# Patient Record
Sex: FEMALE | Race: WHITE | Employment: OTHER
[De-identification: names, ages, dates, MRNs, and addresses within clinical notes are randomized per-mention and may not be internally consistent; named-entity substitution may affect disease eponyms.]

---

## 2017-01-13 ENCOUNTER — OFFICE VISIT (OUTPATIENT)
Dept: PODIATRY | Facility: CLINIC | Age: 67
End: 2017-01-13

## 2017-01-13 VITALS
BODY MASS INDEX: 36.61 KG/M2 | HEART RATE: 83 BPM | TEMPERATURE: 98.3 F | WEIGHT: 220 LBS | DIASTOLIC BLOOD PRESSURE: 80 MMHG | SYSTOLIC BLOOD PRESSURE: 136 MMHG

## 2017-01-13 DIAGNOSIS — M79.672 PAIN IN BOTH FEET: ICD-10-CM

## 2017-01-13 DIAGNOSIS — M79.671 PAIN IN BOTH FEET: ICD-10-CM

## 2017-01-13 DIAGNOSIS — B35.1 ONYCHOMYCOSIS: Primary | ICD-10-CM

## 2017-01-13 PROCEDURE — 11721 DEBRIDE NAIL 6 OR MORE: CPT | Performed by: PODIATRIST

## 2017-02-06 ENCOUNTER — HOSPITAL ENCOUNTER (OUTPATIENT)
Age: 67
Setting detail: SPECIMEN
Discharge: HOME | End: 2017-02-06
Payer: COMMERCIAL

## 2017-02-06 ENCOUNTER — TELEPHONE (OUTPATIENT)
Dept: FAMILY MEDICINE CLINIC | Facility: CLINIC | Age: 67
End: 2017-02-06

## 2017-02-06 LAB
CHOLESTEROL/HDL RATIO: 3.5
CHOLESTEROL: 230 MG/DL
HDLC SERPL-MCNC: 65 MG/DL
HEPATITIS C ANTIBODY: NONREACTIVE
LDL CHOLESTEROL: 131 MG/DL (ref 0–130)
TRIGL SERPL-MCNC: 169 MG/DL
VLDLC SERPL CALC-MCNC: ABNORMAL MG/DL (ref 1–30)

## 2017-02-06 RX ORDER — ATORVASTATIN CALCIUM 40 MG/1
40 TABLET, FILM COATED ORAL DAILY
Qty: 30 TABLET | Refills: 3 | Status: SHIPPED | OUTPATIENT
Start: 2017-02-06 | End: 2018-12-11 | Stop reason: SDDI

## 2017-02-07 ENCOUNTER — TELEPHONE (OUTPATIENT)
Dept: FAMILY MEDICINE CLINIC | Facility: CLINIC | Age: 67
End: 2017-02-07

## 2017-02-17 ENCOUNTER — OFFICE VISIT (OUTPATIENT)
Dept: FAMILY MEDICINE CLINIC | Facility: CLINIC | Age: 67
End: 2017-02-17

## 2017-02-17 VITALS
WEIGHT: 217.8 LBS | DIASTOLIC BLOOD PRESSURE: 69 MMHG | HEART RATE: 77 BPM | SYSTOLIC BLOOD PRESSURE: 119 MMHG | HEIGHT: 65 IN | TEMPERATURE: 96.9 F | BODY MASS INDEX: 36.29 KG/M2

## 2017-02-17 DIAGNOSIS — E78.2 MIXED HYPERLIPIDEMIA: ICD-10-CM

## 2017-02-17 DIAGNOSIS — I10 ESSENTIAL HYPERTENSION: Primary | ICD-10-CM

## 2017-02-17 DIAGNOSIS — M81.0 OSTEOPOROSIS: ICD-10-CM

## 2017-02-17 DIAGNOSIS — M17.12 PRIMARY OSTEOARTHRITIS OF LEFT KNEE: ICD-10-CM

## 2017-02-17 PROCEDURE — 99213 OFFICE O/P EST LOW 20 MIN: CPT

## 2017-02-17 PROCEDURE — 20610 DRAIN/INJ JOINT/BURSA W/O US: CPT

## 2017-02-17 RX ORDER — ALENDRONATE SODIUM 70 MG/1
70 TABLET ORAL
Qty: 4 TABLET | Refills: 5 | Status: SHIPPED | OUTPATIENT
Start: 2017-02-17 | End: 2018-12-11 | Stop reason: SDDI

## 2017-02-17 RX ORDER — ASPIRIN 81 MG/1
TABLET ORAL
Qty: 60 TABLET | Refills: 5 | Status: SHIPPED | OUTPATIENT
Start: 2017-02-17 | End: 2018-12-11 | Stop reason: SDDI

## 2017-02-17 RX ORDER — LIDOCAINE HYDROCHLORIDE 10 MG/ML
2 INJECTION, SOLUTION EPIDURAL; INFILTRATION; INTRACAUDAL; PERINEURAL ONCE
Status: COMPLETED | OUTPATIENT
Start: 2017-02-17 | End: 2017-02-17

## 2017-02-17 RX ORDER — TRIAMCINOLONE ACETONIDE 40 MG/ML
40 INJECTION, SUSPENSION INTRA-ARTICULAR; INTRAMUSCULAR ONCE
Status: COMPLETED | OUTPATIENT
Start: 2017-02-17 | End: 2017-02-17

## 2017-02-17 RX ADMIN — TRIAMCINOLONE ACETONIDE 40 MG: 40 INJECTION, SUSPENSION INTRA-ARTICULAR; INTRAMUSCULAR at 14:37

## 2017-02-17 RX ADMIN — LIDOCAINE HYDROCHLORIDE 2 ML: 10 INJECTION, SOLUTION EPIDURAL; INFILTRATION; INTRACAUDAL; PERINEURAL at 14:36

## 2017-02-17 ASSESSMENT — ENCOUNTER SYMPTOMS
SHORTNESS OF BREATH: 0
CONSTIPATION: 0
VOMITING: 0
DIARRHEA: 0
NAUSEA: 0
WHEEZING: 0
BACK PAIN: 0
COUGH: 0

## 2017-02-17 ASSESSMENT — PATIENT HEALTH QUESTIONNAIRE - PHQ9
SUM OF ALL RESPONSES TO PHQ QUESTIONS 1-9: 1
1. LITTLE INTEREST OR PLEASURE IN DOING THINGS: 1
2. FEELING DOWN, DEPRESSED OR HOPELESS: 0
SUM OF ALL RESPONSES TO PHQ9 QUESTIONS 1 & 2: 1

## 2017-03-09 RX ORDER — LISINOPRIL 20 MG/1
TABLET ORAL
Qty: 90 TABLET | Refills: 0 | Status: SHIPPED | OUTPATIENT
Start: 2017-03-09 | End: 2017-03-22 | Stop reason: SDUPTHER

## 2017-03-22 RX ORDER — LISINOPRIL 20 MG/1
TABLET ORAL
Qty: 90 TABLET | Refills: 0 | Status: SHIPPED | OUTPATIENT
Start: 2017-03-22 | End: 2017-05-18 | Stop reason: SDUPTHER

## 2017-03-24 ENCOUNTER — TELEPHONE (OUTPATIENT)
Dept: FAMILY MEDICINE CLINIC | Age: 67
End: 2017-03-24

## 2017-04-17 ENCOUNTER — OFFICE VISIT (OUTPATIENT)
Dept: FAMILY MEDICINE CLINIC | Age: 67
End: 2017-04-17
Payer: COMMERCIAL

## 2017-04-17 VITALS
SYSTOLIC BLOOD PRESSURE: 127 MMHG | TEMPERATURE: 98.2 F | DIASTOLIC BLOOD PRESSURE: 67 MMHG | WEIGHT: 213 LBS | BODY MASS INDEX: 35.45 KG/M2 | HEART RATE: 88 BPM

## 2017-04-17 DIAGNOSIS — M79.601 RIGHT ARM PAIN: Primary | ICD-10-CM

## 2017-04-17 PROCEDURE — 99213 OFFICE O/P EST LOW 20 MIN: CPT | Performed by: FAMILY MEDICINE

## 2017-04-17 RX ORDER — LEG BRACE
1 EACH MISCELLANEOUS DAILY
Qty: 1 EACH | Refills: 0 | Status: SHIPPED | OUTPATIENT
Start: 2017-04-17

## 2017-04-17 RX ORDER — IBUPROFEN 600 MG/1
600 TABLET ORAL EVERY 6 HOURS PRN
Qty: 30 TABLET | Refills: 1 | Status: SHIPPED | OUTPATIENT
Start: 2017-04-17 | End: 2017-05-18 | Stop reason: SDUPTHER

## 2017-04-17 ASSESSMENT — ENCOUNTER SYMPTOMS
COUGH: 0
NAUSEA: 0
BACK PAIN: 0
DIARRHEA: 0
SORE THROAT: 0
CONSTIPATION: 0
SHORTNESS OF BREATH: 0
ABDOMINAL PAIN: 0
VOMITING: 0

## 2017-04-21 ENCOUNTER — HOSPITAL ENCOUNTER (OUTPATIENT)
Age: 67
Discharge: HOME | End: 2017-04-21
Payer: COMMERCIAL

## 2017-04-21 ENCOUNTER — HOSPITAL ENCOUNTER (OUTPATIENT)
Dept: GENERAL RADIOLOGY | Age: 67
Discharge: HOME | End: 2017-04-21
Payer: COMMERCIAL

## 2017-04-21 DIAGNOSIS — M79.601 RIGHT ARM PAIN: ICD-10-CM

## 2017-04-21 PROCEDURE — 73080 X-RAY EXAM OF ELBOW: CPT

## 2017-04-21 PROCEDURE — 73130 X-RAY EXAM OF HAND: CPT

## 2017-04-21 PROCEDURE — 73090 X-RAY EXAM OF FOREARM: CPT

## 2017-05-18 ENCOUNTER — OFFICE VISIT (OUTPATIENT)
Dept: FAMILY MEDICINE CLINIC | Age: 67
End: 2017-05-18
Payer: COMMERCIAL

## 2017-05-18 VITALS
DIASTOLIC BLOOD PRESSURE: 86 MMHG | HEIGHT: 65 IN | WEIGHT: 215.2 LBS | SYSTOLIC BLOOD PRESSURE: 140 MMHG | TEMPERATURE: 98.7 F | BODY MASS INDEX: 35.85 KG/M2 | HEART RATE: 98 BPM

## 2017-05-18 DIAGNOSIS — S52.124A CLOSED NONDISPLACED FRACTURE OF HEAD OF RIGHT RADIUS, INITIAL ENCOUNTER: ICD-10-CM

## 2017-05-18 DIAGNOSIS — M17.12 PRIMARY OSTEOARTHRITIS OF LEFT KNEE: ICD-10-CM

## 2017-05-18 DIAGNOSIS — I10 ESSENTIAL HYPERTENSION: Primary | ICD-10-CM

## 2017-05-18 PROCEDURE — 99213 OFFICE O/P EST LOW 20 MIN: CPT | Performed by: FAMILY MEDICINE

## 2017-05-18 RX ORDER — LISINOPRIL 20 MG/1
TABLET ORAL
Qty: 90 TABLET | Refills: 0 | Status: SHIPPED | OUTPATIENT
Start: 2017-05-18 | End: 2017-06-19 | Stop reason: SDUPTHER

## 2017-05-18 RX ORDER — IBUPROFEN 600 MG/1
600 TABLET ORAL EVERY 6 HOURS PRN
Qty: 30 TABLET | Refills: 1 | Status: SHIPPED | OUTPATIENT
Start: 2017-05-18 | End: 2017-07-31 | Stop reason: ALTCHOICE

## 2017-05-18 ASSESSMENT — ENCOUNTER SYMPTOMS
COUGH: 0
GASTROINTESTINAL NEGATIVE: 1
SHORTNESS OF BREATH: 0

## 2017-05-19 ENCOUNTER — PROCEDURE VISIT (OUTPATIENT)
Dept: PODIATRY | Age: 67
End: 2017-05-19
Payer: COMMERCIAL

## 2017-05-19 VITALS
HEIGHT: 65 IN | HEART RATE: 83 BPM | DIASTOLIC BLOOD PRESSURE: 57 MMHG | WEIGHT: 217 LBS | BODY MASS INDEX: 36.15 KG/M2 | SYSTOLIC BLOOD PRESSURE: 123 MMHG

## 2017-05-19 DIAGNOSIS — B35.1 ONYCHOMYCOSIS: Primary | ICD-10-CM

## 2017-05-19 DIAGNOSIS — M79.671 PAIN IN BOTH FEET: ICD-10-CM

## 2017-05-19 DIAGNOSIS — L40.9 PSORIASIS: ICD-10-CM

## 2017-05-19 DIAGNOSIS — M79.672 PAIN IN BOTH FEET: ICD-10-CM

## 2017-05-19 PROCEDURE — 11721 DEBRIDE NAIL 6 OR MORE: CPT | Performed by: PODIATRIST

## 2017-05-23 ENCOUNTER — TELEPHONE (OUTPATIENT)
Dept: ORTHOPEDIC SURGERY | Age: 67
End: 2017-05-23

## 2017-05-24 DIAGNOSIS — S52.124A CLOSED NONDISPLACED FRACTURE OF HEAD OF RIGHT RADIUS, INITIAL ENCOUNTER: Primary | ICD-10-CM

## 2017-06-09 ENCOUNTER — HOSPITAL ENCOUNTER (OUTPATIENT)
Age: 67
Discharge: HOME | End: 2017-06-09
Payer: COMMERCIAL

## 2017-06-09 ENCOUNTER — OFFICE VISIT (OUTPATIENT)
Dept: ORTHOPEDIC SURGERY | Age: 67
End: 2017-06-09
Payer: COMMERCIAL

## 2017-06-09 VITALS — HEIGHT: 65 IN | BODY MASS INDEX: 36.14 KG/M2 | WEIGHT: 216.93 LBS

## 2017-06-09 DIAGNOSIS — S52.124A CLOSED NONDISPLACED FRACTURE OF HEAD OF RIGHT RADIUS, INITIAL ENCOUNTER: Primary | ICD-10-CM

## 2017-06-09 LAB
ALT SERPL-CCNC: 20 U/L (ref 5–33)
AST SERPL-CCNC: 18 U/L
CHOLESTEROL, FASTING: 173 MG/DL
CHOLESTEROL/HDL RATIO: 2.2
HDLC SERPL-MCNC: 77 MG/DL
LDL CHOLESTEROL: 67 MG/DL (ref 0–130)
TOTAL CK: 74 U/L (ref 26–192)
TRIGLYCERIDE, FASTING: 147 MG/DL
VLDLC SERPL CALC-MCNC: NORMAL MG/DL (ref 1–30)

## 2017-06-09 PROCEDURE — 80061 LIPID PANEL: CPT

## 2017-06-09 PROCEDURE — 84460 ALANINE AMINO (ALT) (SGPT): CPT

## 2017-06-09 PROCEDURE — 84450 TRANSFERASE (AST) (SGOT): CPT

## 2017-06-09 PROCEDURE — 36415 COLL VENOUS BLD VENIPUNCTURE: CPT

## 2017-06-09 PROCEDURE — 82550 ASSAY OF CK (CPK): CPT

## 2017-06-09 PROCEDURE — 99243 OFF/OP CNSLTJ NEW/EST LOW 30: CPT | Performed by: ORTHOPAEDIC SURGERY

## 2017-06-09 ASSESSMENT — ENCOUNTER SYMPTOMS
DIARRHEA: 0
COUGH: 0
NAUSEA: 0
CONSTIPATION: 0

## 2017-06-19 RX ORDER — LISINOPRIL 20 MG/1
TABLET ORAL
Qty: 90 TABLET | Refills: 0 | Status: SHIPPED | OUTPATIENT
Start: 2017-06-19 | End: 2018-07-17 | Stop reason: SDUPTHER

## 2017-06-28 ENCOUNTER — HOSPITAL ENCOUNTER (OUTPATIENT)
Age: 67
Setting detail: SPECIMEN
Discharge: HOME | End: 2017-06-28
Payer: COMMERCIAL

## 2017-06-28 ENCOUNTER — OFFICE VISIT (OUTPATIENT)
Dept: FAMILY MEDICINE CLINIC | Age: 67
End: 2017-06-28
Payer: COMMERCIAL

## 2017-06-28 VITALS
HEART RATE: 91 BPM | HEIGHT: 65 IN | BODY MASS INDEX: 36.22 KG/M2 | WEIGHT: 217.4 LBS | TEMPERATURE: 98.6 F | SYSTOLIC BLOOD PRESSURE: 119 MMHG | DIASTOLIC BLOOD PRESSURE: 68 MMHG

## 2017-06-28 DIAGNOSIS — Z13.9 SCREENING: ICD-10-CM

## 2017-06-28 DIAGNOSIS — I10 ESSENTIAL HYPERTENSION: ICD-10-CM

## 2017-06-28 DIAGNOSIS — M17.12 PRIMARY OSTEOARTHRITIS OF LEFT KNEE: Primary | ICD-10-CM

## 2017-06-28 LAB
ALBUMIN SERPL-MCNC: 4.2 G/DL (ref 3.5–5.2)
ALBUMIN/GLOBULIN RATIO: 1.3 (ref 1–2.5)
ALP BLD-CCNC: 127 U/L (ref 35–104)
ALT SERPL-CCNC: 19 U/L (ref 5–33)
ANION GAP SERPL CALCULATED.3IONS-SCNC: 15 MMOL/L (ref 9–17)
AST SERPL-CCNC: 19 U/L
BILIRUB SERPL-MCNC: 0.35 MG/DL (ref 0.3–1.2)
BUN BLDV-MCNC: 14 MG/DL (ref 8–23)
BUN/CREAT BLD: ABNORMAL (ref 9–20)
CALCIUM SERPL-MCNC: 9.7 MG/DL (ref 8.6–10.4)
CHLORIDE BLD-SCNC: 94 MMOL/L (ref 98–107)
CHOLESTEROL/HDL RATIO: 2.4
CHOLESTEROL: 185 MG/DL
CO2: 27 MMOL/L (ref 20–31)
CREAT SERPL-MCNC: 0.79 MG/DL (ref 0.5–0.9)
ESTIMATED AVERAGE GLUCOSE: 114 MG/DL
GFR AFRICAN AMERICAN: >60 ML/MIN
GFR NON-AFRICAN AMERICAN: >60 ML/MIN
GFR SERPL CREATININE-BSD FRML MDRD: ABNORMAL ML/MIN/{1.73_M2}
GFR SERPL CREATININE-BSD FRML MDRD: ABNORMAL ML/MIN/{1.73_M2}
GLUCOSE BLD-MCNC: 85 MG/DL (ref 70–99)
HBA1C MFR BLD: 5.6 % (ref 4–6)
HDLC SERPL-MCNC: 78 MG/DL
HEPATITIS C ANTIBODY: NONREACTIVE
LDL CHOLESTEROL: 80 MG/DL (ref 0–130)
POTASSIUM SERPL-SCNC: 4.5 MMOL/L (ref 3.7–5.3)
SODIUM BLD-SCNC: 136 MMOL/L (ref 135–144)
TOTAL PROTEIN: 7.4 G/DL (ref 6.4–8.3)
TRIGL SERPL-MCNC: 136 MG/DL
VLDLC SERPL CALC-MCNC: NORMAL MG/DL (ref 1–30)

## 2017-06-28 PROCEDURE — 20610 DRAIN/INJ JOINT/BURSA W/O US: CPT | Performed by: FAMILY MEDICINE

## 2017-06-28 RX ORDER — METHYLPREDNISOLONE ACETATE 40 MG/ML
40 INJECTION, SUSPENSION INTRA-ARTICULAR; INTRALESIONAL; INTRAMUSCULAR; SOFT TISSUE ONCE
Status: COMPLETED | OUTPATIENT
Start: 2017-06-28 | End: 2017-06-28

## 2017-06-28 RX ORDER — LIDOCAINE HYDROCHLORIDE 10 MG/ML
2 INJECTION, SOLUTION EPIDURAL; INFILTRATION; INTRACAUDAL; PERINEURAL ONCE
Status: COMPLETED | OUTPATIENT
Start: 2017-06-28 | End: 2017-06-28

## 2017-06-28 RX ORDER — LIDOCAINE HYDROCHLORIDE 10 MG/ML
4 INJECTION, SOLUTION EPIDURAL; INFILTRATION; INTRACAUDAL; PERINEURAL ONCE
Status: DISCONTINUED | OUTPATIENT
Start: 2017-06-28 | End: 2017-06-28

## 2017-06-28 RX ADMIN — LIDOCAINE HYDROCHLORIDE 2 ML: 10 INJECTION, SOLUTION EPIDURAL; INFILTRATION; INTRACAUDAL; PERINEURAL at 15:46

## 2017-06-28 RX ADMIN — METHYLPREDNISOLONE ACETATE 40 MG: 40 INJECTION, SUSPENSION INTRA-ARTICULAR; INTRALESIONAL; INTRAMUSCULAR; SOFT TISSUE at 14:35

## 2017-06-28 RX ADMIN — LIDOCAINE HYDROCHLORIDE 2 ML: 10 INJECTION, SOLUTION EPIDURAL; INFILTRATION; INTRACAUDAL; PERINEURAL at 15:50

## 2017-07-18 DIAGNOSIS — S52.124A CLOSED NONDISPLACED FRACTURE OF HEAD OF RIGHT RADIUS, INITIAL ENCOUNTER: Primary | ICD-10-CM

## 2017-07-21 ENCOUNTER — OFFICE VISIT (OUTPATIENT)
Dept: ORTHOPEDIC SURGERY | Age: 67
End: 2017-07-21
Payer: COMMERCIAL

## 2017-07-21 VITALS — WEIGHT: 217.37 LBS | BODY MASS INDEX: 36.22 KG/M2 | HEIGHT: 65 IN

## 2017-07-21 DIAGNOSIS — S52.124D CLOSED NONDISPLACED FRACTURE OF HEAD OF RIGHT RADIUS WITH ROUTINE HEALING, SUBSEQUENT ENCOUNTER: Primary | ICD-10-CM

## 2017-07-21 PROCEDURE — 99213 OFFICE O/P EST LOW 20 MIN: CPT | Performed by: ORTHOPAEDIC SURGERY

## 2017-07-21 ASSESSMENT — ENCOUNTER SYMPTOMS
SHORTNESS OF BREATH: 0
VOICE CHANGE: 0
DIARRHEA: 0
CHOKING: 0
WHEEZING: 0
NAUSEA: 0
VOMITING: 0
CONSTIPATION: 0
COUGH: 0
ABDOMINAL PAIN: 0
TROUBLE SWALLOWING: 0

## 2017-07-31 ENCOUNTER — OFFICE VISIT (OUTPATIENT)
Dept: FAMILY MEDICINE CLINIC | Age: 67
End: 2017-07-31
Payer: COMMERCIAL

## 2017-07-31 VITALS
WEIGHT: 216.4 LBS | TEMPERATURE: 97.6 F | HEART RATE: 77 BPM | DIASTOLIC BLOOD PRESSURE: 74 MMHG | SYSTOLIC BLOOD PRESSURE: 128 MMHG | BODY MASS INDEX: 38.34 KG/M2 | HEIGHT: 63 IN

## 2017-07-31 DIAGNOSIS — G44.219 EPISODIC TENSION-TYPE HEADACHE, NOT INTRACTABLE: ICD-10-CM

## 2017-07-31 DIAGNOSIS — I10 ESSENTIAL HYPERTENSION: Primary | ICD-10-CM

## 2017-07-31 PROCEDURE — 99213 OFFICE O/P EST LOW 20 MIN: CPT | Performed by: INTERNAL MEDICINE

## 2017-07-31 RX ORDER — NAPROXEN 375 MG/1
375 TABLET ORAL 2 TIMES DAILY WITH MEALS
Qty: 60 TABLET | Refills: 1 | Status: SHIPPED | OUTPATIENT
Start: 2017-07-31 | End: 2017-10-27 | Stop reason: SDUPTHER

## 2017-07-31 ASSESSMENT — ENCOUNTER SYMPTOMS
COUGH: 0
SHORTNESS OF BREATH: 0

## 2017-08-25 ENCOUNTER — OFFICE VISIT (OUTPATIENT)
Dept: PODIATRY | Age: 67
End: 2017-08-25
Payer: COMMERCIAL

## 2017-08-25 VITALS
DIASTOLIC BLOOD PRESSURE: 74 MMHG | BODY MASS INDEX: 36.65 KG/M2 | SYSTOLIC BLOOD PRESSURE: 130 MMHG | WEIGHT: 220 LBS | HEART RATE: 84 BPM | TEMPERATURE: 97.9 F | HEIGHT: 65 IN

## 2017-08-25 DIAGNOSIS — M79.672 PAIN IN BOTH FEET: ICD-10-CM

## 2017-08-25 DIAGNOSIS — B35.1 ONYCHOMYCOSIS: Primary | ICD-10-CM

## 2017-08-25 DIAGNOSIS — M79.671 PAIN IN BOTH FEET: ICD-10-CM

## 2017-08-25 PROCEDURE — 11721 DEBRIDE NAIL 6 OR MORE: CPT | Performed by: PODIATRIST

## 2017-09-12 ENCOUNTER — TELEPHONE (OUTPATIENT)
Dept: PHARMACY | Age: 67
End: 2017-09-12

## 2017-09-12 ENCOUNTER — OFFICE VISIT (OUTPATIENT)
Dept: FAMILY MEDICINE CLINIC | Age: 67
End: 2017-09-12
Payer: COMMERCIAL

## 2017-09-12 VITALS
HEIGHT: 65 IN | DIASTOLIC BLOOD PRESSURE: 83 MMHG | WEIGHT: 218 LBS | HEART RATE: 101 BPM | TEMPERATURE: 97.9 F | SYSTOLIC BLOOD PRESSURE: 137 MMHG | BODY MASS INDEX: 36.32 KG/M2

## 2017-09-12 DIAGNOSIS — J44.9 CHRONIC OBSTRUCTIVE PULMONARY DISEASE, UNSPECIFIED COPD TYPE (HCC): Primary | ICD-10-CM

## 2017-09-12 DIAGNOSIS — Z23 NEED FOR INFLUENZA VACCINATION: ICD-10-CM

## 2017-09-12 DIAGNOSIS — F17.200 SMOKER: ICD-10-CM

## 2017-09-12 PROCEDURE — 90688 IIV4 VACCINE SPLT 0.5 ML IM: CPT | Performed by: INTERNAL MEDICINE

## 2017-09-12 PROCEDURE — 90471 IMMUNIZATION ADMIN: CPT | Performed by: INTERNAL MEDICINE

## 2017-09-12 PROCEDURE — 99213 OFFICE O/P EST LOW 20 MIN: CPT | Performed by: INTERNAL MEDICINE

## 2017-09-12 RX ORDER — TRAZODONE HYDROCHLORIDE 100 MG/1
TABLET ORAL
COMMUNITY
Start: 2017-08-09

## 2017-09-12 ASSESSMENT — ENCOUNTER SYMPTOMS: ABDOMINAL PAIN: 0

## 2017-09-19 ENCOUNTER — HOSPITAL ENCOUNTER (OUTPATIENT)
Dept: PULMONOLOGY | Age: 67
Discharge: HOME | End: 2017-09-19
Payer: COMMERCIAL

## 2017-09-19 DIAGNOSIS — F17.200 SMOKER: ICD-10-CM

## 2017-09-19 DIAGNOSIS — J44.9 CHRONIC OBSTRUCTIVE PULMONARY DISEASE, UNSPECIFIED COPD TYPE (HCC): ICD-10-CM

## 2017-09-19 PROCEDURE — 94726 PLETHYSMOGRAPHY LUNG VOLUMES: CPT

## 2017-09-19 PROCEDURE — 94010 BREATHING CAPACITY TEST: CPT

## 2017-09-19 PROCEDURE — 94728 AIRWY RESIST BY OSCILLOMETRY: CPT

## 2017-10-27 ENCOUNTER — OFFICE VISIT (OUTPATIENT)
Dept: FAMILY MEDICINE CLINIC | Age: 67
End: 2017-10-27
Payer: COMMERCIAL

## 2017-10-27 VITALS
OXYGEN SATURATION: 98 % | SYSTOLIC BLOOD PRESSURE: 133 MMHG | DIASTOLIC BLOOD PRESSURE: 75 MMHG | TEMPERATURE: 97.7 F | BODY MASS INDEX: 36.59 KG/M2 | WEIGHT: 219.6 LBS | HEART RATE: 71 BPM | HEIGHT: 65 IN

## 2017-10-27 DIAGNOSIS — G44.219 EPISODIC TENSION-TYPE HEADACHE, NOT INTRACTABLE: ICD-10-CM

## 2017-10-27 DIAGNOSIS — Z12.39 BREAST CANCER SCREENING: ICD-10-CM

## 2017-10-27 DIAGNOSIS — J44.9 CHRONIC OBSTRUCTIVE PULMONARY DISEASE, UNSPECIFIED COPD TYPE (HCC): Primary | ICD-10-CM

## 2017-10-27 PROCEDURE — 99213 OFFICE O/P EST LOW 20 MIN: CPT | Performed by: INTERNAL MEDICINE

## 2017-10-27 RX ORDER — NAPROXEN 375 MG/1
375 TABLET ORAL 2 TIMES DAILY WITH MEALS
Qty: 60 TABLET | Refills: 1 | Status: SHIPPED | OUTPATIENT
Start: 2017-10-27 | End: 2017-12-11 | Stop reason: SDUPTHER

## 2017-10-27 ASSESSMENT — ENCOUNTER SYMPTOMS
COUGH: 1
ANAL BLEEDING: 0

## 2017-11-27 ENCOUNTER — OFFICE VISIT (OUTPATIENT)
Dept: FAMILY MEDICINE CLINIC | Age: 67
End: 2017-11-27
Payer: COMMERCIAL

## 2017-11-27 ENCOUNTER — TELEPHONE (OUTPATIENT)
Dept: PHARMACY | Age: 67
End: 2017-11-27

## 2017-11-27 VITALS
TEMPERATURE: 99.2 F | BODY MASS INDEX: 37.09 KG/M2 | WEIGHT: 222.6 LBS | HEART RATE: 110 BPM | HEIGHT: 65 IN | SYSTOLIC BLOOD PRESSURE: 147 MMHG | DIASTOLIC BLOOD PRESSURE: 86 MMHG

## 2017-11-27 DIAGNOSIS — J21.8 ACUTE BRONCHIOLITIS DUE TO OTHER SPECIFIED ORGANISMS: Primary | ICD-10-CM

## 2017-11-27 DIAGNOSIS — J42 CHRONIC BRONCHITIS, UNSPECIFIED CHRONIC BRONCHITIS TYPE (HCC): ICD-10-CM

## 2017-11-27 PROCEDURE — 3023F SPIROM DOC REV: CPT | Performed by: FAMILY MEDICINE

## 2017-11-27 PROCEDURE — 1123F ACP DISCUSS/DSCN MKR DOCD: CPT | Performed by: FAMILY MEDICINE

## 2017-11-27 PROCEDURE — 3014F SCREEN MAMMO DOC REV: CPT | Performed by: FAMILY MEDICINE

## 2017-11-27 PROCEDURE — 4004F PT TOBACCO SCREEN RCVD TLK: CPT | Performed by: FAMILY MEDICINE

## 2017-11-27 PROCEDURE — G8399 PT W/DXA RESULTS DOCUMENT: HCPCS | Performed by: FAMILY MEDICINE

## 2017-11-27 PROCEDURE — 3017F COLORECTAL CA SCREEN DOC REV: CPT | Performed by: FAMILY MEDICINE

## 2017-11-27 PROCEDURE — 1090F PRES/ABSN URINE INCON ASSESS: CPT | Performed by: FAMILY MEDICINE

## 2017-11-27 PROCEDURE — G8926 SPIRO NO PERF OR DOC: HCPCS | Performed by: FAMILY MEDICINE

## 2017-11-27 PROCEDURE — 4040F PNEUMOC VAC/ADMIN/RCVD: CPT | Performed by: FAMILY MEDICINE

## 2017-11-27 PROCEDURE — G8484 FLU IMMUNIZE NO ADMIN: HCPCS | Performed by: FAMILY MEDICINE

## 2017-11-27 PROCEDURE — 99213 OFFICE O/P EST LOW 20 MIN: CPT | Performed by: FAMILY MEDICINE

## 2017-11-27 PROCEDURE — G8417 CALC BMI ABV UP PARAM F/U: HCPCS | Performed by: FAMILY MEDICINE

## 2017-11-27 PROCEDURE — G8427 DOCREV CUR MEDS BY ELIG CLIN: HCPCS | Performed by: FAMILY MEDICINE

## 2017-11-27 RX ORDER — ALBUTEROL SULFATE 90 UG/1
2 AEROSOL, METERED RESPIRATORY (INHALATION) EVERY 6 HOURS PRN
Qty: 1 INHALER | Refills: 5 | Status: SHIPPED | OUTPATIENT
Start: 2017-11-27 | End: 2018-05-07 | Stop reason: SDUPTHER

## 2017-11-27 RX ORDER — PREDNISONE 20 MG/1
20 TABLET ORAL 2 TIMES DAILY
Qty: 10 TABLET | Refills: 0 | Status: SHIPPED | OUTPATIENT
Start: 2017-11-27 | End: 2017-12-02

## 2017-11-27 ASSESSMENT — ENCOUNTER SYMPTOMS
HEMOPTYSIS: 0
SORE THROAT: 0
COUGH: 1
SHORTNESS OF BREATH: 0

## 2017-11-27 NOTE — PROGRESS NOTES
Subjective:      Patient ID: Nancy Chatman is a 78 y.o. female. Cough with sputum -white  No blood seen. Cough   This is a new problem. The current episode started in the past 7 days. The problem has been unchanged. The problem occurs hourly. The cough is productive of sputum. Pertinent negatives include no chest pain, ear pain, fever, hemoptysis, sore throat or shortness of breath. Risk factors for lung disease include smoking/tobacco exposure (passive exposure - lady frien in her house). The treatment provided no relief. Her past medical history is significant for bronchitis. There is no history of bronchiectasis. Review of Systems   Constitutional: Negative for fever. HENT: Negative for ear pain and sore throat. Respiratory: Positive for cough. Negative for hemoptysis and shortness of breath. Cardiovascular: Negative for chest pain. Objective:   Physical Exam   Constitutional: She is oriented to person, place, and time. She appears well-developed and well-nourished. HENT:   Head: Normocephalic and atraumatic. Eyes: Conjunctivae are normal.   Cardiovascular: Normal rate and regular rhythm. Pulmonary/Chest: Effort normal and breath sounds normal.   Neurological: She is alert and oriented to person, place, and time. Skin: Skin is warm and dry. Psychiatric: She has a normal mood and affect. Her behavior is normal. Judgment and thought content normal.       Assessment:      1. Acute bronchiolitis due to other specified organisms  predniSONE (DELTASONE) 20 MG tablet    albuterol sulfate HFA (VENTOLIN HFA) 108 (90 Base) MCG/ACT inhaler   2. Chronic bronchitis, unspecified chronic bronchitis type (HCC)  albuterol sulfate HFA (VENTOLIN HFA) 108 (90 Base) MCG/ACT inhaler           Plan:      The patient is asked to return in 2 weeks      Verify condition is resolved - compliance with medications.

## 2017-11-27 NOTE — TELEPHONE ENCOUNTER
Medication Management - Inhaler Technique Follow-Up    Karsten Rizzo is a 78 y.o. female who was called by pharmacy for follow-up post inhaler education for COPD management. Patients CAT score today is: Not assessed today as patient currently has acute bronchitis. Patient is currently using the following inhaler(s)  1. Proair  · Frequency of Use: Patient reports using this 2 times daily, 2 puffs each time. She stated that she has not increased her inhaler use due to her bronchitis, but does need a refill on this medication. Technique for the use of Proair was reiterated. Patient verbalized understanding. All questions were answered. Recommendations/Interventions: PFTs performed at end of September with spirometry in normal range. No changes had been recommended by patient's PCP, Dr. Siddharth Trinidad after PFTs completed. Patient reports using her rescue inhaler less than at initial visit, despite respiratory illness. Recommended to Dr. Joe Del Real that rescue inhaler be refilled at this time, and he placed the order. Medication Management will have no further follow-up at this time. Medication Management is available in the future for any further medication needs. [employer], Pharm. Yancy Paul  PGY2 Ambulatory Care Pharmacy Resident  [facility]) Medication Management Service  [phone]

## 2017-12-08 ENCOUNTER — OFFICE VISIT (OUTPATIENT)
Dept: PODIATRY | Age: 67
End: 2017-12-08
Payer: COMMERCIAL

## 2017-12-08 VITALS
HEIGHT: 65 IN | SYSTOLIC BLOOD PRESSURE: 108 MMHG | WEIGHT: 217 LBS | HEART RATE: 90 BPM | DIASTOLIC BLOOD PRESSURE: 76 MMHG | BODY MASS INDEX: 36.15 KG/M2 | TEMPERATURE: 98.2 F

## 2017-12-08 DIAGNOSIS — M79.671 PAIN IN BOTH FEET: ICD-10-CM

## 2017-12-08 DIAGNOSIS — M79.672 PAIN IN BOTH FEET: ICD-10-CM

## 2017-12-08 DIAGNOSIS — B35.1 ONYCHOMYCOSIS: Primary | ICD-10-CM

## 2017-12-08 PROCEDURE — 3017F COLORECTAL CA SCREEN DOC REV: CPT | Performed by: STUDENT IN AN ORGANIZED HEALTH CARE EDUCATION/TRAINING PROGRAM

## 2017-12-08 PROCEDURE — 1090F PRES/ABSN URINE INCON ASSESS: CPT | Performed by: STUDENT IN AN ORGANIZED HEALTH CARE EDUCATION/TRAINING PROGRAM

## 2017-12-08 PROCEDURE — 3014F SCREEN MAMMO DOC REV: CPT | Performed by: STUDENT IN AN ORGANIZED HEALTH CARE EDUCATION/TRAINING PROGRAM

## 2017-12-08 PROCEDURE — 11721 DEBRIDE NAIL 6 OR MORE: CPT | Performed by: STUDENT IN AN ORGANIZED HEALTH CARE EDUCATION/TRAINING PROGRAM

## 2017-12-08 PROCEDURE — G8417 CALC BMI ABV UP PARAM F/U: HCPCS | Performed by: STUDENT IN AN ORGANIZED HEALTH CARE EDUCATION/TRAINING PROGRAM

## 2017-12-08 PROCEDURE — 4004F PT TOBACCO SCREEN RCVD TLK: CPT | Performed by: STUDENT IN AN ORGANIZED HEALTH CARE EDUCATION/TRAINING PROGRAM

## 2017-12-08 PROCEDURE — G8399 PT W/DXA RESULTS DOCUMENT: HCPCS | Performed by: STUDENT IN AN ORGANIZED HEALTH CARE EDUCATION/TRAINING PROGRAM

## 2017-12-08 PROCEDURE — G8427 DOCREV CUR MEDS BY ELIG CLIN: HCPCS | Performed by: STUDENT IN AN ORGANIZED HEALTH CARE EDUCATION/TRAINING PROGRAM

## 2017-12-08 PROCEDURE — 4040F PNEUMOC VAC/ADMIN/RCVD: CPT | Performed by: STUDENT IN AN ORGANIZED HEALTH CARE EDUCATION/TRAINING PROGRAM

## 2017-12-08 PROCEDURE — G8484 FLU IMMUNIZE NO ADMIN: HCPCS | Performed by: STUDENT IN AN ORGANIZED HEALTH CARE EDUCATION/TRAINING PROGRAM

## 2017-12-08 PROCEDURE — 1123F ACP DISCUSS/DSCN MKR DOCD: CPT | Performed by: STUDENT IN AN ORGANIZED HEALTH CARE EDUCATION/TRAINING PROGRAM

## 2017-12-08 NOTE — PROGRESS NOTES
Patient instructed to remove shoes and socks, instructed to sit in exam chair. Current PCP name is Dr. Sundeep Gregg and date of last visit 11/27/17. Do you have a follow up visit scheduled?   Yes or no    If yes the date is 12/11/17

## 2017-12-08 NOTE — PROGRESS NOTES
Subjective:      Patient ID: Nancy Chatman is a nondiabetic 75 y.o. female presenting to the clinic for follow up on fungal nails. She is only complaining of painful, elongated toes today. Patient has psoriasis which is under control at this time. No other complaints currently. Patient states her psoriatic plaques have cleared up. Review of Systems  Denies n/v/f/c    Objective:     Vitals:    12/08/17 1439   BP: 108/76   Pulse: 90   Temp: 98.2 °F (36.8 °C)       Physical Exam  Gen: AAOx3, NAD    Vascular: DP and PT pulses are palpable 1/4, bilateral. CFT is brisk to all digits. Skin temp is warm to cool proximal to distal, bilateral. No appreciable edema or varicosities noted, bilateral. Hair growth absent to digits, bilateral.     Neurologic: Sharp/dull discrimination intact, bilateral. Protective sensation intact to 10 /10 pedal sites as tested with [place]-Da Monofilament 5.07g.     Dermatologic: Nails 1-10 are thickened, elongated, and discolored with the presence of subungual debris. Webspaces 1-4 are c/d/i, bilateral. No rash or lesions. No open wounds. Excoriations noted to plantar medial right foot, pt states cat scratched her. No erythema, edema, purulence noted. Musculoskeletal: Muscle strength 5/5 for all LE muscle groups. No gross deformities noted, bilateral.     10/31/14: Left anterior ankle biopsy: PSORIASIFORM DERMATITIS, CONSISTENT WITH PSORIASIS VULGARIS. Assessment:      1. Onychomycosis  41451 - TX DEBRIDEMENT OF NAILS, 6 OR MORE   2. Pain in both feet  36960 - TX DEBRIDEMENT OF NAILS, 6 OR MORE         Plan:      Patient seen and evaluated  Nails 1-5 debrided in thickness and length without incident   Educated on the importance of daily foot exams and what to look for, including open sores, foreign bodies, and signs of infection. Instructed to wear supportive tennis shoes   Continue use of dovonex if psoriatic lesions develop.   RTC in 3 months or sooner should problem arise     Electronically signed by Darinel Lee DPM on 12/8/2017 at 3:15 PM     I performed a history and physical examination of the patient and discussed management with the resident. I reviewed the residents note and agree with the documented findings and plan of care. Any areas of disagreement are noted on the chart. I was personally present for the key portions of any procedures. I have documented in the chart those procedures where I was not present during the key portions. I have reviewed the Podiatry Resident progress note. I agree with the chief complaint, past medical history, past surgical history, allergies, medications, social and family history as documented unless otherwise noted below. Documentation of the HPI, Physical Exam and Medical Decision Making performed by medical students or scribes is based on my personal performance of the HPI, PE and MDM. I have personally evaluated this patient and have completed at least one if not all key elements of the E/M (history, physical exam, and MDM). Additional findings are as noted.      Electronically signed by Mynor Medeiros DPM on 12/8/2017 at 4:16 PM

## 2017-12-11 ENCOUNTER — HOSPITAL ENCOUNTER (OUTPATIENT)
Age: 67
Setting detail: SPECIMEN
Discharge: HOME | End: 2017-12-11
Payer: COMMERCIAL

## 2017-12-11 ENCOUNTER — OFFICE VISIT (OUTPATIENT)
Dept: FAMILY MEDICINE CLINIC | Age: 67
End: 2017-12-11
Payer: COMMERCIAL

## 2017-12-11 VITALS
SYSTOLIC BLOOD PRESSURE: 128 MMHG | HEART RATE: 95 BPM | TEMPERATURE: 97.8 F | WEIGHT: 219.8 LBS | BODY MASS INDEX: 36.62 KG/M2 | HEIGHT: 65 IN | DIASTOLIC BLOOD PRESSURE: 69 MMHG

## 2017-12-11 DIAGNOSIS — M25.562 CHRONIC PAIN OF LEFT KNEE: ICD-10-CM

## 2017-12-11 DIAGNOSIS — G89.29 CHRONIC PAIN OF LEFT KNEE: ICD-10-CM

## 2017-12-11 DIAGNOSIS — I10 ESSENTIAL HYPERTENSION: Primary | ICD-10-CM

## 2017-12-11 LAB
CREATININE URINE: 48.2 MG/DL (ref 28–217)
MICROALBUMIN/CREAT 24H UR: <12 MG/L
MICROALBUMIN/CREAT UR-RTO: NORMAL MCG/MG CREAT

## 2017-12-11 PROCEDURE — 4004F PT TOBACCO SCREEN RCVD TLK: CPT | Performed by: INTERNAL MEDICINE

## 2017-12-11 PROCEDURE — 1123F ACP DISCUSS/DSCN MKR DOCD: CPT | Performed by: INTERNAL MEDICINE

## 2017-12-11 PROCEDURE — 1090F PRES/ABSN URINE INCON ASSESS: CPT | Performed by: INTERNAL MEDICINE

## 2017-12-11 PROCEDURE — 4040F PNEUMOC VAC/ADMIN/RCVD: CPT | Performed by: INTERNAL MEDICINE

## 2017-12-11 PROCEDURE — G8427 DOCREV CUR MEDS BY ELIG CLIN: HCPCS | Performed by: INTERNAL MEDICINE

## 2017-12-11 PROCEDURE — G8484 FLU IMMUNIZE NO ADMIN: HCPCS | Performed by: INTERNAL MEDICINE

## 2017-12-11 PROCEDURE — 3017F COLORECTAL CA SCREEN DOC REV: CPT | Performed by: INTERNAL MEDICINE

## 2017-12-11 PROCEDURE — G8399 PT W/DXA RESULTS DOCUMENT: HCPCS | Performed by: INTERNAL MEDICINE

## 2017-12-11 PROCEDURE — 99213 OFFICE O/P EST LOW 20 MIN: CPT | Performed by: INTERNAL MEDICINE

## 2017-12-11 PROCEDURE — G8417 CALC BMI ABV UP PARAM F/U: HCPCS | Performed by: INTERNAL MEDICINE

## 2017-12-11 PROCEDURE — 3014F SCREEN MAMMO DOC REV: CPT | Performed by: INTERNAL MEDICINE

## 2017-12-11 RX ORDER — NAPROXEN 375 MG/1
375 TABLET ORAL 2 TIMES DAILY WITH MEALS
Qty: 60 TABLET | Refills: 1 | Status: SHIPPED | OUTPATIENT
Start: 2017-12-11 | End: 2018-07-17 | Stop reason: SDUPTHER

## 2017-12-11 ASSESSMENT — ENCOUNTER SYMPTOMS
SHORTNESS OF BREATH: 0
COUGH: 0

## 2017-12-11 NOTE — PROGRESS NOTES
Subjective: Analy Vicente is a 75 y.o. female with  has a past medical history of Arthritis; Bronchitis; Chronic obstructive pulmonary disease ([address] Colon polyps; Dental neglect; Depression; Environmental allergies; GERD (gastroesophageal reflux disease); Hyperlipidemia; Hypertension; Obesity; Onychomycosis; Poor historian; RBBB; Treadmill stress test negative for angina pectoris; and Wears glasses. Family History   Problem Relation Age of Onset    Heart Disease Mother    [employer] Cancer Father        Presented to the office today for:  Chief Complaint   Patient presents with    1 Month Follow-Up     bronchitis     Knee Pain     left knee       HPI  Pt is here for:    HTN:  Her BP is controlled. She is taking lisinopril 20 and lopressor 25 BID. She denies chest pain or flank pain. No new numbness or weakness. Left knee pain:  She has chronic left knee pain that is there over the past few years. She had XR on 2015 with no abnormalities. She had PT of the knee but states she does not want to do it again. She takes ibuprofen as needed for the pain. Last BMP from this year shows normal creatinine. Review of Systems   Constitutional: Negative for chills and fever. Respiratory: Negative for cough and shortness of breath. Cardiovascular: Negative for chest pain. Musculoskeletal: Positive for arthralgias. Objective:    /69 (Site: Left Arm, Position: Sitting, Cuff Size: Large Adult)   Pulse 95   Temp 97.8 °F (36.6 °C) (Tympanic)   Ht 5' 5\" (1.651 m)   Wt 219 lb 12.8 oz (99.7 kg)   BMI 36.58 kg/m²    BP Readings from Last 3 Encounters:   12/11/17 128/69   12/08/17 108/76   11/27/17 (!) 147/86       Physical Exam   Constitutional: She appears well-developed and well-nourished. Cardiovascular: Normal rate, regular rhythm and normal heart sounds. Pulmonary/Chest: Effort normal and breath sounds normal.   Musculoskeletal:   Positive for diffuse tenderness of left knee with normal ROM.

## 2017-12-15 ENCOUNTER — HOSPITAL ENCOUNTER (OUTPATIENT)
Dept: GENERAL RADIOLOGY | Age: 67
Discharge: HOME | End: 2017-12-15
Payer: COMMERCIAL

## 2017-12-15 ENCOUNTER — HOSPITAL ENCOUNTER (OUTPATIENT)
Dept: MAMMOGRAPHY | Age: 67
Discharge: HOME | End: 2017-12-15
Payer: COMMERCIAL

## 2017-12-15 ENCOUNTER — HOSPITAL ENCOUNTER (OUTPATIENT)
Age: 67
Discharge: HOME | End: 2017-12-15
Payer: COMMERCIAL

## 2017-12-15 DIAGNOSIS — Z12.39 BREAST CANCER SCREENING: ICD-10-CM

## 2017-12-15 DIAGNOSIS — M25.562 CHRONIC PAIN OF LEFT KNEE: ICD-10-CM

## 2017-12-15 DIAGNOSIS — G89.29 CHRONIC PAIN OF LEFT KNEE: ICD-10-CM

## 2017-12-15 PROCEDURE — G0202 SCR MAMMO BI INCL CAD: HCPCS

## 2017-12-15 PROCEDURE — 73562 X-RAY EXAM OF KNEE 3: CPT

## 2018-01-25 ENCOUNTER — OFFICE VISIT (OUTPATIENT)
Dept: FAMILY MEDICINE CLINIC | Age: 68
End: 2018-01-25
Payer: COMMERCIAL

## 2018-01-25 VITALS — HEART RATE: 94 BPM | SYSTOLIC BLOOD PRESSURE: 120 MMHG | TEMPERATURE: 97.2 F | DIASTOLIC BLOOD PRESSURE: 77 MMHG

## 2018-01-25 DIAGNOSIS — E66.09 CLASS 2 OBESITY DUE TO EXCESS CALORIES WITH BODY MASS INDEX (BMI) OF 36.0 TO 36.9 IN ADULT, UNSPECIFIED WHETHER SERIOUS COMORBIDITY PRESENT: ICD-10-CM

## 2018-01-25 DIAGNOSIS — M25.462 KNEE EFFUSION, LEFT: Primary | ICD-10-CM

## 2018-01-25 PROCEDURE — 4004F PT TOBACCO SCREEN RCVD TLK: CPT | Performed by: INTERNAL MEDICINE

## 2018-01-25 PROCEDURE — 4040F PNEUMOC VAC/ADMIN/RCVD: CPT | Performed by: INTERNAL MEDICINE

## 2018-01-25 PROCEDURE — 1123F ACP DISCUSS/DSCN MKR DOCD: CPT | Performed by: INTERNAL MEDICINE

## 2018-01-25 PROCEDURE — 1090F PRES/ABSN URINE INCON ASSESS: CPT | Performed by: INTERNAL MEDICINE

## 2018-01-25 PROCEDURE — 3014F SCREEN MAMMO DOC REV: CPT | Performed by: INTERNAL MEDICINE

## 2018-01-25 PROCEDURE — G8427 DOCREV CUR MEDS BY ELIG CLIN: HCPCS | Performed by: INTERNAL MEDICINE

## 2018-01-25 PROCEDURE — G8417 CALC BMI ABV UP PARAM F/U: HCPCS | Performed by: INTERNAL MEDICINE

## 2018-01-25 PROCEDURE — G8399 PT W/DXA RESULTS DOCUMENT: HCPCS | Performed by: INTERNAL MEDICINE

## 2018-01-25 PROCEDURE — 3017F COLORECTAL CA SCREEN DOC REV: CPT | Performed by: INTERNAL MEDICINE

## 2018-01-25 PROCEDURE — 99213 OFFICE O/P EST LOW 20 MIN: CPT | Performed by: INTERNAL MEDICINE

## 2018-01-25 PROCEDURE — G8484 FLU IMMUNIZE NO ADMIN: HCPCS | Performed by: INTERNAL MEDICINE

## 2018-01-25 ASSESSMENT — ENCOUNTER SYMPTOMS
COUGH: 0
SHORTNESS OF BREATH: 0
ABDOMINAL PAIN: 0

## 2018-01-25 NOTE — PROGRESS NOTES
Diabetic visit information    BP Readings from Last 3 Encounters:   01/25/18 120/77   12/11/17 128/69   12/08/17 108/76       Hemoglobin A1C (%)   Date Value   06/28/2017 5.6   11/21/2016 5.3   08/14/2015 5.7     Microalb/Crt. Ratio (mcg/mg creat)   Date Value   12/11/2017 CANNOT BE CALCULATED     LDL Cholesterol (mg/dL)   Date Value   06/28/2017 80               Have you changed or started any medications since your last visit including any over-the-counter medicines, vitamins, or herbal medicines? no   Have you stopped taking any of your medications? Is so, why? -  no  Are you having any side effects from any of your medications? - no    Have you seen any other physician or provider since your last visit?  no   Have you had any other diagnostic tests since your last visit?  no   Have you been seen in the emergency room and/or had an admission in a hospital since we last saw you?  no     Have you had your annual diabetic retinal (eye) exam? No   (ensure copy of exam is in the chart)    Have you had your routine dental cleaning in the past 6 months? no    Do you have an active [employer] account? If not, what are your barriers? Yes    Patient Care Team:  Lennox Som, MD as PCP - General (Family Medicine)  Lamar Yeager DPM as Consulting Physician (Podiatry)  Tiara Roberts DO as Consulting Physician (General Surgery)    Medical history Review  Past Medical, Family, and Social History reviewed and does contribute to the patient presenting condition.     Health Maintenance   Topic Date Due    Diabetic foot exam  [birth year]04/1960    Diabetic retinal exam  12/04/1959    Pneumococcal low/med risk (2 of 2 - PPSV23) 04/24/2018    A1C test (Diabetic or Prediabetic)  06/28/2018    Lipid screen  06/28/2018    Potassium monitoring  06/28/2018    Creatinine monitoring  06/28/2018    Diabetic microalbuminuria test  12/11/2018    Breast cancer screen  12/15/2019    Colon cancer screen colonoscopy  06/11/2023    DTaP/Tdap/Td vaccine (2 - Td) 08/17/2026    Zostavax vaccine  Addressed    DEXA (modify frequency per FRAX score)  Completed    Flu vaccine  Completed    Hepatitis C screen  Completed

## 2018-01-30 ENCOUNTER — TELEPHONE (OUTPATIENT)
Dept: ORTHOPEDIC SURGERY | Age: 68
End: 2018-01-30

## 2018-02-12 ENCOUNTER — OFFICE VISIT (OUTPATIENT)
Dept: ORTHOPEDIC SURGERY | Age: 68
End: 2018-02-12
Payer: COMMERCIAL

## 2018-02-12 VITALS — WEIGHT: 219.8 LBS | BODY MASS INDEX: 36.62 KG/M2 | HEIGHT: 65 IN

## 2018-02-12 DIAGNOSIS — M17.12 ARTHRITIS OF LEFT KNEE: Primary | ICD-10-CM

## 2018-02-12 PROCEDURE — G8417 CALC BMI ABV UP PARAM F/U: HCPCS | Performed by: STUDENT IN AN ORGANIZED HEALTH CARE EDUCATION/TRAINING PROGRAM

## 2018-02-12 PROCEDURE — 4040F PNEUMOC VAC/ADMIN/RCVD: CPT | Performed by: STUDENT IN AN ORGANIZED HEALTH CARE EDUCATION/TRAINING PROGRAM

## 2018-02-12 PROCEDURE — G8399 PT W/DXA RESULTS DOCUMENT: HCPCS | Performed by: STUDENT IN AN ORGANIZED HEALTH CARE EDUCATION/TRAINING PROGRAM

## 2018-02-12 PROCEDURE — 20610 DRAIN/INJ JOINT/BURSA W/O US: CPT | Performed by: STUDENT IN AN ORGANIZED HEALTH CARE EDUCATION/TRAINING PROGRAM

## 2018-02-12 PROCEDURE — 4004F PT TOBACCO SCREEN RCVD TLK: CPT | Performed by: STUDENT IN AN ORGANIZED HEALTH CARE EDUCATION/TRAINING PROGRAM

## 2018-02-12 PROCEDURE — G8484 FLU IMMUNIZE NO ADMIN: HCPCS | Performed by: STUDENT IN AN ORGANIZED HEALTH CARE EDUCATION/TRAINING PROGRAM

## 2018-02-12 PROCEDURE — 99213 OFFICE O/P EST LOW 20 MIN: CPT | Performed by: STUDENT IN AN ORGANIZED HEALTH CARE EDUCATION/TRAINING PROGRAM

## 2018-02-12 PROCEDURE — 3017F COLORECTAL CA SCREEN DOC REV: CPT | Performed by: STUDENT IN AN ORGANIZED HEALTH CARE EDUCATION/TRAINING PROGRAM

## 2018-02-12 PROCEDURE — 3014F SCREEN MAMMO DOC REV: CPT | Performed by: STUDENT IN AN ORGANIZED HEALTH CARE EDUCATION/TRAINING PROGRAM

## 2018-02-12 PROCEDURE — G8427 DOCREV CUR MEDS BY ELIG CLIN: HCPCS | Performed by: STUDENT IN AN ORGANIZED HEALTH CARE EDUCATION/TRAINING PROGRAM

## 2018-02-12 PROCEDURE — 1123F ACP DISCUSS/DSCN MKR DOCD: CPT | Performed by: STUDENT IN AN ORGANIZED HEALTH CARE EDUCATION/TRAINING PROGRAM

## 2018-02-12 PROCEDURE — 1090F PRES/ABSN URINE INCON ASSESS: CPT | Performed by: STUDENT IN AN ORGANIZED HEALTH CARE EDUCATION/TRAINING PROGRAM

## 2018-02-12 ASSESSMENT — ENCOUNTER SYMPTOMS
SHORTNESS OF BREATH: 0
ABDOMINAL DISTENTION: 0
WHEEZING: 0

## 2018-03-09 ENCOUNTER — OFFICE VISIT (OUTPATIENT)
Dept: PODIATRY | Age: 68
End: 2018-03-09
Payer: COMMERCIAL

## 2018-03-09 VITALS
HEIGHT: 65 IN | WEIGHT: 225 LBS | DIASTOLIC BLOOD PRESSURE: 84 MMHG | BODY MASS INDEX: 37.49 KG/M2 | SYSTOLIC BLOOD PRESSURE: 138 MMHG | HEART RATE: 86 BPM

## 2018-03-09 DIAGNOSIS — M15.9 OSTEOARTHRITIS OF MULTIPLE JOINTS, UNSPECIFIED OSTEOARTHRITIS TYPE: ICD-10-CM

## 2018-03-09 DIAGNOSIS — M79.671 PAIN IN BOTH FEET: ICD-10-CM

## 2018-03-09 DIAGNOSIS — L40.9 PSORIASIS: ICD-10-CM

## 2018-03-09 DIAGNOSIS — B35.1 ONYCHOMYCOSIS: Primary | ICD-10-CM

## 2018-03-09 DIAGNOSIS — M79.672 PAIN IN BOTH FEET: ICD-10-CM

## 2018-03-09 PROCEDURE — 4040F PNEUMOC VAC/ADMIN/RCVD: CPT | Performed by: STUDENT IN AN ORGANIZED HEALTH CARE EDUCATION/TRAINING PROGRAM

## 2018-03-09 PROCEDURE — 4004F PT TOBACCO SCREEN RCVD TLK: CPT | Performed by: STUDENT IN AN ORGANIZED HEALTH CARE EDUCATION/TRAINING PROGRAM

## 2018-03-09 PROCEDURE — 1090F PRES/ABSN URINE INCON ASSESS: CPT | Performed by: STUDENT IN AN ORGANIZED HEALTH CARE EDUCATION/TRAINING PROGRAM

## 2018-03-09 PROCEDURE — 3017F COLORECTAL CA SCREEN DOC REV: CPT | Performed by: STUDENT IN AN ORGANIZED HEALTH CARE EDUCATION/TRAINING PROGRAM

## 2018-03-09 PROCEDURE — 99213 OFFICE O/P EST LOW 20 MIN: CPT | Performed by: STUDENT IN AN ORGANIZED HEALTH CARE EDUCATION/TRAINING PROGRAM

## 2018-03-09 PROCEDURE — 1123F ACP DISCUSS/DSCN MKR DOCD: CPT | Performed by: STUDENT IN AN ORGANIZED HEALTH CARE EDUCATION/TRAINING PROGRAM

## 2018-03-09 PROCEDURE — G8482 FLU IMMUNIZE ORDER/ADMIN: HCPCS | Performed by: STUDENT IN AN ORGANIZED HEALTH CARE EDUCATION/TRAINING PROGRAM

## 2018-03-09 PROCEDURE — G8399 PT W/DXA RESULTS DOCUMENT: HCPCS | Performed by: STUDENT IN AN ORGANIZED HEALTH CARE EDUCATION/TRAINING PROGRAM

## 2018-03-09 PROCEDURE — 11721 DEBRIDE NAIL 6 OR MORE: CPT | Performed by: STUDENT IN AN ORGANIZED HEALTH CARE EDUCATION/TRAINING PROGRAM

## 2018-03-09 PROCEDURE — G8417 CALC BMI ABV UP PARAM F/U: HCPCS | Performed by: STUDENT IN AN ORGANIZED HEALTH CARE EDUCATION/TRAINING PROGRAM

## 2018-03-09 PROCEDURE — G8427 DOCREV CUR MEDS BY ELIG CLIN: HCPCS | Performed by: STUDENT IN AN ORGANIZED HEALTH CARE EDUCATION/TRAINING PROGRAM

## 2018-03-09 PROCEDURE — 3014F SCREEN MAMMO DOC REV: CPT | Performed by: STUDENT IN AN ORGANIZED HEALTH CARE EDUCATION/TRAINING PROGRAM

## 2018-03-09 NOTE — PROGRESS NOTES
Patient instructed to remove shoes and socks, instructed to sit in exam chair. Current PCP name is DR. Coy and date of last visit 01/2018. Do you have a follow up visit scheduled?   Yes 04/2018

## 2018-04-12 PROBLEM — Z23 NEED FOR INFLUENZA VACCINATION: Status: RESOLVED | Noted: 2017-09-12 | Resolved: 2018-04-12

## 2018-04-26 ENCOUNTER — OFFICE VISIT (OUTPATIENT)
Dept: FAMILY MEDICINE CLINIC | Age: 68
End: 2018-04-26
Payer: COMMERCIAL

## 2018-04-26 VITALS
WEIGHT: 223.8 LBS | HEIGHT: 65 IN | BODY MASS INDEX: 37.29 KG/M2 | SYSTOLIC BLOOD PRESSURE: 120 MMHG | DIASTOLIC BLOOD PRESSURE: 68 MMHG | HEART RATE: 105 BPM | TEMPERATURE: 98.4 F

## 2018-04-26 DIAGNOSIS — J44.9 CHRONIC OBSTRUCTIVE PULMONARY DISEASE, UNSPECIFIED COPD TYPE (HCC): ICD-10-CM

## 2018-04-26 DIAGNOSIS — Z23 NEED FOR VACCINATION: ICD-10-CM

## 2018-04-26 DIAGNOSIS — I10 ESSENTIAL HYPERTENSION: Primary | ICD-10-CM

## 2018-04-26 PROCEDURE — 3017F COLORECTAL CA SCREEN DOC REV: CPT | Performed by: INTERNAL MEDICINE

## 2018-04-26 PROCEDURE — 4004F PT TOBACCO SCREEN RCVD TLK: CPT | Performed by: INTERNAL MEDICINE

## 2018-04-26 PROCEDURE — 99213 OFFICE O/P EST LOW 20 MIN: CPT | Performed by: INTERNAL MEDICINE

## 2018-04-26 PROCEDURE — G8417 CALC BMI ABV UP PARAM F/U: HCPCS | Performed by: INTERNAL MEDICINE

## 2018-04-26 PROCEDURE — 1090F PRES/ABSN URINE INCON ASSESS: CPT | Performed by: INTERNAL MEDICINE

## 2018-04-26 PROCEDURE — G8399 PT W/DXA RESULTS DOCUMENT: HCPCS | Performed by: INTERNAL MEDICINE

## 2018-04-26 PROCEDURE — 99213 OFFICE O/P EST LOW 20 MIN: CPT

## 2018-04-26 PROCEDURE — 90732 PPSV23 VACC 2 YRS+ SUBQ/IM: CPT

## 2018-04-26 PROCEDURE — 4040F PNEUMOC VAC/ADMIN/RCVD: CPT | Performed by: INTERNAL MEDICINE

## 2018-04-26 PROCEDURE — G8926 SPIRO NO PERF OR DOC: HCPCS | Performed by: INTERNAL MEDICINE

## 2018-04-26 PROCEDURE — 90471 IMMUNIZATION ADMIN: CPT | Performed by: INTERNAL MEDICINE

## 2018-04-26 PROCEDURE — 1123F ACP DISCUSS/DSCN MKR DOCD: CPT | Performed by: INTERNAL MEDICINE

## 2018-04-26 PROCEDURE — 90732 PPSV23 VACC 2 YRS+ SUBQ/IM: CPT | Performed by: INTERNAL MEDICINE

## 2018-04-26 PROCEDURE — 3023F SPIROM DOC REV: CPT | Performed by: INTERNAL MEDICINE

## 2018-04-26 PROCEDURE — G8427 DOCREV CUR MEDS BY ELIG CLIN: HCPCS | Performed by: INTERNAL MEDICINE

## 2018-04-26 ASSESSMENT — ENCOUNTER SYMPTOMS: ABDOMINAL PAIN: 0

## 2018-04-26 ASSESSMENT — PATIENT HEALTH QUESTIONNAIRE - PHQ9
2. FEELING DOWN, DEPRESSED OR HOPELESS: 0
1. LITTLE INTEREST OR PLEASURE IN DOING THINGS: 0
SUM OF ALL RESPONSES TO PHQ9 QUESTIONS 1 & 2: 0
SUM OF ALL RESPONSES TO PHQ QUESTIONS 1-9: 0

## 2018-05-07 ENCOUNTER — OFFICE VISIT (OUTPATIENT)
Dept: FAMILY MEDICINE CLINIC | Age: 68
End: 2018-05-07
Payer: COMMERCIAL

## 2018-05-07 VITALS
WEIGHT: 223.6 LBS | HEIGHT: 65 IN | BODY MASS INDEX: 37.25 KG/M2 | SYSTOLIC BLOOD PRESSURE: 138 MMHG | HEART RATE: 101 BPM | DIASTOLIC BLOOD PRESSURE: 79 MMHG

## 2018-05-07 DIAGNOSIS — J40 BRONCHITIS: Primary | ICD-10-CM

## 2018-05-07 PROCEDURE — 4040F PNEUMOC VAC/ADMIN/RCVD: CPT | Performed by: FAMILY MEDICINE

## 2018-05-07 PROCEDURE — 1123F ACP DISCUSS/DSCN MKR DOCD: CPT | Performed by: FAMILY MEDICINE

## 2018-05-07 PROCEDURE — G8427 DOCREV CUR MEDS BY ELIG CLIN: HCPCS | Performed by: FAMILY MEDICINE

## 2018-05-07 PROCEDURE — G8417 CALC BMI ABV UP PARAM F/U: HCPCS | Performed by: FAMILY MEDICINE

## 2018-05-07 PROCEDURE — 99213 OFFICE O/P EST LOW 20 MIN: CPT | Performed by: FAMILY MEDICINE

## 2018-05-07 PROCEDURE — 4004F PT TOBACCO SCREEN RCVD TLK: CPT | Performed by: FAMILY MEDICINE

## 2018-05-07 PROCEDURE — G8399 PT W/DXA RESULTS DOCUMENT: HCPCS | Performed by: FAMILY MEDICINE

## 2018-05-07 PROCEDURE — 1090F PRES/ABSN URINE INCON ASSESS: CPT | Performed by: FAMILY MEDICINE

## 2018-05-07 PROCEDURE — 3017F COLORECTAL CA SCREEN DOC REV: CPT | Performed by: FAMILY MEDICINE

## 2018-05-07 RX ORDER — AZITHROMYCIN 250 MG/1
250 TABLET, FILM COATED ORAL DAILY
Qty: 10 TABLET | Refills: 0 | Status: CANCELLED | OUTPATIENT
Start: 2018-05-07 | End: 2018-05-17

## 2018-05-07 RX ORDER — AZITHROMYCIN 250 MG/1
250 TABLET, FILM COATED ORAL DAILY
Qty: 10 TABLET | Refills: 0 | Status: SHIPPED | OUTPATIENT
Start: 2018-05-07 | End: 2018-05-17

## 2018-05-07 RX ORDER — ALBUTEROL SULFATE 90 UG/1
2 AEROSOL, METERED RESPIRATORY (INHALATION) EVERY 6 HOURS PRN
Qty: 1 INHALER | Refills: 5 | Status: SHIPPED | OUTPATIENT
Start: 2018-05-07 | End: 2018-09-25 | Stop reason: SDUPTHER

## 2018-05-07 ASSESSMENT — ENCOUNTER SYMPTOMS
GASTROINTESTINAL NEGATIVE: 1
COUGH: 1
SHORTNESS OF BREATH: 1
RHINORRHEA: 1

## 2018-06-22 ENCOUNTER — OFFICE VISIT (OUTPATIENT)
Dept: PODIATRY | Age: 68
End: 2018-06-22
Payer: COMMERCIAL

## 2018-06-22 VITALS
BODY MASS INDEX: 36.75 KG/M2 | DIASTOLIC BLOOD PRESSURE: 84 MMHG | HEIGHT: 65 IN | WEIGHT: 220.6 LBS | HEART RATE: 68 BPM | SYSTOLIC BLOOD PRESSURE: 136 MMHG

## 2018-06-22 DIAGNOSIS — M79.672 PAIN IN BOTH FEET: ICD-10-CM

## 2018-06-22 DIAGNOSIS — M79.671 PAIN IN BOTH FEET: ICD-10-CM

## 2018-06-22 DIAGNOSIS — B35.1 ONYCHOMYCOSIS: Primary | ICD-10-CM

## 2018-06-22 PROCEDURE — G8399 PT W/DXA RESULTS DOCUMENT: HCPCS | Performed by: PODIATRIST

## 2018-06-22 PROCEDURE — 11721 DEBRIDE NAIL 6 OR MORE: CPT | Performed by: PODIATRIST

## 2018-06-22 PROCEDURE — G8417 CALC BMI ABV UP PARAM F/U: HCPCS | Performed by: PODIATRIST

## 2018-06-22 PROCEDURE — 99213 OFFICE O/P EST LOW 20 MIN: CPT | Performed by: PODIATRIST

## 2018-06-22 PROCEDURE — 1090F PRES/ABSN URINE INCON ASSESS: CPT | Performed by: PODIATRIST

## 2018-06-22 PROCEDURE — 2022F DILAT RTA XM EVC RTNOPTHY: CPT | Performed by: PODIATRIST

## 2018-06-22 PROCEDURE — 3017F COLORECTAL CA SCREEN DOC REV: CPT | Performed by: PODIATRIST

## 2018-06-22 PROCEDURE — 4040F PNEUMOC VAC/ADMIN/RCVD: CPT | Performed by: PODIATRIST

## 2018-06-22 PROCEDURE — 4004F PT TOBACCO SCREEN RCVD TLK: CPT | Performed by: PODIATRIST

## 2018-06-22 PROCEDURE — 3046F HEMOGLOBIN A1C LEVEL >9.0%: CPT | Performed by: PODIATRIST

## 2018-06-22 PROCEDURE — 1123F ACP DISCUSS/DSCN MKR DOCD: CPT | Performed by: PODIATRIST

## 2018-06-22 PROCEDURE — G8427 DOCREV CUR MEDS BY ELIG CLIN: HCPCS | Performed by: PODIATRIST

## 2018-07-17 ENCOUNTER — OFFICE VISIT (OUTPATIENT)
Dept: FAMILY MEDICINE CLINIC | Age: 68
End: 2018-07-17
Payer: COMMERCIAL

## 2018-07-17 VITALS
SYSTOLIC BLOOD PRESSURE: 136 MMHG | WEIGHT: 222 LBS | HEART RATE: 88 BPM | TEMPERATURE: 98.8 F | DIASTOLIC BLOOD PRESSURE: 80 MMHG | BODY MASS INDEX: 36.94 KG/M2

## 2018-07-17 DIAGNOSIS — Z13.1 DIABETES MELLITUS SCREENING: ICD-10-CM

## 2018-07-17 DIAGNOSIS — Z78.9 HEALTH MAINTENANCE ALTERATION: ICD-10-CM

## 2018-07-17 DIAGNOSIS — G44.229 CHRONIC TENSION-TYPE HEADACHE, NOT INTRACTABLE: Primary | ICD-10-CM

## 2018-07-17 DIAGNOSIS — I10 ESSENTIAL HYPERTENSION: ICD-10-CM

## 2018-07-17 DIAGNOSIS — M25.562 CHRONIC PAIN OF LEFT KNEE: ICD-10-CM

## 2018-07-17 DIAGNOSIS — G89.29 CHRONIC PAIN OF LEFT KNEE: ICD-10-CM

## 2018-07-17 LAB — HBA1C MFR BLD: 5.6 %

## 2018-07-17 PROCEDURE — 4040F PNEUMOC VAC/ADMIN/RCVD: CPT | Performed by: STUDENT IN AN ORGANIZED HEALTH CARE EDUCATION/TRAINING PROGRAM

## 2018-07-17 PROCEDURE — G8399 PT W/DXA RESULTS DOCUMENT: HCPCS | Performed by: STUDENT IN AN ORGANIZED HEALTH CARE EDUCATION/TRAINING PROGRAM

## 2018-07-17 PROCEDURE — 1090F PRES/ABSN URINE INCON ASSESS: CPT | Performed by: STUDENT IN AN ORGANIZED HEALTH CARE EDUCATION/TRAINING PROGRAM

## 2018-07-17 PROCEDURE — 99213 OFFICE O/P EST LOW 20 MIN: CPT | Performed by: STUDENT IN AN ORGANIZED HEALTH CARE EDUCATION/TRAINING PROGRAM

## 2018-07-17 PROCEDURE — 3017F COLORECTAL CA SCREEN DOC REV: CPT | Performed by: STUDENT IN AN ORGANIZED HEALTH CARE EDUCATION/TRAINING PROGRAM

## 2018-07-17 PROCEDURE — 4004F PT TOBACCO SCREEN RCVD TLK: CPT | Performed by: STUDENT IN AN ORGANIZED HEALTH CARE EDUCATION/TRAINING PROGRAM

## 2018-07-17 PROCEDURE — 1101F PT FALLS ASSESS-DOCD LE1/YR: CPT | Performed by: STUDENT IN AN ORGANIZED HEALTH CARE EDUCATION/TRAINING PROGRAM

## 2018-07-17 PROCEDURE — G8427 DOCREV CUR MEDS BY ELIG CLIN: HCPCS | Performed by: STUDENT IN AN ORGANIZED HEALTH CARE EDUCATION/TRAINING PROGRAM

## 2018-07-17 PROCEDURE — 1123F ACP DISCUSS/DSCN MKR DOCD: CPT | Performed by: STUDENT IN AN ORGANIZED HEALTH CARE EDUCATION/TRAINING PROGRAM

## 2018-07-17 PROCEDURE — 83036 HEMOGLOBIN GLYCOSYLATED A1C: CPT | Performed by: STUDENT IN AN ORGANIZED HEALTH CARE EDUCATION/TRAINING PROGRAM

## 2018-07-17 PROCEDURE — G8417 CALC BMI ABV UP PARAM F/U: HCPCS | Performed by: STUDENT IN AN ORGANIZED HEALTH CARE EDUCATION/TRAINING PROGRAM

## 2018-07-17 RX ORDER — ACETAMINOPHEN 500 MG
500 TABLET ORAL EVERY 6 HOURS PRN
Qty: 120 TABLET | Refills: 3 | Status: SHIPPED | OUTPATIENT
Start: 2018-07-17 | End: 2019-03-07 | Stop reason: SDUPTHER

## 2018-07-17 RX ORDER — NAPROXEN 375 MG/1
375 TABLET ORAL 2 TIMES DAILY WITH MEALS
Qty: 60 TABLET | Refills: 3 | Status: SHIPPED | OUTPATIENT
Start: 2018-07-17 | End: 2018-12-11 | Stop reason: ALTCHOICE

## 2018-07-17 RX ORDER — LISINOPRIL 20 MG/1
TABLET ORAL
Qty: 90 TABLET | Refills: 3 | Status: SHIPPED | OUTPATIENT
Start: 2018-07-17 | End: 2018-12-11 | Stop reason: SDDI

## 2018-07-17 ASSESSMENT — ENCOUNTER SYMPTOMS
COUGH: 0
ANAL BLEEDING: 0
VOMITING: 0
ABDOMINAL PAIN: 0
DIARRHEA: 0
SHORTNESS OF BREATH: 0
NAUSEA: 0
CONSTIPATION: 0
WHEEZING: 0

## 2018-07-17 NOTE — PATIENT INSTRUCTIONS
make sure you have a routine office visit set up to follow-up on [address]. Your Care Team at [place] [address] is Team #2  Tatyana Kay DO (Faculty)  Rochelle Hernandez MD (Resident)  Sarkis Jain MD (Resident)  Lorin Sainz MD (Resident)  Rangel Wilson MD (Resident)  Filomena Warren MD (Resident)  Enzo Villarreal, LPFATIMAH [place]., [place], [address] ([facility])  Kenneth Garces RN, ([address] )  Bhavana Hoyos, Ph.D., (Behavioral Services)  James Anaya [facility] (Clinical Pharmacist)     Office phone number: [phone]    If you need to get in right away due to illness, please be advised we have \"Same Day\" appointments available Monday-Friday. Please call us at [phone] option #3 to schedule your \"Same Day\" appointment.

## 2018-07-17 NOTE — PROGRESS NOTES
Subjective: Shazia Palma is a 75 y.o. female with  has a past medical history of Arthritis; Bronchitis; Chronic obstructive pulmonary disease ([address] Colon polyps; Dental neglect; Depression; Environmental allergies; GERD (gastroesophageal reflux disease); Hyperlipidemia; Hypertension; Obesity; Onychomycosis; Poor historian; RBBB; Treadmill stress test negative for angina pectoris; and Wears glasses. Family History   Problem Relation Age of Onset    Heart Disease Mother    [employer] Cancer Father        Presented to the office today for:  Chief Complaint   Patient presents with    Hypertension     patient states that she take her b/p medication everyday       Patient is a 65-year-old female presents to the office for physical exam follow-up on chronic left knee pain, hypertension and recurrent headaches. Patient reports that she continues to express headaches states that she's been taking ibuprofen and that her headaches occur 1-2 times per week. She also reports that she continues to experience pain in her left knee she had bruits with a cane and she states that the injection that she received in the office 4 months ago provided some relief however hernia surgery hurting again. Patient reports being compliant with medication states that she has refills on all her medications except for her lisinopril and naproxen. Review of Systems   Constitutional: Negative for chills and fever. Respiratory: Negative for cough, shortness of breath and wheezing. Cardiovascular: Negative for chest pain. Gastrointestinal: Negative for abdominal pain, anal bleeding, constipation, diarrhea, nausea and vomiting. Genitourinary: Negative for difficulty urinating, dysuria, frequency and urgency. Musculoskeletal: Positive for arthralgias (left knee) and gait problem. Neurological: Negative for dizziness, weakness, light-headedness and headaches.        Objective:    /80 (Site: Left Arm, Position: Sitting, Cuff hypertension    - lisinopril (PRINIVIL;ZESTRIL) 20 MG tablet; TAKE 1 TAB BY MOUTH ONCE A DAY  Dispense: 90 tablet; Refill: 3    3. Chronic pain of left knee  - Naprosyn  - Cortisone injection 4 months ago   - Consider second injection in 2 months     4. Diabetes mellitus screening    - POCT glycosylated hemoglobin (Hb A1C)    5. Health maintenance alteration    - Basic Metabolic Panel; Future  - Lipid Panel; Future          Requested Prescriptions     Signed Prescriptions Disp Refills    lisinopril (PRINIVIL;ZESTRIL) 20 MG tablet 90 tablet 3     Sig: TAKE 1 TAB BY MOUTH ONCE A DAY    acetaminophen (APAP EXTRA STRENGTH) 500 MG tablet 120 tablet 3     Sig: Take 1 tablet by mouth every 6 hours as needed for Pain (headache)       Medications Discontinued During This Encounter   Medication Reason    lisinopril (PRINIVIL;ZESTRIL) 20 MG tablet REORDER       No Follow-up on file. Casey Sherman received counseling on the following healthy behaviors: nutrition, exercise and medication adherence  Reviewed prior labs and health maintenance  Continue current medications, diet and exercise. Discussed use, benefit, and side effects of prescribed medications. Barriers to medication compliance addressed. Patient given educational materials - see patient instructions  Was a self-tracking handout given in paper form or via [employer]? Requested Prescriptions     Signed Prescriptions Disp Refills    lisinopril (PRINIVIL;ZESTRIL) 20 MG tablet 90 tablet 3     Sig: TAKE 1 TAB BY MOUTH ONCE A DAY    acetaminophen (APAP EXTRA STRENGTH) 500 MG tablet 120 tablet 3     Sig: Take 1 tablet by mouth every 6 hours as needed for Pain (headache)    naproxen (NAPROSYN) 375 MG tablet 60 tablet 3     Sig: Take 1 tablet by mouth 2 times daily (with meals)       All patient questions answered. Patient voiced understanding. Quality Measures    Body mass index is 36.94 kg/m². Elevated.  Weight control planned discussed Healthy diet and regular

## 2018-09-25 ENCOUNTER — OFFICE VISIT (OUTPATIENT)
Dept: FAMILY MEDICINE CLINIC | Age: 68
End: 2018-09-25
Payer: COMMERCIAL

## 2018-09-25 VITALS
DIASTOLIC BLOOD PRESSURE: 71 MMHG | WEIGHT: 227.4 LBS | SYSTOLIC BLOOD PRESSURE: 136 MMHG | HEART RATE: 110 BPM | TEMPERATURE: 97.5 F | BODY MASS INDEX: 37.89 KG/M2 | HEIGHT: 65 IN

## 2018-09-25 DIAGNOSIS — B37.0 ORAL CANDIDIASIS: ICD-10-CM

## 2018-09-25 DIAGNOSIS — Z23 NEED FOR SHINGLES VACCINE: ICD-10-CM

## 2018-09-25 DIAGNOSIS — Z23 INFLUENZA VACCINATION ADMINISTERED AT CURRENT VISIT: ICD-10-CM

## 2018-09-25 DIAGNOSIS — J44.9 CHRONIC OBSTRUCTIVE PULMONARY DISEASE, UNSPECIFIED COPD TYPE (HCC): Primary | ICD-10-CM

## 2018-09-25 DIAGNOSIS — J06.9 VIRAL URI: ICD-10-CM

## 2018-09-25 DIAGNOSIS — J40 BRONCHITIS: ICD-10-CM

## 2018-09-25 PROCEDURE — 1090F PRES/ABSN URINE INCON ASSESS: CPT | Performed by: STUDENT IN AN ORGANIZED HEALTH CARE EDUCATION/TRAINING PROGRAM

## 2018-09-25 PROCEDURE — G8399 PT W/DXA RESULTS DOCUMENT: HCPCS | Performed by: STUDENT IN AN ORGANIZED HEALTH CARE EDUCATION/TRAINING PROGRAM

## 2018-09-25 PROCEDURE — 3017F COLORECTAL CA SCREEN DOC REV: CPT | Performed by: STUDENT IN AN ORGANIZED HEALTH CARE EDUCATION/TRAINING PROGRAM

## 2018-09-25 PROCEDURE — G8427 DOCREV CUR MEDS BY ELIG CLIN: HCPCS | Performed by: STUDENT IN AN ORGANIZED HEALTH CARE EDUCATION/TRAINING PROGRAM

## 2018-09-25 PROCEDURE — 99213 OFFICE O/P EST LOW 20 MIN: CPT | Performed by: STUDENT IN AN ORGANIZED HEALTH CARE EDUCATION/TRAINING PROGRAM

## 2018-09-25 PROCEDURE — G8926 SPIRO NO PERF OR DOC: HCPCS | Performed by: STUDENT IN AN ORGANIZED HEALTH CARE EDUCATION/TRAINING PROGRAM

## 2018-09-25 PROCEDURE — 4040F PNEUMOC VAC/ADMIN/RCVD: CPT | Performed by: STUDENT IN AN ORGANIZED HEALTH CARE EDUCATION/TRAINING PROGRAM

## 2018-09-25 PROCEDURE — 3023F SPIROM DOC REV: CPT | Performed by: STUDENT IN AN ORGANIZED HEALTH CARE EDUCATION/TRAINING PROGRAM

## 2018-09-25 PROCEDURE — G8417 CALC BMI ABV UP PARAM F/U: HCPCS | Performed by: STUDENT IN AN ORGANIZED HEALTH CARE EDUCATION/TRAINING PROGRAM

## 2018-09-25 PROCEDURE — 1123F ACP DISCUSS/DSCN MKR DOCD: CPT | Performed by: STUDENT IN AN ORGANIZED HEALTH CARE EDUCATION/TRAINING PROGRAM

## 2018-09-25 PROCEDURE — 4004F PT TOBACCO SCREEN RCVD TLK: CPT | Performed by: STUDENT IN AN ORGANIZED HEALTH CARE EDUCATION/TRAINING PROGRAM

## 2018-09-25 PROCEDURE — 1101F PT FALLS ASSESS-DOCD LE1/YR: CPT | Performed by: STUDENT IN AN ORGANIZED HEALTH CARE EDUCATION/TRAINING PROGRAM

## 2018-09-25 RX ORDER — FLUCONAZOLE 200 MG/1
400 TABLET ORAL DAILY
Qty: 32 TABLET | Refills: 0 | Status: SHIPPED | OUTPATIENT
Start: 2018-09-25 | End: 2018-09-25 | Stop reason: CLARIF

## 2018-09-25 RX ORDER — ALBUTEROL SULFATE 90 UG/1
2 AEROSOL, METERED RESPIRATORY (INHALATION) EVERY 6 HOURS PRN
Qty: 1 INHALER | Refills: 3 | Status: SHIPPED | OUTPATIENT
Start: 2018-09-25 | End: 2019-08-12 | Stop reason: SDUPTHER

## 2018-09-25 RX ORDER — FLUTICASONE FUROATE AND VILANTEROL 100; 25 UG/1; UG/1
1 POWDER RESPIRATORY (INHALATION) DAILY
Qty: 1 EACH | Refills: 3 | Status: SHIPPED | OUTPATIENT
Start: 2018-09-25 | End: 2018-09-25

## 2018-09-25 ASSESSMENT — ENCOUNTER SYMPTOMS
DIARRHEA: 0
VOMITING: 0
COUGH: 1
ANAL BLEEDING: 0
ABDOMINAL PAIN: 0
CONSTIPATION: 0
SHORTNESS OF BREATH: 1
NAUSEA: 0
WHEEZING: 0

## 2018-09-25 NOTE — PATIENT INSTRUCTIONS
2. [address] [place], [place] [address]  [phone]  Hours: Monday-Friday 8:00 am-8:00 pm and Saturday and Sunday 9:30 am-6:00 pm    3. [address] [place], [place] [address]  [phone]  Hours: Monday-Friday 9:00 am-5:30 pm and Saturday 10:00 am-4:00 pm    4. [facility]  [phone]  Hours: Monday-Friday 9:00 am-5:30 pm and Saturday 10:00 am-2:00 pm

## 2018-09-26 ENCOUNTER — TELEPHONE (OUTPATIENT)
Dept: PHARMACY | Age: 68
End: 2018-09-26

## 2018-09-26 NOTE — TELEPHONE ENCOUNTER
Ambulatory Care Pharmacy Resident  [facility] Medication Management  Phone: [phone]  9/26/2018 5:14 PM

## 2018-09-28 ENCOUNTER — OFFICE VISIT (OUTPATIENT)
Dept: PODIATRY | Age: 68
End: 2018-09-28
Payer: COMMERCIAL

## 2018-09-28 ENCOUNTER — TELEPHONE (OUTPATIENT)
Dept: FAMILY MEDICINE CLINIC | Age: 68
End: 2018-09-28

## 2018-09-28 VITALS
BODY MASS INDEX: 36.15 KG/M2 | HEART RATE: 117 BPM | SYSTOLIC BLOOD PRESSURE: 134 MMHG | WEIGHT: 217 LBS | DIASTOLIC BLOOD PRESSURE: 78 MMHG | HEIGHT: 65 IN

## 2018-09-28 DIAGNOSIS — B35.1 ONYCHOMYCOSIS: Primary | ICD-10-CM

## 2018-09-28 DIAGNOSIS — M79.671 PAIN IN BOTH FEET: ICD-10-CM

## 2018-09-28 DIAGNOSIS — M79.672 PAIN IN BOTH FEET: ICD-10-CM

## 2018-09-28 PROCEDURE — 1101F PT FALLS ASSESS-DOCD LE1/YR: CPT | Performed by: STUDENT IN AN ORGANIZED HEALTH CARE EDUCATION/TRAINING PROGRAM

## 2018-09-28 PROCEDURE — 11721 DEBRIDE NAIL 6 OR MORE: CPT | Performed by: STUDENT IN AN ORGANIZED HEALTH CARE EDUCATION/TRAINING PROGRAM

## 2018-09-28 PROCEDURE — G8427 DOCREV CUR MEDS BY ELIG CLIN: HCPCS | Performed by: STUDENT IN AN ORGANIZED HEALTH CARE EDUCATION/TRAINING PROGRAM

## 2018-09-28 PROCEDURE — 1123F ACP DISCUSS/DSCN MKR DOCD: CPT | Performed by: STUDENT IN AN ORGANIZED HEALTH CARE EDUCATION/TRAINING PROGRAM

## 2018-09-28 PROCEDURE — 4040F PNEUMOC VAC/ADMIN/RCVD: CPT | Performed by: STUDENT IN AN ORGANIZED HEALTH CARE EDUCATION/TRAINING PROGRAM

## 2018-09-28 PROCEDURE — 99212 OFFICE O/P EST SF 10 MIN: CPT | Performed by: STUDENT IN AN ORGANIZED HEALTH CARE EDUCATION/TRAINING PROGRAM

## 2018-09-28 PROCEDURE — 4004F PT TOBACCO SCREEN RCVD TLK: CPT | Performed by: STUDENT IN AN ORGANIZED HEALTH CARE EDUCATION/TRAINING PROGRAM

## 2018-09-28 PROCEDURE — G8399 PT W/DXA RESULTS DOCUMENT: HCPCS | Performed by: STUDENT IN AN ORGANIZED HEALTH CARE EDUCATION/TRAINING PROGRAM

## 2018-09-28 PROCEDURE — 3017F COLORECTAL CA SCREEN DOC REV: CPT | Performed by: STUDENT IN AN ORGANIZED HEALTH CARE EDUCATION/TRAINING PROGRAM

## 2018-09-28 PROCEDURE — 1090F PRES/ABSN URINE INCON ASSESS: CPT | Performed by: STUDENT IN AN ORGANIZED HEALTH CARE EDUCATION/TRAINING PROGRAM

## 2018-09-28 PROCEDURE — G8417 CALC BMI ABV UP PARAM F/U: HCPCS | Performed by: STUDENT IN AN ORGANIZED HEALTH CARE EDUCATION/TRAINING PROGRAM

## 2018-09-28 NOTE — TELEPHONE ENCOUNTER
Called patient and asked how visit went and if all her questions were answered. She said her visit went fine.

## 2018-09-28 NOTE — PROGRESS NOTES
8554
months for nail debridement or sooner if issues arise  · Discussed with Dr. Trudi Inman    Electronically signed by Shelton Jo DPM on 9/28/2018 at 2:26 PM     I performed a history and physical examination of the patient and discussed management with the resident. I reviewed the residents note and agree with the documented findings and plan of care. Any areas of disagreement are noted on the chart. I was personally present for the key portions of any procedures. I have documented in the chart those procedures where I was not present during the key portions. I have reviewed the Podiatry Resident progress note. I agree with the chief complaint, past medical history, past surgical history, allergies, medications, social and family history as documented unless otherwise noted below. Documentation of the HPI, Physical Exam and Medical Decision Making performed by medical students or scribes is based on my personal performance of the HPI, PE and MDM. I have personally evaluated this patient and have completed at least one if not all key elements of the E/M (history, physical exam, and MDM). Additional findings are as noted.      Electronically signed by Monica Jo DPM on 9/28/2018 at 3:44 PM

## 2018-10-01 NOTE — PROGRESS NOTES
I have reviewed and discussed franco elements of Geovany Cochran with the resident including plan of care and follow up and agree with the care alexy plan.
Visit Information    Have you changed or started any medications since your last visit including any over-the-counter medicines, vitamins, or herbal medicines? no   Have you stopped taking any of your medications? Is so, why? -  no  Are you having any side effects from any of your medications? - no    Have you seen any other physician or provider since your last visit?  no   Have you had any other diagnostic tests since your last visit?  no   Have you been seen in the emergency room and/or had an admission in a hospital since we last saw you?  no   Have you had your routine dental cleaning in the past 6 months?  no     Do you have an active MyChart account? If no, what is the barrier?   Yes    Patient Care Team:  Guido Ashby MD as PCP - General (Family Medicine)  Magui De Leon MD as PCP - S Attributed Provider  Denise Dennis DPM (Inactive) as Consulting Physician (Podiatry)  Supa Moss DO as Consulting Physician (General Surgery)    Medical History Review  Past Medical, Family, and Social History reviewed and does not contribute to the patient presenting condition    Health Maintenance   Topic Date Due    Shingles Vaccine (1 of 2 - 2 Dose Series) 12/04/1999    Potassium monitoring  06/28/2018    Creatinine monitoring  06/28/2018    Flu vaccine (1) 09/01/2018    Breast cancer screen  12/15/2019    Lipid screen  06/28/2022    Colon cancer screen colonoscopy  06/11/2023    DTaP/Tdap/Td vaccine (2 - Td) 08/17/2026    DEXA (modify frequency per FRAX score)  Completed    Pneumococcal low/med risk  Completed    Hepatitis C screen  Completed
handout given in paper form or via     Requested Prescriptions     Signed Prescriptions Disp Refills    zoster recombinant adjuvanted vaccine (SHINGRIX) 50 MCG SUSR injection 0.5 mL 1     Sig: Inject 0.5 mLs into the muscle once for 1 dose    tiotropium (SPIRIVA RESPIMAT) 1.25 MCG/ACT AERS inhaler 1 Inhaler 3     Sig: Inhale 2 puffs into the lungs daily    albuterol sulfate HFA (VENTOLIN HFA) 108 (90 Base) MCG/ACT inhaler 1 Inhaler 3     Sig: Inhale 2 puffs into the lungs every 6 hours as needed for Wheezing    nystatin (MYCOSTATIN) 409982 UNIT/ML suspension 1 Bottle 0     Sig: Swish in oral cavity four times daily one teaspoon       All patient questions answered. Patient voiced understanding. Quality Measures    Body mass index is 37.84 kg/m². Elevated. Weight control planned discussed Healthy diet and regular exercise. BP: 136/71 (machine). Blood pressure is normal. Treatment plan consists of No treatment change needed.     Fall Risk 4/26/2018 2/17/2017 1/18/2016 12/10/2014   2 or more falls in past year? no no no yes   Fall with injury in past year? no no no yes         Lab Results   Component Value Date    LDLCHOLESTEROL 80 06/28/2017    (goal LDL reduction with dx if diabetes is 50% LDL reduction)    PHQ Scores 4/26/2018 2/17/2017 1/18/2016 12/10/2014   PHQ2 Score 0 1 0 0   PHQ9 Score 0 1 0 0     Interpretation of Total Score Depression Severity: 1-4 = Minimal depression, 5-9 = Mild depression, 10-14 = Moderate depression, 15-19 = Moderately severe depression, 20-27 = Severe depression

## 2018-12-11 ENCOUNTER — TELEPHONE (OUTPATIENT)
Dept: PHARMACY | Age: 68
End: 2018-12-11

## 2018-12-11 ENCOUNTER — HOSPITAL ENCOUNTER (OUTPATIENT)
Age: 68
Setting detail: SPECIMEN
Discharge: HOME | End: 2018-12-11
Payer: COMMERCIAL

## 2018-12-11 ENCOUNTER — OFFICE VISIT (OUTPATIENT)
Dept: FAMILY MEDICINE CLINIC | Age: 68
End: 2018-12-11
Payer: COMMERCIAL

## 2018-12-11 VITALS
SYSTOLIC BLOOD PRESSURE: 124 MMHG | TEMPERATURE: 97.8 F | HEART RATE: 94 BPM | BODY MASS INDEX: 34.82 KG/M2 | WEIGHT: 209 LBS | DIASTOLIC BLOOD PRESSURE: 72 MMHG | HEIGHT: 65 IN

## 2018-12-11 DIAGNOSIS — I10 ESSENTIAL HYPERTENSION: ICD-10-CM

## 2018-12-11 DIAGNOSIS — M25.562 ACUTE PAIN OF LEFT KNEE: Primary | ICD-10-CM

## 2018-12-11 DIAGNOSIS — Z78.9 HEALTH MAINTENANCE ALTERATION: ICD-10-CM

## 2018-12-11 DIAGNOSIS — J44.9 CHRONIC OBSTRUCTIVE PULMONARY DISEASE, UNSPECIFIED COPD TYPE (HCC): ICD-10-CM

## 2018-12-11 LAB
ANION GAP SERPL CALCULATED.3IONS-SCNC: 12 MMOL/L (ref 9–17)
BUN BLDV-MCNC: 11 MG/DL (ref 8–23)
BUN/CREAT BLD: NORMAL (ref 9–20)
CALCIUM SERPL-MCNC: 9.6 MG/DL (ref 8.6–10.4)
CHLORIDE BLD-SCNC: 99 MMOL/L (ref 98–107)
CO2: 28 MMOL/L (ref 20–31)
CREAT SERPL-MCNC: 0.76 MG/DL (ref 0.5–0.9)
GFR AFRICAN AMERICAN: >60 ML/MIN
GFR NON-AFRICAN AMERICAN: >60 ML/MIN
GFR SERPL CREATININE-BSD FRML MDRD: NORMAL ML/MIN/{1.73_M2}
GFR SERPL CREATININE-BSD FRML MDRD: NORMAL ML/MIN/{1.73_M2}
GLUCOSE BLD-MCNC: 84 MG/DL (ref 70–99)
POTASSIUM SERPL-SCNC: 4.4 MMOL/L (ref 3.7–5.3)
SODIUM BLD-SCNC: 139 MMOL/L (ref 135–144)

## 2018-12-11 PROCEDURE — 99213 OFFICE O/P EST LOW 20 MIN: CPT | Performed by: STUDENT IN AN ORGANIZED HEALTH CARE EDUCATION/TRAINING PROGRAM

## 2018-12-11 PROCEDURE — 99211 OFF/OP EST MAY X REQ PHY/QHP: CPT | Performed by: STUDENT IN AN ORGANIZED HEALTH CARE EDUCATION/TRAINING PROGRAM

## 2018-12-11 RX ORDER — IBUPROFEN 600 MG/1
600 TABLET ORAL 4 TIMES DAILY PRN
Qty: 120 TABLET | Refills: 1 | Status: SHIPPED | OUTPATIENT
Start: 2018-12-11 | End: 2019-06-20 | Stop reason: SDUPTHER

## 2018-12-11 RX ORDER — HALOPERIDOL 5 MG
5 TABLET ORAL 2 TIMES DAILY
COMMUNITY

## 2018-12-11 RX ORDER — LISINOPRIL 20 MG/1
20 TABLET ORAL DAILY
COMMUNITY
End: 2019-05-10 | Stop reason: SDUPTHER

## 2018-12-11 ASSESSMENT — ENCOUNTER SYMPTOMS
VOMITING: 0
ABDOMINAL PAIN: 0
CONSTIPATION: 0
ANAL BLEEDING: 0
WHEEZING: 0
COUGH: 1
NAUSEA: 0
SHORTNESS OF BREATH: 1
DIARRHEA: 0

## 2018-12-11 NOTE — PROGRESS NOTES
with acute worsening and new onset of edema   - XR KNEE LEFT (3 VIEWS); Future  - Jonas Tidwell DO, Orthopedics [facility]  - ibuprofen (ADVIL;MOTRIN) 600 MG tablet; Take 1 tablet by mouth 4 times daily as needed for Pain  Dispense: 120 tablet; Refill: 1    2. Health maintenance alteration    - Basic Metabolic Panel; Future    3. Chronic obstructive pulmonary disease, unspecified COPD type ([facility] [address]  - stable  - continue albuterol and spiriva    4. Essential hypertension  - controlled           Requested Prescriptions     Signed Prescriptions Disp Refills    ibuprofen (ADVIL;MOTRIN) 600 MG tablet 120 tablet 1     Sig: Take 1 tablet by mouth 4 times daily as needed for Pain       Medications Discontinued During This Encounter   Medication Reason    alendronate (FOSAMAX) 70 MG tablet Non-compliance    aspirin (ASPIRIN LOW DOSE) 81 MG EC tablet Non-compliance    atorvastatin (LIPITOR) 40 MG tablet Non-compliance    calcipotriene (DOVONEX) 0.005 % cream Therapy completed    Calcium Carbonate-Vitamin D (OYSTER SHELL CALCIUM/D) 500-200 MG-UNIT TABS Non-compliance    clobetasol (TEMOVATE) 0.05 % ointment Therapy completed    diclofenac sodium (VOLTAREN) 1 % GEL Non-compliance    famotidine (PEPCID) 20 MG tablet Non-compliance    guaiFENesin (MUCINEX) 600 MG SR tablet Therapy completed    haloperidol (HALDOL) 2 MG tablet DOSE ADJUSTMENT    lisinopril (PRINIVIL;ZESTRIL) 20 MG tablet Non-compliance    metoprolol (LOPRESSOR) 25 MG tablet Non-compliance    naproxen (NAPROSYN) 375 MG tablet Therapy completed    nicotine (NICODERM CQ) 7 MG/24HR Therapy completed    nystatin (MYCOSTATIN) 913931 UNIT/ML suspension Therapy completed    simethicone (MYLICON) 80 MG chewable tablet Non-compliance    trazodone (DESYREL) 50 MG tablet DOSE ADJUSTMENT       Return in about 3 months (around 3/11/2019) for knee pain .       Carmen Bennett received counseling on the following healthy behaviors:   Reviewed prior labs and health

## 2018-12-11 NOTE — TELEPHONE ENCOUNTER
Wheezing 1 Inhaler 3    acetaminophen (APAP EXTRA STRENGTH) 500 MG tablet Take 1 tablet by mouth every 6 hours as needed for Pain (headache) 120 tablet 3    traZODone (DESYREL) 100 MG tablet       Elastic Bandages & Supports (ACE ARM SLING) MISC Apply 1 Units topically daily 1 each 0    Misc. Devices MISC Walker 1 Device 0    trihexyphenidyl (ARTANE) 2 MG tablet Take 2 mg by mouth 2 times daily        No current facility-administered medications for this visit.      ______________________________________________________________________  Targeted Interventions:   [x] Medication adherence: Spiriva  [] Needs drug therapy  [] Needs patient education  [] Suboptimal drug selection  [] Drug interaction  [] Cost-effective alternative    Medication Action Plan:  · Patient counseled on importance of taking medications as directed. Encouraged patient to talk to pharmacist if there are changes to their medications or if they have questions. · Patient taking Spiriva incorrectly, states she is using it three times daily. Counseled patient to use as directed, with two puffs once daily. All patient questions answered and medication list reviewed and updated. Patient provided with copy of [facility] including Personal Medication Record and Medication Action Plan as required by CMS.       Haroon Mcneal, PharmD  PGY-2 Ambulatory Care Pharmacy Resident  [employer] [facility]  Phone: [phone]  12/11/2018 3:38 PM

## 2018-12-28 ENCOUNTER — OFFICE VISIT (OUTPATIENT)
Dept: PODIATRY | Age: 68
End: 2018-12-28
Payer: COMMERCIAL

## 2018-12-28 VITALS
BODY MASS INDEX: 34.72 KG/M2 | HEIGHT: 65 IN | SYSTOLIC BLOOD PRESSURE: 119 MMHG | WEIGHT: 208.4 LBS | DIASTOLIC BLOOD PRESSURE: 71 MMHG | HEART RATE: 85 BPM

## 2018-12-28 DIAGNOSIS — M79.672 PAIN IN BOTH FEET: ICD-10-CM

## 2018-12-28 DIAGNOSIS — M79.671 PAIN IN BOTH FEET: ICD-10-CM

## 2018-12-28 DIAGNOSIS — E11.9 CONTROLLED TYPE 2 DIABETES MELLITUS WITHOUT COMPLICATION, WITHOUT LONG-TERM CURRENT USE OF INSULIN (HCC): ICD-10-CM

## 2018-12-28 DIAGNOSIS — L40.9 PSORIASIS: ICD-10-CM

## 2018-12-28 DIAGNOSIS — B35.1 ONYCHOMYCOSIS: Primary | ICD-10-CM

## 2018-12-28 DIAGNOSIS — M15.9 OSTEOARTHRITIS OF MULTIPLE JOINTS, UNSPECIFIED OSTEOARTHRITIS TYPE: ICD-10-CM

## 2018-12-28 PROCEDURE — 11721 DEBRIDE NAIL 6 OR MORE: CPT | Performed by: STUDENT IN AN ORGANIZED HEALTH CARE EDUCATION/TRAINING PROGRAM

## 2018-12-28 PROCEDURE — 3288F FALL RISK ASSESSMENT DOCD: CPT | Performed by: STUDENT IN AN ORGANIZED HEALTH CARE EDUCATION/TRAINING PROGRAM

## 2018-12-28 PROCEDURE — G8399 PT W/DXA RESULTS DOCUMENT: HCPCS | Performed by: STUDENT IN AN ORGANIZED HEALTH CARE EDUCATION/TRAINING PROGRAM

## 2018-12-28 PROCEDURE — 1100F PTFALLS ASSESS-DOCD GE2>/YR: CPT | Performed by: STUDENT IN AN ORGANIZED HEALTH CARE EDUCATION/TRAINING PROGRAM

## 2018-12-28 PROCEDURE — G8417 CALC BMI ABV UP PARAM F/U: HCPCS | Performed by: STUDENT IN AN ORGANIZED HEALTH CARE EDUCATION/TRAINING PROGRAM

## 2018-12-28 PROCEDURE — 1090F PRES/ABSN URINE INCON ASSESS: CPT | Performed by: STUDENT IN AN ORGANIZED HEALTH CARE EDUCATION/TRAINING PROGRAM

## 2018-12-28 PROCEDURE — 1123F ACP DISCUSS/DSCN MKR DOCD: CPT | Performed by: STUDENT IN AN ORGANIZED HEALTH CARE EDUCATION/TRAINING PROGRAM

## 2018-12-28 PROCEDURE — 4040F PNEUMOC VAC/ADMIN/RCVD: CPT | Performed by: STUDENT IN AN ORGANIZED HEALTH CARE EDUCATION/TRAINING PROGRAM

## 2018-12-28 PROCEDURE — G8427 DOCREV CUR MEDS BY ELIG CLIN: HCPCS | Performed by: STUDENT IN AN ORGANIZED HEALTH CARE EDUCATION/TRAINING PROGRAM

## 2018-12-28 PROCEDURE — 3044F HG A1C LEVEL LT 7.0%: CPT | Performed by: STUDENT IN AN ORGANIZED HEALTH CARE EDUCATION/TRAINING PROGRAM

## 2018-12-28 PROCEDURE — G8484 FLU IMMUNIZE NO ADMIN: HCPCS | Performed by: STUDENT IN AN ORGANIZED HEALTH CARE EDUCATION/TRAINING PROGRAM

## 2018-12-28 PROCEDURE — 2022F DILAT RTA XM EVC RTNOPTHY: CPT | Performed by: STUDENT IN AN ORGANIZED HEALTH CARE EDUCATION/TRAINING PROGRAM

## 2018-12-28 PROCEDURE — 4004F PT TOBACCO SCREEN RCVD TLK: CPT | Performed by: STUDENT IN AN ORGANIZED HEALTH CARE EDUCATION/TRAINING PROGRAM

## 2018-12-28 PROCEDURE — 3017F COLORECTAL CA SCREEN DOC REV: CPT | Performed by: STUDENT IN AN ORGANIZED HEALTH CARE EDUCATION/TRAINING PROGRAM

## 2018-12-28 PROCEDURE — 99213 OFFICE O/P EST LOW 20 MIN: CPT | Performed by: STUDENT IN AN ORGANIZED HEALTH CARE EDUCATION/TRAINING PROGRAM

## 2018-12-28 RX ORDER — INFLUENZA VACCINE, ADJUVANTED 15; 15; 15 UG/.5ML; UG/.5ML; UG/.5ML
INJECTION, SUSPENSION INTRAMUSCULAR
Refills: 0 | COMMUNITY
Start: 2018-10-11 | End: 2019-09-26

## 2019-01-11 DIAGNOSIS — M25.562 ACUTE PAIN OF LEFT KNEE: Primary | ICD-10-CM

## 2019-01-28 ENCOUNTER — OFFICE VISIT (OUTPATIENT)
Dept: ORTHOPEDIC SURGERY | Age: 69
End: 2019-01-28
Payer: COMMERCIAL

## 2019-01-28 VITALS — HEIGHT: 65 IN | WEIGHT: 208 LBS | BODY MASS INDEX: 34.66 KG/M2

## 2019-01-28 DIAGNOSIS — M17.12 ARTHRITIS OF LEFT KNEE: ICD-10-CM

## 2019-01-28 DIAGNOSIS — M25.562 ACUTE PAIN OF LEFT KNEE: Primary | ICD-10-CM

## 2019-01-28 PROCEDURE — 4040F PNEUMOC VAC/ADMIN/RCVD: CPT | Performed by: STUDENT IN AN ORGANIZED HEALTH CARE EDUCATION/TRAINING PROGRAM

## 2019-01-28 PROCEDURE — 1123F ACP DISCUSS/DSCN MKR DOCD: CPT | Performed by: STUDENT IN AN ORGANIZED HEALTH CARE EDUCATION/TRAINING PROGRAM

## 2019-01-28 PROCEDURE — G8484 FLU IMMUNIZE NO ADMIN: HCPCS | Performed by: STUDENT IN AN ORGANIZED HEALTH CARE EDUCATION/TRAINING PROGRAM

## 2019-01-28 PROCEDURE — 99213 OFFICE O/P EST LOW 20 MIN: CPT | Performed by: STUDENT IN AN ORGANIZED HEALTH CARE EDUCATION/TRAINING PROGRAM

## 2019-01-28 PROCEDURE — 4004F PT TOBACCO SCREEN RCVD TLK: CPT | Performed by: STUDENT IN AN ORGANIZED HEALTH CARE EDUCATION/TRAINING PROGRAM

## 2019-01-28 PROCEDURE — G8417 CALC BMI ABV UP PARAM F/U: HCPCS | Performed by: STUDENT IN AN ORGANIZED HEALTH CARE EDUCATION/TRAINING PROGRAM

## 2019-01-28 PROCEDURE — G8399 PT W/DXA RESULTS DOCUMENT: HCPCS | Performed by: STUDENT IN AN ORGANIZED HEALTH CARE EDUCATION/TRAINING PROGRAM

## 2019-01-28 PROCEDURE — 3288F FALL RISK ASSESSMENT DOCD: CPT | Performed by: STUDENT IN AN ORGANIZED HEALTH CARE EDUCATION/TRAINING PROGRAM

## 2019-01-28 PROCEDURE — 20610 DRAIN/INJ JOINT/BURSA W/O US: CPT | Performed by: STUDENT IN AN ORGANIZED HEALTH CARE EDUCATION/TRAINING PROGRAM

## 2019-01-28 PROCEDURE — 1090F PRES/ABSN URINE INCON ASSESS: CPT | Performed by: STUDENT IN AN ORGANIZED HEALTH CARE EDUCATION/TRAINING PROGRAM

## 2019-01-28 PROCEDURE — 3017F COLORECTAL CA SCREEN DOC REV: CPT | Performed by: STUDENT IN AN ORGANIZED HEALTH CARE EDUCATION/TRAINING PROGRAM

## 2019-01-28 PROCEDURE — 0518F FALL PLAN OF CARE DOCD: CPT | Performed by: STUDENT IN AN ORGANIZED HEALTH CARE EDUCATION/TRAINING PROGRAM

## 2019-01-28 PROCEDURE — 1100F PTFALLS ASSESS-DOCD GE2>/YR: CPT | Performed by: STUDENT IN AN ORGANIZED HEALTH CARE EDUCATION/TRAINING PROGRAM

## 2019-01-28 PROCEDURE — G8427 DOCREV CUR MEDS BY ELIG CLIN: HCPCS | Performed by: STUDENT IN AN ORGANIZED HEALTH CARE EDUCATION/TRAINING PROGRAM

## 2019-01-28 RX ORDER — BUPIVACAINE HYDROCHLORIDE 2.5 MG/ML
2 INJECTION, SOLUTION INFILTRATION; PERINEURAL ONCE
Status: COMPLETED | OUTPATIENT
Start: 2019-01-28 | End: 2019-01-29

## 2019-01-28 RX ORDER — METHYLPREDNISOLONE ACETATE 80 MG/ML
80 INJECTION, SUSPENSION INTRA-ARTICULAR; INTRALESIONAL; INTRAMUSCULAR; SOFT TISSUE ONCE
Status: COMPLETED | OUTPATIENT
Start: 2019-01-28 | End: 2019-01-29

## 2019-01-29 RX ADMIN — BUPIVACAINE HYDROCHLORIDE 5 MG: 2.5 INJECTION, SOLUTION INFILTRATION; PERINEURAL at 10:19

## 2019-01-29 RX ADMIN — METHYLPREDNISOLONE ACETATE 80 MG: 80 INJECTION, SUSPENSION INTRA-ARTICULAR; INTRALESIONAL; INTRAMUSCULAR; SOFT TISSUE at 10:20

## 2019-01-31 ASSESSMENT — ENCOUNTER SYMPTOMS
BACK PAIN: 0
SHORTNESS OF BREATH: 0
CHEST TIGHTNESS: 0
CONSTIPATION: 0
VOMITING: 0
COLOR CHANGE: 0
NAUSEA: 0
WHEEZING: 0
ALLERGIC/IMMUNOLOGIC NEGATIVE: 1

## 2019-02-05 ENCOUNTER — OFFICE VISIT (OUTPATIENT)
Dept: FAMILY MEDICINE CLINIC | Age: 69
End: 2019-02-05
Payer: COMMERCIAL

## 2019-02-05 ENCOUNTER — HOSPITAL ENCOUNTER (OUTPATIENT)
Age: 69
Setting detail: SPECIMEN
Discharge: HOME | End: 2019-02-05
Payer: COMMERCIAL

## 2019-02-05 VITALS
SYSTOLIC BLOOD PRESSURE: 123 MMHG | WEIGHT: 206.2 LBS | HEART RATE: 87 BPM | DIASTOLIC BLOOD PRESSURE: 74 MMHG | HEIGHT: 65 IN | TEMPERATURE: 96.8 F | BODY MASS INDEX: 34.35 KG/M2

## 2019-02-05 DIAGNOSIS — E78.5 DYSLIPIDEMIA: ICD-10-CM

## 2019-02-05 DIAGNOSIS — J42 CHRONIC BRONCHITIS, UNSPECIFIED CHRONIC BRONCHITIS TYPE (HCC): Primary | ICD-10-CM

## 2019-02-05 DIAGNOSIS — M17.12 OSTEOARTHRITIS OF LEFT KNEE, UNSPECIFIED OSTEOARTHRITIS TYPE: ICD-10-CM

## 2019-02-05 LAB
CHOLESTEROL/HDL RATIO: 3.3
CHOLESTEROL: 234 MG/DL
HDLC SERPL-MCNC: 71 MG/DL
LDL CHOLESTEROL: 124 MG/DL (ref 0–130)
TRIGL SERPL-MCNC: 193 MG/DL
VLDLC SERPL CALC-MCNC: ABNORMAL MG/DL (ref 1–30)

## 2019-02-05 PROCEDURE — 3017F COLORECTAL CA SCREEN DOC REV: CPT | Performed by: FAMILY MEDICINE

## 2019-02-05 PROCEDURE — 99211 OFF/OP EST MAY X REQ PHY/QHP: CPT | Performed by: STUDENT IN AN ORGANIZED HEALTH CARE EDUCATION/TRAINING PROGRAM

## 2019-02-05 PROCEDURE — G8926 SPIRO NO PERF OR DOC: HCPCS | Performed by: FAMILY MEDICINE

## 2019-02-05 PROCEDURE — G8417 CALC BMI ABV UP PARAM F/U: HCPCS | Performed by: FAMILY MEDICINE

## 2019-02-05 PROCEDURE — G8427 DOCREV CUR MEDS BY ELIG CLIN: HCPCS | Performed by: FAMILY MEDICINE

## 2019-02-05 PROCEDURE — 99213 OFFICE O/P EST LOW 20 MIN: CPT | Performed by: STUDENT IN AN ORGANIZED HEALTH CARE EDUCATION/TRAINING PROGRAM

## 2019-02-05 PROCEDURE — 4004F PT TOBACCO SCREEN RCVD TLK: CPT | Performed by: FAMILY MEDICINE

## 2019-02-05 PROCEDURE — 1123F ACP DISCUSS/DSCN MKR DOCD: CPT | Performed by: FAMILY MEDICINE

## 2019-02-05 PROCEDURE — 3288F FALL RISK ASSESSMENT DOCD: CPT | Performed by: FAMILY MEDICINE

## 2019-02-05 PROCEDURE — 4040F PNEUMOC VAC/ADMIN/RCVD: CPT | Performed by: FAMILY MEDICINE

## 2019-02-05 PROCEDURE — 0518F FALL PLAN OF CARE DOCD: CPT | Performed by: FAMILY MEDICINE

## 2019-02-05 PROCEDURE — G8399 PT W/DXA RESULTS DOCUMENT: HCPCS | Performed by: FAMILY MEDICINE

## 2019-02-05 PROCEDURE — 1100F PTFALLS ASSESS-DOCD GE2>/YR: CPT | Performed by: FAMILY MEDICINE

## 2019-02-05 PROCEDURE — 1090F PRES/ABSN URINE INCON ASSESS: CPT | Performed by: FAMILY MEDICINE

## 2019-02-05 PROCEDURE — G8484 FLU IMMUNIZE NO ADMIN: HCPCS | Performed by: FAMILY MEDICINE

## 2019-02-05 PROCEDURE — 3023F SPIROM DOC REV: CPT | Performed by: FAMILY MEDICINE

## 2019-02-05 ASSESSMENT — ENCOUNTER SYMPTOMS
SHORTNESS OF BREATH: 1
VOMITING: 0
WHEEZING: 0
BACK PAIN: 1
ABDOMINAL PAIN: 0
ANAL BLEEDING: 0
DIARRHEA: 0
CONSTIPATION: 0
NAUSEA: 0
COUGH: 1

## 2019-03-07 ENCOUNTER — OFFICE VISIT (OUTPATIENT)
Dept: FAMILY MEDICINE CLINIC | Age: 69
End: 2019-03-07
Payer: COMMERCIAL

## 2019-03-07 ENCOUNTER — TELEPHONE (OUTPATIENT)
Dept: PHARMACY | Age: 69
End: 2019-03-07

## 2019-03-07 VITALS
HEART RATE: 120 BPM | DIASTOLIC BLOOD PRESSURE: 80 MMHG | TEMPERATURE: 97.8 F | SYSTOLIC BLOOD PRESSURE: 115 MMHG | WEIGHT: 198 LBS | BODY MASS INDEX: 32.95 KG/M2

## 2019-03-07 DIAGNOSIS — K52.9 ACUTE GASTROENTERITIS: Primary | ICD-10-CM

## 2019-03-07 PROCEDURE — 4004F PT TOBACCO SCREEN RCVD TLK: CPT | Performed by: STUDENT IN AN ORGANIZED HEALTH CARE EDUCATION/TRAINING PROGRAM

## 2019-03-07 PROCEDURE — 99213 OFFICE O/P EST LOW 20 MIN: CPT | Performed by: STUDENT IN AN ORGANIZED HEALTH CARE EDUCATION/TRAINING PROGRAM

## 2019-03-07 PROCEDURE — 99211 OFF/OP EST MAY X REQ PHY/QHP: CPT | Performed by: STUDENT IN AN ORGANIZED HEALTH CARE EDUCATION/TRAINING PROGRAM

## 2019-03-07 PROCEDURE — G8484 FLU IMMUNIZE NO ADMIN: HCPCS | Performed by: STUDENT IN AN ORGANIZED HEALTH CARE EDUCATION/TRAINING PROGRAM

## 2019-03-07 PROCEDURE — 1100F PTFALLS ASSESS-DOCD GE2>/YR: CPT | Performed by: STUDENT IN AN ORGANIZED HEALTH CARE EDUCATION/TRAINING PROGRAM

## 2019-03-07 PROCEDURE — 3017F COLORECTAL CA SCREEN DOC REV: CPT | Performed by: STUDENT IN AN ORGANIZED HEALTH CARE EDUCATION/TRAINING PROGRAM

## 2019-03-07 PROCEDURE — G8427 DOCREV CUR MEDS BY ELIG CLIN: HCPCS | Performed by: STUDENT IN AN ORGANIZED HEALTH CARE EDUCATION/TRAINING PROGRAM

## 2019-03-07 PROCEDURE — 0518F FALL PLAN OF CARE DOCD: CPT | Performed by: STUDENT IN AN ORGANIZED HEALTH CARE EDUCATION/TRAINING PROGRAM

## 2019-03-07 PROCEDURE — 4040F PNEUMOC VAC/ADMIN/RCVD: CPT | Performed by: STUDENT IN AN ORGANIZED HEALTH CARE EDUCATION/TRAINING PROGRAM

## 2019-03-07 PROCEDURE — 1090F PRES/ABSN URINE INCON ASSESS: CPT | Performed by: STUDENT IN AN ORGANIZED HEALTH CARE EDUCATION/TRAINING PROGRAM

## 2019-03-07 PROCEDURE — 1123F ACP DISCUSS/DSCN MKR DOCD: CPT | Performed by: STUDENT IN AN ORGANIZED HEALTH CARE EDUCATION/TRAINING PROGRAM

## 2019-03-07 PROCEDURE — 3288F FALL RISK ASSESSMENT DOCD: CPT | Performed by: STUDENT IN AN ORGANIZED HEALTH CARE EDUCATION/TRAINING PROGRAM

## 2019-03-07 PROCEDURE — G8417 CALC BMI ABV UP PARAM F/U: HCPCS | Performed by: STUDENT IN AN ORGANIZED HEALTH CARE EDUCATION/TRAINING PROGRAM

## 2019-03-07 PROCEDURE — G8399 PT W/DXA RESULTS DOCUMENT: HCPCS | Performed by: STUDENT IN AN ORGANIZED HEALTH CARE EDUCATION/TRAINING PROGRAM

## 2019-03-07 RX ORDER — ACETAMINOPHEN 500 MG
1000 TABLET ORAL EVERY 8 HOURS PRN
Qty: 40 TABLET | Refills: 0 | Status: SHIPPED | OUTPATIENT
Start: 2019-03-07 | End: 2022-05-17

## 2019-03-07 RX ORDER — ONDANSETRON 4 MG/1
4 TABLET, FILM COATED ORAL 3 TIMES DAILY PRN
Qty: 30 TABLET | Refills: 0 | Status: SHIPPED | OUTPATIENT
Start: 2019-03-07 | End: 2021-06-29

## 2019-03-07 ASSESSMENT — ENCOUNTER SYMPTOMS
COUGH: 0
SHORTNESS OF BREATH: 0
WHEEZING: 0

## 2019-03-29 ENCOUNTER — OFFICE VISIT (OUTPATIENT)
Dept: PODIATRY | Age: 69
End: 2019-03-29
Payer: COMMERCIAL

## 2019-03-29 VITALS
SYSTOLIC BLOOD PRESSURE: 115 MMHG | DIASTOLIC BLOOD PRESSURE: 64 MMHG | BODY MASS INDEX: 34.16 KG/M2 | HEIGHT: 65 IN | HEART RATE: 103 BPM | WEIGHT: 205 LBS

## 2019-03-29 DIAGNOSIS — M79.672 PAIN IN BOTH FEET: ICD-10-CM

## 2019-03-29 DIAGNOSIS — B35.1 ONYCHOMYCOSIS: Primary | ICD-10-CM

## 2019-03-29 DIAGNOSIS — M79.671 PAIN IN BOTH FEET: ICD-10-CM

## 2019-03-29 PROCEDURE — 99213 OFFICE O/P EST LOW 20 MIN: CPT | Performed by: PODIATRIST

## 2019-03-29 PROCEDURE — 3017F COLORECTAL CA SCREEN DOC REV: CPT | Performed by: PODIATRIST

## 2019-03-29 PROCEDURE — 1123F ACP DISCUSS/DSCN MKR DOCD: CPT | Performed by: PODIATRIST

## 2019-03-29 PROCEDURE — 0518F FALL PLAN OF CARE DOCD: CPT | Performed by: PODIATRIST

## 2019-03-29 PROCEDURE — G8399 PT W/DXA RESULTS DOCUMENT: HCPCS | Performed by: PODIATRIST

## 2019-03-29 PROCEDURE — 3288F FALL RISK ASSESSMENT DOCD: CPT | Performed by: PODIATRIST

## 2019-03-29 PROCEDURE — G8484 FLU IMMUNIZE NO ADMIN: HCPCS | Performed by: PODIATRIST

## 2019-03-29 PROCEDURE — G8427 DOCREV CUR MEDS BY ELIG CLIN: HCPCS | Performed by: PODIATRIST

## 2019-03-29 PROCEDURE — G8417 CALC BMI ABV UP PARAM F/U: HCPCS | Performed by: PODIATRIST

## 2019-03-29 PROCEDURE — 4004F PT TOBACCO SCREEN RCVD TLK: CPT | Performed by: PODIATRIST

## 2019-03-29 PROCEDURE — 11721 DEBRIDE NAIL 6 OR MORE: CPT | Performed by: PODIATRIST

## 2019-03-29 PROCEDURE — 4040F PNEUMOC VAC/ADMIN/RCVD: CPT | Performed by: PODIATRIST

## 2019-03-29 PROCEDURE — 1090F PRES/ABSN URINE INCON ASSESS: CPT | Performed by: PODIATRIST

## 2019-03-29 NOTE — PROGRESS NOTES
Patient instructed to remove shoes and socks, instructed to sit in exam chair. Current PCP name is  and date of last visit . Do you have a follow up visit scheduled?   Yes or no    If yes the date is
examination of the patient and discussed management with the resident. I reviewed the residents note and agree with the documented findings and plan of care. Any areas of disagreement are noted on the chart. I was personally present for the key portions of any procedures. I have documented in the chart those procedures where I was not present during the key portions. I have reviewed the Podiatry Resident progress note. I agree with the chief complaint, past medical history, past surgical history, allergies, medications, social and family history as documented unless otherwise noted below. Documentation of the HPI, Physical Exam and Medical Decision Making performed by medical students or scribes is based on my personal performance of the HPI, PE and MDM. I have personally evaluated this patient and have completed at least one if not all key elements of the E/M (history, physical exam, and MDM). Additional findings are as noted.      Electronically signed by Blank Tavera DPM on 3/29/2019 at 3:53 PM

## 2019-05-10 ENCOUNTER — TELEPHONE (OUTPATIENT)
Dept: FAMILY MEDICINE CLINIC | Age: 69
End: 2019-05-10

## 2019-05-10 ENCOUNTER — OFFICE VISIT (OUTPATIENT)
Dept: FAMILY MEDICINE CLINIC | Age: 69
End: 2019-05-10
Payer: COMMERCIAL

## 2019-05-10 VITALS
DIASTOLIC BLOOD PRESSURE: 84 MMHG | BODY MASS INDEX: 33.72 KG/M2 | WEIGHT: 202.4 LBS | HEART RATE: 86 BPM | HEIGHT: 65 IN | SYSTOLIC BLOOD PRESSURE: 130 MMHG

## 2019-05-10 DIAGNOSIS — I10 ESSENTIAL HYPERTENSION: Primary | ICD-10-CM

## 2019-05-10 PROCEDURE — 99213 OFFICE O/P EST LOW 20 MIN: CPT | Performed by: STUDENT IN AN ORGANIZED HEALTH CARE EDUCATION/TRAINING PROGRAM

## 2019-05-10 RX ORDER — LISINOPRIL 20 MG/1
20 TABLET ORAL DAILY
Qty: 30 TABLET | Refills: 3 | Status: SHIPPED | OUTPATIENT
Start: 2019-05-10 | End: 2021-06-29 | Stop reason: ALTCHOICE

## 2019-05-10 ASSESSMENT — ENCOUNTER SYMPTOMS
WHEEZING: 0
NAUSEA: 0
SHORTNESS OF BREATH: 0
CONSTIPATION: 0
VOMITING: 0
ANAL BLEEDING: 0
DIARRHEA: 0
COUGH: 0
ABDOMINAL PAIN: 0

## 2019-05-10 NOTE — TELEPHONE ENCOUNTER
Forms for transportation were completed to reflect patient can take bus to appointments.   Patient states she can not take bus as she falls and would like other medical transportation  Please return call to patient

## 2019-05-10 NOTE — PROGRESS NOTES
I have reviewed and discussed franco elements of Louise Barbosa with the resident including plan of care and follow up and agree with the care alexy plan.

## 2019-05-10 NOTE — PROGRESS NOTES
HYPERTENSION visit     BP Readings from Last 3 Encounters:   03/29/19 115/64   03/07/19 115/80   02/05/19 123/74       LDL Cholesterol (mg/dL)   Date Value   02/05/2019 124     HDL (mg/dL)   Date Value   02/05/2019 71     BUN (mg/dL)   Date Value   12/11/2018 11     CREATININE (mg/dL)   Date Value   12/11/2018 0.76     Glucose (mg/dL)   Date Value   12/11/2018 84   04/24/2012 87              Have you changed or started any medications since your last visit including any over-the-counter medicines, vitamins, or herbal medicines? no   Have you stopped taking any of your medications? Is so, why? -  no  Are you having any side effects from any of your medications? - no  How often do you miss doses of your medication? occasional      Have you seen any other physician or provider since your last visit? yes - Podiatry    Have you had any other diagnostic tests since your last visit?  no   Have you been seen in the emergency room and/or had an admission in a hospital since we last saw you?  no   Have you had your routine dental cleaning in the past 6 months?  no     Do you have an active MyChart account? If no, what is the barrier?   Yes    Patient Care Team:  Laurita Long MD as PCP - General (Family Medicine)  Sharri Lewis,  as PCP - S Attributed Provider  Oral De La Rosa DPM (Inactive) as Consulting Physician (Podiatry)  Starr Richmond, DO as Consulting Physician (General Surgery)    Medical History Review  Past Medical, Family, and Social History reviewed and does contribute to the patient presenting condition    Health Maintenance   Topic Date Due    Shingles Vaccine (1 of 2) 12/04/1999    Flu vaccine (Season Ended) 09/01/2019    Potassium monitoring  12/11/2019    Creatinine monitoring  12/11/2019    Breast cancer screen  12/15/2019    Colon cancer screen colonoscopy  06/11/2023    Lipid screen  02/05/2024    DTaP/Tdap/Td vaccine (2 - Td) 08/17/2026    DEXA (modify frequency per FRAX score) Completed    Pneumococcal 65+ years Vaccine  Completed    Hepatitis C screen  Completed

## 2019-05-10 NOTE — PROGRESS NOTES
Subjective: Edilberto Ellis is a 70 y.o. female with  has a past medical history of Arthritis, Bronchitis, Chronic obstructive pulmonary disease ([address] Colon polyps, Dental neglect, Depression, Environmental allergies, GERD (gastroesophageal reflux disease), Hyperlipidemia, Hypertension, Obesity, Onychomycosis, Poor historian, RBBB, Treadmill stress test negative for angina pectoris, and Wears glasses. Family History   Problem Relation Age of Onset    Heart Disease Mother    [employer] Cancer Father        Presented tothe office today for:  Chief Complaint   Patient presents with    3 Month Follow-Up    Knee Pain     Left knee     Forms     [employer] transportatin forms to be completed        53-year-old female presented to the office today for follow-up related to essential hypertension and left knee pain. Patient reports that her left knee pain has improved but today it is swollen. Ace wrap applied. Otherwise patient does not have any other concerns or complaints at this time. Review of systems is negative except as mentioned above. Patient does need paperwork filled out for assistance with transportation. Paperwork completed. Review of Systems   Constitutional: Negative for chills and fever. Respiratory: Negative for cough, shortness of breath and wheezing. Cardiovascular: Negative for chest pain. Gastrointestinal: Negative for abdominal pain, anal bleeding, constipation, diarrhea, nausea and vomiting. Genitourinary: Negative for difficulty urinating, dysuria, frequency and urgency. Musculoskeletal: Positive for arthralgias and joint swelling. Neurological: Negative for dizziness, weakness, light-headedness and headaches.        Objective:    /84 (Site: Left Upper Arm, Position: Sitting, Cuff Size: Large Adult)   Pulse 86   Ht 5' 5\" (1.651 m)   Wt 202 lb 6.4 oz (91.8 kg)   BMI 33.68 kg/m²    BP Readings from Last 3 Encounters:   05/10/19 130/84   03/29/19 115/64   03/07/19 115/80 Physical Exam   Constitutional: She is oriented to person, place, and time. No distress. HENT:   Head: Normocephalic and atraumatic. Neck: Normal range of motion. Neck supple. No JVD present. Cardiovascular: Normal rate and regular rhythm. Exam reveals no gallop and no friction rub. No murmur heard. Pulmonary/Chest: Effort normal and breath sounds normal. No respiratory distress. She has no wheezes. Abdominal: Soft. Bowel sounds are normal. She exhibits no distension. There is no tenderness. Musculoskeletal: She exhibits edema and tenderness. She exhibits no deformity. Left knee   Neurological: She is alert and oriented to person, place, and time. Skin: Skin is warm and dry. No rash noted. No erythema. Lab Results   Component Value Date    WBC 9.2 08/14/2015    HGB 14.1 08/14/2015    HCT 43.5 08/14/2015     08/14/2015    CHOL 234 (H) 02/05/2019    TRIG 193 (H) 02/05/2019    HDL 71 02/05/2019    ALT 19 06/28/2017    AST 19 06/28/2017     12/11/2018    K 4.4 12/11/2018    CL 99 12/11/2018    CREATININE 0.76 12/11/2018    BUN 11 12/11/2018    CO2 28 12/11/2018    TSH 2.35 08/14/2015    LABA1C 5.6 07/17/2018    LABMICR CANNOT BE CALCULATED 12/11/2017     Lab Results   Component Value Date    CALCIUM 9.6 12/11/2018     Lab Results   Component Value Date    LDLCHOLESTEROL 124 02/05/2019       Assessment and Plan:    1. Essential hypertension  - lisinopril (PRINIVIL;ZESTRIL) 20 MG tablet; Take 1 tablet by mouth daily  Dispense: 30 tablet; Refill: 3  - controlled     2. Left knee pain and swelling  - RICE  - ace wrap applied         Requested Prescriptions     Signed Prescriptions Disp Refills    lisinopril (PRINIVIL;ZESTRIL) 20 MG tablet 30 tablet 3     Sig: Take 1 tablet by mouth daily       Medications Discontinued During This Encounter   Medication Reason    lisinopril (PRINIVIL;ZESTRIL) 20 MG tablet REORDER       Return in about 3 months (around 8/10/2019).       Ashley White

## 2019-05-13 ENCOUNTER — TELEPHONE (OUTPATIENT)
Dept: ADMINISTRATIVE | Age: 69
End: 2019-05-13

## 2019-05-13 NOTE — TELEPHONE ENCOUNTER
transportation form completed and scanned in patient's chart from 5-10-19.  Left pt msg that this has been completed

## 2019-06-20 ENCOUNTER — OFFICE VISIT (OUTPATIENT)
Dept: FAMILY MEDICINE CLINIC | Age: 69
End: 2019-06-20
Payer: COMMERCIAL

## 2019-06-20 VITALS
HEART RATE: 99 BPM | DIASTOLIC BLOOD PRESSURE: 82 MMHG | HEIGHT: 65 IN | TEMPERATURE: 98.3 F | BODY MASS INDEX: 35.32 KG/M2 | SYSTOLIC BLOOD PRESSURE: 133 MMHG | WEIGHT: 212 LBS

## 2019-06-20 DIAGNOSIS — G44.209 ACUTE NON INTRACTABLE TENSION-TYPE HEADACHE: Primary | ICD-10-CM

## 2019-06-20 DIAGNOSIS — M25.562 ACUTE PAIN OF LEFT KNEE: ICD-10-CM

## 2019-06-20 PROCEDURE — G8427 DOCREV CUR MEDS BY ELIG CLIN: HCPCS | Performed by: STUDENT IN AN ORGANIZED HEALTH CARE EDUCATION/TRAINING PROGRAM

## 2019-06-20 PROCEDURE — 1036F TOBACCO NON-USER: CPT | Performed by: STUDENT IN AN ORGANIZED HEALTH CARE EDUCATION/TRAINING PROGRAM

## 2019-06-20 PROCEDURE — 1123F ACP DISCUSS/DSCN MKR DOCD: CPT | Performed by: STUDENT IN AN ORGANIZED HEALTH CARE EDUCATION/TRAINING PROGRAM

## 2019-06-20 PROCEDURE — 99211 OFF/OP EST MAY X REQ PHY/QHP: CPT | Performed by: STUDENT IN AN ORGANIZED HEALTH CARE EDUCATION/TRAINING PROGRAM

## 2019-06-20 PROCEDURE — G8399 PT W/DXA RESULTS DOCUMENT: HCPCS | Performed by: STUDENT IN AN ORGANIZED HEALTH CARE EDUCATION/TRAINING PROGRAM

## 2019-06-20 PROCEDURE — 1090F PRES/ABSN URINE INCON ASSESS: CPT | Performed by: STUDENT IN AN ORGANIZED HEALTH CARE EDUCATION/TRAINING PROGRAM

## 2019-06-20 PROCEDURE — 4040F PNEUMOC VAC/ADMIN/RCVD: CPT | Performed by: STUDENT IN AN ORGANIZED HEALTH CARE EDUCATION/TRAINING PROGRAM

## 2019-06-20 PROCEDURE — 3017F COLORECTAL CA SCREEN DOC REV: CPT | Performed by: STUDENT IN AN ORGANIZED HEALTH CARE EDUCATION/TRAINING PROGRAM

## 2019-06-20 PROCEDURE — 99213 OFFICE O/P EST LOW 20 MIN: CPT | Performed by: STUDENT IN AN ORGANIZED HEALTH CARE EDUCATION/TRAINING PROGRAM

## 2019-06-20 PROCEDURE — G8417 CALC BMI ABV UP PARAM F/U: HCPCS | Performed by: STUDENT IN AN ORGANIZED HEALTH CARE EDUCATION/TRAINING PROGRAM

## 2019-06-20 RX ORDER — IBUPROFEN 600 MG/1
600 TABLET ORAL 4 TIMES DAILY PRN
Qty: 120 TABLET | Refills: 1 | Status: ON HOLD | OUTPATIENT
Start: 2019-06-20 | End: 2021-06-03 | Stop reason: HOSPADM

## 2019-06-20 ASSESSMENT — ENCOUNTER SYMPTOMS
WHEEZING: 0
COUGH: 0
VOMITING: 0
ABDOMINAL PAIN: 0
NAUSEA: 0
ANAL BLEEDING: 0
CONSTIPATION: 0
SHORTNESS OF BREATH: 0
DIARRHEA: 0

## 2019-06-20 NOTE — PROGRESS NOTES
Subjective: Liu Fleming is a 70 y.o. female with  has a past medical history of Arthritis, Bronchitis, Chronic obstructive pulmonary disease ([address] Colon polyps, Dental neglect, Depression, Environmental allergies, GERD (gastroesophageal reflux disease), Hyperlipidemia, Hypertension, Obesity, Onychomycosis, Poor historian, RBBB, Treadmill stress test negative for angina pectoris, and Wears glasses. Family History   Problem Relation Age of Onset    Heart Disease Mother     Cancer Father        Presented tot office today for:  Chief Complaint   Patient presents with    Headache     x 2 weeks        Zeinab Hammer is a 39-year-old female presents the office today due to headache for the past 2 weeks. Patient states that she has been having frontal headaches, occurring every 3 days, duration of 5 minutes, respond to ibuprofen. Patient denies any dizziness, lightheadedness, visual disturbances, nausea, vomiting. Patient denies any head trauma. No concerns or complaints at this time. Review of Systems   Constitutional: Negative for chills and fever. Respiratory: Negative for cough, shortness of breath and wheezing. Cardiovascular: Negative for chest pain. Gastrointestinal: Negative for abdominal pain, anal bleeding, constipation, diarrhea, nausea and vomiting. Genitourinary: Negative for difficulty urinating, dysuria, frequency and urgency. Neurological: Positive for headaches. Negative for dizziness, weakness and light-headedness. Objective:    /82 (Site: Left Lower Arm, Position: Sitting, Cuff Size: Medium Adult) Comment: machine  Pulse 99   Temp 98.3 °F (36.8 °C) (Temporal)   Ht 5' 5\" (1.651 m)   Wt 212 lb (96.2 kg)   BMI 35.28 kg/m²    BP Readings from Last 3 Encounters:   06/20/19 133/82   05/10/19 130/84   03/29/19 115/64     Physical Exam   Constitutional: She is oriented to person, place, and time. She appears well-developed and well-nourished. No distress.

## 2019-06-20 NOTE — PROGRESS NOTES
Visit Information    Have you changed or started any medications since your last visit including any over-the-counter medicines, vitamins, or herbal medicines? no   Have you stopped taking any of your medications? Is so, why? -  no  Are you having any side effects from any of your medications? - no    Have you seen any other physician or provider since your last visit?  no   Have you had any other diagnostic tests since your last visit?  no   Have you been seen in the emergency room and/or had an admission in a hospital since we last saw you?  no   Have you had your routine dental cleaning in the past 6 months?  no     Do you have an active MyChart account? If no, what is the barrier?   Yes    Patient Care Team:  Joyce Díaz MD as PCP - General (Family Medicine)  Amy Crocker DO as PCP - [facility] Provider  Jesus Kaur DPM (Inactive) as Consulting Physician (Podiatry)  Bina Jernigan DO as Consulting Physician (General Surgery)    Medical History Review  Past Medical, Family, and Social History reviewed and does not contribute to the patient presenting condition    Health Maintenance   Topic Date Due    Shingles Vaccine (1 of 2) 12/04/1999    Flu vaccine (Season Ended) 09/01/2019    Potassium monitoring  12/11/2019    Creatinine monitoring  12/11/2019    Breast cancer screen  12/15/2019    Colon cancer screen colonoscopy  06/11/2023    Lipid screen  02/05/2024    DTaP/Tdap/Td vaccine (2 - Td) 08/17/2026    DEXA (modify frequency per FRAX score)  Completed    Pneumococcal 65+ years Vaccine  Completed    Hepatitis C screen  Completed

## 2019-07-03 ENCOUNTER — OFFICE VISIT (OUTPATIENT)
Dept: PODIATRY | Age: 69
End: 2019-07-03
Payer: COMMERCIAL

## 2019-07-03 VITALS
WEIGHT: 212.08 LBS | SYSTOLIC BLOOD PRESSURE: 119 MMHG | DIASTOLIC BLOOD PRESSURE: 69 MMHG | BODY MASS INDEX: 35.34 KG/M2 | HEART RATE: 90 BPM | HEIGHT: 65 IN

## 2019-07-03 DIAGNOSIS — M79.671 PAIN IN BOTH FEET: Primary | ICD-10-CM

## 2019-07-03 DIAGNOSIS — M79.672 PAIN IN BOTH FEET: Primary | ICD-10-CM

## 2019-07-03 DIAGNOSIS — B35.1 ONYCHOMYCOSIS: ICD-10-CM

## 2019-07-03 PROCEDURE — 11721 DEBRIDE NAIL 6 OR MORE: CPT | Performed by: STUDENT IN AN ORGANIZED HEALTH CARE EDUCATION/TRAINING PROGRAM

## 2019-07-03 PROCEDURE — 99212 OFFICE O/P EST SF 10 MIN: CPT | Performed by: STUDENT IN AN ORGANIZED HEALTH CARE EDUCATION/TRAINING PROGRAM

## 2019-08-12 ENCOUNTER — OFFICE VISIT (OUTPATIENT)
Dept: FAMILY MEDICINE CLINIC | Age: 69
End: 2019-08-12
Payer: COMMERCIAL

## 2019-08-12 VITALS
BODY MASS INDEX: 33.69 KG/M2 | WEIGHT: 202.2 LBS | HEIGHT: 65 IN | SYSTOLIC BLOOD PRESSURE: 114 MMHG | HEART RATE: 79 BPM | DIASTOLIC BLOOD PRESSURE: 72 MMHG

## 2019-08-12 DIAGNOSIS — G44.219 EPISODIC TENSION-TYPE HEADACHE, NOT INTRACTABLE: ICD-10-CM

## 2019-08-12 DIAGNOSIS — Z23 NEED FOR SHINGLES VACCINE: Primary | ICD-10-CM

## 2019-08-12 DIAGNOSIS — Z71.6 ENCOUNTER FOR SMOKING CESSATION COUNSELING: ICD-10-CM

## 2019-08-12 DIAGNOSIS — J44.9 CHRONIC OBSTRUCTIVE PULMONARY DISEASE, UNSPECIFIED COPD TYPE (HCC): ICD-10-CM

## 2019-08-12 DIAGNOSIS — I10 ESSENTIAL HYPERTENSION: ICD-10-CM

## 2019-08-12 PROCEDURE — G8926 SPIRO NO PERF OR DOC: HCPCS | Performed by: FAMILY MEDICINE

## 2019-08-12 PROCEDURE — 1036F TOBACCO NON-USER: CPT | Performed by: FAMILY MEDICINE

## 2019-08-12 PROCEDURE — G8417 CALC BMI ABV UP PARAM F/U: HCPCS | Performed by: FAMILY MEDICINE

## 2019-08-12 PROCEDURE — 3017F COLORECTAL CA SCREEN DOC REV: CPT | Performed by: FAMILY MEDICINE

## 2019-08-12 PROCEDURE — 99213 OFFICE O/P EST LOW 20 MIN: CPT | Performed by: STUDENT IN AN ORGANIZED HEALTH CARE EDUCATION/TRAINING PROGRAM

## 2019-08-12 PROCEDURE — 4040F PNEUMOC VAC/ADMIN/RCVD: CPT | Performed by: FAMILY MEDICINE

## 2019-08-12 PROCEDURE — 1090F PRES/ABSN URINE INCON ASSESS: CPT | Performed by: FAMILY MEDICINE

## 2019-08-12 PROCEDURE — G8427 DOCREV CUR MEDS BY ELIG CLIN: HCPCS | Performed by: FAMILY MEDICINE

## 2019-08-12 PROCEDURE — 1123F ACP DISCUSS/DSCN MKR DOCD: CPT | Performed by: FAMILY MEDICINE

## 2019-08-12 PROCEDURE — 3023F SPIROM DOC REV: CPT | Performed by: FAMILY MEDICINE

## 2019-08-12 PROCEDURE — G8399 PT W/DXA RESULTS DOCUMENT: HCPCS | Performed by: FAMILY MEDICINE

## 2019-08-12 RX ORDER — ALBUTEROL SULFATE 90 UG/1
2 AEROSOL, METERED RESPIRATORY (INHALATION) EVERY 6 HOURS PRN
Qty: 1 INHALER | Refills: 3 | Status: SHIPPED | OUTPATIENT
Start: 2019-08-12 | End: 2021-06-29

## 2019-08-12 ASSESSMENT — ENCOUNTER SYMPTOMS
DIARRHEA: 1
ANAL BLEEDING: 0
ABDOMINAL PAIN: 0
CONSTIPATION: 0
WHEEZING: 0
VOMITING: 0
COUGH: 0
NAUSEA: 0
SHORTNESS OF BREATH: 0

## 2019-08-12 NOTE — PROGRESS NOTES
Subjective: Macario Henning is a 70 y.o. female with  has a past medical history of Arthritis, Bronchitis, Chronic obstructive pulmonary disease ([address] Colon polyps, Dental neglect, Depression, Environmental allergies, GERD (gastroesophageal reflux disease), Hyperlipidemia, Hypertension, Obesity, Onychomycosis, Poor historian, RBBB, Treadmill stress test negative for angina pectoris, and Wears glasses. Family History   Problem Relation Age of Onset    Heart Disease Mother    Lissette Britt[employer] Cancer Father        Presented tot office today for:  Chief Complaint   Patient presents with    Headache    Stress       69-year-old female presents the office for follow-up. At this time patient's primary concern is headaches, says that she has them occasionally, less than once a week, good response to ibuprofen, no aura, photosensitivity, or vomiting. Patient complains of diarrhea since yesterday, has had 3 loose bowel movements, no blood. No pain. Patient reports being compliant with medication. Patient is requesting refills on her inhalers. Patient does not have any other concerns or complaints at this time. Review of systems is negative except as mentioned above. Review of Systems   Constitutional: Negative for chills and fever. Respiratory: Negative for cough, shortness of breath and wheezing. Cardiovascular: Negative for chest pain. Gastrointestinal: Positive for diarrhea. Negative for abdominal pain, anal bleeding, constipation, nausea and vomiting. Genitourinary: Negative for difficulty urinating, dysuria, frequency and urgency. Neurological: Positive for headaches. Negative for dizziness, weakness and light-headedness.        Objective:    /72 (Site: Left Upper Arm, Position: Sitting, Cuff Size: Large Adult)   Pulse 79   Ht 5' 5\" (1.651 m)   Wt 202 lb 3.2 oz (91.7 kg)   BMI 33.65 kg/m²    BP Readings from Last 3 Encounters:   08/12/19 114/72   07/03/19 119/69   06/20/19 133/82     Physical

## 2019-09-26 ENCOUNTER — OFFICE VISIT (OUTPATIENT)
Dept: FAMILY MEDICINE CLINIC | Age: 69
End: 2019-09-26
Payer: COMMERCIAL

## 2019-09-26 VITALS
DIASTOLIC BLOOD PRESSURE: 85 MMHG | BODY MASS INDEX: 33.95 KG/M2 | SYSTOLIC BLOOD PRESSURE: 131 MMHG | HEIGHT: 65 IN | HEART RATE: 88 BPM | WEIGHT: 203.8 LBS

## 2019-09-26 DIAGNOSIS — Z71.6 ENCOUNTER FOR SMOKING CESSATION COUNSELING: ICD-10-CM

## 2019-09-26 DIAGNOSIS — M25.562 CHRONIC PAIN OF LEFT KNEE: Primary | ICD-10-CM

## 2019-09-26 DIAGNOSIS — G89.29 CHRONIC PAIN OF LEFT KNEE: Primary | ICD-10-CM

## 2019-09-26 PROCEDURE — G8427 DOCREV CUR MEDS BY ELIG CLIN: HCPCS | Performed by: FAMILY MEDICINE

## 2019-09-26 PROCEDURE — 1090F PRES/ABSN URINE INCON ASSESS: CPT | Performed by: FAMILY MEDICINE

## 2019-09-26 PROCEDURE — 3017F COLORECTAL CA SCREEN DOC REV: CPT | Performed by: FAMILY MEDICINE

## 2019-09-26 PROCEDURE — G8399 PT W/DXA RESULTS DOCUMENT: HCPCS | Performed by: FAMILY MEDICINE

## 2019-09-26 PROCEDURE — 99213 OFFICE O/P EST LOW 20 MIN: CPT | Performed by: STUDENT IN AN ORGANIZED HEALTH CARE EDUCATION/TRAINING PROGRAM

## 2019-09-26 PROCEDURE — 4040F PNEUMOC VAC/ADMIN/RCVD: CPT | Performed by: FAMILY MEDICINE

## 2019-09-26 PROCEDURE — 1123F ACP DISCUSS/DSCN MKR DOCD: CPT | Performed by: FAMILY MEDICINE

## 2019-09-26 PROCEDURE — 1036F TOBACCO NON-USER: CPT | Performed by: FAMILY MEDICINE

## 2019-09-26 PROCEDURE — G8417 CALC BMI ABV UP PARAM F/U: HCPCS | Performed by: FAMILY MEDICINE

## 2019-09-26 PROCEDURE — 99211 OFF/OP EST MAY X REQ PHY/QHP: CPT | Performed by: FAMILY MEDICINE

## 2019-09-26 ASSESSMENT — ENCOUNTER SYMPTOMS
CONSTIPATION: 0
WHEEZING: 0
NAUSEA: 0
DIARRHEA: 0
COUGH: 0
ABDOMINAL PAIN: 0
SHORTNESS OF BREATH: 0
ANAL BLEEDING: 0
VOMITING: 0

## 2019-10-02 ENCOUNTER — OFFICE VISIT (OUTPATIENT)
Dept: PODIATRY | Age: 69
End: 2019-10-02
Payer: COMMERCIAL

## 2019-10-02 VITALS
HEART RATE: 104 BPM | DIASTOLIC BLOOD PRESSURE: 74 MMHG | SYSTOLIC BLOOD PRESSURE: 132 MMHG | WEIGHT: 208 LBS | BODY MASS INDEX: 34.66 KG/M2 | HEIGHT: 65 IN

## 2019-10-02 DIAGNOSIS — M79.671 PAIN IN BOTH FEET: Primary | ICD-10-CM

## 2019-10-02 DIAGNOSIS — B35.1 ONYCHOMYCOSIS: ICD-10-CM

## 2019-10-02 DIAGNOSIS — R26.2 DIFFICULTY WALKING: ICD-10-CM

## 2019-10-02 DIAGNOSIS — M79.672 PAIN IN BOTH FEET: Primary | ICD-10-CM

## 2019-10-02 DIAGNOSIS — M15.9 OSTEOARTHRITIS OF MULTIPLE JOINTS, UNSPECIFIED OSTEOARTHRITIS TYPE: ICD-10-CM

## 2019-10-02 PROCEDURE — 99212 OFFICE O/P EST SF 10 MIN: CPT | Performed by: STUDENT IN AN ORGANIZED HEALTH CARE EDUCATION/TRAINING PROGRAM

## 2019-10-02 PROCEDURE — 11721 DEBRIDE NAIL 6 OR MORE: CPT | Performed by: STUDENT IN AN ORGANIZED HEALTH CARE EDUCATION/TRAINING PROGRAM

## 2019-11-18 ENCOUNTER — OFFICE VISIT (OUTPATIENT)
Dept: FAMILY MEDICINE CLINIC | Age: 69
End: 2019-11-18
Payer: COMMERCIAL

## 2019-11-18 VITALS
HEART RATE: 76 BPM | BODY MASS INDEX: 34.26 KG/M2 | HEIGHT: 65 IN | SYSTOLIC BLOOD PRESSURE: 133 MMHG | DIASTOLIC BLOOD PRESSURE: 80 MMHG | WEIGHT: 205.6 LBS

## 2019-11-18 DIAGNOSIS — Z12.31 ENCOUNTER FOR SCREENING MAMMOGRAM FOR BREAST CANCER: ICD-10-CM

## 2019-11-18 DIAGNOSIS — R73.03 PRE-DIABETES: Primary | ICD-10-CM

## 2019-11-18 DIAGNOSIS — M25.562 CHRONIC PAIN OF LEFT KNEE: ICD-10-CM

## 2019-11-18 DIAGNOSIS — G89.29 CHRONIC PAIN OF LEFT KNEE: ICD-10-CM

## 2019-11-18 DIAGNOSIS — Z23 NEED FOR INFLUENZA VACCINATION: ICD-10-CM

## 2019-11-18 LAB — HBA1C MFR BLD: 5.6 %

## 2019-11-18 PROCEDURE — 99213 OFFICE O/P EST LOW 20 MIN: CPT | Performed by: STUDENT IN AN ORGANIZED HEALTH CARE EDUCATION/TRAINING PROGRAM

## 2019-11-18 PROCEDURE — G8399 PT W/DXA RESULTS DOCUMENT: HCPCS | Performed by: FAMILY MEDICINE

## 2019-11-18 PROCEDURE — 1123F ACP DISCUSS/DSCN MKR DOCD: CPT | Performed by: FAMILY MEDICINE

## 2019-11-18 PROCEDURE — 1036F TOBACCO NON-USER: CPT | Performed by: FAMILY MEDICINE

## 2019-11-18 PROCEDURE — 83036 HEMOGLOBIN GLYCOSYLATED A1C: CPT | Performed by: STUDENT IN AN ORGANIZED HEALTH CARE EDUCATION/TRAINING PROGRAM

## 2019-11-18 PROCEDURE — G8427 DOCREV CUR MEDS BY ELIG CLIN: HCPCS | Performed by: FAMILY MEDICINE

## 2019-11-18 PROCEDURE — 99211 OFF/OP EST MAY X REQ PHY/QHP: CPT | Performed by: FAMILY MEDICINE

## 2019-11-18 PROCEDURE — 1090F PRES/ABSN URINE INCON ASSESS: CPT | Performed by: FAMILY MEDICINE

## 2019-11-18 PROCEDURE — 4040F PNEUMOC VAC/ADMIN/RCVD: CPT | Performed by: FAMILY MEDICINE

## 2019-11-18 PROCEDURE — 90686 IIV4 VACC NO PRSV 0.5 ML IM: CPT | Performed by: FAMILY MEDICINE

## 2019-11-18 PROCEDURE — G8417 CALC BMI ABV UP PARAM F/U: HCPCS | Performed by: FAMILY MEDICINE

## 2019-11-18 PROCEDURE — G8482 FLU IMMUNIZE ORDER/ADMIN: HCPCS | Performed by: FAMILY MEDICINE

## 2019-11-18 PROCEDURE — 3017F COLORECTAL CA SCREEN DOC REV: CPT | Performed by: FAMILY MEDICINE

## 2019-11-18 ASSESSMENT — ENCOUNTER SYMPTOMS
ABDOMINAL PAIN: 0
DIARRHEA: 0
NAUSEA: 0
CONSTIPATION: 0
VOMITING: 0
SHORTNESS OF BREATH: 1
ANAL BLEEDING: 0
WHEEZING: 0
COUGH: 0

## 2019-12-18 PROBLEM — Z23 NEED FOR INFLUENZA VACCINATION: Status: RESOLVED | Noted: 2017-09-12 | Resolved: 2019-12-18

## 2019-12-19 ENCOUNTER — OFFICE VISIT (OUTPATIENT)
Dept: FAMILY MEDICINE CLINIC | Age: 69
End: 2019-12-19
Payer: COMMERCIAL

## 2019-12-19 VITALS
HEIGHT: 65 IN | WEIGHT: 205.4 LBS | DIASTOLIC BLOOD PRESSURE: 73 MMHG | SYSTOLIC BLOOD PRESSURE: 124 MMHG | HEART RATE: 95 BPM | BODY MASS INDEX: 34.22 KG/M2

## 2019-12-19 DIAGNOSIS — M17.12 PRIMARY OSTEOARTHRITIS OF LEFT KNEE: Primary | ICD-10-CM

## 2019-12-19 PROCEDURE — 6360000002 HC RX W HCPCS

## 2019-12-19 PROCEDURE — 3017F COLORECTAL CA SCREEN DOC REV: CPT | Performed by: STUDENT IN AN ORGANIZED HEALTH CARE EDUCATION/TRAINING PROGRAM

## 2019-12-19 PROCEDURE — G8427 DOCREV CUR MEDS BY ELIG CLIN: HCPCS | Performed by: STUDENT IN AN ORGANIZED HEALTH CARE EDUCATION/TRAINING PROGRAM

## 2019-12-19 PROCEDURE — 99213 OFFICE O/P EST LOW 20 MIN: CPT | Performed by: STUDENT IN AN ORGANIZED HEALTH CARE EDUCATION/TRAINING PROGRAM

## 2019-12-19 PROCEDURE — 1090F PRES/ABSN URINE INCON ASSESS: CPT | Performed by: STUDENT IN AN ORGANIZED HEALTH CARE EDUCATION/TRAINING PROGRAM

## 2019-12-19 PROCEDURE — 20610 DRAIN/INJ JOINT/BURSA W/O US: CPT

## 2019-12-19 PROCEDURE — 1036F TOBACCO NON-USER: CPT | Performed by: STUDENT IN AN ORGANIZED HEALTH CARE EDUCATION/TRAINING PROGRAM

## 2019-12-19 PROCEDURE — G8482 FLU IMMUNIZE ORDER/ADMIN: HCPCS | Performed by: STUDENT IN AN ORGANIZED HEALTH CARE EDUCATION/TRAINING PROGRAM

## 2019-12-19 PROCEDURE — 1123F ACP DISCUSS/DSCN MKR DOCD: CPT | Performed by: STUDENT IN AN ORGANIZED HEALTH CARE EDUCATION/TRAINING PROGRAM

## 2019-12-19 PROCEDURE — 4040F PNEUMOC VAC/ADMIN/RCVD: CPT | Performed by: STUDENT IN AN ORGANIZED HEALTH CARE EDUCATION/TRAINING PROGRAM

## 2019-12-19 PROCEDURE — 99211 OFF/OP EST MAY X REQ PHY/QHP: CPT | Performed by: FAMILY MEDICINE

## 2019-12-19 PROCEDURE — G8417 CALC BMI ABV UP PARAM F/U: HCPCS | Performed by: STUDENT IN AN ORGANIZED HEALTH CARE EDUCATION/TRAINING PROGRAM

## 2019-12-19 PROCEDURE — G8399 PT W/DXA RESULTS DOCUMENT: HCPCS | Performed by: STUDENT IN AN ORGANIZED HEALTH CARE EDUCATION/TRAINING PROGRAM

## 2019-12-19 RX ORDER — BUPIVACAINE HYDROCHLORIDE 5 MG/ML
4 INJECTION, SOLUTION EPIDURAL; INTRACAUDAL ONCE
Status: COMPLETED | OUTPATIENT
Start: 2019-12-19 | End: 2019-12-19

## 2019-12-19 RX ORDER — TRIAMCINOLONE ACETONIDE 40 MG/ML
40 INJECTION, SUSPENSION INTRA-ARTICULAR; INTRAMUSCULAR ONCE
Status: COMPLETED | OUTPATIENT
Start: 2019-12-19 | End: 2019-12-19

## 2019-12-19 RX ADMIN — TRIAMCINOLONE ACETONIDE 40 MG: 40 INJECTION, SUSPENSION INTRA-ARTICULAR; INTRAMUSCULAR at 14:17

## 2019-12-19 RX ADMIN — BUPIVACAINE HYDROCHLORIDE 20 MG: 5 INJECTION, SOLUTION EPIDURAL; INTRACAUDAL at 14:31

## 2019-12-19 ASSESSMENT — PAIN SCALES - GENERAL: PAINLEVEL_OUTOF10: 0

## 2019-12-19 ASSESSMENT — ENCOUNTER SYMPTOMS: SHORTNESS OF BREATH: 0

## 2020-01-08 ENCOUNTER — OFFICE VISIT (OUTPATIENT)
Dept: PODIATRY | Age: 70
End: 2020-01-08
Payer: COMMERCIAL

## 2020-01-08 VITALS
WEIGHT: 209 LBS | DIASTOLIC BLOOD PRESSURE: 80 MMHG | SYSTOLIC BLOOD PRESSURE: 136 MMHG | BODY MASS INDEX: 34.82 KG/M2 | HEIGHT: 65 IN | HEART RATE: 78 BPM

## 2020-01-08 PROCEDURE — 11721 DEBRIDE NAIL 6 OR MORE: CPT | Performed by: STUDENT IN AN ORGANIZED HEALTH CARE EDUCATION/TRAINING PROGRAM

## 2020-01-08 PROCEDURE — 99212 OFFICE O/P EST SF 10 MIN: CPT | Performed by: STUDENT IN AN ORGANIZED HEALTH CARE EDUCATION/TRAINING PROGRAM

## 2020-01-22 ENCOUNTER — OFFICE VISIT (OUTPATIENT)
Dept: FAMILY MEDICINE CLINIC | Age: 70
End: 2020-01-22
Payer: COMMERCIAL

## 2020-01-22 ENCOUNTER — HOSPITAL ENCOUNTER (OUTPATIENT)
Age: 70
Setting detail: SPECIMEN
Discharge: HOME | End: 2020-01-22
Payer: COMMERCIAL

## 2020-01-22 VITALS
SYSTOLIC BLOOD PRESSURE: 113 MMHG | HEART RATE: 77 BPM | DIASTOLIC BLOOD PRESSURE: 72 MMHG | WEIGHT: 210.6 LBS | BODY MASS INDEX: 35.05 KG/M2

## 2020-01-22 LAB
ANION GAP SERPL CALCULATED.3IONS-SCNC: 14 MMOL/L (ref 9–17)
BUN BLDV-MCNC: 11 MG/DL (ref 8–23)
BUN/CREAT BLD: NORMAL (ref 9–20)
CALCIUM SERPL-MCNC: 9.5 MG/DL (ref 8.6–10.4)
CHLORIDE BLD-SCNC: 101 MMOL/L (ref 98–107)
CHOLESTEROL/HDL RATIO: 2.7
CHOLESTEROL: 226 MG/DL
CO2: 26 MMOL/L (ref 20–31)
CREAT SERPL-MCNC: 0.71 MG/DL (ref 0.5–0.9)
GFR AFRICAN AMERICAN: >60 ML/MIN
GFR NON-AFRICAN AMERICAN: >60 ML/MIN
GFR SERPL CREATININE-BSD FRML MDRD: NORMAL ML/MIN/{1.73_M2}
GFR SERPL CREATININE-BSD FRML MDRD: NORMAL ML/MIN/{1.73_M2}
GLUCOSE BLD-MCNC: 98 MG/DL (ref 70–99)
HDLC SERPL-MCNC: 84 MG/DL
LDL CHOLESTEROL: 120 MG/DL (ref 0–130)
POTASSIUM SERPL-SCNC: 4.2 MMOL/L (ref 3.7–5.3)
SODIUM BLD-SCNC: 141 MMOL/L (ref 135–144)
TRIGL SERPL-MCNC: 109 MG/DL
VLDLC SERPL CALC-MCNC: ABNORMAL MG/DL (ref 1–30)

## 2020-01-22 PROCEDURE — 1090F PRES/ABSN URINE INCON ASSESS: CPT | Performed by: FAMILY MEDICINE

## 2020-01-22 PROCEDURE — 1123F ACP DISCUSS/DSCN MKR DOCD: CPT | Performed by: FAMILY MEDICINE

## 2020-01-22 PROCEDURE — 3023F SPIROM DOC REV: CPT | Performed by: FAMILY MEDICINE

## 2020-01-22 PROCEDURE — 4040F PNEUMOC VAC/ADMIN/RCVD: CPT | Performed by: FAMILY MEDICINE

## 2020-01-22 PROCEDURE — 99213 OFFICE O/P EST LOW 20 MIN: CPT | Performed by: STUDENT IN AN ORGANIZED HEALTH CARE EDUCATION/TRAINING PROGRAM

## 2020-01-22 PROCEDURE — 99211 OFF/OP EST MAY X REQ PHY/QHP: CPT | Performed by: FAMILY MEDICINE

## 2020-01-22 PROCEDURE — 1036F TOBACCO NON-USER: CPT | Performed by: FAMILY MEDICINE

## 2020-01-22 PROCEDURE — G8417 CALC BMI ABV UP PARAM F/U: HCPCS | Performed by: FAMILY MEDICINE

## 2020-01-22 PROCEDURE — G8482 FLU IMMUNIZE ORDER/ADMIN: HCPCS | Performed by: FAMILY MEDICINE

## 2020-01-22 PROCEDURE — G8926 SPIRO NO PERF OR DOC: HCPCS | Performed by: FAMILY MEDICINE

## 2020-01-22 PROCEDURE — 3017F COLORECTAL CA SCREEN DOC REV: CPT | Performed by: FAMILY MEDICINE

## 2020-01-22 PROCEDURE — G8427 DOCREV CUR MEDS BY ELIG CLIN: HCPCS | Performed by: FAMILY MEDICINE

## 2020-01-22 PROCEDURE — G8399 PT W/DXA RESULTS DOCUMENT: HCPCS | Performed by: FAMILY MEDICINE

## 2020-01-22 ASSESSMENT — ENCOUNTER SYMPTOMS
COUGH: 0
NAUSEA: 0
ABDOMINAL PAIN: 0
ANAL BLEEDING: 0
DIARRHEA: 0
SHORTNESS OF BREATH: 0
VOMITING: 0
WHEEZING: 0
CONSTIPATION: 0

## 2020-01-22 NOTE — PROGRESS NOTES
Subjective: Mindy Tavares is a 78 y.o. female with  has a past medical history of Arthritis, Bronchitis, Chronic obstructive pulmonary disease ([address] Colon polyps, Dental neglect, Depression, Environmental allergies, GERD (gastroesophageal reflux disease), Hyperlipidemia, Hypertension, Obesity, Onychomycosis, Poor historian, RBBB, Treadmill stress test negative for angina pectoris, and Wears glasses. Family History   Problem Relation Age of Onset    Heart Disease Mother    Cardoza Rule Cancer Father        Presented tothe office today for:  Chief Complaint   Patient presents with    Knee Pain     6 week f/u left knee pain        76-year-old female presents the office for routine follow-up. Reports that her bilateral knee pain has improved significantly, pain is managed with ibuprofen. Patient states that at this time she is not considering knee injections or physical therapy. Patient does not have any other concerns or complaints at this time. Patient reports that she is been trying to lose weight through healthy eating. Patient reports having lost 20+ pounds. She reports that she has not smoked in more than 6 months, encouragement and counseling provided. No other concerns or complaints at this time. Review of systems is negative except as mentioned above. Review of Systems   Constitutional: Negative for chills and fever. Respiratory: Negative for cough, shortness of breath and wheezing. Cardiovascular: Negative for chest pain. Gastrointestinal: Negative for abdominal pain, anal bleeding, constipation, diarrhea, nausea and vomiting. Genitourinary: Negative for difficulty urinating, dysuria, frequency and urgency. Neurological: Negative for dizziness, weakness, light-headedness and headaches.        Objective:    /72 (Site: Left Upper Arm, Position: Sitting, Cuff Size: Large Adult)   Pulse 77   Wt 210 lb 9.6 oz (95.5 kg)   BMI 35.05 kg/m²    BP Readings from Last 3 Encounters: 01/22/20 113/72   01/08/20 136/80   12/19/19 124/73     Physical Exam  Constitutional:       General: She is not in acute distress. HENT:      Head: Normocephalic and atraumatic. Neck:      Musculoskeletal: Normal range of motion and neck supple. Vascular: No JVD. Cardiovascular:      Rate and Rhythm: Normal rate and regular rhythm. Heart sounds: No murmur. No friction rub. No gallop. Pulmonary:      Effort: Pulmonary effort is normal. No respiratory distress. Breath sounds: Normal breath sounds. No wheezing. Abdominal:      General: Bowel sounds are normal. There is no distension. Palpations: Abdomen is soft. Tenderness: There is no tenderness. Skin:     General: Skin is warm and dry. Findings: No erythema or rash. Neurological:      Mental Status: She is alert and oriented to person, place, and time. Lab Results   Component Value Date    WBC 9.2 08/14/2015    HGB 14.1 08/14/2015    HCT 43.5 08/14/2015     08/14/2015    CHOL 234 (H) 02/05/2019    TRIG 193 (H) 02/05/2019    HDL 71 02/05/2019    ALT 19 06/28/2017    AST 19 06/28/2017     12/11/2018    K 4.4 12/11/2018    CL 99 12/11/2018    CREATININE 0.76 12/11/2018    BUN 11 12/11/2018    CO2 28 12/11/2018    TSH 2.35 08/14/2015    LABA1C 5.6 11/18/2019    LABMICR CANNOT BE CALCULATED 12/11/2017     Lab Results   Component Value Date    CALCIUM 9.6 12/11/2018     Lab Results   Component Value Date    LDLCHOLESTEROL 124 02/05/2019       Assessment and Plan:    1. Chronic pain of left knee  - Motrin prn  - Basic Metabolic Panel; Future, check creatinine     2. Dyslipidemia  - Lipid Panel; Future  - If elevated will start patient on statin     3. Chronic obstructive pulmonary disease, unspecified COPD type ([facility] [address]  - Stable  - Has not smoked in > 6 months          Requested Prescriptions      No prescriptions requested or ordered in this encounter       There are no discontinued medications.     Return in about 6 months (around 7/22/2020). Greyson Noel received counseling on the following healthy behaviors: nutrition and exercise  Reviewed prior labs and health maintenance  Continue current medications, diet and exercise. Discussed use, benefit, and side effects of prescribed medications. Barriers to medication compliance addressed. Patient given educational materials - see patient instructions  Was a self-tracking handout given in paper form or via [employer]? Requested Prescriptions      No prescriptions requested or ordered in this encounter       All patient questions answered. Patient voiced understanding. Quality Measures    Body mass index is 35.05 kg/m². Elevated. Weight control planned discussed Healthy diet and regular exercise. BP: 113/72 Blood pressure is normal. Treatment plan consists of No treatment change needed.     Lab Results   Component Value Date    LDLCHOLESTEROL 124 02/05/2019    (goal LDL reduction with dx if diabetes is 50% LDL reduction)      PHQ Scores 4/26/2018 2/17/2017 1/18/2016 12/10/2014   PHQ2 Score 0 1 0 0   PHQ9 Score 0 1 0 0     Interpretation of Total Score Depression Severity: 1-4 = Minimal depression, 5-9 = Mild depression, 10-14 = Moderate depression, 15-19 = Moderately severe depression, 20-27 = Severe depression

## 2020-01-22 NOTE — PROGRESS NOTES
Attending Physician Statement  I  have discussed the care of Kalie Cruz including pertinent history and exam findings with the resident. I agree with the assessment, plan and orders as documented by the resident. /72 (Site: Left Upper Arm, Position: Sitting, Cuff Size: Large Adult)   Pulse 77   Wt 210 lb 9.6 oz (95.5 kg)   BMI 35.05 kg/m²    BP Readings from Last 3 Encounters:   01/22/20 113/72   01/08/20 136/80   12/19/19 124/73     Wt Readings from Last 3 Encounters:   01/22/20 210 lb 9.6 oz (95.5 kg)   01/08/20 209 lb (94.8 kg)   12/19/19 205 lb 6.4 oz (93.2 kg)          Diagnosis Orders   1. Chronic pain of left knee  Basic Metabolic Panel   2.  Dyslipidemia  Lipid Panel       Lulla Sandifer, DO 1/22/2020 2:22 PM

## 2020-01-23 RX ORDER — ATORVASTATIN CALCIUM 20 MG/1
20 TABLET, FILM COATED ORAL DAILY
Qty: 30 TABLET | Refills: 3 | Status: SHIPPED | OUTPATIENT
Start: 2020-01-23 | End: 2020-05-19 | Stop reason: SDUPTHER

## 2020-03-25 ENCOUNTER — TELEPHONE (OUTPATIENT)
Dept: PODIATRY | Age: 70
End: 2020-03-25

## 2020-04-20 ENCOUNTER — TELEPHONE (OUTPATIENT)
Dept: FAMILY MEDICINE CLINIC | Age: 70
End: 2020-04-20

## 2020-05-07 ENCOUNTER — TELEPHONE (OUTPATIENT)
Dept: FAMILY MEDICINE CLINIC | Age: 70
End: 2020-05-07

## 2020-05-13 ENCOUNTER — NURSE TRIAGE (OUTPATIENT)
Dept: OTHER | Facility: CLINIC | Age: 70
End: 2020-05-13

## 2020-05-13 NOTE — TELEPHONE ENCOUNTER
Reason for Disposition   Patient wants to be seen    Answer Assessment - Initial Assessment Questions  1. ONSET: \"When did the cough begin? \"       1 week ago  2. SEVERITY: \"How bad is the cough today? \"       mild  3. RESPIRATORY DISTRESS: \"Describe your breathing. \"       no  4. FEVER: \"Do you have a fever? \" If so, ask: \"What is your temperature, how was it measured, and when did it start? \"      no  5. HEMOPTYSIS: \"Are you coughing up any blood? \" If so ask: \"How much? \" (flecks, streaks, tablespoons, etc.)      no  6. TREATMENT: \"What have you done so far to treat the cough? \" (e.g., meds, fluids, humidifier)      no  7. CARDIAC HISTORY: \"Do you have any history of heart disease? \" (e.g., heart attack, congestive heart failure)       no  8. LUNG HISTORY: \"Do you have any history of lung disease? \"  (e.g., pulmonary embolus, asthma, emphysema)      no  9. PE RISK FACTORS: \"Do you have a history of blood clots? \" (or: recent major surgery, recent prolonged travel, bedridden)      no  10. OTHER SYMPTOMS: \"Do you have any other symptoms? (e.g., runny nose, wheezing, chest pain)        no  11. PREGNANCY: \"Is there any chance you are pregnant? \" \"When was your last menstrual period? \"        no  12. TRAVEL: \"Have you traveled out of the country in the last month? \" (e.g., travel history, exposures)        no    Protocols used: COUGH-ADULT-[place]    Pt requests to be seen. No SOB or alarm symptoms. Care advice discussed. Call transferred back to [facility].

## 2020-05-14 ENCOUNTER — VIRTUAL VISIT (OUTPATIENT)
Dept: FAMILY MEDICINE CLINIC | Age: 70
End: 2020-05-14
Payer: COMMERCIAL

## 2020-05-14 VITALS — WEIGHT: 180 LBS | HEIGHT: 65 IN | BODY MASS INDEX: 29.99 KG/M2

## 2020-05-14 PROCEDURE — 99212 OFFICE O/P EST SF 10 MIN: CPT | Performed by: FAMILY MEDICINE

## 2020-05-19 RX ORDER — ATORVASTATIN CALCIUM 20 MG/1
20 TABLET, FILM COATED ORAL DAILY
Qty: 30 TABLET | Refills: 3 | Status: SHIPPED | OUTPATIENT
Start: 2020-05-19 | End: 2021-06-29 | Stop reason: SDUPTHER

## 2020-06-24 ENCOUNTER — OFFICE VISIT (OUTPATIENT)
Dept: PODIATRY | Age: 70
End: 2020-06-24
Payer: COMMERCIAL

## 2020-06-24 VITALS
HEIGHT: 65 IN | SYSTOLIC BLOOD PRESSURE: 135 MMHG | TEMPERATURE: 97.6 F | DIASTOLIC BLOOD PRESSURE: 72 MMHG | BODY MASS INDEX: 36.65 KG/M2 | WEIGHT: 220 LBS

## 2020-06-24 PROCEDURE — 3017F COLORECTAL CA SCREEN DOC REV: CPT | Performed by: PODIATRIST

## 2020-06-24 PROCEDURE — 1123F ACP DISCUSS/DSCN MKR DOCD: CPT | Performed by: PODIATRIST

## 2020-06-24 PROCEDURE — G8417 CALC BMI ABV UP PARAM F/U: HCPCS | Performed by: PODIATRIST

## 2020-06-24 PROCEDURE — 1036F TOBACCO NON-USER: CPT | Performed by: PODIATRIST

## 2020-06-24 PROCEDURE — 99212 OFFICE O/P EST SF 10 MIN: CPT | Performed by: PODIATRIST

## 2020-06-24 PROCEDURE — 4040F PNEUMOC VAC/ADMIN/RCVD: CPT | Performed by: PODIATRIST

## 2020-06-24 PROCEDURE — G8399 PT W/DXA RESULTS DOCUMENT: HCPCS | Performed by: PODIATRIST

## 2020-06-24 PROCEDURE — 99213 OFFICE O/P EST LOW 20 MIN: CPT | Performed by: PODIATRIST

## 2020-06-24 PROCEDURE — G8427 DOCREV CUR MEDS BY ELIG CLIN: HCPCS | Performed by: PODIATRIST

## 2020-06-24 PROCEDURE — 1090F PRES/ABSN URINE INCON ASSESS: CPT | Performed by: PODIATRIST

## 2020-06-24 NOTE — PROGRESS NOTES
[address]  [address] [place] [address],  SARTH[place] Bagley  Tel: [phone]   Fax: [phone]    Subjective     CC: Painful and elongated toe nails    HPI:  Xavier Carson is a 78y.o. year old female who presents to clinic today for painful and elongated toenails. The patient is not diabetic. The patient states their digits are painful when the toenails are elongated, causing rubbing in shoe gear. The patient denies tingling and numbness in the lower extremities. Patient denies any rest pain or claudication symptoms. The patient denies any history of acute trauma. The patient denies any other pedal complaints. Primary care physician is Kaleigh Green MD.    ROS:    Constitutional: Denies nausea, vomiting, fever, chills. Neurologic: Denies numbness, tingling, and burning in the feet. Vascular: Denies symptoms of lower extremity claudication. Skin: Denies open wounds. Otherwise negative except as noted in the HPI. PMH:  Past Medical History:   Diagnosis Date    Arthritis     knees    Bronchitis x1 long ago    Chronic obstructive pulmonary disease ([facility] [address] 2017    Colon polyps     Dental neglect     poor dentation. Missing teeth.  No loose teeth    Depression     Environmental allergies     GERD (gastroesophageal reflux disease)     Hyperlipidemia     Hypertension     Obesity     Onychomycosis     Poor historian     RBBB     Treadmill stress test negative for angina pectoris     Wears glasses     reading only       Surgical History:   Past Surgical History:   Procedure Laterality Date    CHOLECYSTECTOMY      COLONOSCOPY  2013    one hyperplastic polyp    DOPPLER ECHOCARDIOGRAPHY      cardiac    TUBAL LIGATION         Social History:  Social History     Tobacco Use    Smoking status: Former Smoker     Packs/day: 0.50     Years: 35.00     Pack years: 17.50     Types: Cigarettes     Last attempt to quit: 2015     Years since quittin.9   

## 2020-06-24 NOTE — PROGRESS NOTES
Patient instructed to remove shoes and socks, instructed to sit in exam chair. Current PCP name is Jonn Newton and date of last visit 1/22/20. Do you have a follow up visit scheduled?    no

## 2020-07-09 ENCOUNTER — TELEPHONE (OUTPATIENT)
Dept: FAMILY MEDICINE CLINIC | Age: 70
End: 2020-07-09

## 2020-09-23 ENCOUNTER — OFFICE VISIT (OUTPATIENT)
Dept: PODIATRY | Age: 70
End: 2020-09-23
Payer: COMMERCIAL

## 2020-09-23 VITALS
HEIGHT: 65 IN | HEART RATE: 96 BPM | WEIGHT: 202.2 LBS | DIASTOLIC BLOOD PRESSURE: 67 MMHG | BODY MASS INDEX: 33.69 KG/M2 | SYSTOLIC BLOOD PRESSURE: 95 MMHG

## 2020-09-23 PROCEDURE — 99212 OFFICE O/P EST SF 10 MIN: CPT | Performed by: STUDENT IN AN ORGANIZED HEALTH CARE EDUCATION/TRAINING PROGRAM

## 2020-09-23 PROCEDURE — 11721 DEBRIDE NAIL 6 OR MORE: CPT | Performed by: STUDENT IN AN ORGANIZED HEALTH CARE EDUCATION/TRAINING PROGRAM

## 2020-09-23 RX ORDER — KETOCONAZOLE 20 MG/G
CREAM TOPICAL
Qty: 30 G | Refills: 5 | Status: SHIPPED | OUTPATIENT
Start: 2020-09-23 | End: 2021-06-29

## 2020-09-23 NOTE — PROGRESS NOTES
Patient instructed to remove shoes and socks and instructed to sit in exam chair. Current PCP is Letty Palacio MD and date of last visit was 5-14-20. Do you have a follow up visit scheduled?   No

## 2020-09-23 NOTE — PROGRESS NOTES
[address]  [address] [facility] [address] [facility], [address] [facility]  Tel: [phone]   Fax: [phone]    Subjective     CC: Painful and elongated toe nails, non-diabetic    Interval History: 9/23/2020  Patient presents to clinic today for debridement of painful, thickened, and elongated toenails. She states the nails are causing rubbing in her shoes which causes pain and interferes with her ambulation. Additionally patient states that she has some \"kiran horses\" in her feet and hands when she's sitting. Reports that it's intermittent and does not happen while walking. Patient denies trauma or open wounds since her last visit. No other pedal complaints today. HPI:  Donato Jean Baptiste is a 78y.o. year old female who presents to clinic today for painful and elongated toenails. The patient is not diabetic. The patient states their digits are painful when the toenails are elongated, causing rubbing in shoe gear. The patient denies tingling and numbness in the lower extremities. Patient denies any rest pain or claudication symptoms. The patient denies any history of acute trauma. The patient denies any other pedal complaints. Primary care physician is Ron Sutherland MD.    ROS:    Constitutional: Denies nausea, vomiting, fever, chills. Neurologic: Denies numbness, tingling, and burning in the feet. Vascular: Denies symptoms of lower extremity claudication. Skin: Denies open wounds. Otherwise negative except as noted in the HPI. PMH:  Past Medical History:   Diagnosis Date    Arthritis     knees    Bronchitis x1 long ago    Chronic obstructive pulmonary disease ([facility] [address] 9/12/2017    Colon polyps     Dental neglect     poor dentation. Missing teeth.  No loose teeth    Depression     Environmental allergies     GERD (gastroesophageal reflux disease)     Hyperlipidemia     Hypertension     Obesity     Onychomycosis     Poor historian     RBBB     Treadmill stress test negative for angina pectoris     Wears glasses     reading only       Surgical History:   Past Surgical History:   Procedure Laterality Date    CHOLECYSTECTOMY      COLONOSCOPY  2013    one hyperplastic polyp    DOPPLER ECHOCARDIOGRAPHY      cardiac    TUBAL LIGATION         Social History:  Social History     Tobacco Use    Smoking status: Former Smoker     Packs/day: 0.50     Years: 35.00     Pack years: 17.50     Types: Cigarettes     Last attempt to quit: 2015     Years since quittin.1    Smokeless tobacco: Never Used    Tobacco comment: Daughter stated pt started again   Substance Use Topics    Alcohol use: No     Alcohol/week: 0.0 standard drinks    Drug use: No       Medications:  Prior to Admission medications    Medication Sig Start Date End Date Taking? Authorizing Provider   atorvastatin (LIPITOR) 20 MG tablet Take 1 tablet by mouth daily 20  Yes Gwynneth Aschoff, MD   ciclopirox (LOPROX) 0.77 % cream Apply topically 2 times daily.  10/2/19  Yes Cassi Garces DPM   tiotropium (SPIRIVA RESPIMAT) 1.25 MCG/ACT AERS inhaler Inhale 2 puffs into the lungs daily 19  Yes Cristela Salcedo MD   albuterol sulfate HFA (VENTOLIN HFA) 108 (90 Base) MCG/ACT inhaler Inhale 2 puffs into the lungs every 6 hours as needed for Wheezing 19  Yes Cristela Salcedo MD   ibuprofen (ADVIL;MOTRIN) 600 MG tablet Take 1 tablet by mouth 4 times daily as needed for Pain 19  Yes Cristela Salcedo MD   lisinopril (PRINIVIL;ZESTRIL) 20 MG tablet Take 1 tablet by mouth daily 5/10/19  Yes Cristela Salcedo MD   acetaminophen (TYLENOL) 500 MG tablet Take 2 tablets by mouth every 8 hours as needed for Pain 3/7/19  Yes Camilo Watt MD   ondansetron (ZOFRAN) 4 MG tablet Take 1 tablet by mouth 3 times daily as needed for Nausea or Vomiting 3/7/19  Yes Camilo Watt MD   haloperidol (HALDOL) 5 MG tablet Take 5 mg by mouth 2 times daily   Yes Historical Provider, MD   traZODone (DESYREL) 100 MG tablet  17  Yes Historical

## 2020-10-13 ENCOUNTER — NURSE TRIAGE (OUTPATIENT)
Dept: OTHER | Facility: CLINIC | Age: 70
End: 2020-10-13

## 2020-10-13 NOTE — TELEPHONE ENCOUNTER
Reason for Disposition   Patient wants to be seen    Protocols used: CHEST PAIN-ADULT-[place]    Call received from [facility] in [place]. Caller is extremely difficult to understand and difficult to complete a phone triage. Caller does have chest pain when she coughs. Call placed to PCP office and office staff unfamiliar with patient. Informed it is difficult to understand her. Directed caller to the ED at this time. Caller does state she will go to Urgent Care.

## 2020-12-23 ENCOUNTER — OFFICE VISIT (OUTPATIENT)
Dept: PODIATRY | Age: 70
End: 2020-12-23
Payer: COMMERCIAL

## 2020-12-23 VITALS
SYSTOLIC BLOOD PRESSURE: 120 MMHG | HEART RATE: 101 BPM | DIASTOLIC BLOOD PRESSURE: 82 MMHG | BODY MASS INDEX: 33.61 KG/M2 | WEIGHT: 202 LBS | TEMPERATURE: 98 F

## 2020-12-23 PROCEDURE — 11721 DEBRIDE NAIL 6 OR MORE: CPT | Performed by: STUDENT IN AN ORGANIZED HEALTH CARE EDUCATION/TRAINING PROGRAM

## 2020-12-23 PROCEDURE — 4040F PNEUMOC VAC/ADMIN/RCVD: CPT | Performed by: STUDENT IN AN ORGANIZED HEALTH CARE EDUCATION/TRAINING PROGRAM

## 2020-12-23 PROCEDURE — 1123F ACP DISCUSS/DSCN MKR DOCD: CPT | Performed by: STUDENT IN AN ORGANIZED HEALTH CARE EDUCATION/TRAINING PROGRAM

## 2020-12-23 PROCEDURE — G8399 PT W/DXA RESULTS DOCUMENT: HCPCS | Performed by: STUDENT IN AN ORGANIZED HEALTH CARE EDUCATION/TRAINING PROGRAM

## 2020-12-23 PROCEDURE — G8427 DOCREV CUR MEDS BY ELIG CLIN: HCPCS | Performed by: STUDENT IN AN ORGANIZED HEALTH CARE EDUCATION/TRAINING PROGRAM

## 2020-12-23 PROCEDURE — G8484 FLU IMMUNIZE NO ADMIN: HCPCS | Performed by: STUDENT IN AN ORGANIZED HEALTH CARE EDUCATION/TRAINING PROGRAM

## 2020-12-23 PROCEDURE — 1090F PRES/ABSN URINE INCON ASSESS: CPT | Performed by: STUDENT IN AN ORGANIZED HEALTH CARE EDUCATION/TRAINING PROGRAM

## 2020-12-23 PROCEDURE — G8417 CALC BMI ABV UP PARAM F/U: HCPCS | Performed by: STUDENT IN AN ORGANIZED HEALTH CARE EDUCATION/TRAINING PROGRAM

## 2020-12-23 PROCEDURE — 1036F TOBACCO NON-USER: CPT | Performed by: STUDENT IN AN ORGANIZED HEALTH CARE EDUCATION/TRAINING PROGRAM

## 2020-12-23 PROCEDURE — 99212 OFFICE O/P EST SF 10 MIN: CPT | Performed by: STUDENT IN AN ORGANIZED HEALTH CARE EDUCATION/TRAINING PROGRAM

## 2020-12-23 PROCEDURE — 99213 OFFICE O/P EST LOW 20 MIN: CPT | Performed by: STUDENT IN AN ORGANIZED HEALTH CARE EDUCATION/TRAINING PROGRAM

## 2020-12-23 PROCEDURE — 3017F COLORECTAL CA SCREEN DOC REV: CPT | Performed by: STUDENT IN AN ORGANIZED HEALTH CARE EDUCATION/TRAINING PROGRAM

## 2020-12-23 NOTE — PROGRESS NOTES
Patient instructed to remove shoes and socks and instructed to sit in exam chair. Current PCP is Sapphire Kimbrough MD and date of last visit was 05/14/20. Do you have a follow up visit scheduled?   No  If yes, the date is n/a

## 2020-12-23 NOTE — PROGRESS NOTES
[address],  S [place] Bagley  Tel: [phone]   Fax: [phone]    Subjective     CC: Painful and elongated toe nails, non-diabetic    HPI:  Veronica Qureshi is a 69y.o. year old female who presents to clinic today for painful and elongated toenails. The patient is not diabetic. The patient states their digits are painful when the toenails are elongated, causing rubbing in shoe gear. The patient admits to mild tingling and numbness in the lower extremities. Patient denies any rest pain or claudication symptoms. The patient denies any history of acute trauma. The patient denies any other pedal complaints. Patient continues to smoke despite smoking cessation consultation. Patient states she follows closely with her PCP. Primary care physician is Nithya Mendez MD.    ROS:    Constitutional: Denies nausea, vomiting, fever, chills. Neurologic: Denies numbness, tingling, and burning in the feet. Vascular: Denies symptoms of lower extremity claudication. Skin: Denies open wounds. Otherwise negative except as noted in the HPI. PMH:  Past Medical History:   Diagnosis Date    Arthritis     knees    Bronchitis x1 long ago    Chronic obstructive pulmonary disease ([facility] [address] 9/12/2017    Colon polyps     Dental neglect     poor dentation. Missing teeth.  No loose teeth    Depression     Environmental allergies     GERD (gastroesophageal reflux disease)     Hyperlipidemia     Hypertension     Obesity     Onychomycosis     Poor historian     RBBB     Treadmill stress test negative for angina pectoris 2009    Wears glasses     reading only       Surgical History:   Past Surgical History:   Procedure Laterality Date    CHOLECYSTECTOMY      COLONOSCOPY  6/2013    one hyperplastic polyp    DOPPLER ECHOCARDIOGRAPHY      cardiac    TUBAL LIGATION         Social History:  Social History     Tobacco Use    Smoking status: Former Smoker     Packs/day: 0.50 Years: 35.00     Pack years: 17.50     Types: Cigarettes     Quit date: 2015     Years since quittin.4    Smokeless tobacco: Never Used   Substance Use Topics    Alcohol use: No     Alcohol/week: 0.0 standard drinks    Drug use: No       Medications:  Prior to Admission medications    Medication Sig Start Date End Date Taking? Authorizing Provider   ketoconazole (NIZORAL) 2 % cream Apply topically daily to bottom of feet, nails, and in-between toes. 20  Yes Dotty Guthrie DPM   atorvastatin (LIPITOR) 20 MG tablet Take 1 tablet by mouth daily 20  Yes Lara Brown MD   ciclopirox (LOPROX) 0.77 % cream Apply topically 2 times daily. 10/2/19  Yes Levy Mott DPM   tiotropium (SPIRIVA RESPIMAT) 1.25 MCG/ACT AERS inhaler Inhale 2 puffs into the lungs daily 19  Yes Nina Garrett MD   albuterol sulfate HFA (VENTOLIN HFA) 108 (90 Base) MCG/ACT inhaler Inhale 2 puffs into the lungs every 6 hours as needed for Wheezing 19  Yes Nina Garrett MD   ibuprofen (ADVIL;MOTRIN) 600 MG tablet Take 1 tablet by mouth 4 times daily as needed for Pain 19  Yes Nina Garrett MD   lisinopril (PRINIVIL;ZESTRIL) 20 MG tablet Take 1 tablet by mouth daily 5/10/19  Yes Nina Garrett MD   acetaminophen (TYLENOL) 500 MG tablet Take 2 tablets by mouth every 8 hours as needed for Pain 3/7/19  Yes Enoch Hidalgo MD   ondansetron (ZOFRAN) 4 MG tablet Take 1 tablet by mouth 3 times daily as needed for Nausea or Vomiting 3/7/19  Yes Enoch Hidalgo MD   haloperidol (HALDOL) 5 MG tablet Take 5 mg by mouth 2 times daily   Yes Historical Provider, MD   traZODone (DESYREL) 100 MG tablet  17  Yes Historical Provider, MD   Elastic Bandages & Supports (ACE ARM SLING) MISC Apply 1 Units topically daily 17  Yes Deandra Ellis MD   Misc.  Devices MISC Walker 9/1/15  Yes Paolo Macias MD   trihexyphenidyl (ARTANE) 2 MG tablet Take 2 mg by mouth 2 times daily  10/10/14  Yes Historical Provider, MD       Objective Vitals:    12/23/20 1301   BP: 120/82   Pulse: 101   Temp: 98 °F (36.7 °C)       Lab Results   Component Value Date    LABA1C 5.6 11/18/2019       Physical Exam:  General:  Alert and oriented x3. In no acute distress. Lower Extremity Physical Exam:    Vascular: DP and PT pulses are palpable, Bilateral. CFT <3 seconds to all digits, Bilateral.  No edema, Bilateral.  Hair growth is absent to the level of the digits, Bilateral.     Neuro: Saph/sural/SP/DP/plantar sensation intact to light touch. Protective sensation is intact to 6/10 sites as tested with a 5.07g SWMF, Bilateral.     Musculoskeletal: EHL/FHL/GS/TA gross motor intact. TTP to distal digits b/l. Gross deformity is absent, Bilateral.     Dermatologic: No open lesions, Bilateral.  Interdigital maceration absent, Bilateral. Nails 1-5 bilateral thickened, discolored, and elongated with subungual debris noted. Assessment   Kenzie Dacosta is a 69 y.o. female with     Diagnosis Orders   1. Onychomycosis     2. Peripheral neuropathy, idiopathic     3. PVD (peripheral vascular disease) ([facility] [address]          Plan   · Patient examined and evaluated. · Diagnosis and treatment options discussed in detail. · Mycotic nails 1-10 debrided sharply of all nonviable tissue with sterile nail nippers without incident. · Educated patient on the utmost importance of smoking cessation  · Educated patient on signs of worsening PAD such as intermittent claudication or rest pain. Instructed patient to closely monitor for worsening symptoms. · Continue ketoconazole. · Patient to RTC in 3 months. · Please, call the office with any questions or concerns. No orders of the defined types were placed in this encounter. No orders of the defined types were placed in this encounter.       Sade Chung DPM   Podiatric Medicine & Surgery   12/23/2020 at 1:40 PM

## 2021-01-22 ENCOUNTER — TELEPHONE (OUTPATIENT)
Dept: PODIATRY | Age: 71
End: 2021-01-22

## 2021-01-22 NOTE — TELEPHONE ENCOUNTER
Writer attempted to reach pt to inform her that podiatry clinic has been cancelled for 3/24/2021 and that her appointment will need to be rescheduled. Line rang busy. Will attempt again, and send letter upon next attempt.

## 2021-04-07 ENCOUNTER — OFFICE VISIT (OUTPATIENT)
Dept: PODIATRY | Age: 71
End: 2021-04-07
Payer: COMMERCIAL

## 2021-04-07 VITALS
HEART RATE: 118 BPM | HEIGHT: 65 IN | DIASTOLIC BLOOD PRESSURE: 86 MMHG | WEIGHT: 202 LBS | SYSTOLIC BLOOD PRESSURE: 135 MMHG | BODY MASS INDEX: 33.66 KG/M2

## 2021-04-07 DIAGNOSIS — I73.9 PVD (PERIPHERAL VASCULAR DISEASE) (HCC): ICD-10-CM

## 2021-04-07 DIAGNOSIS — M79.662 BILATERAL CALF PAIN: ICD-10-CM

## 2021-04-07 DIAGNOSIS — M79.661 BILATERAL CALF PAIN: ICD-10-CM

## 2021-04-07 DIAGNOSIS — B35.1 ONYCHOMYCOSIS: Primary | ICD-10-CM

## 2021-04-07 PROCEDURE — 99213 OFFICE O/P EST LOW 20 MIN: CPT | Performed by: STUDENT IN AN ORGANIZED HEALTH CARE EDUCATION/TRAINING PROGRAM

## 2021-04-07 PROCEDURE — 11721 DEBRIDE NAIL 6 OR MORE: CPT | Performed by: STUDENT IN AN ORGANIZED HEALTH CARE EDUCATION/TRAINING PROGRAM

## 2021-04-07 PROCEDURE — 99212 OFFICE O/P EST SF 10 MIN: CPT | Performed by: STUDENT IN AN ORGANIZED HEALTH CARE EDUCATION/TRAINING PROGRAM

## 2021-04-07 NOTE — PROGRESS NOTES
[address],  [place] Bag[place]  Tel: [phone]   Fax: [phone]    Subjective     CC: Painful and elongated toe nails, non-diabetic    HPI:  Bandar Leonard is a 69y.o. year old female who presents to clinic today for painful and elongated toenails. The patient is not diabetic. The patient states their digits are painful when the toenails are elongated, causing rubbing in shoe gear. The patient states she has the left calf pain. The patient denies any shortness of breath. Patient denies any rest pain or claudication symptoms. The patient denies any history of acute trauma. The patient denies any other pedal complaints. Patient continues to smoke despite smoking cessation consultation. Patient states she follows closely with her PCP. Primary care physician is Isabella Pineda MD.    ROS:    Constitutional: Denies nausea, vomiting, fever, chills. Neurologic: Denies numbness, tingling, and burning in the feet. Vascular: Denies symptoms of lower extremity claudication. Skin: Denies open wounds. Otherwise negative except as noted in the HPI. PMH:  Past Medical History:   Diagnosis Date    Arthritis     knees    Bronchitis x1 long ago    Chronic obstructive pulmonary disease ([facility] [address] 9/12/2017    Colon polyps     Dental neglect     poor dentation. Missing teeth.  No loose teeth    Depression     Environmental allergies     GERD (gastroesophageal reflux disease)     Hyperlipidemia     Hypertension     Obesity     Onychomycosis     Poor historian     RBBB     Treadmill stress test negative for angina pectoris 2009    Wears glasses     reading only       Surgical History:   Past Surgical History:   Procedure Laterality Date    CHOLECYSTECTOMY      COLONOSCOPY  6/2013    one hyperplastic polyp    DOPPLER ECHOCARDIOGRAPHY      cardiac    TUBAL LIGATION         Social History:  Social History     Tobacco Use    Smoking status: Former Smoker Packs/day: 0.50     Years: 35.00     Pack years: 17.50     Types: Cigarettes     Quit date: 2015     Years since quittin.7    Smokeless tobacco: Never Used   Substance Use Topics    Alcohol use: No     Alcohol/week: 0.0 standard drinks    Drug use: No       Medications:  Prior to Admission medications    Medication Sig Start Date End Date Taking? Authorizing Provider   ketoconazole (NIZORAL) 2 % cream Apply topically daily to bottom of feet, nails, and in-between toes. 20  Yes Patricio Patten DPM   atorvastatin (LIPITOR) 20 MG tablet Take 1 tablet by mouth daily 20  Yes Kadie Winn MD   ciclopirox (LOPROX) 0.77 % cream Apply topically 2 times daily. 10/2/19  Yes Arpit Martini DPM   tiotropium (SPIRIVA RESPIMAT) 1.25 MCG/ACT AERS inhaler Inhale 2 puffs into the lungs daily 19  Yes Cyndy Cortez MD   albuterol sulfate HFA (VENTOLIN HFA) 108 (90 Base) MCG/ACT inhaler Inhale 2 puffs into the lungs every 6 hours as needed for Wheezing 19  Yes Cyndy Cortez MD   ibuprofen (ADVIL;MOTRIN) 600 MG tablet Take 1 tablet by mouth 4 times daily as needed for Pain 19  Yes Cyndy Cortez MD   lisinopril (PRINIVIL;ZESTRIL) 20 MG tablet Take 1 tablet by mouth daily 5/10/19  Yes Cyndy Cortez MD   acetaminophen (TYLENOL) 500 MG tablet Take 2 tablets by mouth every 8 hours as needed for Pain 3/7/19  Yes Fritz Jackson MD   ondansetron (ZOFRAN) 4 MG tablet Take 1 tablet by mouth 3 times daily as needed for Nausea or Vomiting 3/7/19  Yes Fritz Jackson MD   haloperidol (HALDOL) 5 MG tablet Take 5 mg by mouth 2 times daily   Yes Historical Provider, MD   traZODone (DESYREL) 100 MG tablet  17  Yes Historical Provider, MD   Elastic Bandages & Supports (ACE ARM SLING) MISC Apply 1 Units topically daily 17  Yes Jennifer White MD   Misc.  Devices MISC Walker 9/1/15  Yes Daysi Fine MD   trihexyphenidyl (ARTANE) 2 MG tablet Take 2 mg by mouth 2 times daily  10/10/14  Yes Historical Provider, MD       Objective     Vitals:    04/07/21 1255   BP: 135/86   Pulse: 118       Lab Results   Component Value Date    LABA1C 5.6 11/18/2019       Physical Exam:  General:  Alert and oriented x3. In no acute distress. Lower Extremity Physical Exam:    Vascular: DP and PT pulses are palpable, Bilateral. CFT <3 seconds to all digits, Bilateral.  No edema, Bilateral.  Hair growth is absent to the level of the digits, Bilateral.     Neuro: Saph/sural/SP/DP/plantar sensation intact to light touch. Protective sensation is intact to 6/10 sites as tested with a 5.07g SWMF, Bilateral.     Musculoskeletal: EHL/FHL/GS/TA gross motor intact. TTP to distal digits b/l. Gross deformity is absent, Bilateral.  Very mild tenderness to the left calf with palpation. Dermatologic: No open lesions, Bilateral.  Interdigital maceration absent, Bilateral. Nails 1-5 bilateral thickened, discolored, and elongated with subungual debris noted. Assessment   Shazia Crews is a 69 y.o. female with     Diagnosis Orders   1. Onychomycosis     2. [facility]     3. Bilateral calf pain  VL DUP LOWER EXTREMITY VENOUS LEFT    VL DUP LOWER EXTREMITY VENOUS RIGHT        Plan   · Patient examined and evaluated. · Diagnosis and treatment options discussed in detail. · Mycotic nails 1-10 debrided sharply of all nonviable tissue with sterile nail nippers without incident. · Educated patient on the utmost importance of smoking cessation  · Educated patient on signs of worsening PAD such as intermittent claudication or rest pain. Instructed patient to closely monitor for worsening symptoms. · Given the calf pain without any acute shortness of breath very low suspicion for DVT however we will order bilateral venous duplex scans. · Continue ketoconazole. · Patient to RTC in 3 months. · Please, call the office with any questions or concerns. No orders of the defined types were placed in this encounter.     Orders

## 2021-05-25 ENCOUNTER — APPOINTMENT (OUTPATIENT)
Dept: GENERAL RADIOLOGY | Age: 71
End: 2021-05-25
Payer: COMMERCIAL

## 2021-05-25 ENCOUNTER — HOSPITAL ENCOUNTER (EMERGENCY)
Age: 71
Discharge: HOME | End: 2021-05-25
Attending: EMERGENCY MEDICINE
Payer: COMMERCIAL

## 2021-05-25 VITALS
WEIGHT: 220 LBS | HEIGHT: 65 IN | RESPIRATION RATE: 23 BRPM | BODY MASS INDEX: 36.65 KG/M2 | OXYGEN SATURATION: 95 % | TEMPERATURE: 98.2 F | DIASTOLIC BLOOD PRESSURE: 85 MMHG | SYSTOLIC BLOOD PRESSURE: 115 MMHG | HEART RATE: 83 BPM

## 2021-05-25 DIAGNOSIS — R07.9 CHEST PAIN, UNSPECIFIED TYPE: Primary | ICD-10-CM

## 2021-05-25 LAB
ABSOLUTE EOS #: 0 K/UL (ref 0–0.4)
ABSOLUTE IMMATURE GRANULOCYTE: ABNORMAL K/UL (ref 0–0.3)
ABSOLUTE LYMPH #: 1.7 K/UL (ref 1–4.8)
ABSOLUTE MONO #: 0.6 K/UL (ref 0.1–1.3)
ANION GAP SERPL CALCULATED.3IONS-SCNC: 11 MMOL/L (ref 9–17)
BASOPHILS # BLD: 1 % (ref 0–2)
BASOPHILS ABSOLUTE: 0.1 K/UL (ref 0–0.2)
BNP INTERPRETATION: NORMAL
BUN BLDV-MCNC: 11 MG/DL (ref 8–23)
BUN/CREAT BLD: ABNORMAL (ref 9–20)
CALCIUM SERPL-MCNC: 9.3 MG/DL (ref 8.6–10.4)
CHLORIDE BLD-SCNC: 103 MMOL/L (ref 98–107)
CO2: 26 MMOL/L (ref 20–31)
CREAT SERPL-MCNC: 0.77 MG/DL (ref 0.5–0.9)
D-DIMER QUANTITATIVE: 0.41 MG/L FEU (ref 0–0.59)
DIFFERENTIAL TYPE: ABNORMAL
EOSINOPHILS RELATIVE PERCENT: 0 % (ref 0–4)
GFR AFRICAN AMERICAN: >60 ML/MIN
GFR NON-AFRICAN AMERICAN: >60 ML/MIN
GFR SERPL CREATININE-BSD FRML MDRD: ABNORMAL ML/MIN/{1.73_M2}
GFR SERPL CREATININE-BSD FRML MDRD: ABNORMAL ML/MIN/{1.73_M2}
GLUCOSE BLD-MCNC: 103 MG/DL (ref 70–99)
HCT VFR BLD CALC: 42.2 % (ref 36–46)
HEMOGLOBIN: 14.1 G/DL (ref 12–16)
IMMATURE GRANULOCYTES: ABNORMAL %
LYMPHOCYTES # BLD: 20 % (ref 24–44)
MCH RBC QN AUTO: 30.9 PG (ref 26–34)
MCHC RBC AUTO-ENTMCNC: 33.4 G/DL (ref 31–37)
MCV RBC AUTO: 92.8 FL (ref 80–100)
MONOCYTES # BLD: 7 % (ref 1–7)
NRBC AUTOMATED: ABNORMAL PER 100 WBC
PDW BLD-RTO: 13.6 % (ref 11.5–14.9)
PLATELET # BLD: 282 K/UL (ref 150–450)
PLATELET ESTIMATE: ABNORMAL
PMV BLD AUTO: 7.4 FL (ref 6–12)
POTASSIUM SERPL-SCNC: 3.7 MMOL/L (ref 3.7–5.3)
PRO-BNP: 280 PG/ML
RBC # BLD: 4.55 M/UL (ref 4–5.2)
RBC # BLD: ABNORMAL 10*6/UL
SARS-COV-2, RAPID: NOT DETECTED
SEG NEUTROPHILS: 72 % (ref 36–66)
SEGMENTED NEUTROPHILS ABSOLUTE COUNT: 6.3 K/UL (ref 1.3–9.1)
SODIUM BLD-SCNC: 140 MMOL/L (ref 135–144)
SPECIMEN DESCRIPTION: NORMAL
TROPONIN INTERP: NORMAL
TROPONIN INTERP: NORMAL
TROPONIN T: NORMAL NG/ML
TROPONIN T: NORMAL NG/ML
TROPONIN, HIGH SENSITIVITY: 10 NG/L (ref 0–14)
TROPONIN, HIGH SENSITIVITY: 11 NG/L (ref 0–14)
WBC # BLD: 8.7 K/UL (ref 3.5–11)
WBC # BLD: ABNORMAL 10*3/UL

## 2021-05-25 PROCEDURE — 99283 EMERGENCY DEPT VISIT LOW MDM: CPT

## 2021-05-25 PROCEDURE — 93005 ELECTROCARDIOGRAM TRACING: CPT | Performed by: STUDENT IN AN ORGANIZED HEALTH CARE EDUCATION/TRAINING PROGRAM

## 2021-05-25 PROCEDURE — 87635 SARS-COV-2 COVID-19 AMP PRB: CPT

## 2021-05-25 PROCEDURE — 71046 X-RAY EXAM CHEST 2 VIEWS: CPT

## 2021-05-25 PROCEDURE — 85025 COMPLETE CBC W/AUTO DIFF WBC: CPT

## 2021-05-25 PROCEDURE — 36415 COLL VENOUS BLD VENIPUNCTURE: CPT

## 2021-05-25 PROCEDURE — 83880 ASSAY OF NATRIURETIC PEPTIDE: CPT

## 2021-05-25 PROCEDURE — 85379 FIBRIN DEGRADATION QUANT: CPT

## 2021-05-25 PROCEDURE — 80048 BASIC METABOLIC PNL TOTAL CA: CPT

## 2021-05-25 PROCEDURE — 84484 ASSAY OF TROPONIN QUANT: CPT

## 2021-05-25 ASSESSMENT — PAIN SCALES - GENERAL: PAINLEVEL_OUTOF10: 2

## 2021-05-25 NOTE — ED NOTES
Patient states she is having chest discomfort. Patient appears to be in no distress, speaking with her son on the telephone. Patient describes chest discomfort as dull pain located to left upper chest and remains constant.       Ethan Salmon RN  05/25/21 0610

## 2021-05-25 NOTE — ED NOTES
Patient provided with phone to speak to her son who called.       Lucas Del Rosario RN  05/25/21 8512

## 2021-05-25 NOTE — ED PROVIDER NOTES
[address] ED  eMERGENCY dEPARTMENT eNCOUnter   Attending Attestation     Pt Name: Ritu Dale  MRN: [id number]  [place] 1950  Date of evaluation: 5/25/21       Ritu Dale is a 69 y.o. female who presents with Shortness of Breath (Pt states sthat she has just not been feeling right for the last 2 days. Patient states she is unable to sleep and feels a little short of breath. )      History:   Patient is here stating she has had about 3 days of just not feeling right feeling short of breath and having a hard time falling asleep. Patient denies cough or fevers. Patient has no pain or swelling in the legs. Patient has had no sore throat vomiting or diarrhea. Exam: Vitals:   Vitals:    05/25/21 1408   BP: (!) 149/69   Pulse: (!) 48   Resp: 16   Temp: 98.2 °F (36.8 °C)   TempSrc: Oral   SpO2: 97%   Weight: 220 lb (99.8 kg)   Height: 5' 5\" (1.651 m)     Heart is irregular but no murmurs. Lungs clear to auscultation bilaterally. I performed a history and physical examination of the patient and discussed management with the resident. I reviewed the residents note and agree with the documented findings and plan of care. Any areas of disagreement are noted on the chart. I was personally present for the key portions of any procedures. I have documented in the chart those procedures where I was not present during the key portions. I have personally reviewed all images and agree with the resident's interpretation. I have reviewed the emergency nurses triage note. I agree with the chief complaint, past medical history, past surgical history, allergies, medications, social and family history as documented unless otherwise noted below. Documentation of the HPI, Physical Exam and Medical Decision Making performed by medical students or scribes is based on my personal performance of the HPI, PE and MDM.  I personally evaluated and examined the patient in conjunction with the APC and agree with the assessment, treatment plan, and disposition of the patient as recorded by the APC. Additional findings are as noted.     Joshua Tracy MD  Attending Emergency  Physician             Alice Abdalla MD  05/25/21 3246

## 2021-05-25 NOTE — ED PROVIDER NOTES
[address]  Emergency Department Encounter  EmergencyMedicine Resident     Pt Emili Schumacher  MRN: [id number]  Birthdate 1950  Date of evaluation: 5/25/21  PCP:  MD Faustino Yang       Chief Complaint   Patient presents with    Shortness of Breath     Pt states sthat she has just not been feeling right for the last 2 days. Patient states she is unable to sleep and feels a little short of breath. HISTORY OF PRESENT ILLNESS  (Location/Symptom, Timing/Onset, Context/Setting, Quality, Duration, Modifying Factors, Severity.)      Ritu Dale is a 69 y.o. female who presents with chest pain. Patient presents with 2 days of intermittent chest pain, sharp, left-sided, worse with breathing, not associated with exertion, does not know what makes it better or worse, associated with shortness of breath, lightheadedness. Patient denies fevers, chills, cough, congestion, rhinorrhea, sore throat, palpitations, abdominal pain, nausea, vomiting, dysuria, diarrhea, constipation, lower extremity swelling or pain. Patient has history of COPD, HLD, HTN. Patient denies history of diabetes, smoking, family history of CAD, drug use, clots, lower extremity swelling or pain. Patient is not on any anticoagulation medication. PAST MEDICAL / SURGICAL / SOCIAL / FAMILY HISTORY      has a past medical history of Arthritis, Bronchitis, Chronic obstructive pulmonary disease ([address] Colon polyps, Dental neglect, Depression, Environmental allergies, GERD (gastroesophageal reflux disease), Hyperlipidemia, Hypertension, Obesity, Onychomycosis, Poor historian, RBBB, Treadmill stress test negative for angina pectoris, and Wears glasses. has a past surgical history that includes Cholecystectomy; doppler echocardiography; Colonoscopy (6/2013); and Tubal ligation.       Social History     Socioeconomic History    Marital status: Single     Spouse name: Not on file    Number of children: Not ciclopirox (LOPROX) 0.77 % cream Apply topically 2 times daily. 10/2/19   Venkat Manner, DPM   tiotropium (SPIRIVA RESPIMAT) 1.25 MCG/ACT AERS inhaler Inhale 2 puffs into the lungs daily 8/12/19   Rhoda Peters MD   albuterol sulfate HFA (VENTOLIN HFA) 108 (90 Base) MCG/ACT inhaler Inhale 2 puffs into the lungs every 6 hours as needed for Wheezing 8/12/19   Rhoda Peters MD   ibuprofen (ADVIL;MOTRIN) 600 MG tablet Take 1 tablet by mouth 4 times daily as needed for Pain 6/20/19   Rhoda Peters MD   lisinopril (PRINIVIL;ZESTRIL) 20 MG tablet Take 1 tablet by mouth daily 5/10/19   Rhoda Peters MD   acetaminophen (TYLENOL) 500 MG tablet Take 2 tablets by mouth every 8 hours as needed for Pain 3/7/19   Rosmery Phillips MD   ondansetron (ZOFRAN) 4 MG tablet Take 1 tablet by mouth 3 times daily as needed for Nausea or Vomiting 3/7/19   Rosmery Phillips MD   haloperidol (HALDOL) 5 MG tablet Take 5 mg by mouth 2 times daily    Historical Provider, MD   traZODone (DESYREL) 100 MG tablet  8/9/17   Historical Provider, MD   Elastic Bandages & Supports (ACE ARM SLING) MISC Apply 1 Units topically daily 4/17/17   Elizabeth Love MD   Misc. Devices MISC Walker 9/1/15   Jasson Strange MD   trihexyphenidyl (ARTANE) 2 MG tablet Take 2 mg by mouth 2 times daily  10/10/14   Historical Provider, MD       REVIEW OF SYSTEMS    (2-9 systems for level 4, 10 or more for level 5)      Review of Systems   Positive for chest pain, shortness of breath, lightheadedness. Patient denies fevers, chills, cough, congestion, rhinorrhea, sore throat, palpitations, abdominal pain, nausea, vomiting, dysuria, diarrhea, constipation, lower extremity swelling or pain.     PHYSICAL EXAM   (up to 7 for level 4, 8 or more for level 5)      INITIAL VITALS:   /85   Pulse 83   Temp 98.2 °F (36.8 °C) (Oral)   Resp 23   Ht 5' 5\" (1.651 m)   Wt 220 lb (99.8 kg)   SpO2 95%   BMI 36.61 kg/m²     Physical Exam  Constitutional:       General: She is not in acute distress. Appearance: She is well-developed. She is not ill-appearing, toxic-appearing or diaphoretic. HENT:      Head: Normocephalic and atraumatic. Right Ear: External ear normal.      Left Ear: External ear normal.   Eyes:      General:         Right eye: No discharge. Left eye: No discharge. Extraocular Movements: Extraocular movements intact. Pupils: Pupils are equal, round, and reactive to light. Neck:      Vascular: No JVD. Trachea: No tracheal deviation. Cardiovascular:      Rate and Rhythm: Normal rate and regular rhythm. Pulses: Normal pulses. Heart sounds: Normal heart sounds. No murmur heard. No friction rub. No gallop. Pulmonary:      Effort: Pulmonary effort is normal. No respiratory distress. Breath sounds: Normal breath sounds. No stridor. No wheezing, rhonchi or rales. Chest:      Chest wall: No tenderness. Abdominal:      General: There is no distension. Palpations: Abdomen is soft. There is no mass. Tenderness: There is no abdominal tenderness. There is no right CVA tenderness, left CVA tenderness or guarding. Musculoskeletal:         General: No tenderness. Normal range of motion. Cervical back: Normal range of motion and neck supple. Right lower leg: No edema. Left lower leg: No edema. Skin:     General: Skin is warm. Capillary Refill: Capillary refill takes less than 2 seconds. Neurological:      General: No focal deficit present. Mental Status: She is alert and oriented to person, place, and time. Cranial Nerves: No cranial nerve deficit. Sensory: No sensory deficit. Motor: No weakness.       Coordination: Coordination normal.      Gait: Gait normal.   Psychiatric:         Behavior: Behavior normal.         DIFFERENTIAL  DIAGNOSIS     PLAN (LABS / IMAGING / EKG):  Orders Placed This Encounter   Procedures    COVID-19, Rapid    XR CHEST (2 VW)    CBC Auto Differential    Basic Metabolic Panel w/ Reflex to MG    Troponin    Brain Natriuretic Peptide    D-Dimer, Quantitative    Troponin    EKG 12 Lead       MEDICATIONS ORDERED:  No orders of the defined types were placed in this encounter. DDX:     DIAGNOSTIC RESULTS / EMERGENCY DEPARTMENT COURSE / MDM   LAB RESULTS:  Results for orders placed or performed during the hospital encounter of 05/25/21   COVID-19, Rapid    Specimen: Nasopharyngeal Swab   Result Value Ref Range    Specimen Description . NASOPHARYNGEAL SWAB     SARS-CoV-2, Rapid Not Detected Not Detected   CBC Auto Differential   Result Value Ref Range    WBC 8.7 3.5 - 11.0 k/uL    RBC 4.55 4.0 - 5.2 m/uL    Hemoglobin 14.1 12.0 - 16.0 g/dL    Hematocrit 42.2 36 - 46 %    MCV 92.8 80 - 100 fL    MCH 30.9 26 - 34 pg    MCHC 33.4 31 - 37 g/dL    RDW 13.6 11.5 - 14.9 %    Platelets 109 330 - 505 k/uL    MPV 7.4 6.0 - 12.0 fL    NRBC Automated NOT REPORTED per 100 WBC    Differential Type NOT REPORTED     Seg Neutrophils 72 (H) 36 - 66 %    Lymphocytes 20 (L) 24 - 44 %    Monocytes 7 1 - 7 %    Eosinophils % 0 0 - 4 %    Basophils 1 0 - 2 %    Immature Granulocytes NOT REPORTED 0 %    Segs Absolute 6.30 1.3 - 9.1 k/uL    Absolute Lymph # 1.70 1.0 - 4.8 k/uL    Absolute Mono # 0.60 0.1 - 1.3 k/uL    Absolute Eos # 0.00 0.0 - 0.4 k/uL    Basophils Absolute 0.10 0.0 - 0.2 k/uL    Absolute Immature Granulocyte NOT REPORTED 0.00 - 0.30 k/uL    WBC Morphology NOT REPORTED     RBC Morphology NOT REPORTED     Platelet Estimate NOT REPORTED    Basic Metabolic Panel w/ Reflex to MG   Result Value Ref Range    Glucose 103 (H) 70 - 99 mg/dL    BUN 11 8 - 23 mg/dL    CREATININE 0.77 0.50 - 0.90 mg/dL    Bun/Cre Ratio NOT REPORTED 9 - 20    Calcium 9.3 8.6 - 10.4 mg/dL    Sodium 140 135 - 144 mmol/L    Potassium 3.7 3.7 - 5.3 mmol/L    Chloride 103 98 - 107 mmol/L    CO2 26 20 - 31 mmol/L    Anion Gap 11 9 - 17 mmol/L    GFR Non-African American >60 >60 mL/min    GFR African American >60 >60 mL/min    GFR Comment          GFR Staging NOT REPORTED    Troponin   Result Value Ref Range    Troponin, High Sensitivity 11 0 - 14 ng/L    Troponin T NOT REPORTED <0.03 ng/mL    Troponin Interp NOT REPORTED    Brain Natriuretic Peptide   Result Value Ref Range    Pro- <300 pg/mL    BNP Interpretation Pro-BNP Reference Range:    D-Dimer, Quantitative   Result Value Ref Range    D-Dimer, Quant 0.41 0.00 - 0.59 mg/L FEU   Troponin   Result Value Ref Range    Troponin, High Sensitivity 10 0 - 14 ng/L    Troponin T NOT REPORTED <0.03 ng/mL    Troponin Interp NOT REPORTED    EKG 12 Lead   Result Value Ref Range    Ventricular Rate 88 BPM    Atrial Rate 88 BPM    P-R Interval 126 ms    QRS Duration 110 ms    Q-T Interval 408 ms    QTc Calculation (Bazett) 493 ms    P Axis 40 degrees    R Axis -43 degrees    T Axis -30 degrees       IMPRESSION: 74-year-old female with history of HTN, HLD, smoking, presenting with 2 days of intermittent chest pain, worse with breathing, not associated with exertion, associated with shortness of breath, will obtain cardiac work-up to include EKG, troponins, dimer to evaluate for PE, BNP to evaluate for heart failure, basic labs to evaluate for anemia, electrolyte abnormality. RADIOLOGY:  XR CHEST (2 VW)    Result Date: 5/25/2021  EXAMINATION: TWO XRAY VIEWS OF THE CHEST 5/25/2021 3:52 pm COMPARISON: Chest radiograph performed 05/29/2013. HISTORY: ORDERING SYSTEM PROVIDED HISTORY: Chest pain TECHNOLOGIST PROVIDED HISTORY: Chest pain Reason for Exam: PT CO generalized fatigue with CP and SOB X 3 days. Acuity: Acute Type of Exam: Initial FINDINGS: There is no acute consolidation or effusion. There is no pneumothorax. The mediastinal structures are unremarkable. The upper abdomen is unremarkable. The extrathoracic soft tissues are unremarkable. There is no acute osseous abnormality. No acute cardiopulmonary process.        EKG  EKG Interpretation    Interpreted by me    Rhythm: Sinus rhythm  Rate: 88  Axis: Left axis  Ectopy: PACs bigeminy  Conduction: Right bundle branch block  ST Segments: no acute change  T Waves: no acute change  Q Waves: none    Clinical Impression: no acute changes and normal EKG, similar to prior EKG on 8/11/2015    All EKG's are interpreted by the Emergency Department Physician who either signs or Co-signs this chart in the absence of a cardiologist.    EMERGENCY DEPARTMENT COURSE:  Patient came to emergency department, HPI and physical exam were conducted. All nursing notes were reviewed. Chest x-ray found no acute changes and has no concern for pneumonia, pneumothorax. No electrolyte abnormality, no anemia, , no concern for worsening heart failure, dimer negative. EKG found no acute changes, similar to prior EKG, troponin stable, patient has heart score 4. Recommended admission to the hospital for cardiology evaluation, patient adamantly declined admission to the hospital, son was in the room, agreed with admission, tried to convince the patient, patient continued to decline. Recommended follow-up immediately with PCP, provided a list of PCPs to follow-up with, both patient and son were in agreement with discharge, will follow up or return to the emergency department with worsening symptoms. Patient has capacity, with stable in the emergency department, will allow patient to leave and have her follow-up with PCP. PROCEDURES:      CONSULTS:  None    CRITICAL CARE:  Please see attending note    FINAL IMPRESSION      1.  Chest pain, unspecified type          DISPOSITION / PLAN     DISPOSITION        PATIENT REFERRED TO:  [facility] ED  Yajaira Liz [id number] [phone]  Go to   As needed, If symptoms worsen    [address] [place] [address] [address] [address] [phone]  Schedule an appointment as soon as possible for a visit in 1 day  For reassessment      DISCHARGE MEDICATIONS:  Discharge Medication List as of 5/25/2021  7:36 PM          Alma Crawley MD  Emergency Medicine Resident    (Please note that portions of thisnote were completed with a voice recognition program.  Efforts were made to edit the dictations but occasionally words are mis-transcribed.)        Alma Crawley MD  Resident  05/26/21 [id number]

## 2021-05-26 ENCOUNTER — TELEPHONE (OUTPATIENT)
Dept: FAMILY MEDICINE CLINIC | Age: 71
End: 2021-05-26

## 2021-05-26 LAB
EKG ATRIAL RATE: 88 BPM
EKG P AXIS: 40 DEGREES
EKG P-R INTERVAL: 126 MS
EKG Q-T INTERVAL: 408 MS
EKG QRS DURATION: 110 MS
EKG QTC CALCULATION (BAZETT): 493 MS
EKG R AXIS: -43 DEGREES
EKG T AXIS: -30 DEGREES
EKG VENTRICULAR RATE: 88 BPM

## 2021-05-26 PROCEDURE — 93010 ELECTROCARDIOGRAM REPORT: CPT | Performed by: INTERNAL MEDICINE

## 2021-06-01 ENCOUNTER — HOSPITAL ENCOUNTER (INPATIENT)
Age: 71
LOS: 1 days | Discharge: HOME | DRG: 468 | End: 2021-06-03
Attending: EMERGENCY MEDICINE | Admitting: FAMILY MEDICINE
Payer: COMMERCIAL

## 2021-06-01 ENCOUNTER — APPOINTMENT (OUTPATIENT)
Dept: GENERAL RADIOLOGY | Age: 71
DRG: 468 | End: 2021-06-01
Payer: COMMERCIAL

## 2021-06-01 DIAGNOSIS — R73.9 HYPERGLYCEMIA: ICD-10-CM

## 2021-06-01 DIAGNOSIS — N17.9 AKI (ACUTE KIDNEY INJURY) (HCC): ICD-10-CM

## 2021-06-01 DIAGNOSIS — N39.0 URINARY TRACT INFECTION WITH HEMATURIA, SITE UNSPECIFIED: ICD-10-CM

## 2021-06-01 DIAGNOSIS — R31.9 URINARY TRACT INFECTION WITH HEMATURIA, SITE UNSPECIFIED: ICD-10-CM

## 2021-06-01 DIAGNOSIS — R29.6 FALLS FREQUENTLY: Primary | ICD-10-CM

## 2021-06-01 DIAGNOSIS — M17.11 PRIMARY OSTEOARTHRITIS OF RIGHT KNEE: ICD-10-CM

## 2021-06-01 DIAGNOSIS — R42 LIGHTHEADED: ICD-10-CM

## 2021-06-01 LAB
-: ABNORMAL
ABSOLUTE EOS #: <0.03 K/UL (ref 0–0.44)
ABSOLUTE IMMATURE GRANULOCYTE: 0.07 K/UL (ref 0–0.3)
ABSOLUTE LYMPH #: 1.18 K/UL (ref 1.1–3.7)
ABSOLUTE MONO #: 0.51 K/UL (ref 0.1–1.2)
ALBUMIN SERPL-MCNC: 3.7 G/DL (ref 3.5–5.2)
ALBUMIN/GLOBULIN RATIO: 1.4 (ref 1–2.5)
ALP BLD-CCNC: 118 U/L (ref 35–104)
ALT SERPL-CCNC: 50 U/L (ref 5–33)
AMORPHOUS: ABNORMAL
ANION GAP SERPL CALCULATED.3IONS-SCNC: 18 MMOL/L (ref 9–17)
AST SERPL-CCNC: 28 U/L
BACTERIA: ABNORMAL
BASOPHILS # BLD: 0 % (ref 0–2)
BASOPHILS ABSOLUTE: <0.03 K/UL (ref 0–0.2)
BILIRUB SERPL-MCNC: 0.31 MG/DL (ref 0.3–1.2)
BILIRUBIN URINE: NEGATIVE
BUN BLDV-MCNC: 13 MG/DL (ref 8–23)
BUN/CREAT BLD: ABNORMAL (ref 9–20)
CALCIUM SERPL-MCNC: 9.2 MG/DL (ref 8.6–10.4)
CASTS UA: ABNORMAL /LPF (ref 0–2)
CASTS UA: ABNORMAL /LPF (ref 0–2)
CHLORIDE BLD-SCNC: 100 MMOL/L (ref 98–107)
CO2: 19 MMOL/L (ref 20–31)
COLOR: YELLOW
CREAT SERPL-MCNC: 1.14 MG/DL (ref 0.5–0.9)
CRYSTALS, UA: ABNORMAL /HPF
DIFFERENTIAL TYPE: ABNORMAL
EOSINOPHILS RELATIVE PERCENT: 0 % (ref 1–4)
EPITHELIAL CELLS UA: ABNORMAL /HPF (ref 0–5)
GFR AFRICAN AMERICAN: 57 ML/MIN
GFR NON-AFRICAN AMERICAN: 47 ML/MIN
GFR SERPL CREATININE-BSD FRML MDRD: ABNORMAL ML/MIN/{1.73_M2}
GFR SERPL CREATININE-BSD FRML MDRD: ABNORMAL ML/MIN/{1.73_M2}
GLUCOSE BLD-MCNC: 331 MG/DL (ref 70–99)
GLUCOSE BLD-MCNC: 83 MG/DL (ref 65–105)
GLUCOSE URINE: ABNORMAL
HCT VFR BLD CALC: 44 % (ref 36.3–47.1)
HEMOGLOBIN: 13.4 G/DL (ref 11.9–15.1)
IMMATURE GRANULOCYTES: 1 %
KETONES, URINE: ABNORMAL
LEUKOCYTE ESTERASE, URINE: ABNORMAL
LYMPHOCYTES # BLD: 10 % (ref 24–43)
MAGNESIUM: 1.9 MG/DL (ref 1.6–2.6)
MCH RBC QN AUTO: 31.1 PG (ref 25.2–33.5)
MCHC RBC AUTO-ENTMCNC: 30.5 G/DL (ref 28.4–34.8)
MCV RBC AUTO: 102.1 FL (ref 82.6–102.9)
MONOCYTES # BLD: 4 % (ref 3–12)
MUCUS: ABNORMAL
MYOGLOBIN: 92 NG/ML (ref 25–58)
NITRITE, URINE: POSITIVE
NRBC AUTOMATED: 0 PER 100 WBC
OTHER OBSERVATIONS UA: ABNORMAL
PDW BLD-RTO: 12.5 % (ref 11.8–14.4)
PH UA: 5.5 (ref 5–8)
PLATELET # BLD: 228 K/UL (ref 138–453)
PLATELET ESTIMATE: ABNORMAL
PMV BLD AUTO: 9.5 FL (ref 8.1–13.5)
POTASSIUM SERPL-SCNC: 4.2 MMOL/L (ref 3.7–5.3)
PROTEIN UA: ABNORMAL
RBC # BLD: 4.31 M/UL (ref 3.95–5.11)
RBC # BLD: ABNORMAL 10*6/UL
RBC UA: ABNORMAL /HPF (ref 0–2)
RENAL EPITHELIAL, UA: ABNORMAL /HPF
SARS-COV-2, RAPID: NOT DETECTED
SEG NEUTROPHILS: 85 % (ref 36–65)
SEGMENTED NEUTROPHILS ABSOLUTE COUNT: 10.07 K/UL (ref 1.5–8.1)
SODIUM BLD-SCNC: 137 MMOL/L (ref 135–144)
SPECIFIC GRAVITY UA: 1.02 (ref 1–1.03)
SPECIMEN DESCRIPTION: NORMAL
TOTAL CK: 54 U/L (ref 26–192)
TOTAL PROTEIN: 6.3 G/DL (ref 6.4–8.3)
TRICHOMONAS: ABNORMAL
TROPONIN INTERP: ABNORMAL
TROPONIN INTERP: ABNORMAL
TROPONIN T: ABNORMAL NG/ML
TROPONIN T: ABNORMAL NG/ML
TROPONIN, HIGH SENSITIVITY: 16 NG/L (ref 0–14)
TROPONIN, HIGH SENSITIVITY: 18 NG/L (ref 0–14)
TURBIDITY: ABNORMAL
URINE HGB: ABNORMAL
UROBILINOGEN, URINE: NORMAL
WBC # BLD: 11.9 K/UL (ref 3.5–11.3)
WBC # BLD: ABNORMAL 10*3/UL
WBC UA: ABNORMAL /HPF (ref 0–5)
YEAST: ABNORMAL

## 2021-06-01 PROCEDURE — G0378 HOSPITAL OBSERVATION PER HR: HCPCS

## 2021-06-01 PROCEDURE — 87635 SARS-COV-2 COVID-19 AMP PRB: CPT

## 2021-06-01 PROCEDURE — 81001 URINALYSIS AUTO W/SCOPE: CPT

## 2021-06-01 PROCEDURE — 85025 COMPLETE CBC W/AUTO DIFF WBC: CPT

## 2021-06-01 PROCEDURE — 99284 EMERGENCY DEPT VISIT MOD MDM: CPT

## 2021-06-01 PROCEDURE — 73562 X-RAY EXAM OF KNEE 3: CPT

## 2021-06-01 PROCEDURE — 6370000000 HC RX 637 (ALT 250 FOR IP): Performed by: STUDENT IN AN ORGANIZED HEALTH CARE EDUCATION/TRAINING PROGRAM

## 2021-06-01 PROCEDURE — 87077 CULTURE AEROBIC IDENTIFY: CPT

## 2021-06-01 PROCEDURE — 96375 TX/PRO/DX INJ NEW DRUG ADDON: CPT

## 2021-06-01 PROCEDURE — 84484 ASSAY OF TROPONIN QUANT: CPT

## 2021-06-01 PROCEDURE — 2580000003 HC RX 258: Performed by: STUDENT IN AN ORGANIZED HEALTH CARE EDUCATION/TRAINING PROGRAM

## 2021-06-01 PROCEDURE — 83874 ASSAY OF MYOGLOBIN: CPT

## 2021-06-01 PROCEDURE — 82947 ASSAY GLUCOSE BLOOD QUANT: CPT

## 2021-06-01 PROCEDURE — 96372 THER/PROPH/DIAG INJ SC/IM: CPT

## 2021-06-01 PROCEDURE — 82550 ASSAY OF CK (CPK): CPT

## 2021-06-01 PROCEDURE — 87086 URINE CULTURE/COLONY COUNT: CPT

## 2021-06-01 PROCEDURE — 93005 ELECTROCARDIOGRAM TRACING: CPT | Performed by: STUDENT IN AN ORGANIZED HEALTH CARE EDUCATION/TRAINING PROGRAM

## 2021-06-01 PROCEDURE — 87186 SC STD MICRODIL/AGAR DIL: CPT

## 2021-06-01 PROCEDURE — 83735 ASSAY OF MAGNESIUM: CPT

## 2021-06-01 PROCEDURE — 80053 COMPREHEN METABOLIC PANEL: CPT

## 2021-06-01 PROCEDURE — 96361 HYDRATE IV INFUSION ADD-ON: CPT

## 2021-06-01 PROCEDURE — 96374 THER/PROPH/DIAG INJ IV PUSH: CPT

## 2021-06-01 PROCEDURE — 71046 X-RAY EXAM CHEST 2 VIEWS: CPT

## 2021-06-01 PROCEDURE — 6360000002 HC RX W HCPCS: Performed by: STUDENT IN AN ORGANIZED HEALTH CARE EDUCATION/TRAINING PROGRAM

## 2021-06-01 RX ORDER — ATORVASTATIN CALCIUM 20 MG/1
20 TABLET, FILM COATED ORAL DAILY
Status: DISCONTINUED | OUTPATIENT
Start: 2021-06-01 | End: 2021-06-03 | Stop reason: HOSPADM

## 2021-06-01 RX ORDER — OXYCODONE HYDROCHLORIDE AND ACETAMINOPHEN 5; 325 MG/1; MG/1
1 TABLET ORAL EVERY 4 HOURS PRN
Status: DISCONTINUED | OUTPATIENT
Start: 2021-06-01 | End: 2021-06-02

## 2021-06-01 RX ORDER — SODIUM CHLORIDE 0.9 % (FLUSH) 0.9 %
5-40 SYRINGE (ML) INJECTION PRN
Status: DISCONTINUED | OUTPATIENT
Start: 2021-06-01 | End: 2021-06-03 | Stop reason: HOSPADM

## 2021-06-01 RX ORDER — OXYCODONE HYDROCHLORIDE AND ACETAMINOPHEN 5; 325 MG/1; MG/1
2 TABLET ORAL EVERY 4 HOURS PRN
Status: DISCONTINUED | OUTPATIENT
Start: 2021-06-01 | End: 2021-06-02

## 2021-06-01 RX ORDER — CEPHALEXIN 500 MG/1
500 CAPSULE ORAL ONCE
Status: COMPLETED | OUTPATIENT
Start: 2021-06-01 | End: 2021-06-01

## 2021-06-01 RX ORDER — ONDANSETRON 2 MG/ML
4 INJECTION INTRAMUSCULAR; INTRAVENOUS EVERY 8 HOURS PRN
Status: DISCONTINUED | OUTPATIENT
Start: 2021-06-01 | End: 2021-06-03 | Stop reason: HOSPADM

## 2021-06-01 RX ORDER — LISINOPRIL 10 MG/1
20 TABLET ORAL DAILY
Status: DISCONTINUED | OUTPATIENT
Start: 2021-06-01 | End: 2021-06-03 | Stop reason: HOSPADM

## 2021-06-01 RX ORDER — ACETAMINOPHEN 500 MG
1000 TABLET ORAL ONCE
Status: COMPLETED | OUTPATIENT
Start: 2021-06-01 | End: 2021-06-01

## 2021-06-01 RX ORDER — TRIHEXYPHENIDYL HYDROCHLORIDE 2 MG/1
2 TABLET ORAL 2 TIMES DAILY
Status: DISCONTINUED | OUTPATIENT
Start: 2021-06-01 | End: 2021-06-03 | Stop reason: HOSPADM

## 2021-06-01 RX ORDER — SODIUM CHLORIDE 0.9 % (FLUSH) 0.9 %
5-40 SYRINGE (ML) INJECTION EVERY 12 HOURS SCHEDULED
Status: DISCONTINUED | OUTPATIENT
Start: 2021-06-01 | End: 2021-06-03 | Stop reason: HOSPADM

## 2021-06-01 RX ORDER — 0.9 % SODIUM CHLORIDE 0.9 %
1000 INTRAVENOUS SOLUTION INTRAVENOUS ONCE
Status: COMPLETED | OUTPATIENT
Start: 2021-06-01 | End: 2021-06-01

## 2021-06-01 RX ORDER — FENTANYL CITRATE 50 UG/ML
50 INJECTION, SOLUTION INTRAMUSCULAR; INTRAVENOUS
Status: DISCONTINUED | OUTPATIENT
Start: 2021-06-01 | End: 2021-06-02

## 2021-06-01 RX ORDER — HALOPERIDOL 5 MG
5 TABLET ORAL 2 TIMES DAILY
Status: DISCONTINUED | OUTPATIENT
Start: 2021-06-01 | End: 2021-06-03 | Stop reason: HOSPADM

## 2021-06-01 RX ORDER — TRAZODONE HYDROCHLORIDE 100 MG/1
100 TABLET ORAL NIGHTLY
Status: DISCONTINUED | OUTPATIENT
Start: 2021-06-01 | End: 2021-06-03 | Stop reason: HOSPADM

## 2021-06-01 RX ORDER — ACETAMINOPHEN 325 MG/1
650 TABLET ORAL EVERY 4 HOURS PRN
Status: DISCONTINUED | OUTPATIENT
Start: 2021-06-01 | End: 2021-06-03 | Stop reason: HOSPADM

## 2021-06-01 RX ORDER — SODIUM CHLORIDE 9 MG/ML
25 INJECTION, SOLUTION INTRAVENOUS PRN
Status: DISCONTINUED | OUTPATIENT
Start: 2021-06-01 | End: 2021-06-03 | Stop reason: HOSPADM

## 2021-06-01 RX ORDER — SODIUM CHLORIDE 9 MG/ML
INJECTION, SOLUTION INTRAVENOUS CONTINUOUS
Status: DISCONTINUED | OUTPATIENT
Start: 2021-06-01 | End: 2021-06-03 | Stop reason: HOSPADM

## 2021-06-01 RX ORDER — ALBUTEROL SULFATE 90 UG/1
2 AEROSOL, METERED RESPIRATORY (INHALATION) EVERY 6 HOURS PRN
Status: DISCONTINUED | OUTPATIENT
Start: 2021-06-01 | End: 2021-06-03 | Stop reason: HOSPADM

## 2021-06-01 RX ORDER — FENTANYL CITRATE 50 UG/ML
25 INJECTION, SOLUTION INTRAMUSCULAR; INTRAVENOUS
Status: DISCONTINUED | OUTPATIENT
Start: 2021-06-01 | End: 2021-06-02

## 2021-06-01 RX ADMIN — HALOPERIDOL 5 MG: 5 TABLET ORAL at 22:48

## 2021-06-01 RX ADMIN — SODIUM CHLORIDE: 9 INJECTION, SOLUTION INTRAVENOUS at 22:49

## 2021-06-01 RX ADMIN — CEPHALEXIN 500 MG: 500 CAPSULE ORAL at 12:46

## 2021-06-01 RX ADMIN — ACETAMINOPHEN 1000 MG: 500 TABLET ORAL at 11:45

## 2021-06-01 RX ADMIN — OXYCODONE HYDROCHLORIDE AND ACETAMINOPHEN 1 TABLET: 5; 325 TABLET ORAL at 22:47

## 2021-06-01 RX ADMIN — TRAZODONE HYDROCHLORIDE 100 MG: 100 TABLET ORAL at 22:47

## 2021-06-01 RX ADMIN — SODIUM CHLORIDE 1000 ML: 9 INJECTION, SOLUTION INTRAVENOUS at 11:28

## 2021-06-01 RX ADMIN — TRIHEXYPHENIDYL HYDROCHLORIDE 2 MG: 2 TABLET ORAL at 22:47

## 2021-06-01 RX ADMIN — INSULIN HUMAN 5 UNITS: 100 INJECTION, SOLUTION PARENTERAL at 11:46

## 2021-06-01 RX ADMIN — ENOXAPARIN SODIUM 40 MG: 40 INJECTION, SOLUTION INTRAVENOUS; SUBCUTANEOUS at 23:57

## 2021-06-01 ASSESSMENT — PAIN DESCRIPTION - LOCATION
LOCATION: KNEE
LOCATION: KNEE

## 2021-06-01 ASSESSMENT — ENCOUNTER SYMPTOMS
SHORTNESS OF BREATH: 0
VOMITING: 0
TROUBLE SWALLOWING: 0
ABDOMINAL PAIN: 0
NAUSEA: 0
BACK PAIN: 0
SORE THROAT: 0
DIARRHEA: 0

## 2021-06-01 ASSESSMENT — PAIN SCALES - GENERAL
PAINLEVEL_OUTOF10: 10
PAINLEVEL_OUTOF10: 0
PAINLEVEL_OUTOF10: 5

## 2021-06-01 ASSESSMENT — PAIN DESCRIPTION - ORIENTATION
ORIENTATION: RIGHT
ORIENTATION: RIGHT

## 2021-06-01 NOTE — ED PROVIDER NOTES
[facility] ED  Emergency Department Encounter  EmergencyMedicine Resident     Pt Blanquita Hoyt  MRN: [id number]  [place] 1950  Date of evaluation: 6/1/21  PCP:  Ann Marie Kenney MD    [address] [place] [address]       Chief Complaint   Patient presents with    Dizziness    Fall       HISTORY OF PRESENT ILLNESS  (Location/Symptom, Timing/Onset, Context/Setting, Quality, Duration, Modifying Factors, Severity.)      Adriana Branham is a 69 y.o. female who presents with falls 3 today and lightheadedness denies vertiginous symptoms denies any pain denies any loss of consciousness denies striking her head patient states that she was walking and simply fell to the ground denies any prodromal symptoms. Not in any pain. Review of records with the patient was seen recently at [facility] for chest pain recommending admission due to elevated heart score and she adamantly refused. While in the emergency department patient's called the department to inform staff that she left AMA recently her she was advised to be admitted to the hospital.  Denies any fevers any chest pain or shortness of breath any abdominal pain and nausea or vomiting. She is a poor historian. Denies any seizure-like activity. PAST MEDICAL / SURGICAL / SOCIAL / FAMILY HISTORY      has a past medical history of Arthritis, Bronchitis, Chronic obstructive pulmonary disease ([address] Colon polyps, Dental neglect, Depression, Environmental allergies, GERD (gastroesophageal reflux disease), Hyperlipidemia, Hypertension, Obesity, Onychomycosis, Poor historian, RBBB, Treadmill stress test negative for angina pectoris, and Wears glasses. has a past surgical history that includes Cholecystectomy; doppler echocardiography; Colonoscopy (6/2013); and Tubal ligation.       Social History     Socioeconomic History    Marital status: Single     Spouse name: Not on file    Number of children: Not on file    Years of education: Not on 9/23/20   Ila Crabtree DPM   atorvastatin (LIPITOR) 20 MG tablet Take 1 tablet by mouth daily 5/19/20   Armando Molina MD   ciclopirox (LOPROX) 0.77 % cream Apply topically 2 times daily. 10/2/19   Andres Hernandez DPM   tiotropium (SPIRIVA RESPIMAT) 1.25 MCG/ACT AERS inhaler Inhale 2 puffs into the lungs daily 8/12/19   Henry Gann MD   albuterol sulfate HFA (VENTOLIN HFA) 108 (90 Base) MCG/ACT inhaler Inhale 2 puffs into the lungs every 6 hours as needed for Wheezing 8/12/19   Henry Gann MD   ibuprofen (ADVIL;MOTRIN) 600 MG tablet Take 1 tablet by mouth 4 times daily as needed for Pain 6/20/19   Henry Gann MD   lisinopril (PRINIVIL;ZESTRIL) 20 MG tablet Take 1 tablet by mouth daily 5/10/19   Henry Gann MD   acetaminophen (TYLENOL) 500 MG tablet Take 2 tablets by mouth every 8 hours as needed for Pain 3/7/19   Meg Benavides MD   ondansetron (ZOFRAN) 4 MG tablet Take 1 tablet by mouth 3 times daily as needed for Nausea or Vomiting 3/7/19   Meg Benavides MD   Elastic Bandages & Supports (ACE ARM SLING) MISC Apply 1 Units topically daily 4/17/17   Sonya Olivera MD   Misc. Devices MISC Walker 9/1/15   Leah Baker MD   trihexyphenidyl (ARTANE) 2 MG tablet Take 2 mg by mouth 2 times daily  10/10/14   Historical Provider, MD       REVIEW OF SYSTEMS    (2-9 systems for level 4, 10 or more for level 5)      Review of Systems   Constitutional: Negative for fever. HENT: Negative for sore throat. Cardiovascular: Negative for chest pain. Gastrointestinal: Negative for abdominal pain, diarrhea, nausea and vomiting. Genitourinary: Negative for difficulty urinating and dysuria. Musculoskeletal: Negative for back pain and neck pain. Skin: Positive for wound. Small abrasion of left knee   Allergic/Immunologic: Negative for environmental allergies and food allergies. Neurological: Positive for weakness and light-headedness. Psychiatric/Behavioral: Negative for agitation.        PHYSICAL EXAM (up to 7 for level 4, 8 or more for level 5)      INITIAL VITALS:   BP (!) 140/51   Pulse 106   Temp 97.7 °F (36.5 °C) (Oral)   Resp 19   Wt 220 lb (99.8 kg)   SpO2 95%   BMI 36.61 kg/m²     Physical Exam  Vitals and nursing note reviewed. Constitutional:       Appearance: She is not toxic-appearing. Comments: Disheveled, unkempt malodorous   HENT:      Head: Normocephalic. Right Ear: External ear normal.      Left Ear: External ear normal.      Nose: Nose normal.      Mouth/Throat:      Pharynx: Oropharynx is clear. Eyes:      Conjunctiva/sclera: Conjunctivae normal.   Cardiovascular:      Rate and Rhythm: Normal rate. Pulses: Normal pulses. Pulmonary:      Effort: Pulmonary effort is normal.   Abdominal:      Palpations: Abdomen is soft. Tenderness: There is no abdominal tenderness. There is no guarding. Musculoskeletal:         General: Normal range of motion. Cervical back: Normal range of motion. Skin:     General: Skin is warm. Capillary Refill: Capillary refill takes less than 2 seconds. Comments: Is wearing wet socks that have not been changed and apparently several days   Neurological:      General: No focal deficit present. Mental Status: She is alert. Psychiatric:         Mood and Affect: Affect is flat. Speech: Speech is delayed. Behavior: Behavior is slowed.          DIFFERENTIAL  DIAGNOSIS     PLAN (LABS / IMAGING / EKG):  Orders Placed This Encounter   Procedures    Culture, Urine    COVID-19, Rapid    XR CHEST (2 VW)    XR KNEE RIGHT (3 VIEWS)    XR KNEE LEFT (3 VIEWS)    CBC WITH AUTO DIFFERENTIAL    Comprehensive Metabolic Panel    Troponin    Urinalysis with Microscopic    Magnesium    Troponin    CK    Myoglobin, Serum    Telemetry monitoring    POC Glucose Fingerstick    EKG 12 Lead    PATIENT STATUS (FROM ED OR OR/PROCEDURAL) Observation       MEDICATIONS ORDERED:  Orders Placed This Encounter Glucose Fingerstick   Result Value Ref Range    POC Glucose 83 65 - 105 mg/dL   EKG 12 Lead   Result Value Ref Range    Ventricular Rate 113 BPM    Atrial Rate 113 BPM    P-R Interval 112 ms    QRS Duration 112 ms    Q-T Interval 354 ms    QTc Calculation (Bazett) 485 ms    P Axis 44 degrees    R Axis -33 degrees    T Axis -15 degrees       IMPRESSION: 2year-old female with multiple falls no loss of consciousness she is a very poor historian he is complaining of lightheadedness be broad work-up labs imaging will call attempt to reach out to family for further history    RADIOLOGY:  XR CHEST (2 VW)    Result Date: 6/1/2021  EXAMINATION: TWO XRAY VIEWS OF THE CHEST 6/1/2021 10:38 am COMPARISON: May 25, 2021 HISTORY: ORDERING SYSTEM PROVIDED HISTORY: evaluation lightheadedness TECHNOLOGIST PROVIDED HISTORY: evaluation lightheadedness Reason for Exam: evaluation lightheadedness Acuity: Acute Type of Exam: Initial FINDINGS: The lungs are without acute focal process. There is no effusion or pneumothorax. The cardiomediastinal silhouette is stable. The osseous structures are stable. No acute process. Stable compared to prior study     XR KNEE LEFT (3 VIEWS)    Result Date: 6/1/2021  EXAMINATION: THREE XRAY VIEWS OF THE LEFT KNEE 6/1/2021 11:15 am COMPARISON: None. HISTORY: ORDERING SYSTEM PROVIDED HISTORY: pain fall TECHNOLOGIST PROVIDED HISTORY: pain fall FINDINGS: There is no evidence of fracture, malalignment, or other acute osseous abnormality. There is severe tricompartmental degenerative changes. There is a moderate to large suprapatellar joint effusion. There is significant medial soft tissue swelling. No acute osseous abnormality.      XR KNEE RIGHT (3 VIEWS)    Result Date: 6/1/2021  EXAMINATION: THREE XRAY VIEWS OF THE RIGHT KNEE 6/1/2021 11:15 am COMPARISON: 04/08/2013 HISTORY: ORDERING SYSTEM PROVIDED HISTORY: pain fall TECHNOLOGIST PROVIDED HISTORY: pain fall Reason for Exam: painl fall Acuity: Acute Type of Exam: Initial FINDINGS: Tricompartmental osteoarthropathy is noted with mild joint space narrowing and marginal hypertrophic changes. This predominates in the patellofemoral compartment. No significant joint effusion. No acute osseous abnormality identified. No acute osseous abnormality identified. EKG  Sinus tachycardia rate of 113 there is lewftward axis  Right bundle branch block pattern QRS duration 112 ms diffuse T wave abnormalities    All EKG's are interpreted by the Emergency Department Physician who either signs or Co-signs this chart in the absence of a cardiologist.    EMERGENCY DEPARTMENT COURSE:  ED Course as of Jun 01 1927   Tue Jun 01, 2021   1106 Pt initially stating she has no pain now endorsing knee pain. Remains poor historian, awaiting arrival of family for corroborating data    [BG]   1108 Trop elevated from prior 1 week ago    [BG]   1126 Álvaro hyperglycemia      [BG]   65 Spoke with family at bedside pt and family agreeable to admission    [BG]      ED Course User Index  [BG] Katarzyna Valiente DO         PROCEDURES:  none    CONSULTS:  None    CRITICAL CARE:  Please see attending note    FINAL IMPRESSION      1. Falls frequently    2. Lightheaded    3. ÁLVARO (acute kidney injury) ([facility] [address]    4. Hyperglycemia    5. Urinary tract infection with hematuria, site unspecified          DISPOSITION / PLAN     DISPOSITION  admitted to obs for hydration, pt, ot       PATIENT REFERRED TO:  No follow-up provider specified.     DISCHARGE MEDICATIONS:  New Prescriptions    No medications on file       Katarzyna Valiente DO  Emergency Medicine Resident    (Please note that portions of thisnote were completed with a voice recognition program.  Efforts were made to edit the dictations but occasionally words are mis-transcribed.)        Katarzyna Valiente DO  Resident  06/01/21 3120

## 2021-06-01 NOTE — ED NOTES
The following labs were labeled with patient stickers & tubed to lab;    [x]Lavender   []On Ice  [x]Blue  [x]Green/ Yellow  []Green/ Black []On Ice  []Pink  []Red  []Yellow    []COVID-19 Swab []Rapid    []Urine Sample  []Pelvic Cultures    []Blood Cultures       Isabella Virgen, JUSTINO  06/01/21 2909

## 2021-06-01 NOTE — ED NOTES
The following labs were labeled with patient stickers & tubed to lab;    []Lavender   []On Ice  []Blue  []Green/ Yellow  []Green/ Black []On Ice  []Pink  []Red  []Yellow    [x]COVID-19 Swab [x]Rapid    []Urine Sample  []Pelvic Cultures    []Blood Cultures       Sam Moeller RN  06/01/21 3162

## 2021-06-01 NOTE — ED NOTES
Bed: 42  Expected date:   Expected time:   Means of arrival:   Comments:     Yanni Chilel RN  06/01/21 5832

## 2021-06-01 NOTE — ED PROVIDER NOTES
[address]     Emergency Department     Faculty Attestation    I performed a history and physical examination of the patient and discussed management with the resident. I have reviewed and agree with the residents findings including all diagnostic interpretations, and treatment plans as written. Any areas of disagreement are noted on the chart. I was personally present for the key portions of any procedures. I have documented in the chart those procedures where I was not present during the key portions. I have reviewed the emergency nurses triage note. I agree with the chief complaint, past medical history, past surgical history, allergies, medications, social and family history as documented unless otherwise noted below. Documentation of the HPI, Physical Exam and Medical Decision Making performed by scribann marie is based on my personal performance of the HPI, PE and MDM. For Physician Assistant/ Nurse Practitioner cases/documentation I have personally evaluated this patient and have completed at least one if not all key elements of the E/M (history, physical exam, and MDM). Additional findings are as noted. Patient brought in by EMS after fall. Patient is a very poor historian and answers \"I do not know \" for most of my questioning's. She does not know why she is falling. She only complains of pain to her right knee where there is a slight abrasion and she is able to flex and extend fully at the knee, no edema or deformity noted. She ambulates at baseline with a cane. Her son called 911 because she was on the ground and could not get up. Patient is oriented to person place and time. Moving all extremities equally, no focal deficits. Will await further information from son. Chart does show patient was in [facility] for chest pain and recommended admission but patient did not want to stay.   We will plan on cardiac work-up unclear etiology

## 2021-06-01 NOTE — ED NOTES
Patient to ED via LS4 with c/o three falls today due to dizziness. Patient denies any pain or injury after her falls. The patient is placed in gown and on full cardiac monitor. Dr Bret Waller at bedside. The patient's son called the ED and reported that the patient was seen recently at [facility] and left AMA when admission was recommended.        Meri Stockton RN  06/01/21 1038

## 2021-06-01 NOTE — ED NOTES
The following labs were labeled with patient stickers & tubed to lab;    []Lavender   []On Ice  []Blue  [x]Green/ Yellow  []Green/ Black []On Ice  []Pink  []Red  []Yellow    []COVID-19 Swab []Rapid    []Urine Sample  []Pelvic Cultures    []Blood Cultures       Teja Morton RN  06/01/21 1047

## 2021-06-01 NOTE — FLOWSHEET NOTE
Pt brought over by transport/aid from ED to room 42 to board, report was never given by previous RN, pt connected to cardiac monitor at this time, pt denies questions at time and updated on plan of care, focused assessment complete.

## 2021-06-01 NOTE — ED NOTES
Blood sugar 83 g/dL. Patient given boxed lunch while waiting for meal tray.      Isabella Virgen RN  06/01/21 8015

## 2021-06-02 LAB
ANION GAP SERPL CALCULATED.3IONS-SCNC: 8 MMOL/L (ref 9–17)
BUN BLDV-MCNC: 14 MG/DL (ref 8–23)
BUN/CREAT BLD: ABNORMAL (ref 9–20)
CALCIUM SERPL-MCNC: 8.4 MG/DL (ref 8.6–10.4)
CHLORIDE BLD-SCNC: 110 MMOL/L (ref 98–107)
CO2: 25 MMOL/L (ref 20–31)
CREAT SERPL-MCNC: 0.97 MG/DL (ref 0.5–0.9)
EKG ATRIAL RATE: 113 BPM
EKG P AXIS: 44 DEGREES
EKG P-R INTERVAL: 112 MS
EKG Q-T INTERVAL: 354 MS
EKG QRS DURATION: 112 MS
EKG QTC CALCULATION (BAZETT): 485 MS
EKG R AXIS: -33 DEGREES
EKG T AXIS: -15 DEGREES
EKG VENTRICULAR RATE: 113 BPM
ESTIMATED AVERAGE GLUCOSE: 103 MG/DL
GFR AFRICAN AMERICAN: >60 ML/MIN
GFR NON-AFRICAN AMERICAN: 57 ML/MIN
GFR SERPL CREATININE-BSD FRML MDRD: ABNORMAL ML/MIN/{1.73_M2}
GFR SERPL CREATININE-BSD FRML MDRD: ABNORMAL ML/MIN/{1.73_M2}
GLUCOSE BLD-MCNC: 100 MG/DL (ref 65–105)
GLUCOSE BLD-MCNC: 102 MG/DL (ref 65–105)
GLUCOSE BLD-MCNC: 105 MG/DL (ref 70–99)
HBA1C MFR BLD: 5.2 % (ref 4–6)
MAGNESIUM: 2.2 MG/DL (ref 1.6–2.6)
POTASSIUM SERPL-SCNC: 3.9 MMOL/L (ref 3.7–5.3)
SODIUM BLD-SCNC: 143 MMOL/L (ref 135–144)
TROPONIN INTERP: ABNORMAL
TROPONIN T: ABNORMAL NG/ML
TROPONIN, HIGH SENSITIVITY: 17 NG/L (ref 0–14)

## 2021-06-02 PROCEDURE — 6370000000 HC RX 637 (ALT 250 FOR IP): Performed by: STUDENT IN AN ORGANIZED HEALTH CARE EDUCATION/TRAINING PROGRAM

## 2021-06-02 PROCEDURE — 80048 BASIC METABOLIC PNL TOTAL CA: CPT

## 2021-06-02 PROCEDURE — 97530 THERAPEUTIC ACTIVITIES: CPT

## 2021-06-02 PROCEDURE — 6360000002 HC RX W HCPCS: Performed by: EMERGENCY MEDICINE

## 2021-06-02 PROCEDURE — 97166 OT EVAL MOD COMPLEX 45 MIN: CPT

## 2021-06-02 PROCEDURE — G0378 HOSPITAL OBSERVATION PER HR: HCPCS

## 2021-06-02 PROCEDURE — 96365 THER/PROPH/DIAG IV INF INIT: CPT

## 2021-06-02 PROCEDURE — 97535 SELF CARE MNGMENT TRAINING: CPT

## 2021-06-02 PROCEDURE — 96372 THER/PROPH/DIAG INJ SC/IM: CPT

## 2021-06-02 PROCEDURE — 84484 ASSAY OF TROPONIN QUANT: CPT

## 2021-06-02 PROCEDURE — 99222 1ST HOSP IP/OBS MODERATE 55: CPT | Performed by: FAMILY MEDICINE

## 2021-06-02 PROCEDURE — 6360000002 HC RX W HCPCS: Performed by: STUDENT IN AN ORGANIZED HEALTH CARE EDUCATION/TRAINING PROGRAM

## 2021-06-02 PROCEDURE — 2580000003 HC RX 258: Performed by: EMERGENCY MEDICINE

## 2021-06-02 PROCEDURE — 36415 COLL VENOUS BLD VENIPUNCTURE: CPT

## 2021-06-02 PROCEDURE — 2580000003 HC RX 258: Performed by: STUDENT IN AN ORGANIZED HEALTH CARE EDUCATION/TRAINING PROGRAM

## 2021-06-02 PROCEDURE — 94640 AIRWAY INHALATION TREATMENT: CPT

## 2021-06-02 PROCEDURE — 83735 ASSAY OF MAGNESIUM: CPT

## 2021-06-02 PROCEDURE — 83036 HEMOGLOBIN GLYCOSYLATED A1C: CPT

## 2021-06-02 PROCEDURE — 93010 ELECTROCARDIOGRAM REPORT: CPT | Performed by: INTERNAL MEDICINE

## 2021-06-02 PROCEDURE — 97162 PT EVAL MOD COMPLEX 30 MIN: CPT

## 2021-06-02 PROCEDURE — 93005 ELECTROCARDIOGRAM TRACING: CPT | Performed by: STUDENT IN AN ORGANIZED HEALTH CARE EDUCATION/TRAINING PROGRAM

## 2021-06-02 PROCEDURE — 1200000000 HC SEMI PRIVATE

## 2021-06-02 PROCEDURE — 82947 ASSAY GLUCOSE BLOOD QUANT: CPT

## 2021-06-02 RX ORDER — 0.9 % SODIUM CHLORIDE 0.9 %
500 INTRAVENOUS SOLUTION INTRAVENOUS PRN
Status: DISCONTINUED | OUTPATIENT
Start: 2021-06-02 | End: 2021-06-03 | Stop reason: HOSPADM

## 2021-06-02 RX ORDER — NICOTINE POLACRILEX 4 MG
15 LOZENGE BUCCAL PRN
Status: DISCONTINUED | OUTPATIENT
Start: 2021-06-02 | End: 2021-06-03 | Stop reason: HOSPADM

## 2021-06-02 RX ORDER — OXYCODONE HYDROCHLORIDE AND ACETAMINOPHEN 5; 325 MG/1; MG/1
1 TABLET ORAL EVERY 8 HOURS PRN
Status: DISCONTINUED | OUTPATIENT
Start: 2021-06-02 | End: 2021-06-03 | Stop reason: HOSPADM

## 2021-06-02 RX ORDER — DEXTROSE MONOHYDRATE 50 MG/ML
100 INJECTION, SOLUTION INTRAVENOUS PRN
Status: DISCONTINUED | OUTPATIENT
Start: 2021-06-02 | End: 2021-06-03 | Stop reason: HOSPADM

## 2021-06-02 RX ORDER — DEXTROSE MONOHYDRATE 25 G/50ML
12.5 INJECTION, SOLUTION INTRAVENOUS PRN
Status: DISCONTINUED | OUTPATIENT
Start: 2021-06-02 | End: 2021-06-03 | Stop reason: HOSPADM

## 2021-06-02 RX ORDER — SENNA PLUS 8.6 MG/1
1 TABLET ORAL NIGHTLY
Status: DISCONTINUED | OUTPATIENT
Start: 2021-06-02 | End: 2021-06-03 | Stop reason: HOSPADM

## 2021-06-02 RX ORDER — OXYCODONE HYDROCHLORIDE AND ACETAMINOPHEN 5; 325 MG/1; MG/1
2 TABLET ORAL EVERY 8 HOURS PRN
Status: DISCONTINUED | OUTPATIENT
Start: 2021-06-02 | End: 2021-06-03 | Stop reason: HOSPADM

## 2021-06-02 RX ORDER — POLYETHYLENE GLYCOL 3350 17 G/17G
17 POWDER, FOR SOLUTION ORAL DAILY
Status: DISCONTINUED | OUTPATIENT
Start: 2021-06-02 | End: 2021-06-03 | Stop reason: HOSPADM

## 2021-06-02 RX ADMIN — OXYCODONE HYDROCHLORIDE AND ACETAMINOPHEN 2 TABLET: 5; 325 TABLET ORAL at 12:20

## 2021-06-02 RX ADMIN — TIOTROPIUM BROMIDE INHALATION SPRAY 1 PUFF: 3.12 SPRAY, METERED RESPIRATORY (INHALATION) at 09:51

## 2021-06-02 RX ADMIN — SENNOSIDES 8.6 MG: 8.6 TABLET, FILM COATED ORAL at 19:42

## 2021-06-02 RX ADMIN — CEFTRIAXONE SODIUM 1000 MG: 1 INJECTION, POWDER, FOR SOLUTION INTRAMUSCULAR; INTRAVENOUS at 09:37

## 2021-06-02 RX ADMIN — HALOPERIDOL 5 MG: 5 TABLET ORAL at 19:42

## 2021-06-02 RX ADMIN — SODIUM CHLORIDE: 9 INJECTION, SOLUTION INTRAVENOUS at 08:55

## 2021-06-02 RX ADMIN — LISINOPRIL 20 MG: 10 TABLET ORAL at 08:58

## 2021-06-02 RX ADMIN — TRIHEXYPHENIDYL HYDROCHLORIDE 2 MG: 2 TABLET ORAL at 19:42

## 2021-06-02 RX ADMIN — HALOPERIDOL 5 MG: 5 TABLET ORAL at 08:58

## 2021-06-02 RX ADMIN — TRAZODONE HYDROCHLORIDE 100 MG: 100 TABLET ORAL at 19:42

## 2021-06-02 RX ADMIN — ENOXAPARIN SODIUM 40 MG: 40 INJECTION, SOLUTION INTRAVENOUS; SUBCUTANEOUS at 08:58

## 2021-06-02 RX ADMIN — ATORVASTATIN CALCIUM 20 MG: 20 TABLET, FILM COATED ORAL at 08:58

## 2021-06-02 RX ADMIN — TRIHEXYPHENIDYL HYDROCHLORIDE 2 MG: 2 TABLET ORAL at 08:58

## 2021-06-02 RX ADMIN — OXYCODONE HYDROCHLORIDE AND ACETAMINOPHEN 1 TABLET: 5; 325 TABLET ORAL at 08:56

## 2021-06-02 RX ADMIN — OXYCODONE HYDROCHLORIDE AND ACETAMINOPHEN 1 TABLET: 5; 325 TABLET ORAL at 07:33

## 2021-06-02 RX ADMIN — OXYCODONE HYDROCHLORIDE AND ACETAMINOPHEN 2 TABLET: 5; 325 TABLET ORAL at 17:37

## 2021-06-02 ASSESSMENT — PAIN DESCRIPTION - LOCATION
LOCATION: KNEE
LOCATION: KNEE

## 2021-06-02 ASSESSMENT — PAIN DESCRIPTION - FREQUENCY
FREQUENCY: CONTINUOUS
FREQUENCY: CONTINUOUS

## 2021-06-02 ASSESSMENT — PAIN DESCRIPTION - PAIN TYPE
TYPE: ACUTE PAIN
TYPE: ACUTE PAIN

## 2021-06-02 ASSESSMENT — PAIN SCALES - GENERAL
PAINLEVEL_OUTOF10: 5
PAINLEVEL_OUTOF10: 8
PAINLEVEL_OUTOF10: 10

## 2021-06-02 ASSESSMENT — PAIN DESCRIPTION - ORIENTATION
ORIENTATION: LEFT
ORIENTATION: LEFT

## 2021-06-02 ASSESSMENT — PAIN - FUNCTIONAL ASSESSMENT: PAIN_FUNCTIONAL_ASSESSMENT: PREVENTS OR INTERFERES SOME ACTIVE ACTIVITIES AND ADLS

## 2021-06-02 ASSESSMENT — PAIN DESCRIPTION - DESCRIPTORS
DESCRIPTORS: ACHING
DESCRIPTORS: ACHING;DISCOMFORT;SORE;TENDER

## 2021-06-02 ASSESSMENT — PAIN DESCRIPTION - PROGRESSION: CLINICAL_PROGRESSION: NOT CHANGED

## 2021-06-02 ASSESSMENT — PAIN DESCRIPTION - ONSET: ONSET: ON-GOING

## 2021-06-02 NOTE — PROGRESS NOTES
Physical Therapy    Facility/Department: [address] MED SURG  Initial Assessment    NAME: Nydia Noyola  : 1950  MRN: [id number]    Date of Service: 2021   Nydia Nooyla is a 69 y.o. female who presents with complaints of falls and dizziness for the last couple days. Patient states she had a fall at home on her porch and says she did not hit her head. Patient currently complains of left leg discomfort with some increased weakness. Patient currently denies fever, chills, nausea, vomiting. Patient states he lives at home with her son and wants to return home. Discharge Recommendations:  Further therapy recommended at discharge. Pt would benefit from home or OP PT to improved gait/balance/independence. PT Equipment Recommendations  Equipment Needed: Yes  Mobility Devices: Purvi Christopher: Rolling    Assessment    Pt cooperative, but didn't want to ambulate in hallway--agreeable to ambulate in the room. Unsteady on her feet, better with rwalker. Recommend for home use. Body structures, Functions, Activity limitations: Decreased functional mobility ; Decreased endurance;Decreased balance; Increased pain;Decreased posture  Prognosis: Good  Decision Making: Medium Complexity  PT Education: Goals;PT Role;Plan of Care  REQUIRES PT FOLLOW UP: Yes  Activity Tolerance  Activity Tolerance: Patient Tolerated treatment well       Patient Diagnosis(es): The primary encounter diagnosis was Falls frequently. Diagnoses of Lightheaded, YOSEF (acute kidney injury) ([address] Hyperglycemia, and Urinary tract infection with hematuria, site unspecified were also pertinent to this visit.      has a past medical history of Arthritis, Bronchitis, Chronic obstructive pulmonary disease ([address] Colon polyps, Dental neglect, Depression, Environmental allergies, GERD (gastroesophageal reflux disease), Hyperlipidemia, Hypertension, Obesity, Onychomycosis, Poor historian, RBBB, Treadmill stress test negative for angina pectoris, and Wears glasses. has a past surgical history that includes Cholecystectomy; doppler echocardiography; Colonoscopy (6/2013); and Tubal ligation.     Restrictions  Restrictions/Precautions  Restrictions/Precautions: General Precautions, Fall Risk, Up as Tolerated  Required Braces or Orthoses?: No  Vision/Hearing  Vision: Within Functional Limits  Hearing: Within functional limits     Subjective  General  Patient assessed for rehabilitation services?: Yes  Response To Previous Treatment: Not applicable  Family / Caregiver Present: No  Follows Commands: Impaired (needed frequent visual and tactile cues to participate with MMT)  Pain Screening  Patient Currently in Pain: Yes  Pain Assessment  Pain Assessment: 0-10  Pain Level: 5  Patient's Stated Pain Goal: No pain  Pain Type: Acute pain  Pain Location: Knee  Pain Orientation: Left  Pain Descriptors: Aching  Pain Frequency: Continuous  Pain Onset: On-going  Clinical Progression: Not changed  Functional Pain Assessment: Prevents or interferes some active activities and ADLs  Vital Signs  Patient Currently in Pain: Yes  Pre Treatment Pain Screening  Intervention List: Patient able to continue with treatment    Orientation  Orientation  Overall Orientation Status: Impaired  Orientation Level: Oriented to place;Oriented to person;Disoriented to time;Oriented to situation  Social/Functional History  Social/Functional History  Lives With: Family (dtr and \"friend\")  Type of Home: House  Home Layout: One level, Laundry in basement (dtr and friend do laundry, per pt)  Home Access: Stairs to enter with rails  Entrance Stairs - Number of Steps: 2-4 (pt initially said there were 4 steps to enter, then changed it to 2)  Entrance Stairs - Rails: Right  Home Equipment: [address] Help From: Family, Friend(s)  ADL Assistance: Independent  Ambulation Assistance: Independent (normally uses straight cane)  Transfer Assistance: Independent      Objective Observation/Palpation  Posture: Fair    PROM RLE (degrees)  RLE PROM: WFL  PROM LLE (degrees)  LLE PROM: WFL  PROM RUE (degrees)  RUE PROM: WFL  PROM LUE (degrees)  LUE PROM: WFL  Strength RLE  Strength RLE: WFL  Strength LLE  Strength LLE: WFL  Strength RUE  Strength RUE: WFL  Strength LUE  Strength LUE: WFL  Tone RLE  RLE Tone: Normotonic  Tone LLE  LLE Tone: Normotonic  Motor Control  Gross Motor?: WFL  Sensation  Overall Sensation Status: WFL  Bed mobility  Rolling to Left: Independent  Supine to Sit: Independent  Scooting: Independent  Transfers  Sit to Stand: Independent  Stand to sit: Supervision  Bed to Chair: Supervision  Stand Pivot Transfers: Supervision  Ambulation  Ambulation?: Yes  More Ambulation?: Yes  Ambulation 1  Surface: level tile  Device: No Device  Assistance: Minimal assistance  Quality of Gait: ambulates with B knees \"stiff\", small/choppy steps  Gait Deviations: Slow Inez;Decreased step length;Decreased step height;Decreased arm swing;Decreased head and trunk rotation; Shuffles  Distance: 25'x1  Ambulation 2  Surface - 2: level tile  Device 2: Rolling Walker  Assistance 2: Stand by assistance  Gait Deviations: Slow Inez; Increased BETTINA; Decreased step length;Decreased step height  Distance: 25'x2  Stairs/Curb  Stairs?: No     Balance  Posture: Fair  Sitting - Static: Good  Sitting - Dynamic: Good  Standing - Static: Fair  Standing - Dynamic: Fair;-  Other exercises  Other exercises 1: A/AAROM BLEs supine: heel slides, SAQ x 10; AROM: ankle pumps x 10  Other exercises 2: seated LE ex, AROM: LAQ and KTC; verbal and visual cues to perform correctly     Plan   Plan  Times per week: 5-6 visits weekly  Times per day: Daily  Current Treatment Recommendations: Strengthening, ROM, Balance Training, Functional Mobility Training, Transfer Training, Endurance Training, Gait Training, Stair training, Cognitive Reorientation, Pain Management, Home Exercise Program, Safety Education & Training,

## 2021-06-02 NOTE — CARE COORDINATION
Case Management Initial Discharge Plan  Halina Bass,             Met with:patient to discuss discharge plans. Information verified: address, contacts, phone number, , insurance Yes    Emergency Contact/Next of Kin name & number: Mariella N[place]otte, ej [phone]    PCP: Angelica Sánchez MD  Date of last visit: 1 year ago     Insurance Provider: Edy     Discharge Planning    Living Arrangements:  Family Members, Friends   Support Systems:  Family Members, Friends/Neighbors    Home has 1 stories  2 stairs to climb to get into front door, na stairs to climb to reach second floor  Location of bedroom/bathroom in home main level     Patient able to perform ADL's:Independent. Son assists with IADLs     Current Services (outpatient & in home) None   DME equipment: cane   DME provider: na     Receiving oral anticoagulation therapy? No    If indicated:   Physician managing anticoagulation treatment: na   Where does patient obtain lab work for ATC treatment? Na        Potential Assistance Needed:  Durable Medical Equipment    Patient agreeable to home care: Yes  Freedom of choice provided:  Patient is agreeable to use [facility]. Jacquelyn Hughes is notified, accepts the patient and states that [facility] Juliet will provide nursing care. Prior SNF/Rehab Placement and Facility: none   Agreeable to SNF/Rehab: No  [place] of choice provided: n/a     Evaluation: no    Expected Discharge date:       Patient expects to be discharged to:  Home with Home PT and a walker  Follow Up Appointment: Best Day/ Time:      Transportation provider: medical cab  Transportation arrangements needed for discharge: Yes    Readmission Risk              Risk of Unplanned Readmission:  0             Does patient have a readmission risk score greater than 14?: No  If yes, follow-up appointment must be made within 7 days of discharge. Goals of Care: to get taken care of       Discharge Plan: Home with [facility]. And la nena from [facility]. Fax DC to [facility] at [phone] and Arnold at .           Electronically signed by Nithya Corley RN on 6/2/21 at 1:15 PM EDT

## 2021-06-02 NOTE — PROGRESS NOTES
[address]  CDU / OBSERVATION ENCOUNTER  ATTENDING NOTE       I performed a history and physical examination of the patient and discussed management with the resident or midlevel provider. I reviewed the resident or midlevel provider's note and agree with the documented findings and plan of care. Any areas of disagreement are noted on the chart. I was personally present for the key portions of any procedures. I have documented in the chart those procedures where I was not present during the key portions. I have reviewed the nurses notes. I agree with the chief complaint, past medical history, past surgical history, allergies, medications, social and family history as documented unless otherwise noted below. The Family history, social history, and ROS are effectively unchanged since admission unless noted elsewhere in the chart. Patient with significant arthritis in both knees. Patient with mechanical fall. Concerns over home safety. Admitted for PT and OT evaluation. Patient seemed confused providers later in the day and patient does have a urinary tract infection. Patient requiring IV antibiotics and reassessment. Patient cleared on safety for home. Patient typically cared for by [facility]. Will discuss with primary team assuming care in order to reassess patient over the next 24 hours of IV fluids, antibiotics, and physical therapy.     Francisco Juarez MD  Attending Emergency  Physician

## 2021-06-02 NOTE — H&P
[facility]  CDU / [facility] NOTE     Pt Name: Danelle Jacobo  MRN: [id number]  [place] 1950  Date of evaluation: 6/1/21  Patient's PCP is :  Davon Servin MD    [address] [place] [address]       Chief Complaint   Patient presents with    Dizziness    Fall         HISTORY OF PRESENT ILLNESS    Danelle Jacobo is a 69 y.o. female who presents with complaints of falls and dizziness for the last couple days. Patient states she had a fall at home on her porch and says she did not hit her head. Patient currently complains of left leg discomfort with some increased weakness. Patient currently denies fever, chills, nausea, vomiting. Patient states he lives at home with her son and wants to return home. Location/Symptom: Generalized weakness involved  Timing/Onset: 2 to 3 days  Provocation: Unknown  Quality: n/a  Radiation: n/a  Severity: n/a  Timing/Duration: Intermittent  Modifying Factors: n/a    REVIEW OF SYSTEMS       Review of Systems   Constitutional: Negative for appetite change, diaphoresis and fever. HENT: Negative for trouble swallowing. Eyes: Negative for visual disturbance. Respiratory: Negative for shortness of breath. Cardiovascular: Negative for chest pain. Gastrointestinal: Negative for abdominal pain, nausea and vomiting. Genitourinary: Negative for difficulty urinating. Musculoskeletal: Positive for arthralgias and myalgias. Neurological: Positive for dizziness and weakness. Psychiatric/Behavioral: Negative for agitation and behavioral problems. (PQRS) Advance directives on face sheet per hospital policy.  No change unless specifically mentioned in chart    PAST MEDICAL HISTORY    has a past medical history of Arthritis, Bronchitis, Chronic obstructive pulmonary disease ([address] Colon polyps, Dental neglect, Depression, Environmental allergies, GERD (gastroesophageal reflux disease), Hyperlipidemia, Hypertension, Obesity, Onychomycosis, evaluation lightheadedness Acuity: Acute Type of Exam: Initial FINDINGS: The lungs are without acute focal process. There is no effusion or pneumothorax. The cardiomediastinal silhouette is stable. The osseous structures are stable. No acute process. Stable compared to prior study     XR KNEE LEFT (3 VIEWS)    Result Date: 6/1/2021  EXAMINATION: THREE XRAY VIEWS OF THE LEFT KNEE 6/1/2021 11:15 am COMPARISON: None. HISTORY: ORDERING SYSTEM PROVIDED HISTORY: pain fall TECHNOLOGIST PROVIDED HISTORY: pain fall FINDINGS: There is no evidence of fracture, malalignment, or other acute osseous abnormality. There is severe tricompartmental degenerative changes. There is a moderate to large suprapatellar joint effusion. There is significant medial soft tissue swelling. No acute osseous abnormality. XR KNEE RIGHT (3 VIEWS)    Result Date: 6/1/2021  EXAMINATION: THREE XRAY VIEWS OF THE RIGHT KNEE 6/1/2021 11:15 am COMPARISON: 04/08/2013 HISTORY: ORDERING SYSTEM PROVIDED HISTORY: pain fall TECHNOLOGIST PROVIDED HISTORY: pain fall Reason for Exam: painl fall Acuity: Acute Type of Exam: Initial FINDINGS: Tricompartmental osteoarthropathy is noted with mild joint space narrowing and marginal hypertrophic changes. This predominates in the patellofemoral compartment. No significant joint effusion. No acute osseous abnormality identified. No acute osseous abnormality identified. LABS:  I have reviewed and interpreted all available lab results.   Labs Reviewed   CBC WITH AUTO DIFFERENTIAL - Abnormal; Notable for the following components:       Result Value    WBC 11.9 (*)     Seg Neutrophils 85 (*)     Lymphocytes 10 (*)     Eosinophils % 0 (*)     Immature Granulocytes 1 (*)     Segs Absolute 10.07 (*)     All other components within normal limits   COMPREHENSIVE METABOLIC PANEL - Abnormal; Notable for the following components:    Glucose 331 (*)     CREATININE 1.14 (*)     CO2 19 (*)     Anion Gap 18 (*) Alkaline Phosphatase 118 (*)     ALT 50 (*)     Total Protein 6.3 (*)     GFR Non- 47 (*)     GFR  57 (*)     All other components within normal limits   TROPONIN - Abnormal; Notable for the following components:    Troponin, High Sensitivity 16 (*)     All other components within normal limits   URINALYSIS WITH MICROSCOPIC - Abnormal; Notable for the following components:    Turbidity UA CLOUDY (*)     Glucose, Ur 3+ (*)     Ketones, Urine TRACE (*)     Urine Hgb TRACE (*)     Protein, UA 1+ (*)     Nitrite, Urine POSITIVE (*)     Leukocyte Esterase, Urine MODERATE (*)     Bacteria, UA MANY (*)     Mucus, UA 2+ (*)     All other components within normal limits   TROPONIN - Abnormal; Notable for the following components:    Troponin, High Sensitivity 18 (*)     All other components within normal limits   MYOGLOBIN, SERUM - Abnormal; Notable for the following components:    Myoglobin 92 (*)     All other components within normal limits   COVID-19, RAPID   CULTURE, URINE   MAGNESIUM   CK   POC GLUCOSE FINGERSTICK       SCREENING TOOLS:    HEART Risk Score for Chest Pain Patients   History and Physical Exam Suspicion Level  (Nausea, Vomiting, Diaphoresis, Radiation, Exertion)   Slightly Suspicious (0 pts)   Moderately Suspicious (1 pt)   Highly Suspicious (2 pts)   EKG Interpretation   Normal (0 pts)   Non-Specific Repolarization Disturbance (1 pt)   Significant ST-Depression (2 pts)   Age of Patient (in years)   = 39 (0 pts)   46-64 (1 pt)   = 65 (2 pts)   Risk Factors   No Risk Factors (0 pts)   1-2 Risk Factors (1 pt)   = 3 Risk Factors (2 pts)   Risk Factors Include:   Hypercholesterolemia   Hypertension   Diabetes Mellitus   Cigarette smoking   Positive family history   Obesity   CAD   (SLE, CKDz, HIV, Cocaine abuse)   Troponin Levels   = Normal Limit (0 pts)   1-3 Times Normal Limit (1 pt)   > 3 Times Normal Limit (2 pts)  TOTAL:    Percent Risk for Major Adverse Cardiac Event (MACE)  0-3 pts indicates low risk for MACE   2.5% (DISCHARGE)   4-7 pts indicates moderate risk for MACE  20.3% (OBS)  8-10 pts indicates high risk for MACE  72.7% (EARLY INVASIVE [place])    CDU IMPRESSION / PLAN      Ira Saldivar is a 69 y.o. female who presents with complaints of generalized weakness and increased falls at home. Patient states she fell on her porch approximately 2 days ago. Patient has complaints of left leg pain. Given clinical presentation will admit for further observation and evaluation by physical and Occupational Therapy    1. PT /OT to evaluate  · Symptom management  · Continue home medications and pain control  · Monitor vitals, labs, and imaging  · DISPO: pending consults and clinical improvement    CONSULTS:    None    PROCEDURES:  Not indicated       PATIENT REFERRED TO:    No follow-up provider specified. --  My Supervising Physician for today is Dr. Katerin Farrell  We discussed and agreed upon a treatment plan  Lencho Cummings, [address] [place] [address]   Emergency Medicine Resident     This dictation was generated by voice recognition computer software. Although all attempts are made to edit the dictation for accuracy, there may be errors in the transcription that are not intended. Detail Level: Detailed Quality 130: Documentation Of Current Medications In The Medical Record: Current Medications Documented Quality 431: Preventive Care And Screening: Unhealthy Alcohol Use - Screening: Patient screened for unhealthy alcohol use using a single question and scores less than 2 times per year Quality 226: Preventive Care And Screening: Tobacco Use: Screening And Cessation Intervention: Patient screened for tobacco use and is an ex/non-smoker

## 2021-06-02 NOTE — PROGRESS NOTES
OBS/CDU   RESIDENT NOTE    Patients PCP is:  Sandee Singh MD    SUBJECTIVE    Patient reports doing well, reports no complaints overnight. Patient noted to have vital signs that are stable. Patient reports that she still gets a little lightheaded when going up to the bathroom. Patient denies any fever, chills, nausea, vomiting, chest pain, shortness of breath, change in urination or bowel habits. Patient also reports no abdominal pain. PHYSICAL EXAM      General: NAD, A&Ox3  Heent: EOMI, PERRLA  Neck: Supple, No JVD  Cardiovascular: RRR, Normal S1/S2  Pulmonary: CTA-B, No SOB  Abdomen: Soft, nontender, nondistended, +bowel sounds  Extremities: +2/4 distal pulses, No swelling  Neuro/Psych: No numbness or tingling, mentating at baseline    PERTINENT TEST /EXAMS      I have reviewed all available laboratory results. MEDICATIONS CURRENT     albuterol sulfate  (90 Base) MCG/ACT inhaler 2 puff, Q6H PRN  atorvastatin (LIPITOR) tablet 20 mg, Daily  haloperidol (HALDOL) tablet 5 mg, BID  lisinopril (PRINIVIL;ZESTRIL) tablet 20 mg, Daily  tiotropium (SPIRIVA RESPIMAT) 2.5 MCG/ACT inhaler 1 puff, Daily  traZODone (DESYREL) tablet 100 mg, Nightly  trihexyphenidyl (ARTANE) tablet 2 mg, BID  sodium chloride flush 0.9 % injection 5-40 mL, 2 times per day  sodium chloride flush 0.9 % injection 5-40 mL, PRN  0.9 % sodium chloride infusion, PRN  enoxaparin (LOVENOX) injection 40 mg, Daily  acetaminophen (TYLENOL) tablet 650 mg, Q4H PRN  0.9 % sodium chloride infusion, Continuous  oxyCODONE-acetaminophen (PERCOCET) 5-325 MG per tablet 1 tablet, Q4H PRN   Or  oxyCODONE-acetaminophen (PERCOCET) 5-325 MG per tablet 2 tablet, Q4H PRN  fentaNYL (SUBLIMAZE) injection 25 mcg, Q1H PRN   Or  fentaNYL (SUBLIMAZE) injection 50 mcg, Q1H PRN  ondansetron (ZOFRAN) injection 4 mg, Q8H PRN        All medication charted and reviewed.     CONSULTS      None    ASSESSMENT/PLAN       Marcus Loomis is a 69 y.o. female who presents with    1. Acute onset of frequent falls with weakness. · Plan for PT OT evaluation. · Obtain repeat troponin orthostatic pressures with frequent falls. · Continue home medications and pain control  · Monitor vitals, labs, and imaging  · DISPO: pending consults and clinical improvement    --  Sharee Parr, DO   Emergency Medicine Resident Physician, PGY-1    This dictation was generated by voice recognition computer software. Although all attempts are made to edit the dictation for accuracy, there may be errors in the transcription that are not intended.

## 2021-06-02 NOTE — ED NOTES
Report given to [facility]     Ean Coppola RN  06/01/21 2056

## 2021-06-02 NOTE — PROGRESS NOTES
Occupational Therapy   Occupational Therapy Initial Assessment  Date: 2021   Patient Name: Lloyd Rodgers  MRN: [id number]     : 1950    Date of Service: 2021  Chief Complaint   Patient presents with    Dizziness    Fall       Discharge Recommendations: Pt confused, unsure of true baseline, L knee swollen, CTA. Equipment recommendations listed below are based on what the patient would need if they were able to return to prior living arrangements at the time of discharge. Patient would benefit from continued therapy after discharge     OT Equipment Recommendations  Equipment Needed: Yes  Mobility Devices: ADL Assistive Devices  ADL Assistive Devices: Transfer Tub Bench; Toileting - Raised Toilet Seat with arms;Grab Bars - shower    Assessment   Performance deficits / Impairments: Decreased functional mobility ; Decreased balance;Decreased safe awareness;Decreased cognition;Decreased ADL status; Decreased endurance;Decreased high-level IADLs  Prognosis: Good  Decision Making: Medium Complexity  OT Education: OT Role;Plan of Care;Transfer Training  Patient Education: Mary Claudio return-pt with some confusion  REQUIRES OT FOLLOW UP: Yes  Activity Tolerance  Activity Tolerance: Treatment limited secondary to decreased cognition;Patient limited by pain; Patient limited by fatigue  Safety Devices  Safety Devices in place: Yes  Type of devices: All fall risk precautions in place;Call light within reach;Gait belt;Nurse notified; Chair alarm in place; Left in chair  Restraints  Initially in place: No         Patient Diagnosis(es): The primary encounter diagnosis was Falls frequently. Diagnoses of Lightheaded, YOSEF (acute kidney injury) ([address] Hyperglycemia, and Urinary tract infection with hematuria, site unspecified were also pertinent to this visit.      has a past medical history of Arthritis, Bronchitis, Chronic obstructive pulmonary disease ([address] Colon polyps, Dental neglect, Depression, Environmental allergies, GERD (gastroesophageal reflux disease), Hyperlipidemia, Hypertension, Obesity, Onychomycosis, Poor historian, RBBB, Treadmill stress test negative for angina pectoris, and Wears glasses. has a past surgical history that includes Cholecystectomy; doppler echocardiography; Colonoscopy (2013); and Tubal ligation. Restrictions  Restrictions/Precautions  Restrictions/Precautions: General Precautions, Fall Risk  Required Braces or Orthoses?: No  Position Activity Restriction  Other position/activity restrictions: up with A    Subjective   General  Patient assessed for rehabilitation services?: Yes  Family / Caregiver Present: No  Diagnosis: Dizziness, falls  Patient Currently in Pain: Yes  Pain Assessment  Pain Assessment: 0-10  Pain Level: 5  Pain Type: Acute pain  Pain Location: Knee  Pain Orientation: Left  Pain Descriptors: Aching;Discomfort;Sore;Tender  Pain Frequency: Continuous  Non-Pharmaceutical Pain Intervention(s): Ambulation/Increased Activity; Distraction; Therapeutic touch; Emotional support  Response to Pain Intervention: Patient Satisfied  Vital Signs  Blood Pressure Lyin/43  Blood Pressure Sittin/38  Blood Pressure Standin/34  Patient Currently in Pain: Yes  Social/Functional History  Social/Functional History  Lives With: Son, Friend(s)  Type of Home: House  Home Layout: One level  Home Access: Stairs to enter with rails  Entrance Stairs - Number of Steps: 2  Entrance Stairs - Rails: Right  Bathroom Shower/Tub: Tub/Shower unit  Bathroom Toilet: Standard  Bathroom Accessibility: Accessible  Home Equipment: Cane (Pt asked \"Why do you need to use a cane? 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, dog     Objective   Vision: Impaired  Vision Exceptions: Wears glasses at all times  Hearing: Within functional limits    Orientation  Overall Orientation Status: Within Functional Limits  Observation/Palpation  Posture: Fair  Balance  Sitting Balance: Modified independent   Standing Balance: Supervision  Standing Balance  Time: 13 min  Activity: Pt stood bedside, sinkside, func mob to/from bathroom  Functional Mobility  Functional - Mobility Device: Rolling Walker  Activity: To/from bathroom  Assist Level: Stand by assistance  Functional Mobility Comments: DIzziness during func mob however no dizziness when sitting up/supine/standing, orthostatic BP supine (101/43), sitting (96/38), and standing (113/34), no major LOB noted but pt struggled to use RW properly-especially when turning RW  ADL  Feeding: Modified independent ; Increased time to complete  Grooming: Stand by assistance; Increased time to complete  UE Bathing: Stand by assistance; Increased time to complete  LE Bathing: Contact guard assistance; Increased time to complete  UE Dressing: Contact guard assistance; Increased time to complete  LE Dressing: Minimal assistance; Increased time to complete  Toileting: Minimal assistance; Increased time to complete  Additional Comments: Pt confused for most of session, dizzy during func mob only, pt stood sinkside for oral care activity, pt dipped toothbrush into a cup of water instead of simply turning sink water on, pt stood for hair care task sinkside-no major LOB noted during dynamic standing tasks, pt doffed/donned sock sitting EOB this date  Tone RUE  RUE Tone: Normotonic  Tone LUE  LUE Tone: Normotonic  Coordination  Movements Are Fluid And Coordinated: No  Coordination and Movement description: Decreased speed     Bed mobility  Supine to Sit: Minimal assistance (Pt slow to initiate movement to EOB after being asked to sit EOB, pt relied on tactile cues from writer and Hand-held assist)  Sit to Supine: Unable to assess  Scooting: Independent  Transfers  Sit to stand: Contact guard assistance  Stand to sit: Contact guard assistance     Cognition  Overall Cognitive Status: Exceptions  Arousal/Alertness: Delayed responses to stimuli;Inconsistent responses to stimuli  Following Commands: Follows multistep commands with increased time; Follows multistep commands with repitition  Attention Span: Difficulty attending to directions; Attends with cues to redirect  Memory: Decreased recall of precautions;Decreased short term memory;Decreased recall of recent events  Problem Solving: Assistance required to generate solutions  Insights: Decreased awareness of deficits  Initiation: Requires cues for some  Sequencing: Requires cues for some  Cognition Comment: Pt presents as if pt has chronic cognitive deficits, pt answering questions about home shower and then asked \"Is your toilet standard height or tall? and pt responds \"I have a shower\", pt has a accent and when asked \"do you speak any other languages than english? \" pt replies \"No\".  Poor safety awareness, cooperative with therapy this date        Sensation  Overall Sensation Status: WFL        LUE AROM (degrees)  LUE AROM : WFL  RUE AROM (degrees)  RUE AROM : WFL  LUE Strength  Gross LUE Strength: WFL  L Shoulder Flex: 4+/5  L Elbow Flex: 5/5  L Elbow Ext: 5/5  L Hand General: 5/5  RUE Strength  Gross RUE Strength: WFL  R Shoulder Flex: 4+/5  R Elbow Flex: 5/5  R Elbow Ext: 5/5  R Hand General: 5/5         Plan   Plan  Times per week: 3-5x  Current Treatment Recommendations: Balance Training, Functional Mobility Training, Patient/Caregiver Education & Training, Equipment Evaluation, Education, & procurement, Home Management Training, Self-Care / ADL, Pain Management, Safety Education & Training    AM-PAC Score        AM-PAC Inpatient Daily Activity Raw Score: 18 (06/02/21 1537)  AM-PAC Inpatient ADL

## 2021-06-02 NOTE — PROGRESS NOTES
This is a 76-year-old female with a history of blood pressure, cognitive impairment, mild intermittent asthma came to the ED after having a fall. Patient is a poor historian, per chart review patient had a fall at home but she stated that she did not hit the head. Patient is unable to specify other details but said that her left knee hurt. Patient came to the ED initial work-up showed WBC: 11.9, CMP showed glucose 331, creatinine 1.14, troponin 16--->18---> 17 (T), myoglobin: 92. Urinalysis shows glucose +3, trace ketones, urine hemoglobin trace, Nitrate positive with moderate leukocyte Estrace, urine culture is growing lactose fermenting gram-negative rods greater than 100,000. X-ray, right knee x-ray and left knee x-ray were unremarkable for any acute fracture. Patient was initially admitted to the hobs unit started on fluids and Rocephin. Patient was transferred from observation to family medicine inpatient service. Writer and senior resident saw the patient bedside, patient was alert and oriented. Patient denies any symptoms of headache, shortness of breath, chest pain but states that she is on and off abdominal pain. Patient also endorses constipation. Physical exam is unremarkable. Recent change in mentation might be secondary to UTI. We will continue to observe and order other appropriate labs.       Shagufta Koroma  PGY-1  Family Medicine     6/2/2021  4:52 PM

## 2021-06-02 NOTE — PROGRESS NOTES
tolerated     Activity:  As tolerated    Consultants: None    Procedures:      Diagnostic Test:   Results for orders placed or performed during the hospital encounter of 06/01/21   Culture, Urine    Specimen: Urine, clean catch   Result Value Ref Range    Specimen Description . CLEAN CATCH URINE     Special Requests NOT REPORTED     Culture (A)      LACTOSE FERMENTING GRAM NEGATIVE RODS >161051 CFU/ML   COVID-19, Rapid    Specimen: Nasopharyngeal Swab   Result Value Ref Range    Specimen Description . NASOPHARYNGEAL SWAB     SARS-CoV-2, Rapid Not Detected Not Detected   CBC WITH AUTO DIFFERENTIAL   Result Value Ref Range    WBC 11.9 (H) 3.5 - 11.3 k/uL    RBC 4.31 3.95 - 5.11 m/uL    Hemoglobin 13.4 11.9 - 15.1 g/dL    Hematocrit 44.0 36.3 - 47.1 %    .1 82.6 - 102.9 fL    MCH 31.1 25.2 - 33.5 pg    MCHC 30.5 28.4 - 34.8 g/dL    RDW 12.5 11.8 - 14.4 %    Platelets 081 507 - 197 k/uL    MPV 9.5 8.1 - 13.5 fL    NRBC Automated 0.0 0.0 per 100 WBC    Differential Type NOT REPORTED     Seg Neutrophils 85 (H) 36 - 65 %    Lymphocytes 10 (L) 24 - 43 %    Monocytes 4 3 - 12 %    Eosinophils % 0 (L) 1 - 4 %    Basophils 0 0 - 2 %    Immature Granulocytes 1 (H) 0 %    Segs Absolute 10.07 (H) 1.50 - 8.10 k/uL    Absolute Lymph # 1.18 1.10 - 3.70 k/uL    Absolute Mono # 0.51 0.10 - 1.20 k/uL    Absolute Eos # <0.03 0.00 - 0.44 k/uL    Basophils Absolute <0.03 0.00 - 0.20 k/uL    Absolute Immature Granulocyte 0.07 0.00 - 0.30 k/uL    WBC Morphology NOT REPORTED     RBC Morphology NOT REPORTED     Platelet Estimate NOT REPORTED    Comprehensive Metabolic Panel   Result Value Ref Range    Glucose 331 (H) 70 - 99 mg/dL    BUN 13 8 - 23 mg/dL    CREATININE 1.14 (H) 0.50 - 0.90 mg/dL    Bun/Cre Ratio NOT REPORTED 9 - 20    Calcium 9.2 8.6 - 10.4 mg/dL    Sodium 137 135 - 144 mmol/L    Potassium 4.2 3.7 - 5.3 mmol/L    Chloride 100 98 - 107 mmol/L    CO2 19 (L) 20 - 31 mmol/L    Anion Gap 18 (H) 9 - 17 mmol/L    Alkaline Phosphatase 118 (H) 35 - 104 U/L    ALT 50 (H) 5 - 33 U/L    AST 28 <32 U/L    Total Bilirubin 0.31 0.3 - 1.2 mg/dL    Total Protein 6.3 (L) 6.4 - 8.3 g/dL    Albumin 3.7 3.5 - 5.2 g/dL    Albumin/Globulin Ratio 1.4 1.0 - 2.5    GFR Non- 47 (L) >60 mL/min    GFR  57 (L) >60 mL/min    GFR Comment          GFR Staging NOT REPORTED    Troponin   Result Value Ref Range    Troponin, High Sensitivity 16 (H) 0 - 14 ng/L    Troponin T NOT REPORTED <0.03 ng/mL    Troponin Interp NOT REPORTED    Urinalysis with Microscopic   Result Value Ref Range    Color, UA YELLOW YELLOW    Turbidity UA CLOUDY (A) CLEAR    Glucose, Ur 3+ (A) NEGATIVE    Bilirubin Urine NEGATIVE NEGATIVE    Ketones, Urine TRACE (A) NEGATIVE    Specific Gravity, UA 1.022 1.005 - 1.030    Urine Hgb TRACE (A) NEGATIVE    pH, UA 5.5 5.0 - 8.0    Protein, UA 1+ (A) NEGATIVE    Urobilinogen, Urine Normal Normal    Nitrite, Urine POSITIVE (A) NEGATIVE    Leukocyte Esterase, Urine MODERATE (A) NEGATIVE    -          WBC, UA 20 TO 50 0 - 5 /HPF    RBC, UA 0 TO 2 0 - 2 /HPF    Casts UA HYALINE 0 - 2 /LPF    Casts UA 2 TO 5 0 - 2 /LPF    Crystals, UA NOT REPORTED None /HPF    Epithelial Cells UA 2 TO 5 0 - 5 /HPF    Renal Epithelial, UA NOT REPORTED 0 /HPF    Bacteria, UA MANY (A) None    Mucus, UA 2+ (A) None    Trichomonas, UA NOT REPORTED None    Amorphous, UA NOT REPORTED None    Other Observations UA NOT REPORTED NOT REQ.     Yeast, UA NOT REPORTED None   Magnesium   Result Value Ref Range    Magnesium 1.9 1.6 - 2.6 mg/dL   Troponin   Result Value Ref Range    Troponin, High Sensitivity 18 (H) 0 - 14 ng/L    Troponin T NOT REPORTED <0.03 ng/mL    Troponin Interp NOT REPORTED    CK   Result Value Ref Range    Total CK 54 26 - 192 U/L   Myoglobin, Serum   Result Value Ref Range    Myoglobin 92 (H) 25 - 58 ng/mL   Hemoglobin A1c   Result Value Ref Range    Hemoglobin A1C 5.2 4.0 - 6.0 %    Estimated Avg Glucose 103 mg/dL   Troponin Result Value Ref Range    Troponin, High Sensitivity 17 (H) 0 - 14 ng/L    Troponin T NOT REPORTED <0.03 ng/mL    Troponin Interp NOT REPORTED    POC Glucose Fingerstick   Result Value Ref Range    POC Glucose 83 65 - 105 mg/dL   POC Glucose Fingerstick   Result Value Ref Range    POC Glucose 102 65 - 105 mg/dL   EKG 12 Lead   Result Value Ref Range    Ventricular Rate 113 BPM    Atrial Rate 113 BPM    P-R Interval 112 ms    QRS Duration 112 ms    Q-T Interval 354 ms    QTc Calculation (Bazett) 485 ms    P Axis 44 degrees    R Axis -33 degrees    T Axis -15 degrees     XR CHEST (2 VW)    Result Date: 6/1/2021  EXAMINATION: TWO XRAY VIEWS OF THE CHEST 6/1/2021 10:38 am COMPARISON: May 25, 2021 HISTORY: ORDERING SYSTEM PROVIDED HISTORY: evaluation lightheadedness TECHNOLOGIST PROVIDED HISTORY: evaluation lightheadedness Reason for Exam: evaluation lightheadedness Acuity: Acute Type of Exam: Initial FINDINGS: The lungs are without acute focal process. There is no effusion or pneumothorax. The cardiomediastinal silhouette is stable. The osseous structures are stable. No acute process. Stable compared to prior study     XR KNEE LEFT (3 VIEWS)    Result Date: 6/1/2021  EXAMINATION: THREE XRAY VIEWS OF THE LEFT KNEE 6/1/2021 11:15 am COMPARISON: None. HISTORY: ORDERING SYSTEM PROVIDED HISTORY: pain fall TECHNOLOGIST PROVIDED HISTORY: pain fall FINDINGS: There is no evidence of fracture, malalignment, or other acute osseous abnormality. There is severe tricompartmental degenerative changes. There is a moderate to large suprapatellar joint effusion. There is significant medial soft tissue swelling. No acute osseous abnormality.      XR KNEE RIGHT (3 VIEWS)    Result Date: 6/1/2021  EXAMINATION: THREE XRAY VIEWS OF THE RIGHT KNEE 6/1/2021 11:15 am COMPARISON: 04/08/2013 HISTORY: ORDERING SYSTEM PROVIDED HISTORY: pain fall TECHNOLOGIST PROVIDED HISTORY: pain fall Reason for Exam: painl fall Acuity: Acute Type of Exam: Initial FINDINGS: Tricompartmental osteoarthropathy is noted with mild joint space narrowing and marginal hypertrophic changes. This predominates in the patellofemoral compartment. No significant joint effusion. No acute osseous abnormality identified. No acute osseous abnormality identified. Physical Exam:    General appearance - NAD, AOx 3 unclear of baseline. Lungs -CTAB, no R/R/R  Heart - RRR, no M/R/G  Abdomen - Soft, NT/ND  Neurological:  MAEx4, No focal motor deficit, sensory loss  Extremities - Cap refil <2 sec in all ext., no edema  Skin -warm, dry      Hospital Course:  Clinical course has improved, labs and imaging reviewed. Nelida Dunn originally presented to the hospital on 6/1/2021 10:20 AM with fall. At that time it was determined that She required further observation and Patient with significant arthritis in both knees. Patient with mechanical fall. Concerns over home safety. Admitted for PT and OT evaluation. Patient seemed confused providers later in the day and patient does have a urinary tract infection. Patient requiring IV antibiotics and reassessment. Patient cleared on safety for home. Patient typically cared for by [facility]. Will discuss with primary team assuming care in order to reassess patient over the next 24 hours of IV fluids, antibiotics, and physical therapy. .     Pt discussed directly with the admitting team    Disposition: Transfer  Condition: Good    Time Spent: 0 day      --  Veena Greenberg MD  Emergency Medicine Attending Physician    This dictation was generated by voice recognition computer software. Although all attempts are made to edit the dictation for accuracy, there may be errors in the transcription that are not intended.

## 2021-06-02 NOTE — DISCHARGE INSTR - COC
Continuity of Care Form    Patient Name: Anise Fothergill   :  1950  MRN:  [id number]    Admit date:  2021  Discharge date:  ***    Code Status Order: Full Code   Advance Directives:   Advance Care Flowsheet Documentation       Date/Time Healthcare Directive Type of Healthcare Directive Copy in [address] Agent's Name Healthcare Agent's Phone Number    21 [phone]  No, patient does not have an advance directive for healthcare treatment -- -- -- -- --            Admitting Physician:  Francois Daniel MD  PCP: Swetha Orosco MD    Discharging Nurse: [facility] Unit/Room#: 1570/5236-60  Discharging Unit Phone Number: ***    Emergency Contact:   Extended Emergency Contact Information  Primary Emergency Contact: Bharati Davin  Address: [place] Phone: [phone]  Relation: Child  Secondary Emergency Contact: [address] [place] [address],  Lexii Sandoval  Phone: [phone]  Relation: Child    Past Surgical History:  Past Surgical History:   Procedure Laterality Date    CHOLECYSTECTOMY      COLONOSCOPY  2013    one hyperplastic polyp    DOPPLER ECHOCARDIOGRAPHY      cardiac    TUBAL LIGATION         Immunization History:   Immunization History   Administered Date(s) Administered    Influenza 2012, 10/09/2013    Influenza Virus Vaccine 2014, 10/06/2015    Influenza, High Dose (Fluzone 65 yrs and older) 11/15/2011    Influenza, Quadv, IM, (6 mo and older Fluzone, Flulaval, Fluarix and 3 yrs and older Afluria) 2016, 2017    Influenza, Quadv, IM, PF (6 mo and older Fluzone, Flulaval, Fluarix, and 3 yrs and older Afluria) 2019    Influenza, Triv, inactivated, subunit, adjuvanted, IM (Fluad 65 yrs and older) 10/11/2018    Pneumococcal Conjugate 13-valent ([id number]) 2016    Pneumococcal Polysaccharide ([id number]) 2013, 2018    Tdap (Boostrix, Adacel) 2016       Active Problems:  Patient Active Problem List Diagnosis Code    GERD (gastroesophageal reflux disease) K21.9    Hyperlipidemia E78.5    Depression F32.9    Bronchitis J40    HTN (hypertension) I10    Osteoarthritis M19.90    Closed nondisplaced fracture of head of right radius S52.124A    Chronic obstructive pulmonary disease ([facility] [address] J44.9    Smoker F17.200    Chronic pain of left knee M25.562, G89.29    Pre-diabetes R73.03    Falls frequently R29.6       Isolation/Infection:   Isolation            No Isolation          Patient Infection Status       Infection Onset Added Last Indicated Last Indicated By Review Planned Expiration Resolved Resolved By    None active    Resolved    COVID-19 Rule Out 05/25/21 05/25/21 05/25/21 COVID-19, Rapid (Ordered)   05/25/21 Rule-Out Test Resulted            Nurse Assessment:  Last Vital Signs: /69   Pulse 110   Temp 97.7 °F (36.5 °C) (Oral)   Resp 20   Ht 5' 5\" (1.651 m)   Wt 203 lb (92.1 kg)   SpO2 92%   BMI 33.78 kg/m²     Last documented pain score (0-10 scale): Pain Level: 5  Last Weight:   Wt Readings from Last 1 Encounters:   06/01/21 203 lb (92.1 kg)     Mental Status:  oriented, alert, coherent, logical, thought processes intact, and able to concentrate and follow conversation    IV Access:  - None    Nursing Mobility/ADLs:  Walking   Assisted  Transfer  Assisted  Bathing  Assisted  Dressing  Assisted  Toileting  Assisted  Feeding  Assisted  Med Admin  Assisted  Med Delivery   whole    Wound Care Documentation and Therapy:        Elimination:  Continence: Bowel: Yes  Bladder: Yes  Urinary Catheter: None   Colostomy/Ileostomy/Ileal Conduit: No       Date of Last BM: 06/1/21    No intake or output data in the 24 hours ending 06/02/21 1210  No intake/output data recorded.     Safety Concerns:     History of Falls (last 30 days) and At Risk for Falls    Impairments/Disabilities:      Vision and Hearing    Nutrition Therapy:  Current Nutrition Therapy:   - Oral Diet:  General    Routes of Feeding: Oral  Liquids: No Restrictions  Daily Fluid Restriction: no  Last Modified Barium Swallow with Video (Video Swallowing Test): not done    Treatments at the Time of Hospital Discharge:   Respiratory Treatments: albuterol  Oxygen Therapy:  is not on home oxygen therapy. Ventilator:    - No ventilator support    Rehab Therapies: ***  Weight Bearing Status/Restrictions: No weight bearing restirctions  Other Medical Equipment (for information only, NOT a DME order):  cane, walker, and bath bench  Other Treatments: ***    Patient's personal belongings (please select all that are sent with patient):  Glasses    RN SIGNATURE:  Electronically signed by Sami Quijano RN on 6/3/21 at 1:16 PM EDT    CASE MANAGEMENT/SOCIAL WORK SECTION    Inpatient Status Date: ***    Readmission Risk Assessment Score:  Readmission Risk              Risk of Unplanned Readmission:  0           Discharging to Facility/ Agency   Name: [facility]  Phone: [address] [address]       Phone: [phone]       Fax: [phone]            / signature: Electronically signed by Vera Carlisle RN on 6/2/21 at 12:14 PM EDT    PHYSICIAN SECTION    Prognosis: Fair    Condition at Discharge: Stable    Rehab Potential (if transferring to Rehab): Poor    Recommended Labs or Other Treatments After Discharge:  PT/OT eval and treat. Skilled nursing for assessment and medication education. Home nursing - evaluation and care - 4 weeks. Physician Certification: I certify the above information and transfer of Adriana Branham  is necessary for the continuing treatment of the diagnosis listed and that she requires Home Care for greater 30 days.      Update Admission H&P: No change in H&P    PHYSICIAN SIGNATURE:  Electronically signed by Maycol Eng DO on 6/3/21 at 12:56 PM EDT

## 2021-06-03 VITALS
WEIGHT: 203 LBS | DIASTOLIC BLOOD PRESSURE: 57 MMHG | SYSTOLIC BLOOD PRESSURE: 120 MMHG | BODY MASS INDEX: 33.82 KG/M2 | OXYGEN SATURATION: 93 % | TEMPERATURE: 97.3 F | HEIGHT: 65 IN | RESPIRATION RATE: 20 BRPM | HEART RATE: 55 BPM

## 2021-06-03 PROBLEM — G92.8 TOXIC METABOLIC ENCEPHALOPATHY: Status: ACTIVE | Noted: 2021-06-03

## 2021-06-03 PROBLEM — R46.89 COGNITIVE AND BEHAVIORAL CHANGES: Status: ACTIVE | Noted: 2021-06-03

## 2021-06-03 PROBLEM — R41.89 COGNITIVE AND BEHAVIORAL CHANGES: Status: ACTIVE | Noted: 2021-06-03

## 2021-06-03 LAB
ANION GAP SERPL CALCULATED.3IONS-SCNC: 8 MMOL/L (ref 9–17)
BUN BLDV-MCNC: 13 MG/DL (ref 8–23)
BUN/CREAT BLD: ABNORMAL (ref 9–20)
CALCIUM SERPL-MCNC: 8.2 MG/DL (ref 8.6–10.4)
CHLORIDE BLD-SCNC: 112 MMOL/L (ref 98–107)
CO2: 24 MMOL/L (ref 20–31)
CREAT SERPL-MCNC: 0.81 MG/DL (ref 0.5–0.9)
CULTURE: ABNORMAL
EKG ATRIAL RATE: 69 BPM
EKG P AXIS: 73 DEGREES
EKG P-R INTERVAL: 108 MS
EKG Q-T INTERVAL: 398 MS
EKG QRS DURATION: 112 MS
EKG QTC CALCULATION (BAZETT): 426 MS
EKG R AXIS: -23 DEGREES
EKG T AXIS: -20 DEGREES
EKG VENTRICULAR RATE: 69 BPM
GFR AFRICAN AMERICAN: >60 ML/MIN
GFR NON-AFRICAN AMERICAN: >60 ML/MIN
GFR SERPL CREATININE-BSD FRML MDRD: ABNORMAL ML/MIN/{1.73_M2}
GFR SERPL CREATININE-BSD FRML MDRD: ABNORMAL ML/MIN/{1.73_M2}
GLUCOSE BLD-MCNC: 87 MG/DL (ref 70–99)
Lab: ABNORMAL
POTASSIUM SERPL-SCNC: 3.9 MMOL/L (ref 3.7–5.3)
SODIUM BLD-SCNC: 144 MMOL/L (ref 135–144)
SPECIMEN DESCRIPTION: ABNORMAL
TSH SERPL DL<=0.05 MIU/L-ACNC: 1.84 MIU/L (ref 0.3–5)

## 2021-06-03 PROCEDURE — 99239 HOSP IP/OBS DSCHRG MGMT >30: CPT | Performed by: FAMILY MEDICINE

## 2021-06-03 PROCEDURE — 94760 N-INVAS EAR/PLS OXIMETRY 1: CPT

## 2021-06-03 PROCEDURE — 36415 COLL VENOUS BLD VENIPUNCTURE: CPT

## 2021-06-03 PROCEDURE — 94640 AIRWAY INHALATION TREATMENT: CPT

## 2021-06-03 PROCEDURE — 2580000003 HC RX 258: Performed by: EMERGENCY MEDICINE

## 2021-06-03 PROCEDURE — 6360000002 HC RX W HCPCS: Performed by: EMERGENCY MEDICINE

## 2021-06-03 PROCEDURE — 2580000003 HC RX 258: Performed by: STUDENT IN AN ORGANIZED HEALTH CARE EDUCATION/TRAINING PROGRAM

## 2021-06-03 PROCEDURE — 84443 ASSAY THYROID STIM HORMONE: CPT

## 2021-06-03 PROCEDURE — 80048 BASIC METABOLIC PNL TOTAL CA: CPT

## 2021-06-03 PROCEDURE — 93010 ELECTROCARDIOGRAM REPORT: CPT | Performed by: INTERNAL MEDICINE

## 2021-06-03 PROCEDURE — 6370000000 HC RX 637 (ALT 250 FOR IP): Performed by: STUDENT IN AN ORGANIZED HEALTH CARE EDUCATION/TRAINING PROGRAM

## 2021-06-03 PROCEDURE — 6360000002 HC RX W HCPCS: Performed by: STUDENT IN AN ORGANIZED HEALTH CARE EDUCATION/TRAINING PROGRAM

## 2021-06-03 RX ORDER — SENNA PLUS 8.6 MG/1
1 TABLET ORAL NIGHTLY
Qty: 30 TABLET | Refills: 0 | Status: SHIPPED | OUTPATIENT
Start: 2021-06-03 | End: 2021-07-03

## 2021-06-03 RX ORDER — NITROFURANTOIN 25; 75 MG/1; MG/1
100 CAPSULE ORAL 2 TIMES DAILY
Qty: 6 CAPSULE | Refills: 0 | Status: SHIPPED | OUTPATIENT
Start: 2021-06-03 | End: 2021-06-06

## 2021-06-03 RX ORDER — POLYETHYLENE GLYCOL 3350 17 G/17G
17 POWDER, FOR SOLUTION ORAL DAILY
Qty: 527 G | Refills: 1 | Status: SHIPPED | OUTPATIENT
Start: 2021-06-04 | End: 2021-06-29

## 2021-06-03 RX ADMIN — TRIHEXYPHENIDYL HYDROCHLORIDE 2 MG: 2 TABLET ORAL at 09:30

## 2021-06-03 RX ADMIN — TIOTROPIUM BROMIDE INHALATION SPRAY 1 PUFF: 3.12 SPRAY, METERED RESPIRATORY (INHALATION) at 08:34

## 2021-06-03 RX ADMIN — ATORVASTATIN CALCIUM 20 MG: 20 TABLET, FILM COATED ORAL at 09:30

## 2021-06-03 RX ADMIN — CEFTRIAXONE SODIUM 1000 MG: 1 INJECTION, POWDER, FOR SOLUTION INTRAMUSCULAR; INTRAVENOUS at 09:41

## 2021-06-03 RX ADMIN — POLYETHYLENE GLYCOL 3350 17 G: 17 POWDER, FOR SOLUTION ORAL at 09:35

## 2021-06-03 RX ADMIN — LISINOPRIL 20 MG: 10 TABLET ORAL at 09:30

## 2021-06-03 RX ADMIN — ENOXAPARIN SODIUM 40 MG: 40 INJECTION, SOLUTION INTRAVENOUS; SUBCUTANEOUS at 09:30

## 2021-06-03 RX ADMIN — SODIUM CHLORIDE, PRESERVATIVE FREE 10 ML: 5 INJECTION INTRAVENOUS at 09:30

## 2021-06-03 ASSESSMENT — ENCOUNTER SYMPTOMS
COUGH: 0
ABDOMINAL PAIN: 0
NAUSEA: 0
SINUS PRESSURE: 0
BACK PAIN: 0
SHORTNESS OF BREATH: 0
VOMITING: 0
WHEEZING: 0
CONSTIPATION: 1
DIARRHEA: 0
COLOR CHANGE: 0
BLOOD IN STOOL: 0
SINUS PAIN: 0

## 2021-06-03 ASSESSMENT — PAIN SCALES - GENERAL
PAINLEVEL_OUTOF10: 6
PAINLEVEL_OUTOF10: 0

## 2021-06-03 NOTE — PROGRESS NOTES
Discharge teaching and instructions completed with patient using teachback method. AVS reviewed. Meds delivered to patient through meds to beds. Patient voiced understanding regarding prescriptions, follow up appointments, and care of self at home. Ambulated off unit with a steady gait. Discharged to private residence . All questions answered.

## 2021-06-03 NOTE — CARE COORDINATION
Discharge instructions and summary are faxed to both [facility] and [facility]. Script for walker and face to face are faxed to [employer]. Donald from [employer] and [facility] is notified of discharge.       Discharge [facility] Case Management Department  Written by: Eder Robbins RN    Patient Name: Josseline Hood  Attending Provider: Semaj Aguero DO  Admit Date: 2021 10:20 AM  MRN: [id number]  Account: [de-identified]                     : 1950  Discharge Date:       Disposition: Home with [facility] and therapy from [facility] and Jessica Simmons RN

## 2021-06-03 NOTE — PROGRESS NOTES
Physician Progress Note      Shonda Ruiz  CSN #:                  [id number]  :                       1950  ADMIT DATE:       2021 10:20 AM  DISCH DATE:  RESPONDING  PROVIDER #:        Hector Wilson MD          QUERY TEXT:    Pt admitted with UTI. Pt noted to have change in mentation. If possible,   please document in the progress notes and discharge summary if you are   evaluating and / or treating any of the following: The medical record reflects the following:  Risk Factors: UTI  Clinical Indicators: UA shows moderate leukocyte esterase and +nitrites, urine   cx + KLEBSIELLA PNEUMONIAE >567375 CFU/ML, per prog notes \"UTI possibly   causing mental status changes\", confusion  Treatment: IV Rocephin, labs, cultures, transfer to inpatient status    Call if any questions. Thank you, Andres Staples, [address] [place] [address]  Options provided:  -- Metabolic encephalopathy  -- Toxic encephalopathy  -- Toxic metabolic encephalopathy  -- Delirium due to, Please specify cause. -- Delirium  -- Other - I will add my own diagnosis  -- Disagree - Not applicable / Not valid  -- Disagree - Clinically unable to determine / Unknown  -- Refer to Clinical Documentation Reviewer    PROVIDER RESPONSE TEXT:    This patient has toxic encephalopathy.     Query created by: Leti Nick on 6/3/2021 8:58 AM      Electronically signed by:  Hector Wilson MD 6/3/2021 10:43 AM

## 2021-06-03 NOTE — PROGRESS NOTES
[facility]  Progress Note    Date:   6/3/2021  Patient name:  Joseph Engle  Date of admission:  6/1/2021 10:20 AM  MRN:   [id number]  YOB: 1950    SUBJECTIVE/Last 24 hours update:     Day 3 Hospitalization:   Patient was seen and examined with bedside. Patient heart rate drops into the 30s-40s during night. Resident was notified stat EKG was performed with electrolyte panel which was unremarkable. Due to patient poor cognition she did not mention any symptoms that occurred overnight. Patient been vitally stable other than that particular episode. Patient is only complaining of left knee pain and constipation, denies any headache, chest pain, shortness of breath and belly pain at this moment. Patient is working with physical therapy and use cane for ambulation. Urine culture came back which is sensitive to Keflex, Macrobid and Bactrim. REVIEW OF SYSTEMS:      Review of Systems   Constitutional: Negative for chills and fever. HENT: Negative for congestion, sinus pressure and sinus pain. Respiratory: Negative for cough, shortness of breath and wheezing. Cardiovascular: Negative for chest pain, palpitations and leg swelling. Gastrointestinal: Positive for constipation. Negative for abdominal pain, blood in stool, diarrhea, nausea and vomiting. Genitourinary: Negative for difficulty urinating, flank pain and hematuria. Musculoskeletal: Negative for arthralgias, back pain and myalgias. Left knee pain. Skin: Negative for color change and wound. Neurological: Negative for dizziness, light-headedness and headaches. Psychiatric/Behavioral: Negative for agitation and confusion. Patient has a flat affect and answer yes or no.        PAST MEDICAL HISTORY:      has a past medical history of Arthritis, Bronchitis, Chronic obstructive pulmonary disease ([address] Colon polyps, Dental neglect, Depression, Environmental allergies, GERD (gastroesophageal reflux disease), Hyperlipidemia, Hypertension, Obesity, Onychomycosis, Poor historian, RBBB, Treadmill stress test negative for angina pectoris, and Wears glasses. PAST SURGICAL HISTORY:      has a past surgical history that includes Cholecystectomy; doppler echocardiography; Colonoscopy (6/2013); and Tubal ligation. SOCIALHISTORY:      reports that she quit smoking about 5 years ago. Her smoking use included cigarettes. She has a 17.50 pack-year smoking history. She has never used smokeless tobacco. She reports that she does not drink alcohol and does not use drugs. FAMILY HISTORY:      family history includes Cancer in her father; Heart Disease in her mother. HOME MEDICATIONS:      Prior to Admission medications    Medication Sig Start Date End Date Taking? Authorizing Provider   atorvastatin (LIPITOR) 20 MG tablet Take 1 tablet by mouth daily 5/19/20  Yes Nena Moss MD   ibuprofen (ADVIL;MOTRIN) 600 MG tablet Take 1 tablet by mouth 4 times daily as needed for Pain 6/20/19  Yes Amina Gore MD   lisinopril (PRINIVIL;ZESTRIL) 20 MG tablet Take 1 tablet by mouth daily 5/10/19  Yes Amina Gore MD   acetaminophen (TYLENOL) 500 MG tablet Take 2 tablets by mouth every 8 hours as needed for Pain 3/7/19  Yes Keisha Tanner MD   ondansetron (ZOFRAN) 4 MG tablet Take 1 tablet by mouth 3 times daily as needed for Nausea or Vomiting 3/7/19  Yes Keisha Tanner MD   traZODone (DESYREL) 100 MG tablet  8/9/17  Yes Historical Provider, MD   ketoconazole (NIZORAL) 2 % cream Apply topically daily to bottom of feet, nails, and in-between toes. 9/23/20   Xiao Victor DPM   ciclopirox (LOPROX) 0.77 % cream Apply topically 2 times daily.  10/2/19   Snow Moctezuma DPM   tiotropium (SPIRIVA RESPIMAT) 1.25 MCG/ACT AERS inhaler Inhale 2 puffs into the lungs daily 8/12/19   Amina Gore MD   albuterol sulfate HFA (VENTOLIN HFA) 108 (90 Base) MCG/ACT inhaler Inhale 2 puffs into the lungs every 6 hours as needed for Wheezing 8/12/19   Ashley Dale MD   haloperidol (HALDOL) 5 MG tablet Take 5 mg by mouth 2 times daily    Historical Provider, MD   Elastic Bandages & Supports (ACE ARM SLING) MISC Apply 1 Units topically daily 4/17/17   Cyrus Mike MD   Misc. Devices MISC Walker 9/1/15   Paulette Prader, MD   trihexyphenidyl (ARTANE) 2 MG tablet Take 2 mg by mouth 2 times daily  10/10/14   Historical Provider, MD       ALLERGIES:     Patient has no known allergies. OBJECTIVE:       Vitals:    06/02/21 0805 06/02/21 1930 06/02/21 2120 06/03/21 0339   BP: 125/69 (!) 96/48 96/63 115/65   Pulse: 110 64 (!) 45 (!) 45   Resp: 20 17 20 19   Temp: 97.7 °F (36.5 °C) 98.8 °F (37.1 °C) 97.5 °F (36.4 °C) 97.3 °F (36.3 °C)   TempSrc: Oral Oral Oral Oral   SpO2: 92%  94% 95%   Weight:       Height:           No intake or output data in the 24 hours ending 06/03/21 0829    PHYSICAL EXAM:    Physical Exam  Vitals and nursing note reviewed. Constitutional:       Appearance: Normal appearance. HENT:      Head: Normocephalic and atraumatic. Mouth/Throat:      Mouth: Mucous membranes are moist.   Eyes:      Conjunctiva/sclera: Conjunctivae normal.   Cardiovascular:      Rate and Rhythm: Normal rate and regular rhythm. Pulmonary:      Effort: Pulmonary effort is normal.      Breath sounds: Normal breath sounds. Abdominal:      General: Bowel sounds are normal. There is no distension. Palpations: Abdomen is soft. There is no mass. Tenderness: There is no abdominal tenderness. There is no guarding. Musculoskeletal:      Right knee: Effusion and crepitus present. No swelling, deformity, lacerations or bony tenderness. Left knee: Effusion and crepitus present. No swelling, deformity, lacerations or bony tenderness. Right lower leg: No edema. Left lower leg: No edema. Neurological:      General: No focal deficit present.       Mental Status: She is alert and oriented to person, place, and time. Psychiatric:         Mood and Affect: Mood normal.         Behavior: Behavior normal.         ASSESSMENT:      Principal Problem:    Toxic metabolic encephalopathy  Active Problems:    Hypertension    Osteoarthritis of right knee    Chronic obstructive pulmonary disease (HCC)    Falls frequently    Cognitive and behavioral changes  Resolved Problems:    * No resolved hospital problems. *      PLAN:     1. Toxic metabolic encephalopathy secondary to UTI versus possible schizophrenia:   -Febrile, VSS.  - WBC: 11.9  - UA showed positive nitrates with moderate leukocyte esterase.  - Patient is on Rocephin, urine culture is sensitive to Macrobid, Keflex and ciprofloxacin.  - Patient is following with [facility] and is on Haldol for hallucination.  - Artane 2 mg twice daily. 2. YOSEF 2/2 UTI versus dehydration (Resolved)  - Cr: 1.14 ---> 0.81  - CK: 54, Myoglobin: 92  - NS@ 100 ml/hr    3. Elevated Troponin:   - EKG showed sinus tachycardia with premature supraventricular complexes urine complex. Repeat EKG was done yesterday as noted in chart.  - Troponin: 16 --> 18 ---> 17  - QTc: 485  - She is on trazodone and Haldol which might cause slight increase in QTC. We will continue to observe. 4.  Essential hypertension:  - Lisinopril 20 mg daily.  - Lipitor 20 mg daily. 5.  COPD:  - Albuterol and Spiriva as needed for shortness of breath/wheezing. Dispo: Most likely will discharge today as patient is medically stable and lab works are unremarkable.       Plan will be discussed with the attending, Dr. Juancho Means MD  Family Medicine Resident  6/3/2021 8:29 AM

## 2021-06-03 NOTE — PROGRESS NOTES
Nydia Noyola was evaluated today and a DME order was entered for a standard walker because she requires this to successfully complete daily living tasks of personal cares, ambulating, grooming and meal preparation. A standard walker is necessary due to the patient's impaired ambulation and mobility restrictions and she can ambulate only by using a walker instead of a lesser assistive device, such as a cane or crutch. The need for this equipment was discussed with the patient and she understands and is in agreement.          Shagufta Brown  PGY-1  Family Medicine     6/3/2021  12:57 PM

## 2021-06-03 NOTE — DISCHARGE SUMMARY
[facility]  Discharge Summary      NAME:  Ira Saldivar  :  1950  MRN:  [id number]    Admit date:  2021  Discharge date:  6/3/2021    Admitting Physician:  Lisa Lemus DO    Primary Diagnosis on Admission:   Present on Admission:  [address] frequently   Osteoarthritis of right knee   Hypertension   Chronic obstructive pulmonary disease ([address]   Cognitive and behavioral changes   Toxic metabolic encephalopathy      Secondary Diagnoses:  does not have any pertinent problems on file. Admission Condition:  stable     Discharged Condition: stable    Hospital Course: The patient was admitted for the management of toxic metabolic encephalopathy secondary to UTI. This is a 25-year-old female with a history of left and right knee osteoarthritis, hypertension, cognitive impairment with hallucination follow-up with Jovany Oden came in after having a fall. Per patient and chart reviewing patient loss of balance and fell without hitting the head. Patient is also a poor historian and lives with the son who called 46 and patient came to the ED for further evaluation. Initial evaluation showed that patient is having an YOSEF secondary to UTI and dehydration with slightly elevated troponin in 18 which trended down without any EKG changes. Patient was also found to have elevated myoglobin with normal CK. Chest x-ray, left and right knee x-ray were unremarkable for any acute fracture but show signs of osteoarthritis and effusion. Patient was started on Rocephin and fluids. Urine culture came back sensitive to Keflex, ciprofloxacin and Macrobid. Patient was afebrile and vitally stable, she will be discharged on 3 days of Macrobid with a follow-up with PCP in 3 days. Overnight on 6/3/2021 patient has an episode of bradycardia start EKG was done and electrolytes both were unremarkable. TSH was checked which was also unremarkable.   There is

## 2021-06-03 NOTE — PROGRESS NOTES
Pt HR dropped to 39 while resting family med resident rounded, stat electrolyte orders received, PRN bolus order placed     Haldol to be held, EKG order placed.

## 2021-06-03 NOTE — PROGRESS NOTES
CLINICAL PHARMACY NOTE: MEDS TO BEDS    Total # of Prescriptions Filled: 3   The following medications were delivered to the patient:  · jacob- andrea 8.6 mg tab  · Nitrofurantoin 100 mg cap  · Clearlax 17 g powder    Additional Documentation:Medications delivered to the patient in her room 349.

## 2021-06-03 NOTE — PROGRESS NOTES
Telephone conversation with Dr. Julissa Gibbs. Patient was initially admitted to Observation service but now [facility] in patient will take over. Chart has been reviewed and conditions discussed. 1. Falls frequently    2. Lightheaded    3. YOSEF (acute kidney injury) ([facility] [address]    4. Hyperglycemia    5. Urinary tract infection with hematuria, site unspecified    6. Primary osteoarthritis of right knee      Family Medicine inpatient team was notified and residents will proceed with a further evaluation and orders.

## 2021-06-29 ENCOUNTER — OFFICE VISIT (OUTPATIENT)
Dept: INTERNAL MEDICINE CLINIC | Age: 71
End: 2021-06-29
Payer: COMMERCIAL

## 2021-06-29 VITALS
BODY MASS INDEX: 31.52 KG/M2 | DIASTOLIC BLOOD PRESSURE: 58 MMHG | HEIGHT: 65 IN | WEIGHT: 189.2 LBS | HEART RATE: 58 BPM | SYSTOLIC BLOOD PRESSURE: 118 MMHG | OXYGEN SATURATION: 95 %

## 2021-06-29 DIAGNOSIS — Z12.31 ENCOUNTER FOR SCREENING MAMMOGRAM FOR BREAST CANCER: ICD-10-CM

## 2021-06-29 DIAGNOSIS — R00.1 BRADYCARDIA: ICD-10-CM

## 2021-06-29 DIAGNOSIS — R29.6 FALLS FREQUENTLY: ICD-10-CM

## 2021-06-29 DIAGNOSIS — E11.9 CONTROLLED TYPE 2 DIABETES MELLITUS WITHOUT COMPLICATION, WITHOUT LONG-TERM CURRENT USE OF INSULIN (HCC): ICD-10-CM

## 2021-06-29 DIAGNOSIS — I10 ESSENTIAL HYPERTENSION: ICD-10-CM

## 2021-06-29 DIAGNOSIS — R41.89 COGNITIVE AND BEHAVIORAL CHANGES: ICD-10-CM

## 2021-06-29 DIAGNOSIS — J44.9 CHRONIC OBSTRUCTIVE PULMONARY DISEASE, UNSPECIFIED COPD TYPE (HCC): Primary | ICD-10-CM

## 2021-06-29 DIAGNOSIS — Z13.220 SCREENING FOR HYPERLIPIDEMIA: ICD-10-CM

## 2021-06-29 DIAGNOSIS — R46.89 COGNITIVE AND BEHAVIORAL CHANGES: ICD-10-CM

## 2021-06-29 PROCEDURE — 3044F HG A1C LEVEL LT 7.0%: CPT | Performed by: INTERNAL MEDICINE

## 2021-06-29 PROCEDURE — G8427 DOCREV CUR MEDS BY ELIG CLIN: HCPCS | Performed by: INTERNAL MEDICINE

## 2021-06-29 PROCEDURE — 1123F ACP DISCUSS/DSCN MKR DOCD: CPT | Performed by: INTERNAL MEDICINE

## 2021-06-29 PROCEDURE — 99204 OFFICE O/P NEW MOD 45 MIN: CPT | Performed by: INTERNAL MEDICINE

## 2021-06-29 PROCEDURE — G8399 PT W/DXA RESULTS DOCUMENT: HCPCS | Performed by: INTERNAL MEDICINE

## 2021-06-29 PROCEDURE — 1111F DSCHRG MED/CURRENT MED MERGE: CPT | Performed by: INTERNAL MEDICINE

## 2021-06-29 PROCEDURE — 2022F DILAT RTA XM EVC RTNOPTHY: CPT | Performed by: INTERNAL MEDICINE

## 2021-06-29 PROCEDURE — G8417 CALC BMI ABV UP PARAM F/U: HCPCS | Performed by: INTERNAL MEDICINE

## 2021-06-29 PROCEDURE — 3017F COLORECTAL CA SCREEN DOC REV: CPT | Performed by: INTERNAL MEDICINE

## 2021-06-29 PROCEDURE — 1090F PRES/ABSN URINE INCON ASSESS: CPT | Performed by: INTERNAL MEDICINE

## 2021-06-29 PROCEDURE — G8926 SPIRO NO PERF OR DOC: HCPCS | Performed by: INTERNAL MEDICINE

## 2021-06-29 PROCEDURE — 4040F PNEUMOC VAC/ADMIN/RCVD: CPT | Performed by: INTERNAL MEDICINE

## 2021-06-29 PROCEDURE — 1036F TOBACCO NON-USER: CPT | Performed by: INTERNAL MEDICINE

## 2021-06-29 PROCEDURE — 3023F SPIROM DOC REV: CPT | Performed by: INTERNAL MEDICINE

## 2021-06-29 RX ORDER — ATORVASTATIN CALCIUM 20 MG/1
20 TABLET, FILM COATED ORAL DAILY
Qty: 30 TABLET | Refills: 3 | Status: SHIPPED | OUTPATIENT
Start: 2021-06-29

## 2021-06-29 RX ORDER — POLYETHYLENE GLYCOL 3350 17 G/17G
POWDER, FOR SOLUTION ORAL
COMMUNITY
Start: 2021-06-03 | End: 2021-07-07 | Stop reason: ALTCHOICE

## 2021-06-29 SDOH — ECONOMIC STABILITY: FOOD INSECURITY: WITHIN THE PAST 12 MONTHS, YOU WORRIED THAT YOUR FOOD WOULD RUN OUT BEFORE YOU GOT MONEY TO BUY MORE.: NEVER TRUE

## 2021-06-29 SDOH — ECONOMIC STABILITY: FOOD INSECURITY: WITHIN THE PAST 12 MONTHS, THE FOOD YOU BOUGHT JUST DIDN'T LAST AND YOU DIDN'T HAVE MONEY TO GET MORE.: NEVER TRUE

## 2021-06-29 SDOH — ECONOMIC STABILITY: TRANSPORTATION INSECURITY
IN THE PAST 12 MONTHS, HAS THE LACK OF TRANSPORTATION KEPT YOU FROM MEDICAL APPOINTMENTS OR FROM GETTING MEDICATIONS?: NO

## 2021-06-29 SDOH — ECONOMIC STABILITY: TRANSPORTATION INSECURITY
IN THE PAST 12 MONTHS, HAS LACK OF TRANSPORTATION KEPT YOU FROM MEETINGS, WORK, OR FROM GETTING THINGS NEEDED FOR DAILY LIVING?: NO

## 2021-06-29 ASSESSMENT — PATIENT HEALTH QUESTIONNAIRE - PHQ9
1. LITTLE INTEREST OR PLEASURE IN DOING THINGS: 0
2. FEELING DOWN, DEPRESSED OR HOPELESS: 0
SUM OF ALL RESPONSES TO PHQ QUESTIONS 1-9: 0
SUM OF ALL RESPONSES TO PHQ9 QUESTIONS 1 & 2: 0
SUM OF ALL RESPONSES TO PHQ QUESTIONS 1-9: 0
SUM OF ALL RESPONSES TO PHQ QUESTIONS 1-9: 0

## 2021-06-29 ASSESSMENT — SOCIAL DETERMINANTS OF HEALTH (SDOH): HOW HARD IS IT FOR YOU TO PAY FOR THE VERY BASICS LIKE FOOD, HOUSING, MEDICAL CARE, AND HEATING?: NOT HARD AT ALL

## 2021-06-29 NOTE — PROGRESS NOTES
following with: none    Chronic conditions being monitored: as below      Discharged from hospital 6/3 after admission for encephalopathy, fall and UTI. feelibng better now. COPD  Stabel, compliant with ibnhaler      DIABETES MELLITUS:    diagnosed more than 5 years ago  Modifying factors on med:   Severity: un/controlled   Associated signs and symtoms: neuropathy/ckd/ CAD. aggravated with sugar diet and better with low sugar diet      - Follows a diabetic diet most of the time.   -Is compliant with medication(s) and is tolerating med(s) without any side effects.    -  Reports checking his/her glucose on a once a day schedule with sugars in the fasting range. Hemoglobin A1C   Date Value Ref Range Status   06/02/2021 5.2 4.0 - 6.0 % Final   11/18/2019 5.6 % Final   07/17/2018 5.6 % Final     Diet conrolled only       HYPERTENSION:    Onset more than 2 years ago  Elke: mild to mod  Not taking any meds  Not associated with headaches or blurry vision  No chest pain     -S/he santos /not check BP's generally. -Denies regular aerobic exercise. - Watches his/her diet for sodium, low fat and low cholesterol most of the time. Last 3 Encounter BP Readings:     Date:        BP:  BP Readings from Last 3 Encounters:   06/29/21 (!) 118/58   06/03/21 (!) 120/57   05/25/21 115/85     Not taking  Her lisinopril, will stop     HYPERLIPIDEMIA:   Patient reports doing well on current therapy of statin:    - Denies side effects of muscle weakness or achiness.   - Exercise  -last lipid panel  Lab Results   Component Value Date    CHOL 226 (H) 01/22/2020    CHOL 234 (H) 02/05/2019    CHOL 185 06/28/2017     Lab Results   Component Value Date    TRIG 109 01/22/2020    TRIG 193 (H) 02/05/2019    TRIG 136 06/28/2017     Lab Results   Component Value Date    HDL 84 01/22/2020    HDL 71 02/05/2019    HDL 78 06/28/2017     Lab Results   Component Value Date    LDLCHOLESTEROL 120 01/22/2020    LDLCHOLESTEROL 124 02/05/2019 LDLCHOLESTEROL 80 06/28/2017     Lab Results   Component Value Date    VLDL NOT REPORTED 01/22/2020    VLDL NOT REPORTED 02/05/2019    VLDL NOT REPORTED 06/28/2017     Lab Results   Component Value Date    CHOLHDLRATIO 2.7 01/22/2020    CHOLHDLRATIO 3.3 02/05/2019    CHOLHDLRATIO 2.4 06/28/2017       REVIEW OF SYSTEMS (except Subjective (HPI))    CONSTITUTIONAL: No weight loss, malaise or fevers  HEENT: Negative for frequent or significant headaches, No changes in hearing or vision, no nose bleeds or other nasal problems  NECK: Negative for lumps, goiter, pain and significant neck swelling  RESPIRATORY: Negative for cough, hemoptysis, wheezing, or shortness of breath  CARDIOVASCULAR: Negative for chest pain, leg swelling,or palpitations  GI: No nausea, vomiting, or diarrhea or Constipation  : No history of dysuria, frequency or incontinence, or hematuria  MUSCULOSKELETAL: Negative for joint pain or swelling  SKIN: Negative for lesions, rash, and itching  PSYCH: Negative for sleep disturbance, mood disorder and recent psychosocial stressors  NEURO: No history of headaches, syncope, paralysis, seizures or tremors    ACTIVE PROBLEM LIST  Patient Active Problem List   Diagnosis    GERD (gastroesophageal reflux disease)    Hypertension    Hyperlipidemia    Depression    Bronchitis    HTN (hypertension)    Osteoarthritis    Osteoarthritis of right knee    Closed nondisplaced fracture of head of right radius    Chronic obstructive pulmonary disease ([address]    Smoker    Chronic pain of left knee    Pre-diabetes    Falls frequently    Cognitive and behavioral changes    Toxic metabolic encephalopathy    Controlled type 2 diabetes mellitus without complication, without long-term current use of insulin (HCC)         PAST MEDICAL HISTORY:    Past Medical History:   Diagnosis Date    Arthritis     knees    Bronchitis x1 long ago    Chronic obstructive pulmonary disease ([address] 9/12/2017    Colon polyps     Controlled type 2 diabetes mellitus without complication, without long-term current use of insulin ([facility] [address] 2021    Dental neglect     poor dentation. Missing teeth. No loose teeth    Depression     Environmental allergies     GERD (gastroesophageal reflux disease)     Hyperlipidemia     Hypertension     Obesity     Onychomycosis     Poor historian     RBBB     Treadmill stress test negative for angina pectoris     Wears glasses     reading only       PAST SURGICAL HISTORY:    Past Surgical History:   Procedure Laterality Date    CHOLECYSTECTOMY      COLONOSCOPY  2013    one hyperplastic polyp    DOPPLER ECHOCARDIOGRAPHY      cardiac    TUBAL LIGATION         FAMILY HISTORY:    Family History   Problem Relation Age of Onset    Heart Disease Mother     Cancer Father         SOCIAL HISTORY:    Social History     Socioeconomic History    Marital status: Single     Spouse name: Not on file    Number of children: Not on file    Years of education: Not on file    Highest education level: Not on file   Occupational History    Not on file   Tobacco Use    Smoking status: Former Smoker     Packs/day: 0.50     Years: 35.00     Pack years: 17.50     Types: Cigarettes     Quit date: 2015     Years since quittin.9    Smokeless tobacco: Never Used   Substance and Sexual Activity    Alcohol use: No     Alcohol/week: 0.0 standard drinks    Drug use: No    Sexual activity: Not on file   Other Topics Concern    Not on file   Social History Narrative    Not on file     Social Determinants of Health     Financial Resource Strain: Low Risk     Difficulty of Paying Living Expenses: Not hard at all   Food Insecurity: No Food Insecurity    Worried About Running Out of Food in the Last Year: Never true    Marcos of Food in the Last Year: Never true   Transportation Needs: No Transportation Needs    Lack of Transportation (Medical): No    Lack of Transportation (Non-Medical):  No Physical Activity:     Days of Exercise per Week:     Minutes of Exercise per Session:    Stress:     Feeling of Stress :    Social Connections:     Frequency of Communication with Friends and Family:     Frequency of Social Gatherings with Friends and Family:     Attends Restorationist Services:     Active Member of Clubs or Organizations:     Attends Club or Organization Meetings:     Marital Status:    Intimate Partner Violence:     Fear of Current or Ex-Partner:     Emotionally Abused:     Physically Abused:     Sexually Abused:        CURRENT MEDICATIONS:  Current Outpatient Medications   Medication Sig Dispense Refill    atorvastatin (LIPITOR) 20 MG tablet Take 1 tablet by mouth daily 30 tablet 3    senna (SENOKOT) 8.6 MG tablet Take 1 tablet by mouth nightly 30 tablet 0    tiotropium (SPIRIVA RESPIMAT) 1.25 MCG/ACT AERS inhaler Inhale 2 puffs into the lungs daily 1 Inhaler 3    acetaminophen (TYLENOL) 500 MG tablet Take 2 tablets by mouth every 8 hours as needed for Pain 40 tablet 0    haloperidol (HALDOL) 5 MG tablet Take 5 mg by mouth 2 times daily      traZODone (DESYREL) 100 MG tablet       Elastic Bandages & Supports (ACE ARM SLING) MISC Apply 1 Units topically daily 1 each 0    trihexyphenidyl (ARTANE) 2 MG tablet Take 2 mg by mouth 2 times daily       CLEARLAX 17 GM/SCOOP powder  (Patient not taking: Reported on 6/29/2021)      polyethylene glycol (GLYCOLAX) 17 g packet Take 17 g by mouth daily (Patient not taking: Reported on 6/29/2021) 527 g 1    ketoconazole (NIZORAL) 2 % cream Apply topically daily to bottom of feet, nails, and in-between toes. (Patient not taking: Reported on 6/29/2021) 30 g 5    ciclopirox (LOPROX) 0.77 % cream Apply topically 2 times daily.  (Patient not taking: Reported on 6/29/2021) 1 Tube 0    albuterol sulfate HFA (VENTOLIN HFA) 108 (90 Base) MCG/ACT inhaler Inhale 2 puffs into the lungs every 6 hours as needed for Wheezing (Patient not taking: Reported on 6/29/2021) 1 Inhaler 3    ondansetron (ZOFRAN) 4 MG tablet Take 1 tablet by mouth 3 times daily as needed for Nausea or Vomiting (Patient not taking: Reported on 6/29/2021) 30 tablet 0    Misc. Devices MISC Gaetana Scales (Patient not taking: Reported on 6/29/2021) 1 Device 0     No current facility-administered medications for this visit. OBJECTIVE:  Vitals:    06/29/21 1237   BP: (!) 118/58   Pulse: 58   SpO2: 95%       General exam (except above):  Constitutional - well appearing, alert, in no acute distress  Eyes: Anicteric sclera. Pupils are equally round and reactive to light. Extraocular movements are intact. Skin: Skin color, texture, turgor normal  Respiratory - Lungs clear to auscultation. No wheezing, rhonchi, rales  Cardiovascular - RRR. S1S2 present. No leg swelling. Gastrointestinal - Abdomen soft, non-tender. Bowel sounds normal. No masses, organomegaly  Musculoskeletal - No joint swelling, deformity, or tenderness  Neuro - Pt Alert, awake and oriented x 3. No gross focal neurological deficits      ASSESSMENT AND PLAN (MEDICAL DECISION MAKING):   Xochilt Singh was seen today for new patient. Diagnoses and all orders for this visit:    Chronic obstructive pulmonary disease, unspecified COPD type ([address]  Comments:  breathing good, compliant [facility] inhaler    Encounter for screening mammogram for breast cancer    Screening for hyperlipidemia    Controlled type 2 diabetes mellitus without complication, without long-term current use of insulin ([address]  Comments:  diet controlld, not on meds  last HbA1c 5.2 6/1/2021  Orders:  -     atorvastatin (LIPITOR) 20 MG tablet; Take 1 tablet by mouth daily    Essential hypertension    Falls frequently    Cognitive and behavioral changes  Comments:  pt denies any memory concerns recently  lives with son and niece    Bradycardia  Comments:  noted inpatient  will get sleep study for possible LOUIE  Orders:  -     Sleep Study with PAP Titration; Future       :     Follow up in: 3mth    This note was partially generated using Dragon voice recognition system, any errors noted are unintentional and are due to this technology.   Santiago Martinez MD

## 2021-06-29 NOTE — PROGRESS NOTES
Visit Information    Have you changed or started any medications since your last visit including any over-the-counter medicines, vitamins, or herbal medicines? no   Are you having any side effects from any of your medications? -  no  Have you stopped taking any of your medications? Is so, why? -  yes -     Have you seen any other physician or provider since your last visit? Yes - Records Obtained  Have you had any other diagnostic tests since your last visit? Yes - Records Obtained  Have you been seen in the emergency room and/or had an admission to a hospital since we last saw you? Yes - Records Obtained  Have you had your routine dental cleaning in the past 6 months? no    Have you activated your [employer] account? If not, what are your barriers?  Yes     Patient Care Team:  Amaris Lozada MD as PCP - General (Internal Medicine)  Levar Yoo DPM (Inactive) as Consulting Physician (Podiatry)  [address], DO as Consulting Physician (General Surgery)    Medical History Review  Past Medical, Family, and Social History reviewed and does not contribute to the patient presenting condition    Health Maintenance   Topic Date Due    COVID-19 Vaccine (1) Never done    Shingles Vaccine (1 of 2) Never done    Breast cancer screen  12/15/2019    Lipid screen  01/22/2021    Flu vaccine (Season Ended) 09/01/2021    A1C test (Diabetic or Prediabetic)  06/02/2022    Potassium monitoring  06/03/2022    Creatinine monitoring  06/03/2022    Colon cancer screen colonoscopy  06/11/2023    DTaP/Tdap/Td vaccine (2 - Td or Tdap) 08/17/2026    DEXA (modify frequency per FRAX score)  Completed    Pneumococcal 65+ years Vaccine  Completed    Hepatitis C screen  Completed    Hepatitis A vaccine  Aged Out    Hepatitis B vaccine  Aged Out    Hib vaccine  Aged Out    Meningococcal (ACWY) vaccine  Aged Out     SUBJECTIVE:  Nydia Noyola is a 69 y.o. female who  comes for complaints of   Chief Complaint   Patient presents with    New Patient     [facility] pt is following with: none    Chronic conditions being monitored: as below      Discharged from hospital 6/3 after admission for encephalopathy, fall and UTI. feelibng better now. COPD  Stabel, compliant with ibnhaler      DIABETES MELLITUS:    diagnosed more than 5 years ago  Modifying factors on med:   Severity: un/controlled   Associated signs and symtoms: neuropathy/ckd/ CAD. aggravated with sugar diet and better with low sugar diet      - Follows a diabetic diet most of the time.   -Is compliant with medication(s) and is tolerating med(s) without any side effects.    -  Reports checking his/her glucose on a once a day schedule with sugars in the fasting range. Hemoglobin A1C   Date Value Ref Range Status   06/02/2021 5.2 4.0 - 6.0 % Final   11/18/2019 5.6 % Final   07/17/2018 5.6 % Final     Diet conrolled only       HYPERTENSION:    Onset more than 2 years ago  Elke: mild to mod  Not taking any meds  Not associated with headaches or blurry vision  No chest pain     -S/he santos /not check BP's generally. -Denies regular aerobic exercise. - Watches his/her diet for sodium, low fat and low cholesterol most of the time. Last 3 Encounter BP Readings:     Date:        BP:  BP Readings from Last 3 Encounters:   06/29/21 (!) 118/58   06/03/21 (!) 120/57   05/25/21 115/85     Not taking  Her lisinopril, will stop     HYPERLIPIDEMIA:   Patient reports doing well on current therapy of statin:    - Denies side effects of muscle weakness or achiness.   - Exercise  -last lipid panel  Lab Results   Component Value Date    CHOL 226 (H) 01/22/2020    CHOL 234 (H) 02/05/2019    CHOL 185 06/28/2017     Lab Results   Component Value Date    TRIG 109 01/22/2020    TRIG 193 (H) 02/05/2019    TRIG 136 06/28/2017     Lab Results   Component Value Date    HDL 84 01/22/2020    HDL 71 02/05/2019    HDL 78 06/28/2017     Lab Results   Component Value Date obstructive pulmonary disease ([facility] 2017    Colon polyps     Controlled type 2 diabetes mellitus without complication, without long-term current use of insulin ([facility] 2021    Dental neglect     poor dentation. Missing teeth.  No loose teeth    Depression     Environmental allergies     GERD (gastroesophageal reflux disease)     Hyperlipidemia     Hypertension     Obesity     Onychomycosis     Poor historian     RBBB     Treadmill stress test negative for angina pectoris     Wears glasses     reading only       PAST SURGICAL HISTORY:    Past Surgical History:   Procedure Laterality Date    CHOLECYSTECTOMY      COLONOSCOPY  2013    one hyperplastic polyp    DOPPLER ECHOCARDIOGRAPHY      cardiac    TUBAL LIGATION         FAMILY HISTORY:    Family History   Problem Relation Age of Onset    Heart Disease Mother     Cancer Father         SOCIAL HISTORY:    Social History     Socioeconomic History    Marital status: Single     Spouse name: Not on file    Number of children: Not on file    Years of education: Not on file    Highest education level: Not on file   Occupational History    Not on file   Tobacco Use    Smoking status: Former Smoker     Packs/day: 0.50     Years: 35.00     Pack years: 17.50     Types: Cigarettes     Quit date: 2015     Years since quittin.9    Smokeless tobacco: Never Used   Substance and Sexual Activity    Alcohol use: No     Alcohol/week: 0.0 standard drinks    Drug use: No    Sexual activity: Not on file   Other Topics Concern    Not on file   Social History Narrative    Not on file     Social Determinants of Health     Financial Resource Strain: Low Risk     Difficulty of Paying Living Expenses: Not hard at all   Food Insecurity: No Food Insecurity    Worried About Running Out of Food in the Last Year: Never true    Marcos of Food in the Last Year: Never true   Transportation Needs: No Transportation Needs    Lack of Transportation (Medical): No    Lack of Transportation (Non-Medical): No   Physical Activity:     Days of Exercise per Week:     Minutes of Exercise per Session:    Stress:     Feeling of Stress :    Social Connections:     Frequency of Communication with Friends and Family:     Frequency of Social Gatherings with Friends and Family:     Attends Voodoo Services:     Active Member of Clubs or Organizations:     Attends Club or Organization Meetings:     Marital Status:    Intimate Partner Violence:     Fear of Current or Ex-Partner:     Emotionally Abused:     Physically Abused:     Sexually Abused:        CURRENT MEDICATIONS:  Current Outpatient Medications   Medication Sig Dispense Refill    senna (SENOKOT) 8.6 MG tablet Take 1 tablet by mouth nightly 30 tablet 0    tiotropium (SPIRIVA RESPIMAT) 1.25 MCG/ACT AERS inhaler Inhale 2 puffs into the lungs daily 1 Inhaler 3    acetaminophen (TYLENOL) 500 MG tablet Take 2 tablets by mouth every 8 hours as needed for Pain 40 tablet 0    haloperidol (HALDOL) 5 MG tablet Take 5 mg by mouth 2 times daily      traZODone (DESYREL) 100 MG tablet       Elastic Bandages & Supports (ACE ARM SLING) MISC Apply 1 Units topically daily 1 each 0    trihexyphenidyl (ARTANE) 2 MG tablet Take 2 mg by mouth 2 times daily       CLEARLAX 17 GM/SCOOP powder  (Patient not taking: Reported on 6/29/2021)      polyethylene glycol (GLYCOLAX) 17 g packet Take 17 g by mouth daily (Patient not taking: Reported on 6/29/2021) 527 g 1    ketoconazole (NIZORAL) 2 % cream Apply topically daily to bottom of feet, nails, and in-between toes. (Patient not taking: Reported on 6/29/2021) 30 g 5    atorvastatin (LIPITOR) 20 MG tablet Take 1 tablet by mouth daily (Patient not taking: Reported on 6/29/2021) 30 tablet 3    ciclopirox (LOPROX) 0.77 % cream Apply topically 2 times daily.  (Patient not taking: Reported on 6/29/2021) 1 Tube 0    albuterol sulfate HFA (VENTOLIN HFA) 108 (90 Base) MCG/ACT inhaler Inhale 2 puffs into the lungs every 6 hours as needed for Wheezing (Patient not taking: Reported on 6/29/2021) 1 Inhaler 3    lisinopril (PRINIVIL;ZESTRIL) 20 MG tablet Take 1 tablet by mouth daily (Patient not taking: Reported on 6/29/2021) 30 tablet 3    ondansetron (ZOFRAN) 4 MG tablet Take 1 tablet by mouth 3 times daily as needed for Nausea or Vomiting (Patient not taking: Reported on 6/29/2021) 30 tablet 0    Misc. Devices MISC Rayetta Dhara (Patient not taking: Reported on 6/29/2021) 1 Device 0     No current facility-administered medications for this visit. OBJECTIVE:  Vitals:    06/29/21 1237   BP: (!) 118/58   Pulse: 58   SpO2: 95%       General exam (except above):  Constitutional - well appearing, alert, in no acute distress  Eyes: Anicteric sclera. Pupils are equally round and reactive to light. Extraocular movements are intact. Skin: Skin color, texture, turgor normal  Respiratory - Lungs clear to auscultation. No wheezing, rhonchi, rales  Cardiovascular - RRR. S1S2 present. No leg swelling. Gastrointestinal - Abdomen soft, non-tender. Bowel sounds normal. No masses, organomegaly  Musculoskeletal - No joint swelling, deformity, or tenderness  Neuro - Pt Alert, awake and oriented x 3. No gross focal neurological deficits      ASSESSMENT AND PLAN (MEDICAL DECISION MAKING):   Markus Urbina was seen today for new patient.     Diagnoses and all orders for this visit:    Chronic obstructive pulmonary disease, unspecified COPD type ([address]  Comments:  breathing good, compliant [facility] inhaler    Encounter for screening mammogram for breast cancer    Screening for hyperlipidemia    Controlled type 2 diabetes mellitus without complication, without long-term current use of insulin ([address]  Comments:  diet controlld, not on meds  last HbA1c 5.2 6/1/2021    Essential hypertension    Falls frequently    Cognitive and behavioral changes  Comments:  pt denies any memory concerns recently  lives with son and niece    Bradycardia  Comments:  noted inpatient  will get sleep study for possible LOUIE  Orders:  -     Sleep Study with PAP Titration; Future    Other orders  -     atorvastatin (LIPITOR) 20 MG tablet; Take 1 tablet by mouth daily       :    Follow up in: 3mth    This note was partially generated using Dragon voice recognition system, any errors noted are unintentional and are due to this technology.   Ramu Boyle MD

## 2021-07-07 ENCOUNTER — HOSPITAL ENCOUNTER (OUTPATIENT)
Dept: VASCULAR LAB | Age: 71
Discharge: HOME | End: 2021-07-07
Payer: COMMERCIAL

## 2021-07-07 ENCOUNTER — TELEPHONE (OUTPATIENT)
Dept: PODIATRY | Age: 71
End: 2021-07-07

## 2021-07-07 ENCOUNTER — OFFICE VISIT (OUTPATIENT)
Dept: PODIATRY | Age: 71
End: 2021-07-07
Payer: COMMERCIAL

## 2021-07-07 VITALS
WEIGHT: 189 LBS | SYSTOLIC BLOOD PRESSURE: 134 MMHG | HEART RATE: 72 BPM | DIASTOLIC BLOOD PRESSURE: 75 MMHG | BODY MASS INDEX: 31.45 KG/M2

## 2021-07-07 DIAGNOSIS — I82.562 CHRONIC DEEP VEIN THROMBOSIS (DVT) OF CALF MUSCLE VEIN OF LEFT LOWER EXTREMITY (HCC): ICD-10-CM

## 2021-07-07 DIAGNOSIS — M25.562 LEFT KNEE PAIN, UNSPECIFIED CHRONICITY: ICD-10-CM

## 2021-07-07 DIAGNOSIS — I73.9 PVD (PERIPHERAL VASCULAR DISEASE) (HCC): ICD-10-CM

## 2021-07-07 DIAGNOSIS — M79.662 BILATERAL CALF PAIN: ICD-10-CM

## 2021-07-07 DIAGNOSIS — B35.1 ONYCHOMYCOSIS: ICD-10-CM

## 2021-07-07 DIAGNOSIS — M79.662 BILATERAL CALF PAIN: Primary | ICD-10-CM

## 2021-07-07 DIAGNOSIS — M79.661 BILATERAL CALF PAIN: ICD-10-CM

## 2021-07-07 DIAGNOSIS — M79.661 BILATERAL CALF PAIN: Primary | ICD-10-CM

## 2021-07-07 PROCEDURE — 99212 OFFICE O/P EST SF 10 MIN: CPT

## 2021-07-07 PROCEDURE — G8417 CALC BMI ABV UP PARAM F/U: HCPCS

## 2021-07-07 PROCEDURE — 99213 OFFICE O/P EST LOW 20 MIN: CPT

## 2021-07-07 PROCEDURE — 93970 EXTREMITY STUDY: CPT

## 2021-07-07 PROCEDURE — 1090F PRES/ABSN URINE INCON ASSESS: CPT

## 2021-07-07 PROCEDURE — 4040F PNEUMOC VAC/ADMIN/RCVD: CPT

## 2021-07-07 PROCEDURE — 1036F TOBACCO NON-USER: CPT

## 2021-07-07 PROCEDURE — 3017F COLORECTAL CA SCREEN DOC REV: CPT

## 2021-07-07 PROCEDURE — G8399 PT W/DXA RESULTS DOCUMENT: HCPCS

## 2021-07-07 PROCEDURE — 11721 DEBRIDE NAIL 6 OR MORE: CPT

## 2021-07-07 PROCEDURE — G8427 DOCREV CUR MEDS BY ELIG CLIN: HCPCS

## 2021-07-07 PROCEDURE — 1123F ACP DISCUSS/DSCN MKR DOCD: CPT

## 2021-07-07 NOTE — PROGRESS NOTES
Patient instructed to remove shoes and socks and instructed to sit in exam chair. Current PCP is Cherylene Patten, MD and date of last visit was 06/29/21. Do you have a follow up visit scheduled? No  If yes, the date is n/a  Diabetic visit information    Blood pressure (Control is BP <140/90)  BP Readings from Last 3 Encounters:   06/29/21 (!) 118/58   06/03/21 (!) 120/57   05/25/21 115/85       BP taken with correct size cuff? - Yes   Repeated if > 140/90 Yes      Tobacco use:  Patient  reports that she quit smoking about 5 years ago. Her smoking use included cigarettes. She has a 17.50 pack-year smoking history. She has never used smokeless tobacco.  If Smoker - Cessation materials given? - Yes       Diabetic Health Maintenance Items due  Diabetes Management   Topic Date Due    Diabetic foot exam  Never done    Diabetic retinal exam  Never done    Diabetic microalbuminuria test  12/11/2018    Lipid screen  01/22/2021       Diabetic retinal exam done in last year? - Yes   If No: remind patient that it is due and they should schedule an exam    Medications  Is patient taking any medications for diabetes? -   Yes  Have blood sugars been controlled? Fasting blood sugars under 120   -   Yes   Random home sugars or today's POCT glucose is under 180 -   Yes   []  If No to the above then patient should schedule appt with PCP.      Diabetic Plan    A1C Plan  Lab Results   Component Value Date    LABA1C 5.2 06/02/2021    LABA1C 5.6 11/18/2019    LABA1C 5.6 07/17/2018      []  If A1C over 8 and last result >3 months ago - Order A1C and refer to PCP   []  If last A1C over 6 months ago - Order A1C and refer to PCP for follow up   []  If elevated blood sugars > 180 - refer to PCP for follow up    []  Blood sugar controlled - A1C under 8 and last check was < 6 months      Cholesterol Plan   Lab Results   Component Value Date    LDLCHOLESTEROL 120 01/22/2020      []  If LDL > 100 and last result >3 months ago - order Fasting lipids and refer to PCP for follow up   []  If LDL < 100 and over 1 year ago - Order Fasting lipids and refer to PCP for follow up   [] LDL is controlled. LDL < 100 and checked within the last year     Blood Pressure  BP Readings from Last 3 Encounters:   06/29/21 (!) 118/58   06/03/21 (!) 120/57   05/25/21 115/85      []  If SBP >140 mmhg - refer to PCP for follow up   []  If DBP > 90 mmhg - refer to PCP for follow up   [] BP is controlled <140/90     Order labs as PCP ordered.   (ie: Lipids, A1C, CMP)

## 2021-07-07 NOTE — PROGRESS NOTES
[address],  [address] [place]  Tel: [phone]   Fax: [phone]    Subjective     CC: Painful and elongated toe nails, non-diabetic    HPI:  Yahaira Allison is a 69y.o. year old female who presents to clinic today for painful and elongated toenails. The patient is not diabetic. Patient is a poor historian and does not recall exactly when she fell and what medications were prescribed. Patient states she had to go to the ER after a fall and UTI complications on 22/06/3690. She states the left knee is still painful after the fall today, and has not been seen by Ortho. The patient states their digits are painful when the toenails are elongated, causing rubbing in shoe gear. The patient states she still has the left calf pain. The patient denies any shortness of breath. Patient denies any rest pain or claudication symptoms. The patient denies any history of acute trauma. The patient denies any other pedal complaints. Patient continues to smoke despite smoking cessation consultation. Patient states she follows closely with her PCP. Primary care physician is Zoe Christie MD.    ROS:    Constitutional: Denies nausea, vomiting, fever, chills. Neurologic: Denies numbness, tingling, and burning in the feet. Vascular: Denies symptoms of lower extremity claudication. Skin: Denies open wounds. Otherwise negative except as noted in the HPI. PMH:  Past Medical History:   Diagnosis Date    Arthritis     knees    Bronchitis x1 long ago    Chronic obstructive pulmonary disease ([address] 9/12/2017    Colon polyps     Controlled type 2 diabetes mellitus without complication, without long-term current use of insulin ([address] 6/29/2021    Dental neglect     poor dentation. Missing teeth.  No loose teeth    Depression     Environmental allergies     GERD (gastroesophageal reflux disease)     Hyperlipidemia     Hypertension     Obesity     Onychomycosis     Poor historian     RBBB     Treadmill stress test negative for angina pectoris 2009    Wears glasses     reading only       Surgical History:   Past Surgical History:   Procedure Laterality Date    CHOLECYSTECTOMY      COLONOSCOPY  2013    one hyperplastic polyp    DOPPLER ECHOCARDIOGRAPHY      cardiac    TUBAL LIGATION         Social History:  Social History     Tobacco Use    Smoking status: Former Smoker     Packs/day: 0.50     Years: 35.00     Pack years: 17.50     Types: Cigarettes     Quit date: 2015     Years since quittin.9    Smokeless tobacco: Never Used   Substance Use Topics    Alcohol use: No     Alcohol/week: 0.0 standard drinks    Drug use: No       Medications:  Prior to Admission medications    Medication Sig Start Date End Date Taking? Authorizing Provider   atorvastatin (LIPITOR) 20 MG tablet Take 1 tablet by mouth daily 21  Yes Rhea Noyola MD   tiotropium (SPIRIVA RESPIMAT) 1.25 MCG/ACT AERS inhaler Inhale 2 puffs into the lungs daily 19  Yes Chyna Young MD   acetaminophen (TYLENOL) 500 MG tablet Take 2 tablets by mouth every 8 hours as needed for Pain 3/7/19  Yes Roger Arita MD   haloperidol (HALDOL) 5 MG tablet Take 5 mg by mouth 2 times daily   Yes Historical Provider, MD   traZODone (DESYREL) 100 MG tablet  17  Yes Historical Provider, MD   Elastic Bandages & Supports (ACE ARM SLING) MISC Apply 1 Units topically daily 17  Yes Kris Pineda MD   trihexyphenidyl (ARTANE) 2 MG tablet Take 2 mg by mouth 2 times daily  10/10/14  Yes Historical Provider, MD       Objective     Vitals:    21 1241   BP: 134/75   Pulse: 72       Lab Results   Component Value Date    LABA1C 5.2 2021       Physical Exam:  General:  Alert and oriented x3. In no acute distress.      Lower Extremity Physical Exam:    Vascular: DP and PT pulses are palpable, Bilateral. CFT <3 seconds to all digits, Bilateral.  No edema, Bilateral.  Hair growth is absent to the level of the digits, Bilateral.  Pain to left calf. Neuro: Saph/sural/SP/DP/plantar sensation intact to light touch. Protective sensation is intact to 6/10 sites as tested with a 5.07g SWMF, Bilateral.     Musculoskeletal: EHL/FHL/GS/TA gross motor intact. TTP to distal digits b/l. Gross deformity is absent, Bilateral.  Very mild tenderness to the left calf with palpation. Pain and edema to left knee. Dermatologic: No open lesions, Bilateral.  Interdigital maceration absent, Bilateral. Nails 1-5 bilateral thickened, discolored, and elongated with subungual debris noted. Assessment   Arash Bowles is a 69 y.o. female with a history of PVD, left knee pain, and onychomycosis. Diagnosis Orders   1. Bilateral calf pain  VL DUP LOWER EXTREMITY VENOUS BILATERAL   2. PVD (peripheral vascular disease) (HCC)  VL DUP LOWER EXTREMITY VENOUS BILATERAL   3. Chronic deep vein thrombosis (DVT) of calf muscle vein of left lower extremity (HCC)  VL DUP LOWER EXTREMITY VENOUS BILATERAL   4. Left knee pain, unspecified chronicity  Noreen Napier DO, Orthopedic Surgery, [facility]    VL DUP LOWER EXTREMITY VENOUS BILATERAL   5. Onychomycosis           Plan   · Patient examined and evaluated. · Diagnosis and treatment options discussed in detail. · Mycotic nails 1-10 debrided sharply of all nonviable tissue with sterile nail nippers without incident. · Educated patient on signs of worsening PAD such as intermittent claudication or rest pain. Instructed patient to closely monitor for worsening symptoms. · Given the calf pain without any acute shortness of breath, Stat duplex ultrasound were ordered for bilateral extremities, it was ruled patient does not have DVT, fluid collection was found behind left knee. · Patient referred to Ortho for left knee edema and pain. · Continue ketoconazole. · Patient to RTC in 3 months. · Please, call the office with any questions or concerns.     No orders of the defined types were placed in this encounter.     Orders Placed This Encounter   Procedures    VL DUP LOWER EXTREMITY VENOUS BILATERAL     HOLD AND CALL [phone]     Standing Status:   Future     Number of Occurrences:   1     Standing Expiration Date:   7/7/2022     Order Specific Question:   Reason for exam:     Answer:   POSSIBLE DVT    Noreen Olivier DO, Orthopedic Surgery, [facility]     Referral Priority:   Routine     Referral Type:   Eval and Treat     Referral Reason:   Specialty Services Required     Referred to Provider:   Brandy Guzman DO     Requested Specialty:   Orthopedic Surgery     Number of Visits Requested:   Nicolas Moffett 27, DPM   Podiatric Medicine & Surgery   7/7/2021 at 4:58 PM

## 2021-08-18 ENCOUNTER — HOSPITAL ENCOUNTER (EMERGENCY)
Age: 71
Discharge: HOME | End: 2021-08-18
Attending: EMERGENCY MEDICINE
Payer: COMMERCIAL

## 2021-08-18 ENCOUNTER — APPOINTMENT (OUTPATIENT)
Dept: GENERAL RADIOLOGY | Age: 71
End: 2021-08-18
Payer: COMMERCIAL

## 2021-08-18 VITALS
TEMPERATURE: 97.2 F | SYSTOLIC BLOOD PRESSURE: 123 MMHG | OXYGEN SATURATION: 97 % | HEART RATE: 82 BPM | BODY MASS INDEX: 34.16 KG/M2 | WEIGHT: 205 LBS | HEIGHT: 65 IN | RESPIRATION RATE: 22 BRPM | DIASTOLIC BLOOD PRESSURE: 54 MMHG

## 2021-08-18 DIAGNOSIS — R07.9 CHEST PAIN, UNSPECIFIED TYPE: Primary | ICD-10-CM

## 2021-08-18 LAB
ABSOLUTE EOS #: 0.1 K/UL (ref 0–0.4)
ABSOLUTE IMMATURE GRANULOCYTE: ABNORMAL K/UL (ref 0–0.3)
ABSOLUTE LYMPH #: 1.2 K/UL (ref 1–4.8)
ABSOLUTE MONO #: 0.9 K/UL (ref 0.1–1.3)
ANION GAP SERPL CALCULATED.3IONS-SCNC: 9 MMOL/L (ref 9–17)
BASOPHILS # BLD: 0 % (ref 0–2)
BASOPHILS ABSOLUTE: 0 K/UL (ref 0–0.2)
BNP INTERPRETATION: NORMAL
BUN BLDV-MCNC: 9 MG/DL (ref 8–23)
BUN/CREAT BLD: ABNORMAL (ref 9–20)
CALCIUM SERPL-MCNC: 9.2 MG/DL (ref 8.6–10.4)
CHLORIDE BLD-SCNC: 104 MMOL/L (ref 98–107)
CO2: 26 MMOL/L (ref 20–31)
CREAT SERPL-MCNC: 0.86 MG/DL (ref 0.5–0.9)
DIFFERENTIAL TYPE: ABNORMAL
EOSINOPHILS RELATIVE PERCENT: 1 % (ref 0–4)
GFR AFRICAN AMERICAN: >60 ML/MIN
GFR NON-AFRICAN AMERICAN: >60 ML/MIN
GFR SERPL CREATININE-BSD FRML MDRD: ABNORMAL ML/MIN/{1.73_M2}
GFR SERPL CREATININE-BSD FRML MDRD: ABNORMAL ML/MIN/{1.73_M2}
GLUCOSE BLD-MCNC: 106 MG/DL (ref 70–99)
HCT VFR BLD CALC: 42.1 % (ref 36–46)
HEMOGLOBIN: 13.8 G/DL (ref 12–16)
IMMATURE GRANULOCYTES: ABNORMAL %
INR BLD: 0.8
LYMPHOCYTES # BLD: 10 % (ref 24–44)
MAGNESIUM: 1.8 MG/DL (ref 1.6–2.6)
MCH RBC QN AUTO: 30.3 PG (ref 26–34)
MCHC RBC AUTO-ENTMCNC: 32.9 G/DL (ref 31–37)
MCV RBC AUTO: 92.2 FL (ref 80–100)
MONOCYTES # BLD: 8 % (ref 1–7)
NRBC AUTOMATED: ABNORMAL PER 100 WBC
PARTIAL THROMBOPLASTIN TIME: 28.4 SEC (ref 24–36)
PDW BLD-RTO: 13.3 % (ref 11.5–14.9)
PLATELET # BLD: 286 K/UL (ref 150–450)
PLATELET ESTIMATE: ABNORMAL
PMV BLD AUTO: 7.2 FL (ref 6–12)
POTASSIUM SERPL-SCNC: 3.5 MMOL/L (ref 3.7–5.3)
PRO-BNP: 270 PG/ML
PROTHROMBIN TIME: 11.6 SEC (ref 11.8–14.6)
RBC # BLD: 4.56 M/UL (ref 4–5.2)
RBC # BLD: ABNORMAL 10*6/UL
SEG NEUTROPHILS: 81 % (ref 36–66)
SEGMENTED NEUTROPHILS ABSOLUTE COUNT: 9.7 K/UL (ref 1.3–9.1)
SODIUM BLD-SCNC: 139 MMOL/L (ref 135–144)
TROPONIN INTERP: NORMAL
TROPONIN T: NORMAL NG/ML
TROPONIN, HIGH SENSITIVITY: 8 NG/L (ref 0–14)
WBC # BLD: 12 K/UL (ref 3.5–11)
WBC # BLD: ABNORMAL 10*3/UL

## 2021-08-18 PROCEDURE — 84484 ASSAY OF TROPONIN QUANT: CPT

## 2021-08-18 PROCEDURE — 99285 EMERGENCY DEPT VISIT HI MDM: CPT

## 2021-08-18 PROCEDURE — 93005 ELECTROCARDIOGRAM TRACING: CPT | Performed by: EMERGENCY MEDICINE

## 2021-08-18 PROCEDURE — 71045 X-RAY EXAM CHEST 1 VIEW: CPT

## 2021-08-18 PROCEDURE — 83735 ASSAY OF MAGNESIUM: CPT

## 2021-08-18 PROCEDURE — 6370000000 HC RX 637 (ALT 250 FOR IP): Performed by: EMERGENCY MEDICINE

## 2021-08-18 PROCEDURE — 85730 THROMBOPLASTIN TIME PARTIAL: CPT

## 2021-08-18 PROCEDURE — 36415 COLL VENOUS BLD VENIPUNCTURE: CPT

## 2021-08-18 PROCEDURE — 85025 COMPLETE CBC W/AUTO DIFF WBC: CPT

## 2021-08-18 PROCEDURE — 85610 PROTHROMBIN TIME: CPT

## 2021-08-18 PROCEDURE — 83880 ASSAY OF NATRIURETIC PEPTIDE: CPT

## 2021-08-18 PROCEDURE — 80048 BASIC METABOLIC PNL TOTAL CA: CPT

## 2021-08-18 RX ORDER — ASPIRIN 81 MG/1
324 TABLET, CHEWABLE ORAL ONCE
Status: COMPLETED | OUTPATIENT
Start: 2021-08-18 | End: 2021-08-18

## 2021-08-18 RX ADMIN — ASPIRIN 324 MG: 81 TABLET, CHEWABLE ORAL at 18:32

## 2021-08-18 ASSESSMENT — ENCOUNTER SYMPTOMS
SHORTNESS OF BREATH: 1
ABDOMINAL PAIN: 0
BACK PAIN: 0
COLOR CHANGE: 0
EYE PAIN: 0

## 2021-08-18 ASSESSMENT — PAIN SCALES - GENERAL: PAINLEVEL_OUTOF10: 5

## 2021-08-18 NOTE — ED PROVIDER NOTES
EMERGENCY DEPARTMENT ENCOUNTER    Pt Name: Kaylan Barrow  MRN: [id number]  [place] 1950  Date of evaluation: 8/18/21  CHIEF COMPLAINT       Chief Complaint   Patient presents with    Shortness of Breath     Ongoing for one day. Denies further complaints. HISTORY OF PRESENT ILLNESS   76-year-old female presents for complaint of chest pain and shortness of breath. Patient reports pain started yesterday but she was at home. Pain is located in the center of her chest, describes it as sharp, states it has been worse when she is out walking around, also admits associated shortness of breath which is worse with exertion as well. Patient denies radiation of pain, denies any significant cough or significant increase in sputum production. Patient denies any fevers, chills, nausea or vomiting. The history is provided by the patient. REVIEW OF SYSTEMS     Review of Systems   Constitutional: Negative for fever. HENT: Negative for congestion and ear pain. Eyes: Negative for pain. Respiratory: Positive for shortness of breath. Cardiovascular: Positive for chest pain. Negative for palpitations and leg swelling. Gastrointestinal: Negative for abdominal pain. Genitourinary: Negative for dysuria and flank pain. Musculoskeletal: Negative for back pain. Skin: Negative for color change. Neurological: Negative for numbness and headaches. Psychiatric/Behavioral: Negative for confusion. All other systems reviewed and are negative. PASTMEDICAL HISTORY     Past Medical History:   Diagnosis Date    Arthritis     knees    Bronchitis x1 long ago    Chronic obstructive pulmonary disease ([address] 9/12/2017    Colon polyps     Controlled type 2 diabetes mellitus without complication, without long-term current use of insulin ([address] 6/29/2021    Dental neglect     poor dentation. Missing teeth.  No loose teeth    Depression     Environmental allergies     GERD (gastroesophageal reflux tablet Take 2 mg by mouth 2 times daily Historical Med           ALLERGIES     has No Known Allergies. FAMILY HISTORY     She indicated that her mother is . She indicated that her father is . She indicated that her maternal grandmother is . She indicated that her maternal grandfather is . She indicated that her paternal grandmother is . She indicated that her paternal grandfather is . SOCIAL HISTORY       Social History     Tobacco Use    Smoking status: Former Smoker     Packs/day: 0.50     Years: 35.00     Pack years: 17.50     Types: Cigarettes     Quit date: 2015     Years since quittin.0    Smokeless tobacco: Never Used   Substance Use Topics    Alcohol use: No     Alcohol/week: 0.0 standard drinks    Drug use: No     PHYSICAL EXAM     INITIAL VITALS: BP (!) 123/54   Pulse 82   Temp 97.2 °F (36.2 °C)   Resp 22   Ht 5' 5\" (1.651 m)   Wt 205 lb (93 kg)   SpO2 97%   BMI 34.11 kg/m²    Physical Exam  Vitals and nursing note reviewed. Constitutional:       General: She is not in acute distress. Appearance: Normal appearance. She is not toxic-appearing. HENT:      Head: Normocephalic and atraumatic. Nose: Nose normal.      Mouth/Throat:      Mouth: Mucous membranes are moist.      Pharynx: Oropharynx is clear. Eyes:      Extraocular Movements: Extraocular movements intact. Conjunctiva/sclera: Conjunctivae normal.   Cardiovascular:      Rate and Rhythm: Normal rate and regular rhythm. Pulses: Normal pulses. Heart sounds: Normal heart sounds. Pulmonary:      Effort: Pulmonary effort is normal.      Breath sounds: Normal breath sounds. Abdominal:      General: Bowel sounds are normal. There is no distension. Palpations: Abdomen is soft. Tenderness: There is no abdominal tenderness. Musculoskeletal:         General: Normal range of motion. Cervical back: Normal range of motion.    Skin:     General: Skin is warm and dry. Capillary Refill: Capillary refill takes less than 2 seconds. Neurological:      General: No focal deficit present. Mental Status: She is alert. Psychiatric:         Mood and Affect: Mood normal.         MEDICAL DECISION MAKIN-year-old female presents with shortness of breath and chest pain. On initial exam patient in no acute distress, vitals are stable, will check cardiac work-up    Labs are reviewed patient noted of white blood cell count of 12, x-ray negative for acute process    Patient did not want to wait for second troponin, will be signing out AMA    The patient has decided to leave against medical advice and is refusing all further workup and testing. The patient has normal mental status and adequate capacity to make medical decisions and has the capacity to make decisions. The patient refuses hospital admission and wants to be discharged. The risks have been explained to the patient, including worsening illness, chronic pain, permanent disability, loss of organs, and death. The benefits of further testing and admission have also been explained, including the availability and proximity of nurses, physicians, monitoring, diagnostic testing, and treatment. The patient was able to understand and state the risks and benefits of hospital admission. This was witnessed by the nurse and me. The patient had the opportunity to ask questions about medical conditions. The patient was treated to the extent that the patient allowed, and knows that may return for care at any time. Follow up has been discussed patient will see pcp tomorrow.          CRITICAL CARE:       PROCEDURES:    Procedures    DIAGNOSTIC RESULTS   EKG:All EKG's are interpreted by the Emergency Department Physician who either signs or Co-signs this chart in the absence of a cardiologist.    Sinus rhythm with PACs, no significant ST segment elevation or depression, nonspecific T wave changes  RADIOLOGY:All plain film, CT, MRI, and formal ultrasound images (except ED bedside ultrasound) are read by the radiologist, see reports below, unless otherwisenoted in MDM or here. XR CHEST PORTABLE   Final Result   No acute cardiopulmonary findings           LABS: All lab results were reviewed by myself, and all abnormals are listed below. Labs Reviewed   CBC WITH AUTO DIFFERENTIAL - Abnormal; Notable for the following components:       Result Value    WBC 12.0 (*)     Seg Neutrophils 81 (*)     Lymphocytes 10 (*)     Monocytes 8 (*)     Segs Absolute 9.70 (*)     All other components within normal limits   BASIC METABOLIC PANEL W/ REFLEX TO MG FOR LOW K - Abnormal; Notable for the following components:    Glucose 106 (*)     Potassium 3.5 (*)     All other components within normal limits   PROTIME-INR - Abnormal; Notable for the following components:    Protime 11.6 (*)     All other components within normal limits   TROPONIN   BRAIN NATRIURETIC PEPTIDE   APTT   MAGNESIUM   TROPONIN       EMERGENCY DEPARTMENTCOURSE:         Vitals:    Vitals:    08/18/21 1646 08/18/21 1831   BP: (!) 108/48 (!) 123/54   Pulse:  82   Resp: 22 22   Temp: 97.2 °F (36.2 °C)    SpO2: 97% 97%   Weight: 205 lb (93 kg)    Height: 5' 5\" (1.651 m)        The patient was given the following medications while in the emergency department:  Orders Placed This Encounter   Medications    aspirin chewable tablet 324 mg     CONSULTS:  None    FINAL IMPRESSION      1. Chest pain, unspecified type          DISPOSITION/PLAN   DISPOSITION [place] 08/18/2021 07:22:11 PM      PATIENT REFERRED TO:  No follow-up provider specified.   DISCHARGE MEDICATIONS:  Discharge Medication List as of 8/18/2021  9:20 PM        Johnny Young DO  Attending Emergency Physician                  Johnny Young DO  08/18/21 [id number]

## 2021-08-18 NOTE — ED NOTES
Comfort measures provided, patient denies any pain at this time. Pure wick put on patient, and working properly. Patient resting comfortably in bed, bed in lowest position, wheels locked, and call bell within reach. son at bedside.       Lor Kelly RN  08/18/21 6254

## 2021-08-18 NOTE — LETTER
[facility] ED  [address] [place] [address]  Phone: [phone]    Patient: Tommy Jarrett  YOB: 1950  Date: 8/18/2021 Time: 7:10 PM    Leaving the Hospital Against Medical Advice    Chart #:[id number]    This will certify that I, the undersigned,    ______________________________________________________________________    A patient in the above named medical center, having requested discharge and removal from the medical center against the advice of my attending physician(s), hereby release the Emergency Department, its physicians, officers and employees, severally and individually, from any and all liability of any nature whatsoever for any injury or harm or complication of any kind that may result directly or indirectly, by reason of my terminating my stay as a patient from [facility], and hereby waive any and all rights of action I may now have or later acquire as a result of my voluntary departure from [facility] and the termination of my stay as a patient therein. This release is made with the full knowledge of the danger that may result from the action which I am taking.       Date:_______________________                         ___________________________                                                                                    Patient/Legal Representative    Witness:        ____________________________                          ___________________________  Nurse                                                                        Physician

## 2021-08-18 NOTE — ED NOTES
Mode of arrival (squad #, walk in, police, etc) : walk in         Chief complaint(s): SOB        Arrival Note (brief scenario, treatment PTA, etc). : patient arrived to ED from home accompanied by son with C/O SOB, and CP, onset yesterday. Patient states the CP is midline, non radiating described as sharp stabbing pain. Patient states she was just sitting down when the pain and SOB came on. Patient denies any fevers, chills, N/V/D, abd pain, or receiving the covid vaccine, or covid exposure. Patient is a poor historian and has difficulty keeping her answers consistent. C= \"Have you ever felt that you should Cut down on your drinking? \"  No  A= \"Have people Annoyed you by criticizing your drinking? \"  No  G= \"Have you ever felt bad or Guilty about your drinking? \"  No  E= \"Have you ever had a drink as an Eye-opener first thing in the morning to steady your nerves or to help a hangover? \"  No      Deferred []      Reason for deferring: N/A    *If yes to two or more: probable alcohol abuse. Nilsa Stephens RN  08/18/21 6755

## 2021-08-19 ENCOUNTER — HOSPITAL ENCOUNTER (EMERGENCY)
Age: 71
Discharge: HOME | End: 2021-08-20
Attending: EMERGENCY MEDICINE
Payer: COMMERCIAL

## 2021-08-19 ENCOUNTER — HOSPITAL ENCOUNTER (EMERGENCY)
Age: 71
Discharge: HOME | End: 2021-08-19
Attending: EMERGENCY MEDICINE
Payer: COMMERCIAL

## 2021-08-19 ENCOUNTER — APPOINTMENT (OUTPATIENT)
Dept: CT IMAGING | Age: 71
End: 2021-08-19
Payer: COMMERCIAL

## 2021-08-19 VITALS
OXYGEN SATURATION: 97 % | RESPIRATION RATE: 26 BRPM | TEMPERATURE: 98.7 F | SYSTOLIC BLOOD PRESSURE: 130 MMHG | HEART RATE: 80 BPM | DIASTOLIC BLOOD PRESSURE: 60 MMHG

## 2021-08-19 DIAGNOSIS — N30.00 ACUTE CYSTITIS WITHOUT HEMATURIA: Primary | ICD-10-CM

## 2021-08-19 DIAGNOSIS — R10.84 GENERALIZED ABDOMINAL PAIN: Primary | ICD-10-CM

## 2021-08-19 LAB
-: ABNORMAL
ABSOLUTE EOS #: 0.07 K/UL (ref 0–0.44)
ABSOLUTE EOS #: 0.07 K/UL (ref 0–0.44)
ABSOLUTE IMMATURE GRANULOCYTE: 0.03 K/UL (ref 0–0.3)
ABSOLUTE IMMATURE GRANULOCYTE: 0.04 K/UL (ref 0–0.3)
ABSOLUTE LYMPH #: 1.32 K/UL (ref 1.1–3.7)
ABSOLUTE LYMPH #: 2.03 K/UL (ref 1.1–3.7)
ABSOLUTE MONO #: 0.85 K/UL (ref 0.1–1.2)
ABSOLUTE MONO #: 1.07 K/UL (ref 0.1–1.2)
ACETAMINOPHEN LEVEL: <5 UG/ML (ref 10–30)
ALBUMIN SERPL-MCNC: 3.7 G/DL (ref 3.5–5.2)
ALBUMIN SERPL-MCNC: 3.8 G/DL (ref 3.5–5.2)
ALBUMIN/GLOBULIN RATIO: 1.3 (ref 1–2.5)
ALBUMIN/GLOBULIN RATIO: 1.4 (ref 1–2.5)
ALP BLD-CCNC: 148 U/L (ref 35–104)
ALP BLD-CCNC: 154 U/L (ref 35–104)
ALT SERPL-CCNC: 19 U/L (ref 5–33)
ALT SERPL-CCNC: 27 U/L (ref 5–33)
AMORPHOUS: ABNORMAL
ANION GAP SERPL CALCULATED.3IONS-SCNC: 13 MMOL/L (ref 9–17)
ANION GAP SERPL CALCULATED.3IONS-SCNC: 14 MMOL/L (ref 9–17)
AST SERPL-CCNC: 16 U/L
AST SERPL-CCNC: 26 U/L
BACTERIA: ABNORMAL
BASOPHILS # BLD: 0 % (ref 0–2)
BASOPHILS # BLD: 0 % (ref 0–2)
BASOPHILS ABSOLUTE: <0.03 K/UL (ref 0–0.2)
BASOPHILS ABSOLUTE: <0.03 K/UL (ref 0–0.2)
BILIRUB SERPL-MCNC: 0.27 MG/DL (ref 0.3–1.2)
BILIRUB SERPL-MCNC: 0.34 MG/DL (ref 0.3–1.2)
BILIRUBIN URINE: NEGATIVE
BNP INTERPRETATION: NORMAL
BUN BLDV-MCNC: 8 MG/DL (ref 8–23)
BUN BLDV-MCNC: 9 MG/DL (ref 8–23)
BUN/CREAT BLD: ABNORMAL (ref 9–20)
BUN/CREAT BLD: ABNORMAL (ref 9–20)
CALCIUM SERPL-MCNC: 9.1 MG/DL (ref 8.6–10.4)
CALCIUM SERPL-MCNC: 9.2 MG/DL (ref 8.6–10.4)
CASTS UA: ABNORMAL /LPF (ref 0–8)
CHLORIDE BLD-SCNC: 103 MMOL/L (ref 98–107)
CHLORIDE BLD-SCNC: 105 MMOL/L (ref 98–107)
CO2: 21 MMOL/L (ref 20–31)
CO2: 23 MMOL/L (ref 20–31)
COLOR: YELLOW
CREAT SERPL-MCNC: 0.92 MG/DL (ref 0.5–0.9)
CREAT SERPL-MCNC: 0.94 MG/DL (ref 0.5–0.9)
CRYSTALS, UA: ABNORMAL /HPF
D-DIMER QUANTITATIVE: 1.88 MG/L FEU
DIFFERENTIAL TYPE: ABNORMAL
DIFFERENTIAL TYPE: ABNORMAL
EKG ATRIAL RATE: 75 BPM
EKG P AXIS: 82 DEGREES
EKG P-R INTERVAL: 118 MS
EKG Q-T INTERVAL: 440 MS
EKG QRS DURATION: 110 MS
EKG QTC CALCULATION (BAZETT): 491 MS
EKG R AXIS: -34 DEGREES
EKG T AXIS: -12 DEGREES
EKG VENTRICULAR RATE: 75 BPM
EOSINOPHILS RELATIVE PERCENT: 1 % (ref 1–4)
EOSINOPHILS RELATIVE PERCENT: 1 % (ref 1–4)
EPITHELIAL CELLS UA: ABNORMAL /HPF (ref 0–5)
ETHANOL PERCENT: <0.01 %
ETHANOL: <10 MG/DL
GFR AFRICAN AMERICAN: >60 ML/MIN
GFR AFRICAN AMERICAN: >60 ML/MIN
GFR NON-AFRICAN AMERICAN: 59 ML/MIN
GFR NON-AFRICAN AMERICAN: >60 ML/MIN
GFR SERPL CREATININE-BSD FRML MDRD: ABNORMAL ML/MIN/{1.73_M2}
GLUCOSE BLD-MCNC: 104 MG/DL (ref 70–99)
GLUCOSE BLD-MCNC: 114 MG/DL (ref 70–99)
GLUCOSE URINE: NEGATIVE
HCT VFR BLD CALC: 40.7 % (ref 36.3–47.1)
HCT VFR BLD CALC: 41.3 % (ref 36.3–47.1)
HEMOGLOBIN: 12.8 G/DL (ref 11.9–15.1)
HEMOGLOBIN: 13.3 G/DL (ref 11.9–15.1)
IMMATURE GRANULOCYTES: 0 %
IMMATURE GRANULOCYTES: 0 %
KETONES, URINE: NEGATIVE
LACTIC ACID, SEPSIS WHOLE BLOOD: 2 MMOL/L (ref 0.5–1.9)
LACTIC ACID, SEPSIS: ABNORMAL MMOL/L (ref 0.5–1.9)
LACTIC ACID, WHOLE BLOOD: 2.3 MMOL/L (ref 0.7–2.1)
LEUKOCYTE ESTERASE, URINE: ABNORMAL
LIPASE: 13 U/L (ref 13–60)
LYMPHOCYTES # BLD: 14 % (ref 24–43)
LYMPHOCYTES # BLD: 18 % (ref 24–43)
MAGNESIUM: 2 MG/DL (ref 1.6–2.6)
MCH RBC QN AUTO: 30 PG (ref 25.2–33.5)
MCH RBC QN AUTO: 30.3 PG (ref 25.2–33.5)
MCHC RBC AUTO-ENTMCNC: 31.4 G/DL (ref 28.4–34.8)
MCHC RBC AUTO-ENTMCNC: 32.2 G/DL (ref 28.4–34.8)
MCV RBC AUTO: 93 FL (ref 82.6–102.9)
MCV RBC AUTO: 96.2 FL (ref 82.6–102.9)
MONOCYTES # BLD: 10 % (ref 3–12)
MONOCYTES # BLD: 9 % (ref 3–12)
MUCUS: ABNORMAL
NITRITE, URINE: POSITIVE
NRBC AUTOMATED: 0 PER 100 WBC
NRBC AUTOMATED: 0 PER 100 WBC
OTHER OBSERVATIONS UA: ABNORMAL
PDW BLD-RTO: 12.2 % (ref 11.8–14.4)
PDW BLD-RTO: 12.6 % (ref 11.8–14.4)
PH UA: 5.5 (ref 5–8)
PLATELET # BLD: 251 K/UL (ref 138–453)
PLATELET # BLD: 258 K/UL (ref 138–453)
PLATELET ESTIMATE: ABNORMAL
PLATELET ESTIMATE: ABNORMAL
PMV BLD AUTO: 9 FL (ref 8.1–13.5)
PMV BLD AUTO: 9.1 FL (ref 8.1–13.5)
POTASSIUM SERPL-SCNC: 3.5 MMOL/L (ref 3.7–5.3)
POTASSIUM SERPL-SCNC: 3.8 MMOL/L (ref 3.7–5.3)
PRO-BNP: 231 PG/ML
PROTEIN UA: NEGATIVE
RBC # BLD: 4.23 M/UL (ref 3.95–5.11)
RBC # BLD: 4.44 M/UL (ref 3.95–5.11)
RBC # BLD: ABNORMAL 10*6/UL
RBC # BLD: ABNORMAL 10*6/UL
RBC UA: ABNORMAL /HPF (ref 0–4)
RENAL EPITHELIAL, UA: ABNORMAL /HPF
SALICYLATE LEVEL: 4 MG/DL (ref 3–10)
SEG NEUTROPHILS: 71 % (ref 36–65)
SEG NEUTROPHILS: 76 % (ref 36–65)
SEGMENTED NEUTROPHILS ABSOLUTE COUNT: 7.53 K/UL (ref 1.5–8.1)
SEGMENTED NEUTROPHILS ABSOLUTE COUNT: 7.82 K/UL (ref 1.5–8.1)
SODIUM BLD-SCNC: 138 MMOL/L (ref 135–144)
SODIUM BLD-SCNC: 141 MMOL/L (ref 135–144)
SPECIFIC GRAVITY UA: 1.04 (ref 1–1.03)
TOTAL PROTEIN: 6.3 G/DL (ref 6.4–8.3)
TOTAL PROTEIN: 6.7 G/DL (ref 6.4–8.3)
TOXIC TRICYCLIC SC,BLOOD: NEGATIVE
TRICHOMONAS: ABNORMAL
TROPONIN INTERP: NORMAL
TROPONIN INTERP: NORMAL
TROPONIN T: NORMAL NG/ML
TROPONIN T: NORMAL NG/ML
TROPONIN, HIGH SENSITIVITY: 10 NG/L (ref 0–14)
TROPONIN, HIGH SENSITIVITY: 12 NG/L (ref 0–14)
TURBIDITY: CLEAR
URINE HGB: NEGATIVE
UROBILINOGEN, URINE: NORMAL
WBC # BLD: 11.1 K/UL (ref 3.5–11.3)
WBC # BLD: 9.8 K/UL (ref 3.5–11.3)
WBC # BLD: ABNORMAL 10*3/UL
WBC # BLD: ABNORMAL 10*3/UL
WBC UA: ABNORMAL /HPF (ref 0–5)
YEAST: ABNORMAL

## 2021-08-19 PROCEDURE — 85379 FIBRIN DEGRADATION QUANT: CPT

## 2021-08-19 PROCEDURE — 96361 HYDRATE IV INFUSION ADD-ON: CPT

## 2021-08-19 PROCEDURE — 80307 DRUG TEST PRSMV CHEM ANLYZR: CPT

## 2021-08-19 PROCEDURE — 83690 ASSAY OF LIPASE: CPT

## 2021-08-19 PROCEDURE — 80143 DRUG ASSAY ACETAMINOPHEN: CPT

## 2021-08-19 PROCEDURE — 99285 EMERGENCY DEPT VISIT HI MDM: CPT

## 2021-08-19 PROCEDURE — 83735 ASSAY OF MAGNESIUM: CPT

## 2021-08-19 PROCEDURE — 6370000000 HC RX 637 (ALT 250 FOR IP): Performed by: STUDENT IN AN ORGANIZED HEALTH CARE EDUCATION/TRAINING PROGRAM

## 2021-08-19 PROCEDURE — 80179 DRUG ASSAY SALICYLATE: CPT

## 2021-08-19 PROCEDURE — 80053 COMPREHEN METABOLIC PANEL: CPT

## 2021-08-19 PROCEDURE — 93010 ELECTROCARDIOGRAM REPORT: CPT | Performed by: INTERNAL MEDICINE

## 2021-08-19 PROCEDURE — G0480 DRUG TEST DEF 1-7 CLASSES: HCPCS

## 2021-08-19 PROCEDURE — 96360 HYDRATION IV INFUSION INIT: CPT

## 2021-08-19 PROCEDURE — 81001 URINALYSIS AUTO W/SCOPE: CPT

## 2021-08-19 PROCEDURE — 93005 ELECTROCARDIOGRAM TRACING: CPT | Performed by: STUDENT IN AN ORGANIZED HEALTH CARE EDUCATION/TRAINING PROGRAM

## 2021-08-19 PROCEDURE — 71260 CT THORAX DX C+: CPT

## 2021-08-19 PROCEDURE — 84484 ASSAY OF TROPONIN QUANT: CPT

## 2021-08-19 PROCEDURE — 99283 EMERGENCY DEPT VISIT LOW MDM: CPT

## 2021-08-19 PROCEDURE — 83880 ASSAY OF NATRIURETIC PEPTIDE: CPT

## 2021-08-19 PROCEDURE — 83605 ASSAY OF LACTIC ACID: CPT

## 2021-08-19 PROCEDURE — 6360000004 HC RX CONTRAST MEDICATION: Performed by: STUDENT IN AN ORGANIZED HEALTH CARE EDUCATION/TRAINING PROGRAM

## 2021-08-19 PROCEDURE — 85025 COMPLETE CBC W/AUTO DIFF WBC: CPT

## 2021-08-19 RX ORDER — CEPHALEXIN 500 MG/1
500 CAPSULE ORAL ONCE
Status: COMPLETED | OUTPATIENT
Start: 2021-08-19 | End: 2021-08-19

## 2021-08-19 RX ORDER — CEPHALEXIN 500 MG/1
500 CAPSULE ORAL 4 TIMES DAILY
Qty: 28 CAPSULE | Refills: 0 | Status: SHIPPED | OUTPATIENT
Start: 2021-08-19 | End: 2021-08-26

## 2021-08-19 RX ORDER — 0.9 % SODIUM CHLORIDE 0.9 %
1000 INTRAVENOUS SOLUTION INTRAVENOUS ONCE
Status: COMPLETED | OUTPATIENT
Start: 2021-08-19 | End: 2021-08-20

## 2021-08-19 RX ORDER — ACETAMINOPHEN 500 MG
1000 TABLET ORAL ONCE
Status: COMPLETED | OUTPATIENT
Start: 2021-08-19 | End: 2021-08-19

## 2021-08-19 RX ADMIN — POTASSIUM BICARBONATE 40 MEQ: 782 TABLET, EFFERVESCENT ORAL at 03:15

## 2021-08-19 RX ADMIN — ACETAMINOPHEN 1000 MG: 500 TABLET ORAL at 03:01

## 2021-08-19 RX ADMIN — IOPAMIDOL 85 ML: 755 INJECTION, SOLUTION INTRAVENOUS at 02:55

## 2021-08-19 RX ADMIN — CEPHALEXIN 500 MG: 500 CAPSULE ORAL at 05:17

## 2021-08-19 ASSESSMENT — PAIN DESCRIPTION - DESCRIPTORS: DESCRIPTORS: SHARP

## 2021-08-19 ASSESSMENT — PAIN DESCRIPTION - FREQUENCY: FREQUENCY: INTERMITTENT

## 2021-08-19 ASSESSMENT — PAIN DESCRIPTION - ORIENTATION: ORIENTATION: RIGHT

## 2021-08-19 ASSESSMENT — PAIN SCALES - GENERAL
PAINLEVEL_OUTOF10: 6
PAINLEVEL_OUTOF10: 5

## 2021-08-19 ASSESSMENT — ENCOUNTER SYMPTOMS
ABDOMINAL PAIN: 1
ABDOMINAL PAIN: 1
VOMITING: 0
NAUSEA: 1
BACK PAIN: 0
COUGH: 0
COUGH: 0
BACK PAIN: 0
VOMITING: 0
SORE THROAT: 0
TACHYPNEA: 1
SHORTNESS OF BREATH: 1

## 2021-08-19 ASSESSMENT — PAIN DESCRIPTION - LOCATION
LOCATION: CHEST
LOCATION: ABDOMEN

## 2021-08-19 NOTE — ED PROVIDER NOTES
[facility] ED  Emergency Department Encounter  EmergencyMedicine Resident     Pt Lisabeth Osgood  MRN: [id number]  [place] 1950  Date of evaluation: 8/19/21  PCP:  Shamir Rollins MD    This patient was evaluated in the Emergency Department for symptoms described in the history of present illness. The patient was evaluated in the context of the global COVID-19 pandemic, which necessitated consideration that the patient might be at risk for infection with the SARS-CoV-2 virus that causes COVID-19. Institutional protocols and algorithms that pertain to the evaluation of patients at risk for COVID-19 are in a state of rapid change based on information released by regulatory bodies including the CDC and federal and state organizations. These policies and algorithms were followed during the patient's care in the ED. CHIEF COMPLAINT       Chief Complaint   Patient presents with    Chest Pain    Anxiety    Abdominal Pain     HISTORY OF PRESENT ILLNESS  (Location/Symptom, Timing/Onset, Context/Setting, Quality, Duration, Modifying Factors, Severity.)      Kathie Shaikh is a 69 y.o. female who presents with chest pain, anxiety and abdominal pain, was seen at [facility] earlier today, per son Moraima Rushing did not find nothing wrong with her, so he came here\". Per son, patient has been saying that she is more nervous and is worried. When asked patient what she is worried about, patient states \"I do not know\". Patient complaining of chest pain that is unchanged from previous at this time. Also complaining of abdominal pain. Denies nausea or vomiting. Has been able to tolerate p.o. well. Has not received Covid vaccines.     PAST MEDICAL / SURGICAL / SOCIAL / FAMILY HISTORY      has a past medical history of Arthritis, Bronchitis, Chronic obstructive pulmonary disease ([address] Colon polyps, Controlled type 2 diabetes mellitus without complication, without long-term current use of insulin [facility]), Dental neglect, Depression, Environmental allergies, GERD (gastroesophageal reflux disease), Hyperlipidemia, Hypertension, Obesity, Onychomycosis, Poor historian, RBBB, Treadmill stress test negative for angina pectoris, and Wears glasses. has a past surgical history that includes Cholecystectomy; doppler echocardiography; Colonoscopy (2013); and Tubal ligation. Social History     Socioeconomic History    Marital status: Single     Spouse name: Not on file    Number of children: Not on file    Years of education: Not on file    Highest education level: Not on file   Occupational History    Not on file   Tobacco Use    Smoking status: Former Smoker     Packs/day: 0.50     Years: 35.00     Pack years: 17.50     Types: Cigarettes     Quit date: 2015     Years since quittin.0    Smokeless tobacco: Never Used   Substance and Sexual Activity    Alcohol use: No     Alcohol/week: 0.0 standard drinks    Drug use: No    Sexual activity: Not on file   Other Topics Concern    Not on file   Social History Narrative    Not on file     Social Determinants of Health     Financial Resource Strain: Low Risk     Difficulty of Paying Living Expenses: Not hard at all   Food Insecurity: No Food Insecurity    Worried About [address] [facility] in the Last Year: Never true    [address]  in the Last Year: Never true   Transportation Needs: No Transportation Needs    Lack of Transportation (Medical): No    Lack of Transportation (Non-Medical):  No   Physical Activity:     Days of Exercise per Week:     Minutes of Exercise per Session:    Stress:     Feeling of Stress :    Social Connections:     Frequency of Communication with Friends and Family:     Frequency of Social Gatherings with Friends and Family:     Attends Judaism Services:     Active Member of Clubs or Organizations:     Attends Club or Organization Meetings:     Marital Status:    Intimate Partner Violence:     Fear of Current or Ex-Partner:     Emotionally Abused:     Physically Abused:     Sexually Abused:        Family History   Problem Relation Age of Onset    Heart Disease Mother     Cancer Father        Allergies:  Patient has no known allergies. Home Medications:  Prior to Admission medications    Medication Sig Start Date End Date Taking? Authorizing Provider   cephALEXin (KEFLEX) 500 MG capsule Take 1 capsule by mouth 4 times daily for 7 days 8/19/21 8/26/21 Yes Shalom Duque MD   atorvastatin (LIPITOR) 20 MG tablet Take 1 tablet by mouth daily 6/29/21   Liu Barraza MD   tiotropium (SPIRIVA RESPIMAT) 1.25 MCG/ACT AERS inhaler Inhale 2 puffs into the lungs daily 8/12/19   Eusebio Herrmann MD   acetaminophen (TYLENOL) 500 MG tablet Take 2 tablets by mouth every 8 hours as needed for Pain 3/7/19   Charmayne Punches, MD   haloperidol (HALDOL) 5 MG tablet Take 5 mg by mouth 2 times daily    Historical Provider, MD   traZODone (DESYREL) 100 MG tablet  8/9/17   Historical Provider, MD   Elastic Bandages & Supports (ACE ARM SLING) MISC Apply 1 Units topically daily 4/17/17   Elyssa Aggarwal MD   trihexyphenidyl (ARTANE) 2 MG tablet Take 2 mg by mouth 2 times daily  10/10/14   Historical Provider, MD       REVIEW OF SYSTEMS    (2-9 systems for level 4, 10 or more for level 5)      Review of Systems   Constitutional: Negative for chills and fever. HENT: Negative for sore throat. Eyes: Negative for visual disturbance. Respiratory: Negative for cough. Cardiovascular: Positive for chest pain. Gastrointestinal: Positive for abdominal pain. Negative for vomiting. Genitourinary: Negative for dysuria and hematuria. Musculoskeletal: Negative for back pain. Neurological: Negative for light-headedness and headaches. Psychiatric/Behavioral: Negative for confusion.        PHYSICAL EXAM   (up to 7 for level 4, 8 or more for level 5)      INITIAL VITALS:   /60   Pulse 80   Temp 98.7 °F (37.1 °C) (Oral)   Resp 26 SpO2 97%     Physical Exam  Constitutional:       Appearance: She is well-developed. She is obese. HENT:      Head: Normocephalic. Mouth/Throat:      Mouth: Mucous membranes are moist.   Eyes:      Extraocular Movements: Extraocular movements intact. Pupils: Pupils are equal, round, and reactive to light. Cardiovascular:      Rate and Rhythm: Normal rate and regular rhythm. Heart sounds: Normal heart sounds. Pulmonary:      Effort: Pulmonary effort is normal.      Breath sounds: Normal breath sounds. Abdominal:      General: Abdomen is protuberant. Bowel sounds are normal. There is no distension. Palpations: Abdomen is soft. Tenderness: There is no abdominal tenderness. There is no right CVA tenderness or left CVA tenderness. Skin:     General: Skin is warm. Capillary Refill: Capillary refill takes less than 2 seconds. Neurological:      General: No focal deficit present. Mental Status: She is alert and oriented to person, place, and time.          DIFFERENTIAL  DIAGNOSIS     PLAN (LABS / IMAGING / EKG):  Orders Placed This Encounter   Procedures    CT CHEST PULMONARY EMBOLISM W CONTRAST    LACTIC ACID, WHOLE BLOOD    Troponin    CBC WITH AUTO DIFFERENTIAL    COMPREHENSIVE METABOLIC PANEL    LIPASE    Urinalysis with microscopic    D-DIMER, QUANTITATIVE    Brain Natriuretic Peptide    Straight cath    EKG 12 Lead       MEDICATIONS ORDERED:  Orders Placed This Encounter   Medications    iopamidol (ISOVUE-370) 76 % injection 85 mL    acetaminophen (TYLENOL) tablet 1,000 mg    potassium bicarb-citric acid (EFFER-K) effervescent tablet 40 mEq    cephALEXin (KEFLEX) capsule 500 mg     Order Specific Question:   Antimicrobial Indications     Answer:   Urinary Tract Infection    cephALEXin (KEFLEX) 500 MG capsule     Sig: Take 1 capsule by mouth 4 times daily for 7 days     Dispense:  28 capsule     Refill:  0     DDX:  STEMI, NSTEMI, angina, PE, anxiety    DIAGNOSTIC RESULTS / EMERGENCY DEPARTMENT COURSE / MDM   LAB RESULTS:  Results for orders placed or performed during the hospital encounter of 08/19/21   LACTIC ACID, WHOLE BLOOD   Result Value Ref Range    Lactic Acid, Whole Blood 2.3 (H) 0.7 - 2.1 mmol/L   Troponin   Result Value Ref Range    Troponin, High Sensitivity 10 0 - 14 ng/L    Troponin T NOT REPORTED <0.03 ng/mL    Troponin Interp NOT REPORTED    CBC WITH AUTO DIFFERENTIAL   Result Value Ref Range    WBC 9.8 3.5 - 11.3 k/uL    RBC 4.44 3.95 - 5.11 m/uL    Hemoglobin 13.3 11.9 - 15.1 g/dL    Hematocrit 41.3 36.3 - 47.1 %    MCV 93.0 82.6 - 102.9 fL    MCH 30.0 25.2 - 33.5 pg    MCHC 32.2 28.4 - 34.8 g/dL    RDW 12.2 11.8 - 14.4 %    Platelets 163 514 - 667 k/uL    MPV 9.1 8.1 - 13.5 fL    NRBC Automated 0.0 0.0 per 100 WBC    Differential Type NOT REPORTED     Seg Neutrophils 76 (H) 36 - 65 %    Lymphocytes 14 (L) 24 - 43 %    Monocytes 9 3 - 12 %    Eosinophils % 1 1 - 4 %    Basophils 0 0 - 2 %    Immature Granulocytes 0 0 %    Segs Absolute 7.53 1.50 - 8.10 k/uL    Absolute Lymph # 1.32 1.10 - 3.70 k/uL    Absolute Mono # 0.85 0.10 - 1.20 k/uL    Absolute Eos # 0.07 0.00 - 0.44 k/uL    Basophils Absolute <0.03 0.00 - 0.20 k/uL    Absolute Immature Granulocyte 0.03 0.00 - 0.30 k/uL    WBC Morphology NOT REPORTED     RBC Morphology NOT REPORTED     Platelet Estimate NOT REPORTED    COMPREHENSIVE METABOLIC PANEL   Result Value Ref Range    Glucose 114 (H) 70 - 99 mg/dL    BUN 8 8 - 23 mg/dL    CREATININE 0.92 (H) 0.50 - 0.90 mg/dL    Bun/Cre Ratio NOT REPORTED 9 - 20    Calcium 9.1 8.6 - 10.4 mg/dL    Sodium 141 135 - 144 mmol/L    Potassium 3.5 (L) 3.7 - 5.3 mmol/L    Chloride 105 98 - 107 mmol/L    CO2 23 20 - 31 mmol/L    Anion Gap 13 9 - 17 mmol/L    Alkaline Phosphatase 148 (H) 35 - 104 U/L    ALT 19 5 - 33 U/L    AST 16 <32 U/L    Total Bilirubin 0.27 (L) 0.3 - 1.2 mg/dL    Total Protein 6.3 (L) 6.4 - 8.3 g/dL    Albumin 3.7 3.5 - 5.2 g/dL    Albumin/Globulin Ratio 1.4 1.0 - 2.5    GFR Non-African American >60 >60 mL/min    GFR African American >60 >60 mL/min    GFR Comment          GFR Staging NOT REPORTED    LIPASE   Result Value Ref Range    Lipase 13 13 - 60 U/L   Urinalysis with microscopic   Result Value Ref Range    Color, UA YELLOW YELLOW    Turbidity UA CLEAR CLEAR    Glucose, Ur NEGATIVE NEGATIVE    Bilirubin Urine NEGATIVE NEGATIVE    Ketones, Urine NEGATIVE NEGATIVE    Specific Gravity, UA 1.037 (H) 1.005 - 1.030    Urine Hgb NEGATIVE NEGATIVE    pH, UA 5.5 5.0 - 8.0    Protein, UA NEGATIVE NEGATIVE    Urobilinogen, Urine Normal Normal    Nitrite, Urine POSITIVE (A) NEGATIVE    Leukocyte Esterase, Urine MODERATE (A) NEGATIVE    -          WBC, UA 50  0 - 5 /HPF    RBC, UA 0 TO 2 0 - 4 /HPF    Casts UA  0 - 8 /LPF     10 TO 20 HYALINE Reference range defined for non-centrifuged specimen. Crystals, UA NOT REPORTED None /HPF    Epithelial Cells UA 2 TO 5 0 - 5 /HPF    Renal Epithelial, UA NOT REPORTED 0 /HPF    Bacteria, UA MANY (A) None    Mucus, UA NOT REPORTED None    Trichomonas, UA NOT REPORTED None    Amorphous, UA NOT REPORTED None    Other Observations UA NOT REPORTED NOT REQ. Yeast, UA NOT REPORTED None   D-DIMER, QUANTITATIVE   Result Value Ref Range    D-Dimer, Quant 1.88 mg/L FEU   Brain Natriuretic Peptide   Result Value Ref Range    Pro- <300 pg/mL    BNP Interpretation Pro-BNP Reference Range:        IMPRESSION: 65-year-old lady presents to the emergency department with nervousness and chest pain that was unchanged from previously when seen at [facility] multiple hours ago. Patient brought by son and states that she is nervous and unable to sleep hence came here. No new symptoms. Physical exam grossly unremarkable. Cardiac work-up unremarkable. CT PE negative. Urine showing UTI. First dose of Keflex given in the emergency department.   Discussed with patient regarding follow-up with primary care

## 2021-08-19 NOTE — ED TRIAGE NOTES
Patient arrived to ED with C/O chest pain, nervousness and abdominal pain in the right upper quadrant. Patient reports that she was seen at [facility] for same symptoms yesterday @ 1900 but they report no abnormality. Patient also reports she is out of her medication Trazodone which helps her sleep at night. Patient reports she use to smoke and has not taken her COVID vaccine. Patient is AOx4 and is afebrile at this time.  Dr. Jeanette Euceda, Dr. Mercedes Lira, and Dr. Niecy Jimenez at bedside for eval.

## 2021-08-19 NOTE — ED PROVIDER NOTES
for separately reportable procedures.        [facility], DO  08/19/21 [address] [place] Mcdaniel, DO  08/19/21 0170

## 2021-08-20 ENCOUNTER — APPOINTMENT (OUTPATIENT)
Dept: CT IMAGING | Age: 71
End: 2021-08-20
Payer: COMMERCIAL

## 2021-08-20 ENCOUNTER — HOSPITAL ENCOUNTER (EMERGENCY)
Age: 71
Discharge: LEFT BEFORE DISCHARGE | End: 2021-08-21
Payer: COMMERCIAL

## 2021-08-20 VITALS
TEMPERATURE: 96.6 F | SYSTOLIC BLOOD PRESSURE: 141 MMHG | OXYGEN SATURATION: 94 % | HEART RATE: 90 BPM | DIASTOLIC BLOOD PRESSURE: 67 MMHG | RESPIRATION RATE: 22 BRPM

## 2021-08-20 LAB
EKG ATRIAL RATE: 90 BPM
EKG P-R INTERVAL: 156 MS
EKG Q-T INTERVAL: 410 MS
EKG QRS DURATION: 106 MS
EKG QTC CALCULATION (BAZETT): 501 MS
EKG R AXIS: -15 DEGREES
EKG T AXIS: -27 DEGREES
EKG VENTRICULAR RATE: 90 BPM
LACTIC ACID, SEPSIS WHOLE BLOOD: 1.7 MMOL/L (ref 0.5–1.9)
LACTIC ACID, SEPSIS: NORMAL MMOL/L (ref 0.5–1.9)

## 2021-08-20 PROCEDURE — 96360 HYDRATION IV INFUSION INIT: CPT

## 2021-08-20 PROCEDURE — 83605 ASSAY OF LACTIC ACID: CPT

## 2021-08-20 PROCEDURE — 93010 ELECTROCARDIOGRAM REPORT: CPT | Performed by: INTERNAL MEDICINE

## 2021-08-20 PROCEDURE — 96361 HYDRATE IV INFUSION ADD-ON: CPT

## 2021-08-20 PROCEDURE — 2580000003 HC RX 258: Performed by: STUDENT IN AN ORGANIZED HEALTH CARE EDUCATION/TRAINING PROGRAM

## 2021-08-20 PROCEDURE — 74177 CT ABD & PELVIS W/CONTRAST: CPT

## 2021-08-20 PROCEDURE — 6360000004 HC RX CONTRAST MEDICATION: Performed by: STUDENT IN AN ORGANIZED HEALTH CARE EDUCATION/TRAINING PROGRAM

## 2021-08-20 PROCEDURE — 6370000000 HC RX 637 (ALT 250 FOR IP): Performed by: STUDENT IN AN ORGANIZED HEALTH CARE EDUCATION/TRAINING PROGRAM

## 2021-08-20 RX ORDER — ACETAMINOPHEN 500 MG
1000 TABLET ORAL ONCE
Status: COMPLETED | OUTPATIENT
Start: 2021-08-20 | End: 2021-08-20

## 2021-08-20 RX ADMIN — ACETAMINOPHEN 1000 MG: 500 TABLET ORAL at 01:20

## 2021-08-20 RX ADMIN — IOPAMIDOL 75 ML: 755 INJECTION, SOLUTION INTRAVENOUS at 00:39

## 2021-08-20 RX ADMIN — SODIUM CHLORIDE 1000 ML: 9 INJECTION, SOLUTION INTRAVENOUS at 00:00

## 2021-08-20 ASSESSMENT — PAIN SCALES - GENERAL: PAINLEVEL_OUTOF10: 5

## 2021-08-20 NOTE — ED PROVIDER NOTES
[facility] ED  Emergency Department Encounter  EmergencyMedicine Resident     Pt eRji Eden  MRN: [id number]  Sharongfofelia 1950  Date of evaluation: 8/19/21  PCP:  Link Pritchard MD    This patient was evaluated in the Emergency Department for symptoms described in the history of present illness. The patient was evaluated in the context of the global COVID-19 pandemic, which necessitated consideration that the patient might be at risk for infection with the SARS-CoV-2 virus that causes COVID-19. Institutional protocols and algorithms that pertain to the evaluation of patients at risk for COVID-19 are in a state of rapid change based on information released by regulatory bodies including the CDC and federal and state organizations. These policies and algorithms were followed during the patient's care in the ED. CHIEF COMPLAINT       No chief complaint on file. HISTORY OF PRESENT ILLNESS  (Location/Symptom, Timing/Onset, Context/Setting, Quality, Duration, Modifying Factors, Severity.)      Hali Hines is a 69 y.o. female who presents with same complaints as yesterday's visit, patient states that she feels sick, and is nauseated. Patient continues to report abdominal pain, with chest pain and mild shortness of breath. Limited history due to communication barrier for mentation baseline. Patient is able to answer questions appropriately, however is minimally expressive. Patient states that she has been compliant with her antibiotics for her cystitis that was diagnosed yesterday.      PAST MEDICAL / SURGICAL / SOCIAL / FAMILY HISTORY      has a past medical history of Arthritis, Bronchitis, Chronic obstructive pulmonary disease ([address] Colon polyps, Controlled type 2 diabetes mellitus without complication, without long-term current use of insulin ([address] Dental neglect, Depression, Environmental allergies, GERD (gastroesophageal reflux disease), Hyperlipidemia, Hypertension, Obesity, Onychomycosis, Poor historian, RBBB, Treadmill stress test negative for angina pectoris, and Wears glasses. has a past surgical history that includes Cholecystectomy; doppler echocardiography; Colonoscopy (2013); and Tubal ligation. Social History     Socioeconomic History    Marital status: Single     Spouse name: Not on file    Number of children: Not on file    Years of education: Not on file    Highest education level: Not on file   Occupational History    Not on file   Tobacco Use    Smoking status: Former Smoker     Packs/day: 0.50     Years: 35.00     Pack years: 17.50     Types: Cigarettes     Quit date: 2015     Years since quittin.0    Smokeless tobacco: Never Used   Substance and Sexual Activity    Alcohol use: No     Alcohol/week: 0.0 standard drinks    Drug use: No    Sexual activity: Not on file   Other Topics Concern    Not on file   Social History Narrative    Not on file     Social Determinants of Health     Financial Resource Strain: Low Risk     Difficulty of Paying Living Expenses: Not hard at all   Food Insecurity: No Food Insecurity    Worried About [employer] [employer] in the Last Year: Never true    [facility] in the Last Year: Never true   Transportation Needs: No Transportation Needs    Lack of Transportation (Medical): No    Lack of Transportation (Non-Medical):  No   Physical Activity:     Days of Exercise per Week:     Minutes of Exercise per Session:    Stress:     Feeling of Stress :    Social Connections:     Frequency of Communication with Friends and Family:     Frequency of Social Gatherings with Friends and Family:     Attends Adventism Services:     Active Member of Clubs or Organizations:     Attends Club or Organization Meetings:     Marital Status:    Intimate Partner Violence:     Fear of Current or Ex-Partner:     Emotionally Abused:     Physically Abused:     Sexually Abused:        Family History   Problem Relation Age of Onset    Heart Disease Mother     Cancer Father        Allergies:  Patient has no known allergies. Home Medications:  Prior to Admission medications    Medication Sig Start Date End Date Taking? Authorizing Provider   cephALEXin (KEFLEX) 500 MG capsule Take 1 capsule by mouth 4 times daily for 7 days 8/19/21 8/26/21  LOGAN Murcia MD   atorvastatin (LIPITOR) 20 MG tablet Take 1 tablet by mouth daily 6/29/21   Destiny Moser MD   tiotropium (SPIRIVA RESPIMAT) 1.25 MCG/ACT AERS inhaler Inhale 2 puffs into the lungs daily 8/12/19   Edward Ann MD   acetaminophen (TYLENOL) 500 MG tablet Take 2 tablets by mouth every 8 hours as needed for Pain 3/7/19   Rahul Rogers MD   haloperidol (HALDOL) 5 MG tablet Take 5 mg by mouth 2 times daily    Historical Provider, MD   traZODone (DESYREL) 100 MG tablet  8/9/17   Historical Provider, MD   Elastic Bandages & Supports (ACE ARM SLING) MISC Apply 1 Units topically daily 4/17/17   Meek Johnson MD   trihexyphenidyl (ARTANE) 2 MG tablet Take 2 mg by mouth 2 times daily  10/10/14   Historical Provider, MD       REVIEW OF SYSTEMS    (2-9 systems for level 4, 10 or more for level 5)      Review of Systems   Constitutional: Negative for chills and fever. HENT: Negative for tinnitus. Respiratory: Positive for shortness of breath. Negative for cough. Cardiovascular: Positive for chest pain. Gastrointestinal: Positive for abdominal pain and nausea. Negative for vomiting. Endocrine: Negative for polyuria. Genitourinary: Negative for dysuria and hematuria. Musculoskeletal: Negative for back pain. Neurological: Negative for light-headedness, numbness and headaches. Psychiatric/Behavioral: Negative for confusion.        PHYSICAL EXAM   (up to 7 for level 4, 8 or more for level 5)      INITIAL VITALS:   BP (!) 141/67   Pulse 90   Temp 96.6 °F (35.9 °C) (Oral)   Resp 22   SpO2 94%     Physical Exam  Constitutional:       Appearance: Normal appearance. She is obese. HENT:      Head: Normocephalic. Nose: Nose normal.      Mouth/Throat:      Mouth: Mucous membranes are moist.      Pharynx: Oropharynx is clear. Eyes:      Extraocular Movements: Extraocular movements intact. Pupils: Pupils are equal, round, and reactive to light. Cardiovascular:      Rate and Rhythm: Regular rhythm. Tachycardia present. Pulses: Normal pulses. Heart sounds: Normal heart sounds. Pulmonary:      Effort: Pulmonary effort is normal.      Breath sounds: Normal breath sounds. Abdominal:      Palpations: Abdomen is soft. Tenderness: There is no abdominal tenderness. There is no right CVA tenderness or left CVA tenderness. Musculoskeletal:      Cervical back: Normal range of motion and neck supple. Skin:     General: Skin is warm. Capillary Refill: Capillary refill takes less than 2 seconds. Neurological:      General: No focal deficit present. Mental Status: She is alert and oriented to person, place, and time. Mental status is at baseline.    Psychiatric:         Mood and Affect: Mood normal.       DIFFERENTIAL  DIAGNOSIS     PLAN (LABS / IMAGING / EKG):  Orders Placed This Encounter   Procedures    CT ABDOMEN PELVIS W IV CONTRAST Additional Contrast? None    CBC WITH AUTO DIFFERENTIAL    COMPREHENSIVE METABOLIC PANEL    Lactate, Sepsis    Troponin    TOX SCR, BLD, ED    MAGNESIUM    EKG 12 Lead       MEDICATIONS ORDERED:  Orders Placed This Encounter   Medications    0.9 % sodium chloride bolus    iopamidol (ISOVUE-370) 76 % injection 75 mL    acetaminophen (TYLENOL) tablet 1,000 mg       DDX: Mesenteric ischemia, acute pancreatitis, cholecystitis, ACS    DIAGNOSTIC RESULTS / EMERGENCY DEPARTMENT COURSE / MDM   LAB RESULTS:  Results for orders placed or performed during the hospital encounter of 08/19/21   CBC WITH AUTO DIFFERENTIAL   Result Value Ref Range    WBC 11.1 3.5 - 11.3 k/uL    RBC 4.23 3.95 - 5.11 m/uL Hemoglobin 12.8 11.9 - 15.1 g/dL    Hematocrit 40.7 36.3 - 47.1 %    MCV 96.2 82.6 - 102.9 fL    MCH 30.3 25.2 - 33.5 pg    MCHC 31.4 28.4 - 34.8 g/dL    RDW 12.6 11.8 - 14.4 %    Platelets 211 585 - 781 k/uL    MPV 9.0 8.1 - 13.5 fL    NRBC Automated 0.0 0.0 per 100 WBC    Differential Type NOT REPORTED     Seg Neutrophils 71 (H) 36 - 65 %    Lymphocytes 18 (L) 24 - 43 %    Monocytes 10 3 - 12 %    Eosinophils % 1 1 - 4 %    Basophils 0 0 - 2 %    Immature Granulocytes 0 0 %    Segs Absolute 7.82 1.50 - 8.10 k/uL    Absolute Lymph # 2.03 1.10 - 3.70 k/uL    Absolute Mono # 1.07 0.10 - 1.20 k/uL    Absolute Eos # 0.07 0.00 - 0.44 k/uL    Basophils Absolute <0.03 0.00 - 0.20 k/uL    Absolute Immature Granulocyte 0.04 0.00 - 0.30 k/uL    WBC Morphology NOT REPORTED     RBC Morphology NOT REPORTED     Platelet Estimate NOT REPORTED    COMPREHENSIVE METABOLIC PANEL   Result Value Ref Range    Glucose 104 (H) 70 - 99 mg/dL    BUN 9 8 - 23 mg/dL    CREATININE 0.94 (H) 0.50 - 0.90 mg/dL    Bun/Cre Ratio NOT REPORTED 9 - 20    Calcium 9.2 8.6 - 10.4 mg/dL    Sodium 138 135 - 144 mmol/L    Potassium 3.8 3.7 - 5.3 mmol/L    Chloride 103 98 - 107 mmol/L    CO2 21 20 - 31 mmol/L    Anion Gap 14 9 - 17 mmol/L    Alkaline Phosphatase 154 (H) 35 - 104 U/L    ALT 27 5 - 33 U/L    AST 26 <32 U/L    Total Bilirubin 0.34 0.3 - 1.2 mg/dL    Total Protein 6.7 6.4 - 8.3 g/dL    Albumin 3.8 3.5 - 5.2 g/dL    Albumin/Globulin Ratio 1.3 1.0 - 2.5    GFR Non- 59 (L) >60 mL/min    GFR African American >60 >60 mL/min    GFR Comment          GFR Staging NOT REPORTED    Lactate, Sepsis   Result Value Ref Range    Lactic Acid, Sepsis NOT REPORTED 0.5 - 1.9 mmol/L    Lactic Acid, Sepsis, Whole Blood 2.0 (H) 0.5 - 1.9 mmol/L   Lactate, Sepsis   Result Value Ref Range    Lactic Acid, Sepsis NOT REPORTED 0.5 - 1.9 mmol/L    Lactic Acid, Sepsis, Whole Blood 1.7 0.5 - 1.9 mmol/L   Troponin   Result Value Ref Range    Troponin, High for Miller's presence    [EM]   0021 Patient son called back, states that he cannot make it to the emergency room at this time. Explained to patient's son that patient at this time does not have capacity to make the decision to leave [address] Explained to him that we are still awaiting a CT abdomen pelvis scan to rule out life-threatening conditions. He verbalized agreement and understanding, states that he does understand his mother at this time does not have the capacity to make this decision. Agrees with our plan. States that he can be contacted at any time in the night.    [EM]   0124 CT AP  Mild nonobstructive gaseous distention of large and small bowel.     2.8 cm polyp in the ascending colon near the hepatic flexure. Follow-up  colonoscopy recommended. [EM]      ED Course User Index  [EM] Oleksandr Seay MD        PROCEDURES:  None    CONSULTS:  None    FINAL IMPRESSION      1.  Generalized abdominal pain          DISPOSITION / PLAN     DISPOSITION        PATIENT REFERRED TO:  Carmella Kamara MD  [address] [place] [address] [address]    Schedule an appointment as soon as possible for a visit   For follow up    [facility]  [address]  Go to   As needed      DISCHARGE MEDICATIONS:  Discharge Medication List as of 8/20/2021  1:44 AM          Oleksandr Seay MD  Emergency Medicine Resident    (Please note that portions of thisnote were completed with a voice recognition program.  Efforts were made to edit the dictations but occasionally words are mis-transcribed.)       Oleksandr Seay MD  Resident  08/20/21 9655

## 2021-08-20 NOTE — ED PROVIDER NOTES
[facility]     Emergency Department     Faculty Attestation    I performed a history and physical examination of the patient and discussed management with the resident. I have reviewed and agree with the residents findings including all diagnostic interpretations, and treatment plans as written. Any areas of disagreement are noted on the chart. I was personally present for the key portions of any procedures. I have documented in the chart those procedures where I was not present during the key portions. I have reviewed the emergency nurses triage note. I agree with the chief complaint, past medical history, past surgical history, allergies, medications, social and family history as documented unless otherwise noted below. Documentation of the HPI, Physical Exam and Medical Decision Making performed by scribann marie is based on my personal performance of the HPI, PE and MDM. For Physician Assistant/ Nurse Practitioner cases/documentation I have personally evaluated this patient and have completed at least one if not all key elements of the E/M (history, physical exam, and MDM). Additional findings are as noted. 70 yo F c/o sob, seen yesterday, today having non focused complaints, no injury,   pe hr 90 on recheck, Kirby RN escort for exam, flat affect, no acute distress, lolita, neck supple, abdomen non tender, no distension, no rigidity, no guarding, 2+ ankle edema, no calf tenderness,     Lactate improved, trop stable, ct stable, s/s improved, pt request discharge, vss,     EKG Interpretation    Interpreted by me  Sinus rhythm, with pac, hr 90, no st elevation, L axis, qtc 501,     CRITICAL CARE: There was a high probability of clinically significant/life threatening deterioration in this patient's condition which required my urgent intervention. Total critical care time was 10 minutes. This excludes any time for separately reportable procedures.        Ana [address] Dania Franks, DO  08/19/21 [address] Cheryl Houser, DO  08/20/21 9759

## 2021-08-21 VITALS
OXYGEN SATURATION: 97 % | SYSTOLIC BLOOD PRESSURE: 173 MMHG | HEART RATE: 70 BPM | RESPIRATION RATE: 22 BRPM | DIASTOLIC BLOOD PRESSURE: 69 MMHG | TEMPERATURE: 97.2 F

## 2021-08-21 LAB
EKG ATRIAL RATE: 92 BPM
EKG Q-T INTERVAL: 414 MS
EKG QRS DURATION: 112 MS
EKG QTC CALCULATION (BAZETT): 511 MS
EKG R AXIS: -43 DEGREES
EKG T AXIS: -7 DEGREES
EKG VENTRICULAR RATE: 92 BPM

## 2021-08-21 PROCEDURE — 93010 ELECTROCARDIOGRAM REPORT: CPT | Performed by: INTERNAL MEDICINE

## 2021-08-21 ASSESSMENT — PAIN SCALES - GENERAL: PAINLEVEL_OUTOF10: 2

## 2021-08-21 NOTE — ED TRIAGE NOTES
Mode of arrival (squad #, walk in, police, etc) : walk in        Chief complaint(s): anxiety        Arrival Note (brief scenario, treatment PTA, etc). : Pt states she can;t sleep. Pt states that she is hearing voices that make her nervous. Pt states that the voices are not threatening, nor does she feel suicidal. Pt denies anyone trying to hurt her. Pt is very anxious. C= \"Have you ever felt that you should Cut down on your drinking? \"  No  A= \"Have people Annoyed you by criticizing your drinking? \"  No  G= \"Have you ever felt bad or Guilty about your drinking? \"  No  E= \"Have you ever had a drink as an Eye-opener first thing in the morning to steady your nerves or to help a hangover? \"  No      Deferred []      Reason for deferring: N/A    *If yes to two or more: probable alcohol abuse. *

## 2021-09-02 ENCOUNTER — TELEPHONE (OUTPATIENT)
Dept: INTERNAL MEDICINE CLINIC | Age: 71
End: 2021-09-02

## 2021-09-02 NOTE — LETTER
[facility]          09/02/2021    Dear Navi Parson: You recently missed a scheduled appointment with Dr Aicha Lenó on 09/02/2021 . This is the  scheduledappointment that you have missed within the last twelve months. If you miss 2 more appointment, you may be dismissed from the practice. It is your responsibility to arrive to your appointment. Please call our office as soon as possible so that we may reschedule your appointment, because your health and follow-up medical care are important to us. For future appointments that you are unable to keep, please call the office at [phone] at least 24 hours in advance to cancel and reschedule. If a traditional appointment is difficult for you to make, please keep in mind, we offer E-Visits for several diagnoses as well as virtual visits. We also have primary care walk-in clinics available. For more information on these options, please contact our office.    Sincerely,        [address]  [facility]

## 2021-09-02 NOTE — TELEPHONE ENCOUNTER
Mailed patient no show appointment letter #1 for the date of 09/01/2021 scheduled with Dr Randi Davis. Called patient to inform of missed appointment and the phone # verified is incorrect.

## 2021-09-30 NOTE — PROGRESS NOTES
Patient instructed to remove shoes and socks and instructed to sit in exam chair. Current PCP is Catia Owens MD and date of last visit was10/5/21  Do you have a follow up visit scheduled?   No  If yes, the date is

## 2021-09-30 NOTE — PATIENT INSTRUCTIONS
Patient Education        Toenail Fungus: Care Instructions  Overview  A nail that is infected by a fungus usually turns white or yellow. As the fungus spreads, the nail turns a darker color and gets thicker. And the nail edges start to turn ragged and crumble. A bad infection can cause pain, and the nail may pull away from the toe or finger. Nails that are exposed to moisture and warmth a lot are more likely to get infected by a fungus. This can happen from wearing sweaty shoes often and from walking barefoot on shower floors. Or it can happen if you share personal things, such as towels and nail clippers. It's hard to treat nail fungus. And the infection can return after it has cleared up. But medicines can sometimes get rid of nail fungus for good. If the infection is very bad, or if it causes a lot of pain, you may need to have the nail removed. Follow-up care is a key part of your treatment and safety. Be sure to make and go to all appointments, and call your doctor if you are having problems. It's also a good idea to know your test results and keep a list of the medicines you take. How can you care for yourself at home? · Take your medicines exactly as prescribed. Call your doctor if you have any problems with your medicine. · If your doctor gave you a cream or liquid to put on your nail, use it exactly as directed. · Wash your hands and feet often, and wash your hands after touching your feet. · Keep your nails clean and dry. Dry your feet completely after you bathe and before you put on shoes and socks. · Keep your nails trimmed. · Change socks often. Wear dry socks that absorb moisture. · Don't go barefoot in public places. · Use a spray or powder that fights fungus on your feet and in your shoes. · Don't pick at the skin around your nails. · Don't use nail polish or fake nails on your nails. · Don't share personal things, such as towels and nail clippers. When should you call for help? Call your doctor now or seek immediate medical care if:    · You have signs of infection, such as:  ? Increased pain, swelling, warmth, or redness. ? Red streaks leading from the site. ? Pus draining from the site. ? A fever.     · You have new or increased toe pain. Watch closely for changes in your health, and be sure to contact your doctor if:    · You do not get better as expected. Where can you learn more? Go to https://[email or web address].[email or web address] Talk. org and sign in to your [employer] account. Enter D202 in the FirmPlay box to learn more about \"Toenail Fungus: Care Instructions. \"     If you do not have an account, please click on the \"Sign Up Now\" link. Current as of: March 3, 2021               Content Version: 13.0  © 5340-3934 Healthwise, Incorporated. Care instructions adapted under license by [facility] ([facility]). If you have questions about a medical condition or this instruction, always ask your healthcare professional. [place] [address] any warranty or liability for your use of this information.

## 2021-10-03 ENCOUNTER — APPOINTMENT (OUTPATIENT)
Dept: CT IMAGING | Age: 71
End: 2021-10-03
Payer: COMMERCIAL

## 2021-10-03 ENCOUNTER — HOSPITAL ENCOUNTER (EMERGENCY)
Age: 71
Discharge: HOME | End: 2021-10-03
Attending: EMERGENCY MEDICINE
Payer: COMMERCIAL

## 2021-10-03 ENCOUNTER — APPOINTMENT (OUTPATIENT)
Dept: GENERAL RADIOLOGY | Age: 71
End: 2021-10-03
Payer: COMMERCIAL

## 2021-10-03 VITALS
BODY MASS INDEX: 34.11 KG/M2 | DIASTOLIC BLOOD PRESSURE: 74 MMHG | HEART RATE: 82 BPM | TEMPERATURE: 98.2 F | SYSTOLIC BLOOD PRESSURE: 157 MMHG | OXYGEN SATURATION: 95 % | RESPIRATION RATE: 26 BRPM | WEIGHT: 205 LBS

## 2021-10-03 DIAGNOSIS — N30.00 ACUTE CYSTITIS WITHOUT HEMATURIA: ICD-10-CM
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
## 2021-10-03 LAB
-: ABNORMAL
ABSOLUTE EOS #: 0.15 K/UL (ref 0–0.44)
ABSOLUTE IMMATURE GRANULOCYTE: 0.03 K/UL (ref 0–0.3)
ABSOLUTE LYMPH #: 2.74 K/UL (ref 1.1–3.7)
ABSOLUTE MONO #: 0.68 K/UL (ref 0.1–1.2)
AMORPHOUS: ABNORMAL
ANION GAP SERPL CALCULATED.3IONS-SCNC: 13 MMOL/L (ref 9–17)
BACTERIA: ABNORMAL
BASOPHILS # BLD: 1 % (ref 0–2)
BASOPHILS ABSOLUTE: 0.05 K/UL (ref 0–0.2)
BILIRUBIN URINE: NEGATIVE
BUN BLDV-MCNC: 14 MG/DL (ref 8–23)
BUN/CREAT BLD: ABNORMAL (ref 9–20)
CALCIUM SERPL-MCNC: 9.3 MG/DL (ref 8.6–10.4)
CASTS UA: ABNORMAL /LPF (ref 0–8)
CHLORIDE BLD-SCNC: 100 MMOL/L (ref 98–107)
CO2: 26 MMOL/L (ref 20–31)
COLOR: YELLOW
COMMENT UA: ABNORMAL
CREAT SERPL-MCNC: 0.7 MG/DL (ref 0.5–0.9)
CRYSTALS, UA: ABNORMAL /HPF
DIFFERENTIAL TYPE: NORMAL
EOSINOPHILS RELATIVE PERCENT: 2 % (ref 1–4)
EPITHELIAL CELLS UA: ABNORMAL /HPF (ref 0–5)
GFR AFRICAN AMERICAN: >60 ML/MIN
GFR NON-AFRICAN AMERICAN: >60 ML/MIN
GFR SERPL CREATININE-BSD FRML MDRD: ABNORMAL ML/MIN/{1.73_M2}
GFR SERPL CREATININE-BSD FRML MDRD: ABNORMAL ML/MIN/{1.73_M2}
GLUCOSE BLD-MCNC: 104 MG/DL (ref 70–99)
GLUCOSE URINE: NEGATIVE
HCT VFR BLD CALC: 44.9 % (ref 36.3–47.1)
HEMOGLOBIN: 13.9 G/DL (ref 11.9–15.1)
IMMATURE GRANULOCYTES: 0 %
KETONES, URINE: NEGATIVE
LEUKOCYTE ESTERASE, URINE: ABNORMAL
LYMPHOCYTES # BLD: 35 % (ref 24–43)
MCH RBC QN AUTO: 30.1 PG (ref 25.2–33.5)
MCHC RBC AUTO-ENTMCNC: 31 G/DL (ref 28.4–34.8)
MCV RBC AUTO: 97.2 FL (ref 82.6–102.9)
MONOCYTES # BLD: 9 % (ref 3–12)
MUCUS: ABNORMAL
NITRITE, URINE: POSITIVE
NRBC AUTOMATED: 0 PER 100 WBC
OTHER OBSERVATIONS UA: ABNORMAL
PDW BLD-RTO: 12.4 % (ref 11.8–14.4)
PH UA: 5.5 (ref 5–8)
PLATELET # BLD: 252 K/UL (ref 138–453)
PLATELET ESTIMATE: NORMAL
PMV BLD AUTO: 9.4 FL (ref 8.1–13.5)
POTASSIUM SERPL-SCNC: 4.1 MMOL/L (ref 3.7–5.3)
PROTEIN UA: NEGATIVE
RBC # BLD: 4.62 M/UL (ref 3.95–5.11)
RBC # BLD: NORMAL 10*6/UL
RBC UA: ABNORMAL /HPF (ref 0–4)
RENAL EPITHELIAL, UA: ABNORMAL /HPF
SEG NEUTROPHILS: 53 % (ref 36–65)
SEGMENTED NEUTROPHILS ABSOLUTE COUNT: 4.28 K/UL (ref 1.5–8.1)
SODIUM BLD-SCNC: 139 MMOL/L (ref 135–144)
SPECIFIC GRAVITY UA: 1.01 (ref 1–1.03)
TRICHOMONAS: ABNORMAL
TROPONIN INTERP: NORMAL
TROPONIN T: NORMAL NG/ML
TROPONIN, HIGH SENSITIVITY: 10 NG/L (ref 0–14)
TURBIDITY: CLEAR
URINE HGB: NEGATIVE
UROBILINOGEN, URINE: NORMAL
WBC # BLD: 7.9 K/UL (ref 3.5–11.3)
WBC # BLD: NORMAL 10*3/UL
WBC UA: ABNORMAL /HPF (ref 0–5)
YEAST: ABNORMAL

## 2021-10-03 PROCEDURE — 72170 X-RAY EXAM OF PELVIS: CPT

## 2021-10-03 PROCEDURE — 73562 X-RAY EXAM OF KNEE 3: CPT

## 2021-10-03 PROCEDURE — 85025 COMPLETE CBC W/AUTO DIFF WBC: CPT

## 2021-10-03 PROCEDURE — 84484 ASSAY OF TROPONIN QUANT: CPT

## 2021-10-03 PROCEDURE — 6370000000 HC RX 637 (ALT 250 FOR IP): Performed by: STUDENT IN AN ORGANIZED HEALTH CARE EDUCATION/TRAINING PROGRAM

## 2021-10-03 PROCEDURE — 96360 HYDRATION IV INFUSION INIT: CPT

## 2021-10-03 PROCEDURE — 72125 CT NECK SPINE W/O DYE: CPT

## 2021-10-03 PROCEDURE — 2580000003 HC RX 258: Performed by: STUDENT IN AN ORGANIZED HEALTH CARE EDUCATION/TRAINING PROGRAM

## 2021-10-03 PROCEDURE — 81001 URINALYSIS AUTO W/SCOPE: CPT

## 2021-10-03 PROCEDURE — 72131 CT LUMBAR SPINE W/O DYE: CPT

## 2021-10-03 PROCEDURE — 87088 URINE BACTERIA CULTURE: CPT

## 2021-10-03 PROCEDURE — 70450 CT HEAD/BRAIN W/O DYE: CPT

## 2021-10-03 PROCEDURE — 87186 SC STD MICRODIL/AGAR DIL: CPT

## 2021-10-03 PROCEDURE — 73700 CT LOWER EXTREMITY W/O DYE: CPT

## 2021-10-03 PROCEDURE — 80048 BASIC METABOLIC PNL TOTAL CA: CPT

## 2021-10-03 PROCEDURE — 93005 ELECTROCARDIOGRAM TRACING: CPT

## 2021-10-03 PROCEDURE — 99283 EMERGENCY DEPT VISIT LOW MDM: CPT

## 2021-10-03 PROCEDURE — 87086 URINE CULTURE/COLONY COUNT: CPT

## 2021-10-03 RX ORDER — CEPHALEXIN 500 MG/1
500 CAPSULE ORAL 2 TIMES DAILY
Qty: 14 CAPSULE | Refills: 0 | Status: SHIPPED | OUTPATIENT
Start: 2021-10-03 | End: 2021-10-03 | Stop reason: SDUPTHER

## 2021-10-03 RX ORDER — 0.9 % SODIUM CHLORIDE 0.9 %
1000 INTRAVENOUS SOLUTION INTRAVENOUS ONCE
Status: COMPLETED | OUTPATIENT
Start: 2021-10-03 | End: 2021-10-03

## 2021-10-03 RX ORDER — CEPHALEXIN 500 MG/1
500 CAPSULE ORAL 2 TIMES DAILY
Qty: 14 CAPSULE | Refills: 0 | Status: SHIPPED | OUTPATIENT
Start: 2021-10-03 | End: 2021-10-10

## 2021-10-03 RX ORDER — ACETAMINOPHEN 500 MG
1000 TABLET ORAL ONCE
Status: COMPLETED | OUTPATIENT
Start: 2021-10-03 | End: 2021-10-03

## 2021-10-03 RX ADMIN — SODIUM CHLORIDE 1000 ML: 9 INJECTION, SOLUTION INTRAVENOUS at 12:58

## 2021-10-03 RX ADMIN — ACETAMINOPHEN 1000 MG: 500 TABLET ORAL at 12:57

## 2021-10-03 ASSESSMENT — ENCOUNTER SYMPTOMS
EYE REDNESS: 0
TROUBLE SWALLOWING: 0
VOMITING: 0
CONSTIPATION: 0
PHOTOPHOBIA: 0
SINUS PAIN: 0
ABDOMINAL PAIN: 0
SINUS PRESSURE: 0
COUGH: 0
SHORTNESS OF BREATH: 0
BACK PAIN: 0
SORE THROAT: 0
COLOR CHANGE: 0
NAUSEA: 0
CHEST TIGHTNESS: 0
DIARRHEA: 0

## 2021-10-03 ASSESSMENT — PAIN SCALES - GENERAL: PAINLEVEL_OUTOF10: 8

## 2021-10-03 NOTE — ED PROVIDER NOTES
[facility]  Emergency Department  Faculty Attestation     I performed a history and physical examination of the patient and discussed management with the resident. I reviewed the residents note and agree with the documented findings and plan of care. Any areas of disagreement are noted on the chart. I was personally present for the key portions of any procedures. I have documented in the chart those procedures where I was not present during the key portions. I have reviewed the emergency nurses triage note. I agree with the chief complaint, past medical history, past surgical history, allergies, medications, social and family history as documented unless otherwise noted below. For Physician Assistant/ Nurse Practitioner cases/documentation I have personally evaluated this patient and have completed at least one if not all key elements of the E/M (history, physical exam, and MDM). Additional findings are as noted. Primary Care Physician:  Mor Hitchcock MD    Screenings:  [unfilled]    CHIEF COMPLAINT       Chief Complaint   Patient presents with    Fall     pt uses a walker, pt tripped and fell down face first    Dizziness       RECENT VITALS:    ,  Pulse: 100, Resp: 22, BP: (!) 157/74    LABS:  Labs Reviewed   CBC WITH AUTO DIFFERENTIAL   BASIC METABOLIC PANEL W/ REFLEX TO MG FOR LOW K   TROPONIN       Radiology  CT Head WO Contrast    (Results Pending)   CT Cervical Spine WO Contrast    (Results Pending)           EKG:   EKG Interpretation    Interpreted by me    Rhythm: normal sinus   Rate: normal  Axis: normal  Ectopy: PVC  Conduction: Incomplete right bundle branch block pattern  ST Segments: no acute change  T Waves: Inversion inferiorly and anteriorly  Q Waves: none    Clinical Impression: Incomplete right bundle, nonspecific T inversion    Attending Physician Additional  Notes    Patient is brought by EMS after a fall with head injury.   She is a poor historian due to MRDD but admits to likely having dizziness before her fall. Most of her symptoms are headache. She did admit to being too weak to be able to get back up. She admits to neck pain. No double vision. No stroke symptoms. No chest pain shortness of breath abdominal pain. She does admit to thirst.  No lower extremity deformity or pain. She is not on anticoagulation per medical record review. On exam she is tachycardic, mildly tachypneic, afebrile, mild systolic hypertension. GCS is 15. Normal pupils next ocular movements. Normal motor strength. Negative drift. Neck has mild midline tenderness but diffuse, more so paraspinous muscle tenderness. There is swelling in the right forehead and around the right eye. There is a superficial vertical abrasion of the upper periorbital tissues it does not involve the lid margin. No hyphema noted. There is a 1 cm skin avulsion on the right forehead. There is a superficial V-shaped laceration without active bleeding. No obvious foreign body. Upper and lower extremities are full range of movement without tenderness or deformity. There is mild tenderness to compression over the pelvis. Abdomen is soft and nontender. Mild lumbar spine tenderness as well. Impression is fall, dizziness, head injury, lacerations and avulsions, multiple contusions, rule out fracture or intracranial bleeding. Plan is CT imaging, plain films of the pelvis, laboratory studies, fluids, reassess. Danisha Snow.  Sylvester Linda MD, [facility]  Attending Emergency  Physician               Bethany Cordon MD  10/03/21 6282

## 2021-10-03 NOTE — ED NOTES
The following labs labeled with pt sticker and tubed to lab:     [] Blue     [] Lavender   [] on ice  [] Green/yellow  [] Green/black [] on ice  [] Yellow  [] Red  [] Pink      [] COVID-19 swab    [] Rapid  [] PCR  [] Peds Viral Panel     [x] Urine Sample  [] Pelvic Cultures  [] Blood Cultures      Leellen Pert, [place]  10/03/21 7749

## 2021-10-03 NOTE — ED NOTES
Bed: 28  Expected date:   Expected time:   Means of arrival:   Comments:  Med [id number] [facility], RN  10/03/21 2968

## 2021-10-03 NOTE — ED PROVIDER NOTES
[facility] ED  Emergency Department Encounter  Emergency Medicine Resident     Pt Name: Caren Martinez  MRN: [id number]  [place] 1950  Date of evaluation: 10/3/21  PCP:  Yesy Tierney MD    [address] [place] [address]       Chief Complaint   Patient presents with    Fall     pt uses a walker, pt tripped and fell down face first    Dizziness       HISTORY OFPRESENT ILLNESS  (Location/Symptom, Timing/Onset, Context/Setting, Quality, Duration, Modifying Factors,Severity.)      Caren Martinez is a 69 y. o.yo female who presents via EMS for mechanical fall. Patient reportedly was using her walker when he got caught up and she tripped falling face forward. Patient does have a history of MRDD and is difficult to obtain history from. Patient reportedly lives in a group home, not sure if she takes any blood thinning medication. Patient did strike her head, she did not lose consciousness. Patient does state that she might have been dizzy before the fall. Patient complaining of a headache at this time otherwise denies any concerns. PAST MEDICAL / SURGICAL / SOCIAL / FAMILY HISTORY      has a past medical history of Arthritis, Bronchitis, Chronic obstructive pulmonary disease ([address] Colon polyps, Controlled type 2 diabetes mellitus without complication, without long-term current use of insulin ([address] Dental neglect, Depression, Environmental allergies, GERD (gastroesophageal reflux disease), Hyperlipidemia, Hypertension, Obesity, Onychomycosis, Poor historian, RBBB, Treadmill stress test negative for angina pectoris, and Wears glasses. has a past surgical history that includes Cholecystectomy; doppler echocardiography; Colonoscopy (6/2013); and Tubal ligation.      Social History     Socioeconomic History    Marital status: Single     Spouse name: Not on file    Number of children: Not on file    Years of education: Not on file    Highest education level: Not on file   Occupational History    Not on file   Tobacco Use    Smoking status: Former Smoker     Packs/day: 0.50     Years: 35.00     Pack years: 17.50     Types: Cigarettes     Quit date: 2015     Years since quittin.2    Smokeless tobacco: Never Used   Substance and Sexual Activity    Alcohol use: No     Alcohol/week: 0.0 standard drinks    Drug use: No    Sexual activity: Not on file   Other Topics Concern    Not on file   Social History Narrative    Not on file     Social Determinants of Health     Financial Resource Strain: Low Risk     Difficulty of Paying Living Expenses: Not hard at all   Food Insecurity: No Food Insecurity    Worried About Running Out of Food in the Last Year: Never true    Marcos of Food in the Last Year: Never true   Transportation Needs: No Transportation Needs    Lack of Transportation (Medical): No    Lack of Transportation (Non-Medical): No   Physical Activity:     Days of Exercise per Week:     Minutes of Exercise per Session:    Stress:     Feeling of Stress :    Social Connections:     Frequency of Communication with Friends and Family:     Frequency of Social Gatherings with Friends and Family:     Attends [employer] Services:     Active Member of Clubs or Organizations:     Attends Club or Organization Meetings:     Marital Status:    Intimate Partner Violence:     Fear of Current or Ex-Partner:     Emotionally Abused:     Physically Abused:     Sexually Abused:        Family History   Problem Relation Age of Onset    Heart Disease Mother     Cancer Father         Allergies:  Patient has no known allergies. Home Medications:  Prior to Admission medications    Medication Sig Start Date End Date Taking?  Authorizing Provider   cephALEXin (KEFLEX) 500 MG capsule Take 1 capsule by mouth 2 times daily for 7 days 10/3/21 10/10/21 Yes Yolette Leija DO   atorvastatin (LIPITOR) 20 MG tablet Take 1 tablet by mouth daily 21   Yesy Tierney MD   tiotropium (Luke Caraballo) 1.25 MCG/ACT AERS inhaler Inhale 2 puffs into the lungs daily 8/12/19   Harriet Benites MD   acetaminophen (TYLENOL) 500 MG tablet Take 2 tablets by mouth every 8 hours as needed for Pain 3/7/19   Divine Freeman MD   haloperidol (HALDOL) 5 MG tablet Take 5 mg by mouth 2 times daily    Historical Provider, MD   traZODone (DESYREL) 100 MG tablet  8/9/17   Historical Provider, MD   Elastic Bandages & Supports (ACE ARM SLING) MISC Apply 1 Units topically daily 4/17/17   Cayden Montana MD   trihexyphenidyl (ARTANE) 2 MG tablet Take 2 mg by mouth 2 times daily  10/10/14   Historical Provider, MD       REVIEW OFSYSTEMS    (2-9 systems for level 4, 10 or more for level 5)      Review of Systems   Constitutional: Negative for chills, diaphoresis, fatigue and fever. HENT: Negative for congestion, sinus pressure, sinus pain, sore throat and trouble swallowing. Eyes: Negative for photophobia, redness and visual disturbance. Respiratory: Negative for cough, chest tightness and shortness of breath. Cardiovascular: Negative for chest pain, palpitations and leg swelling. Gastrointestinal: Negative for abdominal pain, constipation, diarrhea, nausea and vomiting. Genitourinary: Negative for difficulty urinating, dysuria, flank pain and urgency. Musculoskeletal: Negative for back pain, neck pain and neck stiffness. Skin: Positive for wound. Negative for color change, pallor and rash. Neurological: Positive for dizziness and headaches. Negative for tremors, speech difficulty, weakness and light-headedness. PHYSICAL EXAM   (up to 7 for level 4, 8 or more forlevel 5)      INITIAL VITALS:   ED Triage Vitals [10/03/21 1225]   BP Temp Temp src Pulse Resp SpO2 Height Weight   (!) 157/74 98.2 -- 100 22 93 % -- 205 lb (93 kg)       Physical Exam  Constitutional:       General: She is not in acute distress. HENT:      Head: Normocephalic.       Comments: Skin abrasion noted over right forehead, adjacent skin tear present. Not actively bleeding. Right Ear: External ear normal.      Left Ear: External ear normal.      Nose: Nose normal. No rhinorrhea. Eyes:      Extraocular Movements: Extraocular movements intact. Pupils: Pupils are equal, round, and reactive to light. Neck:      Comments: Left lateral lumbar tenderness  Cardiovascular:      Rate and Rhythm: Normal rate and regular rhythm. Pulses: Normal pulses. Heart sounds: No murmur heard. Pulmonary:      Effort: Pulmonary effort is normal. No respiratory distress. Breath sounds: Normal breath sounds. Chest:      Chest wall: No tenderness. Abdominal:      Palpations: Abdomen is soft. Tenderness: There is no abdominal tenderness. Musculoskeletal:         General: No swelling. Normal range of motion. Cervical back: Normal range of motion. No rigidity or tenderness. Skin:     General: Skin is warm and dry. Neurological:      General: No focal deficit present. Mental Status: She is alert and oriented to person, place, and time.          DIFFERENTIAL  DIAGNOSIS     PLAN (LABS / IMAGING / EKG):  Orders Placed This Encounter   Procedures    Culture, Urine    CT Head WO Contrast    CT Cervical Spine WO Contrast    CT LUMBAR SPINE WO CONTRAST    XR PELVIS (1-2 VIEWS)    CT HIP RIGHT WO CONTRAST    XR KNEE RIGHT (3 VIEWS)    CBC Auto Differential    Basic Metabolic Panel w/ Reflex to MG    Troponin    Urinalysis Reflex to Culture    Microscopic Urinalysis    EKG 12 Lead       MEDICATIONS ORDERED:  Orders Placed This Encounter   Medications    acetaminophen (TYLENOL) tablet 1,000 mg    0.9 % sodium chloride bolus    DISCONTD: cephALEXin (KEFLEX) 500 MG capsule     Sig: Take 1 capsule by mouth 2 times daily for 7 days     Dispense:  14 capsule     Refill:  0    cephALEXin (KEFLEX) 500 MG capsule     Sig: Take 1 capsule by mouth 2 times daily for 7 days     Dispense:  14 capsule     Refill:  0       DDX: Dehydration, mechanical fall, intracranial bleed, electrolyte abnormality, presyncope, atypical ACS    Initial MDM/Plan: 69 y.o. female who presents with questionable mechanical fall versus dizziness prior to fall. Patient does have history of MRDD difficult to obtain history from. She does have abrasion and skin tear noted over the right side of her forehead. We will plan for CT head C-spine and lumbar spine. Will check basic lab work and give a liter of fluid as she appears dehydrated    DIAGNOSTIC RESULTS / Kaleigh Leal / MDM     LABS:  Labs Reviewed   BASIC METABOLIC PANEL W/ REFLEX TO MG FOR LOW K - Abnormal; Notable for the following components:       Result Value    Glucose 104 (*)     All other components within normal limits   URINE RT REFLEX TO CULTURE - Abnormal; Notable for the following components:    Nitrite, Urine POSITIVE (*)     Leukocyte Esterase, Urine LARGE (*)     All other components within normal limits   MICROSCOPIC URINALYSIS - Abnormal; Notable for the following components:    Bacteria, UA MANY (*)     All other components within normal limits   CULTURE, URINE   CBC WITH AUTO DIFFERENTIAL   TROPONIN         RADIOLOGY:  XR PELVIS (1-2 VIEWS)    Result Date: 10/3/2021  EXAMINATION: ONE XRAY VIEW OF THE PELVIS 10/3/2021 12:54 pm COMPARISON: None. HISTORY: ORDERING SYSTEM PROVIDED HISTORY: pain, fall TECHNOLOGIST PROVIDED HISTORY: pain, fall Reason for Exam: supine Acuity: Acute Type of Exam: Initial FINDINGS: Pelvic ring intact. Osteoarthrosis bilateral hips. Slight irregularity of the right lesser trochanter with suggestion of cortical discontinuity/step of. If patient has right hip/pelvis pain further evaluation with CT scan would be advised. Some irregularity of the right lesser trochanter raises concern for nondisplaced fracture as discussed above. If patient has pain in the right hip/pelvis CT scan would be advised for further evaluation.      XR KNEE RIGHT (3 VIEWS)    Result Date: 10/3/2021  EXAMINATION: THREE XRAY VIEWS OF THE RIGHT KNEE 10/3/2021 2:54 pm COMPARISON: June 1, 2021 HISTORY: ORDERING SYSTEM PROVIDED HISTORY: fall TECHNOLOGIST PROVIDED HISTORY: fall FINDINGS: No evidence of acute fracture or dislocation. No focal osseous lesion. No evidence of joint effusion. No focal soft tissue abnormality. No acute abnormality of the knee. Mild degenerative changes. Prepatellar thickening and subcutaneous calcification suggestive of possible prepatellar bursitis. Clinical correlation required. CT Head WO Contrast    Result Date: 10/3/2021  EXAMINATION: CT OF THE HEAD WITHOUT CONTRAST  10/3/2021 1:33 pm TECHNIQUE: CT of the head was performed without the administration of intravenous contrast. Dose modulation, iterative reconstruction, and/or weight based adjustment of the mA/kV was utilized to reduce the radiation dose to as low as reasonably achievable. COMPARISON: None. HISTORY: ORDERING SYSTEM PROVIDED HISTORY: Fall TECHNOLOGIST PROVIDED HISTORY: Fall Decision Support Exception - unselect if not a suspected or confirmed emergency medical condition->Emergency Medical Condition (MA) Reason for Exam: Fall Acuity: Acute Type of Exam: Initial History of hypertension, diabetes and COPD. History of frequent falls. FINDINGS: BRAIN/VENTRICLES: There is no acute intracranial hemorrhage, mass effect or midline shift. No abnormal extra-axial fluid collection. No evidence of an acute infarct; extensive periventricular and subcortical patchy low-attenuation white matter abnormalities identified. Cortical atrophy evident, especially temporal and parietal lobes, with probable compensatory mild ventricular dilatation. Septum vergae. Vascular calcifications, best seen ICAs. ORBITS: The visualized portion of the orbits demonstrate no acute abnormality. Likely cataracts. SINUSES: The visualized paranasal sinuses and mastoid air cells demonstrate no acute abnormality. Small polyp or retention cyst left maxillary antrum. SOFT TISSUES/SKULL: Somewhat brachycephalic skull with significant hyperostosis frontalis interna. Scalp hematoma over the right frontal bones. No acute abnormality of the visualized skull or soft tissues. No acute intracranial abnormality. Small scalp hematoma over the right frontal bone. No calvarial fracture. Somewhat brachycephalic shape skull with marked hyperostosis frontalis interna. Findings compatible with chronic small vessel ischemic disease, cortical atrophy and likely associated ventricular dilatation. Arteriosclerosis. Septum vergae. Small polyp or retention cyst left maxillary antrum. CT Cervical Spine WO Contrast    Result Date: 10/3/2021  EXAMINATION: CT OF THE CERVICAL SPINE WITHOUT CONTRAST 10/3/2021 1:33 pm TECHNIQUE: CT of the cervical spine was performed without the administration of intravenous contrast. Multiplanar reformatted images are provided for review. Dose modulation, iterative reconstruction, and/or weight based adjustment of the mA/kV was utilized to reduce the radiation dose to as low as reasonably achievable. COMPARISON: None. HISTORY: ORDERING SYSTEM PROVIDED HISTORY: Fall TECHNOLOGIST PROVIDED HISTORY: Fall Decision Support Exception - unselect if not a suspected or confirmed emergency medical condition->Emergency Medical Condition (MA) Reason for Exam: Fall Acuity: Acute Type of Exam: Initial FINDINGS: BONES/ALIGNMENT: There is no acute fracture or traumatic malalignment. DEGENERATIVE CHANGES: Mild diffuse degenerative changes. SOFT TISSUES: There is no prevertebral soft tissue swelling. No acute abnormality of the cervical spine. CT LUMBAR SPINE WO CONTRAST    Result Date: 10/3/2021  EXAMINATION: CT OF THE LUMBAR SPINE WITHOUT CONTRAST  10/3/2021 TECHNIQUE: CT of the lumbar spine was performed without the administration of intravenous contrast. Multiplanar reformatted images are provided for review. Adjustment of mA and/or kV according to patient size was utilized. Dose modulation, iterative reconstruction, and/or weight based adjustment of the mA/kV was utilized to reduce the radiation dose to as low as reasonably achievable. COMPARISON: CT abdomen and pelvis 08/20/2021 HISTORY: ORDERING SYSTEM PROVIDED HISTORY: FALLING TECHNOLOGIST PROVIDED HISTORY: fall Decision Support Exception - unselect if not a suspected or confirmed emergency medical condition->Emergency Medical Condition (MA) Reason for Exam: Fall Acuity: Acute Type of Exam: Initial FINDINGS: BONES/ALIGNMENT: There is normal alignment of the spine. The vertebral body heights are maintained. No osseous destructive lesion is seen. DEGENERATIVE CHANGES: Multilevel mild disc disease is present with disc bulges notable at L3-4 and L4-5. Facet arthropathy is present at L4-5 and L5-S1. mild degenerative change in the sacroiliac joints. SOFT TISSUES/RETROPERITONEUM: No soft tissue hematoma identified. Calcified atheromatous plaque. No acute osseous abnormality identified. CT HIP RIGHT WO CONTRAST    Result Date: 10/3/2021  EXAMINATION: CT OF THE RIGHT HIP WITHOUT CONTRAST 10/3/2021 2:55 pm TECHNIQUE: CT of the right hip was performed without the administration of intravenous contrast.  Multiplanar reformatted images are provided for review. Dose modulation, iterative reconstruction, and/or weight based adjustment of the mA/kV was utilized to reduce the radiation dose to as low as reasonably achievable. COMPARISON: Plain film on the same date HISTORY ORDERING SYSTEM PROVIDED HISTORY: concern for fx over lesser troch TECHNOLOGIST PROVIDED HISTORY: concern for fx over lesser troch Decision Support Exception - unselect if not a suspected or confirmed emergency medical condition->Emergency Medical Condition (MA) FINDINGS: Bones: The right hip is intact. No underlying fracture.   Normal appearance of the lesser trochanter in comparison to the corresponding plain film study. Pelvic bones are otherwise intact bilaterally. Soft Tissue: No soft tissue abnormalities in the pelvis. Bladder and uterus are normal.  Visualized loops of bowel are normal. Joint: There is no joint effusion in the right hip. There is moderate degenerative change, symmetric in both hips. 1. No acute fracture of the right hip. 2. Chronic degenerative changes in both hips. EKG  Incomplete right bundle, nonspecific T wave inversion    All EKG's are interpreted by the Emergency Department Physicianwho either signs or Co-signs this chart in the absence of a cardiologist.    EMERGENCY DEPARTMENT COURSE:  ED Course as of Oct 03 1603   Sun Oct 03, 2021   1322 Trop wnl. CBC unremarkable     [CD]   1342 No obvious injury on imaging. Will wait for formal reads. [CD]   6299 X-ray of the pelvis is concerning for possible right hip fracture will obtain CT of the pelvis. Skin tear was cleaned and Steri-Strips were applied. Abrasion was cleaned and bacitracin was applied    [CD]   1535 No acute fracture of the head. Patient is wanting to go home. Will attempt to ambulate and discharge home with son if she is able to ambulate    [CD]   [phone] Patient does have a urinary tract infection. Will start on antibiotics. Patient does not have her walker here which she normally uses to ambulate. Will attempt to walk her to see if she is safe to return home. Patients walker is at her house     [CD]   1547 WBC, UA: 50  [CD]      ED Course User Index  [CD] Duana Lamp, DO          PROCEDURES:  None    CONSULTS:  None    CRITICAL CARE:  Please see attending documentation    FINAL IMPRESSION      1. Fall, initial encounter    2.  Acute cystitis without hematuria          DISPOSITION / PLAN     DISPOSITION    Discharge home    PATIENT REFERRED TO:  MD Louie Tucker [address] [facility]  [phone]    Schedule an appointment as soon as possible for a visit in 3

## 2021-10-04 LAB
CULTURE: ABNORMAL
Lab: ABNORMAL
SPECIMEN DESCRIPTION: ABNORMAL

## 2021-10-05 ENCOUNTER — OFFICE VISIT (OUTPATIENT)
Dept: INTERNAL MEDICINE CLINIC | Age: 71
End: 2021-10-05
Payer: COMMERCIAL

## 2021-10-05 VITALS
HEIGHT: 65 IN | OXYGEN SATURATION: 92 % | HEART RATE: 71 BPM | WEIGHT: 178 LBS | BODY MASS INDEX: 29.66 KG/M2 | DIASTOLIC BLOOD PRESSURE: 76 MMHG | SYSTOLIC BLOOD PRESSURE: 118 MMHG

## 2021-10-05 DIAGNOSIS — Z91.81 AT HIGH RISK FOR FALLS: Primary | ICD-10-CM

## 2021-10-05 DIAGNOSIS — Z13.220 SCREENING FOR HYPERLIPIDEMIA: ICD-10-CM

## 2021-10-05 DIAGNOSIS — E11.9 CONTROLLED TYPE 2 DIABETES MELLITUS WITHOUT COMPLICATION, WITHOUT LONG-TERM CURRENT USE OF INSULIN (HCC): ICD-10-CM

## 2021-10-05 DIAGNOSIS — J44.9 CHRONIC OBSTRUCTIVE PULMONARY DISEASE, UNSPECIFIED COPD TYPE (HCC): ICD-10-CM

## 2021-10-05 DIAGNOSIS — Z12.31 ENCOUNTER FOR SCREENING MAMMOGRAM FOR BREAST CANCER: ICD-10-CM

## 2021-10-05 PROCEDURE — G8417 CALC BMI ABV UP PARAM F/U: HCPCS | Performed by: INTERNAL MEDICINE

## 2021-10-05 PROCEDURE — 2022F DILAT RTA XM EVC RTNOPTHY: CPT | Performed by: INTERNAL MEDICINE

## 2021-10-05 PROCEDURE — 3044F HG A1C LEVEL LT 7.0%: CPT | Performed by: INTERNAL MEDICINE

## 2021-10-05 PROCEDURE — G8926 SPIRO NO PERF OR DOC: HCPCS | Performed by: INTERNAL MEDICINE

## 2021-10-05 PROCEDURE — 3023F SPIROM DOC REV: CPT | Performed by: INTERNAL MEDICINE

## 2021-10-05 PROCEDURE — G8427 DOCREV CUR MEDS BY ELIG CLIN: HCPCS | Performed by: INTERNAL MEDICINE

## 2021-10-05 PROCEDURE — 1123F ACP DISCUSS/DSCN MKR DOCD: CPT | Performed by: INTERNAL MEDICINE

## 2021-10-05 PROCEDURE — 1090F PRES/ABSN URINE INCON ASSESS: CPT | Performed by: INTERNAL MEDICINE

## 2021-10-05 PROCEDURE — 1036F TOBACCO NON-USER: CPT | Performed by: INTERNAL MEDICINE

## 2021-10-05 PROCEDURE — 3017F COLORECTAL CA SCREEN DOC REV: CPT | Performed by: INTERNAL MEDICINE

## 2021-10-05 PROCEDURE — 99214 OFFICE O/P EST MOD 30 MIN: CPT | Performed by: INTERNAL MEDICINE

## 2021-10-05 PROCEDURE — G8399 PT W/DXA RESULTS DOCUMENT: HCPCS | Performed by: INTERNAL MEDICINE

## 2021-10-05 PROCEDURE — 4040F PNEUMOC VAC/ADMIN/RCVD: CPT | Performed by: INTERNAL MEDICINE

## 2021-10-05 PROCEDURE — G8484 FLU IMMUNIZE NO ADMIN: HCPCS | Performed by: INTERNAL MEDICINE

## 2021-10-05 RX ORDER — ALBUTEROL SULFATE 90 UG/1
AEROSOL, METERED RESPIRATORY (INHALATION)
COMMUNITY

## 2021-10-05 RX ORDER — IBUPROFEN 600 MG/1
TABLET ORAL
COMMUNITY
End: 2022-05-17

## 2021-10-05 RX ORDER — LEVOFLOXACIN 500 MG/1
TABLET, FILM COATED ORAL
Status: ON HOLD | COMMUNITY
End: 2022-03-15 | Stop reason: HOSPADM

## 2021-10-05 RX ORDER — METHYLPREDNISOLONE SODIUM SUCCINATE 125 MG/2ML
125 INJECTION, POWDER, LYOPHILIZED, FOR SOLUTION INTRAMUSCULAR; INTRAVENOUS
Status: ON HOLD | COMMUNITY
End: 2022-04-29 | Stop reason: HOSPADM

## 2021-10-05 RX ORDER — BENZONATATE 100 MG/1
CAPSULE ORAL
COMMUNITY
End: 2022-04-13 | Stop reason: SDUPTHER

## 2021-10-05 NOTE — PROGRESS NOTES
Visit Information    Have you changed or started any medications since your last visit including any over-the-counter medicines, vitamins, or herbal medicines? no   Are you having any side effects from any of your medications? -  no  Have you stopped taking any of your medications? Is so, why? -  no    Have you seen any other physician or provider since your last visit? No  Have you had any other diagnostic tests since your last visit? No  Have you been seen in the emergency room and/or had an admission to a hospital since we last saw you? Yes - Records Obtained  Have you had your routine dental cleaning in the past 6 months? no    Have you activated your [employer] account? If not, what are your barriers?  Yes     Patient Care Team:  Lin Greene MD as PCP - General (Internal Medicine)  Lin Greene MD as PCP - [address] [place] [address]  [facility] Provider  Jerry Trinidad DPM (Inactive) as Consulting Physician (Podiatry)  Marky Dumont DO as Consulting Physician (General Surgery)    Medical History Review  Past Medical, Family, and Social History reviewed and does contribute to the patient presenting condition    Health Maintenance   Topic Date Due    Diabetic foot exam  Never done    Diabetic retinal exam  Never done    COVID-19 Vaccine (1) Never done    Shingles Vaccine (1 of 2) Never done    Diabetic microalbuminuria test  12/11/2018    Breast cancer screen  12/15/2019    Lipid screen  01/22/2021    Flu vaccine (1) 09/01/2021    A1C test (Diabetic or Prediabetic)  06/02/2022    Colon cancer screen colonoscopy  06/11/2023    DTaP/Tdap/Td vaccine (2 - Td or Tdap) 08/17/2026    DEXA (modify frequency per FRAX score)  Completed    Pneumococcal 65+ years Vaccine  Completed    Hepatitis C screen  Completed    Hepatitis A vaccine  Aged Out    Hib vaccine  Aged Out    Meningococcal (ACWY) vaccine  Aged Out     SUBJECTIVE:  Cynthia Briseno is a 69 y.o. female patient who  comes for complaints of   Chief Complaint Patient presents with    Hypertension    Fall     pt son requesting hospital bed          Fall recent  Pt has walker at home, pt wasn't using it at the time  Pt denies any Chest pain  Son is with her, lives with her, denies any forgetfulness  Poor historianMARIELLE  Seen in ER for recent fall  Given abx for UTI- on 10/3  Requesting hospital bed, discussed- I dont think will help current situation  Patient has no difficulty moving within the bed      UTI  E. coli sensitive to Keflex on culture  Patient was started on Keflex in ER, denies fevers chills vomiting or dysuria currently    Prediabetes  s. Hemoglobin A1C   Date Value Ref Range Status   06/02/2021 5.2 4.0 - 6.0 % Final   11/18/2019 5.6 % Final   07/17/2018 5.6 % Final            HYPERTENSION:    Currently not on medication  Last 3 Encounter BP Readings:     Date:        BP:  BP Readings from Last 3 Encounters:   10/05/21 118/76   10/03/21 (!) 157/74   08/19/21 (!) 141/67           HYPERLIPIDEMIA:   Patient reports doing well on current therapy of statin:  lipitor 20  - Denies side effects of muscle weakness or achiness.   - Exercise  -last lipid panel  Lab Results   Component Value Date    CHOL 226 (H) 01/22/2020    CHOL 234 (H) 02/05/2019    CHOL 185 06/28/2017     Lab Results   Component Value Date    TRIG 109 01/22/2020    TRIG 193 (H) 02/05/2019    TRIG 136 06/28/2017     Lab Results   Component Value Date    HDL 84 01/22/2020    HDL 71 02/05/2019    HDL 78 06/28/2017     Lab Results   Component Value Date    LDLCHOLESTEROL 120 01/22/2020    LDLCHOLESTEROL 124 02/05/2019    LDLCHOLESTEROL 80 06/28/2017     Lab Results   Component Value Date    VLDL NOT REPORTED 01/22/2020    VLDL NOT REPORTED 02/05/2019    VLDL NOT REPORTED 06/28/2017     Lab Results   Component Value Date    CHOLHDLRATIO 2.7 01/22/2020    CHOLHDLRATIO 3.3 02/05/2019    CHOLHDLRATIO 2.4 06/28/2017           REVIEW OF SYSTEMS (except Subjective (HPI))  GENERAL: No fevers / chills  RESPIRATORY: Negative for cough, wheezing or shortness of breath  CARDIOVASCULAR: Negative for chest pain or palpitations. GI: no nausea, vomiting, or diarrhea  NEURO: No history of headaches    Past Medical History:   Diagnosis Date    Arthritis     knees    Bronchitis x1 long ago    Chronic obstructive pulmonary disease ([facility] 2017    Colon polyps     Controlled type 2 diabetes mellitus without complication, without long-term current use of insulin ([facility] 2021    Dental neglect     poor dentation. Missing teeth. No loose teeth    Depression     Environmental allergies     GERD (gastroesophageal reflux disease)     Hyperlipidemia     Hypertension     Obesity     Onychomycosis     Poor historian     RBBB     Treadmill stress test negative for angina pectoris 2009    Wears glasses     reading only       SOCIAL HISTORY:  Social History     Socioeconomic History    Marital status: Single     Spouse name: Not on file    Number of children: Not on file    Years of education: Not on file    Highest education level: Not on file   Occupational History    Not on file   Tobacco Use    Smoking status: Former Smoker     Packs/day: 0.50     Years: 35.00     Pack years: 17.50     Types: Cigarettes     Quit date: 2015     Years since quittin.2    Smokeless tobacco: Never Used   Substance and Sexual Activity    Alcohol use: No     Alcohol/week: 0.0 standard drinks    Drug use: No    Sexual activity: Not on file   Other Topics Concern    Not on file   Social History Narrative    Not on file     Social Determinants of Health     Financial Resource Strain: Low Risk     Difficulty of Paying Living Expenses: Not hard at all   Food Insecurity: No Food Insecurity    Worried About Running Out of Food in the Last Year: Never true    Marcos of Food in the Last Year: Never true   Transportation Needs: No Transportation Needs    Lack of Transportation (Medical):  No    Lack of Transportation (Non-Medical): No   Physical Activity:     Days of Exercise per Week:     Minutes of Exercise per Session:    Stress:     Feeling of Stress :    Social Connections:     Frequency of Communication with Friends and Family:     Frequency of Social Gatherings with Friends and Family:     Attends Hinduism Services:     Active Member of Clubs or Organizations:     Attends Club or Organization Meetings:     Marital Status:    Intimate Partner Violence:     Fear of Current or Ex-Partner:     Emotionally Abused:     Physically Abused:     Sexually Abused:            CURRENT MEDICATIONS:  Current Outpatient Medications   Medication Sig Dispense Refill    methylPREDNISolone sodium (SOLU-MEDROL) 125 MG injection 125 mg      levoFLOXacin (LEVAQUIN) 500 MG tablet Levaquin 500 mg tablet   Take 1 tablet every 24 hours by oral route for 7 days.  ibuprofen (ADVIL;MOTRIN) 600 MG tablet ibuprofen 600 mg tablet      ciclopirox (LOPROX) 0.77 % cream ciclopirox 0.77 % topical cream      benzonatate (TESSALON PERLES) 100 MG capsule Tessalon Perles 100 mg capsule   Take 1 capsule 3 times a day by oral route as needed for 10 days.    for cough      albuterol sulfate  (90 Base) MCG/ACT inhaler albuterol sulfate HFA 90 mcg/actuation aerosol inhaler      cephALEXin (KEFLEX) 500 MG capsule Take 1 capsule by mouth 2 times daily for 7 days 14 capsule 0    atorvastatin (LIPITOR) 20 MG tablet Take 1 tablet by mouth daily 30 tablet 3    tiotropium (SPIRIVA RESPIMAT) 1.25 MCG/ACT AERS inhaler Inhale 2 puffs into the lungs daily 1 Inhaler 3    acetaminophen (TYLENOL) 500 MG tablet Take 2 tablets by mouth every 8 hours as needed for Pain 40 tablet 0    haloperidol (HALDOL) 5 MG tablet Take 5 mg by mouth 2 times daily      traZODone (DESYREL) 100 MG tablet       Elastic Bandages & Supports (ACE ARM SLING) MISC Apply 1 Units topically daily 1 each 0    trihexyphenidyl (ARTANE) 2 MG tablet Take 2 mg by mouth 2 times daily        No current facility-administered medications for this visit. OBJECTIVE:  Vitals:    10/05/21 1053   BP: 118/76   Pulse: 71   SpO2: 92%     Body mass index is 29.62 kg/m². Focal exam:    General exam (except above):  General appearance - well appearing, alert, in no acute distress  Head - Atraumatic, normocephalic  Eyes - EOMI, no jaundice or pallor  Lungs - Lungs clear to auscultation. No wheezing, rhonchi, rales  Heart - RRR without murmur, gallop, or rubs. No ectopy  Abdomen - Abdomen soft, non-tender. Bowel sounds normal. No masses, organomegaly  Extremities -No significant edema, or skin discoloration. Good capillary refill. Neuro - Pt Alert, awake and oriented x 3. No gross focal neurological deficits    ASSESSMENT AND PLAN (MEDICAL DECISION MAKING):   Cedric Roque was seen today for hypertension and fall. Diagnoses and all orders for this visit:    Chronic obstructive pulmonary disease, unspecified COPD type ([facility]  Comments:  stable, no flare up    Encounter for screening mammogram for breast cancer  -     [facility] Digital Screen Bilateral [[id number]]; Future    Screening for hyperlipidemia  -     Lipid, Fasting;  Future    Controlled type 2 diabetes mellitus without complication, without long-term current use of insulin ([facility])  -      DIABETES FOOT EXAM  -     Microalbumin, Ur; Future  -     Hemoglobin A1C; Future    At high risk for falls  -     Ambulatory referral to Physical Therapy           Follow up in: 3 to 4 weeks      Laquita Mott MD      On the basis of positive falls risk screening, assessment and plan is as follows: Refer to physical therapy for balance testing and recommendations

## 2021-10-05 NOTE — PROGRESS NOTES
Reviewed patient's urine culture - culture positive for E. coli. Patient was discharged on cephalexin 500 mg BID x 7 days, and culture is sensitive to prescribed medication. Antibiotic prescribed at discharge is appropriate - no changes made to antibiotic regimen.      Thank you,  Kevin Phillips, PharmD  10/5/2021  11:39 AM

## 2021-10-06 ENCOUNTER — HOSPITAL ENCOUNTER (OUTPATIENT)
Age: 71
Discharge: HOME | End: 2021-10-06
Payer: COMMERCIAL

## 2021-10-06 ENCOUNTER — OFFICE VISIT (OUTPATIENT)
Dept: PODIATRY | Age: 71
End: 2021-10-06
Payer: COMMERCIAL

## 2021-10-06 VITALS
RESPIRATION RATE: 16 BRPM | DIASTOLIC BLOOD PRESSURE: 82 MMHG | HEIGHT: 65 IN | HEART RATE: 72 BPM | WEIGHT: 178 LBS | SYSTOLIC BLOOD PRESSURE: 140 MMHG | BODY MASS INDEX: 29.66 KG/M2

## 2021-10-06 DIAGNOSIS — I73.9 PVD (PERIPHERAL VASCULAR DISEASE) (HCC): ICD-10-CM

## 2021-10-06 DIAGNOSIS — Z13.220 SCREENING FOR HYPERLIPIDEMIA: ICD-10-CM

## 2021-10-06 DIAGNOSIS — R60.0 EDEMA OF LOWER EXTREMITY: ICD-10-CM

## 2021-10-06 DIAGNOSIS — R20.8 BURNING SENSATION OF FEET: ICD-10-CM

## 2021-10-06 DIAGNOSIS — E11.9 CONTROLLED TYPE 2 DIABETES MELLITUS WITHOUT COMPLICATION, WITHOUT LONG-TERM CURRENT USE OF INSULIN (HCC): ICD-10-CM

## 2021-10-06 DIAGNOSIS — B35.1 ONYCHOMYCOSIS: Primary | ICD-10-CM

## 2021-10-06 LAB
CHOLESTEROL, FASTING: 188 MG/DL
CHOLESTEROL/HDL RATIO: 2.4
CREATININE URINE: 155.2 MG/DL (ref 28–217)
ESTIMATED AVERAGE GLUCOSE: 103 MG/DL
HBA1C MFR BLD: 5.2 % (ref 4–6)
HDLC SERPL-MCNC: 78 MG/DL
LDL CHOLESTEROL: 76 MG/DL (ref 0–130)
MICROALBUMIN/CREAT 24H UR: <12 MG/L
MICROALBUMIN/CREAT UR-RTO: NORMAL MCG/MG CREAT
TRIGLYCERIDE, FASTING: 170 MG/DL
VLDLC SERPL CALC-MCNC: ABNORMAL MG/DL (ref 1–30)

## 2021-10-06 PROCEDURE — 11721 DEBRIDE NAIL 6 OR MORE: CPT

## 2021-10-06 PROCEDURE — G8484 FLU IMMUNIZE NO ADMIN: HCPCS

## 2021-10-06 PROCEDURE — 83036 HEMOGLOBIN GLYCOSYLATED A1C: CPT

## 2021-10-06 PROCEDURE — 82570 ASSAY OF URINE CREATININE: CPT

## 2021-10-06 PROCEDURE — 36415 COLL VENOUS BLD VENIPUNCTURE: CPT

## 2021-10-06 PROCEDURE — 82043 UR ALBUMIN QUANTITATIVE: CPT

## 2021-10-06 PROCEDURE — 1090F PRES/ABSN URINE INCON ASSESS: CPT

## 2021-10-06 PROCEDURE — 3017F COLORECTAL CA SCREEN DOC REV: CPT

## 2021-10-06 PROCEDURE — 1123F ACP DISCUSS/DSCN MKR DOCD: CPT

## 2021-10-06 PROCEDURE — 80061 LIPID PANEL: CPT

## 2021-10-06 PROCEDURE — G8399 PT W/DXA RESULTS DOCUMENT: HCPCS

## 2021-10-06 PROCEDURE — G8417 CALC BMI ABV UP PARAM F/U: HCPCS

## 2021-10-06 PROCEDURE — 4040F PNEUMOC VAC/ADMIN/RCVD: CPT

## 2021-10-06 PROCEDURE — G8427 DOCREV CUR MEDS BY ELIG CLIN: HCPCS

## 2021-10-06 PROCEDURE — 1036F TOBACCO NON-USER: CPT

## 2021-10-06 PROCEDURE — 99212 OFFICE O/P EST SF 10 MIN: CPT

## 2021-10-06 PROCEDURE — 99214 OFFICE O/P EST MOD 30 MIN: CPT

## 2021-10-06 NOTE — PROGRESS NOTES
[address],  [address] [place]  Tel: [phone]   Fax: [phone]    Subjective     CC: Painful and elongated toe nails, non-diabetic    HPI:  Veronica Izquierdo is a 69y.o. year old female who presents to clinic today for painful and elongated toenails. Patient is a poor historian and has had multiple falls in the recent past. Patient states she had to go to the ER after a fall and UTI complications on 93/22/0902 and again on 10/03/2021. She is following for her left knee pain with Ortho. The patient states their digits are painful when the toenails are elongated, causing rubbing in shoe gear. Reports increase in edema and burning to her feet. The patient denies any shortness of breath. Patient denies any rest pain or claudication symptoms. The patient denies any history of acute trauma. The patient denies any other pedal complaints. Patient continues to smoke despite smoking cessation consultation. Patient states she follows closely with her PCP. Primary care physician is Lui Bedoya MD.    ROS:    Constitutional: Denies nausea, vomiting, fever, chills. Neurologic: Denies numbness, tingling, and burning in the feet. Vascular: Denies symptoms of lower extremity claudication. Skin: Denies open wounds. Otherwise negative except as noted in the HPI. PMH:  Past Medical History:   Diagnosis Date    Arthritis     knees    Bronchitis x1 long ago    Chronic obstructive pulmonary disease ([address] 9/12/2017    Colon polyps     Controlled type 2 diabetes mellitus without complication, without long-term current use of insulin ([address] 6/29/2021    Dental neglect     poor dentation. Missing teeth.  No loose teeth    Depression     Environmental allergies     GERD (gastroesophageal reflux disease)     Hyperlipidemia     Hypertension     Obesity     Onychomycosis     Poor historian     RBBB     Treadmill stress test negative for angina pectoris 2009  Wears glasses     reading only       Surgical History:   Past Surgical History:   Procedure Laterality Date    CHOLECYSTECTOMY      COLONOSCOPY  2013    one hyperplastic polyp    DOPPLER ECHOCARDIOGRAPHY      cardiac    TUBAL LIGATION         Social History:  Social History     Tobacco Use    Smoking status: Former Smoker     Packs/day: 0.50     Years: 35.00     Pack years: 17.50     Types: Cigarettes     Quit date: 2015     Years since quittin.2    Smokeless tobacco: Never Used   Substance Use Topics    Alcohol use: No     Alcohol/week: 0.0 standard drinks    Drug use: No       Medications:  Prior to Admission medications    Medication Sig Start Date End Date Taking? Authorizing Provider   methylPREDNISolone sodium (SOLU-MEDROL) 125 MG injection 125 mg   Yes Historical Provider, MD   levoFLOXacin (LEVAQUIN) 500 MG tablet Levaquin 500 mg tablet   Take 1 tablet every 24 hours by oral route for 7 days. Yes Historical Provider, MD   ibuprofen (ADVIL;MOTRIN) 600 MG tablet ibuprofen 600 mg tablet   Yes Historical Provider, MD   ciclopirox (LOPROX) 0.77 % cream ciclopirox 0.77 % topical cream   Yes Historical Provider, MD   benzonatate (TESSALON PERLES) 100 MG capsule Tessalon Perles 100 mg capsule   Take 1 capsule 3 times a day by oral route as needed for 10 days.    for cough   Yes Historical Provider, MD   albuterol sulfate  (90 Base) MCG/ACT inhaler albuterol sulfate HFA 90 mcg/actuation aerosol inhaler   Yes Historical Provider, MD   cephALEXin (KEFLEX) 500 MG capsule Take 1 capsule by mouth 2 times daily for 7 days 10/3/21 10/10/21 Yes Tiny Palumbo DO   atorvastatin (LIPITOR) 20 MG tablet Take 1 tablet by mouth daily 21  Yes Jamia Singh MD   tiotropium (SPIRIVA RESPIMAT) 1.25 MCG/ACT AERS inhaler Inhale 2 puffs into the lungs daily 19  Yes Shanon Strickland MD   acetaminophen (TYLENOL) 500 MG tablet Take 2 tablets by mouth every 8 hours as needed for Pain 3/7/19  Yes Dwayne Brii Bryant MD   haloperidol (HALDOL) 5 MG tablet Take 5 mg by mouth 2 times daily   Yes Historical Provider, MD   traZODone (DESYREL) 100 MG tablet  8/9/17  Yes Historical Provider, MD   Elastic Bandages & Supports (ACE ARM SLING) MISC Apply 1 Units topically daily 4/17/17  Yes Khai Bailey MD   trihexyphenidyl (ARTANE) 2 MG tablet Take 2 mg by mouth 2 times daily  10/10/14  Yes Historical Provider, MD       Objective     Vitals:    10/06/21 1249   BP: (!) 140/82   Pulse: 72   Resp: 16       Lab Results   Component Value Date    LABA1C 5.2 10/06/2021       Physical Exam:  General:  Alert and oriented x3. In no acute distress. Lower Extremity Physical Exam:    Vascular: DP and PT pulses are palpable, Bilateral. CFT <3 seconds to all digits, Bilateral.  No edema, Bilateral.  Hair growth is absent to the level of the digits, Bilateral.  Pain to left calf. Neuro: Saph/sural/SP/DP/plantar sensation intact to light touch. Protective sensation is intact to 6/10 sites as tested with a 5.07g SWMF, Bilateral.     Musculoskeletal: EHL/FHL/GS/TA gross motor intact. TTP to distal digits b/l. Gross deformity is absent, Bilateral.  No tenderness to the left calf with palpation. Pain and edema to left knee. Dermatologic: No open lesions, Bilateral.  Interdigital maceration absent, Bilateral. Nails 1-5 bilateral thickened, yellowish, and dystrophic with subungual debris noted. Assessment   Becky Cruz is a 69 y.o. female with a    Diagnosis Orders   1. Onychomycosis     2. Edema of lower extremity     3. PVD (peripheral vascular disease) ([facility]     4. Burning sensation of feet           Plan   · Patient examined and evaluated. · Diagnosis and treatment options discussed in detail. · Mycotic nails 1-10 debrided sharply of all nonviable tissue with sterile nail nippers without incident. · Educated patient on signs of worsening PAD such as intermittent claudication or rest pain.   Instructed patient to closely monitor for worsening symptoms  · Duplex ultrasound was reviewed, negative for any DVT  · Continue ketoconazole. · Patient to RTC in 3 months. Spent 30 min with patient discussing tx and options. · Please, call the office with any questions or concerns. No orders of the defined types were placed in this encounter. No orders of the defined types were placed in this encounter. Mariama Bernal DPM   Podiatric Medicine & Surgery   10/6/2021 at 4:09 PM   I performed a history and physical examination of the patient and discussed management with the resident. I reviewed the residents note and agree with the documented findings and plan of care. Any areas of disagreement are noted on the chart. I was personally present for the key portions of any procedures. I have documented in the chart those procedures where I was not present during the key portions. I have reviewed the Podiatry Resident progress note. I agree with the chief complaint, past medical history, past surgical history, allergies, medications, social and family history as documented unless otherwise noted below. Documentation of the HPI, Physical Exam and Medical Decision Making performed by medical students or scribes is based on my personal performance of the HPI, PE and MDM. I have personally evaluated this patient and have completed at least one if not all key elements of the E/M (history, physical exam, and MDM). Additional findings are as noted. Sharyl Dancer, DPM,D.P.M.

## 2021-10-07 LAB
EKG ATRIAL RATE: 96 BPM
EKG P AXIS: 94 DEGREES
EKG P-R INTERVAL: 98 MS
EKG Q-T INTERVAL: 394 MS
EKG QRS DURATION: 106 MS
EKG QTC CALCULATION (BAZETT): 497 MS
EKG R AXIS: -24 DEGREES
EKG T AXIS: 0 DEGREES
EKG VENTRICULAR RATE: 96 BPM

## 2021-10-11 ENCOUNTER — HOSPITAL ENCOUNTER (OUTPATIENT)
Dept: PHYSICAL THERAPY | Age: 71
Setting detail: THERAPIES SERIES
Discharge: HOME | End: 2021-10-11
Payer: COMMERCIAL

## 2021-10-11 PROCEDURE — 97161 PT EVAL LOW COMPLEX 20 MIN: CPT

## 2021-10-11 PROCEDURE — 97110 THERAPEUTIC EXERCISES: CPT

## 2021-10-11 NOTE — FLOWSHEET NOTE
[facility] Fall Risk Assessment    Patient Name:  Hemalatha Abdul  : 1950        Risk Factor Scale  Score   History of Falls [x] Yes  [] No 25  0 25   Secondary Diagnosis [x] Yes  [] No 15  0 15   Ambulatory Aid [] Furniture  [x] Crutches/cane/walker  [] None/bedrest/wheelchair/nurse 30  15  0 15   IV/Heparin Lock [] Yes  [x] No 20  0 0   Gait/Transferring [] Impaired  [x] Weak  [] Normal/bedrest/immobile 20  10  0 10   Mental Status [x] Forgets limitations  [] Oriented to own ability 15  0 15      Total:80     Based on the Assessment score: check the appropriate box. []  No intervention needed   Low =   Score of 0-24    []  Use standard prevention interventions Moderate =  Score of 24-44   [] Give patient handout and discuss fall prevention strategies   [] Establish goal of education for patient/family RE: fall prevention strategies    [x]  Use high risk prevention interventions High = Score of 45 and higher   [x] Give patient handout and discuss fall prevention strategies   [x] Establish goal of education for patient/family Re: fall prevention strategies   [x] Discuss lifeline / other resources    Electronically signed by:    Jeancarlos Cardoza PT  Date: 10/11/2021

## 2021-10-11 NOTE — CONSULTS
[x] [facility]) - [facility] &  Therapy  [address]  [phone]  F: [phone] [] [address]  [place] [address]   [address]  P: [phone]  F: [phone] [] [address] &  Therapy  [address]  P: [phone]  F: [phone] [] [address]  P: [phone]  F: [phone] [] [address]  [facility]   [address]   [place]: [phone]  F: [phone]      Physical Therapy Lower Extremity Evaluation    Date:  10/11/2021  Patient: Paolo Estrada  : 1950  MRN: [id number]  Physician: Marta Hugo MD    Insurance: [facility] MEDICAID ([place] [facility])  Medical Diagnosis: Z91.81 (ICD-10-CM) - At high risk for falls     Rehab Codes: M62.81  Onset date: 2021    Next Dr's appt.: 12/15/2021    Subjective:     CC: Frequent Falls     HPI: Patient states that she has been falling frequently. The patient states that her legs feel weak causing her falls. The patient denies any dizziness or light headedness that causes her falls. The patient arrives with cane but reports using a walker. Denies any numbness / tingling or back pain. Patient arrives without caregiver and had difficulty filling out the evaluation paper work. Patient has a history of MRDD and demonstrates signs of a questionable historian.      PMHx: [] Unremarkable [x] Diabetes [] HTN  [] Pacemaker   [] MI/Heart Problems [] Cancer [] Arthritis [] Other:              [] Refer to full medical chart  In EPIC       Comorbidities:   [x] Obesity [] Dialysis  [] N/A   [x] Asthma/COPD [] Dementia [] Other:   [] Stroke [] Sleep apnea [] Other:   [] Vascular disease [] Rheumatic disease [] Other:     Tests: [x] X-Ray: [] MRI:  [] Other:    Medications: [x] Refer to full medical [] AM    [] PM    [] Sit    [] Rise/Sit    []Stand    [] Walk    [] Lying    [] Other:  Worse: [] AM    [] PM    [] Sit    [x] Rise/Sit    [x]Stand    [x] Walk    [] Lying    [] Bend                      [] Valsalva    [] Other:  Sleep: [] OK    [] Disturbed    Objective:     OBSERVATION No Deficit Deficit Not Tested Comments   Posture       Lordosis [] [] [x]    Lateral Shift [] [] [x]    Scoliosis [] [] [x]    Iliac Crest [] [] [x]    PSIS [] [] [x]    ASIS [] [] [x]    Genu Valgus [] [] [x]    Genu Varus [] [] [x]    Genu Recurvatum [] [] [x]    Pronation [] [] [x]    Supination [] [] [x]    Leg Length Discrp [] [] [x]    Slumped Sitting [] [] [x]    Palpation [] [] [x]    Sensation [] [] [x]    Edema [] [] [x]    Neurological [] [] [x] Difficult to assess reflexes due to patient not understanding instructions   Patellar Mobility [] [] [x]    Patellar Orientation [] [] [x]    Gait [] [x] [] Analysis: decreased step length and danisha, decreased left push off. Demonstrates poor knowledge of impairments.       Transfers  [] [x] [] Sit to stand: [facility]   Chair to table: [facility]  Ambulation: [facility] 50 ft             ROM  ° A/P STRENGTH TESTS (+/-) Left Right Not Tested    Left Right Left Right    []   Hip     Anterior Drawer    []   Flexion   3+ 3+ Posterior drawer   []   Extension   3 3 Lachman's   []   Abduction   3 3 [facility]'s   []   IR     Apley's Compression   []   ER     Apley's Distraction   []   Knee     Thessaly    []   Flexion   4 4 Hip scour    []   Extension   4+ 4+ ROE   []   Ankle     FADDIR   []   Plantarflexion   4+ 4+ Neural tension   []   Dorsiflexion   4 4 Obers   []   Inversion     Noble compression   []   Eversion     Ankle anterior drawer    []   Foot     Talar tilt    []   EHL extension        []   Subtalar Inversion        []   Subtalar Eversion        []           []        FUNCTION Normal Difficult Unable   Sitting [x] [] []   Standing [] [x] []   Ambulation [] [x] []   Groom/Dress [] [x] [] Lift/Carry [] [x] []   Stairs [] [x] []   Bending [] [x] []   Squat [] [] [x]   Kneel [] [] [x]     BALANCE/PROPRIOCEPTION              [] Not tested   Narrow Base of Support: 30 sec   Semi Tandem Stance: 10 sec    Tandem stance: TBA       FUNCTIONAL TESTS PAIN NO PAIN COMMENTS   Step Test 4 [] []    6 [] []    8 [] []    Squat [] []    Single Leg Squat [] []      Functional Test:  Optimal Instrument  Score: 40% functionally impaired     Comments:    Assessment: The patient is a 68 y/o female that presents with frequent falls. The patient presents with impairments of increased frequency of falls, decreased LE strength, decreased balance, poor understanding of impairments and decreased tolerance to ADL's. The patient signs and symptoms may be consistent with generalized weakness leading to falls. The patient will benefit from therapy services to address the impairments of increased frequency of falls, decreased LE strength, decreased balance, poor understanding of impairments and decreased tolerance to ADL's. to return to previous level of activity. Problems:    [] ? Pain:  [] ? ROM:  [x] ? Strength: Decreased LLE Strength   [x] ? Function: Decreased tolerance to ADL's  [x] Other: Increased Falls / Decreased balance       STG: (to be met in 10 treatments)  1. ? Strength: Patient will increase LE strength to > or = to 3+/5.  2. ? Function: Patient will be able to safely ambulate 100ft with FWW to improve home ambulation. 3. Patient will increase semi tandem balance to > or = to 30 seconds. 4. Patient to be independent with home exercise program as demonstrated by performance with correct form without cues. 5. Demonstrate Knowledge of fall prevention  LTG: (to be met in 20 treatments)  1. Patient will be able to ambulate > 300 ft with FWW to improve community ambulation.   2. Patient will increase bilateral LE strength to > or = to 4/5.  3. Patient will decrease optimal instrument score to < or = to 30% MRDD  Exercises:  Exercise Reps/ Time Weight/ Level Comments   Seated Marches  1 x 10     Long arc Quad  1 x 10     Patient education X 10 minutes   Fall prevention strategies at home, reviewed work sheet                  HEP:   Seated March   Seated Long Arc [place]    Other:    Specific Instructions for next treatment: General LE strengthening focusing on the quads, hip flexors, hip extensors. Gait training continuously providing cues / reinforcement of safe ambulation. Safe transfer training. Evaluation Complexity:  History (Personal factors, comorbidities) [] 0 [] 1-2 [x] 3+   Exam (limitations, restrictions) [] 1-2 [x] 3 [] 4+   Clinical presentation (progression) [x] Stable [] Evolving  [] Unstable   Decision Making [x] Low [] Moderate [] High    [x] Low Complexity [] Moderate Complexity [] High Complexity       Treatment Charges: Mins Units   [x] Evaluation       [x]  Low       []  Moderate       []  High 30 1   []  Modalities     [x]  Ther Exercise 10 1   []  Manual Therapy     []  Ther Activities     []  Aquatics     []  Vasocompression     []  Other       TOTAL TREATMENT TIME: 40    Time in: 10:00 am   Time Out: 10:45 am     Electronically signed by: Dara Troy PT        Physician Signature:________________________________Date:__________________  By signing above or cosigning this note, I have reviewed this plan of care and certify a need for medically necessary rehabilitation services.      *PLEASE SIGN ABOVE AND FAX BACK ALL PAGES*

## 2021-10-15 ENCOUNTER — HOSPITAL ENCOUNTER (OUTPATIENT)
Dept: PHYSICAL THERAPY | Age: 71
Setting detail: THERAPIES SERIES
Discharge: HOME | End: 2021-10-15
Payer: COMMERCIAL

## 2021-10-15 PROCEDURE — 97110 THERAPEUTIC EXERCISES: CPT

## 2021-10-15 NOTE — FLOWSHEET NOTE
[facility]  [facility]  [address]  [phone]  F: [phone] [] [address]  [address]   [address]  P: [phone]  F: [phone] [] [address] &  Therapy  [address]  P: [phone]  F: [phone] [] [address]  P: [phone]  F: [phone] [] [address]  [facility]   [address]   [place]: [phone]  F: [phone]      Physical Therapy Daily Treatment Note    Date:  10/15/2021  Patient Name:  Peter Corrales    :  1950  MRN: [id number]  Physician: Salvador Hart MD                        Insurance: [facility] MEDICAID  Medical Diagnosis: Z91.81 (ICD-10-CM) - At high risk for falls                     Rehab Codes: M62.81  Onset date: 2021               Next Dr's appt.: 12/15/2021    Visit# / total visits: ; STG at 10    Cancels/No Shows: 0/0    Subjective:    Pain:  [x]? Yes  []? No  Location: No pain        Pain Rating: (0-10 scale) 0/10  Pain altered Tx:  []? Yes  [x]? No  Action:  Comments: Patient arrives stating she feels well overall. Denies pain at arrival. Reports adherence to HEP. Objective:  Modalities:   Precautions: MRDD  Exercises: All completed bilaterally  Exercise Reps/ Time Weight/ Level Comments   Nustep 5m L2          Seated      HS stretch 3x20\" ea  Stool   LAQ 2x10     Marches 2x10     HS curls 1x10  1x5 ea Lime    Sit to stands 6x Chair Eduated for proper sequence first, then performed.           Supine      Bridge  2x10     SAQ 2x10 ea     SLR 1x10 ea  1x5     Clamshells 2x10 Lime          Other:      Treatment Charges: Mins Units   []  Modalities     [x]  Ther Exercise 45 3   []  Manual Therapy     []  Ther Activities     []  Aquatics     [] previously given. Plan: [x] Continue current frequency toward long and short term goals.     [x] Specific Instructions for subsequent treatments: gait train, standing exs/balance as able     Frequency:  2 x/week for 20 visits      Time In: 805 am            Time Out: 855 am    Electronically signed by:  Bryant Walker PTA

## 2021-10-20 ENCOUNTER — HOSPITAL ENCOUNTER (OUTPATIENT)
Dept: PHYSICAL THERAPY | Age: 71
Setting detail: THERAPIES SERIES
Discharge: HOME | End: 2021-10-20
Payer: COMMERCIAL

## 2021-10-20 PROCEDURE — 97110 THERAPEUTIC EXERCISES: CPT

## 2021-10-20 NOTE — FLOWSHEET NOTE
[x] [facility]) - [facility] &  Therapy  [address]  [phone]  F: [phone] [] [address]  [address]   [address]  P: [phone]  F: [phone] [] [address] &  Therapy  [address]  P: [phone]  F: [phone] [] [address]  P: [phone]  F: [phone] [] [address]  [facility]   [address]   [place]: [phone]  F: [phone]      Physical Therapy Daily Treatment Note    Date:  10/20/2021  Patient Name:  Komal Guzman    :  1950  MRN: [id number]  Physician: Meredith Franklin MD                        Insurance: [facility] MEDICAID  Medical Diagnosis: Z91.81 (ICD-10-CM) - At high risk for falls                     Rehab Codes: M62.81  Onset date: 2021               Next Dr's appt.: 12/15/2021     Visit# / total visits: 3/20; STG at 10    Cancels/No Shows: 0/0    Subjective:    Pain:  [x]? Yes  []? No  Location: No pain        Pain Rating: (0-10 scale) 0/10  Pain altered Tx:  []? Yes  [x]? No  Action:  Comments: Patient arrives with only SPC, utilizing nearly every step but forgetting occasionally. Denies pain and states is feeling well at arrival.      Objective:  Modalities:   Precautions: MRDD  Exercises: All completed bilaterally  Exercise Reps/ Time Weight/ Level Comments   Nustep/Scifit 5m L2          Standing   // bars - mirror feedback   Gastroc stretch 3x20\"  Timer   Marches 2x10  Circuit format with heel raises - 10x heel raise > 10x marches - 2 sets    Heel raises 2x10  Circuit format with marches         Seated      HS stretch 3x20\" ea  Stool   LAQ 2x10     Marches 2x10     HS curls 2x10 ea Lime    Sit to stands 6x Chair Eduated for proper sequence first, then performed. 4/5.  3. Patient will decrease optimal instrument score to < or = to 30% impairment.                     Patient goals: Get stronger and decrease falls     Pt. Education:  [x] Yes  [] No  [x] Reviewed Prior HEP/Ed  Method of Education: [x] Verbal  [x] Demo  [] Written  Comprehension of Education:  [x] Verbalizes understanding. [x] Demonstrates understanding. [x] Needs review. [] Demonstrates/verbalizes HEP/Ed previously given. Plan: [x] Continue current frequency toward long and short term goals.     [x] Specific Instructions for subsequent treatments: gait train, standing exs/balance as able, add weight/resistance as able     Frequency:  2 x/week for 20 visits      Time In: 1000 am            Time Out: 1055 am    Electronically signed by:  Dom Martinez PTA

## 2021-10-27 ENCOUNTER — HOSPITAL ENCOUNTER (OUTPATIENT)
Dept: PHYSICAL THERAPY | Age: 71
Setting detail: THERAPIES SERIES
Discharge: HOME | End: 2021-10-27
Payer: COMMERCIAL

## 2021-10-27 PROCEDURE — 97110 THERAPEUTIC EXERCISES: CPT

## 2021-10-27 NOTE — FLOWSHEET NOTE
[x] [facility]  [facility]  [address]  [phone]  F: [phone] [] [address]  [address]   [address]  P: [phone]  F: [phone] [] [address] &  Therapy  [address]  P: [phone]  F: [phone] [] [address]  P: [phone]  F: [phone] [] [address]  [facility]   [address]   [place]: [phone]  F: [phone]      Physical Therapy Daily Treatment Note    Date:  10/27/2021  Patient Name:  Caren Martinez    :  1950  MRN: [id number]  Physician: Yesy Tierney MD                        Insurance: [facility] MEDICAID  Medical Diagnosis: Z91.81 (ICD-10-CM) - At high risk for falls                     Rehab Codes: M62.81  Onset date: 2021               Next Dr's appt.: 12/15/2021     Visit# / total visits: ; STG at 10    Cancels/No Shows: 0/0    Subjective:    Pain:  [x]? Yes  []? No  Location: No pain        Pain Rating: (0-10 scale) 0/10  Pain altered Tx:  []? Yes  [x]? No  Action:  Comments: Patient states no pain at arrival and notes no new symptoms or anything to report. Objective:  Modalities:   Precautions: MRDD  Exercises:  All completed bilaterally  Exercise Reps/ Time Weight/ Level Comments   Nustep/Scifit 5m L2          Standing   // bars - mirror feedback   Gastroc stretch 3x20\"  Timer   Marches 2x10  Circuit format with heel raises - 10x heel raise > 10x marches - 2 sets    Heel raises 2x10  Circuit format with marches   Hip abduction 10x ea  Added 10/27               Seated   Added weight 10/   HS stretch 3x20\" ea  Stool   LAQ 2x10 1lb    Marches 2x10 1lb    HS curls 2x10 ea Lime    Sit to stands 2x5 Chair Second set no UE assist               Supine   Added weight 10/27   Bridge  2x10     SAQ 2x10 ea 1lb     SLR 2x10 ea 1lb    Clamshells 2x10 Lime    Hip adduction 20x2\" Yellow ball          Gait training   SPC, verbal cues for cane use and increased heel strike          Other:      Treatment Charges: Mins Units   []  Modalities     [x]  Ther Exercise 55 4   []  Manual Therapy     []  Ther Activities     []  Aquatics     []  Vasocompression     []  Other     Total Treatment time 55 4       Assessment: [x] Progressing toward goals. Continued strengthening and stability progressions with addition of standing hip abduction as well as added resistance to seated and supine mat exercises. Patient with good tolerance to all progressions this date with no noted onset of pain/symptoms or significant fatigue. Improved recall this date, however, patient with continued need for verbal cueing and demonstration for proper technique throughout with fair to good carryover. [] No change. [] Other:  [x] Patient would continue to benefit from skilled physical therapy services in order to address the impairments of increased frequency of falls, decreased LE strength, decreased balance, poor understanding of impairments and decreased tolerance to ADL's. to return to previous level of activity.        STG: (to be met in 10 treatments)  1. ? Strength: Patient will increase LE strength to > or = to 3+/5.  2. ? Function: Patient will be able to safely ambulate 100ft with FWW to improve home ambulation. 3. Patient will increase semi tandem balance to > or = to 30 seconds. 4. Patient to be independent with home exercise program as demonstrated by performance with correct form without cues. 5. Demonstrate Knowledge of fall prevention    LTG: (to be met in 20 treatments)  1. Patient will be able to ambulate > 300 ft with FWW to improve community ambulation.   2. Patient will increase bilateral LE strength to > or = to 4/5.  3. Patient will decrease optimal instrument score to < or = to 30% impairment.                     Patient goals: Get stronger and decrease falls     Pt. Education:  [x] Yes  [] No  [x] Reviewed Prior HEP/Ed  Method of Education: [x] Verbal  [x] Demo  [] Written  Comprehension of Education:  [x] Verbalizes understanding. [x] Demonstrates understanding. [x] Needs review. [] Demonstrates/verbalizes HEP/Ed previously given. Plan: [x] Continue current frequency toward long and short term goals.     [x] Specific Instructions for subsequent treatments: gait train, standing exs/balance as able, add weight/resistance as able     Frequency:  2 x/week for 20 visits      Time In: 910 am            Time Out: 1015 am    Electronically signed by:  Mindy Whyte PTA

## 2021-10-29 ENCOUNTER — HOSPITAL ENCOUNTER (OUTPATIENT)
Dept: PHYSICAL THERAPY | Age: 71
Setting detail: THERAPIES SERIES
Discharge: HOME | End: 2021-10-29
Payer: COMMERCIAL

## 2021-10-29 PROCEDURE — 97110 THERAPEUTIC EXERCISES: CPT

## 2021-10-29 NOTE — FLOWSHEET NOTE
2x10 ea Lime  x   Plantar/dorsiflexion 15x ea Lime Added 10/29 - foot out on stool, therapist resist  x          Sit to stands 2x5 Chair Second set no UE assist                  Supine   Added weight 10/27    Bridge  2x10   x   SAQ 2x10 ea 1lb      SLR 2x10 ea 1lb  x   Clamshells 2x10 Lime  x   Hip adduction 20x2\" Yellow ball  x          Gait training   SPC, verbal cues for cane use and increased heel strike            Other:      Treatment Charges: Mins Units   []  Modalities     [x]  Ther Exercise 55 4   []  Manual Therapy     []  Ther Activities     []  Aquatics     []  Vasocompression     []  Other     Total Treatment time 55 4       Assessment: [x] Progressing toward goals. Continued standing progressions for improved stability and standing tolerance with good tolerance. Added dorsi/plantar flexion with lime theraband this date as patient with poor dorisflexion RLE>LLE at heel strike demonstrated during gait training this date. Max verbal cueing and tactile cueing for proper technique and alignment for resisted plantar/dorsiflexion. Patient verbalized increased fatigue/exhuastion at conclusion of treatment this date, however no noted pain or symptoms. [] No change. [] Other:  [x] Patient would continue to benefit from skilled physical therapy services in order to address the impairments of increased frequency of falls, decreased LE strength, decreased balance, poor understanding of impairments and decreased tolerance to ADL's. to return to previous level of activity.        STG: (to be met in 10 treatments)  1. ? Strength: Patient will increase LE strength to > or = to 3+/5.  2. ? Function: Patient will be able to safely ambulate 100ft with FWW to improve home ambulation. 3. Patient will increase semi tandem balance to > or = to 30 seconds. 4. Patient to be independent with home exercise program as demonstrated by performance with correct form without cues.   5. Demonstrate Knowledge of fall prevention    LTG: (to be met in 20 treatments)  1. Patient will be able to ambulate > 300 ft with FWW to improve community ambulation. 2. Patient will increase bilateral LE strength to > or = to 4/5.  3. Patient will decrease optimal instrument score to < or = to 30% impairment.                     Patient goals: Get stronger and decrease falls     Pt. Education:  [x] Yes  [] No  [] Reviewed Prior HEP/Ed  Method of Education: [x] Verbal  [x] Demo  [] Written  Comprehension of Education:  [x] Verbalizes understanding. [x] Demonstrates understanding. [] Needs review. [x] Demonstrates/verbalizes HEP/Ed previously given. Plan: [x] Continue current frequency toward long and short term goals.     [x] Specific Instructions for subsequent treatments: gait train, standing exs/balance as able, add weight/resistance as able     Frequency:  2 x/week for 20 visits      Time In: 920 am            Time Out: 1020 am    Electronically signed by:  Rosales Dejesus PTA

## 2021-11-10 ENCOUNTER — HOSPITAL ENCOUNTER (OUTPATIENT)
Dept: PHYSICAL THERAPY | Age: 71
Setting detail: THERAPIES SERIES
Discharge: HOME | End: 2021-11-10
Payer: COMMERCIAL

## 2021-11-10 PROCEDURE — 97110 THERAPEUTIC EXERCISES: CPT

## 2021-11-10 NOTE — FLOWSHEET NOTE
[x] [facility]) - [facility] TWELVESTEP [facility] &  Therapy  [address]  [phone]  F: [phone] [] [address]  [facility]   [address]  P: [phone]  F: [phone] [] [address] &  Therapy  [address] [facility]  P: [phone]  F: [phone] [] [address]  P: [phone]  F: [phone] [] [address]  [facility]   [address]   [place]: [phone]  F: [phone]      Physical Therapy Daily Treatment Note    Date:  11/10/2021  Patient Name:  Marie Wan    :  1950  MRN: [id number]  Physician: Lakeshia Beach MD                        Insurance: [facility] MEDICAID  Medical Diagnosis: Z91.81 (ICD-10-CM) - At high risk for falls                     Rehab Codes: M62.81  Onset date: 2021               Next Dr's appt.: 12/15/2021     Visit# / total visits: ; STG at 10    Cancels/No Shows: 0/2    Subjective:    Pain:  [x]? Yes  []? No  Location: No pain        Pain Rating: (0-10 scale) 0/10  Pain altered Tx:  []? Yes  [x]? No  Action:  Comments: Patient states feeling well at arrival, however, notes she had a headache the last couple days that has since gone away and is feeling better. Denies pain overall at arrival.         Objective:  Modalities:   Precautions: MRDD  Exercises:  All completed bilaterally  Exercise Reps/ Time Weight/ Level Comments    Nustep/Scifit 5m L2  x          Standing   Patient counts reps out loud     Gastroc stretch 3x20\"  Timer x   Marches 2x10   x   Heel raises 2x10   x   Hip abduction 2x10 ea   x   HS curls 2x10 ea   x   SLS 3x15\" ea  Added 11/10 x   Step ups to foam 10x ea  10x lead with RLE, 10x LLE - Added 11/10 x          Seated       HS stretch 3x20\" ea  Stool    LAQ 2x10 2lb Increased weight 11/10 x   Marches 2x10 2lb Increased weight 11/10 x   HS curls 2x10 ea Lime  x   Plantar/dorsiflexion 15x ea Lime  x          Sit to stands 2x5 Chair Second set no UE assist                  Supine       Bridge  2x10      SAQ 2x10 ea 1lb      SLR 2x10 ea 1lb     Clamshells 2x10 Lime     Hip adduction 20x2\" Yellow ball            Gait training 80 ft  SPC, verbal cues for cane use and increased heel strike, obstacle navigation  x          Other: Mod to briseyda verbal cues and demonstration for proper technique and rep count. Treatment Charges: Mins Units   []  Modalities     [x]  Ther Exercise 55 4   []  Manual Therapy     []  Ther Activities     []  Aquatics     []  Vasocompression     []  Other     Total Treatment time 55 4       Assessment: [x] Progressing toward goals. Added SLS as well as step ups to foam this date in standing for further strengthening and stability improvements of bilateral LE all with good tolerance. Patient required only one seated rest break during standing program despite progressions made this date. Patient required increased verbal and tactile cueing for proper technique of standing and seated exs and carryover this date. Patient stated increased challenge with increased weight for seated exs, however notes no pain accompanying. [] No change. [x] Other: Patient with poor obstacle navigation during gait training this date and poor carryover following verbal cues and demonstration for proper navigation as well as heel/toe gait pattern. Patient tends to take very quick steps and with little improvement following instruction for slower oliver and proper gait pattern. [x] Patient would continue to benefit from skilled physical therapy services in order to address the impairments of increased frequency of falls, decreased LE strength, decreased balance, poor understanding of impairments and decreased tolerance to ADL's. to return to previous level of activity.    STG: (to be met in 10 treatments)  1. ? Strength: Patient will increase LE strength to > or = to 3+/5.  2. ? Function: Patient will be able to safely ambulate 100ft with FWW to improve home ambulation. 3. Patient will increase semi tandem balance to > or = to 30 seconds. 4. Patient to be independent with home exercise program as demonstrated by performance with correct form without cues. 5. Demonstrate Knowledge of fall prevention    LTG: (to be met in 20 treatments)  1. Patient will be able to ambulate > 300 ft with FWW to improve community ambulation. 2. Patient will increase bilateral LE strength to > or = to 4/5.  3. Patient will decrease optimal instrument score to < or = to 30% impairment.                     Patient goals: Get stronger and decrease falls     Pt. Education:  [x] Yes  [] No  [] Reviewed Prior HEP/Ed  Method of Education: [x] Verbal  [x] Demo  [] Written  Comprehension of Education:  [x] Verbalizes understanding. [x] Demonstrates understanding. [] Needs review. [x] Demonstrates/verbalizes HEP/Ed previously given. Plan: [x] Continue current frequency toward long and short term goals.     [x] Specific Instructions for subsequent treatments: gait train, standing exs/balance as able, add weight/resistance as able     Frequency:  2 x/week for 20 visits      Time In: 950 am            Time Out: 1055 am    Electronically signed by:  Padma Fu PTA

## 2021-11-12 ENCOUNTER — HOSPITAL ENCOUNTER (OUTPATIENT)
Dept: PHYSICAL THERAPY | Age: 71
Setting detail: THERAPIES SERIES
Discharge: HOME | End: 2021-11-12
Payer: COMMERCIAL

## 2021-11-12 PROCEDURE — 97110 THERAPEUTIC EXERCISES: CPT

## 2021-11-12 NOTE — FLOWSHEET NOTE
[x] [facility]) - [facility] TWELVESTEP [facility] &  Therapy  [address]  [phone]  F: [phone] [] [address]  [facility]   [address]  P: [phone]  F: [phone] [] [address] &  Therapy  [address] [facility]  P: [phone]  F: [phone] [] [address]  P: [phone]  F: [phone] [] [address]  [facility]   [address]   [place]: [phone]  F: [phone]      Physical Therapy Daily Treatment Note    Date:  2021  Patient Name:  Cynthia Briseno    :  1950  MRN: [id number]  Physician: Lin Greene MD                        Insurance: [facility] MEDICAID  Medical Diagnosis: Z91.81 (ICD-10-CM) - At high risk for falls                     Rehab Codes: M62.81  Onset date: 2021               Next Dr's appt.: 12/15/2021     Visit# / total visits: ; STG at 10    Cancels/No Shows: 0/2    Subjective:    Pain:  [x]? Yes  []? No  Location: No pain        Pain Rating: (0-10 scale) 0/10  Pain altered Tx:  []? Yes  [x]? No  Action:  Comments: Patient states she if feeling good today. No pain. Arrived 2.5 hrs early due to transportation. Objective:  Modalities:   Precautions: MRDD  Exercises:  All completed bilaterally  Exercise Reps/ Time Weight/ Level Comments    Nustep/Scifit 5m L3  x          Standing   Patient counts reps out loud  x   Gastroc stretch 3x20\"  Timer x   Marches 2x10   x   Heel raises 2x10   x   Hip abduction 2x10 ea   x   HS curls 2x10 ea   x   SLS 3x15\" ea  Added 11/10  Cues for 1 hand rail x   Step ups to foam 10x ea  Max cueing x          Seated       HS stretch 3x20\" ea  Stool x   LAQ 2x10 2lb Increased weight 11/10 x   Marches 2x10 2lb Increased weight 11/10 x   HS curls 2x10 ea Lime  x Plantar/dorsiflexion 15x ea Lime  x          Sit to stands 2x5 Chair Second set no UE assist                  Supine       Bridge  2x10      SAQ 2x10 ea 2 lb  Inc weight 11.12 x   SLR 2x10 ea 2 lb Inc weight 11.12 x   Clamshells 2x10 Lime     Hip adduction 20x2\" Yellow ball  x          Gait training 38 ft  140 ft  SPC, verbal cues for cane use and increased heel strike x          Other: Mod to briseyda verbal cues and demonstration for proper technique and rep count. Treatment Charges: Mins Units   []  Modalities     [x]  Ther Exercise 45 3   []  Manual Therapy     []  Ther Activities     []  Aquatics     []  Vasocompression     []  Other     Total Treatment time 45 3       Assessment: [x] Progressing toward goals. Patient required mod to max cueing, improvement of carry over with repitition of demonstration. Improved gait training with SPC with appropriate heel strike. Progressed SLS to 1 UE support to improve postural sway. [] No change. [] Other:   [x] Patient would continue to benefit from skilled physical therapy services in order to address the impairments of increased frequency of falls, decreased LE strength, decreased balance, poor understanding of impairments and decreased tolerance to ADL's. to return to previous level of activity.        STG: (to be met in 10 treatments)  1. ? Strength: Patient will increase LE strength to > or = to 3+/5.  2. ? Function: Patient will be able to safely ambulate 100ft with FWW to improve home ambulation. 3. Patient will increase semi tandem balance to > or = to 30 seconds. 4. Patient to be independent with home exercise program as demonstrated by performance with correct form without cues. 5. Demonstrate Knowledge of fall prevention    LTG: (to be met in 20 treatments)  1. Patient will be able to ambulate > 300 ft with FWW to improve community ambulation.   2. Patient will increase bilateral LE strength to > or = to 4/5.  3. Patient will decrease optimal instrument score to < or = to 30% impairment.                     Patient goals: Get stronger and decrease falls     Pt. Education:  [x] Yes  [] No  [] Reviewed Prior HEP/Ed  Method of Education: [x] Verbal  [x] Demo  [] Written  Comprehension of Education:  [x] Verbalizes understanding. [x] Demonstrates understanding. [] Needs review. [x] Demonstrates/verbalizes HEP/Ed previously given. Plan: [x] Continue current frequency toward long and short term goals.     [x] Specific Instructions for subsequent treatments: gait train, standing exs/balance as able, add weight/resistance as able     Frequency:  2 x/week for 20 visits      Time In: 1010 am            Time Out: 1100 am    Electronically signed by:  Blank Villatoro PTA

## 2021-11-16 ENCOUNTER — HOSPITAL ENCOUNTER (OUTPATIENT)
Dept: PHYSICAL THERAPY | Age: 71
Setting detail: THERAPIES SERIES
Discharge: HOME | End: 2021-11-16
Payer: COMMERCIAL

## 2021-11-16 PROCEDURE — 97110 THERAPEUTIC EXERCISES: CPT

## 2021-11-16 NOTE — FLOWSHEET NOTE
[x] [facility]) [facility] TWELVESTEP [facility] &  Therapy  [address]  [phone]  F: [phone] [] [address]  [facility]   [address]  P: [phone]  F: [phone] [] [address] &  Therapy  [address] [facility]  P: [phone]  F: [phone] [] [address]  P: [phone]  F: [phone] [] [address]  [facility]   [address]   [place]: [phone]  F: [phone]      Physical Therapy Daily Treatment Note    Date:  2021  Patient Name:  Tami Vo    :  1950  MRN: [id number]  Physician: Nicolasa Murphy MD                        Insurance: [facility] MEDICAID  Medical Diagnosis: Z91.81 (ICD-10-CM) - At high risk for falls                     Rehab Codes: M62.81  Onset date: 2021               Next Dr's appt.: 12/15/2021     Visit# / total visits: ; STG at 10    Cancels/No Shows: 0/2    Subjective:    Pain:  []? Yes  [x]? No  Location: No pain  General weakness       Pain Rating: (0-10 scale) 0/10  Pain altered Tx:  []? Yes  [x]? No  Action:  Comments   Reports therapy is helping, feeling stronger. No pain complaints. Objective:  Modalities:   Precautions: MRDD  Exercises:  All completed bilaterally  Exercise Reps/ Time Weight/ Level Comments    Nustep/Scifit 5m L3  x          Standing   Patient counts reps out loud  x   Gastroc stretch 3x20\"  Wedge,Timer x   Marches 2x10   x   Heel raises 2x10   x   Hip abduction 2x10 ea   x   HS curls 2x10 ea   x   SLS 3x15\" ea  Cues for 1 hand rail x   Step ups to foam 10x ea  Max cueing x   Step ups  10x 4\" Added 11/16 x          Seated       HS stretch 3x20\" ea  Stool x   LAQ 2x10 2lb Increased weight 11/10 x   Marches 2x10 2lb Increased weight 11/10 x   HS curls 2x10 ea Lime  x Plantar/dorsiflexion 15x ea Lime  x          Sit to stands 2x5 Chair Second set no UE assist                  Supine       Bridge  2x10   x   SAQ 2x10 ea 2 lb  Inc weight 11.12 x   SLR 2x10 ea 2 lb Inc weight 11.12 x   Clamshells 2x10 Lime  x   Hip adduction 20x2\" Yellow ball  x          Gait training 84 ft  110 ft  SPC, verbal cues for cane use and increased heel strike. Lowered cane height 11/16 x          Other: Mod to briseyda verbal cues and demonstration for proper technique and rep count. 11/16 adjusted cane lower two notches. Treatment Charges: Mins Units   []  Modalities     [x]  Ther Exercise  50 3   []  Manual Therapy     []  Ther Activities     []  Aquatics     []  Vasocompression     []  Other     Total Treatment time 50 3       Assessment: [x] Progressing toward goals completed exercises as charted with intermittent vc for posture and/or technique. Added step ups for quad strengthening, vc to decreased UE usage. [] No change. [] Other:   [x] Patient would continue to benefit from skilled physical therapy services in order to address the impairments of increased frequency of falls, decreased LE strength, decreased balance, poor understanding of impairments and decreased tolerance to ADL's. to return to previous level of activity.        STG: (to be met in 10 treatments)  1. ? Strength: Patient will increase LE strength to > or = to 3+/5.  2. ? Function: Patient will be able to safely ambulate 100ft with FWW to improve home ambulation. 3. Patient will increase semi tandem balance to > or = to 30 seconds. 4. Patient to be independent with home exercise program as demonstrated by performance with correct form without cues. 5. Demonstrate Knowledge of fall prevention    LTG: (to be met in 20 treatments)  1. Patient will be able to ambulate > 300 ft with FWW to improve community ambulation.   2. Patient will increase bilateral LE strength to > or = to 4/5.  3. Patient will

## 2021-11-19 ENCOUNTER — HOSPITAL ENCOUNTER (OUTPATIENT)
Dept: PHYSICAL THERAPY | Age: 71
Setting detail: THERAPIES SERIES
Discharge: HOME | End: 2021-11-19
Payer: COMMERCIAL

## 2021-11-19 PROCEDURE — 97110 THERAPEUTIC EXERCISES: CPT

## 2021-11-19 NOTE — FLOWSHEET NOTE
curls 2x10 ea Lime     Plantar/dorsiflexion 15x ea Lime            Sit to stands 2x5 Chair Second set no UE assist                  Supine       Bridge  2x10      SAQ 2x10 ea 2 lb  Inc weight 11.12    SLR 2x10 ea 2 lb Inc weight 11.12    Clamshells 2x10 Lime     Hip adduction 20x2\" Yellow ball            Gait training 84 ft  110 ft  SPC, verbal cues for cane use and increased heel strike. Lowered cane height 11/16           Other: Mod to briseyda verbal cues and demonstration for proper technique and rep count. Treatment Charges: Mins Units   []  Modalities     [x]  Ther Exercise  45 3   []  Manual Therapy     []  Ther Activities     []  Aquatics     []  Vasocompression     []  Other     Total Treatment time 45 3       Assessment: [x] Progressing toward goals: Completed all standing exercises as noted with improved activity tolerance and required fewer rest breaks throughout standing program. Patient noted most fatigue and exhaustion during standing step ups, however able to complete with only one seated rest break. Treatment terminated after standing program due to \"other\" assessment section noted below. [] No change. [x] Other: Bed bug found on patient this date and removed. Patient taken into separate room and discussed clinic protocols including removal from schedule, checking the home for infestation, and having pest control involved if need be. Patient denied seeing or having bed bugs in the home, but agreeable to clinic protocols. Issued written handouts regarding protocols and further information.      [x] Patient would continue to benefit from skilled physical therapy services in order to address the impairments of increased frequency of falls, decreased LE strength, decreased balance, poor understanding of impairments and decreased tolerance to ADL's. to return to previous level of activity.        STG: (to be met in 10 treatments)  1. ? Strength: Patient will increase LE strength to > or = to 3+/5.  2. ? Function: Patient will be able to safely ambulate 100ft with FWW to improve home ambulation. 3. Patient will increase semi tandem balance to > or = to 30 seconds. 4. Patient to be independent with home exercise program as demonstrated by performance with correct form without cues. 5. Demonstrate Knowledge of fall prevention    LTG: (to be met in 20 treatments)  1. Patient will be able to ambulate > 300 ft with FWW to improve community ambulation. 2. Patient will increase bilateral LE strength to > or = to 4/5.  3. Patient will decrease optimal instrument score to < or = to 30% impairment.                     Patient goals: Get stronger and decrease falls     Pt. Education:  [x] Yes  [] No  [] Reviewed Prior HEP/Ed  Method of Education: [x] Verbal  [x] Demo  [] Written  Comprehension of Education:  [x] Verbalizes understanding. [x] Demonstrates understanding. [] Needs review. [x] Demonstrates/verbalizes HEP/Ed previously given. Plan: [] Continue current frequency toward long and short term goals. Patient on hold due to beg bug finding, will be called in two weeks to check in.    [x] Specific Instructions for subsequent treatments: gait train, standing exs/balance as able, add weight/resistance as able     Frequency:  2 x/week for 20 visits      Time In: 4475            Time Out: 1045 am     Electronically signed by:  Padma Fu PTA

## 2021-11-23 ENCOUNTER — APPOINTMENT (OUTPATIENT)
Dept: PHYSICAL THERAPY | Age: 71
End: 2021-11-23
Payer: COMMERCIAL

## 2021-12-03 ENCOUNTER — HOSPITAL ENCOUNTER (EMERGENCY)
Age: 71
Discharge: HOME | End: 2021-12-03
Attending: EMERGENCY MEDICINE
Payer: COMMERCIAL

## 2021-12-03 ENCOUNTER — APPOINTMENT (OUTPATIENT)
Dept: GENERAL RADIOLOGY | Age: 71
End: 2021-12-03
Payer: COMMERCIAL

## 2021-12-03 VITALS
SYSTOLIC BLOOD PRESSURE: 141 MMHG | HEART RATE: 81 BPM | WEIGHT: 178 LBS | RESPIRATION RATE: 17 BRPM | OXYGEN SATURATION: 96 % | DIASTOLIC BLOOD PRESSURE: 73 MMHG | TEMPERATURE: 98.2 F | BODY MASS INDEX: 29.62 KG/M2

## 2021-12-03 DIAGNOSIS — R07.89 ATYPICAL CHEST PAIN: Primary | ICD-10-CM

## 2021-12-03 LAB
ABSOLUTE EOS #: 0.03 K/UL (ref 0–0.44)
ABSOLUTE IMMATURE GRANULOCYTE: 0.04 K/UL (ref 0–0.3)
ABSOLUTE LYMPH #: 1.5 K/UL (ref 1.1–3.7)
ABSOLUTE MONO #: 0.62 K/UL (ref 0.1–1.2)
ANION GAP SERPL CALCULATED.3IONS-SCNC: 10 MMOL/L (ref 9–17)
BASOPHILS # BLD: 0 % (ref 0–2)
BASOPHILS ABSOLUTE: <0.03 K/UL (ref 0–0.2)
BNP INTERPRETATION: NORMAL
BUN BLDV-MCNC: 8 MG/DL (ref 8–23)
BUN/CREAT BLD: ABNORMAL (ref 9–20)
CALCIUM SERPL-MCNC: 9.5 MG/DL (ref 8.6–10.4)
CHLORIDE BLD-SCNC: 104 MMOL/L (ref 98–107)
CO2: 25 MMOL/L (ref 20–31)
CREAT SERPL-MCNC: 0.75 MG/DL (ref 0.5–0.9)
DIFFERENTIAL TYPE: ABNORMAL
EKG ATRIAL RATE: 80 BPM
EKG P AXIS: 70 DEGREES
EKG P-R INTERVAL: 112 MS
EKG Q-T INTERVAL: 440 MS
EKG QRS DURATION: 118 MS
EKG QTC CALCULATION (BAZETT): 507 MS
EKG R AXIS: -10 DEGREES
EKG T AXIS: -8 DEGREES
EKG VENTRICULAR RATE: 80 BPM
EOSINOPHILS RELATIVE PERCENT: 0 % (ref 1–4)
GFR AFRICAN AMERICAN: >60 ML/MIN
GFR NON-AFRICAN AMERICAN: >60 ML/MIN
GFR SERPL CREATININE-BSD FRML MDRD: ABNORMAL ML/MIN/{1.73_M2}
GFR SERPL CREATININE-BSD FRML MDRD: ABNORMAL ML/MIN/{1.73_M2}
GLUCOSE BLD-MCNC: 124 MG/DL (ref 70–99)
HCT VFR BLD CALC: 43.4 % (ref 36.3–47.1)
HEMOGLOBIN: 14.1 G/DL (ref 11.9–15.1)
IMMATURE GRANULOCYTES: 1 %
LYMPHOCYTES # BLD: 19 % (ref 24–43)
MAGNESIUM: 2 MG/DL (ref 1.6–2.6)
MCH RBC QN AUTO: 30.5 PG (ref 25.2–33.5)
MCHC RBC AUTO-ENTMCNC: 32.5 G/DL (ref 28.4–34.8)
MCV RBC AUTO: 93.9 FL (ref 82.6–102.9)
MONOCYTES # BLD: 8 % (ref 3–12)
NRBC AUTOMATED: 0 PER 100 WBC
PDW BLD-RTO: 12.4 % (ref 11.8–14.4)
PLATELET # BLD: 268 K/UL (ref 138–453)
PLATELET ESTIMATE: ABNORMAL
PMV BLD AUTO: 9.6 FL (ref 8.1–13.5)
POTASSIUM SERPL-SCNC: 4.1 MMOL/L (ref 3.7–5.3)
PRO-BNP: 170 PG/ML
RBC # BLD: 4.62 M/UL (ref 3.95–5.11)
RBC # BLD: ABNORMAL 10*6/UL
SEG NEUTROPHILS: 72 % (ref 36–65)
SEGMENTED NEUTROPHILS ABSOLUTE COUNT: 5.91 K/UL (ref 1.5–8.1)
SODIUM BLD-SCNC: 139 MMOL/L (ref 135–144)
TROPONIN INTERP: NORMAL
TROPONIN INTERP: NORMAL
TROPONIN T: NORMAL NG/ML
TROPONIN T: NORMAL NG/ML
TROPONIN, HIGH SENSITIVITY: 10 NG/L (ref 0–14)
TROPONIN, HIGH SENSITIVITY: 9 NG/L (ref 0–14)
WBC # BLD: 8.1 K/UL (ref 3.5–11.3)
WBC # BLD: ABNORMAL 10*3/UL

## 2021-12-03 PROCEDURE — 84484 ASSAY OF TROPONIN QUANT: CPT

## 2021-12-03 PROCEDURE — 80048 BASIC METABOLIC PNL TOTAL CA: CPT

## 2021-12-03 PROCEDURE — 99285 EMERGENCY DEPT VISIT HI MDM: CPT

## 2021-12-03 PROCEDURE — 71045 X-RAY EXAM CHEST 1 VIEW: CPT

## 2021-12-03 PROCEDURE — 83880 ASSAY OF NATRIURETIC PEPTIDE: CPT

## 2021-12-03 PROCEDURE — 93005 ELECTROCARDIOGRAM TRACING: CPT | Performed by: STUDENT IN AN ORGANIZED HEALTH CARE EDUCATION/TRAINING PROGRAM

## 2021-12-03 PROCEDURE — 93010 ELECTROCARDIOGRAM REPORT: CPT | Performed by: INTERNAL MEDICINE

## 2021-12-03 PROCEDURE — 6370000000 HC RX 637 (ALT 250 FOR IP): Performed by: STUDENT IN AN ORGANIZED HEALTH CARE EDUCATION/TRAINING PROGRAM

## 2021-12-03 PROCEDURE — 83735 ASSAY OF MAGNESIUM: CPT

## 2021-12-03 PROCEDURE — 85025 COMPLETE CBC W/AUTO DIFF WBC: CPT

## 2021-12-03 RX ORDER — MAGNESIUM HYDROXIDE/ALUMINUM HYDROXICE/SIMETHICONE 120; 1200; 1200 MG/30ML; MG/30ML; MG/30ML
30 SUSPENSION ORAL ONCE
Status: COMPLETED | OUTPATIENT
Start: 2021-12-03 | End: 2021-12-03

## 2021-12-03 RX ADMIN — ALUMINUM HYDROXIDE, MAGNESIUM HYDROXIDE, AND SIMETHICONE 30 ML: 200; 200; 20 SUSPENSION ORAL at 05:07

## 2021-12-03 ASSESSMENT — PAIN SCALES - GENERAL: PAINLEVEL_OUTOF10: 10

## 2021-12-03 ASSESSMENT — HEART SCORE: ECG: 0

## 2021-12-03 ASSESSMENT — ENCOUNTER SYMPTOMS
VOMITING: 0
ABDOMINAL PAIN: 0
NAUSEA: 0
SORE THROAT: 0
DIARRHEA: 0
SHORTNESS OF BREATH: 0
COUGH: 0
RHINORRHEA: 0

## 2021-12-03 NOTE — ED NOTES
Patient in need of transportation home. SW confirmed with the patient that she is able to ambulate in and out of a vehicle. Patient also confirmed that she has access to her home. TREVOR scheduled transportation with [facility]. ETA unknown.

## 2021-12-03 NOTE — ED PROVIDER NOTES
[facility]  ED  Emergency Department Encounter  Emergency Medicine Resident     Pt Name: Veronica Izquierdo  MRN: [id number]  Sharongfofelia 1950  Date of evaluation: 12/3/21  PCP:  Lui Bedoya MD    [address] [place] [address]       Chief Complaint   Patient presents with    Chest Pain       HISTORY OFPRESENT ILLNESS  (Location/Symptom, Timing/Onset, Context/Setting, Quality, Duration, Modifying Factors,Severity.)      Veronica Izquierdo is a 69 y.o. female who presents with complaint of midsternal chest pain, feels burning in nature. She has a medical history of MRDD and lives in a group setting. Very poor historian. She called EMS 3 times today for GERD-like symptoms and EMS brought her in on the third call. She does not have any shortness of breath, fevers, chills, cough, leg swelling, calf tenderness, difficulty breathing, abdominal pain, nausea or vomiting. PAST MEDICAL / SURGICAL / SOCIAL / FAMILY HISTORY      has a past medical history of Arthritis, Bronchitis, Chronic obstructive pulmonary disease ([address] Colon polyps, Controlled type 2 diabetes mellitus without complication, without long-term current use of insulin ([address] Dental neglect, Depression, Environmental allergies, GERD (gastroesophageal reflux disease), Hyperlipidemia, Hypertension, Obesity, Onychomycosis, Poor historian, RBBB, Treadmill stress test negative for angina pectoris, and Wears glasses. has a past surgical history that includes Cholecystectomy; doppler echocardiography; Colonoscopy (6/2013); and Tubal ligation. Social:  reports that she quit smoking about 6 years ago. Her smoking use included cigarettes. She has a 17.50 pack-year smoking history. She has never used smokeless tobacco. She reports that she does not drink alcohol and does not use drugs. Family Hx:   Family History   Problem Relation Age of Onset    Heart Disease Mother     Cancer Father         Allergies:  Patient has no known allergies.     Home Medications:  Prior to Admission medications    Medication Sig Start Date End Date Taking? Authorizing Provider   methylPREDNISolone sodium (SOLU-MEDROL) 125 MG injection 125 mg    Historical Provider, MD   levoFLOXacin (LEVAQUIN) 500 MG tablet Levaquin 500 mg tablet   Take 1 tablet every 24 hours by oral route for 7 days. Historical Provider, MD   ibuprofen (ADVIL;MOTRIN) 600 MG tablet ibuprofen 600 mg tablet    Historical Provider, MD   ciclopirox (LOPROX) 0.77 % cream ciclopirox 0.77 % topical cream    Historical Provider, MD   benzonatate (TESSALON PERLES) 100 MG capsule Tessalon Perles 100 mg capsule   Take 1 capsule 3 times a day by oral route as needed for 10 days. for cough    Historical Provider, MD   albuterol sulfate  (90 Base) MCG/ACT inhaler albuterol sulfate HFA 90 mcg/actuation aerosol inhaler    Historical Provider, MD   atorvastatin (LIPITOR) 20 MG tablet Take 1 tablet by mouth daily 6/29/21   Tory Hathaway MD   tiotropium (SPIRIVA RESPIMAT) 1.25 MCG/ACT AERS inhaler Inhale 2 puffs into the lungs daily 8/12/19   Geri Leung MD   acetaminophen (TYLENOL) 500 MG tablet Take 2 tablets by mouth every 8 hours as needed for Pain 3/7/19   Jaleesa Dumont MD   haloperidol (HALDOL) 5 MG tablet Take 5 mg by mouth 2 times daily    Historical Provider, MD   traZODone (DESYREL) 100 MG tablet  8/9/17   Historical Provider, MD   Elastic Bandages & Supports (ACE ARM SLING) MISC Apply 1 Units topically daily 4/17/17   Boyd Pineda MD   trihexyphenidyl (ARTANE) 2 MG tablet Take 2 mg by mouth 2 times daily  10/10/14   Historical Provider, MD       REVIEW OFSYSTEMS    (2-9 systems for level 4, 10 or more for level 5)      Review of Systems   Constitutional: Negative for appetite change, chills, fatigue and fever. HENT: Negative for congestion, rhinorrhea, sneezing and sore throat. Eyes: Negative for visual disturbance. Respiratory: Negative for cough and shortness of breath.     Cardiovascular: Positive for chest pain. Negative for leg swelling. Gastrointestinal: Negative for abdominal pain, diarrhea, nausea and vomiting. Genitourinary: Negative for dysuria. Musculoskeletal: Negative for myalgias, neck pain and neck stiffness. Skin: Negative for rash and wound. Neurological: Negative for dizziness, syncope, light-headedness and headaches. Psychiatric/Behavioral: Negative for dysphoric mood and suicidal ideas. PHYSICAL EXAM   (up to 7 for level 4, 8 or more forlevel 5)      INITIAL VITALS:   Vitals:    12/03/21 0404   BP: (!) 141/73   Pulse: 81   Resp: 17   Temp: 98.2 °F (36.8 °C)   SpO2: 96%        Physical Exam  Vitals and nursing note reviewed. Constitutional:       General: She is not in acute distress. Appearance: Normal appearance. She is not ill-appearing or diaphoretic. HENT:      Head: Normocephalic. Nose: Nose normal.      Mouth/Throat:      Mouth: Mucous membranes are moist.      Pharynx: Oropharynx is clear. Eyes:      Extraocular Movements: Extraocular movements intact. Conjunctiva/sclera: Conjunctivae normal.      Pupils: Pupils are equal, round, and reactive to light. Cardiovascular:      Rate and Rhythm: Normal rate and regular rhythm. Pulses: Normal pulses. Heart sounds: Normal heart sounds. Pulmonary:      Effort: Pulmonary effort is normal. No respiratory distress. Breath sounds: Normal breath sounds. No wheezing or rales. Chest:      Chest wall: No tenderness. Abdominal:      General: There is no distension. Palpations: Abdomen is soft. Tenderness: There is no abdominal tenderness. There is no guarding or rebound. Musculoskeletal:         General: Normal range of motion. Cervical back: Normal range of motion and neck supple. Skin:     General: Skin is warm. Capillary Refill: Capillary refill takes less than 2 seconds. Neurological:      General: No focal deficit present.       Mental Status: She is within normal limits. Poor delineation of the left hemidiaphragm appears secondary to superimposed soft tissues and prominent mediastinal fat pad. No focal consolidation identified. No definite effusion. No pneumothorax or subdiaphragmatic free air. No acute osseous abnormality identified. No radiographic evidence of acute cardiopulmonary disease. EKG  EKG Interpretation    Interpreted by emergency department physician    Rhythm: normal sinus   Rate: normal  Axis: normal  Ectopy: none  Conduction: right bundle branch block (incomplete)  ST Segments: no acute change  T Waves: no acute change  Q Waves: none    Clinical Impression: no acute changes    Candy Ruiz MD      All EKG's are interpreted by the Emergency Department Physicianwho either signs or Co-signs this chart in the absence of a cardiologist.    EMERGENCY DEPARTMENT COURSE:  ED Course as of 12/03/21 0556   Fri Dec 03, 2021   0454 Troponin, High Sensitivity: 9 [JT]   0554 Troponin, High Sensitivity: 10 [JT]   0555 Patient says her symptoms improved after Maalox. Likely GERD symptoms. Plan to discharge. [JT]      ED Course User Index  [JT] Candy Ruiz MD        PROCEDURES:  None    CONSULTS:  None      FINAL IMPRESSION      1.  Atypical chest pain        DISPOSITION / PLAN     DISPOSITION Decision To Discharge 12/03/2021 05:42:31 AM      PATIENT REFERRED TO:  Jamia Singh MD  [address]  [phone]    In 2 days  As needed    [facility]  [address] [place] [address]  [phone]  Go to   If symptoms worsen      DISCHARGE MEDICATIONS:  New Prescriptions    No medications on file       Candy Ruiz MD  Emergency Medicine Resident    (Please note that portions of this note were completed with a voice recognition program.Efforts were made to edit the dictations but occasionally words are mis-transcribed.)        Candy Ruiz MD  Resident  12/03/21 0871

## 2021-12-03 NOTE — ED NOTES
Bed: 17  Expected date:   Expected time:   Means of arrival:   Comments:  CUBA 613 Sumi Franks RN  12/03/21 4972

## 2021-12-03 NOTE — ED NOTES
Pt arrived to ED via EMS with c/o of chest pain. Pt states she has had chest pain today only. Pt states she belched earlier and her chest pain was relieved.  Pt is alert and oriented x4     Lawson Banuelos RN  12/03/21 2233

## 2021-12-08 NOTE — FLOWSHEET NOTE
[x] [facility]  [address]  [phone]  F: [phone] [] [address]  [facility]   [address]  P: [phone]  F: [phone] [] [place]  [address] [facility]  P: [phone]  F: [phone] [] [address]  [facility]   [address]   [place]: [phone]  F: [phone]     THERAPY RESPONSIBILITY OF CARE TRANSFER FORM       PATIENT NAME: Destiny Avitia  MRN: [id number]   : 1950      TRANSFERRING FACILITY:    [] [facility]   [] [facility]   []  [facility]   [] [facility]   [] [facility]   [] [facility]   [x] [facility]  [] [facility]   [] Other:       ACCEPTING FACILITY   [] [facility]   [] [facility]   []  [address]   [] [facility]   [] [facility]   [] [facility]   [x] [facility]  [] [facility]   [] Other:          REASON FOR TRANSFER: Evaluating therapist will be transferring locations and another therapist will be taking over this patient's POC effective 2021. This patient is currently on hold as she was placed on a mandatory 2 week hold due to bed bugs. The patient may be rescheduled starting 2021. TRANSFER OF CARE:    I am transferring the care of the above patient to: Michael Crawford PT, DPT  Mars Willoughby PT  2021      ACCEPTANCE OF CARE:     I am accepting the care of the above patient.  Michael Crawford PT, DPT

## 2021-12-13 ENCOUNTER — APPOINTMENT (OUTPATIENT)
Dept: GENERAL RADIOLOGY | Age: 71
End: 2021-12-13
Payer: COMMERCIAL

## 2021-12-13 ENCOUNTER — APPOINTMENT (OUTPATIENT)
Dept: CT IMAGING | Age: 71
End: 2021-12-13
Payer: COMMERCIAL

## 2021-12-13 ENCOUNTER — HOSPITAL ENCOUNTER (EMERGENCY)
Age: 71
Discharge: HOME | End: 2021-12-13
Attending: EMERGENCY MEDICINE
Payer: COMMERCIAL

## 2021-12-13 VITALS
SYSTOLIC BLOOD PRESSURE: 146 MMHG | TEMPERATURE: 98 F | OXYGEN SATURATION: 99 % | HEART RATE: 82 BPM | RESPIRATION RATE: 15 BRPM | DIASTOLIC BLOOD PRESSURE: 84 MMHG

## 2021-12-13 DIAGNOSIS — R10.84 GENERALIZED ABDOMINAL PAIN: Primary | ICD-10-CM

## 2021-12-13 DIAGNOSIS — N30.00 ACUTE CYSTITIS WITHOUT HEMATURIA: ICD-10-CM

## 2021-12-13 LAB
-: ABNORMAL
ALBUMIN SERPL-MCNC: 4 G/DL (ref 3.5–5.2)
ALBUMIN/GLOBULIN RATIO: 1.4 (ref 1–2.5)
ALP BLD-CCNC: 136 U/L (ref 35–104)
ALT SERPL-CCNC: 12 U/L (ref 5–33)
AMORPHOUS: ABNORMAL
ANION GAP SERPL CALCULATED.3IONS-SCNC: 12 MMOL/L (ref 9–17)
AST SERPL-CCNC: 14 U/L
BACTERIA: ABNORMAL
BILIRUB SERPL-MCNC: 0.23 MG/DL (ref 0.3–1.2)
BILIRUBIN URINE: NEGATIVE
BNP INTERPRETATION: NORMAL
BUN BLDV-MCNC: 10 MG/DL (ref 8–23)
BUN/CREAT BLD: ABNORMAL (ref 9–20)
CALCIUM SERPL-MCNC: 9.3 MG/DL (ref 8.6–10.4)
CASTS UA: ABNORMAL /LPF (ref 0–8)
CHLORIDE BLD-SCNC: 105 MMOL/L (ref 98–107)
CO2: 25 MMOL/L (ref 20–31)
COLOR: YELLOW
COMMENT UA: ABNORMAL
CREAT SERPL-MCNC: 0.78 MG/DL (ref 0.5–0.9)
CRYSTALS, UA: ABNORMAL /HPF
EPITHELIAL CELLS UA: ABNORMAL /HPF (ref 0–5)
GFR AFRICAN AMERICAN: >60 ML/MIN
GFR NON-AFRICAN AMERICAN: >60 ML/MIN
GFR SERPL CREATININE-BSD FRML MDRD: ABNORMAL ML/MIN/{1.73_M2}
GFR SERPL CREATININE-BSD FRML MDRD: ABNORMAL ML/MIN/{1.73_M2}
GLUCOSE BLD-MCNC: 102 MG/DL (ref 70–99)
GLUCOSE URINE: NEGATIVE
HCT VFR BLD CALC: 42 % (ref 36.3–47.1)
HEMOGLOBIN: 13.8 G/DL (ref 11.9–15.1)
KETONES, URINE: NEGATIVE
LACTIC ACID, SEPSIS WHOLE BLOOD: 1.2 MMOL/L (ref 0.5–1.9)
LACTIC ACID, SEPSIS: NORMAL MMOL/L (ref 0.5–1.9)
LEUKOCYTE ESTERASE, URINE: ABNORMAL
LIPASE: 16 U/L (ref 13–60)
MCH RBC QN AUTO: 30.9 PG (ref 25.2–33.5)
MCHC RBC AUTO-ENTMCNC: 32.9 G/DL (ref 28.4–34.8)
MCV RBC AUTO: 94.2 FL (ref 82.6–102.9)
MUCUS: ABNORMAL
NITRITE, URINE: NEGATIVE
NRBC AUTOMATED: 0 PER 100 WBC
OTHER OBSERVATIONS UA: ABNORMAL
PDW BLD-RTO: 12.4 % (ref 11.8–14.4)
PH UA: 6.5 (ref 5–8)
PLATELET # BLD: 251 K/UL (ref 138–453)
PMV BLD AUTO: 9.3 FL (ref 8.1–13.5)
POTASSIUM SERPL-SCNC: 4.1 MMOL/L (ref 3.7–5.3)
PRO-BNP: 86 PG/ML
PROTEIN UA: NEGATIVE
RBC # BLD: 4.46 M/UL (ref 3.95–5.11)
RBC UA: ABNORMAL /HPF (ref 0–4)
RENAL EPITHELIAL, UA: ABNORMAL /HPF
SODIUM BLD-SCNC: 142 MMOL/L (ref 135–144)
SPECIFIC GRAVITY UA: 1.04 (ref 1–1.03)
TOTAL PROTEIN: 6.9 G/DL (ref 6.4–8.3)
TRICHOMONAS: ABNORMAL
TROPONIN INTERP: NORMAL
TROPONIN INTERP: NORMAL
TROPONIN T: NORMAL NG/ML
TROPONIN T: NORMAL NG/ML
TROPONIN, HIGH SENSITIVITY: 7 NG/L (ref 0–14)
TROPONIN, HIGH SENSITIVITY: 8 NG/L (ref 0–14)
TURBIDITY: CLEAR
URINE HGB: NEGATIVE
UROBILINOGEN, URINE: NORMAL
WBC # BLD: 7.3 K/UL (ref 3.5–11.3)
WBC UA: ABNORMAL /HPF (ref 0–5)
YEAST: ABNORMAL

## 2021-12-13 PROCEDURE — 99284 EMERGENCY DEPT VISIT MOD MDM: CPT

## 2021-12-13 PROCEDURE — 6360000004 HC RX CONTRAST MEDICATION: Performed by: EMERGENCY MEDICINE

## 2021-12-13 PROCEDURE — 83880 ASSAY OF NATRIURETIC PEPTIDE: CPT

## 2021-12-13 PROCEDURE — 93005 ELECTROCARDIOGRAM TRACING: CPT | Performed by: GENERAL PRACTICE

## 2021-12-13 PROCEDURE — 87186 SC STD MICRODIL/AGAR DIL: CPT

## 2021-12-13 PROCEDURE — 74177 CT ABD & PELVIS W/CONTRAST: CPT

## 2021-12-13 PROCEDURE — 6370000000 HC RX 637 (ALT 250 FOR IP): Performed by: STUDENT IN AN ORGANIZED HEALTH CARE EDUCATION/TRAINING PROGRAM

## 2021-12-13 PROCEDURE — 83690 ASSAY OF LIPASE: CPT

## 2021-12-13 PROCEDURE — 81001 URINALYSIS AUTO W/SCOPE: CPT

## 2021-12-13 PROCEDURE — 87077 CULTURE AEROBIC IDENTIFY: CPT

## 2021-12-13 PROCEDURE — 84484 ASSAY OF TROPONIN QUANT: CPT

## 2021-12-13 PROCEDURE — 85027 COMPLETE CBC AUTOMATED: CPT

## 2021-12-13 PROCEDURE — 80053 COMPREHEN METABOLIC PANEL: CPT

## 2021-12-13 PROCEDURE — 83605 ASSAY OF LACTIC ACID: CPT

## 2021-12-13 PROCEDURE — 71045 X-RAY EXAM CHEST 1 VIEW: CPT

## 2021-12-13 PROCEDURE — 87086 URINE CULTURE/COLONY COUNT: CPT

## 2021-12-13 RX ORDER — CEPHALEXIN 250 MG/1
500 CAPSULE ORAL ONCE
Status: COMPLETED | OUTPATIENT
Start: 2021-12-13 | End: 2021-12-13

## 2021-12-13 RX ORDER — IBUPROFEN 800 MG/1
800 TABLET ORAL ONCE
Status: COMPLETED | OUTPATIENT
Start: 2021-12-13 | End: 2021-12-13

## 2021-12-13 RX ORDER — CEPHALEXIN 500 MG/1
500 CAPSULE ORAL 2 TIMES DAILY
Qty: 14 CAPSULE | Refills: 0 | Status: SHIPPED | OUTPATIENT
Start: 2021-12-13 | End: 2021-12-20

## 2021-12-13 RX ADMIN — IOPAMIDOL 75 ML: 755 INJECTION, SOLUTION INTRAVENOUS at 16:45

## 2021-12-13 RX ADMIN — IBUPROFEN 800 MG: 800 TABLET, FILM COATED ORAL at 20:50

## 2021-12-13 RX ADMIN — CEPHALEXIN 500 MG: 250 CAPSULE ORAL at 21:54

## 2021-12-13 ASSESSMENT — ENCOUNTER SYMPTOMS
DIARRHEA: 1
SHORTNESS OF BREATH: 0
VOMITING: 0
SORE THROAT: 0
NAUSEA: 0
COUGH: 0
ABDOMINAL PAIN: 1
BACK PAIN: 0

## 2021-12-13 ASSESSMENT — PAIN SCALES - GENERAL
PAINLEVEL_OUTOF10: 10
PAINLEVEL_OUTOF10: 6

## 2021-12-13 NOTE — ED PROVIDER NOTES
[facility]     Emergency Department     Faculty Note/ Attestation      Pt Name: Tami Vo                                       MRN: [id number]  Mauricio 1950  Date of evaluation: 12/13/2021  Patients PCP:    Nicolasa Murphy MD    Attestation  I performed a history and physical examination of the patient/ or directly observed  and discussed management with the resident. I reviewed the residents note and agree with the documented findings and plan of care. Any areas of disagreement are noted on the chart. I was personally present for the key portions of any procedures. I have documented in the chart those procedures where I was not present during the key portions. I have reviewed the emergency nurses triage note. I agree with the chief complaint, past medical history, past surgical history, allergies, medications, social and family history as documented unless otherwise noted below. For Physician Assistant/ Nurse Practitioner cases/documentation I have personally evaluated this patient and have completed at least one if not all key elements of the E/M (history, physical exam, and MDM). Additional findings are as noted. This patient was evaluated in the Emergency Department for symptoms described in the history of present illness. The patient was evaluated in the context of the global COVID-19 pandemic, which necessitated consideration that the patient might be at risk for infection with the SARS-CoV-2 virus that causes COVID-19. Institutional protocols and algorithms that pertain to the evaluation of patients at risk for COVID-19 are in a state of rapid change based on information released by regulatory bodies including the CDC and federal and state organizations. These policies and algorithms were followed during the patient's care in the ED. Initial Screens:  NIH Stroke Scale  Interval: Baseline  Level of Consciousness (1a. ): Alert  LOC Questions (1b. ):  Answers both correctly  LOC Commands (1c. ): Performs both tasks correctly  Best Gaze (2. ): Normal  Visual (3. ): No visual loss  Facial Palsy (4. ): Normal symmetrical movement  Motor Arm, Left (5a. ): No drift  Motor Arm, Right (5b. ): No drift  Motor Leg, Left (6a. ): No drift  Motor Leg, Right (6b. ): No drift  Limb Ataxia (7. ): Absent  Sensory (8. ): Normal  Best Language (9. ): Mild to moderate aphasia  Dysarthria (10. ): Normal  Extinction and Inattention (11): No abnormality  Total: 1     [place] Coma Scale  Eye Opening: Spontaneous  Best Verbal Response: Oriented  Best Motor Response: Obeys commands  [place] Coma Scale Score: 15    Vitals:    Vitals:    12/13/21 1454   BP: (!) 146/84   Pulse: 82   Resp: 15   Temp: 98 °F (36.7 °C)   SpO2: 99%       Chief Complaint      Chief Complaint   Patient presents with    Abdominal Pain          temperature is 98 °F (36.7 °C). Her blood pressure is 146/84 (abnormal) and her pulse is 82. Her respiration is 15 and oxygen saturation is 99%.             DIAGNOSTIC RESULTS       RADIOLOGY:   XR CHEST PORTABLE    (Results Pending)   CT ABDOMEN PELVIS W IV CONTRAST Additional Contrast? None    (Results Pending)         LABS:  Labs Reviewed   CBC   LACTATE, SEPSIS   TROPONIN   COMPREHENSIVE METABOLIC PANEL   BRAIN NATRIURETIC PEPTIDE   LACTATE, SEPSIS   LIPASE   URINE RT REFLEX TO CULTURE         EMERGENCY DEPARTMENT COURSE:     -------------------------      BP: (!) 146/84, Temp: 98 °F (36.7 °C), Pulse: 82, Resp: 15    System Problem List     Patient Active Problem List   Diagnosis    GERD (gastroesophageal reflux disease)    Hypertension    Hyperlipidemia    Depression    Bronchitis    HTN (hypertension)    Osteoarthritis    Osteoarthritis of right knee    Closed nondisplaced fracture of head of right radius    Chronic obstructive pulmonary disease (HCC)    Smoker    Chronic pain of left knee    Pre-diabetes    Falls frequently    Cognitive and behavioral changes

## 2021-12-13 NOTE — ED NOTES
Patient's daughter Kimberly Humphries is bedside. Patient & daughter denied safety concerns in the home. Daughter stated patient lives with [address] aunt. Daughter stated patient has not had aides in the home, does not need aides. Daughter stated she'll contact Francisco Merino once patient's discharged & make sure he'll help get patient out of the cab. Daughter stated patient has [employer] through [address] [place] [address]. Writer to contact [employer] once patient's discharged, daughter stated she'll stay with patient to make sure she gets into the cab.       ALIZA Middleton  12/13/21 4291

## 2021-12-13 NOTE — ED PROVIDER NOTES
Packs/day: 0.50     Years: 35.00     Pack years: 17.50     Types: Cigarettes     Quit date: 2015     Years since quittin.3    Smokeless tobacco: Never Used   Substance and Sexual Activity    Alcohol use: No     Alcohol/week: 0.0 standard drinks    Drug use: No    Sexual activity: Not on file   Other Topics Concern    Not on file   Social History Narrative    Not on file     Social Determinants of Health     Financial Resource Strain: Low Risk     Difficulty of Paying Living Expenses: Not hard at all   Food Insecurity: No Food Insecurity    Worried About Running Out of Food in the Last Year: Never true    Marcos of Food in the Last Year: Never true   Transportation Needs: No Transportation Needs    Lack of Transportation (Medical): No    Lack of Transportation (Non-Medical): No   Physical Activity:     Days of Exercise per Week: Not on file    Minutes of Exercise per Session: Not on file   Stress:     Feeling of Stress : Not on file   Social Connections:     Frequency of Communication with Friends and Family: Not on file    Frequency of Social Gatherings with Friends and Family: Not on file    Attends Mandaeism Services: Not on file    Active Member of [employer] or Organizations: Not on file    Attends Club or Organization Meetings: Not on file    Marital Status: Not on file   Intimate Partner Violence:     Fear of Current or Ex-Partner: Not on file    Emotionally Abused: Not on file    Physically Abused: Not on file    Sexually Abused: Not on file   Housing Stability:     Unable to Pay for Housing in the Last Year: Not on file    Number of [place] in the Last Year: Not on file    Unstable Housing in the Last Year: Not on file       Family History   Problem Relation Age of Onset    Heart Disease Mother     Cancer Father        Allergies:  Patient has no known allergies. Home Medications:  Prior to Admission medications    Medication Sig Start Date End Date Taking? Authorizing Provider   methylPREDNISolone sodium (SOLU-MEDROL) 125 MG injection 125 mg    Historical Provider, MD   levoFLOXacin (LEVAQUIN) 500 MG tablet Levaquin 500 mg tablet   Take 1 tablet every 24 hours by oral route for 7 days. Historical Provider, MD   ibuprofen (ADVIL;MOTRIN) 600 MG tablet ibuprofen 600 mg tablet    Historical Provider, MD   ciclopirox (LOPROX) 0.77 % cream ciclopirox 0.77 % topical cream    Historical Provider, MD   benzonatate (TESSALON PERLES) 100 MG capsule Tessalon Perles 100 mg capsule   Take 1 capsule 3 times a day by oral route as needed for 10 days. for cough    Historical Provider, MD   albuterol sulfate  (90 Base) MCG/ACT inhaler albuterol sulfate HFA 90 mcg/actuation aerosol inhaler    Historical Provider, MD   atorvastatin (LIPITOR) 20 MG tablet Take 1 tablet by mouth daily 6/29/21   Tl Armstrong MD   tiotropium (SPIRIVA RESPIMAT) 1.25 MCG/ACT AERS inhaler Inhale 2 puffs into the lungs daily 8/12/19   Chris Robles MD   acetaminophen (TYLENOL) 500 MG tablet Take 2 tablets by mouth every 8 hours as needed for Pain 3/7/19   Phani Montoya MD   haloperidol (HALDOL) 5 MG tablet Take 5 mg by mouth 2 times daily    Historical Provider, MD   traZODone (DESYREL) 100 MG tablet  8/9/17   Historical Provider, MD   Elastic Bandages & Supports (ACE ARM SLING) MISC Apply 1 Units topically daily 4/17/17   Loretta Chew MD   trihexyphenidyl (ARTANE) 2 MG tablet Take 2 mg by mouth 2 times daily  10/10/14   Historical Provider, MD       REVIEW OF SYSTEMS    (2-9 systems for level 4, 10 or more for level 5)      Review of Systems   Constitutional: Negative for activity change, appetite change, chills and fever. HENT: Negative for congestion and sore throat. Eyes: Negative for visual disturbance. Respiratory: Negative for cough and shortness of breath. Cardiovascular: Negative for chest pain. Gastrointestinal: Positive for abdominal pain and diarrhea.  Negative for nausea and vomiting. Musculoskeletal: Negative for back pain and gait problem. Skin: Negative for rash. Neurological: Negative for headaches. PHYSICAL EXAM   (up to 7 for level 4, 8 or more for level 5)      INITIAL VITALS:   BP (!) 146/84   Pulse 82   Temp 98 °F (36.7 °C)   Resp 15   SpO2 99%     Physical Exam  Vitals reviewed. Constitutional:       General: She is not in acute distress. Appearance: She is obese. She is not ill-appearing, toxic-appearing or diaphoretic. HENT:      Head: Normocephalic and atraumatic. Cardiovascular:      Rate and Rhythm: Normal rate. Pulmonary:      Effort: Pulmonary effort is normal.      Breath sounds: Normal breath sounds. Abdominal:      General: There is no distension. Palpations: Abdomen is soft. Tenderness: There is no abdominal tenderness. There is no guarding or rebound. Negative signs include Silva's sign, Rovsing's sign and McBurney's sign. Neurological:      Mental Status: She is alert. Comments:  At baseline, moving all extremities   Psychiatric:         Mood and Affect: Mood normal.         DIFFERENTIAL  DIAGNOSIS     PLAN (LABS / IMAGING / EKG):  Orders Placed This Encounter   Procedures    XR CHEST PORTABLE    CT ABDOMEN PELVIS W IV CONTRAST Additional Contrast? None    Troponin    CBC    Comprehensive Metabolic Panel    Brain Natriuretic Peptide    Lactate, Sepsis    LIPASE    Urinalysis Reflex to Culture    Troponin    EKG 12 Lead       MEDICATIONS ORDERED:  Orders Placed This Encounter   Medications    iopamidol (ISOVUE-370) 76 % injection 75 mL       DDX: Gastritis, appendicitis, ACS/MI, electrolyte normality, diverticulosis    DIAGNOSTIC RESULTS / EMERGENCY DEPARTMENT COURSE / MDM   :  Results for orders placed or performed during the hospital encounter of 12/13/21   Troponin   Result Value Ref Range    Troponin, High Sensitivity 8 0 - 14 ng/L    Troponin T NOT REPORTED <0.03 ng/mL    Troponin Interp NOT REPORTED    CBC   Result Value Ref Range    WBC 7.3 3.5 - 11.3 k/uL    RBC 4.46 3.95 - 5.11 m/uL    Hemoglobin 13.8 11.9 - 15.1 g/dL    Hematocrit 42.0 36.3 - 47.1 %    MCV 94.2 82.6 - 102.9 fL    MCH 30.9 25.2 - 33.5 pg    MCHC 32.9 28.4 - 34.8 g/dL    RDW 12.4 11.8 - 14.4 %    Platelets 432 489 - 989 k/uL    MPV 9.3 8.1 - 13.5 fL    NRBC Automated 0.0 0.0 per 100 WBC   Comprehensive Metabolic Panel   Result Value Ref Range    Glucose 102 (H) 70 - 99 mg/dL    BUN 10 8 - 23 mg/dL    CREATININE 0.78 0.50 - 0.90 mg/dL    Bun/Cre Ratio NOT REPORTED 9 - 20    Calcium 9.3 8.6 - 10.4 mg/dL    Sodium 142 135 - 144 mmol/L    Potassium 4.1 3.7 - 5.3 mmol/L    Chloride 105 98 - 107 mmol/L    CO2 25 20 - 31 mmol/L    Anion Gap 12 9 - 17 mmol/L    Alkaline Phosphatase 136 (H) 35 - 104 U/L    ALT 12 5 - 33 U/L    AST 14 <32 U/L    Total Bilirubin 0.23 (L) 0.3 - 1.2 mg/dL    Total Protein 6.9 6.4 - 8.3 g/dL    Albumin 4.0 3.5 - 5.2 g/dL    Albumin/Globulin Ratio 1.4 1.0 - 2.5    GFR Non-African American >60 >60 mL/min    GFR African American >60 >60 mL/min    GFR Comment          GFR Staging NOT REPORTED    Brain Natriuretic Peptide   Result Value Ref Range    Pro-BNP 86 <300 pg/mL    BNP Interpretation NOT REPORTED    Lactate, Sepsis   Result Value Ref Range    Lactic Acid, Sepsis NOT REPORTED 0.5 - 1.9 mmol/L    Lactic Acid, Sepsis, Whole Blood 1.2 0.5 - 1.9 mmol/L   LIPASE   Result Value Ref Range    Lipase 16 13 - 60 U/L           RADIOLOGY:  None    EKG  Sinus rhythm rate 84 bpm, rightward axis, , bigeminy with premature atrial complexes, right bundle branch block no signs of acute ischemia    All EKG's are interpreted by the Emergency Department Physician who either signs or Co-signs this chart in the absence of a cardiologist.    EMERGENCY DEPARTMENT COURSE/IMPRESSION: 69-year-old female baseline MRDD present emerge department with vague abdominal pain, abdomen soft, nonperitoneal, as patient has baseline MRDD and is a poor historian, ACS and abdominal exam was initiated which was unremarkable to include CT abdomen, discussed with social work however patient likely would need placement but does live at home, with her son, at this time there is no clinical [place] for admission and  worker aware of this patient have been able to place patient.  and social work continue to follow. There were some mild EKG changes from December 3 with premature atrial complexes in a pattern of bigeminy, patient is asymptomatic. Patient tolerated oral intake in the emergency department, patient discharged with her daughter. Patient signed out to Dr. Brittney Brown awaiting second troponin. PROCEDURES:  None    CONSULTS:  None    CRITICAL CARE:  None    FINAL IMPRESSION      1. Generalized abdominal pain          DISPOSITION / PLAN     DISPOSITION Discharge - Pending Orders Complete 12/13/2021 05:37:01 PM      PATIENT REFERRED TO:  No follow-up provider specified.     DISCHARGE MEDICATIONS:  New Prescriptions    No medications on file       Rose Martell DO  Emergency Medicine Resident    (Please note that portions of thisnote were completed with a voice recognition program.  Efforts were made to edit the dictations but occasionally words are mis-transcribed.)     Rose Martell DO  Resident  12/13/21 1862

## 2021-12-13 NOTE — ED NOTES
Writer received call from female who stated she's been involved with patient/family for decades but refused to provide identifying information. Caller ID reflected number [phone]. Caller stated:  >patient's severe MRDD  >patient's 2 children are also MRDD & do not get along  >patient is supposed to have a healthcare POA named Neo Duncan ________, caller does not know Delilah Gimenez last name  >patient lives with 77 yo female & son Benjamin Camacho. Patient sleeps on 75year old's couch  >patient wanders the streets, cannot care for self, sneaks out of the home & calls caller's step-sister to pick her up  >she does not believe patient receives services through the Board of   >she does not want patient/family to know she made this call  >patient needs AL placement & APS referral  >she requested patient be held in the hospital until placed in assisted living. Writer explained hospital cannot hold patients until they're place in assisted living  >she denied any other concerns    Writer agreed with APS referral, Jessee Duque made referral to on-call ALIZA Hanson  12/13/21 3200

## 2021-12-13 NOTE — ED TRIAGE NOTES
Pt came into er in nad   Pt reports abd pain with sob that started his morning   Pt denies cp   Pt states she used to have to use O2 at home but not anymore   Pt alert and orientede x4   Pt story appears very random  Pt reports shes not eating has much   Pt reports she vomited once yesterday   Will continue to monitor

## 2021-12-14 NOTE — ED NOTES
Patient has JFS via Jessie Jimenez and [facility].  cab scheduled.      [address]  12/13/21 7174

## 2021-12-14 NOTE — ED PROVIDER NOTES
[facility] ED  Emergency Department  Faculty Attestation       I performed a history and physical examination of the patient and discussed management with the resident. I reviewed the residents note and agree with the documented findings including all diagnostic interpretations and plan of care. Any areas of disagreement are noted on the chart. I was personally present for the key portions of any procedures. I have documented in the chart those procedures where I was not present during the key portions. I have reviewed the emergency nurses triage note. I agree with the chief complaint, past medical history, past surgical history, allergies, medications, social and family history as documented unless otherwise noted below. Documentation of the HPI, Physical Exam and Medical Decision Making performed by scribes is based on my personal performance of the HPI, PE and MDM. For Physician Assistant/ Nurse Practitioner cases/documentation I have personally evaluated this patient and have completed at least one if not all key elements of the E/M (history, physical exam, and MDM). Additional findings are as noted. Pertinent Comments     Primary Care Physician: Yousif Smith MD    History: This is a 66 y.o. female who presents to the Emergency Department with complaint of chest pain that is described as a burning sensation in the midsternal region that is nonradiating with no associated shortness of breath, diaphoresis, or nausea or vomiting. Patient states that she thinks this may be her GERD acting up. Per EMS patient is well-known to them and calls multiple times, and was brought in for evaluation as this was the third time of calling today. Does have a history of hypertension hyperlipidemia.       Physical:    ED Triage Vitals [12/03/21 0404]   BP Temp Temp Source Pulse Resp SpO2 Height Weight   (!) 141/73 98.2 °F (36.8 °C) Oral 81 17 96 % -- 178 lb (80.7 kg)        General: alert, well appearing, and in no distress. HENT: normocephalic, moist mucus membranes  Eyes: pupils equal and reactive, extraocular eye movements intact   Neck: normal ROM, trachea midline   Heart:  normal rate, regular rhythm, normal S1, S2, no murmurs, rubs, clicks or gallops   Chest: clear to auscultation, no wheezes, rales or rhonchi, symmetric air entry. Abdomen: soft, nontender, nondistended, no masses or organomegaly  no pulsatile masses   Extremities: no obvious deformities, normal ROM of all 4 extremities, no edema of bilateral lower extremities  Neurological: alert, oriented, normal speech, no focal findings or movement disorder noted   Skin: normal coloration and turgor, no rashes on exposed skin       MDM/Plan:   Midsternal burning sensation, with no associated symptoms. Most likely GERD, however cannot rule out ACS and therefore will do cardiac work-up. Patient's work-up is negative. However based on age and risk factors alone she is a heart score of 4. Recommend calling PCP for outpatient follow-up and cardiac work-up. Generally that she needs inpatient admission at this time given that this is low risk for ACS with normal troponins and unchanged EKG. EKG Interpretation    Interpreted by emergency department physician    Clinical Impression: Right bundle branch block.   No signs of acute ischemia    Ivett Samayoa MD    Critical Care Time: None     Ivett Samayoa MD  Attending Emergency Physician         Ivett Samayoa MD  12/14/21 2379

## 2021-12-14 NOTE — ED PROVIDER NOTES
[facility] ED  Emergency Department  Emergency Medicine Resident Sign-out     Care of Denisse Lloyd was assumed from Dr. Zia Valdez and is being seen for Abdominal Pain  . The patient's initial evaluation and plan have been discussed with the prior provider who initially evaluated the patient. EMERGENCY DEPARTMENT COURSE / MEDICAL DECISION MAKING:       MEDICATIONS GIVEN:  Orders Placed This Encounter   Medications    iopamidol (ISOVUE-370) 76 % injection 75 mL    ibuprofen (ADVIL;MOTRIN) tablet 800 mg    cephALEXin (KEFLEX) capsule 500 mg     Order Specific Question:   Antimicrobial Indications     Answer:   Urinary Tract Infection    cephALEXin (KEFLEX) 500 MG capsule     Sig: Take 1 capsule by mouth 2 times daily for 7 days     Dispense:  14 capsule     Refill:  0       LABS / RADIOLOGY:     Labs Reviewed   COMPREHENSIVE METABOLIC PANEL - Abnormal; Notable for the following components:       Result Value    Glucose 102 (*)     Alkaline Phosphatase 136 (*)     Total Bilirubin 0.23 (*)     All other components within normal limits   URINE RT REFLEX TO CULTURE - Abnormal; Notable for the following components:    Specific Gravity, UA 1.037 (*)     Leukocyte Esterase, Urine SMALL (*)     All other components within normal limits   MICROSCOPIC URINALYSIS - Abnormal; Notable for the following components:    Bacteria, UA MANY (*)     All other components within normal limits   CULTURE, URINE   TROPONIN   CBC   BRAIN NATRIURETIC PEPTIDE   LACTATE, SEPSIS   LIPASE   TROPONIN   LACTATE, SEPSIS       CT ABDOMEN PELVIS W IV CONTRAST Additional Contrast? None    Result Date: 12/13/2021  EXAMINATION: CT OF THE ABDOMEN AND PELVIS WITH CONTRAST 12/13/2021 1:33 pm TECHNIQUE: CT of the abdomen and pelvis was performed with the administration of intravenous contrast. Multiplanar reformatted images are provided for review.  Dose modulation, iterative reconstruction, and/or weight based adjustment of the mA/kV was utilized to reduce the radiation dose to as low as reasonably achievable. COMPARISON: 08/20/2021 HISTORY: ORDERING SYSTEM PROVIDED HISTORY: ABD pain TECHNOLOGIST PROVIDED HISTORY: ABD pain Decision Support Exception - unselect if not a suspected or confirmed emergency medical condition->Emergency Medical Condition (MA) FINDINGS: Lower Chest: No acute abnormality in the lung bases. No pleural or pericardial effusion. Mild cardiomegaly with 3 vessel coronary artery calcifications. Organs: Cholecystectomy with minimal postoperative intrahepatic biliary ductal prominence. Liver is grossly unremarkable aside from focal fatty infiltration near the falciform ligament. Spleen is normal in size and attenuation. Diffusely atrophic pancreas. Adrenal glands are normal caliber. Kidneys enhance symmetrically and normally without hydronephrosis. Small water attenuation renal sinus cysts on the left. Excreting contrast in the collecting system limits assessment for urolithiasis. GI/Bowel: Normal appendix. No bowel obstruction. Duodenal diverticulum in the 3rd portion. Probable colonic lipoma at the hepatic flexure. There is diverticulosis without evidence of diverticulitis. Pelvis: The female pelvic organs and urinary bladder are grossly unremarkable. Peritoneum/Retroperitoneum: No free fluid, free air, or lymphadenopathy. Normal caliber aorta with mild atherosclerosis. Bones/Soft Tissues: Mild subcutaneous edema which is similar to prior comparison. Degenerative changes of the spine and hips without acute bony abnormality. No acute inflammatory findings. Chronic unchanged findings as described above. XR CHEST PORTABLE    Result Date: 12/13/2021  EXAMINATION: ONE XRAY VIEW OF THE CHEST 12/13/2021 3:37 pm COMPARISON: CT of the chest August 19, 2021. Portable frontal view of the chest December 3, 2021.  HISTORY: ORDERING SYSTEM PROVIDED HISTORY: CP pain TECHNOLOGIST PROVIDED HISTORY: CP pain Reason for Exam: port upright FINDINGS: The heart is normal in size. No evidence of pneumothorax, pleural effusion, infiltrate, or abnormal lung mass. Some degenerative changes are apparent in the right shoulder. The central pulmonary vascularity appears normal. Question of a large body habitus. Unremarkable portable view of the chest.     XR CHEST PORTABLE    Result Date: 12/3/2021  EXAMINATION: ONE XRAY VIEW OF THE CHEST 12/3/2021 4:22 am COMPARISON: August 18, 2021. HISTORY: ORDERING SYSTEM PROVIDED HISTORY: chest pain TECHNOLOGIST PROVIDED HISTORY: chest pain Reason for Exam: uprt FINDINGS: Mild cardiomegaly appears unchanged. Cardiomediastinal silhouette otherwise within normal limits. Poor delineation of the left hemidiaphragm appears secondary to superimposed soft tissues and prominent mediastinal fat pad. No focal consolidation identified. No definite effusion. No pneumothorax or subdiaphragmatic free air. No acute osseous abnormality identified. No radiographic evidence of acute cardiopulmonary disease. RECENT VITALS:     Temp: 98 °F (36.7 °C),  Pulse: 82, Resp: 15, BP: (!) 146/84, SpO2: 99 %    This patient is a 66 y.o. Female with they complaint of abdominal pain with a history of MRDD. Patient notes questionable history of diarrhea with no other nausea or vomiting or any other complaints. Lab work is unremarkable except awaiting urinalysis. CT abdomen pelvis is nonconcerning. ED Course as of 12/14/21 0121   Mon Dec 13, 2021   1734 Evaluated patient who is resting comfortably, requesting something to eat and drink. Updated patient on lab work and imaging which were unremarkable. Patient does live at home with her son, has follow-up with her primary care provider will plan for discharge and follow-up with primary care provider additional return precautions given. [WG]   2043 Patient states she still having some abdominal pain but otherwise is resting comfortably.  Patient notes that she is used meth multiple times however never used a specimen cup or a hat. We will have the nurse either straight cath the patient or use a hat in the urinal patient is agreeable with the plan. Await urinalysis and plan for discharge as all the lab work is not nonconcerning. [CS]   2149 Urinalysis concerning for infection as there is many bacteria, 20-50 WBCs along with small cassette esterase but negative for nitrites. Patient is having abdominal pain that is likely the cause. Patient received a dose of Keflex here and follow-up in a few days with her PCP [CS]      ED Course User Index  [CS] Rashid Castillo DO  [WG] Melodie Mooney DO     Patient is updated on UTI. Patient agreeable discharge plan. Educated return precautions. Patient ambulated the without incident. OUTSTANDING TASKS / RECOMMENDATIONS:    1. Follow-up urinalysis  2. Reevaluate possible discharge     FINAL IMPRESSION:     1. Generalized abdominal pain    2.  Acute cystitis without hematuria        DISPOSITION:         DISPOSITION:  [x]  Discharge   []  Transfer -    []  Admission -     []  Against Medical Advice   []  Eloped   FOLLOW-UP: Catia Owens, [address] [place] [address]  [phone]    In 3 days  for reevaluation regarding this visit    [facility] ED  [address] [place] [address]  [phone]    As needed, If symptoms worsen     DISCHARGE MEDICATIONS: Discharge Medication List as of 12/13/2021  9:56 PM      START taking these medications    Details   cephALEXin (KEFLEX) 500 MG capsule Take 1 capsule by mouth 2 times daily for 7 days, Disp-14 capsule, R-0Normal                 Rashid Castillo DO  Emergency Medicine Resident  [facility]       Rashid Castillo [place]  Resident  12/14/21 1767

## 2021-12-15 LAB
CULTURE: ABNORMAL
Lab: ABNORMAL
SPECIMEN DESCRIPTION: ABNORMAL

## 2021-12-16 ENCOUNTER — TELEPHONE (OUTPATIENT)
Dept: INTERNAL MEDICINE CLINIC | Age: 71
End: 2021-12-16

## 2021-12-16 NOTE — TELEPHONE ENCOUNTER
Mailed patient no show appointment letter #2 for the date of 12/15/2021 scheduled with Dr Lovely Kussmaul.

## 2021-12-16 NOTE — PROGRESS NOTES
Reviewed patient's urine culture - culture positive for Klebsiella pneumoniae. Patient was discharged on cephalexin, and culture is sensitive to prescribed medication. Antibiotic prescribed at discharge is appropriate - no changes made to antibiotic regimen. Ginger Mccoy Pharm. D.

## 2022-01-05 LAB
EKG ATRIAL RATE: 84 BPM
EKG P AXIS: 44 DEGREES
EKG P-R INTERVAL: 118 MS
EKG Q-T INTERVAL: 428 MS
EKG QRS DURATION: 110 MS
EKG QTC CALCULATION (BAZETT): 505 MS
EKG R AXIS: -25 DEGREES
EKG T AXIS: -23 DEGREES
EKG VENTRICULAR RATE: 84 BPM

## 2022-03-14 ENCOUNTER — APPOINTMENT (OUTPATIENT)
Dept: CT IMAGING | Age: 72
End: 2022-03-14
Payer: COMMERCIAL

## 2022-03-14 ENCOUNTER — HOSPITAL ENCOUNTER (OUTPATIENT)
Age: 72
Setting detail: OBSERVATION
Discharge: HOME | End: 2022-03-15
Attending: EMERGENCY MEDICINE | Admitting: EMERGENCY MEDICINE
Payer: COMMERCIAL

## 2022-03-14 DIAGNOSIS — N39.0 URINARY TRACT INFECTION WITHOUT HEMATURIA, SITE UNSPECIFIED: Primary | ICD-10-CM

## 2022-03-14 LAB
-: ABNORMAL
ABSOLUTE EOS #: <0.03 K/UL (ref 0–0.44)
ABSOLUTE IMMATURE GRANULOCYTE: 0.06 K/UL (ref 0–0.3)
ABSOLUTE LYMPH #: 1.23 K/UL (ref 1.1–3.7)
ABSOLUTE MONO #: 0.65 K/UL (ref 0.1–1.2)
ALBUMIN SERPL-MCNC: 3.9 G/DL (ref 3.5–5.2)
ALBUMIN/GLOBULIN RATIO: 1.3 (ref 1–2.5)
ALP BLD-CCNC: 130 U/L (ref 35–104)
ALT SERPL-CCNC: 14 U/L (ref 5–33)
ANION GAP SERPL CALCULATED.3IONS-SCNC: 14 MMOL/L (ref 9–17)
AST SERPL-CCNC: 20 U/L
BACTERIA: ABNORMAL
BASOPHILS # BLD: 0 % (ref 0–2)
BASOPHILS ABSOLUTE: <0.03 K/UL (ref 0–0.2)
BILIRUB SERPL-MCNC: 0.28 MG/DL (ref 0.3–1.2)
BILIRUBIN URINE: NEGATIVE
BUN BLDV-MCNC: 10 MG/DL (ref 8–23)
CALCIUM SERPL-MCNC: 9.6 MG/DL (ref 8.6–10.4)
CASTS UA: ABNORMAL /LPF (ref 0–8)
CHLORIDE BLD-SCNC: 101 MMOL/L (ref 98–107)
CO2: 23 MMOL/L (ref 20–31)
COLOR: YELLOW
CREAT SERPL-MCNC: 0.75 MG/DL (ref 0.5–0.9)
EOSINOPHILS RELATIVE PERCENT: 0 % (ref 1–4)
EPITHELIAL CELLS UA: ABNORMAL /HPF (ref 0–5)
GFR AFRICAN AMERICAN: >60 ML/MIN
GFR NON-AFRICAN AMERICAN: >60 ML/MIN
GFR SERPL CREATININE-BSD FRML MDRD: ABNORMAL ML/MIN/{1.73_M2}
GLUCOSE BLD-MCNC: 170 MG/DL (ref 70–99)
GLUCOSE URINE: NEGATIVE
HCT VFR BLD CALC: 39.2 % (ref 36.3–47.1)
HEMOGLOBIN: 12.6 G/DL (ref 11.9–15.1)
IMMATURE GRANULOCYTES: 1 %
KETONES, URINE: NEGATIVE
LACTIC ACID, WHOLE BLOOD: 1.8 MMOL/L (ref 0.7–2.1)
LEUKOCYTE ESTERASE, URINE: ABNORMAL
LIPASE: 14 U/L (ref 13–60)
LYMPHOCYTES # BLD: 14 % (ref 24–43)
MCH RBC QN AUTO: 30.5 PG (ref 25.2–33.5)
MCHC RBC AUTO-ENTMCNC: 32.1 G/DL (ref 28.4–34.8)
MCV RBC AUTO: 94.9 FL (ref 82.6–102.9)
MONOCYTES # BLD: 7 % (ref 3–12)
NITRITE, URINE: NEGATIVE
NRBC AUTOMATED: 0 PER 100 WBC
PDW BLD-RTO: 12.6 % (ref 11.8–14.4)
PH UA: 7 (ref 5–8)
PLATELET # BLD: 335 K/UL (ref 138–453)
PMV BLD AUTO: 9.7 FL (ref 8.1–13.5)
POTASSIUM SERPL-SCNC: 4.2 MMOL/L (ref 3.7–5.3)
PROTEIN UA: NEGATIVE
RBC # BLD: 4.13 M/UL (ref 3.95–5.11)
RBC UA: ABNORMAL /HPF (ref 0–4)
SARS-COV-2, RAPID: NOT DETECTED
SEG NEUTROPHILS: 78 % (ref 36–65)
SEGMENTED NEUTROPHILS ABSOLUTE COUNT: 6.83 K/UL (ref 1.5–8.1)
SODIUM BLD-SCNC: 138 MMOL/L (ref 135–144)
SPECIFIC GRAVITY UA: 1 (ref 1–1.03)
SPECIMEN DESCRIPTION: NORMAL
TOTAL PROTEIN: 6.9 G/DL (ref 6.4–8.3)
TROPONIN, HIGH SENSITIVITY: 10 NG/L (ref 0–14)
TURBIDITY: CLEAR
URINE HGB: NEGATIVE
UROBILINOGEN, URINE: NORMAL
WBC # BLD: 8.8 K/UL (ref 3.5–11.3)
WBC UA: ABNORMAL /HPF (ref 0–5)

## 2022-03-14 PROCEDURE — G0378 HOSPITAL OBSERVATION PER HR: HCPCS

## 2022-03-14 PROCEDURE — 2580000003 HC RX 258

## 2022-03-14 PROCEDURE — 96375 TX/PRO/DX INJ NEW DRUG ADDON: CPT

## 2022-03-14 PROCEDURE — 81001 URINALYSIS AUTO W/SCOPE: CPT

## 2022-03-14 PROCEDURE — 93005 ELECTROCARDIOGRAM TRACING: CPT

## 2022-03-14 PROCEDURE — 74177 CT ABD & PELVIS W/CONTRAST: CPT

## 2022-03-14 PROCEDURE — 96374 THER/PROPH/DIAG INJ IV PUSH: CPT

## 2022-03-14 PROCEDURE — 80053 COMPREHEN METABOLIC PANEL: CPT

## 2022-03-14 PROCEDURE — 6370000000 HC RX 637 (ALT 250 FOR IP)

## 2022-03-14 PROCEDURE — 6360000002 HC RX W HCPCS

## 2022-03-14 PROCEDURE — 84484 ASSAY OF TROPONIN QUANT: CPT

## 2022-03-14 PROCEDURE — 85025 COMPLETE CBC W/AUTO DIFF WBC: CPT

## 2022-03-14 PROCEDURE — 99283 EMERGENCY DEPT VISIT LOW MDM: CPT

## 2022-03-14 PROCEDURE — 87635 SARS-COV-2 COVID-19 AMP PRB: CPT

## 2022-03-14 PROCEDURE — 83605 ASSAY OF LACTIC ACID: CPT

## 2022-03-14 PROCEDURE — 83690 ASSAY OF LIPASE: CPT

## 2022-03-14 PROCEDURE — 96365 THER/PROPH/DIAG IV INF INIT: CPT

## 2022-03-14 PROCEDURE — 2580000003 HC RX 258: Performed by: EMERGENCY MEDICINE

## 2022-03-14 PROCEDURE — 94640 AIRWAY INHALATION TREATMENT: CPT

## 2022-03-14 PROCEDURE — 6360000004 HC RX CONTRAST MEDICATION

## 2022-03-14 PROCEDURE — 96372 THER/PROPH/DIAG INJ SC/IM: CPT

## 2022-03-14 RX ORDER — HALOPERIDOL 5 MG
5 TABLET ORAL 2 TIMES DAILY
Status: DISCONTINUED | OUTPATIENT
Start: 2022-03-14 | End: 2022-03-15 | Stop reason: HOSPADM

## 2022-03-14 RX ORDER — SODIUM CHLORIDE 9 MG/ML
25 INJECTION, SOLUTION INTRAVENOUS PRN
Status: DISCONTINUED | OUTPATIENT
Start: 2022-03-14 | End: 2022-03-15 | Stop reason: HOSPADM

## 2022-03-14 RX ORDER — ALBUTEROL SULFATE 90 UG/1
2 AEROSOL, METERED RESPIRATORY (INHALATION) EVERY 4 HOURS PRN
Status: DISCONTINUED | OUTPATIENT
Start: 2022-03-14 | End: 2022-03-15 | Stop reason: HOSPADM

## 2022-03-14 RX ORDER — ONDANSETRON 2 MG/ML
4 INJECTION INTRAMUSCULAR; INTRAVENOUS EVERY 6 HOURS PRN
Status: DISCONTINUED | OUTPATIENT
Start: 2022-03-14 | End: 2022-03-15 | Stop reason: HOSPADM

## 2022-03-14 RX ORDER — ONDANSETRON 4 MG/1
4 TABLET, ORALLY DISINTEGRATING ORAL EVERY 8 HOURS PRN
Status: DISCONTINUED | OUTPATIENT
Start: 2022-03-14 | End: 2022-03-15 | Stop reason: HOSPADM

## 2022-03-14 RX ORDER — TRIHEXYPHENIDYL HYDROCHLORIDE 2 MG/1
2 TABLET ORAL 2 TIMES DAILY
Status: DISCONTINUED | OUTPATIENT
Start: 2022-03-14 | End: 2022-03-15 | Stop reason: HOSPADM

## 2022-03-14 RX ORDER — 0.9 % SODIUM CHLORIDE 0.9 %
1000 INTRAVENOUS SOLUTION INTRAVENOUS ONCE
Status: COMPLETED | OUTPATIENT
Start: 2022-03-14 | End: 2022-03-14

## 2022-03-14 RX ORDER — SODIUM CHLORIDE 0.9 % (FLUSH) 0.9 %
5-40 SYRINGE (ML) INJECTION EVERY 12 HOURS SCHEDULED
Status: DISCONTINUED | OUTPATIENT
Start: 2022-03-14 | End: 2022-03-15 | Stop reason: HOSPADM

## 2022-03-14 RX ORDER — ATORVASTATIN CALCIUM 20 MG/1
20 TABLET, FILM COATED ORAL DAILY
Status: DISCONTINUED | OUTPATIENT
Start: 2022-03-14 | End: 2022-03-15 | Stop reason: HOSPADM

## 2022-03-14 RX ORDER — FENTANYL CITRATE 50 UG/ML
25 INJECTION, SOLUTION INTRAMUSCULAR; INTRAVENOUS ONCE
Status: COMPLETED | OUTPATIENT
Start: 2022-03-14 | End: 2022-03-14

## 2022-03-14 RX ORDER — ACETAMINOPHEN 650 MG/1
650 SUPPOSITORY RECTAL EVERY 6 HOURS PRN
Status: DISCONTINUED | OUTPATIENT
Start: 2022-03-14 | End: 2022-03-15 | Stop reason: HOSPADM

## 2022-03-14 RX ORDER — SODIUM CHLORIDE 0.9 % (FLUSH) 0.9 %
5-40 SYRINGE (ML) INJECTION PRN
Status: DISCONTINUED | OUTPATIENT
Start: 2022-03-14 | End: 2022-03-15 | Stop reason: HOSPADM

## 2022-03-14 RX ORDER — ACETAMINOPHEN 325 MG/1
650 TABLET ORAL EVERY 6 HOURS PRN
Status: DISCONTINUED | OUTPATIENT
Start: 2022-03-14 | End: 2022-03-15 | Stop reason: HOSPADM

## 2022-03-14 RX ORDER — POLYETHYLENE GLYCOL 3350 17 G/17G
17 POWDER, FOR SOLUTION ORAL DAILY PRN
Status: DISCONTINUED | OUTPATIENT
Start: 2022-03-14 | End: 2022-03-15

## 2022-03-14 RX ADMIN — CEFTRIAXONE SODIUM 1000 MG: 1 INJECTION, POWDER, FOR SOLUTION INTRAMUSCULAR; INTRAVENOUS at 11:10

## 2022-03-14 RX ADMIN — SODIUM CHLORIDE 1000 ML: 9 INJECTION, SOLUTION INTRAVENOUS at 08:51

## 2022-03-14 RX ADMIN — TRIHEXYPHENIDYL HYDROCHLORIDE 2 MG: 2 TABLET ORAL at 20:02

## 2022-03-14 RX ADMIN — ATORVASTATIN CALCIUM 20 MG: 20 TABLET, FILM COATED ORAL at 20:02

## 2022-03-14 RX ADMIN — HALOPERIDOL 5 MG: 5 TABLET ORAL at 20:03

## 2022-03-14 RX ADMIN — IOPAMIDOL 75 ML: 755 INJECTION, SOLUTION INTRAVENOUS at 10:53

## 2022-03-14 RX ADMIN — SODIUM CHLORIDE, PRESERVATIVE FREE 10 ML: 5 INJECTION INTRAVENOUS at 20:02

## 2022-03-14 RX ADMIN — TIOTROPIUM BROMIDE INHALATION SPRAY 1 PUFF: 3.12 SPRAY, METERED RESPIRATORY (INHALATION) at 15:59

## 2022-03-14 RX ADMIN — ENOXAPARIN SODIUM 40 MG: 40 INJECTION SUBCUTANEOUS at 13:44

## 2022-03-14 RX ADMIN — FENTANYL CITRATE 25 MCG: 50 INJECTION, SOLUTION INTRAMUSCULAR; INTRAVENOUS at 08:47

## 2022-03-14 ASSESSMENT — PAIN DESCRIPTION - DESCRIPTORS: DESCRIPTORS: ACHING

## 2022-03-14 ASSESSMENT — ENCOUNTER SYMPTOMS
VOMITING: 0
SHORTNESS OF BREATH: 1
TACHYPNEA: 1
WHEEZING: 0
NAUSEA: 1
DIARRHEA: 0
COUGH: 0
BACK PAIN: 0
ABDOMINAL PAIN: 1
CONSTIPATION: 1

## 2022-03-14 ASSESSMENT — PAIN SCALES - GENERAL
PAINLEVEL_OUTOF10: 5
PAINLEVEL_OUTOF10: 5
PAINLEVEL_OUTOF10: 0

## 2022-03-14 ASSESSMENT — PAIN DESCRIPTION - PAIN TYPE: TYPE: ACUTE PAIN

## 2022-03-14 ASSESSMENT — PAIN DESCRIPTION - LOCATION: LOCATION: ABDOMEN

## 2022-03-14 NOTE — Clinical Note
Discharge Plan[de-identified] Other/[facility])   Telemetry/Cardiac Monitoring Required?: No

## 2022-03-14 NOTE — CARE COORDINATION
Case Management Initial Discharge Plan  Radha Cuellar,             Met with:patient to discuss discharge plans. Information verified: address, contacts, phone number, , insurance Yes  Insurance Provider: [address] [place] [address]     Emergency Contact/Next of Kin name & number: Braden Justice son [phone]  Who are involved in patient's support system? Son     PCP: Chelsea Brady MD  Date of last visit: 6 month ago       Discharge Planning    Living Arrangements:        Home has 1 stories  1 stairs to climb to get into front door, na stairs to climb to reach second floor  Location of bedroom/bathroom in home main level     Patient able to perform ADL's:Independent - son assists with IADL's     Current Services (outpatient & in home) none   DME equipment: Oxygen and walker   DME provider: na    Is patient receiving oral anticoagulation therapy? No    If indicated:   Physician managing anticoagulation treatment: na  Where does patient obtain lab work for ATC treatment? Na       Potential Assistance Needed:       Patient agreeable to home care: No  [place] of choice provided:  n/a    Prior SNF/Rehab Placement and Facility: none   Agreeable to SNF/Rehab: No  [place] of choice provided: n/a     Evaluation: Yes     Expected Discharge date:       Patient expects to be discharged to: If home: is the family and/or caregiver wiling & able to provide support at home? Yes   Who will be providing this support? son    Follow Up Appointment: Best Day/ Time:      Transportation provider: estefani  Transportation arrangements needed for discharge: No    Readmission Risk              Risk of Unplanned Readmission:  0             Does patient have a readmission risk score greater than 14?: No  If yes, follow-up appointment must be made within 7 days of discharge.      Goals of Care: Help me so I can go home       Educated patient  on transitional options, provided freedom of choice and are agreeable with plan      Discharge Plan: Home independently           Electronically signed by Kaitlynn Casillas RN on 3/14/22 at 4:31 PM EDT

## 2022-03-14 NOTE — ED PROVIDER NOTES
[facility] ED  Emergency Department Encounter  Emergency Medicine Resident     Pt Name: Dalton Lizarraga  MRN: [id number]  Sharongfofelia 1950  Date of evaluation: 3/14/22  PCP:  Nguyễn Robert MD    CHIEF COMPLAINT       Chief Complaint   Patient presents with    Abdominal Pain    Constipation       HISTORY OFPRESENT ILLNESS  (Location/Symptom, Timing/Onset, Context/Setting, Quality, Duration, Modifying Factors,Severity.)      Dalton Lizarraga is a 66 y.o. female who presents with abdominal pain and constipation x 3 days. Patient states that the pain started 3 days ago associated with constipation. Rates it 5/10, sharp, comes and goes, located on LLQ, does not radiate, denies any alleviating or aggravating factors. Associated with nausea but denies vomiting. Patient also reports of SOB which her baseline d/t COPD, former smoker. Patient has had history of repeated UTI, and as per charts she has ED visits with abdominal pain d/t UTI. Had admission in Feb 22 2/2 UTI with encephalopathy received Ceftriaxone in patient and ciprofloxacin on discharge. PAST MEDICAL / SURGICAL / SOCIAL / FAMILY HISTORY      has a past medical history of Arthritis, Bronchitis, Chronic obstructive pulmonary disease ([address] Colon polyps, Controlled type 2 diabetes mellitus without complication, without long-term current use of insulin ([address] Dental neglect, Depression, Environmental allergies, GERD (gastroesophageal reflux disease), Hyperlipidemia, Hypertension, Obesity, Onychomycosis, Poor historian, RBBB, Treadmill stress test negative for angina pectoris, and Wears glasses. has a past surgical history that includes Cholecystectomy; doppler echocardiography; Colonoscopy (6/2013); and Tubal ligation.      Social History     Socioeconomic History    Marital status: Single     Spouse name: Not on file    Number of children: Not on file    Years of education: Not on file    Highest education level: Not on file   Occupational History    Not on file   Tobacco Use    Smoking status: Former Smoker     Packs/day: 0.50     Years: 35.00     Pack years: 17.50     Types: Cigarettes     Quit date: 2015     Years since quittin.6    Smokeless tobacco: Never Used   Substance and Sexual Activity    Alcohol use: No     Alcohol/week: 0.0 standard drinks    Drug use: No    Sexual activity: Not on file   Other Topics Concern    Not on file   Social History Narrative    Not on file     Social Determinants of Health     Financial Resource Strain: Low Risk     Difficulty of Paying Living Expenses: Not hard at all   Food Insecurity: No Food Insecurity    Worried About Running Out of Food in the Last Year: Never true    Marcos of Food in the Last Year: Never true   Transportation Needs: No Transportation Needs    Lack of Transportation (Medical): No    Lack of Transportation (Non-Medical): No   Physical Activity:     Days of Exercise per Week: Not on file    Minutes of Exercise per Session: Not on file   Stress:     Feeling of Stress : Not on file   Social Connections:     Frequency of Communication with Friends and Family: Not on file    Frequency of Social Gatherings with Friends and Family: Not on file    Attends [employer]ology Services: Not on file    Active Member of [address] [place] [address] [employer] or Organizations: Not on file    Attends Club or Organization Meetings: Not on file    Marital Status: Not on file   Intimate Partner Violence:     Fear of Current or Ex-Partner: Not on file    Emotionally Abused: Not on file    Physically Abused: Not on file    Sexually Abused: Not on file   Housing Stability:     Unable to Pay for Housing in the Last Year: Not on file    Number of [place] in the Last Year: Not on file    Unstable Housing in the Last Year: Not on file       Family History   Problem Relation Age of Onset    Heart Disease Mother     Cancer Father         Allergies:  Patient has no known allergies.     Home Medications:  Prior to Admission medications    Medication Sig Start Date End Date Taking? Authorizing Provider   cephALEXin (KEFLEX) 500 MG capsule Take 1 capsule by mouth 2 times daily for 7 days 3/15/22 3/22/22 Yes Nikolas Jane MD   albuterol sulfate  (90 Base) MCG/ACT inhaler albuterol sulfate HFA 90 mcg/actuation aerosol inhaler   Yes Historical Provider, MD   atorvastatin (LIPITOR) 20 MG tablet Take 1 tablet by mouth daily 6/29/21  Yes Erika Sherman MD   tiotropium (SPIRIVA RESPIMAT) 1.25 MCG/ACT AERS inhaler Inhale 2 puffs into the lungs daily 8/12/19  Yes Adolfo Morales MD   acetaminophen (TYLENOL) 500 MG tablet Take 2 tablets by mouth every 8 hours as needed for Pain 3/7/19  Yes Marlin Jordan MD   haloperidol (HALDOL) 5 MG tablet Take 5 mg by mouth 2 times daily   Yes Historical Provider, MD   traZODone (DESYREL) 100 MG tablet  8/9/17  Yes Historical Provider, MD   trihexyphenidyl (ARTANE) 2 MG tablet Take 2 mg by mouth 2 times daily  10/10/14  Yes Historical Provider, MD   methylPREDNISolone sodium (SOLU-MEDROL) 125 MG injection 125 mg    Historical Provider, MD   ibuprofen (ADVIL;MOTRIN) 600 MG tablet ibuprofen 600 mg tablet    Historical Provider, MD   ciclopirox (LOPROX) 0.77 % cream ciclopirox 0.77 % topical cream    Historical Provider, MD   benzonatate (TESSALON PERLES) 100 MG capsule Tessalon Perles 100 mg capsule   Take 1 capsule 3 times a day by oral route as needed for 10 days. for cough    Historical Provider, MD   Elastic Bandages & Supports (ACE ARM SLING) MISC Apply 1 Units topically daily 4/17/17   Elidia Strange MD       REVIEW OFSYSTEMS    (2-9 systems for level 4, 10 or more for level 5)      Review of Systems   Constitutional: Negative for activity change, appetite change, chills and fatigue. Respiratory: Positive for shortness of breath. Negative for cough and wheezing. Cardiovascular: Negative for chest pain and palpitations.    Gastrointestinal: Positive for abdominal pain, constipation and nausea. Negative for diarrhea and vomiting. Musculoskeletal: Negative for arthralgias and back pain. Skin: Negative for pallor. Neurological: Negative for dizziness, weakness, numbness and headaches. Psychiatric/Behavioral: Negative for agitation, self-injury and sleep disturbance. The patient is not nervous/anxious. PHYSICAL EXAM   (up to 7 for level 4, 8 or more forlevel 5)      INITIAL VITALS:   ED Triage Vitals [03/14/22 0733]   BP Temp Temp Source Pulse Resp SpO2 Height Weight   132/63 98.4 °F (36.9 °C) Oral 117 30 -- -- --       Physical Exam  HENT:      Head: Normocephalic. Mouth/Throat:      Mouth: Mucous membranes are dry. Cardiovascular:      Rate and Rhythm: Tachycardia present. Pulses: Normal pulses. Pulmonary:      Breath sounds: Decreased breath sounds present. No wheezing or rhonchi. Abdominal:      General: Bowel sounds are normal. There is no distension. Palpations: Abdomen is soft. There is no mass. Tenderness: There is abdominal tenderness (LLQ) in the left lower quadrant. There is no guarding. Musculoskeletal:         General: Normal range of motion. Skin:     General: Skin is warm. Neurological:      General: No focal deficit present. Mental Status: She is alert and oriented to person, place, and time.    Psychiatric:         Mood and Affect: Mood normal.         Behavior: Behavior normal.         DIFFERENTIAL  DIAGNOSIS     PLAN (LABS / IMAGING / EKG):  Orders Placed This Encounter   Procedures    COVID-19, Rapid    CT ABDOMEN PELVIS W IV CONTRAST Additional Contrast? None    CBC with Auto Differential    Comprehensive Metabolic Panel    Lipase    Lactic Acid    Troponin    Urinalysis with Microscopic    Inpatient consult to Social Work    OT eval and treat    PT evaluation and treat    EKG 12 Lead    Place in Observation Service    Place in Observation Service    Discharge patient MEDICATIONS ORDERED:  Orders Placed This Encounter   Medications    fentaNYL (SUBLIMAZE) injection 25 mcg    0.9 % sodium chloride bolus    DISCONTD: cefTRIAXone (ROCEPHIN) 1000 mg IVPB in 50 mL D5W minibag     Order Specific Question:   Antimicrobial Indications     Answer:   Urinary Tract Infection    iopamidol (ISOVUE-370) 76 % injection 75 mL    DISCONTD: cefTRIAXone (ROCEPHIN) 1000 mg IVPB in NS 50ml minibag     Order Specific Question:   Antimicrobial Indications     Answer:   Pneumonia (CAP)    DISCONTD: sodium chloride flush 0.9 % injection 5-40 mL    DISCONTD: sodium chloride flush 0.9 % injection 5-40 mL    DISCONTD: 0.9 % sodium chloride infusion    DISCONTD: enoxaparin (LOVENOX) injection 40 mg    DISCONTD: ondansetron (ZOFRAN-ODT) disintegrating tablet 4 mg    DISCONTD: ondansetron (ZOFRAN) injection 4 mg    DISCONTD: polyethylene glycol (GLYCOLAX) packet 17 g    DISCONTD: acetaminophen (TYLENOL) tablet 650 mg    DISCONTD: acetaminophen (TYLENOL) suppository 650 mg    DISCONTD: albuterol sulfate  (90 Base) MCG/ACT inhaler 2 puff     Order Specific Question:   Initiate RT Bronchodilator Protocol     Answer:   No    DISCONTD: atorvastatin (LIPITOR) tablet 20 mg    DISCONTD: haloperidol (HALDOL) tablet 5 mg    DISCONTD: tiotropium (SPIRIVA RESPIMAT) 2.5 MCG/ACT inhaler 1 puff    DISCONTD: trihexyphenidyl (ARTANE) tablet 2 mg    DISCONTD: polyethylene glycol (GLYCOLAX) packet 17 g    DISCONTD: sennosides-docusate sodium (SENOKOT-S) 8.6-50 MG tablet 2 tablet    cephALEXin (KEFLEX) 500 MG capsule     Sig: Take 1 capsule by mouth 2 times daily for 7 days     Dispense:  14 capsule     Refill:  0       DDX: UTI,  Diverticulitis    Initial MDM/Plan/ED COURSE:    66 y.o. female who presents with abdominal pain and constipation x 3 days states it started suddenly and comes and goes rates it 5/10. Denies alleviating or aggravating factors. Also reports of some dysuria.      Will order abdominal workup CBC, CMP, LA, lipase, Trop, Urinalysis and CT Abdomen r/o diverticulitis, UTI, ishemia         ED Course as of 03/22/22 1540   Mon Mar 14, 2022   0909 EKG Sinus tachycardia, Qtc 482  [AD]   0909 Lactic Acid wnl [AD]   0959 Assessed the patient, reports it still hurts but not like before. Tachycardic. Awaiting CT scan and UA. [AD]   1015 UA LE+, WBC 20-50  Will give 1g IV Rocephin [AD]   1124 Assess the patient, feeling much better. HR in 110s [AD]   1148 HR still elevated  CT scan unremarkable  Patient feels much better, will change her status to observation [AD]      ED Course User Index  [AD] Dwayne Bishop MD    :     DIAGNOSTIC RESULTS / Dwana Prow COURSE / MDM     LABS:  Labs Reviewed   CBC WITH AUTO DIFFERENTIAL - Abnormal; Notable for the following components:       Result Value    Seg Neutrophils 78 (*)     Lymphocytes 14 (*)     Eosinophils % 0 (*)     Immature Granulocytes 1 (*)     All other components within normal limits   COMPREHENSIVE METABOLIC PANEL - Abnormal; Notable for the following components:    Glucose 170 (*)     Alkaline Phosphatase 130 (*)     Total Bilirubin 0.28 (*)     All other components within normal limits   URINALYSIS WITH MICROSCOPIC - Abnormal; Notable for the following components:    Specific Gravity, UA 1.003 (*)     Leukocyte Esterase, Urine MODERATE (*)     Bacteria, UA FEW (*)     All other components within normal limits   CBC WITH AUTO DIFFERENTIAL - Abnormal; Notable for the following components:    RBC 3.87 (*)     .0 (*)     All other components within normal limits   COVID-19, RAPID   LIPASE   LACTIC ACID   TROPONIN   BASIC METABOLIC PANEL           No results found.       EKG      All EKG's are interpreted by the Emergency Department Physicianwho either signs or Co-signs this chart in the absence of a cardiologist.      PROCEDURES:  None    CONSULTS:  IP CONSULT TO SOCIAL WORK    CRITICAL CARE:  Please see attending note    FINAL IMPRESSION      1. Urinary tract infection without hematuria, site unspecified          DISPOSITION / PLAN     DISPOSITION Admitted 03/14/2022 11:42:44 AM      PATIENT REFERRED TO:  MD Kobe Amaro [address] [place] [address]  [phone]    Call in 1 day  for follow up this week      DISCHARGE MEDICATIONS:  Discharge Medication List as of 3/15/2022  4:22 PM      START taking these medications    Details   cephALEXin (KEFLEX) 500 MG capsule Take 1 capsule by mouth 2 times daily for 7 days, Disp-14 capsule, R-0Normal             Delaney Valdez MD  Emergency Medicine Resident    (Please note that portions of this note were completed with a voice recognition program.Efforts were made to edit the dictations but occasionally words are mis-transcribed. )     Charles Pineda MD  Resident  03/14/22 [address] [facility] Manjeet Mercado MD  Resident  03/22/22 [id number]

## 2022-03-14 NOTE — ED TRIAGE NOTES
Pt lives at home with her son and at 0400 today developed abdominal pain.  No n/v/d states she has not had a BM in three days,

## 2022-03-14 NOTE — CARE COORDINATION
Consult received for discharge planning, living condition to be assessed, unable to take care at home  Chart reviewed, noted pt with bedbugs. Also noted that [facility]. was arranged with [email or web address] and [facility] at that time  Met with pt who stated she lives with her son, Roberto Paul, and niece, Sherice Maya. She stated someone is with her 24hrs. Pt stated she is able to get herself showered and dressed. Stated her niece does the laundry and cooking. Pt reports her son goes with her to appts in the medical cab. Pt stated she does not have any current services in the home. Pt stated they do have bedbugs and an  was there 2 weeks ago to spray. Pt reports no concerns at home. Called [facility] and spoke with Kirstin. No current involvement. She stated they had a ref in Jan for concerns of able to care for self. APS reports they went to the home and they then closed the case. SW will follow to see if further concerns arise/documented while pt here.

## 2022-03-15 VITALS
BODY MASS INDEX: 30.16 KG/M2 | TEMPERATURE: 97 F | HEIGHT: 65 IN | SYSTOLIC BLOOD PRESSURE: 104 MMHG | DIASTOLIC BLOOD PRESSURE: 64 MMHG | WEIGHT: 181 LBS | RESPIRATION RATE: 18 BRPM | OXYGEN SATURATION: 97 % | HEART RATE: 76 BPM

## 2022-03-15 LAB
ABSOLUTE EOS #: 0.2 K/UL (ref 0–0.44)
ABSOLUTE IMMATURE GRANULOCYTE: 0.03 K/UL (ref 0–0.3)
ABSOLUTE LYMPH #: 2.22 K/UL (ref 1.1–3.7)
ABSOLUTE MONO #: 0.84 K/UL (ref 0.1–1.2)
ANION GAP SERPL CALCULATED.3IONS-SCNC: 14 MMOL/L (ref 9–17)
BASOPHILS # BLD: 1 % (ref 0–2)
BASOPHILS ABSOLUTE: 0.06 K/UL (ref 0–0.2)
BUN BLDV-MCNC: 9 MG/DL (ref 8–23)
CALCIUM SERPL-MCNC: 9.1 MG/DL (ref 8.6–10.4)
CHLORIDE BLD-SCNC: 102 MMOL/L (ref 98–107)
CO2: 21 MMOL/L (ref 20–31)
CREAT SERPL-MCNC: 0.73 MG/DL (ref 0.5–0.9)
EKG ATRIAL RATE: 125 BPM
EKG P-R INTERVAL: 112 MS
EKG Q-T INTERVAL: 334 MS
EKG QRS DURATION: 106 MS
EKG QTC CALCULATION (BAZETT): 482 MS
EKG R AXIS: -34 DEGREES
EKG T AXIS: -4 DEGREES
EKG VENTRICULAR RATE: 125 BPM
EOSINOPHILS RELATIVE PERCENT: 2 % (ref 1–4)
GFR AFRICAN AMERICAN: >60 ML/MIN
GFR NON-AFRICAN AMERICAN: >60 ML/MIN
GFR SERPL CREATININE-BSD FRML MDRD: NORMAL ML/MIN/{1.73_M2}
GLUCOSE BLD-MCNC: 75 MG/DL (ref 70–99)
HCT VFR BLD CALC: 41.4 % (ref 36.3–47.1)
HEMOGLOBIN: 11.9 G/DL (ref 11.9–15.1)
IMMATURE GRANULOCYTES: 0 %
LYMPHOCYTES # BLD: 25 % (ref 24–43)
MCH RBC QN AUTO: 30.7 PG (ref 25.2–33.5)
MCHC RBC AUTO-ENTMCNC: 28.7 G/DL (ref 28.4–34.8)
MCV RBC AUTO: 107 FL (ref 82.6–102.9)
MONOCYTES # BLD: 10 % (ref 3–12)
NRBC AUTOMATED: 0 PER 100 WBC
PDW BLD-RTO: 13 % (ref 11.8–14.4)
PLATELET # BLD: 264 K/UL (ref 138–453)
PMV BLD AUTO: 9.1 FL (ref 8.1–13.5)
POTASSIUM SERPL-SCNC: 4.1 MMOL/L (ref 3.7–5.3)
RBC # BLD: 3.87 M/UL (ref 3.95–5.11)
RBC # BLD: ABNORMAL 10*6/UL
SEG NEUTROPHILS: 62 % (ref 36–65)
SEGMENTED NEUTROPHILS ABSOLUTE COUNT: 5.42 K/UL (ref 1.5–8.1)
SODIUM BLD-SCNC: 137 MMOL/L (ref 135–144)
WBC # BLD: 8.8 K/UL (ref 3.5–11.3)

## 2022-03-15 PROCEDURE — 97530 THERAPEUTIC ACTIVITIES: CPT

## 2022-03-15 PROCEDURE — 85025 COMPLETE CBC W/AUTO DIFF WBC: CPT

## 2022-03-15 PROCEDURE — G0378 HOSPITAL OBSERVATION PER HR: HCPCS

## 2022-03-15 PROCEDURE — 2580000003 HC RX 258

## 2022-03-15 PROCEDURE — 36415 COLL VENOUS BLD VENIPUNCTURE: CPT

## 2022-03-15 PROCEDURE — 94640 AIRWAY INHALATION TREATMENT: CPT

## 2022-03-15 PROCEDURE — 6360000002 HC RX W HCPCS

## 2022-03-15 PROCEDURE — 97162 PT EVAL MOD COMPLEX 30 MIN: CPT

## 2022-03-15 PROCEDURE — 96372 THER/PROPH/DIAG INJ SC/IM: CPT

## 2022-03-15 PROCEDURE — 6370000000 HC RX 637 (ALT 250 FOR IP)

## 2022-03-15 PROCEDURE — 96366 THER/PROPH/DIAG IV INF ADDON: CPT

## 2022-03-15 PROCEDURE — 80048 BASIC METABOLIC PNL TOTAL CA: CPT

## 2022-03-15 PROCEDURE — 97535 SELF CARE MNGMENT TRAINING: CPT

## 2022-03-15 PROCEDURE — 97166 OT EVAL MOD COMPLEX 45 MIN: CPT

## 2022-03-15 RX ORDER — CEPHALEXIN 500 MG/1
500 CAPSULE ORAL 2 TIMES DAILY
Qty: 14 CAPSULE | Refills: 0 | Status: SHIPPED | OUTPATIENT
Start: 2022-03-15 | End: 2022-03-22

## 2022-03-15 RX ORDER — POLYETHYLENE GLYCOL 3350 17 G/17G
17 POWDER, FOR SOLUTION ORAL 3 TIMES DAILY
Status: DISCONTINUED | OUTPATIENT
Start: 2022-03-15 | End: 2022-03-15 | Stop reason: HOSPADM

## 2022-03-15 RX ORDER — SENNA AND DOCUSATE SODIUM 50; 8.6 MG/1; MG/1
2 TABLET, FILM COATED ORAL 2 TIMES DAILY
Status: DISCONTINUED | OUTPATIENT
Start: 2022-03-15 | End: 2022-03-15 | Stop reason: HOSPADM

## 2022-03-15 RX ADMIN — ENOXAPARIN SODIUM 40 MG: 40 INJECTION SUBCUTANEOUS at 09:16

## 2022-03-15 RX ADMIN — HALOPERIDOL 5 MG: 5 TABLET ORAL at 09:16

## 2022-03-15 RX ADMIN — ATORVASTATIN CALCIUM 20 MG: 20 TABLET, FILM COATED ORAL at 09:16

## 2022-03-15 RX ADMIN — SODIUM CHLORIDE, PRESERVATIVE FREE 10 ML: 5 INJECTION INTRAVENOUS at 09:16

## 2022-03-15 RX ADMIN — SODIUM CHLORIDE 25 ML: 9 INJECTION, SOLUTION INTRAVENOUS at 09:13

## 2022-03-15 RX ADMIN — TIOTROPIUM BROMIDE INHALATION SPRAY 1 PUFF: 3.12 SPRAY, METERED RESPIRATORY (INHALATION) at 08:06

## 2022-03-15 RX ADMIN — CEFTRIAXONE SODIUM 1000 MG: 1 INJECTION, POWDER, FOR SOLUTION INTRAMUSCULAR; INTRAVENOUS at 09:14

## 2022-03-15 RX ADMIN — TRIHEXYPHENIDYL HYDROCHLORIDE 2 MG: 2 TABLET ORAL at 09:16

## 2022-03-15 NOTE — PROGRESS NOTES
Occupational Therapy   Occupational Therapy Initial Assessment  Date: 3/15/2022   Patient Name: Pam Munroe  MRN: [id number]     : 1950    Date of Service: 3/15/2022    Chief Complaint   Patient presents with    Abdominal Pain    Constipation     Discharge Recommendations:  Patient would benefit from continued therapy after discharge       Assessment   Performance deficits / Impairments: Decreased functional mobility ; Decreased ADL status; Decreased safe awareness;Decreased cognition;Decreased balance;Decreased high-level IADLs;Decreased endurance;Decreased strength  Assessment: Pt currently limited in performing ADL tasks and functional transfers/functional mobility due to above noted deficits, most significantly decreased activity tolerance. Pt to benefit from continued therapy services while hospitalized and at discharge to maximize pt's safety and independence in performing functional tasks. 24hr assist/supervision recommended at discharge. Prognosis: Good  Decision Making: Medium Complexity  OT Education: OT Role;Plan of Care;Precautions; ADL Adaptive Strategies;Transfer Training;Equipment (Activity Promotion, Safety Awareness/Fall Prevention, Use of RW/RW Mngt; fair return)  REQUIRES OT FOLLOW UP: Yes  Activity Tolerance  Activity Tolerance: Patient limited by fatigue  Safety Devices  Safety Devices in place: Yes  Type of devices: Nurse notified;Call light within reach; Left in bed  Restraints  Initially in place: No           Patient Diagnosis(es): The encounter diagnosis was Urinary tract infection without hematuria, site unspecified.      has a past medical history of Arthritis, Bronchitis, Chronic obstructive pulmonary disease ([address] Colon polyps, Controlled type 2 diabetes mellitus without complication, without long-term current use of insulin ([address] Dental neglect, Depression, Environmental allergies, GERD (gastroesophageal reflux disease), Hyperlipidemia, Hypertension, Obesity, Onychomycosis, guard assistance  Standing Balance: Minimal assistance    Functional Mobility  Functional - Mobility Device: Rolling Walker  Activity: To/from bathroom (3x)  Assist Level: Minimal assistance  Functional Mobility Comments: Min A with no AD, pt mildly unsteady and reaching for walls/furniture for support therefore provided pt RW and pt progressed to CGA; mod VCs for initiation/sequencing/safety awareness throughout; increased time/effort to perform as pt with slow movements and report of fatigue following minimal exertion     ADL  Feeding: Increased time to complete;Setup  Grooming: Increased time to complete;Contact guard assistance  UE Bathing: Increased time to complete;Minimal assistance  LE Bathing: Increased time to complete;Minimal assistance  UE Dressing: Increased time to complete;Contact guard assistance  LE Dressing: Increased time to complete;Minimal assistance (seated EOB to doff/don socks utilizing figure four technique)  Toileting: Increased time to complete;Contact guard assistance     Tone RUE  RUE Tone: Normotonic  Tone LUE  LUE Tone: Normotonic  Coordination  Movements Are Fluid And Coordinated: Yes        Bed mobility  Supine to Sit: Contact guard assistance  Sit to Supine: Contact guard assistance  Scooting: Contact guard assistance  Comment: HOB raised ~45* and use of bed rail; min VCs for intiation/sequencing/safety awareness; increased time/effort to perform     Transfers  Sit to stand: Contact guard assistance  Stand to sit: Contact guard assistance  Transfer Comments: Min VCs for initiation/sequening/safety awareness; increased time/effort to perform        Cognition  Overall Cognitive Status: Exceptions  Following Commands: Follows multistep commands with increased time; Follows multistep commands with repitition  Attention Span: Attends with cues to redirect  Safety Judgement: Decreased awareness of need for assistance  Problem Solving: Assistance required to identify errors made;Assistance required to correct errors made;Assistance required to generate solutions  Insights: Decreased awareness of deficits  Initiation: Requires cues for some  Sequencing: Requires cues for some           Sensation  Overall Sensation Status: WFL (Pt denies any numbness/tingling)        LUE AROM (degrees)  LUE AROM : WFL  Left Hand AROM (degrees)  Left Hand AROM: WFL  RUE AROM (degrees)  RUE AROM : WFL  Right Hand AROM (degrees)  Right Hand AROM: WFL  LUE Strength  Gross LUE Strength:  (grossly 4/5)  L Hand General: 4/5  RUE Strength  Gross RUE Strength:  (grossly 4/5)  R Hand General: 4/5         Plan   Plan  Times per week: 3-4x/wk  Times per day: Daily  Current Treatment Recommendations: Balance Training,Functional Mobility Training,Endurance Training,Patient/Caregiver Education & Training,Safety Education & Training,Self-Care / ADL,Home Management Training,Equipment Evaluation, Education, & procurement,Strengthening      AM-PAC Score  AM-PAC Inpatient Daily Activity Raw Score: 19 (03/15/22 1139)  AM-PAC Inpatient ADL T-Scale Score : 40.22 (03/15/22 1139)  ADL Inpatient CMS 0-100% Score: 42.8 (03/15/22 1139)  ADL Inpatient CMS G-Code Modifier : CK (03/15/22 1139)    Goals  Short term goals  Time Frame for Short term goals: Pt will, by discharge:  Short term goal 1: Perform ADL tasks independently  Short term goal 2: Perform functional transfers/functional mobility independently  Short term goal 3:  Independently demo good safety awareness during engagement in all ADLs and functional transfers/functional mobility  Short term goal 4: Demo 10+ minutes standing tolerance for increased participation in ADL/IADL tasks       Therapy Time   Individual Concurrent Group Co-treatment   Time In 0925         Time Out 0944         Minutes 19         Timed Code Treatment Minutes: MALA LainezR/L

## 2022-03-15 NOTE — H&P
[facility]  CDU / OBSERVATION ENCOUNTER  ATTENDING NOTE     Pt Name: Ana Laura Dillard  MRN: [id number]  Sharongfofelia 1950  Date of evaluation: 3/14/22  Patient's PCP is :  Yossi Vance MD    [address] [place] [address]       Chief Complaint   Patient presents with    Abdominal Pain    Constipation         HISTORY OF PRESENT ILLNESS    Ana Laura Dillard is a 66 y.o. female who presents with abdominal pain constipation the past 3 days. Patient states pain started 3 days ago. Patient's pain started with constipation patient had work-up in the ED which was positive for UTI. Patient has history of repeated UTIs and has had multiple visits because of the same. Patient had recent UTI and had encephalopathy associated with that. Patient has history of frequent drug-resistant's. Patient if relief of constipation and treatment of urinary tract infection. Location/Symptom: Abdominal discomfort  Timing/Onset: 3 days  Provocation: Uncertain  Quality: Abdominal fullness  Radiation: None  Severity: Mild to moderate  Timing/Duration: 3 days  Modifying Factors: Unclear    REVIEW OF SYSTEMS        General ROS - No fevers, No malaise   Ophthalmic ROS - No discharge, No changes in vision  ENT ROS -  No sore throat, No rhinorrhea,   Respiratory ROS - no shortness of breath, no cough, no  wheezing  Cardiovascular ROS - No chest pain, no dyspnea on exertion  Gastrointestinal ROS -abdominal pain, no nausea or vomiting, positive for constipation, no black or bloody stools  Genito-Urinary ROS - No dysuria, trouble voiding, or hematuria  Musculoskeletal ROS - No myalgias, No arthalgias  Neurological ROS - No headache, no dizziness/lightheadedness, No focal weakness, no loss of sensation  Dermatological ROS - No lesions, No rash     (PQRS) Advance directives on face sheet per hospital policy.  No change unless specifically mentioned in chart    PAST MEDICAL HISTORY    has a past medical history of Arthritis, Bronchitis, Chronic obstructive pulmonary disease ([facility] [address] Colon polyps, Controlled type 2 diabetes mellitus without complication, without long-term current use of insulin ([facility] [address] Dental neglect, Depression, Environmental allergies, GERD (gastroesophageal reflux disease), Hyperlipidemia, Hypertension, Obesity, Onychomycosis, Poor historian, RBBB, Treadmill stress test negative for angina pectoris, and Wears glasses. I have reviewed the past medical history with the patient and it is  pertinent to this complaint. SURGICAL HISTORY      has a past surgical history that includes Cholecystectomy; doppler echocardiography; Colonoscopy (2013); and Tubal ligation. I have reviewed and agree with Surgical History entered and it is  pertinent to this complaint. CURRENT MEDICATIONS     cefTRIAXone (ROCEPHIN) 1000 mg IVPB in NS 50ml minibag, Daily  sodium chloride flush 0.9 % injection 5-40 mL, 2 times per day  sodium chloride flush 0.9 % injection 5-40 mL, PRN  0.9 % sodium chloride infusion, PRN  enoxaparin (LOVENOX) injection 40 mg, Daily  ondansetron (ZOFRAN-ODT) disintegrating tablet 4 mg, Q8H PRN   Or  ondansetron (ZOFRAN) injection 4 mg, Q6H PRN  polyethylene glycol (GLYCOLAX) packet 17 g, Daily PRN  acetaminophen (TYLENOL) tablet 650 mg, Q6H PRN   Or  acetaminophen (TYLENOL) suppository 650 mg, Q6H PRN  albuterol sulfate  (90 Base) MCG/ACT inhaler 2 puff, Q4H PRN  atorvastatin (LIPITOR) tablet 20 mg, Daily  haloperidol (HALDOL) tablet 5 mg, BID  tiotropium (SPIRIVA RESPIMAT) 2.5 MCG/ACT inhaler 1 puff, Daily  trihexyphenidyl (ARTANE) tablet 2 mg, BID        All medication charted and reviewed. ALLERGIES     has No Known Allergies. FAMILY HISTORY     She indicated that her mother is . She indicated that her father is . She indicated that her maternal grandmother is . She indicated that her maternal grandfather is . She indicated that her paternal grandmother is .  She indicated that her paternal grandfather is . family history includes Cancer in her father; Heart Disease in her mother. The patient denies any pertinent family history. I have reviewed and agree with the family history entered. I have reviewed the Family History and it is not significant to the case    SOCIAL HISTORY      reports that she quit smoking about 6 years ago. Her smoking use included cigarettes. She has a 17.50 pack-year smoking history. She has never used smokeless tobacco. She reports that she does not drink alcohol and does not use drugs. I have reviewed and agree with all Social.  There are no  concerns for substance abuse/use. PHYSICAL EXAM     INITIAL VITALS:  height is 5' 5\" (1.651 m) and weight is 181 lb (82.1 kg). Her oral temperature is 98.5 °F (36.9 °C). Her blood pressure is 117/70 and her pulse is 87. Her respiration is 14 and oxygen saturation is 98%. CONSTITUTIONAL: AOx4, no apparent distress, appears stated age     HEAD: normocephalic, atraumatic   EYES: PERRLA, EOMI    ENT: moist mucous membranes, uvula midline   NECK: supple, symmetric   BACK: symmetric   LUNGS: clear to auscultation bilaterally   CARDIOVASCULAR: regular rate and rhythm, no murmurs, rubs or gallops   ABDOMEN: soft, non-tender, non-distended with normal active bowel sounds   NEUROLOGIC:  MAEx4, no focal sensory or motor deficits   MUSCULOSKELETAL: no clubbing, cyanosis or edema   SKIN: no rash or wounds       DIFFERENTIAL DIAGNOSIS/MDM:     History of multiple medical problems. Patient with dyspnea which is her baseline for COPD. Patient history of UTIs with drug resistance. Patient with constipation for 3 days. Patient with associated abdominal discomfort.     DIAGNOSTIC RESULTS            RADIOLOGY:   I directly visualized the following  images and reviewed the radiologist interpretations:    CT ABDOMEN PELVIS W IV CONTRAST Additional Contrast? None    Result Date: 3/14/2022  EXAMINATION: CT OF THE ABDOMEN AND PELVIS WITH CONTRAST 3/14/2022 10:54 am TECHNIQUE: CT of the abdomen and pelvis was performed with the administration of intravenous contrast. Multiplanar reformatted images are provided for review. Dose modulation, iterative reconstruction, and/or weight based adjustment of the mA/kV was utilized to reduce the radiation dose to as low as reasonably achievable. COMPARISON: CT abdomen and pelvis performed 12/13/2021. HISTORY: ORDERING SYSTEM PROVIDED HISTORY: abdominal pain LLQ TECHNOLOGIST PROVIDED HISTORY: abdominal pain LLQ Reason for Exam: abdominal pain LLQ FINDINGS: Lower Chest: The lung bases are without consolidation or effusion. The visualized cardiac structures are unremarkable. Organs: The liver and spleen are normal size and overall attenuation. The gallbladder has been removed. Pancreas and adrenal glands are unremarkable. There is no obstructive uropathy. There are left-sided parapelvic renal cysts. The ureters are not dilated. The urinary bladder is unremarkable. GI/Bowel: The stomach is contracted and otherwise unremarkable. Loops of small bowel are normal in caliber without evidence for obstruction. The colon contains air and fecal residue. The appendix is normal.  There is no free air or free fluid. Pelvis: The uterus is age-appropriate. Peritoneum/Retroperitoneum: The psoas muscles are symmetric. The abdominal aorta is normal in caliber. The inferior vena cava is unremarkable. There is no retroperitoneal or mesenteric adenopathy. Bones/Soft Tissues: The extra-abdominal soft tissues are unremarkable. There is no acute osseous abnormality. No acute abdominal or pelvic abnormality. RECOMMENDATIONS: Unavailable       LABS:  I have reviewed and interpreted all available lab results.   Labs Reviewed   CBC WITH AUTO DIFFERENTIAL - Abnormal; Notable for the following components:       Result Value    Seg Neutrophils 78 (*)     Lymphocytes 14 (*)     Eosinophils % 0 (*) Immature Granulocytes 1 (*)     All other components within normal limits   COMPREHENSIVE METABOLIC PANEL - Abnormal; Notable for the following components:    Glucose 170 (*)     Alkaline Phosphatase 130 (*)     Total Bilirubin 0.28 (*)     All other components within normal limits   URINALYSIS WITH MICROSCOPIC - Abnormal; Notable for the following components:    Specific Gravity, UA 1.003 (*)     Leukocyte Esterase, Urine MODERATE (*)     Bacteria, UA FEW (*)     All other components within normal limits   COVID-19, RAPID   LIPASE   LACTIC ACID   TROPONIN         CDU IMPRESSION / Lukas Mccarty is a 66 y.o. female who presents with       · Abdominal discomfort, history of pain for 3 days, uncertain etiology, constipated. · Patient with nonsurgical abdomen. · Will give laxative. · History of frequent urinary tract infection with drug-resistant UTIs. Patient admitted with UTI for treatment and awaiting cultures. · IV antibiotic  · Urine for culture. · No evidence of sepsis. · Patient without fever, otherwise comfortable. · Patient with poor living situations requiring  referral.  · See social service note regarding bedbugs. · Patient had Adult Protective Services called. · Continue home medications and pain control  · Monitor vitals, labs, and imaging  · DISPO: pending consults and clinical improvement    CONSULTS:    IP CONSULT TO SOCIAL WORK    PROCEDURES:  Not indicated         PATIENT REFERRED TO:    No follow-up provider specified. --  Echo Seo MD   Emergency Medicine Attending    This dictation was generated by voice recognition computer software. Although all attempts are made to edit the dictation for accuracy, there may be errors in the transcription that are not intended.

## 2022-03-15 NOTE — DISCHARGE SUMMARY
[place], [address] [facility]  [address], [place] [address]    Phone: [phone]   · cephALEXin 500 MG capsule         Diet:  No diet orders on file, advance as tolerated     Activity:  As tolerated    Consultants: IP CONSULT TO SOCIAL WORK    Procedures:  Not indicated     Diagnostic Test:   Results for orders placed or performed during the hospital encounter of 03/14/22   COVID-19, Rapid    Specimen: Nasopharyngeal Swab   Result Value Ref Range    Specimen Description . NASOPHARYNGEAL SWAB     SARS-CoV-2, Rapid Not Detected Not Detected   CBC with Auto Differential   Result Value Ref Range    WBC 8.8 3.5 - 11.3 k/uL    RBC 4.13 3.95 - 5.11 m/uL    Hemoglobin 12.6 11.9 - 15.1 g/dL    Hematocrit 39.2 36.3 - 47.1 %    MCV 94.9 82.6 - 102.9 fL    MCH 30.5 25.2 - 33.5 pg    MCHC 32.1 28.4 - 34.8 g/dL    RDW 12.6 11.8 - 14.4 %    Platelets 081 091 - 040 k/uL    MPV 9.7 8.1 - 13.5 fL    NRBC Automated 0.0 0.0 per 100 WBC    Seg Neutrophils 78 (H) 36 - 65 %    Lymphocytes 14 (L) 24 - 43 %    Monocytes 7 3 - 12 %    Eosinophils % 0 (L) 1 - 4 %    Basophils 0 0 - 2 %    Immature Granulocytes 1 (H) 0 %    Segs Absolute 6.83 1.50 - 8.10 k/uL    Absolute Lymph # 1.23 1.10 - 3.70 k/uL    Absolute Mono # 0.65 0.10 - 1.20 k/uL    Absolute Eos # <0.03 0.00 - 0.44 k/uL    Basophils Absolute <0.03 0.00 - 0.20 k/uL    Absolute Immature Granulocyte 0.06 0.00 - 0.30 k/uL   Comprehensive Metabolic Panel   Result Value Ref Range    Glucose 170 (H) 70 - 99 mg/dL    BUN 10 8 - 23 mg/dL    CREATININE 0.75 0.50 - 0.90 mg/dL    Calcium 9.6 8.6 - 10.4 mg/dL    Sodium 138 135 - 144 mmol/L    Potassium 4.2 3.7 - 5.3 mmol/L    Chloride 101 98 - 107 mmol/L    CO2 23 20 - 31 mmol/L    Anion Gap 14 9 - 17 mmol/L    Alkaline Phosphatase 130 (H) 35 - 104 U/L    ALT 14 5 - 33 U/L    AST 20 <32 U/L    Total Bilirubin 0.28 (L) 0.3 - 1.2 mg/dL    Total Protein 6.9 6.4 - 8.3 g/dL    Albumin 3.9 3.5 - 5.2 g/dL Albumin/Globulin Ratio 1.3 1.0 - 2.5    GFR Non-African American >60 >60 mL/min    GFR African American >60 >60 mL/min    GFR Comment         Lipase   Result Value Ref Range    Lipase 14 13 - 60 U/L   Lactic Acid   Result Value Ref Range    Lactic Acid, Whole Blood 1.8 0.7 - 2.1 mmol/L   Troponin   Result Value Ref Range    Troponin, High Sensitivity 10 0 - 14 ng/L   Urinalysis with Microscopic   Result Value Ref Range    Color, UA Yellow Yellow    Turbidity UA Clear Clear    Glucose, Ur NEGATIVE NEGATIVE    Bilirubin Urine NEGATIVE NEGATIVE    Ketones, Urine NEGATIVE NEGATIVE    Specific Gravity, UA 1.003 (L) 1.005 - 1.030    Urine Hgb NEGATIVE NEGATIVE    pH, UA 7.0 5.0 - 8.0    Protein, UA NEGATIVE NEGATIVE    Urobilinogen, Urine Normal Normal    Nitrite, Urine NEGATIVE NEGATIVE    Leukocyte Esterase, Urine MODERATE (A) NEGATIVE    -          WBC, UA 20 TO 50 0 - 5 /HPF    RBC, UA 0 TO 2 0 - 4 /HPF    Casts UA  0 - 8 /LPF     2 TO 5 HYALINE Reference range defined for non-centrifuged specimen.     Epithelial Cells UA 2 TO 5 0 - 5 /HPF    Bacteria, UA FEW (A) None   CBC with Auto Differential   Result Value Ref Range    WBC 8.8 3.5 - 11.3 k/uL    RBC 3.87 (L) 3.95 - 5.11 m/uL    Hemoglobin 11.9 11.9 - 15.1 g/dL    Hematocrit 41.4 36.3 - 47.1 %    .0 (H) 82.6 - 102.9 fL    MCH 30.7 25.2 - 33.5 pg    MCHC 28.7 28.4 - 34.8 g/dL    RDW 13.0 11.8 - 14.4 %    Platelets 666 074 - 158 k/uL    MPV 9.1 8.1 - 13.5 fL    NRBC Automated 0.0 0.0 per 100 WBC    RBC Morphology MACROCYTOSIS PRESENT     Seg Neutrophils 62 36 - 65 %    Lymphocytes 25 24 - 43 %    Monocytes 10 3 - 12 %    Eosinophils % 2 1 - 4 %    Basophils 1 0 - 2 %    Immature Granulocytes 0 0 %    Segs Absolute 5.42 1.50 - 8.10 k/uL    Absolute Lymph # 2.22 1.10 - 3.70 k/uL    Absolute Mono # 0.84 0.10 - 1.20 k/uL    Absolute Eos # 0.20 0.00 - 0.44 k/uL    Basophils Absolute 0.06 0.00 - 0.20 k/uL    Absolute Immature Granulocyte 0.03 0.00 - 0.30 k/uL Basic Metabolic Panel   Result Value Ref Range    Glucose 75 70 - 99 mg/dL    BUN 9 8 - 23 mg/dL    CREATININE 0.73 0.50 - 0.90 mg/dL    Calcium 9.1 8.6 - 10.4 mg/dL    Sodium 137 135 - 144 mmol/L    Potassium 4.1 3.7 - 5.3 mmol/L    Chloride 102 98 - 107 mmol/L    CO2 21 20 - 31 mmol/L    Anion Gap 14 9 - 17 mmol/L    GFR Non-African American >60 >60 mL/min    GFR African American >60 >60 mL/min    GFR Comment         EKG 12 Lead   Result Value Ref Range    Ventricular Rate 125 BPM    Atrial Rate 125 BPM    P-R Interval 112 ms    QRS Duration 106 ms    Q-T Interval 334 ms    QTc Calculation (Bazett) 482 ms    R Axis -34 degrees    T Axis -4 degrees     CT ABDOMEN PELVIS W IV CONTRAST Additional Contrast? None    Result Date: 3/14/2022  EXAMINATION: CT OF THE ABDOMEN AND PELVIS WITH CONTRAST 3/14/2022 10:54 am TECHNIQUE: CT of the abdomen and pelvis was performed with the administration of intravenous contrast. Multiplanar reformatted images are provided for review. Dose modulation, iterative reconstruction, and/or weight based adjustment of the mA/kV was utilized to reduce the radiation dose to as low as reasonably achievable. COMPARISON: CT abdomen and pelvis performed 12/13/2021. HISTORY: ORDERING SYSTEM PROVIDED HISTORY: abdominal pain LLQ TECHNOLOGIST PROVIDED HISTORY: abdominal pain LLQ Reason for Exam: abdominal pain LLQ FINDINGS: Lower Chest: The lung bases are without consolidation or effusion. The visualized cardiac structures are unremarkable. Organs: The liver and spleen are normal size and overall attenuation. The gallbladder has been removed. Pancreas and adrenal glands are unremarkable. There is no obstructive uropathy. There are left-sided parapelvic renal cysts. The ureters are not dilated. The urinary bladder is unremarkable. GI/Bowel: The stomach is contracted and otherwise unremarkable. Loops of small bowel are normal in caliber without evidence for obstruction.   The colon contains air and fecal residue. The appendix is normal.  There is no free air or free fluid. Pelvis: The uterus is age-appropriate. Peritoneum/Retroperitoneum: The psoas muscles are symmetric. The abdominal aorta is normal in caliber. The inferior vena cava is unremarkable. There is no retroperitoneal or mesenteric adenopathy. Bones/Soft Tissues: The extra-abdominal soft tissues are unremarkable. There is no acute osseous abnormality. No acute abdominal or pelvic abnormality. RECOMMENDATIONS: Unavailable           Physical Exam:    General appearance - NAD, AOx 3   Lungs -CTAB, no R/R/R  Heart - RRR, no M/R/G  Abdomen - Soft, ND, mild suprapubic tenderness  Neurological:  MAEx4, No focal motor deficit, sensory loss, at baseline dementia state  Extremities - Cap refil <2 sec in all ext., no edema  Skin -warm, dry      Hospital Course:  Clinical course has improved, labs and imaging reviewed. Jimmy Hutchinson originally presented to the hospital on 3/14/2022  7:27 AM with abdominal pain and dysuria. At that time it was determined that She required further observation and treatment of UTI. Labs and imaging were followed daily. Responded well to antibiotic treatment. Imaging results as above. She is medically stable to be discharged. Disposition: Home    Patient stated that they will not drive themselves home from the hospital if they have gotten pain killers/ narcotics earlier that day and that they will arrange for transportation on their own or work with the  for a ride. Patient counseled NOT to drive while under the influence of narcotics/ pain killers. Condition: Good    Patient stable and ready for discharge home. I have discussed plan of care with patient and they are in understanding. They were instructed to read discharge paperwork. All of their questions and concerns were addressed.      Time Spent: 0 day      --  Kali Callaway MD  Emergency Medicine Attending Physician    This dictation was generated by voice recognition computer software. Although all attempts are made to edit the dictation for accuracy, there may be errors in the transcription that are not intended.

## 2022-03-15 NOTE — PROGRESS NOTES
Physical Therapy    Facility/Department: [address] MED SURG  Initial Assessment    NAME: Kip Amaral  : 1950  MRN: [id number]    Date of Service: 3/15/2022  Chief Complaint   Patient presents with    Abdominal Pain    Constipation     Discharge Recommendations:  Patient would benefit from continued therapy after discharge      Assessment   Body structures, Functions, Activity limitations: Decreased functional mobility ; Decreased strength;Decreased endurance  Assessment: The pt ambulated 25 ft with a RW x CGA. She also ambulated without the walker but was steadier with the use of the walker. She could benefit from a continuation of PT for gait and strengthening following her DC  Prognosis: Good  Decision Making: Medium Complexity  PT Education: Goals;PT Role;Plan of Care  REQUIRES PT FOLLOW UP: Yes  Activity Tolerance  Activity Tolerance: Patient limited by fatigue;Patient limited by endurance       Patient Diagnosis(es): The encounter diagnosis was Urinary tract infection without hematuria, site unspecified. has a past medical history of Arthritis, Bronchitis, Chronic obstructive pulmonary disease ([address] Colon polyps, Controlled type 2 diabetes mellitus without complication, without long-term current use of insulin ([address] Dental neglect, Depression, Environmental allergies, GERD (gastroesophageal reflux disease), Hyperlipidemia, Hypertension, Obesity, Onychomycosis, Poor historian, RBBB, Treadmill stress test negative for angina pectoris, and Wears glasses. has a past surgical history that includes Cholecystectomy; doppler echocardiography; Colonoscopy (2013); and Tubal ligation.     Restrictions  Restrictions/Precautions  Restrictions/Precautions: Fall Risk,Contact Precautions,Up as Tolerated  Required Braces or Orthoses?: No  Vision/Hearing  Vision: Impaired  Vision Exceptions: Wears glasses for reading  Hearing: Exceptions to [facility]  Hearing Exceptions: Hard of hearing/hearing concerns Subjective  General  Patient assessed for rehabilitation services?: Yes  Response To Previous Treatment: Not applicable  Family / Caregiver Present: No  Follows Commands: Within Functional Limits  Subjective  Subjective: RN and pt agreeable to PT eval  Pain Screening  Patient Currently in Pain: Denies  Vital Signs  Patient Currently in Pain: Denies       Orientation  Orientation  Overall Orientation Status: Within Normal Limits  Social/Functional History  Social/Functional History  Lives With: Family (son and niece)  Type of Home: Apartment  Home Layout: One level  Home Access: Stairs to enter with rails  Entrance Stairs - Number of Steps: 3 steps  Entrance Stairs - Rails: Left  Bathroom Shower/Tub: Walk-in shower  Bathroom Toilet: Standard  Bathroom Equipment: Grab bars around toilet,Grab bars in shower,Shower chair,Toilet raiser  Bathroom Accessibility: Accessible  Home Equipment: [employer]  ADL Assistance: Independent  Homemaking Assistance: Needs assistance  Homemaking Responsibilities: Yes (IADL tasks completed mostly by family)  Meal Prep Responsibility: Secondary  Laundry Responsibility: No (son performs)  Cleaning Responsibility: Secondary  Shopping Responsibility: No (son or brother completes)  Health Care Management: Secondary (niece or son assist)  Ambulation Assistance: Independent  Transfer Assistance: Independent  Active : No  Mode of Transportation: Cab,Walk  Leisure & Hobbies: Enjoys reading and watching movies  Cognition      Objective     AROM RLE (degrees)  RLE AROM: WFL  AROM LLE (degrees)  LLE AROM : WFL  AROM RUE (degrees)  RUE AROM : WFL  AROM LUE (degrees)  LUE AROM : WFL  Strength RLE  Strength RLE: WFL  Strength LLE  Strength LLE: WFL  Strength RUE  Strength RUE: WFL  Strength LUE  Strength LUE: WFL     Sensation  Overall Sensation Status: WFL  Bed mobility  Supine to Sit: Contact guard assistance  Sit to Supine: Contact guard assistance  Scooting: Contact guard assistance  Transfers  Sit to Stand: Contact guard assistance  Stand to sit: Contact guard assistance  Ambulation  Ambulation?: Yes  Ambulation 1  Surface: level tile  Device: Rolling Walker  Assistance: Contact guard assistance  Distance: amb 25 ft with a RW x CGA     Balance  Posture: Good  Sitting - Static: Good  Sitting - Dynamic: Fair  Standing - Static: Fair  Standing - Dynamic: [facility]  Times per week: 5-6x wk  Current Treatment Recommendations: Strengthening,Functional Mobility Training,Gait Training,Safety Education & Training,Endurance Training,Balance Training,Stair training  Safety Devices  Type of devices: Nurse notified,Left in bed,Call light within reach    G-Code     OutComes Score    AM-PAC Score  AM-PAC Inpatient Mobility Raw Score : 18 (03/15/22 1254)  AM-PAC Inpatient T-Scale Score : 43.63 (03/15/22 1254)  Mobility Inpatient CMS 0-100% Score: 46.58 (03/15/22 1254)  Mobility Inpatient CMS G-Code Modifier : CK (03/15/22 1254)     Goals  Short term goals  Time Frame for Short term goals: 10 visits  Short term goal 1: transfers with SBA  Short term goal 2: amb 150 ft with a RW x SBA  Short term goal 3: ascend/descend 4 steps with SBA  Short term goal 4: 20 min exercise program x SBA     Therapy Time   Individual Concurrent Group Co-treatment   Time In 0829         Time Out 0950         Minutes 25             [address], PT

## 2022-03-15 NOTE — PROGRESS NOTES
[facility] [employer]  CDU / OBSERVATION ENCOUNTER  ATTENDING NOTE       I performed a history and physical examination of the patient and discussed management with the resident or midlevel provider. I reviewed the resident or midlevel provider's note and agree with the documented findings and plan of care. Any areas of disagreement are noted on the chart. I was personally present for the key portions of any procedures. I have documented in the chart those procedures where I was not present during the key portions. I have reviewed the nurses notes. I agree with the chief complaint, past medical history, past surgical history, allergies, medications, social and family history as documented unless otherwise noted below. The Family history, social history, and ROS are effectively unchanged since admission unless noted elsewhere in the chart. Patient admitted to the ETU for urinary tract infection as well as concern for patient's inability to care for self and possibly poor living conditions. On my exam this morning, patient states that she does continue to have some lower abdominal pain but otherwise feels okay. She denies fever, chills, nausea or vomiting. She says she was able to have a bowel movement this morning. Patient states that she lives with 2 family members and that someone is with her at all times. She says that her family members are able to help her with activities of daily living.  did see patient who did speak with Adult Protective Services. They said that they did have a referral in January for concerns and that protective services did go to patient's home and the case was then closed. PT/OT has been ordered for the patient. Will await their recommendations. We will plan to discharge home on oral antibiotics for the UTI.     Alan Orta MD  Attending Emergency  Physician

## 2022-03-15 NOTE — CARE COORDINATION
Patient needs a ride home. Writer called [facility] and after approx 15 min on hold, monty informed writer that it would be another 15-20 min before they could help due to technical difficulties. Cab service is arranged through [facility] under the [facility] program and will pick the patient up at 1630. Patient and nurse are notified. Reference number is [id number].     Discharge [facility] Case Management Department  Written by: Maria Victoria Villalba RN    Patient Name: Sandra Suzie  Attending Provider: Alistair Burrows MD  Admit Date: 3/14/2022  7:27 AM  MRN: [id number]  Account: [de-identified]                     : 1950  Discharge Date:       Disposition: home    Maria Victoria Villalba RN

## 2022-03-16 NOTE — PROGRESS NOTES
CLINICAL PHARMACY NOTE: MEDS TO BEDS    Total # of Prescriptions Filled: 1   The following medications were delivered to the patient:  · Cephalexin 500 mg cap    Additional Documentation:Medication delivered to patient in room 350 On 3/15 @ 3:45pm .

## 2022-03-17 NOTE — PROGRESS NOTES
OBS/CDU   RESIDENT NOTE      Patients PCP is:  Cholo Gong MD        SUBJECTIVE      No acute events overnight. Bladder pain improving. Mentation at baseline. PHYSICAL EXAM      General: NAD, AO X 3  Heent: EMOI, PERRL  Neck: SUPPLE, NO JVD  Cardiovascular: RRR, S1S2  Pulmonary: CTAB, NO SOB  Abdomen: SOFT, NTTP, ND, +BS  Extremities: +2/4 PULSES DISTAL, NO SWELLING  Neuro / Psych: NO NUMBNESS OR TINGLING, MENTATION AT BASELINE    PERTINENT TEST /EXAMS      I have reviewed all available laboratory results. MEDICATIONS CURRENT   No current facility-administered medications for this encounter. All medication charted and reviewed. CONSULTS      IP CONSULT TO SOCIAL WORK    ASSESSMENT/PLAN        Zee Erazo is a 66 y.o. female who presents with recurrent UTI. Responding well to antibiotics, expect d/c home this afternoon. Has been seen by SW, has help at home.      --  Chris Blackmon MD  Emergency Medicine Resident

## 2022-04-09 ENCOUNTER — HOSPITAL ENCOUNTER (EMERGENCY)
Age: 72
Discharge: HOME | End: 2022-04-09
Attending: EMERGENCY MEDICINE
Payer: COMMERCIAL

## 2022-04-09 ENCOUNTER — APPOINTMENT (OUTPATIENT)
Dept: CT IMAGING | Age: 72
End: 2022-04-09
Payer: COMMERCIAL

## 2022-04-09 ENCOUNTER — APPOINTMENT (OUTPATIENT)
Dept: GENERAL RADIOLOGY | Age: 72
End: 2022-04-09
Payer: COMMERCIAL

## 2022-04-09 VITALS
OXYGEN SATURATION: 99 % | HEART RATE: 108 BPM | DIASTOLIC BLOOD PRESSURE: 69 MMHG | RESPIRATION RATE: 18 BRPM | TEMPERATURE: 98.1 F | SYSTOLIC BLOOD PRESSURE: 153 MMHG

## 2022-04-09 DIAGNOSIS — R10.84 GENERALIZED ABDOMINAL PAIN: Primary | ICD-10-CM

## 2022-04-09 LAB
-: ABNORMAL
ABSOLUTE EOS #: 0.1 K/UL (ref 0–0.44)
ABSOLUTE IMMATURE GRANULOCYTE: <0.03 K/UL (ref 0–0.3)
ABSOLUTE LYMPH #: 1.38 K/UL (ref 1.1–3.7)
ABSOLUTE MONO #: 0.59 K/UL (ref 0.1–1.2)
ANION GAP SERPL CALCULATED.3IONS-SCNC: 10 MMOL/L (ref 9–17)
ANION GAP SERPL CALCULATED.3IONS-SCNC: NORMAL MMOL/L (ref 9–17)
BACTERIA: ABNORMAL
BASOPHILS # BLD: 0 % (ref 0–2)
BASOPHILS ABSOLUTE: 0.03 K/UL (ref 0–0.2)
BILIRUBIN URINE: NEGATIVE
BUN BLDV-MCNC: 8 MG/DL (ref 8–23)
BUN BLDV-MCNC: NORMAL MG/DL (ref 8–23)
CALCIUM SERPL-MCNC: 9.2 MG/DL (ref 8.6–10.4)
CALCIUM SERPL-MCNC: NORMAL MG/DL (ref 8.6–10.4)
CASTS UA: ABNORMAL /LPF (ref 0–8)
CHLORIDE BLD-SCNC: 103 MMOL/L (ref 98–107)
CHLORIDE BLD-SCNC: NORMAL MMOL/L (ref 98–107)
CO2: 25 MMOL/L (ref 20–31)
CO2: NORMAL MMOL/L (ref 20–31)
COLOR: YELLOW
CREAT SERPL-MCNC: 0.62 MG/DL (ref 0.5–0.9)
CREAT SERPL-MCNC: NORMAL MG/DL (ref 0.5–0.9)
EOSINOPHILS RELATIVE PERCENT: 1 % (ref 1–4)
EPITHELIAL CELLS UA: ABNORMAL /HPF (ref 0–5)
GFR AFRICAN AMERICAN: >60 ML/MIN
GFR AFRICAN AMERICAN: NORMAL ML/MIN
GFR NON-AFRICAN AMERICAN: >60 ML/MIN
GFR NON-AFRICAN AMERICAN: NORMAL ML/MIN
GFR SERPL CREATININE-BSD FRML MDRD: ABNORMAL ML/MIN/{1.73_M2}
GFR SERPL CREATININE-BSD FRML MDRD: NORMAL ML/MIN/{1.73_M2}
GLUCOSE BLD-MCNC: 109 MG/DL (ref 70–99)
GLUCOSE BLD-MCNC: NORMAL MG/DL (ref 70–99)
GLUCOSE URINE: NEGATIVE
HCT VFR BLD CALC: 37.6 % (ref 36.3–47.1)
HEMOGLOBIN: 11.8 G/DL (ref 11.9–15.1)
IMMATURE GRANULOCYTES: 0 %
KETONES, URINE: NEGATIVE
LEUKOCYTE ESTERASE, URINE: ABNORMAL
LIPASE: 15 U/L (ref 13–60)
LIPASE: NORMAL U/L (ref 13–60)
LYMPHOCYTES # BLD: 20 % (ref 24–43)
MAGNESIUM: 2 MG/DL (ref 1.6–2.6)
MCH RBC QN AUTO: 30 PG (ref 25.2–33.5)
MCHC RBC AUTO-ENTMCNC: 31.4 G/DL (ref 28.4–34.8)
MCV RBC AUTO: 95.7 FL (ref 82.6–102.9)
MONOCYTES # BLD: 9 % (ref 3–12)
NITRITE, URINE: NEGATIVE
NRBC AUTOMATED: 0 PER 100 WBC
PDW BLD-RTO: 12.8 % (ref 11.8–14.4)
PH UA: 6 (ref 5–8)
PLATELET # BLD: 303 K/UL (ref 138–453)
PMV BLD AUTO: 9.4 FL (ref 8.1–13.5)
POTASSIUM SERPL-SCNC: 3.5 MMOL/L (ref 3.7–5.3)
POTASSIUM SERPL-SCNC: NORMAL MMOL/L (ref 3.7–5.3)
PROTEIN UA: NEGATIVE
RBC # BLD: 3.93 M/UL (ref 3.95–5.11)
RBC UA: ABNORMAL /HPF (ref 0–4)
REASON FOR REJECTION: NORMAL
SEG NEUTROPHILS: 70 % (ref 36–65)
SEGMENTED NEUTROPHILS ABSOLUTE COUNT: 4.81 K/UL (ref 1.5–8.1)
SODIUM BLD-SCNC: 138 MMOL/L (ref 135–144)
SODIUM BLD-SCNC: NORMAL MMOL/L (ref 135–144)
SPECIFIC GRAVITY UA: 1 (ref 1–1.03)
TROPONIN, HIGH SENSITIVITY: 11 NG/L (ref 0–14)
TROPONIN, HIGH SENSITIVITY: NORMAL NG/L (ref 0–14)
TURBIDITY: CLEAR
URINE HGB: NEGATIVE
UROBILINOGEN, URINE: NORMAL
WBC # BLD: 6.9 K/UL (ref 3.5–11.3)
WBC UA: ABNORMAL /HPF (ref 0–5)
ZZ NTE CLEAN UP: ORDERED TEST: NORMAL
ZZ NTE WITH NAME CLEAN UP: SPECIMEN SOURCE: NORMAL

## 2022-04-09 PROCEDURE — 80048 BASIC METABOLIC PNL TOTAL CA: CPT

## 2022-04-09 PROCEDURE — 2580000003 HC RX 258: Performed by: GENERAL PRACTICE

## 2022-04-09 PROCEDURE — 84484 ASSAY OF TROPONIN QUANT: CPT

## 2022-04-09 PROCEDURE — 87086 URINE CULTURE/COLONY COUNT: CPT

## 2022-04-09 PROCEDURE — 93005 ELECTROCARDIOGRAM TRACING: CPT | Performed by: GENERAL PRACTICE

## 2022-04-09 PROCEDURE — 81001 URINALYSIS AUTO W/SCOPE: CPT

## 2022-04-09 PROCEDURE — 85025 COMPLETE CBC W/AUTO DIFF WBC: CPT

## 2022-04-09 PROCEDURE — 83690 ASSAY OF LIPASE: CPT

## 2022-04-09 PROCEDURE — 74177 CT ABD & PELVIS W/CONTRAST: CPT

## 2022-04-09 PROCEDURE — 71045 X-RAY EXAM CHEST 1 VIEW: CPT

## 2022-04-09 PROCEDURE — 6360000004 HC RX CONTRAST MEDICATION: Performed by: STUDENT IN AN ORGANIZED HEALTH CARE EDUCATION/TRAINING PROGRAM

## 2022-04-09 PROCEDURE — 83735 ASSAY OF MAGNESIUM: CPT

## 2022-04-09 PROCEDURE — 99284 EMERGENCY DEPT VISIT MOD MDM: CPT

## 2022-04-09 RX ORDER — 0.9 % SODIUM CHLORIDE 0.9 %
1000 INTRAVENOUS SOLUTION INTRAVENOUS ONCE
Status: COMPLETED | OUTPATIENT
Start: 2022-04-09 | End: 2022-04-09

## 2022-04-09 RX ORDER — CEPHALEXIN 500 MG/1
500 CAPSULE ORAL 2 TIMES DAILY
Qty: 28 CAPSULE | Refills: 0 | Status: SHIPPED | OUTPATIENT
Start: 2022-04-09 | End: 2022-04-16

## 2022-04-09 RX ADMIN — IOPAMIDOL 75 ML: 755 INJECTION, SOLUTION INTRAVENOUS at 18:00

## 2022-04-09 RX ADMIN — SODIUM CHLORIDE 1000 ML: 9 INJECTION, SOLUTION INTRAVENOUS at 16:24

## 2022-04-09 ASSESSMENT — PAIN DESCRIPTION - DESCRIPTORS: DESCRIPTORS: ACHING;DISCOMFORT

## 2022-04-09 ASSESSMENT — PAIN SCALES - GENERAL: PAINLEVEL_OUTOF10: 6

## 2022-04-09 ASSESSMENT — PAIN - FUNCTIONAL ASSESSMENT: PAIN_FUNCTIONAL_ASSESSMENT: 0-10

## 2022-04-09 ASSESSMENT — PAIN DESCRIPTION - LOCATION: LOCATION: ABDOMEN

## 2022-04-09 NOTE — ED NOTES
Pt to room 20 with c/o abdominal pain. Pt reports that her abdomen has been hurting for the past day or so. Pt denies chest pain, pt states that she has vomited. Pt placed on continuous cardiac monitor, bp and pulse ox. EKG completed, IV established, blood work drawn. Pt alert and oriented x4, talking in complete sentences, respirations even and unlabored. Pt acting age appropriate.  White board updated, will continue to plan of care       Jelani Angeles RN  04/09/22 [id number]

## 2022-04-09 NOTE — ED PROVIDER NOTES
[facility]     Emergency Department     Faculty Note/ Attestation      Pt Name: Taylor Martini                                       MRN: [id number]  Sharongfofelia 1950  Date of evaluation: 4/9/2022    Patients PCP:    Crow Peoples MD    Attestation  I performed a history and physical examination of the patient and discussed management with the resident. I reviewed the residents note and agree with the documented findings and plan of care. Any areas of disagreement are noted on the chart. I was personally present for the key portions of any procedures. I have documented in the chart those procedures where I was not present during the key portions. I have reviewed the emergency nurses triage note. I agree with the chief complaint, past medical history, past surgical history, allergies, medications, social and family history as documented unless otherwise noted below. For Physician Assistant/ Nurse Practitioner cases/documentation I have personally evaluated this patient and have completed at least one if not all key elements of the E/M (history, physical exam, and MDM). Additional findings are as noted.     Initial Screens:        [place] Coma Scale  Eye Opening: Spontaneous  Best Verbal Response: Oriented  Best Motor Response: Obeys commands  [place] Coma Scale Score: 15    Vitals:    Vitals:    04/09/22 1507 04/09/22 1508   BP:  (!) 153/69   Pulse: 108    Resp: 18    Temp: 98.1 °F (36.7 °C)    TempSrc: Oral    SpO2: 99%        CHIEF COMPLAINT       Chief Complaint   Patient presents with    Abdominal Pain       Patient is a 65-year-old female arrives from home with abdominal pain on the left side patient unable to elaborate on the duration timing and triggers of this just notes left-sided seems to be there for the last 12 to 24 hours no fevers chills nausea vomiting no chest pain shortness of breath        EMERGENCY DEPARTMENT COURSE:     -------------------------  BP: (!) 153/69, Temp: 98.1 °F (36.7 °C), Pulse: 108, Resp: 18  Physical Exam  Constitutional:       Appearance: She is well-developed. She is not diaphoretic. HENT:      Head: Normocephalic and atraumatic. Right Ear: External ear normal.      Left Ear: External ear normal.   Eyes:      General: No scleral icterus. Right eye: No discharge. Left eye: No discharge. Neck:      Trachea: No tracheal deviation. Pulmonary:      Effort: Pulmonary effort is normal. No respiratory distress. Breath sounds: No stridor. Musculoskeletal:         General: Normal range of motion. Cervical back: Normal range of motion. Skin:     General: Skin is warm and dry. Neurological:      General: No focal deficit present. Mental Status: She is alert. Cranial Nerves: No cranial nerve deficit. Coordination: Coordination normal.      Comments: The patient is hard of hearing understands she is in the hospital she is oriented to person, knows she is having abdominal pain but it is very difficult to get her to understand our concerns. Comments  Pointing to the whole left side of her abdomen is her chief area of pain the patient has no shortness of breath or leg swelling the patient has no nausea or vomiting. Concerns this patient include pancreatitis no signs of pneumonia on x-ray patient has EKG and troponins pending at this time to assess for possible cardiac etiology this is extremely unlikely given more the pain on exam is in the lower quadrant more consistent with possible UTI given the patient's history of having urinary tract infections and acting like this.   Awaiting results    ED Course as of 04/09/22 1641   Sat Apr 09, 2022   1632 X ray negative for infection  [WK]   1633 No WBC elevation  [WK]      ED Course User Index  [WK] Remigio Gooden DO   CT unremarkable for intra-abdominal pathology urinalysis most likely related to infection/UTI antibiotics given discharge patient has patient was ambulatory    Yulissa Rock DO,, DO, RDMS.   Attending Emergency Physician         Yulissa Rock DO  04/09/22 [id number]

## 2022-04-09 NOTE — ED PROVIDER NOTES
[facility] [facility]  Emergency Department  Emergency Medicine Resident Sign-out     Care of Tommy Jarrett was assumed from Dr. Peg Fox and is being seen for Abdominal Pain  . The patient's initial evaluation and plan have been discussed with the prior provider who initially evaluated the patient. EMERGENCY DEPARTMENT COURSE / MEDICAL DECISION MAKING:       MEDICATIONS GIVEN:  Orders Placed This Encounter   Medications    0.9 % sodium chloride bolus    iopamidol (ISOVUE-370) 76 % injection 75 mL    cephALEXin (KEFLEX) 500 MG capsule     Sig: Take 1 capsule by mouth 2 times daily for 7 days     Dispense:  28 capsule     Refill:  0       LABS / RADIOLOGY:     Labs Reviewed   CBC WITH AUTO DIFFERENTIAL - Abnormal; Notable for the following components:       Result Value    RBC 3.93 (*)     Hemoglobin 11.8 (*)     Seg Neutrophils 70 (*)     Lymphocytes 20 (*)     All other components within normal limits   URINALYSIS WITH MICROSCOPIC - Abnormal; Notable for the following components:    Specific Gravity, UA 1.002 (*)     Leukocyte Esterase, Urine MODERATE (*)     Bacteria, UA FEW (*)     All other components within normal limits   BASIC METABOLIC PANEL W/ REFLEX TO MG FOR LOW K - Abnormal; Notable for the following components:    Glucose 109 (*)     Potassium 3.5 (*)     All other components within normal limits   CULTURE, URINE   BASIC METABOLIC PANEL W/ REFLEX TO MG FOR LOW K   TROPONIN   LIPASE   SPECIMEN REJECTION   LIPASE   TROPONIN   MAGNESIUM   PREVIOUS SPECIMEN       CT ABDOMEN PELVIS W IV CONTRAST Additional Contrast? None    Result Date: 3/14/2022  EXAMINATION: CT OF THE ABDOMEN AND PELVIS WITH CONTRAST 3/14/2022 10:54 am TECHNIQUE: CT of the abdomen and pelvis was performed with the administration of intravenous contrast. Multiplanar reformatted images are provided for review.  Dose modulation, iterative reconstruction, and/or weight based adjustment of the mA/kV was utilized to reduce the radiation dose to as low as reasonably achievable. COMPARISON: CT abdomen and pelvis performed 12/13/2021. HISTORY: ORDERING SYSTEM PROVIDED HISTORY: abdominal pain LLQ TECHNOLOGIST PROVIDED HISTORY: abdominal pain LLQ Reason for Exam: abdominal pain LLQ FINDINGS: Lower Chest: The lung bases are without consolidation or effusion. The visualized cardiac structures are unremarkable. Organs: The liver and spleen are normal size and overall attenuation. The gallbladder has been removed. Pancreas and adrenal glands are unremarkable. There is no obstructive uropathy. There are left-sided parapelvic renal cysts. The ureters are not dilated. The urinary bladder is unremarkable. GI/Bowel: The stomach is contracted and otherwise unremarkable. Loops of small bowel are normal in caliber without evidence for obstruction. The colon contains air and fecal residue. The appendix is normal.  There is no free air or free fluid. Pelvis: The uterus is age-appropriate. Peritoneum/Retroperitoneum: The psoas muscles are symmetric. The abdominal aorta is normal in caliber. The inferior vena cava is unremarkable. There is no retroperitoneal or mesenteric adenopathy. Bones/Soft Tissues: The extra-abdominal soft tissues are unremarkable. There is no acute osseous abnormality. No acute abdominal or pelvic abnormality. RECOMMENDATIONS: Unavailable     XR CHEST PORTABLE    Result Date: 4/9/2022  EXAMINATION: ONE XRAY VIEW OF THE CHEST 4/9/2022 12:26 pm COMPARISON: 12/13/2021 HISTORY: ORDERING SYSTEM PROVIDED HISTORY: abd pain TECHNOLOGIST PROVIDED HISTORY: abd pain Reason for Exam: abdo pain port upr at 325pm FINDINGS: The lungs are without acute focal process. There is no effusion or pneumothorax. The cardiomediastinal silhouette is stable. The osseous structures are stable. No acute process. RECENT VITALS:     Temp: 98.1 °F (36.7 °C),  Pulse: 108, Resp: 18, BP: (!) 153/69, SpO2: 99 %    This patient is a 66 y.o. Female with left lower quadrant abdominal pain. Patient poor historian, poor insight to health history. Does report frequent urinary tract infections. Awaiting urinalysis. If UA negative will get CT abdomen/pelvis. Likely discharge pending negative findings    Urinalysis with moderate leukoesterase, few bacteria, 20-50 white cells. CT abdomen/pelvis unremarkable. Patient discharged home with prescription for Keflex for UTI      OUTSTANDING TASKS / RECOMMENDATIONS:    1. UA  2. Possible CT if clean urinalysis  3. Dispo     FINAL IMPRESSION:     1.  Generalized abdominal pain        DISPOSITION:         DISPOSITION:  []  Discharge   []  Transfer -    []  Admission -     []  Against Medical Advice   []  Eloped   FOLLOW-UP: Jaziel Velasco, [address]  [phone]    Schedule an appointment as soon as possible for a visit   For re-evaluation     DISCHARGE MEDICATIONS: Discharge Medication List as of 4/9/2022  6:54 PM             Patel Arvizu DO  Emergency Medicine Resident  [facility], [address] [place] [address]  Resident  04/09/22 [id number]

## 2022-04-09 NOTE — ED PROVIDER NOTES
[address] ED  Emergency Department Encounter  EmergencyMedicine Resident     Pt Lalo Olsen  MRN: [id number]  Sharongfofelia 1950  Date of evaluation: 22  PCP:  Hesham Sahni MD    [address] [place] [address]       Chief Complaint   Patient presents with    Abdominal Pain       HISTORY OF PRESENT ILLNESS  (Location/Symptom, Timing/Onset, Context/Setting, Quality, Duration, Modifying Factors, Severity.)      Stephanie Cheney is a 66 y.o. female who presents with three days of LLQ abd pain without N/V. Patient is an extremely poor historian and almost no information could be gathered from patient herself. I did talk with son who states patient has had recurrent UTI's, and does have a pcp with an appointment next wk. PAST MEDICAL / SURGICAL / SOCIAL / FAMILY HISTORY      has a past medical history of Arthritis, Bronchitis, Chronic obstructive pulmonary disease ([address] Colon polyps, Controlled type 2 diabetes mellitus without complication, without long-term current use of insulin ([address] Dental neglect, Depression, Environmental allergies, GERD (gastroesophageal reflux disease), Hyperlipidemia, Hypertension, Obesity, Onychomycosis, Poor historian, RBBB, Treadmill stress test negative for angina pectoris, and Wears glasses. Denies further past medical hx     has a past surgical history that includes Cholecystectomy; doppler echocardiography; Colonoscopy (2013); and Tubal ligation.   Denies further past surgical hx    Social History     Socioeconomic History    Marital status: Single     Spouse name: Not on file    Number of children: Not on file    Years of education: Not on file    Highest education level: Not on file   Occupational History    Not on file   Tobacco Use    Smoking status: Former Smoker     Packs/day: 0.50     Years: 35.00     Pack years: 17.50     Types: Cigarettes     Quit date: 2015     Years since quittin.7    Smokeless tobacco: Never Used   Substance and Sexual Activity    Alcohol use: No     Alcohol/week: 0.0 standard drinks    Drug use: No    Sexual activity: Not on file   Other Topics Concern    Not on file   Social History Narrative    Not on file     Social Determinants of Health     Financial Resource Strain: Low Risk     Difficulty of Paying Living Expenses: Not hard at all   Food Insecurity: No Food Insecurity    Worried About Running Out of Food in the Last Year: Never true    Marcos of Food in the Last Year: Never true   Transportation Needs: No Transportation Needs    Lack of Transportation (Medical): No    Lack of Transportation (Non-Medical): No   Physical Activity:     Days of Exercise per Week: Not on file    Minutes of Exercise per Session: Not on file   Stress:     Feeling of Stress : Not on file   Social Connections:     Frequency of Communication with Friends and Family: Not on file    Frequency of Social Gatherings with Friends and Family: Not on file    Attends Mu-ism Services: Not on file    Active Member of [address] [place] [address] or Organizations: Not on file    Attends Club or Organization Meetings: Not on file    Marital Status: Not on file   Intimate Partner Violence:     Fear of Current or Ex-Partner: Not on file    Emotionally Abused: Not on file    Physically Abused: Not on file    Sexually Abused: Not on file   Housing Stability:     Unable to Pay for Housing in the Last Year: Not on file    Number of [place] in the Last Year: Not on file    Unstable Housing in the Last Year: Not on file       Family History   Problem Relation Age of Onset    Heart Disease Mother     Cancer Father        Allergies:  Patient has no known allergies. Home Medications:  Prior to Admission medications    Medication Sig Start Date End Date Taking?  Authorizing Provider   cephALEXin (KEFLEX) 500 MG capsule Take 1 capsule by mouth 2 times daily for 7 days 4/9/22 4/16/22 Yes Lisa Borrero DO   methylPREDNISolone sodium (SOLU-MEDROL) 125 MG injection 125 mg    Historical Provider, MD   ibuprofen (ADVIL;MOTRIN) 600 MG tablet ibuprofen 600 mg tablet    Historical Provider, MD   ciclopirox (LOPROX) 0.77 % cream ciclopirox 0.77 % topical cream    Historical Provider, MD   benzonatate (TESSALON PERLES) 100 MG capsule Tessalon Perles 100 mg capsule   Take 1 capsule 3 times a day by oral route as needed for 10 days. for cough    Historical Provider, MD   albuterol sulfate  (90 Base) MCG/ACT inhaler albuterol sulfate HFA 90 mcg/actuation aerosol inhaler    Historical Provider, MD   atorvastatin (LIPITOR) 20 MG tablet Take 1 tablet by mouth daily 6/29/21   Bernard Clemente MD   tiotropium (SPIRIVA RESPIMAT) 1.25 MCG/ACT AERS inhaler Inhale 2 puffs into the lungs daily 8/12/19   Nithya Mendez MD   acetaminophen (TYLENOL) 500 MG tablet Take 2 tablets by mouth every 8 hours as needed for Pain 3/7/19   Rey Maier MD   haloperidol (HALDOL) 5 MG tablet Take 5 mg by mouth 2 times daily    Historical Provider, MD   traZODone (DESYREL) 100 MG tablet  8/9/17   Historical Provider, MD   Elastic Bandages & Supports (ACE ARM SLING) MISC Apply 1 Units topically daily 4/17/17   Annie Maldonado MD   trihexyphenidyl (ARTANE) 2 MG tablet Take 2 mg by mouth 2 times daily  10/10/14   Historical Provider, MD       REVIEW OF SYSTEMS    (2-9 systems for level 4, 10 or more for level 5)      Review of Systems    Review of Systems   Constitutional: Negative for chills and fever. HENT: Negative for sore throat. Eyes: Negative for pain. Respiratory: Negative for cough. Cardiovascular: Negative for chest pain and palpitations. Gastrointestinal: + abd pain   Genitourinary: + for dysuria  Musculoskeletal: Negative for gait problem. Skin: Negative for wound. Neurological: Negative for headaches.        PHYSICAL EXAM   (up to 7 for level 4, 8 or more for level 5)      INITIAL VITALS:   BP (!) 153/69   Pulse 108   Temp 98.1 °F (36.7 °C) (Oral)   Resp 18   SpO2 99%     Physical Exam   Gen. Appearance: patient appears well, nondistressed. Head: head atraumatic, normocephalic. Eyes: Extraocular movements intact. No scleral icterus  Mouth: Oropharynx clear and moist.  No oral lesions  Neck: Supple. No lymphadenopathy. Pulmonary: Lungs clear to auscultation bilaterally. No wheezing, rales or rhonchi   Cardiovascular: Regular rate and rhythm, no murmurs   Abdomen: mildly tender in suprapubic region. No guarding or rebound. Normal bowel sounds. Neurology: GCS 15. Oriented to person place and time.   moving all extremities   Skin: Warm, dry, well perfused        DIFFERENTIAL  DIAGNOSIS     PLAN (Liliana Ing / Collette Wheeler / EKG):  Orders Placed This Encounter   Procedures    Culture, Urine    XR CHEST PORTABLE    CT ABDOMEN PELVIS W IV CONTRAST Additional Contrast? None    CBC with Auto Differential    Basic Metabolic Panel w/ Reflex to MG    Troponin    Urinalysis with Microscopic    Lipase    SPECIMEN REJECTION    Basic Metabolic Panel w/ Reflex to MG    Lipase    PREVIOUS SPECIMEN    Troponin    Magnesium    EKG 12 Lead       MEDICATIONS ORDERED:  Orders Placed This Encounter   Medications    0.9 % sodium chloride bolus    iopamidol (ISOVUE-370) 76 % injection 75 mL    cephALEXin (KEFLEX) 500 MG capsule     Sig: Take 1 capsule by mouth 2 times daily for 7 days     Dispense:  28 capsule     Refill:  0           DIAGNOSTIC RESULTS / EMERGENCY DEPARTMENT COURSE / MDM     LABS:  Results for orders placed or performed during the hospital encounter of 04/09/22   CBC with Auto Differential   Result Value Ref Range    WBC 6.9 3.5 - 11.3 k/uL    RBC 3.93 (L) 3.95 - 5.11 m/uL    Hemoglobin 11.8 (L) 11.9 - 15.1 g/dL    Hematocrit 37.6 36.3 - 47.1 %    MCV 95.7 82.6 - 102.9 fL    MCH 30.0 25.2 - 33.5 pg    MCHC 31.4 28.4 - 34.8 g/dL    RDW 12.8 11.8 - 14.4 %    Platelets 348 890 - 892 k/uL    MPV 9.4 8.1 - 13.5 fL    NRBC Automated 0.0 0.0 per 100 WBC    Seg Neutrophils 70 (H) 36 - 65 %    Lymphocytes 20 (L) 24 - 43 %    Monocytes 9 3 - 12 %    Eosinophils % 1 1 - 4 %    Basophils 0 0 - 2 %    Immature Granulocytes 0 0 %    Segs Absolute 4.81 1.50 - 8.10 k/uL    Absolute Lymph # 1.38 1.10 - 3.70 k/uL    Absolute Mono # 0.59 0.10 - 1.20 k/uL    Absolute Eos # 0.10 0.00 - 0.44 k/uL    Basophils Absolute 0.03 0.00 - 0.20 k/uL    Absolute Immature Granulocyte <0.03 0.00 - 0.30 k/uL   Basic Metabolic Panel w/ Reflex to MG   Result Value Ref Range    Glucose PLEASE DISREGARD RESULTS. SPECIMEN CONTAMINATED. 70 - 99 mg/dL    BUN PLEASE DISREGARD RESULTS. SPECIMEN CONTAMINATED. 8 - 23 mg/dL    CREATININE PLEASE DISREGARD RESULTS. SPECIMEN CONTAMINATED. 0.50 - 0.90 mg/dL    Calcium PLEASE DISREGARD RESULTS. SPECIMEN CONTAMINATED. 8.6 - 10.4 mg/dL    Sodium PLEASE DISREGARD RESULTS. SPECIMEN CONTAMINATED. 135 - 144 mmol/L    Potassium PLEASE DISREGARD RESULTS. SPECIMEN CONTAMINATED. 3.7 - 5.3 mmol/L    Chloride PLEASE DISREGARD RESULTS. SPECIMEN CONTAMINATED. 98 - 107 mmol/L    CO2 PLEASE DISREGARD RESULTS. SPECIMEN CONTAMINATED. 20 - 31 mmol/L    Anion Gap Can not be calculated 9 - 17 mmol/L    GFR Non-African American Can not be calculated >60 mL/min    GFR  Can not be calculated >60 mL/min    GFR Comment         Troponin   Result Value Ref Range    Troponin, High Sensitivity PLEASE DISREGARD RESULTS.   SPECIMEN CONTAMINATED. 0 - 14 ng/L   Urinalysis with Microscopic   Result Value Ref Range    Color, UA Yellow Yellow    Turbidity UA Clear Clear    Glucose, Ur NEGATIVE NEGATIVE    Bilirubin Urine NEGATIVE NEGATIVE    Ketones, Urine NEGATIVE NEGATIVE    Specific Gravity, UA 1.002 (L) 1.005 - 1.030    Urine Hgb NEGATIVE NEGATIVE    pH, UA 6.0 5.0 - 8.0    Protein, UA NEGATIVE NEGATIVE    Urobilinogen, Urine Normal Normal    Nitrite, Urine NEGATIVE NEGATIVE    Leukocyte Esterase, Urine MODERATE (A) NEGATIVE    -          WBC, UA 20 TO 50 0 - 5 /HPF    RBC, UA None 0 - 4 /HPF    Casts UA  0 - 8 /LPF     0 TO 2 HYALINE Reference range defined for non-centrifuged specimen. Epithelial Cells UA 0 TO 2 0 - 5 /HPF    Bacteria, UA FEW (A) None   Lipase   Result Value Ref Range    Lipase PLEASE DISREGARD RESULTS. SPECIMEN CONTAMINATED. 13 - 60 U/L   SPECIMEN REJECTION   Result Value Ref Range    Specimen Source . BLOOD     Ordered Test BMPX LIP     Reason for Rejection Unable to perform testing: Specimen contaminated. Basic Metabolic Panel w/ Reflex to MG   Result Value Ref Range    Glucose 109 (H) 70 - 99 mg/dL    BUN 8 8 - 23 mg/dL    CREATININE 0.62 0.50 - 0.90 mg/dL    Calcium 9.2 8.6 - 10.4 mg/dL    Sodium 138 135 - 144 mmol/L    Potassium 3.5 (L) 3.7 - 5.3 mmol/L    Chloride 103 98 - 107 mmol/L    CO2 25 20 - 31 mmol/L    Anion Gap 10 9 - 17 mmol/L    GFR Non-African American >60 >60 mL/min    GFR African American >60 >60 mL/min    GFR Comment         Lipase   Result Value Ref Range    Lipase 15 13 - 60 U/L   Troponin   Result Value Ref Range    Troponin, High Sensitivity 11 0 - 14 ng/L   Magnesium   Result Value Ref Range    Magnesium 2.0 1.6 - 2.6 mg/dL       RADIOLOGY:  None    EKG  None    All EKG's are interpreted by the Emergency Department Physician who either signs or Co-signs this chart in the absence of a cardiologist.    [facility] MDM:  66 y.o. female who presentswith abd pain in LLQ. Recent visit for the same. Poor historian. Will obtain lab workup. Plan for CT abd/ pelvis if urine is not grossly positive. Turned over to night shift        ED Course as of 04/10/22 0854   Sat Apr 09, 2022   1632 X ray negative for infection  [WK]   1633 No WBC elevation  [WK]      ED Course User Index  [WK] Gail Mares DO         PROCEDURES:  None    CONSULTS:  None    CRITICAL CARE:  None    FINAL IMPRESSION      1.  Generalized abdominal pain              DISPOSITION / PLAN     DISPOSITION Decision To Discharge 04/09/2022 06:52:19 PM      PATIENT REFERRED TO:  MD Louie Hammonds [address] [place] [phone]    Schedule an appointment as soon as possible for a visit   For re-evaluation      DISCHARGE MEDICATIONS:  Discharge Medication List as of 4/9/2022  6:54 PM          Griselda Han, DO  Emergency Medicine Resident    (Please note that portions of thisnote were completed with a voice recognition program.  Efforts were made to edit the dictations but occasionally words are mis-transcribed.)        Griselda Han, DO  Resident  04/10/22 [id number]

## 2022-04-10 LAB
CULTURE: NORMAL
SPECIMEN DESCRIPTION: NORMAL

## 2022-04-11 LAB
EKG ATRIAL RATE: 100 BPM
EKG Q-T INTERVAL: 388 MS
EKG QRS DURATION: 116 MS
EKG QTC CALCULATION (BAZETT): 495 MS
EKG R AXIS: -20 DEGREES
EKG T AXIS: 5 DEGREES
EKG VENTRICULAR RATE: 98 BPM

## 2022-04-13 ENCOUNTER — APPOINTMENT (OUTPATIENT)
Dept: GENERAL RADIOLOGY | Age: 72
End: 2022-04-13
Payer: COMMERCIAL

## 2022-04-13 ENCOUNTER — HOSPITAL ENCOUNTER (EMERGENCY)
Age: 72
Discharge: HOME | End: 2022-04-13
Attending: EMERGENCY MEDICINE
Payer: COMMERCIAL

## 2022-04-13 VITALS
BODY MASS INDEX: 30.12 KG/M2 | SYSTOLIC BLOOD PRESSURE: 132 MMHG | RESPIRATION RATE: 24 BRPM | WEIGHT: 181 LBS | DIASTOLIC BLOOD PRESSURE: 45 MMHG | HEART RATE: 110 BPM | OXYGEN SATURATION: 95 % | TEMPERATURE: 98.8 F

## 2022-04-13 DIAGNOSIS — R06.00 DYSPNEA, UNSPECIFIED TYPE: Primary | ICD-10-CM

## 2022-04-13 PROBLEM — N39.0 UTI (URINARY TRACT INFECTION): Status: RESOLVED | Noted: 2022-03-14 | Resolved: 2022-04-13

## 2022-04-13 LAB
ABSOLUTE EOS #: 0 K/UL (ref 0–0.44)
ABSOLUTE IMMATURE GRANULOCYTE: 0 K/UL (ref 0–0.3)
ABSOLUTE LYMPH #: 0.57 K/UL (ref 1.1–3.7)
ABSOLUTE MONO #: 0.57 K/UL (ref 0.1–1.2)
ANION GAP SERPL CALCULATED.3IONS-SCNC: 12 MMOL/L (ref 9–17)
BASOPHILS # BLD: 0 % (ref 0–2)
BASOPHILS ABSOLUTE: 0 K/UL (ref 0–0.2)
BUN BLDV-MCNC: 12 MG/DL (ref 8–23)
CALCIUM SERPL-MCNC: 8.8 MG/DL (ref 8.6–10.4)
CHLORIDE BLD-SCNC: 99 MMOL/L (ref 98–107)
CO2: 23 MMOL/L (ref 20–31)
CREAT SERPL-MCNC: 0.61 MG/DL (ref 0.5–0.9)
EOSINOPHILS RELATIVE PERCENT: 0 % (ref 1–4)
GFR AFRICAN AMERICAN: >60 ML/MIN
GFR NON-AFRICAN AMERICAN: >60 ML/MIN
GFR SERPL CREATININE-BSD FRML MDRD: ABNORMAL ML/MIN/{1.73_M2}
GLUCOSE BLD-MCNC: 126 MG/DL (ref 70–99)
HCT VFR BLD CALC: 34.6 % (ref 36.3–47.1)
HEMOGLOBIN: 11.1 G/DL (ref 11.9–15.1)
IMMATURE GRANULOCYTES: 0 %
LYMPHOCYTES # BLD: 6 % (ref 24–43)
MCH RBC QN AUTO: 29.8 PG (ref 25.2–33.5)
MCHC RBC AUTO-ENTMCNC: 32.1 G/DL (ref 28.4–34.8)
MCV RBC AUTO: 92.8 FL (ref 82.6–102.9)
MONOCYTES # BLD: 6 % (ref 3–12)
MORPHOLOGY: NORMAL
NRBC AUTOMATED: 0 PER 100 WBC
PDW BLD-RTO: 12.8 % (ref 11.8–14.4)
PLATELET # BLD: ABNORMAL K/UL (ref 138–453)
PLATELET, FLUORESCENCE: NORMAL K/UL (ref 138–453)
POTASSIUM SERPL-SCNC: 4.1 MMOL/L (ref 3.7–5.3)
PRO-BNP: 164 PG/ML
RBC # BLD: 3.73 M/UL (ref 3.95–5.11)
SEG NEUTROPHILS: 88 % (ref 36–65)
SEGMENTED NEUTROPHILS ABSOLUTE COUNT: 8.36 K/UL (ref 1.5–8.1)
SODIUM BLD-SCNC: 134 MMOL/L (ref 135–144)
TROPONIN, HIGH SENSITIVITY: 11 NG/L (ref 0–14)
TROPONIN, HIGH SENSITIVITY: 12 NG/L (ref 0–14)
WBC # BLD: 9.5 K/UL (ref 3.5–11.3)

## 2022-04-13 PROCEDURE — 84484 ASSAY OF TROPONIN QUANT: CPT

## 2022-04-13 PROCEDURE — 83880 ASSAY OF NATRIURETIC PEPTIDE: CPT

## 2022-04-13 PROCEDURE — 6370000000 HC RX 637 (ALT 250 FOR IP): Performed by: STUDENT IN AN ORGANIZED HEALTH CARE EDUCATION/TRAINING PROGRAM

## 2022-04-13 PROCEDURE — 94640 AIRWAY INHALATION TREATMENT: CPT

## 2022-04-13 PROCEDURE — 71045 X-RAY EXAM CHEST 1 VIEW: CPT

## 2022-04-13 PROCEDURE — 85025 COMPLETE CBC W/AUTO DIFF WBC: CPT

## 2022-04-13 PROCEDURE — 94761 N-INVAS EAR/PLS OXIMETRY MLT: CPT

## 2022-04-13 PROCEDURE — 80048 BASIC METABOLIC PNL TOTAL CA: CPT

## 2022-04-13 PROCEDURE — 93005 ELECTROCARDIOGRAM TRACING: CPT | Performed by: STUDENT IN AN ORGANIZED HEALTH CARE EDUCATION/TRAINING PROGRAM

## 2022-04-13 PROCEDURE — 85055 RETICULATED PLATELET ASSAY: CPT

## 2022-04-13 PROCEDURE — 2700000000 HC OXYGEN THERAPY PER DAY

## 2022-04-13 PROCEDURE — 99285 EMERGENCY DEPT VISIT HI MDM: CPT

## 2022-04-13 RX ORDER — PREDNISONE 10 MG/1
TABLET ORAL
Qty: 20 TABLET | Refills: 0 | Status: SHIPPED | OUTPATIENT
Start: 2022-04-13 | End: 2022-04-23

## 2022-04-13 RX ORDER — BENZONATATE 100 MG/1
100 CAPSULE ORAL ONCE
Status: COMPLETED | OUTPATIENT
Start: 2022-04-13 | End: 2022-04-13

## 2022-04-13 RX ORDER — BENZONATATE 100 MG/1
100 CAPSULE ORAL 3 TIMES DAILY PRN
Qty: 21 CAPSULE | Refills: 0 | Status: SHIPPED | OUTPATIENT
Start: 2022-04-13 | End: 2022-04-20

## 2022-04-13 RX ORDER — PREDNISONE 20 MG/1
40 TABLET ORAL ONCE
Status: COMPLETED | OUTPATIENT
Start: 2022-04-13 | End: 2022-04-13

## 2022-04-13 RX ORDER — IPRATROPIUM BROMIDE AND ALBUTEROL SULFATE 2.5; .5 MG/3ML; MG/3ML
1 SOLUTION RESPIRATORY (INHALATION) EVERY 4 HOURS PRN
Status: DISCONTINUED | OUTPATIENT
Start: 2022-04-13 | End: 2022-04-13 | Stop reason: HOSPADM

## 2022-04-13 RX ADMIN — PREDNISONE 40 MG: 20 TABLET ORAL at 20:43

## 2022-04-13 RX ADMIN — BENZONATATE 100 MG: 100 CAPSULE ORAL at 20:43

## 2022-04-13 RX ADMIN — IPRATROPIUM BROMIDE AND ALBUTEROL SULFATE 1 AMPULE: .5; 3 SOLUTION RESPIRATORY (INHALATION) at 18:52

## 2022-04-13 ASSESSMENT — ENCOUNTER SYMPTOMS
BACK PAIN: 0
CHOKING: 0
VOMITING: 0
TACHYPNEA: 1
EYE PAIN: 0
NAUSEA: 0
SHORTNESS OF BREATH: 1
COUGH: 1
SORE THROAT: 0
DIARRHEA: 0
SINUS PAIN: 0
ABDOMINAL PAIN: 1

## 2022-04-13 ASSESSMENT — PAIN DESCRIPTION - LOCATION: LOCATION: ABDOMEN

## 2022-04-13 ASSESSMENT — PAIN - FUNCTIONAL ASSESSMENT: PAIN_FUNCTIONAL_ASSESSMENT: 0-10

## 2022-04-13 ASSESSMENT — PAIN SCALES - GENERAL: PAINLEVEL_OUTOF10: 5

## 2022-04-13 NOTE — ED PROVIDER NOTES
[address] [facility]     Emergency Department     Faculty Attestation    I performed a history and physical examination of the patient and discussed management with the resident. I reviewed the residents note and agree with the documented findings and plan of care. Any areas of disagreement are noted on the chart. I was personally present for the key portions of any procedures. I have documented in the chart those procedures where I was not present during the key portions. I have reviewed the emergency nurses triage note. I agree with the chief complaint, past medical history, past surgical history, allergies, medications, social and family history as documented unless otherwise noted below. For Physician Assistant/ Nurse Practitioner cases/documentation I have personally evaluated this patient and have completed at least one if not all key elements of the E/M (history, physical exam, and MDM). Additional findings are as noted. I have personally seen and evaluated the patient. I find the patient's history and physical exam are consistent with the NP/PA documentation. I agree with the care provided, treatment rendered, disposition and follow-up plan. Service dyspnea the patient is a limited historian also complaining of chest pain history of chronic abdominal pain diminished breath sounds heard patient was initially found to be hypoxic now satting 100% getting treatment      Critical Care     Mary Garcia M.D.   Attending Emergency  Physician              Ar Greene MD  04/13/22 0339

## 2022-04-13 NOTE — ED PROVIDER NOTES
[facility] ED  Emergency Department Encounter  EmergencyMedicineResident     This patient was seen during the COVID-19 crisis. There were limited resources and those resources we did have had to be conserved for the sickest of patients. Pt Name: Taylor Martini  MRN: [id number]  [place]ar[place]of[place] 1950  Date of evaluation: 4/13/22  PCP: Crow Peoples MD    [address]       Chief Complaint   Patient presents with    Shortness of Breath       HISTORY OF PRESENT ILLNESS  (Location/Symptom, Timing/Onset, Context/Setting, Quality, Duration, Modifying Factors, Severity.)      Taylor Martini is a 66 y.o. female who presents for evaluation of shortness of breath. Patient states that she has felt short of breath for the last 5 hours. She is feeling well up until then. She has history of COPD, bronchitis, asthma and is generally a very poor historian. She lacks insight into her medical condition. She reports that she has had a productive cough. She denies fever chills. She reports chronic abdominal pain, unchanged at this time. Chart review shows that the patient has been here recently and treated for UTI with Keflex. Patient reports unchanged frequency, and smells of urine in the room. PAST MEDICAL / SURGICAL /SOCIAL / FAMILY HISTORY      has a past medical history of Arthritis, Bronchitis, Chronic obstructive pulmonary disease ([address] Colon polyps, Controlled type 2 diabetes mellitus without complication, without long-term current use of insulin ([address] Dental neglect, Depression, Environmental allergies, GERD (gastroesophageal reflux disease), Hyperlipidemia, Hypertension, Obesity, Onychomycosis, Poor historian, RBBB, Treadmill stress test negative for angina pectoris, and Wears glasses. has a past surgical history that includes Cholecystectomy; doppler echocardiography; Colonoscopy (6/2013); and Tubal ligation.       Social History     Socioeconomic History    Marital status: Single     Spouse name: Not on file    Number of children: Not on file    Years of education: Not on file    Highest education level: Not on file   Occupational History    Not on file   Tobacco Use    Smoking status: Former Smoker     Packs/day: 0.50     Years: 35.00     Pack years: 17.50     Types: Cigarettes     Quit date: 2015     Years since quittin.7    Smokeless tobacco: Never Used   Substance and Sexual Activity    Alcohol use: No     Alcohol/week: 0.0 standard drinks    Drug use: No    Sexual activity: Not on file   Other Topics Concern    Not on file   Social History Narrative    Not on file     Social Determinants of Health     Financial Resource Strain: Low Risk     Difficulty of Paying Living Expenses: Not hard at all   Food Insecurity: No Food Insecurity    Worried About [address] [employer] in the Last Year: Never true    [address]  [employer] in the Last Year: Never true   Transportation Needs: No Transportation Needs    Lack of Transportation (Medical): No    Lack of Transportation (Non-Medical):  No   Physical Activity:     Days of Exercise per Week: Not on file    Minutes of Exercise per Session: Not on file   Stress:     Feeling of Stress : Not on file   Social Connections:     Frequency of Communication with Friends and Family: Not on file    Frequency of Social Gatherings with Friends and Family: Not on file    Attends Judaism Services: Not on file    Active Member of Clubs or Organizations: Not on file    Attends Club or Organization Meetings: Not on file    Marital Status: Not on file   Intimate Partner Violence:     Fear of Current or Ex-Partner: Not on file    Emotionally Abused: Not on file    Physically Abused: Not on file    Sexually Abused: Not on file   Housing Stability:     Unable to Pay for Housing in the Last Year: Not on file    Number of [place] in the Last Year: Not on file    Unstable Housing in the Last Year: Not on file Family History   Problem Relation Age of Onset    Heart Disease Mother     Cancer Father        Allergies:  Patient has no known allergies. Home Medications:  Prior to Admission medications    Medication Sig Start Date End Date Taking?  Authorizing Provider   predniSONE (DELTASONE) 10 MG tablet Take 4 tablets by mouth once daily for 5 days 4/13/22 4/23/22 Yes Juliann Lopez MD   benzonatate (TESSALON PERLES) 100 MG capsule Take 1 capsule by mouth 3 times daily as needed for Cough 4/13/22 4/20/22 Yes Juliann Lopez MD   cephALEXin [facility]) 500 MG capsule Take 1 capsule by mouth 2 times daily for 7 days 4/9/22 4/16/22  Zehra Cassidy DO   methylPREDNISolone sodium (SOLU-MEDROL) 125 MG injection 125 mg    Historical Provider, MD   ibuprofen (ADVIL;MOTRIN) 600 MG tablet ibuprofen 600 mg tablet    Historical Provider, MD   ciclopirox (LOPROX) 0.77 % cream ciclopirox 0.77 % topical cream    Historical Provider, MD   albuterol sulfate  (90 Base) MCG/ACT inhaler albuterol sulfate HFA 90 mcg/actuation aerosol inhaler    Historical Provider, MD   atorvastatin (LIPITOR) 20 MG tablet Take 1 tablet by mouth daily 6/29/21   Dominga Fam MD   tiotropium (SPIRIVA RESPIMAT) 1.25 MCG/ACT AERS inhaler Inhale 2 puffs into the lungs daily 8/12/19   Kali Guzman MD   acetaminophen (TYLENOL) 500 MG tablet Take 2 tablets by mouth every 8 hours as needed for Pain 3/7/19   Sanna Barbosa MD   haloperidol (HALDOL) 5 MG tablet Take 5 mg by mouth 2 times daily    Historical Provider, MD   traZODone (DESYREL) 100 MG tablet  8/9/17   Historical Provider, MD   Elastic Bandages & Supports (ACE ARM SLING) MISC Apply 1 Units topically daily 4/17/17   Loretta Mendoza MD   trihexyphenidyl (ARTANE) 2 MG tablet Take 2 mg by mouth 2 times daily  10/10/14   Historical Provider, MD       REVIEW OF SYSTEMS    (2-9 systems for level 4, 10 or more forlevel 5)      Review of Systems   Constitutional: Negative for activity change, chills and fever. HENT: Negative for congestion, sinus pain and sore throat. Eyes: Negative for pain and visual disturbance. Respiratory: Positive for cough and shortness of breath. Negative for choking. Cardiovascular: Negative for chest pain. Gastrointestinal: Positive for abdominal pain. Negative for diarrhea, nausea and vomiting. Genitourinary: Positive for frequency. Negative for difficulty urinating, dysuria and hematuria. Musculoskeletal: Negative for back pain and myalgias. Skin: Negative for rash and wound. Neurological: Negative for dizziness, light-headedness and headaches. Psychiatric/Behavioral: Negative for agitation and confusion. PHYSICAL EXAM   (up to 7 for level 4, 8 or more forlevel 5)      ED TRIAGE VITALS BP: 137/76, Temp: 98.8 °F (37.1 °C), Pulse: 78, Resp: 20, SpO2: 92 %    Vitals:    04/13/22 1754 04/13/22 1848 04/13/22 1950 04/13/22 2002   BP: 137/76 (!) 153/82 (!) 124/55 (!) 132/45   Pulse: 78 111 106 110   Resp: 20 18 14 24   Temp: 98.8 °F (37.1 °C)      TempSrc: Oral      SpO2: 92% 90% 96% 95%   Weight: 181 lb (82.1 kg)            Physical Exam  Vitals and nursing note reviewed. Constitutional:       Appearance: Normal appearance. HENT:      Head: Normocephalic and atraumatic. Nose: Nose normal.      Mouth/Throat:      Mouth: Mucous membranes are moist.   Eyes:      Extraocular Movements: Extraocular movements intact. Pupils: Pupils are equal, round, and reactive to light. Cardiovascular:      Rate and Rhythm: Normal rate and regular rhythm. Pulses: Normal pulses. Heart sounds: Normal heart sounds. Pulmonary:      Effort: No tachypnea, accessory muscle usage or respiratory distress. Breath sounds: Normal breath sounds. No decreased breath sounds, wheezing, rhonchi or rales. Abdominal:      General: Abdomen is flat. Palpations: Abdomen is soft. Musculoskeletal:         General: Normal range of motion.       Cervical back: Normal range of motion. Skin:     General: Skin is warm and dry. Capillary Refill: Capillary refill takes less than 2 seconds. Neurological:      General: No focal deficit present. Mental Status: She is alert and oriented to person, place, and time. Psychiatric:         Mood and Affect: Mood normal.         Behavior: Behavior normal.           DIFFERENTIAL  DIAGNOSIS     PLAN (LABS / IMAGING / EKG):  Orders Placed This Encounter   Procedures    XR CHEST PORTABLE    CBC with Auto Differential    Basic Metabolic Panel w/ Reflex to MG    Troponin    Immature Platelet Fraction    Brain Natriuretic Peptide    Vital signs    EKG 12 Lead       MEDICATIONS ORDERED:  ED Medication Orders (From admission, onward)    Start Ordered     Status Ordering Provider    04/13/22 2045 04/13/22 2033  benzonatate (TESSALON) capsule 100 mg  ONCE         Last MAR action: Given - by Scarlett Cruz on 04/13/22 at 2043 Gerhardt Search L    04/13/22 2045 04/13/22 2035  predniSONE (DELTASONE) tablet 40 mg  ONCE         Last MAR action: Given - by Scarlett Cruz on 04/13/22 at 2043 LISSY LOVE            DIAGNOSTIC RESULTS / EMERGENCY DEPARTMENT COURSE / MDM     IMPRESSION & INITIAL PLAN:  65-year-old female presenting respond today for evaluation of shortness of breath. Patient has history of COPD. She reports that she has been using her inhaler at home. She states that she was recently seen here on 4/9 where she was provided Keflex for UTI. She has very poor insight into her medical condition. Work-up was unremarkable for active infection. Patient was provided, Tessalon Perles and steroids.   She will be discharged home on a prednisone burst.      LABS:  Results for orders placed or performed during the hospital encounter of 04/13/22   CBC with Auto Differential   Result Value Ref Range    WBC 9.5 3.5 - 11.3 k/uL    RBC 3.73 (L) 3.95 - 5.11 m/uL    Hemoglobin 11.1 (L) 11.9 - 15.1 g/dL    Hematocrit 34.6 (L) 36.3 - 47.1 %    MCV 92.8 82.6 - 102.9 fL    MCH 29.8 25.2 - 33.5 pg    MCHC 32.1 28.4 - 34.8 g/dL    RDW 12.8 11.8 - 14.4 %    Platelets See Reflexed IPF Result 138 - 453 k/uL    NRBC Automated 0.0 0.0 per 100 WBC    Immature Granulocytes 0 0 %    Seg Neutrophils 88 (H) 36 - 65 %    Lymphocytes 6 (L) 24 - 43 %    Monocytes 6 3 - 12 %    Eosinophils % 0 (L) 1 - 4 %    Basophils 0 0 - 2 %    Absolute Immature Granulocyte 0.00 0.00 - 0.30 k/uL    Segs Absolute 8.36 (H) 1.50 - 8.10 k/uL    Absolute Lymph # 0.57 (L) 1.10 - 3.70 k/uL    Absolute Mono # 0.57 0.10 - 1.20 k/uL    Absolute Eos # 0.00 0.00 - 0.44 k/uL    Basophils Absolute 0.00 0.00 - 0.20 k/uL    Morphology Normal    Basic Metabolic Panel w/ Reflex to MG   Result Value Ref Range    Glucose 126 (H) 70 - 99 mg/dL    BUN 12 8 - 23 mg/dL    CREATININE 0.61 0.50 - 0.90 mg/dL    Calcium 8.8 8.6 - 10.4 mg/dL    Sodium 134 (L) 135 - 144 mmol/L    Potassium 4.1 3.7 - 5.3 mmol/L    Chloride 99 98 - 107 mmol/L    CO2 23 20 - 31 mmol/L    Anion Gap 12 9 - 17 mmol/L    GFR Non-African American >60 >60 mL/min    GFR African American >60 >60 mL/min    GFR Comment         Troponin   Result Value Ref Range    Troponin, High Sensitivity 12 0 - 14 ng/L   Troponin   Result Value Ref Range    Troponin, High Sensitivity 11 0 - 14 ng/L   Immature Platelet Fraction   Result Value Ref Range    Platelet, Fluorescence Platelet clumps present, count appears adequate. 138 - 453 k/uL   Brain Natriuretic Peptide   Result Value Ref Range    Pro- <300 pg/mL       RADIOLOGY:  XR CHEST PORTABLE   Final Result      1. No acute cardiopulmonary abnormality.              EMERGENCY DEPARTMENT COURSE:    ED Course as of 04/14/22 0129   Wed Apr 13, 2022 1936 Troponin, High Sensitivity: 12 [TJ]   1936 Chest xr negative [TJ]   1941 Pro-BNP: 164 [TJ]      ED Course User Index  [TJ] Roma Lockwood MD      CONSULTS:  None    CRITICAL CARE:  See attending physician note    FINAL IMPRESSION      1.  Dyspnea, unspecified type          DISPOSITION / PLAN     DISPOSITION Decision To Discharge 04/13/2022 08:34:03 PM      PATIENT REFERRED TO:  Lavell Horner MD  [address] [place] [id number]  [phone]    In 1 day      [facility]  [address] [place] [address]  [phone]    If symptoms worsen      DISCHARGE MEDICATIONS:  Discharge Medication List as of 4/13/2022  9:00 PM      START taking these medications    Details   predniSONE (DELTASONE) 10 MG tablet Take 4 tablets by mouth once daily for 5 days, Disp-20 tablet, R-0Print           Discharge Medication List as of 4/13/2022  9:00 PM      CONTINUE these medications which have CHANGED    Details   benzonatate (TESSALON PERLES) 100 MG capsule Take 1 capsule by mouth 3 times daily as needed for Cough, Disp-21 capsule, R-0Print              Nereida Hutchinson MD  Emergency Medicine Resident    (Please note that portions of this note were completed with a voice recognition program.  Efforts were made to edit the dictations but occasionally words are mis-transcribed.)       Nereida Hutchinson MD  Resident  04/14/22 9535

## 2022-04-13 NOTE — ED NOTES
The following labs were labeled with patient stickers & tubed to lab;    []Lavender   []On Ice  []Blue  [x]Green/ Yellow  []Green/ Black []On Ice  []Pink  []Red  []Yellow    []COVID-19 Swab []Rapid    []Urine Sample  []Pelvic Cultures    []Blood Cultures       Renny Miller RN  04/13/22 1936

## 2022-04-14 LAB
EKG ATRIAL RATE: 111 BPM
EKG P AXIS: 73 DEGREES
EKG P-R INTERVAL: 114 MS
EKG Q-T INTERVAL: 360 MS
EKG QRS DURATION: 114 MS
EKG QTC CALCULATION (BAZETT): 489 MS
EKG R AXIS: -10 DEGREES
EKG T AXIS: -32 DEGREES
EKG VENTRICULAR RATE: 111 BPM

## 2022-04-14 PROCEDURE — 93010 ELECTROCARDIOGRAM REPORT: CPT | Performed by: INTERNAL MEDICINE

## 2022-04-14 NOTE — ED NOTES
Patient ambulates to and from restroom with cane. Patient placed back on full cardiac monitor.      Ilene Marion RN  04/13/22 2027

## 2022-04-27 ENCOUNTER — HOSPITAL ENCOUNTER (INPATIENT)
Age: 72
LOS: 2 days | Discharge: HOME HEALTH | DRG: 463 | End: 2022-04-29
Attending: EMERGENCY MEDICINE | Admitting: INTERNAL MEDICINE
Payer: COMMERCIAL

## 2022-04-27 ENCOUNTER — APPOINTMENT (OUTPATIENT)
Dept: GENERAL RADIOLOGY | Age: 72
DRG: 463 | End: 2022-04-27
Payer: COMMERCIAL

## 2022-04-27 DIAGNOSIS — N30.00 ACUTE CYSTITIS WITHOUT HEMATURIA: Primary | ICD-10-CM

## 2022-04-27 PROBLEM — N39.0 UTI (URINARY TRACT INFECTION): Status: ACTIVE | Noted: 2022-04-27

## 2022-04-27 LAB
-: ABNORMAL
ABSOLUTE EOS #: 0.07 K/UL (ref 0–0.44)
ABSOLUTE IMMATURE GRANULOCYTE: <0.03 K/UL (ref 0–0.3)
ABSOLUTE LYMPH #: 1.5 K/UL (ref 1.1–3.7)
ABSOLUTE MONO #: 0.53 K/UL (ref 0.1–1.2)
ALBUMIN SERPL-MCNC: 4.1 G/DL (ref 3.5–5.2)
ALBUMIN/GLOBULIN RATIO: 1.4 (ref 1–2.5)
ALP BLD-CCNC: 129 U/L (ref 35–104)
ALT SERPL-CCNC: 15 U/L (ref 5–33)
ANION GAP SERPL CALCULATED.3IONS-SCNC: 11 MMOL/L (ref 9–17)
AST SERPL-CCNC: 19 U/L
BACTERIA: ABNORMAL
BASOPHILS # BLD: 1 % (ref 0–2)
BASOPHILS ABSOLUTE: 0.03 K/UL (ref 0–0.2)
BILIRUB SERPL-MCNC: 0.35 MG/DL (ref 0.3–1.2)
BILIRUBIN DIRECT: 0.08 MG/DL
BILIRUBIN URINE: NEGATIVE
BILIRUBIN, INDIRECT: 0.27 MG/DL (ref 0–1)
BUN BLDV-MCNC: 7 MG/DL (ref 8–23)
CALCIUM SERPL-MCNC: 9.6 MG/DL (ref 8.6–10.4)
CASTS UA: ABNORMAL /LPF (ref 0–8)
CHLORIDE BLD-SCNC: 101 MMOL/L (ref 98–107)
CO2: 27 MMOL/L (ref 20–31)
COLOR: YELLOW
CREAT SERPL-MCNC: 0.66 MG/DL (ref 0.5–0.9)
EOSINOPHILS RELATIVE PERCENT: 1 % (ref 1–4)
EPITHELIAL CELLS UA: ABNORMAL /HPF (ref 0–5)
FLU A ANTIGEN: NEGATIVE
FLU B ANTIGEN: NEGATIVE
GFR AFRICAN AMERICAN: >60 ML/MIN
GFR NON-AFRICAN AMERICAN: >60 ML/MIN
GFR SERPL CREATININE-BSD FRML MDRD: ABNORMAL ML/MIN/{1.73_M2}
GLUCOSE BLD-MCNC: 96 MG/DL (ref 70–99)
GLUCOSE URINE: NEGATIVE
HCT VFR BLD CALC: 38.7 % (ref 36.3–47.1)
HEMOGLOBIN: 12 G/DL (ref 11.9–15.1)
IMMATURE GRANULOCYTES: 0 %
KETONES, URINE: NEGATIVE
LACTIC ACID, SEPSIS WHOLE BLOOD: 0.8 MMOL/L (ref 0.5–1.9)
LEUKOCYTE ESTERASE, URINE: ABNORMAL
LIPASE: 14 U/L (ref 13–60)
LYMPHOCYTES # BLD: 23 % (ref 24–43)
MAGNESIUM: 2.1 MG/DL (ref 1.6–2.6)
MCH RBC QN AUTO: 29.1 PG (ref 25.2–33.5)
MCHC RBC AUTO-ENTMCNC: 31 G/DL (ref 28.4–34.8)
MCV RBC AUTO: 93.9 FL (ref 82.6–102.9)
MONOCYTES # BLD: 8 % (ref 3–12)
NITRITE, URINE: NEGATIVE
NRBC AUTOMATED: 0 PER 100 WBC
PDW BLD-RTO: 13.2 % (ref 11.8–14.4)
PH UA: 6 (ref 5–8)
PLATELET # BLD: 328 K/UL (ref 138–453)
PMV BLD AUTO: 9.1 FL (ref 8.1–13.5)
POTASSIUM SERPL-SCNC: 4 MMOL/L (ref 3.7–5.3)
PROTEIN UA: NEGATIVE
RBC # BLD: 4.12 M/UL (ref 3.95–5.11)
RBC UA: ABNORMAL /HPF (ref 0–4)
SARS-COV-2, RAPID: NOT DETECTED
SEDIMENTATION RATE, ERYTHROCYTE: 18 MM/HR (ref 0–30)
SEG NEUTROPHILS: 67 % (ref 36–65)
SEGMENTED NEUTROPHILS ABSOLUTE COUNT: 4.49 K/UL (ref 1.5–8.1)
SODIUM BLD-SCNC: 139 MMOL/L (ref 135–144)
SPECIFIC GRAVITY UA: 1.01 (ref 1–1.03)
SPECIMEN DESCRIPTION: NORMAL
TOTAL PROTEIN: 7.1 G/DL (ref 6.4–8.3)
TROPONIN, HIGH SENSITIVITY: 10 NG/L (ref 0–14)
TROPONIN, HIGH SENSITIVITY: 11 NG/L (ref 0–14)
TSH SERPL DL<=0.05 MIU/L-ACNC: 1.02 UIU/ML (ref 0.3–5)
TURBIDITY: ABNORMAL
URINE HGB: ABNORMAL
UROBILINOGEN, URINE: NORMAL
WBC # BLD: 6.6 K/UL (ref 3.5–11.3)
WBC UA: ABNORMAL /HPF (ref 0–5)

## 2022-04-27 PROCEDURE — 99220 PR INITIAL OBSERVATION CARE/DAY 70 MINUTES: CPT | Performed by: INTERNAL MEDICINE

## 2022-04-27 PROCEDURE — 87635 SARS-COV-2 COVID-19 AMP PRB: CPT

## 2022-04-27 PROCEDURE — 96375 TX/PRO/DX INJ NEW DRUG ADDON: CPT

## 2022-04-27 PROCEDURE — G0378 HOSPITAL OBSERVATION PER HR: HCPCS

## 2022-04-27 PROCEDURE — 87804 INFLUENZA ASSAY W/OPTIC: CPT

## 2022-04-27 PROCEDURE — 83690 ASSAY OF LIPASE: CPT

## 2022-04-27 PROCEDURE — 6360000002 HC RX W HCPCS: Performed by: STUDENT IN AN ORGANIZED HEALTH CARE EDUCATION/TRAINING PROGRAM

## 2022-04-27 PROCEDURE — 83605 ASSAY OF LACTIC ACID: CPT

## 2022-04-27 PROCEDURE — 96361 HYDRATE IV INFUSION ADD-ON: CPT

## 2022-04-27 PROCEDURE — 85025 COMPLETE CBC W/AUTO DIFF WBC: CPT

## 2022-04-27 PROCEDURE — 93005 ELECTROCARDIOGRAM TRACING: CPT | Performed by: STUDENT IN AN ORGANIZED HEALTH CARE EDUCATION/TRAINING PROGRAM

## 2022-04-27 PROCEDURE — 2580000003 HC RX 258: Performed by: STUDENT IN AN ORGANIZED HEALTH CARE EDUCATION/TRAINING PROGRAM

## 2022-04-27 PROCEDURE — 2500000003 HC RX 250 WO HCPCS: Performed by: STUDENT IN AN ORGANIZED HEALTH CARE EDUCATION/TRAINING PROGRAM

## 2022-04-27 PROCEDURE — 84443 ASSAY THYROID STIM HORMONE: CPT

## 2022-04-27 PROCEDURE — 71045 X-RAY EXAM CHEST 1 VIEW: CPT

## 2022-04-27 PROCEDURE — 87040 BLOOD CULTURE FOR BACTERIA: CPT

## 2022-04-27 PROCEDURE — 84484 ASSAY OF TROPONIN QUANT: CPT

## 2022-04-27 PROCEDURE — 87086 URINE CULTURE/COLONY COUNT: CPT

## 2022-04-27 PROCEDURE — 1200000000 HC SEMI PRIVATE

## 2022-04-27 PROCEDURE — 99285 EMERGENCY DEPT VISIT HI MDM: CPT

## 2022-04-27 PROCEDURE — 85652 RBC SED RATE AUTOMATED: CPT

## 2022-04-27 PROCEDURE — 2580000003 HC RX 258: Performed by: NURSE PRACTITIONER

## 2022-04-27 PROCEDURE — 96365 THER/PROPH/DIAG IV INF INIT: CPT

## 2022-04-27 PROCEDURE — 83735 ASSAY OF MAGNESIUM: CPT

## 2022-04-27 PROCEDURE — 80048 BASIC METABOLIC PNL TOTAL CA: CPT

## 2022-04-27 PROCEDURE — 80076 HEPATIC FUNCTION PANEL: CPT

## 2022-04-27 PROCEDURE — 81001 URINALYSIS AUTO W/SCOPE: CPT

## 2022-04-27 RX ORDER — BENZONATATE 100 MG/1
100 CAPSULE ORAL 3 TIMES DAILY PRN
Status: DISCONTINUED | OUTPATIENT
Start: 2022-04-27 | End: 2022-04-29 | Stop reason: HOSPADM

## 2022-04-27 RX ORDER — ONDANSETRON 2 MG/ML
4 INJECTION INTRAMUSCULAR; INTRAVENOUS ONCE
Status: COMPLETED | OUTPATIENT
Start: 2022-04-27 | End: 2022-04-27

## 2022-04-27 RX ORDER — POLYETHYLENE GLYCOL 3350 17 G/17G
17 POWDER, FOR SOLUTION ORAL DAILY PRN
Status: DISCONTINUED | OUTPATIENT
Start: 2022-04-27 | End: 2022-04-29 | Stop reason: HOSPADM

## 2022-04-27 RX ORDER — SODIUM CHLORIDE 0.9 % (FLUSH) 0.9 %
5-40 SYRINGE (ML) INJECTION PRN
Status: DISCONTINUED | OUTPATIENT
Start: 2022-04-27 | End: 2022-04-29 | Stop reason: HOSPADM

## 2022-04-27 RX ORDER — SODIUM CHLORIDE 0.9 % (FLUSH) 0.9 %
5-40 SYRINGE (ML) INJECTION EVERY 12 HOURS SCHEDULED
Status: DISCONTINUED | OUTPATIENT
Start: 2022-04-27 | End: 2022-04-29 | Stop reason: HOSPADM

## 2022-04-27 RX ORDER — ONDANSETRON 2 MG/ML
4 INJECTION INTRAMUSCULAR; INTRAVENOUS EVERY 6 HOURS PRN
Status: DISCONTINUED | OUTPATIENT
Start: 2022-04-27 | End: 2022-04-29 | Stop reason: HOSPADM

## 2022-04-27 RX ORDER — SODIUM CHLORIDE 9 MG/ML
INJECTION, SOLUTION INTRAVENOUS PRN
Status: DISCONTINUED | OUTPATIENT
Start: 2022-04-27 | End: 2022-04-29 | Stop reason: HOSPADM

## 2022-04-27 RX ORDER — SODIUM CHLORIDE 9 MG/ML
INJECTION, SOLUTION INTRAVENOUS CONTINUOUS
Status: DISCONTINUED | OUTPATIENT
Start: 2022-04-27 | End: 2022-04-29 | Stop reason: HOSPADM

## 2022-04-27 RX ORDER — ALBUTEROL SULFATE 2.5 MG/3ML
2.5 SOLUTION RESPIRATORY (INHALATION)
Status: DISCONTINUED | OUTPATIENT
Start: 2022-04-27 | End: 2022-04-29 | Stop reason: HOSPADM

## 2022-04-27 RX ORDER — ACETAMINOPHEN 325 MG/1
650 TABLET ORAL EVERY 6 HOURS PRN
Status: DISCONTINUED | OUTPATIENT
Start: 2022-04-27 | End: 2022-04-29 | Stop reason: HOSPADM

## 2022-04-27 RX ORDER — OXYCODONE HYDROCHLORIDE 5 MG/1
5 TABLET ORAL EVERY 6 HOURS PRN
Status: DISCONTINUED | OUTPATIENT
Start: 2022-04-27 | End: 2022-04-29 | Stop reason: HOSPADM

## 2022-04-27 RX ORDER — 0.9 % SODIUM CHLORIDE 0.9 %
1000 INTRAVENOUS SOLUTION INTRAVENOUS ONCE
Status: COMPLETED | OUTPATIENT
Start: 2022-04-27 | End: 2022-04-27

## 2022-04-27 RX ORDER — KETOROLAC TROMETHAMINE 15 MG/ML
15 INJECTION, SOLUTION INTRAMUSCULAR; INTRAVENOUS ONCE
Status: COMPLETED | OUTPATIENT
Start: 2022-04-27 | End: 2022-04-27

## 2022-04-27 RX ORDER — ONDANSETRON 4 MG/1
4 TABLET, ORALLY DISINTEGRATING ORAL EVERY 8 HOURS PRN
Status: DISCONTINUED | OUTPATIENT
Start: 2022-04-27 | End: 2022-04-29 | Stop reason: HOSPADM

## 2022-04-27 RX ORDER — TRAZODONE HYDROCHLORIDE 100 MG/1
100 TABLET ORAL NIGHTLY
Status: DISCONTINUED | OUTPATIENT
Start: 2022-04-27 | End: 2022-04-29 | Stop reason: HOSPADM

## 2022-04-27 RX ORDER — FAMOTIDINE 10 MG/ML
20 INJECTION, SOLUTION INTRAVENOUS ONCE
Status: COMPLETED | OUTPATIENT
Start: 2022-04-27 | End: 2022-04-27

## 2022-04-27 RX ORDER — ENOXAPARIN SODIUM 100 MG/ML
40 INJECTION SUBCUTANEOUS DAILY
Status: DISCONTINUED | OUTPATIENT
Start: 2022-04-28 | End: 2022-04-29 | Stop reason: HOSPADM

## 2022-04-27 RX ORDER — ATORVASTATIN CALCIUM 20 MG/1
20 TABLET, FILM COATED ORAL DAILY
Status: DISCONTINUED | OUTPATIENT
Start: 2022-04-28 | End: 2022-04-29 | Stop reason: HOSPADM

## 2022-04-27 RX ORDER — ACETAMINOPHEN 650 MG/1
650 SUPPOSITORY RECTAL EVERY 6 HOURS PRN
Status: DISCONTINUED | OUTPATIENT
Start: 2022-04-27 | End: 2022-04-29 | Stop reason: HOSPADM

## 2022-04-27 RX ORDER — HALOPERIDOL 5 MG
5 TABLET ORAL 2 TIMES DAILY
Status: DISCONTINUED | OUTPATIENT
Start: 2022-04-27 | End: 2022-04-29 | Stop reason: HOSPADM

## 2022-04-27 RX ADMIN — CEFTRIAXONE SODIUM 1000 MG: 1 INJECTION, POWDER, FOR SOLUTION INTRAMUSCULAR; INTRAVENOUS at 18:28

## 2022-04-27 RX ADMIN — SODIUM CHLORIDE: 9 INJECTION, SOLUTION INTRAVENOUS at 23:23

## 2022-04-27 RX ADMIN — FAMOTIDINE 20 MG: 10 INJECTION, SOLUTION INTRAVENOUS at 15:38

## 2022-04-27 RX ADMIN — SODIUM CHLORIDE, PRESERVATIVE FREE 10 ML: 5 INJECTION INTRAVENOUS at 23:29

## 2022-04-27 RX ADMIN — ONDANSETRON 4 MG: 2 INJECTION INTRAMUSCULAR; INTRAVENOUS at 15:38

## 2022-04-27 RX ADMIN — SODIUM CHLORIDE 1000 ML: 9 INJECTION, SOLUTION INTRAVENOUS at 15:43

## 2022-04-27 RX ADMIN — KETOROLAC TROMETHAMINE 15 MG: 15 INJECTION, SOLUTION INTRAMUSCULAR; INTRAVENOUS at 15:38

## 2022-04-27 NOTE — FLOWSHEET NOTE
SPIRITUAL CARE DEPARTMENT - [address]  PROGRESS NOTE    Shift date: 4.27.2022  Shift day: Wednesday   Shift # 2    Room # 08/08   Name: Neeraj Pineda            Age: 66 y.o. Gender: female          Synagogue: unknown   Place of Latter-day: unknown    Referral: Routine Visit    Admit Date & Time: 4/27/2022  2:50 PM    PATIENT/EVENT DESCRIPTION:  Neeraj Pineda is a 66 y.o. female       SPIRITUAL ASSESSMENT/INTERVENTION:  Patient appears to be calm and coping. Son arrived and appears slightly anxious but coping as well. States that they will be moving to a new location on Friday and may need to send patient to a new address. Will confirm on Friday with nurse if patient is admitted.  provided hospitality, space for feelings, thoughts, and concerns. Determined support to be available. SPIRITUAL CARE FOLLOW-UP PLAN:  Chaplains will remain available to offer spiritual and emotional support as needed.       Electronically signed by Mehdi Delacruz on 4/27/2022 at 6:19 PM.  [facility]  [phone]       04/27/22 1818   Encounter Summary   Encounter Overview/Reason  Initial Encounter   Service Provided For: Patient and family together   Referral/Consult From: Nurse   Support System Children   Last Encounter  04/27/22   Complexity of Encounter Moderate   Begin Time 1800   End Time  1818   Total Time Calculated 18 min   Encounter    Type Initial Screen/Assessment   Assessment/Intervention/Outcome   Assessment Calm;Coping   Intervention Active listening;Discussed illness injury and its impact   Outcome Coping;Engaged in conversation     Electronically signed by Gay Posey on 4/27/2022 at 6:19 PM

## 2022-04-27 NOTE — ED PROVIDER NOTES
[facility]     Emergency Department     Faculty Attestation    I performed a history and physical examination of the patient and discussed management with the resident. I reviewed the residents note and agree with the documented findings and plan of care. Any areas of disagreement are noted on the chart. I was personally present for the key portions of any procedures. I have documented in the chart those procedures where I was not present during the key portions. I have reviewed the emergency nurses triage note. I agree with the chief complaint, past medical history, past surgical history, allergies, medications, social and family history as documented unless otherwise noted below. For Physician Assistant/ Nurse Practitioner cases/documentation I have personally evaluated this patient and have completed at least one if not all key elements of the E/M (history, physical exam, and MDM). Additional findings are as noted. I have personally seen and evaluated the patient. I find the patient's history and physical exam are consistent with the NP/PA documentation. I agree with the care provided, treatment rendered, disposition and follow-up plan. This patient was evaluated in the Emergency Department for symptoms described in the history of present illness. The patient was evaluated in the context of the global COVID-19 pandemic, which necessitated consideration that the patient might be at risk for infection with the SARS-CoV-2 virus that causes COVID-19. Institutional protocols and algorithms that pertain to the evaluation of patients at risk for COVID-19 are in a state of rapid change based on information released by regulatory bodies including the CDC and federal and state organizations. These policies and algorithms were followed during the patient's care in the ED. 59-year-old female presenting with lower abdominal pain.   Patient has trouble describing the pain, states that it \"just hurts\". She lives with her brother and her brother's friend, and states that her brother's friend has been ill but she is not sure with what. She denies any vomiting or diarrhea. She does state that it hurts to urinate. No fever. Patient was seen with similar left-sided abdominal pain on 4/9, and had a normal CT abdomen/pelvis at that time. Exam:  General: Laying on the bed, awake, alert and in no acute distress  CV: normal rate and regular rhythm  Lungs: Breathing comfortably on room air with no tachypnea, hypoxia, or increased work of breathing  Abdomen: Mildly tender in the low abdomen to palpation. Abdomen is soft, nondistended. No guarding or rigidity.     Plan:  Labs including CBC, electrolytes, lipase, LFTs, urinalysis  Pain and nausea control  Signed out to oncoming physician pending reevaluation after labs        Carlota Carcamo MD   Attending Emergency  Physician    (Please note that portions of this note were completed with a voice recognition program. Efforts were made to edit the dictations but occasionally words are mis-transcribed.)              Carlota Carcamo MD  04/27/22 7499

## 2022-04-28 LAB
CULTURE: NORMAL
SPECIMEN DESCRIPTION: NORMAL

## 2022-04-28 PROCEDURE — 6360000002 HC RX W HCPCS: Performed by: INTERNAL MEDICINE

## 2022-04-28 PROCEDURE — 6360000002 HC RX W HCPCS: Performed by: NURSE PRACTITIONER

## 2022-04-28 PROCEDURE — 97166 OT EVAL MOD COMPLEX 45 MIN: CPT

## 2022-04-28 PROCEDURE — 96361 HYDRATE IV INFUSION ADD-ON: CPT

## 2022-04-28 PROCEDURE — G0378 HOSPITAL OBSERVATION PER HR: HCPCS

## 2022-04-28 PROCEDURE — 94761 N-INVAS EAR/PLS OXIMETRY MLT: CPT

## 2022-04-28 PROCEDURE — 2700000000 HC OXYGEN THERAPY PER DAY

## 2022-04-28 PROCEDURE — 99232 SBSQ HOSP IP/OBS MODERATE 35: CPT | Performed by: INTERNAL MEDICINE

## 2022-04-28 PROCEDURE — 94640 AIRWAY INHALATION TREATMENT: CPT

## 2022-04-28 PROCEDURE — 1200000000 HC SEMI PRIVATE

## 2022-04-28 PROCEDURE — 97162 PT EVAL MOD COMPLEX 30 MIN: CPT

## 2022-04-28 PROCEDURE — 96376 TX/PRO/DX INJ SAME DRUG ADON: CPT

## 2022-04-28 PROCEDURE — 2500000003 HC RX 250 WO HCPCS: Performed by: INTERNAL MEDICINE

## 2022-04-28 PROCEDURE — 96372 THER/PROPH/DIAG INJ SC/IM: CPT

## 2022-04-28 PROCEDURE — 97535 SELF CARE MNGMENT TRAINING: CPT

## 2022-04-28 PROCEDURE — 6370000000 HC RX 637 (ALT 250 FOR IP): Performed by: NURSE PRACTITIONER

## 2022-04-28 PROCEDURE — 2580000003 HC RX 258: Performed by: NURSE PRACTITIONER

## 2022-04-28 PROCEDURE — 97530 THERAPEUTIC ACTIVITIES: CPT

## 2022-04-28 RX ADMIN — HALOPERIDOL 5 MG: 5 TABLET ORAL at 20:09

## 2022-04-28 RX ADMIN — ACETAMINOPHEN 650 MG: 325 TABLET ORAL at 10:04

## 2022-04-28 RX ADMIN — SODIUM CHLORIDE: 9 INJECTION, SOLUTION INTRAVENOUS at 18:20

## 2022-04-28 RX ADMIN — SODIUM CHLORIDE, PRESERVATIVE FREE 10 ML: 5 INJECTION INTRAVENOUS at 20:10

## 2022-04-28 RX ADMIN — ENOXAPARIN SODIUM 40 MG: 100 INJECTION SUBCUTANEOUS at 10:02

## 2022-04-28 RX ADMIN — CEFTRIAXONE SODIUM 1000 MG: 1 INJECTION, POWDER, FOR SOLUTION INTRAMUSCULAR; INTRAVENOUS at 18:17

## 2022-04-28 RX ADMIN — TIOTROPIUM BROMIDE INHALATION SPRAY 1 PUFF: 3.12 SPRAY, METERED RESPIRATORY (INHALATION) at 08:58

## 2022-04-28 RX ADMIN — TRAZODONE HYDROCHLORIDE 100 MG: 100 TABLET ORAL at 20:09

## 2022-04-28 RX ADMIN — ATORVASTATIN CALCIUM 20 MG: 20 TABLET, FILM COATED ORAL at 10:02

## 2022-04-28 RX ADMIN — HALOPERIDOL 5 MG: 5 TABLET ORAL at 10:02

## 2022-04-28 ASSESSMENT — ENCOUNTER SYMPTOMS
ABDOMINAL PAIN: 1
DIARRHEA: 0
COUGH: 1
CONSTIPATION: 0
RHINORRHEA: 0
SORE THROAT: 0
SHORTNESS OF BREATH: 0
BACK PAIN: 0
VOMITING: 0
NAUSEA: 1

## 2022-04-28 NOTE — PROGRESS NOTES
[facility]. UTI (urinary tract infection) [N39.0]  Acute cystitis without hematuria [N30.00] . Vitals:    04/28/22 0858   BP:    Pulse:    Resp: 18   Temp:    SpO2: 100%   . Patients home meds are   Prior to Admission medications    Medication Sig Start Date End Date Taking? Authorizing Provider   methylPREDNISolone sodium (SOLU-MEDROL) 125 MG injection 125 mg    Historical Provider, MD   ibuprofen (ADVIL;MOTRIN) 600 MG tablet ibuprofen 600 mg tablet    Historical Provider, MD   ciclopirox (LOPROX) 0.77 % cream ciclopirox 0.77 % topical cream    Historical Provider, MD   albuterol sulfate  (90 Base) MCG/ACT inhaler albuterol sulfate HFA 90 mcg/actuation aerosol inhaler    Historical Provider, MD   atorvastatin (LIPITOR) 20 MG tablet Take 1 tablet by mouth daily 6/29/21   Steven Carson MD   tiotropium (SPIRIVA RESPIMAT) 1.25 MCG/ACT AERS inhaler Inhale 2 puffs into the lungs daily 8/12/19   Marita Hernandez MD   acetaminophen (TYLENOL) 500 MG tablet Take 2 tablets by mouth every 8 hours as needed for Pain 3/7/19   Sydnee Tanner MD   haloperidol (HALDOL) 5 MG tablet Take 5 mg by mouth 2 times daily    Historical Provider, MD   traZODone (DESYREL) 100 MG tablet  8/9/17   Historical Provider, MD   Elastic Bandages & Supports (ACE ARM SLING) MISC Apply 1 Units topically daily 4/17/17   Jose Roberto Salas MD   trihexyphenidyl (ARTANE) 2 MG tablet Take 2 mg by mouth 2 times daily  10/10/14   Historical Provider, MD   .  Recent Surgical History: None = 0     Assessment: Patient states she takes Spiriva only at home.     RR 18  Breath Sounds: Clear      · Bronchodilator assessment at level  1  · Hyperinflation assessment at level   · Secretion Management assessment at level    ·   · [x]    Bronchodilator Assessment  BRONCHODILATOR ASSESSMENT SCORE  Score 0 1 2 3 4 5   Breath Sounds   []  Patient Baseline [x]  No Wheeze good aeration []  Faint, scattered wheezing, good aeration []  Expiratory Wheezing and or moderately diminished []  Insp/Exp wheeze and/or very diminished []  Insp/Exp and/ or marked distress   Respiratory Rate   []  Patient Baseline [x]  Less than 20 []  Less than 20 []  20-25 []  Greater than 25 []  Greater than 25   Peak flow % of Pred or PB [x]  NA   []  Greater than 90%  []  81-90% []  71-80% []  Less than or equal to 70%  or unable to perform []  Unable due to Respiratory Distress   Dyspnea re []  Patient Baseline [x]  No SOB []  No SOB []  SOB on exertion []  SOB min activity []  At rest/acute   e FEV% Predicted       [x]  NA []  Above 69%  []  Unable []  Above 60-69%  []  Unable []  Above 50-59%  []  Unable []  Above 35-49%  []  Unable []  Less than 35%  []  Unable

## 2022-04-28 NOTE — PROGRESS NOTES
Occupational Therapy  Facility/Department: [address] ORTHO/MED SURG  Occupational Therapy Initial Assessment    Name: Phoenix Lund  : 1950  MRN: [id number]  Date of Service: 2022    Discharge Recommendations:  Patient would benefit from continued therapy after discharge  OT Equipment Recommendations  Equipment Needed: Yes     Patient Diagnosis(es): The encounter diagnosis was Acute cystitis without hematuria. Past Medical History:  has a past medical history of Arthritis, Bronchitis, Chronic obstructive pulmonary disease ([facility] [address] Colon polyps, Controlled type 2 diabetes mellitus without complication, without long-term current use of insulin ([facility] [address] Dental neglect, Depression, Environmental allergies, GERD (gastroesophageal reflux disease), Hyperlipidemia, Hypertension, Obesity, Onychomycosis, Poor historian, RBBB, Treadmill stress test negative for angina pectoris, and Wears glasses. Past Surgical History:  has a past surgical history that includes Cholecystectomy; doppler echocardiography; Colonoscopy (2013); and Tubal ligation. Assessment   Performance deficits / Impairments: Decreased functional mobility ; Decreased ADL status; Decreased endurance;Decreased high-level IADLs;Decreased safe awareness;Decreased balance;Decreased strength  Assessment: Patient demonstrates decreased endurance and strength impacting patient's overall functional performance. Pt demo decreased balance with functional mobility and ADL tasks, impacting safety and independence. Patient would benefit from continued acute OT services to address functional deficits through skilled intervention to promote independence with ADLs.   Prognosis: Good  Decision Making: Medium Complexity  REQUIRES OT FOLLOW-UP: Yes  Activity Tolerance  Activity Tolerance: Patient limited by fatigue;Patient Tolerated treatment well        Plan   Plan  Times per Week: 3-4x/wk  Current Treatment Recommendations: Strengthening,Balance training,Functional mobility training,Endurance training,Safety education & training,Patient/Caregiver education & training,Equipment evaluation, education, & procurement,Home management training,Self-Care / ADL     Restrictions  Restrictions/Precautions  Restrictions/Precautions: Fall Risk  Position Activity Restriction  Other position/activity restrictions: Up with assist  Subjective   General  Patient assessed for rehabilitation services?: Yes  Family / Caregiver Present: No  General Comment  Comments: RN ok'd patient for OT/PT evaluation. Pt pleasant and cooperative throughout, agreeable to evaluation. Pain: Pt reports no pain   Social/Functional History  Social/Functional History  Lives With: Son  Type of Home: Apartment  Home Layout: One level  Home Access: Stairs to enter without rails  Entrance Stairs - Number of Steps: 1  Bathroom Shower/Tub: Tub/Shower unit  Bathroom Toilet: Standard  Bathroom Equipment: Shower chair  Bathroom Accessibility: Accessible  Home Equipment: [place]  ADL Assistance: Independent  Homemaking Assistance: Independent  Homemaking Responsibilities: Yes (son completes some IADLs)  Meal Prep Responsibility: Primary  Laundry Responsibility: Primary  Cleaning Responsibility: Primary  Shopping Responsibility: No  Ambulation Assistance: Independent  Transfer Assistance: Independent  Active : No  Mode of Transportation: Cab  Occupation: On disability  Leisure & Hobbies: go shopping  Additional Comments: Son is home at all times     Objective   Pulse: 89  Heart Rate Source: Monitor  BP: 132/74  BP Location: Right upper arm  BP Method: Automatic  Patient Position: Semi fowlers  MAP (Calculated): 93.33  Resp: 18  SpO2: 100 %  O2 Device: Nasal cannula  Vision Exceptions: Wears glasses for reading  Hearing: Exceptions to [facility]  Hearing Exceptions: Hard of hearing/hearing concerns       Safety Devices  Type of Devices: Gait belt;Bed alarm in place;Call light within reach; Patient at risk for falls; Left in bed;Nurse notified  Restraints  Restraints Initially in Place: No  Transfer Training  Transfer Training: Yes  Overall Level of Assistance: Contact-guard assistance  Interventions: Verbal cues  Sit to Stand: Contact-guard assistance  Stand to Sit: Contact-guard assistance  Gait  Overall Level of Assistance: Contact-guard assistance  Assistive Device: Walker (intitially with a cane, pulling cane behind unsafe, progressed to use of RW.)     Strength: Generally decreased, functional  Coordination: Generally decreased, functional  Sensation: Intact (denies any numbness or tingling)  ADL  Feeding: Independent  Grooming: Modified independent ; Increased time to complete  UE Bathing: Stand by assistance; Increased time to complete  LE Bathing: Contact guard assistance; Increased time to complete  UE Dressing: Stand by assistance; Increased time to complete  LE Dressing: Contact guard assistance; Increased time to complete  Toileting: Increased time to complete;Contact guard assistance  Additional Comments: Patient sat EOB and doffed B socks, able to don socks sitting EOB with increased time and VC for safety. Activity Tolerance  Activity Tolerance: Patient limited by fatigue;Patient limited by endurance  Bed mobility  Supine to Sit: Minimal assistance  Sit to Supine: Minimal assistance  Scooting: Minimal assistance  Cognition  Overall Cognitive Status: WFL  Education Given To: Patient  Education Provided: Role of Therapy;Plan of Care;Transfer Training;Energy Conservation; ADL Adaptive Strategies; Fall Prevention Strategies; Equipment  Education Method: Demonstration  Barriers to Learning: None  Education Outcome: Verbalized understanding;Demonstrated understanding;Continued education needed  LUE AROM : WFL  Left Hand AROM: WFL  RUE AROM : WFL  Right Hand AROM: WFL  AM-PAC Score        AM-PAC Inpatient Daily Activity Raw Score: 20 (04/28/22 1309)  AM-PAC Inpatient ADL T-Scale Score : 42.03 (04/28/22 1309)  ADL Inpatient CMS 0-100% Score: 38.32 (04/28/22 1309)  ADL Inpatient CMS G-Code Modifier : CJ (04/28/22 1309)    Goals  Short Term Goals  Time Frame for Short term goals: Patient will, by discharge  Short Term Goal 1: demo UB ADLs independently  Short Term Goal 2: demo LB ADLs at Supervision  Short Term Goal 3: demo functional transfers/mobility using LRD at Supervision to engage in ADLs  Short Term Goal 4: demo 10+ min of dynamic standing tolerance at Supervision, reaching outside [place], in all planes, to engage in ADLs/IADLs  Short Term Goal 5: demo good safety awareness during functional tasks with 0 VCs     Therapy Time   Individual Concurrent Group Co-treatment   Time In 0818         Time Out 0843         Minutes 25         Timed Code Treatment Minutes: [place], OTR/L

## 2022-04-28 NOTE — H&P
@[email or web address]@    [facility]    HISTORY AND PHYSICAL EXAMINATION            Date:   4/27/2022  Patient name:  Kenzie Dacosta  Date of admission:  4/27/2022  2:50 PM  MRN:   [id number]  Account:  [de-identified]  YOB: 1950  PCP:    Lavell Horner MD  Room:   08/08  Code Status:    Prior    Chief Complaint:   -Urinary dysuria and frequency, lower abdominal discomfort,  change in baseline cough    History of Present Illness:     66year old with underlying copd/chronic bronchitis, UTis who presented with 1 day of non radiating 5/10 intermittent lower abdominal cramps associated with nausea. UTI last week on keflex but reported no improvement in symptoms. Denies vomiting, fever, flank pain, headache, myalgias. Moreover, patient reports wet productive cough that has been similar for months. Denies pleuritic chest pain, SOB, nasal congestion, sore throat, diarrhea or constipation. Reports sick contact with some of the same symptoms. Says she has hx of chronic bronchitis, copd and reports still smoking cigarettes. Denies any leg swelling/pain or swelling. Upon arrival to ED, patient was tachycardic, RR 22, UA positive, CT abdomen pelvis and CXR unrevealing. Past Medical History:     Past Medical History:   Diagnosis Date    Arthritis     knees    Bronchitis x1 long ago    Chronic obstructive pulmonary disease ([address] 9/12/2017    Colon polyps     Controlled type 2 diabetes mellitus without complication, without long-term current use of insulin ([address] 6/29/2021    Dental neglect     poor dentation. Missing teeth.  No loose teeth    Depression     Environmental allergies     GERD (gastroesophageal reflux disease)     Hyperlipidemia     Hypertension     Obesity     Onychomycosis     Poor historian     RBBB     Treadmill stress test negative for angina pectoris 2009    Wears glasses     reading only        Past Surgical History:     Past Surgical History:   Procedure Laterality Date    CHOLECYSTECTOMY      COLONOSCOPY  6/2013    one hyperplastic polyp    DOPPLER ECHOCARDIOGRAPHY      cardiac    TUBAL LIGATION          Medications Prior to Admission:     Prior to Admission medications    Medication Sig Start Date End Date Taking? Authorizing Provider   methylPREDNISolone sodium (SOLU-MEDROL) 125 MG injection 125 mg    Historical Provider, MD   ibuprofen (ADVIL;MOTRIN) 600 MG tablet ibuprofen 600 mg tablet    Historical Provider, MD   ciclopirox (LOPROX) 0.77 % cream ciclopirox 0.77 % topical cream    Historical Provider, MD   albuterol sulfate  (90 Base) MCG/ACT inhaler albuterol sulfate HFA 90 mcg/actuation aerosol inhaler    Historical Provider, MD   atorvastatin (LIPITOR) 20 MG tablet Take 1 tablet by mouth daily 6/29/21   Gilford Meth, MD   tiotropium (SPIRIVA RESPIMAT) 1.25 MCG/ACT AERS inhaler Inhale 2 puffs into the lungs daily 8/12/19   Laura Giordano MD   acetaminophen (TYLENOL) 500 MG tablet Take 2 tablets by mouth every 8 hours as needed for Pain 3/7/19   Author MD Kevin   haloperidol (HALDOL) 5 MG tablet Take 5 mg by mouth 2 times daily    Historical Provider, MD   traZODone (DESYREL) 100 MG tablet  8/9/17   Historical Provider, MD   Elastic Bandages & Supports (ACE ARM SLING) MISC Apply 1 Units topically daily 4/17/17   Wash MD Adan   trihexyphenidyl (ARTANE) 2 MG tablet Take 2 mg by mouth 2 times daily  10/10/14   Historical Provider, MD        Allergies:     Patient has no known allergies. Social History:     Tobacco:    reports that she quit smoking about 6 years ago. Her smoking use included cigarettes. She has a 17.50 pack-year smoking history. She has never used smokeless tobacco.  Alcohol:      reports no history of alcohol use. Drug Use:  reports no history of drug use.     Family History:     Family History   Problem Relation Age of Onset    Heart Disease Mother     Cancer Father Review of Systems:     Positive and Negative as described in HPI. Physical Exam:   BP (!) 155/71   Pulse 101   Temp 97.7 °F (36.5 °C) (Oral)   Resp 22   SpO2 97%   Temp (24hrs), Av.7 °F (36.5 °C), Min:97.7 °F (36.5 °C), Max:97.7 °F (36.5 °C)    No results for input(s): POCGLU in the last 72 hours. No intake or output data in the 24 hours ending 22    General Appearance: alert, well appearing, no immanent stress but appears uncomfortable . Disheveled. Mental status: oriented to person, place, and time  Head: normocephalic, atraumatic  Eye: no icterus, redness, pupils equal and reactive, extraocular eye movements intact, conjunctiva clear  Ear: normal external ear, no discharge, hearing intact  Nose: no drainage noted  Mouth: mucous membranes dry with poor oral hygiene   Neck: supple, no carotid bruits, thyroid not palpable  Lungs: Bilateral equal air entry, clear to ausculation, no wheezing, rales or rhonchi, normal effort  Cardiovascular: normal rate, regular rhythm, no murmur, gallop, rub  Abdomen: Soft, Lower pelvic and LLQ tenderness on deep palpation. , nondistended, normal bowel sounds, no signs of acute abdomen.    Neurologic: There are no new focal motor or sensory deficits, normal muscle tone and bulk, no abnormal sensation, normal speech, cranial nerves II through XII grossly intact  Skin: No gross lesions, rashes, bruising or bleeding on exposed skin area  Extremities: peripheral pulses palpable, no pedal edema or calf pain with palpation      Investigations:      Laboratory Testing:  Recent Results (from the past 24 hour(s))   Basic Metabolic Panel    Collection Time: 22  3:27 PM   Result Value Ref Range    Glucose 96 70 - 99 mg/dL    BUN 7 (L) 8 - 23 mg/dL    CREATININE 0.66 0.50 - 0.90 mg/dL    Calcium 9.6 8.6 - 10.4 mg/dL    Sodium 139 135 - 144 mmol/L    Potassium 4.0 3.7 - 5.3 mmol/L    Chloride 101 98 - 107 mmol/L    CO2 27 20 - 31 mmol/L    Anion Gap 11 9 - 17 mmol/L    GFR Non-African American >60 >60 mL/min    GFR African American >60 >60 mL/min    GFR Comment         Hepatic Function Panel    Collection Time: 04/27/22  3:27 PM   Result Value Ref Range    Albumin 4.1 3.5 - 5.2 g/dL    Alkaline Phosphatase 129 (H) 35 - 104 U/L    ALT 15 5 - 33 U/L    AST 19 <32 U/L    Total Bilirubin 0.35 0.3 - 1.2 mg/dL    Bilirubin, Direct 0.08 <0.31 mg/dL    Bilirubin, Indirect 0.27 0.00 - 1.00 mg/dL    Total Protein 7.1 6.4 - 8.3 g/dL    Albumin/Globulin Ratio 1.4 1.0 - 2.5   Lipase    Collection Time: 04/27/22  3:27 PM   Result Value Ref Range    Lipase 14 13 - 60 U/L   CBC with Auto Differential    Collection Time: 04/27/22  3:27 PM   Result Value Ref Range    WBC 6.6 3.5 - 11.3 k/uL    RBC 4.12 3.95 - 5.11 m/uL    Hemoglobin 12.0 11.9 - 15.1 g/dL    Hematocrit 38.7 36.3 - 47.1 %    MCV 93.9 82.6 - 102.9 fL    MCH 29.1 25.2 - 33.5 pg    MCHC 31.0 28.4 - 34.8 g/dL    RDW 13.2 11.8 - 14.4 %    Platelets 187 177 - 044 k/uL    MPV 9.1 8.1 - 13.5 fL    NRBC Automated 0.0 0.0 per 100 WBC    Seg Neutrophils 67 (H) 36 - 65 %    Lymphocytes 23 (L) 24 - 43 %    Monocytes 8 3 - 12 %    Eosinophils % 1 1 - 4 %    Basophils 1 0 - 2 %    Immature Granulocytes 0 0 %    Segs Absolute 4.49 1.50 - 8.10 k/uL    Absolute Lymph # 1.50 1.10 - 3.70 k/uL    Absolute Mono # 0.53 0.10 - 1.20 k/uL    Absolute Eos # 0.07 0.00 - 0.44 k/uL    Basophils Absolute 0.03 0.00 - 0.20 k/uL    Absolute Immature Granulocyte <0.03 0.00 - 0.30 k/uL   Magnesium    Collection Time: 04/27/22  3:27 PM   Result Value Ref Range    Magnesium 2.1 1.6 - 2.6 mg/dL   Troponin    Collection Time: 04/27/22  3:27 PM   Result Value Ref Range    Troponin, High Sensitivity 11 0 - 14 ng/L   COVID-19, Rapid    Collection Time: 04/27/22  3:36 PM    Specimen: Nasopharyngeal Swab   Result Value Ref Range    Specimen Description . NASOPHARYNGEAL SWAB     SARS-CoV-2, Rapid Not Detected Not Detected   RAPID INFLUENZA A/B ANTIGENS    Collection Time: 04/27/22  3:42 PM    Specimen: Nasopharyngeal   Result Value Ref Range    Flu A Antigen NEGATIVE NEGATIVE    Flu B Antigen NEGATIVE NEGATIVE   Troponin    Collection Time: 04/27/22  4:57 PM   Result Value Ref Range    Troponin, High Sensitivity 10 0 - 14 ng/L   Urinalysis with Microscopic    Collection Time: 04/27/22  5:43 PM   Result Value Ref Range    Color, UA Yellow Yellow    Turbidity UA Cloudy (A) Clear    Glucose, Ur NEGATIVE NEGATIVE    Bilirubin Urine NEGATIVE NEGATIVE    Ketones, Urine NEGATIVE NEGATIVE    Specific Gravity, UA 1.006 1.005 - 1.030    Urine Hgb MODERATE (A) NEGATIVE    pH, UA 6.0 5.0 - 8.0    Protein, UA NEGATIVE NEGATIVE    Urobilinogen, Urine Normal Normal    Nitrite, Urine NEGATIVE NEGATIVE    Leukocyte Esterase, Urine LARGE (A) NEGATIVE    -          WBC, UA TOO NUMEROUS TO COUNT 0 - 5 /HPF    RBC, UA 5 TO 10 0 - 4 /HPF    Casts UA  0 - 8 /LPF     5 TO 10 HYALINE Reference range defined for non-centrifuged specimen. Epithelial Cells UA 5 TO 10 0 - 5 /HPF    Bacteria, UA MANY (A) None       Imaging/Diagnostics:  XR CHEST PORTABLE    Result Date: 4/27/2022  No acute cardiopulmonary disease       Assessment :      Hospital Problems           Last Modified POA    * (Principal) UTI (urinary tract infection) 4/27/2022 Yes        #Cystitis  -Patient reporting 1 day of  Non radiating 5/10 campy lower pelvic discomfort with associated nausea and increase frequency and dysuria who failed outpatient mgt.   -UA + for Leukocyte esterase  -Normal renal function, WBC 6.6  -CT abdomen pelvis unrevealing  -History of UTIs with urine culture in 2021 growing Klebsiella pneumonia, sensitive to ceftriaxone. Was also in ER last month for UTI sx sent home on keflex. PLAN  -IV ceftriaxone until she can tolerate oral, follow-up on cultures and narrow antibiotics accordingly.  -Bladder scan daily for any possible urinary retention.  She is on ipratropium at home which is anticholinergic effects        #Bronchitis/URI  -Report nasal congestion, wet productive cough with baseline cough, afebrile, chest x-ray clear, sick contact with similar symptoms.  -Stable on room air  -Underlying asthma/copd with active tobacco use, chronic  bronchitis , allergies   -Flu negative, COVID-negative  PLAN  -continue to investigate   -Symptomatic mgt, smoking cessation, tessalon pearls, hydration, resume home inhalers        #COPD  -On room air at this time. No exacerbation.  No wheezing  -Continues to smoke, baseline wet cough  PLAN  -resume home meds           #RBBB  -Known hx of RBBB and copd  -On admission, No chest pain or Angina Sx,  on admission, Troponin 11-->10  -Echo in  2014 EF > 55, Right ventricular dilatation with normal systolic function.   -Treadmill stress test negative for angina pectoris  PLAN  -Monitor on tele, replace K and Mg until goal   -Outpatient follow up with her cardiologist         #Depression/Psych HX  -On haldol 5md daily at home  PLAN  -EKG for QTc monitoring  -Resume home meds   -F/U on TSH

## 2022-04-28 NOTE — ED PROVIDER NOTES
[facility]  ED  Emergency Department Encounter  Emergency Medicine Resident     Pt Name: Ashley Sage  MRN: [id number]  [place] 1950  Date of evaluation: 4/28/22  PCP:  Killian Dominguez MD    CHIEF COMPLAINT       Chief Complaint   Patient presents with    Other     states sent by cab for dry mouth that started yesterday    Abdominal Pain     reports abd pain, denies N/V/D/C, last BM yesterday    Cough     states productive cough today       HISTORY OFPRESENT ILLNESS  (Location/Symptom, Timing/Onset, Context/Setting, Quality, Duration, Modifying Factors,Severity.)      Ashley Sage is a 66 y.o. female with past medical history of COPD, diabetes, hypertension, hyperlipidemia who presents with suprapubic abdominal pain and pain with urination over the past few days. Patient has difficulty describing the pain to me. She states it does not radiate and denies any flank pain. She reports associated nausea and decreased appetite but denies fevers, chills, rhinorrhea, congestion, chest pain, shortness of breath, vomiting, diarrhea, hematuria, urinary frequency. Patient does report a nonproductive cough which has been present for the past several weeks. Patient states she was seen in the emergency department a few weeks ago for the cough, as well as shortness of breath and chest pain. She states the shortness of breath has improved but the cough has not. Patient reports recent sick contact with her roommate. She has not taken any medications for her symptoms.     PAST MEDICAL / SURGICAL / SOCIAL / FAMILY HISTORY      has a past medical history of Arthritis, Bronchitis, Chronic obstructive pulmonary disease ([address] Colon polyps, Controlled type 2 diabetes mellitus without complication, without long-term current use of insulin ([address] Dental neglect, Depression, Environmental allergies, GERD (gastroesophageal reflux disease), Hyperlipidemia, Hypertension, Obesity, Onychomycosis, Poor historian, TAWANNA, Treadmill stress test negative for angina pectoris, and Wears glasses. has a past surgical history that includes Cholecystectomy; doppler echocardiography; Colonoscopy (6/2013); and Tubal ligation. Social:  reports that she quit smoking about 6 years ago. Her smoking use included cigarettes. She has a 17.50 pack-year smoking history. She has never used smokeless tobacco. She reports that she does not drink alcohol and does not use drugs. Family Hx:   Family History   Problem Relation Age of Onset    Heart Disease Mother     Cancer Father         Allergies:  Patient has no known allergies. Home Medications:  Prior to Admission medications    Medication Sig Start Date End Date Taking?  Authorizing Provider   methylPREDNISolone sodium (SOLU-MEDROL) 125 MG injection 125 mg    Historical Provider, MD   ibuprofen (ADVIL;MOTRIN) 600 MG tablet ibuprofen 600 mg tablet    Historical Provider, MD   ciclopirox (LOPROX) 0.77 % cream ciclopirox 0.77 % topical cream    Historical Provider, MD   albuterol sulfate  (90 Base) MCG/ACT inhaler albuterol sulfate HFA 90 mcg/actuation aerosol inhaler    Historical Provider, MD   atorvastatin (LIPITOR) 20 MG tablet Take 1 tablet by mouth daily 6/29/21   Leon Siu MD   tiotropium (SPIRIVA RESPIMAT) 1.25 MCG/ACT AERS inhaler Inhale 2 puffs into the lungs daily 8/12/19   Russ Montes MD   acetaminophen (TYLENOL) 500 MG tablet Take 2 tablets by mouth every 8 hours as needed for Pain 3/7/19   Phoenix Bearden MD   haloperidol (HALDOL) 5 MG tablet Take 5 mg by mouth 2 times daily    Historical Provider, MD   traZODone (DESYREL) 100 MG tablet  8/9/17   Historical Provider, MD   Elastic Bandages & Supports (ACE ARM SLING) MISC Apply 1 Units topically daily 4/17/17   Birgit Banks MD   trihexyphenidyl (ARTANE) 2 MG tablet Take 2 mg by mouth 2 times daily  10/10/14   Historical Provider, MD       REVIEW OFSYSTEMS    (2-9 systems for level 4, 10 or more for level 5)      Review of Systems   Constitutional: Negative for chills and fever. HENT: Negative for congestion, rhinorrhea and sore throat. Eyes: Negative for visual disturbance. Respiratory: Positive for cough. Negative for shortness of breath. Cardiovascular: Negative for chest pain and leg swelling. Gastrointestinal: Positive for abdominal pain and nausea. Negative for constipation, diarrhea and vomiting. Genitourinary: Positive for dysuria. Negative for frequency and hematuria. Musculoskeletal: Negative for arthralgias, back pain and neck pain. Skin: Negative for rash. Neurological: Negative for weakness, light-headedness, numbness and headaches. Psychiatric/Behavioral: Negative for confusion. All other systems reviewed and are negative. PHYSICAL EXAM   (up to 7 for level 4, 8 or more forlevel 5)      INITIAL VITALS:   Vitals:    04/27/22 1949 04/27/22 2235 04/27/22 2300 04/27/22 2319   BP: (!) 155/71 (!) 150/69 (!) 147/79    Pulse: 101 87 91    Resp: 22 14 16    Temp:   98.1 °F (36.7 °C)    TempSrc:   Oral    SpO2: 97% 99% 100%    Weight:    179 lb (81.2 kg)   Height:    5' 5\" (1.651 m)        Physical Exam  Vitals and nursing note reviewed. Constitutional:       General: She is not in acute distress. Appearance: She is not toxic-appearing. Comments: Adult female lying in stretcher awake and alert and in no acute distress. Speaks in full sentences and answers questions appropriately. HENT:      Head: Normocephalic and atraumatic. Nose: Nose normal.      Mouth/Throat:      Mouth: Mucous membranes are moist.      Pharynx: Oropharynx is clear. Eyes:      Conjunctiva/sclera: Conjunctivae normal.      Pupils: Pupils are equal, round, and reactive to light. Cardiovascular:      Rate and Rhythm: Normal rate and regular rhythm. Pulses: Normal pulses. Heart sounds: No murmur heard. No friction rub. No gallop.     Pulmonary:      Effort: Pulmonary effort is normal. No respiratory distress. Breath sounds: Normal breath sounds. No wheezing, rhonchi or rales. Abdominal:      General: There is no distension. Palpations: Abdomen is soft. Tenderness: There is abdominal tenderness. There is no right CVA tenderness or left CVA tenderness. Comments: Mild suprapubic tenderness to palpation without rebound or guarding. Abdomen is soft and nontender. There is no CVA tenderness. Musculoskeletal:      Cervical back: Neck supple. No rigidity. Right lower leg: No edema. Left lower leg: No edema. Skin:     General: Skin is warm and dry. Capillary Refill: Capillary refill takes less than 2 seconds. Findings: No rash. Neurological:      General: No focal deficit present. Mental Status: She is alert. Psychiatric:         Mood and Affect: Mood normal.         Behavior: Behavior normal.         DIFFERENTIAL  DIAGNOSIS       DDX: UTI, pyelonephritis, kidney stone, colitis, gastritis, gastroenteritis, pancreatitis, hepatitis, cholecystitis, cholelithiasis, influenza, COVID, other viral illness, electrolyte abnormality, dehydration, COPD exacerbation, pneumonia, pleural effusion, pulmonary edema, ACS    Initial MDM/Plan: 66 y.o. female with past medical history of COPD, diabetes, hypertension, hyperlipidemia who presents with suprapubic abdominal pain and pain with urination over the past few days. Patient also reports a cough which has been present for the past several weeks. On physical exam, patient is nontoxic-appearing. Vital signs are remarkable for slight tachycardia and respiratory rate in the 20s. This technically puts patient in SIRS criteria. She is afebrile. She has mild suprapubic tenderness to palpation but no CVA tenderness. Suspect UTI. Other intra-abdominal pathology also considered, as well as COVID and flu.   Will obtain abdominal labs, urinalysis as well as troponin, EKG and chest x-ray to evaluate the patient's cough. Will treat symptomatically and reassess. We will hold off on CT imaging of the abdomen, given my exam, however if labs indicate any abnormalities or urine is negative, will obtain CT scan of the abdomen pelvis. DIAGNOSTIC RESULTS / EMERGENCYDEPARTMENT COURSE / MDM     LABS:  Results for orders placed or performed during the hospital encounter of 04/27/22   COVID-19, Rapid    Specimen: Nasopharyngeal Swab   Result Value Ref Range    Specimen Description . NASOPHARYNGEAL SWAB     SARS-CoV-2, Rapid Not Detected Not Detected   RAPID INFLUENZA A/B ANTIGENS    Specimen: Nasopharyngeal   Result Value Ref Range    Flu A Antigen NEGATIVE NEGATIVE    Flu B Antigen NEGATIVE NEGATIVE   Culture, Blood 1    Specimen: Blood   Result Value Ref Range    Specimen Description . BLOOD     Special Requests R FA 10 ML     Culture NO GROWTH <24 HRS    Culture, Blood 2    Specimen: Blood   Result Value Ref Range    Specimen Description . BLOOD     Special Requests L HAND 7 ML     Culture NO GROWTH <24 HRS    Basic Metabolic Panel   Result Value Ref Range    Glucose 96 70 - 99 mg/dL    BUN 7 (L) 8 - 23 mg/dL    CREATININE 0.66 0.50 - 0.90 mg/dL    Calcium 9.6 8.6 - 10.4 mg/dL    Sodium 139 135 - 144 mmol/L    Potassium 4.0 3.7 - 5.3 mmol/L    Chloride 101 98 - 107 mmol/L    CO2 27 20 - 31 mmol/L    Anion Gap 11 9 - 17 mmol/L    GFR Non-African American >60 >60 mL/min    GFR African American >60 >60 mL/min    GFR Comment         Hepatic Function Panel   Result Value Ref Range    Albumin 4.1 3.5 - 5.2 g/dL    Alkaline Phosphatase 129 (H) 35 - 104 U/L    ALT 15 5 - 33 U/L    AST 19 <32 U/L    Total Bilirubin 0.35 0.3 - 1.2 mg/dL    Bilirubin, Direct 0.08 <0.31 mg/dL    Bilirubin, Indirect 0.27 0.00 - 1.00 mg/dL    Total Protein 7.1 6.4 - 8.3 g/dL    Albumin/Globulin Ratio 1.4 1.0 - 2.5   Lipase   Result Value Ref Range    Lipase 14 13 - 60 U/L   CBC with Auto Differential   Result Value Ref Range    WBC 6.6 3.5 - 11.3 k/uL RBC 4.12 3.95 - 5.11 m/uL    Hemoglobin 12.0 11.9 - 15.1 g/dL    Hematocrit 38.7 36.3 - 47.1 %    MCV 93.9 82.6 - 102.9 fL    MCH 29.1 25.2 - 33.5 pg    MCHC 31.0 28.4 - 34.8 g/dL    RDW 13.2 11.8 - 14.4 %    Platelets 593 622 - 103 k/uL    MPV 9.1 8.1 - 13.5 fL    NRBC Automated 0.0 0.0 per 100 WBC    Seg Neutrophils 67 (H) 36 - 65 %    Lymphocytes 23 (L) 24 - 43 %    Monocytes 8 3 - 12 %    Eosinophils % 1 1 - 4 %    Basophils 1 0 - 2 %    Immature Granulocytes 0 0 %    Segs Absolute 4.49 1.50 - 8.10 k/uL    Absolute Lymph # 1.50 1.10 - 3.70 k/uL    Absolute Mono # 0.53 0.10 - 1.20 k/uL    Absolute Eos # 0.07 0.00 - 0.44 k/uL    Basophils Absolute 0.03 0.00 - 0.20 k/uL    Absolute Immature Granulocyte <0.03 0.00 - 0.30 k/uL   Urinalysis with Microscopic   Result Value Ref Range    Color, UA Yellow Yellow    Turbidity UA Cloudy (A) Clear    Glucose, Ur NEGATIVE NEGATIVE    Bilirubin Urine NEGATIVE NEGATIVE    Ketones, Urine NEGATIVE NEGATIVE    Specific Gravity, UA 1.006 1.005 - 1.030    Urine Hgb MODERATE (A) NEGATIVE    pH, UA 6.0 5.0 - 8.0    Protein, UA NEGATIVE NEGATIVE    Urobilinogen, Urine Normal Normal    Nitrite, Urine NEGATIVE NEGATIVE    Leukocyte Esterase, Urine LARGE (A) NEGATIVE    -          WBC, UA TOO NUMEROUS TO COUNT 0 - 5 /HPF    RBC, UA 5 TO 10 0 - 4 /HPF    Casts UA  0 - 8 /LPF     5 TO 10 HYALINE Reference range defined for non-centrifuged specimen.     Epithelial Cells UA 5 TO 10 0 - 5 /HPF    Bacteria, UA MANY (A) None   Magnesium   Result Value Ref Range    Magnesium 2.1 1.6 - 2.6 mg/dL   Troponin   Result Value Ref Range    Troponin, High Sensitivity 11 0 - 14 ng/L   Troponin   Result Value Ref Range    Troponin, High Sensitivity 10 0 - 14 ng/L   Lactate, Sepsis   Result Value Ref Range    Lactic Acid, Sepsis, Whole Blood 0.8 0.5 - 1.9 mmol/L   Sedimentation Rate   Result Value Ref Range    Sed Rate 18 0 - 30 mm/Hr   TSH with Reflex   Result Value Ref Range    TSH 1.02 0.30 - 5.00 uIU/mL         RADIOLOGY:  XR CHEST PORTABLE    Result Date: 4/27/2022  EXAMINATION: ONE XRAY VIEW OF THE CHEST 4/27/2022 3:26 pm COMPARISON: None. HISTORY: ORDERING SYSTEM PROVIDED HISTORY: SOB TECHNOLOGIST PROVIDED HISTORY: SOB FINDINGS: No acute airspace infiltrate. No pneumothorax or pleural effusion. Normal cardiomediastinal silhouette. No acute cardiopulmonary disease       EKG  Normal sinus rhythm, rate: 78  MI: 120  QRS: 112  QT/QTc: 470/535  No ST elevation or depression  No T wave abnormality  Right bundle branch block    All EKG's are interpreted by the Emergency Department Physician who either signs or Co-signs this chart in the absence of a cardiologist.      MEDICATIONS ORDERED:  Orders Placed This Encounter   Medications    0.9 % sodium chloride bolus    famotidine (PEPCID) injection 20 mg    ondansetron (ZOFRAN) injection 4 mg    ketorolac (TORADOL) injection 15 mg    cefTRIAXone (ROCEPHIN) 1000 mg IVPB in NS 50ml minibag     Order Specific Question:   Antimicrobial Indications     Answer:   Urinary Tract Infection       PROCEDURES:  None      CONSULTS:  IP CONSULT TO HOSPITALIST  IP CONSULT TO SOCIAL WORK      EMERGENCY DEPARTMENT COURSE:  1630: Patient's lab work is overall reassuring. Her white blood cell count is normal.  Troponin and EKG negative. She is feeling improved after medications. We are pending urinalysis. 1745: Patient was finally able to give a urine sample. Urinalysis does indicate an infection. Urine culture was added on. We will give patient a dose of Rocephin here and discussed with her plan for admission versus discharge home. 1823: Discussed with the patient and her son her results and further plan of care. Patient and son both state that they are uncomfortable taking her home. Patient is living in an unfit environment with her brother and a roommate at this time.   Patient's son reports that he has an apartment for her but it will not be ready till Friday. Will consult with social work to see if they can help with potential placement. 2000: Social work has not been able to find the patient a safe place to stay with the exception of a homeless shelter at this time. Son states patient cannot stay with him tonight but that he would be able to take her home tomorrow. Given that her vital signs are still borderline and her social situation, feel patient could benefit from admission. [facility]46: [facility]iscussed the [facility] with [facility] who accepts her for admission at this time. FINAL IMPRESSION      1. Acute cystitis without hematuria          DISPOSITION / PLAN     DISPOSITION Admitted 04/27/2022 08:53:59 PM      PATIENT REFERRED TO:  No follow-up provider specified.     DISCHARGE MEDICATIONS:  Current Discharge Medication List          Marci Maharaj DO  Emergency Medicine Resident    (Please note that portions of this note were completed with a voice recognition program.Efforts were made to edit the dictations but occasionally words are mis-transcribed.)        Marci Maharaj DO  Resident  04/28/22 2405

## 2022-04-28 NOTE — PROGRESS NOTES
Physical Therapy  Facility/Department: [address] ORTHO/MED SURG  Physical Therapy Initial Assessment    Name: Jie Paul  : 1950  MRN: [id number]  Date of Service: 2022  Chief Complaint   Patient presents with    Other     states sent by cab for dry mouth that started yesterday    Abdominal Pain     reports abd pain, denies N/V/D/C, last BM yesterday    Cough     states productive cough today     Discharge Recommendations:  Therapy recommended at discharge      Patient Diagnosis(es): The encounter diagnosis was Acute cystitis without hematuria. Past Medical History:  has a past medical history of Arthritis, Bronchitis, Chronic obstructive pulmonary disease ([facility] [address] Colon polyps, Controlled type 2 diabetes mellitus without complication, without long-term current use of insulin ([facility] [address] Dental neglect, Depression, Environmental allergies, GERD (gastroesophageal reflux disease), Hyperlipidemia, Hypertension, Obesity, Onychomycosis, Poor historian, RBBB, Treadmill stress test negative for angina pectoris, and Wears glasses. Past Surgical History:  has a past surgical history that includes Cholecystectomy; doppler echocardiography; Colonoscopy (2013); and Tubal ligation. Assessment   Body Structures, Functions, Activity Limitations Requiring Skilled Therapeutic Intervention: Decreased functional mobility ; Decreased strength;Decreased endurance  Assessment: The pt ambulated 50 ft with a RW x min assist. She ambulated with a decreased stride length and required verbal cues for proper use of the walker.  She could benefit from a continuation of PT for gait and strengthening following her DC  Therapy Prognosis: Good  Decision Making: Medium Complexity  Requires PT Follow-Up: Yes  Activity Tolerance  Activity Tolerance: Patient limited by fatigue;Patient limited by endurance     Plan   Plan  Plan: 5-7 times per week  Current Treatment Recommendations: Strengthening,Functional mobility training,Endurance training,Gait training,Stair training,Pain management,Safety education & training  Safety Devices  Type of Devices: Nurse notified,Left in bed,Call light within reach,Bed alarm in place,Patient at risk for falls     Restrictions  Restrictions/Precautions  Restrictions/Precautions: Fall Risk  Position Activity Restriction  Other position/activity restrictions:  Up with assist     Subjective   Pain: Pt reports no pain  General  Patient assessed for rehabilitation services?: Yes  Response To Previous Treatment: Not applicable  Family / Caregiver Present: No  Follows Commands: Within Functional Limits  Subjective  Subjective: RN and pt agreeable to PT eval         Social/Functional History  Social/Functional History  Lives With: Son  Type of Home: Apartment  Home Layout: One level  Home Access: Stairs to enter without rails  Entrance Stairs - Number of Steps: 1  Bathroom Shower/Tub: Tub/Shower unit  Bathroom Toilet: Standard  Bathroom Equipment: Shower chair  Bathroom Accessibility: Accessible  Home Equipment: Cane  ADL Assistance: Independent  Homemaking Assistance: Independent  Homemaking Responsibilities: Yes (son completes some IADLs)  Meal Prep Responsibility: Primary  Laundry Responsibility: Primary  Cleaning Responsibility: Primary  Shopping Responsibility: No  Ambulation Assistance: Independent  Transfer Assistance: Independent  Active : No  Mode of Transportation: Cab  Occupation: On disability  Leisure & Hobbies: go shopping  Additional Comments: Son is home at all times  Vision/Hearing  Vision Exceptions: Wears glasses for reading  Hearing: Exceptions to [facility]  Hearing Exceptions: Hard of hearing/hearing concerns    Cognition   Orientation  Overall Orientation Status: Within Functional Limits  Orientation Level: Disoriented to time;Oriented to place;Oriented to situation;Oriented to person  Cognition  Overall Cognitive Status: WFL     Objective   Pulse: 89  Heart Rate Source: Monitor  BP: 132/74  BP Location: Right upper arm  BP Method: Automatic  Patient Position: Semi fowlers  MAP (Calculated): 93.33  Resp: 18  SpO2: 100 %  O2 Device: Nasal cannula     Gross Assessment  AROM: Within functional limits  PROM: Within functional limits  Strength: Within functional limits  Coordination: Within functional limits     AROM RLE (degrees)  RLE AROM: WFL  AROM LLE (degrees)  LLE AROM : WFL  AROM RUE (degrees)  RUE AROM : WFL  AROM LUE (degrees)  LUE AROM : WFL  Strength RLE  Strength RLE: WFL  Strength LLE  Strength LLE: WFL  Strength RUE  Strength RUE: WFL  Strength LUE  Strength LUE: WFL     Bed mobility  Supine to Sit: Minimal assistance  Sit to Supine: Minimal assistance  Scooting: Minimal assistance  Transfers  Sit to Stand: Minimal Assistance  Stand to sit: Minimal Assistance  Ambulation  Surface: level tile  Device: Rolling Walker  Assistance: Minimal assistance  Gait Deviations: Decreased step height;Decreased step length  Distance: amb 50 ft with a RW x min assist  Comments: 2L 02 via nasal cannula during ambulation.      Balance  Posture: Good  Sitting - Static: Good  Sitting - Dynamic: Fair  Standing - Static: Fair  Standing - Dynamic: Fair     OutComes Score     AM-PAC Score  AM-PAC Inpatient Mobility Raw Score : 17 (04/28/22 1118)  AM-PAC Inpatient T-Scale Score : 42.13 (04/28/22 1118)  Mobility Inpatient CMS 0-100% Score: 50.57 (04/28/22 1118)  Mobility Inpatient CMS G-Code Modifier : CK (04/28/22 1118)     Goals  Short Term Goals  Time Frame for Short term goals: 10 visits  Short term goal 1: transfers with SBA  Short term goal 2: amb 150 ft with a RW x SBA  Short term goal 3: ascend/descend 4 steps with SBA  Short term goal 4: to be independent with bed mobility  Patient Goals   Patient goals : Return home     Therapy Time   Individual Concurrent Group Co-treatment   Time In  0820         Time Out  0845         Minutes  25             1 of [facility],

## 2022-04-28 NOTE — CARE COORDINATION
Case Management Initial Discharge Plan  [facility],             Met with:patient to discuss discharge plans. Information verified: address, contacts, phone number, , insurance Yes  Insurance Provider: [address] [place] [address]    Emergency Contact/Next of Kin name & number:   Amisha Payne (son) [phone]  Who are involved in patient's support system? son    PCP: Ivon Phillips CNP Date of last visit: 3-      Discharge Planning    Living Arrangements:  son    Home has 1 story  0 stairs to climb to get into front door    Patient able to perform ADL's:Assisted by son    Current Services (outpatient & in home) none  DME equipment: cane, walker, shower chair, O2@ 2L at night  DME provider: [facility] for O2    Is patient receiving oral anticoagulation therapy? No    If indicated:   Physician managing anticoagulation treatment: n/a  Where does patient obtain lab work for ATC treatment? n/a    Does patient have any issues/concerns obtaining medications? No  If yes, what are patient's concerns? Is there a preferred Pharmacy after hours or on weekends? Yes    If yes, which pharmacy? [facility]     Potential Assistance Needed:       Patient agreeable to home care: No  [place] of choice provided:  n/a    Prior SNF/Rehab Placement and Facility: no  Agreeable to SNF/Rehab: No  [place] of choice provided: n/a     Evaluation: n/a    Expected Discharge date:       Patient expects to be discharged to:  son    If home: is the family and/or caregiver wiling & able to provide support at home? yes  Who will be providing this support? son  Follow Up Appointment: Best Day/ Time:      Transportation provider: medical cab  Transportation arrangements needed for discharge: Yes    Readmission Risk              Risk of Unplanned Readmission:  15             Does patient have a readmission risk score greater than 14?: Yes  If yes, follow-up appointment must be made within 7 days of discharge.      Goals of Care:       Educated patient on transitional options, provided freedom of choice and are agreeable with plan      Discharge Plan: Home with support from son. Declines home care. Called and notified Sheng Sharma in respiratory that patient is on home O2 @ 2L at night (also called and confirmed this with [facility]).           Electronically signed by General Charlie RN on 4/28/22 at 3:50 PM EDT

## 2022-04-28 NOTE — PROGRESS NOTES
[facility]  Office: [address], DO, Smith Eulaliosar, DO, Alessandro Narrow, DO, Gaurav Samuel Blood, DO, Martita Kelly MD, Shoaib Baker MD, Kwaku Spangler MD, Lisa Payne MD, Rea Pimentel MD, Kenneth Hall MD, Christina Ling MD, Ricardo Esteves, DO, David Coe, DO, Gera Chan MD,  Anita Fletcher, DO, Myla Fraga MD, Sariah Hood MD, Smooth Ellis MD, Anibal Goodson, DO, Huey Armendariz MD, Melva Guzman MD, Lb Ellison, [facility], [facility], CNP, Lyndsey Mcleod, CNP, Nicki Tran, CNP, Brian Peter, CNP, Joe Dixon, CNP, Rodolfo Florian, PA-C, Lukas Cordon, DNP, Heron Gamble, CNP, Janice Chatman, CNP, Andreina Castellano, CNP, Laura Councilman, CNS, Roxanne Child, [facility], Romaine Escoto, CNP, Temo Duncan, CNP, Lili Mccray, [facility] 19    Progress Note    4/28/2022    11:36 AM    Name:   Sofia Valle  MRN:     [id number]     [place]:      [de-identified]   Room:   [facility]/4669-60   Day:  1  Admit Date:  4/27/2022  2:50 PM    PCP:   Puneet Rodríguez MD  Code Status:  Full Code    Subjective:     C/C:   Chief Complaint   Patient presents with   Katerin Stabs Other     states sent by cab for dry mouth that started yesterday    Abdominal Pain     reports abd pain, denies N/V/D/C, last BM yesterday    Cough     states productive cough today     Interval History Status: improved. Pt is resting in bed napping. She wakes up to voice. She denies any fever, chills, abdominal pain or nausea. She ate breakfast today. Son just left. Brief History:     Per the Nocturnist:  \"70 year old with underlying copd/chronic bronchitis, UTis who presented with 1 day of non radiating 5/10 intermittent lower abdominal cramps associated with nausea. UTI last week on keflex but reported no improvement in symptoms.  Denies vomiting, fever, flank pain, headache, myalgias.      Moreover, patient reports wet productive cough that has been similar for months. Denies pleuritic chest pain, SOB, nasal congestion, sore throat, diarrhea or constipation. Reports sick contact with some of the same symptoms. Says she has hx of chronic bronchitis, copd and reports still smoking cigarettes. Denies any leg swelling/pain or swelling.      Upon arrival to ED, patient was tachycardic, RR 22, UA positive, CT abdomen pelvis and CXR unrevealing. \"    Review of Systems:     Constitutional:  negative for chills, fevers, sweats  Respiratory:  negative for cough, dyspnea on exertion, shortness of breath, wheezing  Cardiovascular:  negative for chest pain, chest pressure/discomfort, lower extremity edema, palpitations  Gastrointestinal:  negative for abdominal pain, constipation, diarrhea, nausea, vomiting  Neurological:  negative for dizziness, headache    Medications:      Allergies:  No Known Allergies    Current Meds:   Scheduled Meds:    atorvastatin  20 mg Oral Daily    haloperidol  5 mg Oral BID    tiotropium  1 puff Inhalation Daily    traZODone  100 mg Oral Nightly    sodium chloride flush  5-40 mL IntraVENous 2 times per day    enoxaparin  40 mg SubCUTAneous Daily    cefTRIAXone (ROCEPHIN) IV  1,000 mg IntraVENous Q24H     Continuous Infusions:    sodium chloride      sodium chloride 75 mL/hr at 04/27/22 2323     PRN Meds: sodium chloride flush, sodium chloride, ondansetron **OR** ondansetron, acetaminophen **OR** acetaminophen, polyethylene glycol, albuterol, benzonatate, oxyCODONE    Data:     Past Medical History:   has a past medical history of Arthritis, Bronchitis, Chronic obstructive pulmonary disease ([facility] [address] Colon polyps, Controlled type 2 diabetes mellitus without complication, without long-term current use of insulin ([facility] [address] Dental neglect, Depression, Environmental allergies, GERD (gastroesophageal reflux disease), Hyperlipidemia, Hypertension, Obesity, Onychomycosis, Poor historian, RBBB, Treadmill stress test negative for angina pectoris, and Wears glasses. Social History:   reports that she quit smoking about 6 years ago. Her smoking use included cigarettes. She has a 17.50 pack-year smoking history. She has never used smokeless tobacco. She reports that she does not drink alcohol and does not use drugs. Family History:   Family History   Problem Relation Age of Onset    Heart Disease Mother     Cancer Father        Vitals:  /74   Pulse 89   Temp 98.6 °F (37 °C) (Oral)   Resp 18   Ht 5' 5\" (1.651 m)   Wt 179 lb (81.2 kg)   SpO2 100%   BMI 29.79 kg/m²   Temp (24hrs), Av.1 °F (36.7 °C), Min:97.7 °F (36.5 °C), Max:98.6 °F (37 °C)    No results for input(s): POCGLU in the last 72 hours. I/O (24Hr):     Intake/Output Summary (Last 24 hours) at 2022 1136  Last data filed at 2022 2311  Gross per 24 hour   Intake --   Output 600 ml   Net -600 ml       Labs:  Hematology:  Recent Labs     22  1527   WBC 6.6   RBC 4.12   HGB 12.0   HCT 38.7   MCV 93.9   MCH 29.1   MCHC 31.0   RDW 13.2      MPV 9.1   SEDRATE 18     Chemistry:  Recent Labs     22  1527 22  1657     --    K 4.0  --      --    CO2 27  --    GLUCOSE 96  --    BUN 7*  --    CREATININE 0.66  --    MG 2.1  --    ANIONGAP 11  --    LABGLOM >60  --    GFRAA >60  --    CALCIUM 9.6  --    TROPHS 11 10     Recent Labs     22  1527 22  1657   PROT 7.1  --    LABALBU 4.1  --    TSH  --  1.02   AST 19  --    ALT 15  --    ALKPHOS 129*  --    BILITOT 0.35  --    BILIDIR 0.08  --    LIPASE 14  --      ABG:No results found for: POCPH, PHART, PH, POCPCO2, VVE5QPX, PCO2, POCPO2, PO2ART, PO2, POCHCO3, PMF2ZUH, HCO3, NBEA, PBEA, BEART, BE, THGBART, THB, BCF4CVD, QUOE9ACK, A2FDOXRF, O2SAT, FIO2  Lab Results   Component Value Date/Time    SPECIAL R FA 10 ML 2022 09:17 PM    SPECIAL L HAND 7 ML 2022 09:17 PM     Lab Results   Component Value Date/Time    CULTURE NO GROWTH 12 HOURS 2022 09:17 PM    CULTURE NO GROWTH 12 HOURS 04/27/2022 09:17 PM       Radiology:  XR CHEST PORTABLE    Result Date: 4/27/2022  No acute cardiopulmonary disease       Physical Examination:        General appearance:  alert, cooperative and no distress  Mental Status:  oriented to person, place and time and normal affect  Lungs:  clear to auscultation bilaterally, normal effort  Heart:  regular rate and rhythm, no murmur  Abdomen:  soft, nontender, obese, normal bowel sounds, no masses, hepatomegaly, splenomegaly  Extremities:  no edema, redness, tenderness in the calves  Skin:  no gross lesions, rashes, induration    Assessment:        Hospital Problems           Last Modified POA    * (Principal) UTI (urinary tract infection) 4/27/2022 Yes    GERD (gastroesophageal reflux disease) 4/28/2022 Yes    Depression 4/28/2022 Yes    Chronic obstructive pulmonary disease ([facility] [address] 4/28/2022 Yes    Smoker 4/28/2022 Yes    Cognitive and behavioral changes 4/28/2022 Yes          Plan:        1. Acute UTI - on IV Rocephin, await UC, bladder scan  2. URI? Tessalon perles prn, Covid negative, Influenza negative, CXR negative  3. COPD- stable, inhalers, continues to smoke intermittently, on RA  4. Hx RBBB  5. Depression- Haldol, Trazodone qhs   6. Tobacco- encouraged cessation  7. Gi/ DVT proph  8. PT/OT    DC planning tomorrow?       Flo Harmon MD  4/28/2022  11:36 AM

## 2022-04-28 NOTE — FLOWSHEET NOTE
04/28/22 1121   Encounter Summary   Encounter Overview/Reason  Volunteer Encounter   Service Provided For: Patient   Referral/Consult From: Rounding   Last Encounter    (4/28/2022)   Complexity of Encounter Low   Begin Time 1000   End Time  1010   Total Time Calculated 10 min   Spiritual/Emotional needs   Type Spiritual Support    provided listening presence, words of comfort and prayer.       04/28/22 1121   Encounter Summary   Encounter Overview/Reason  Volunteer Encounter   Service Provided For: Patient   Referral/Consult From: Rounding   Last Encounter    (4/28/2022)   Complexity of Encounter Low   Begin Time 1000   End Time  1010   Total Time Calculated 10 min   Spiritual/Emotional needs   Type Spiritual Support

## 2022-04-29 VITALS
HEIGHT: 65 IN | OXYGEN SATURATION: 97 % | BODY MASS INDEX: 29.82 KG/M2 | WEIGHT: 179 LBS | TEMPERATURE: 97.7 F | RESPIRATION RATE: 20 BRPM | SYSTOLIC BLOOD PRESSURE: 128 MMHG | HEART RATE: 84 BPM | DIASTOLIC BLOOD PRESSURE: 58 MMHG

## 2022-04-29 LAB
EKG ATRIAL RATE: 78 BPM
EKG P AXIS: 80 DEGREES
EKG P-R INTERVAL: 120 MS
EKG Q-T INTERVAL: 470 MS
EKG QRS DURATION: 112 MS
EKG QTC CALCULATION (BAZETT): 535 MS
EKG R AXIS: -6 DEGREES
EKG T AXIS: 33 DEGREES
EKG VENTRICULAR RATE: 78 BPM

## 2022-04-29 PROCEDURE — 99238 HOSP IP/OBS DSCHRG MGMT 30/<: CPT | Performed by: INTERNAL MEDICINE

## 2022-04-29 PROCEDURE — 94640 AIRWAY INHALATION TREATMENT: CPT

## 2022-04-29 PROCEDURE — 93010 ELECTROCARDIOGRAM REPORT: CPT | Performed by: INTERNAL MEDICINE

## 2022-04-29 PROCEDURE — 51798 US URINE CAPACITY MEASURE: CPT

## 2022-04-29 PROCEDURE — G0378 HOSPITAL OBSERVATION PER HR: HCPCS

## 2022-04-29 PROCEDURE — 6360000002 HC RX W HCPCS: Performed by: NURSE PRACTITIONER

## 2022-04-29 PROCEDURE — 6370000000 HC RX 637 (ALT 250 FOR IP): Performed by: NURSE PRACTITIONER

## 2022-04-29 PROCEDURE — 97116 GAIT TRAINING THERAPY: CPT

## 2022-04-29 PROCEDURE — 97110 THERAPEUTIC EXERCISES: CPT

## 2022-04-29 PROCEDURE — 96372 THER/PROPH/DIAG INJ SC/IM: CPT

## 2022-04-29 PROCEDURE — 97530 THERAPEUTIC ACTIVITIES: CPT

## 2022-04-29 PROCEDURE — 94761 N-INVAS EAR/PLS OXIMETRY MLT: CPT

## 2022-04-29 RX ADMIN — ENOXAPARIN SODIUM 40 MG: 100 INJECTION SUBCUTANEOUS at 09:59

## 2022-04-29 RX ADMIN — HALOPERIDOL 5 MG: 5 TABLET ORAL at 09:59

## 2022-04-29 RX ADMIN — TIOTROPIUM BROMIDE INHALATION SPRAY 1 PUFF: 3.12 SPRAY, METERED RESPIRATORY (INHALATION) at 08:06

## 2022-04-29 RX ADMIN — ATORVASTATIN CALCIUM 20 MG: 20 TABLET, FILM COATED ORAL at 09:59

## 2022-04-29 NOTE — DISCHARGE SUMMARY
[facility]  Office: [address], DO, Xochitl Janus, DO, Shaw Wade, DO, Abigail Miles, DO, Valiant Sicard, MD, Paula Han MD, Scar Younger MD, Sienna Hernandez MD, Steven Guzman MD, Julio Saravia MD, David Wheeler MD, Triston Vu, DO, Lana Collado, DO, Raisa Barragan MD,  Homero Valdez, DO, Sabine Osman MD, Valentin Almendarez MD, Junito Garcia MD, Jason Thomas DO, Edgardo Mendenhall MD, Sujey Messer MD, Diego Putnam, [facility], [facility], CNP, Kindra Acevedo, CNP, Aurelia Guillaume, CNP, Giovani Aviles, CNP, Abraham James, CNP, Randall Overton PA-C, Homero Whitfield, [facility], Aleida Hernandez, CNP, Micky Sylvester, CNP, Sabrina Truong, CNP, Mary Nassar, CNS, Kyree Rowe, [facility], Yossi Jimenez, CNP, Cosmo Hilton, CNP, Naveen Burroughs, [facility] 19    Discharge Summary     Patient ID: Montse Candelaria  :  1950   MRN: [id number]     ACCOUNT:  [de-identified]   Patient's PCP: Sonny Espinosa Date: 2022   Discharge Date: 2022     Length of Stay: 2  Code Status:  Full Code  Admitting Physician: Jeancarlos Araujo MD  Discharge Physician: Jeancarlos Araujo MD     Active Discharge Diagnoses:     Hospital Problem Lists:  Principal Problem:    UTI (urinary tract infection)  Active Problems:    GERD (gastroesophageal reflux disease)    Depression    Chronic obstructive pulmonary disease (HCC)    Smoker    Cognitive and behavioral changes  Resolved Problems:    * No resolved hospital problems. *      Admission Condition:  poor     Discharged Condition: fair    Hospital Stay:     Hospital Course:   Montse Candelaria is a 66 y.o. female who was admitted for the management of   UTI (urinary tract infection) , presented to ER with *Other (states sent by cab for dry mouth that started yesterday), Abdominal Pain (reports abd pain, denies N/V/D/C, last BM yesterday), and Cough (states productive cough today)    Per the Chapinist:  \"70 year old with underlying copd/chronic bronchitis, UTis who presented with 1 day of non radiating 5/10 intermittent lower abdominal cramps associated with nausea. UTI last week on keflex but reported no improvement in symptoms. Denies vomiting, fever, flank pain, headache, myalgias.      Moreover, patient reports wet productive cough that has been similar for months. Denies pleuritic chest pain, SOB, nasal congestion, sore throat, diarrhea or constipation. Reports sick contact with some of the same symptoms. Says she has hx of chronic bronchitis, copd and reports still smoking cigarettes. Denies any leg swelling/pain or swelling.      Upon arrival to ED, patient was tachycardic, RR 22, UA positive, CT abdomen pelvis and CXR unrevealing. Assessment/ Plan       1. Acute UTI - stop IV Rocephin, Urine culture is negative, UA is contaminated with epithelials  1. URI? Tessalon perles prn, Covid negative, Influenza negative, CXR negative, doubt URI  2. COPD- stable, inhalers, continues to smoke intermittently, on RA  3. Hx RBBB  4. Depression- Haldol, Trazodone qhs   5. Tobacco- encouraged cessation  6. MRDD?  - patient is very slow cognitively  7. Gi/ DVT proph  8. PT/OT     Okay to [place] home with home care, she lives with son    Significant therapeutic interventions:  Tessalon perles, antibiotics    Significant Diagnostic Studies:   Labs / Micro:  UC- negative    Radiology:  XR CHEST PORTABLE    Result Date: 4/27/2022  No acute cardiopulmonary disease       Consultations:    Consults:     Final Specialist Recommendations/Findings:   IP CONSULT TO HOSPITALIST  IP CONSULT TO SOCIAL WORK      The patient was seen and examined on day of discharge and this discharge summary is in conjunction with any daily progress note from day of discharge.     Discharge plan:     Disposition: Home    Physician Follow Up:     PCP - Dr. Nydia Stevenson in  week    Diet: regular diet    Activity: As tolerated    Instructions to Patient: Follow up with PCP    Discharge Medications:      Medication List      CONTINUE taking these medications    ACE Arm Sling Misc  Apply 1 Units topically daily     acetaminophen 500 MG tablet  Commonly known as: TYLENOL  Take 2 tablets by mouth every 8 hours as needed for Pain     albuterol sulfate  (90 Base) MCG/ACT inhaler     atorvastatin 20 MG tablet  Commonly known as: Lipitor  Take 1 tablet by mouth daily     ciclopirox 0.77 % cream  Commonly known as: LOPROX     haloperidol 5 MG tablet  Commonly known as: HALDOL     ibuprofen 600 MG tablet  Commonly known as: ADVIL;MOTRIN     tiotropium 1.25 MCG/ACT Aers inhaler  Commonly known as: Spiriva Respimat  Inhale 2 puffs into the lungs daily     traZODone 100 MG tablet  Commonly known as: DESYREL     trihexyphenidyl 2 MG tablet  Commonly known as: ARTANE        STOP taking these medications    SOLU-Medrol 125 MG injection  Generic drug: methylPREDNISolone sodium            No discharge procedures on file. Time Spent on discharge is  20 mins in patient examination, evaluation, counseling as well as medication reconciliation, prescriptions for required medications, discharge plan and follow up. Electronically signed by   Ewa Baker MD  4/29/2022  3:59 PM      Thank you Dr. Rm Allison for the opportunity to be involved in this patient's care.

## 2022-04-29 NOTE — CARE COORDINATION
Transition planning  Dr. Susy Chu here to see patient, plan is to discharge patient to home with son and home care, if agreeable. Called and spoke with patient's son Ashley Ward [phone], updated on plan to discharge patient with home care, pt's son is agreeable to any home care that is in network with [facility]. He states patient will need cab ride to [address] today. He states he is in the process of moving and his Mom will be living with him at [facility]. Rik Sparrow 98. Multiple referrals sent to home care companies. 1630 Received call from [address] [place] at [facility] care she states they are able to accept patient, updated patient is discharging today, verified they have pt's son Miller's correct phone number [phone]. Called and updated pt's son Ashley Ward that cab ride will be set up for patient at 5:30 pm and take patient to [address], he is agreeable. Patient updated.

## 2022-04-29 NOTE — CARE COORDINATION
Discharge [facility] Case Management Department  Written by: Camryn Calvin RN    Patient Name: Veronica Qureshi  Attending Provider: Reyna Gayle MD  Admit Date: 2022  2:50 PM  MRN: [id number]  Account: [de-identified]                     : 1950  Discharge Date: 2022      Disposition: home with son and [facility] care    Camryn Calvin RN

## 2022-04-29 NOTE — PLAN OF CARE
Problem: Discharge Planning  Goal: Discharge to home or other facility with appropriate resources  Outcome: Completed     Problem: Safety - Adult  Goal: Free from fall injury  Outcome: Completed     Problem: Chronic Conditions and Co-morbidities  Goal: Patient's chronic conditions and co-morbidity symptoms are monitored and maintained or improved  Outcome: Completed

## 2022-04-29 NOTE — PROGRESS NOTES
[facility]  Office: [address], DO, Valentina Michael, DO, Adrienne Jeff, DO, Renusergey Miles, DO, Batool Lua MD, Peri Fajardo MD, Vasile Osman MD, Sj Madera MD, Yannick Trevino MD, Tammy Carey MD, Susannah Decker MD, Kia Tobar, DO, Aleksey Amos, DO, Mara Bolton MD,  Elvira Eubanks, DO, Wolfgang Morris MD, Bridgett Mcdonald MD, Jackeline Vyas MD, Harpreet Corona DO, Belen Paniagua MD, Selina Brown MD, Ayaz Gan [facility], [facility], CNP, Ramonita Martinez, CNP, Terrie Guerrero, CNP, Ivan Barrera, [facility], Martha Simon, CNP, ALYSSA GomezC, Calin Shoemaker, [facility], Saravanan Pearl, CNP, Jeremías Kulkarni, CNP, Geetha Cuellar, CNP, Mason Palma, CNS, Malka Ulrich, [facility], Sergey Silva, CNP, Karine Obrien, CNP, Gopi Lion, CNP         [place] [place] [address]    Progress Note    4/29/2022    3:46 PM    Name:   Taylor Martini  MRN:     [id number]     Acct:      [de-identified]   Room:   [facility]7261-20   Day:  2  Admit Date:  4/27/2022  2:50 PM    PCP:   Vern Anaya  Code Status:  Full Code    Subjective:     C/C:   Chief Complaint   Patient presents with   Blase Liming Other     states sent by cab for dry mouth that started yesterday    Abdominal Pain     reports abd pain, denies N/V/D/C, last BM yesterday    Cough     states productive cough today     Interval History Status: improved. Pt is sitting in a chair. No fevers. Cough is the same. She has a diminished appetite. Brief History:     Per the Nocturnist:  \"70 year old with underlying copd/chronic bronchitis, UTis who presented with 1 day of non radiating 5/10 intermittent lower abdominal cramps associated with nausea. UTI last week on keflex but reported no improvement in symptoms. Denies vomiting, fever, flank pain, headache, myalgias.      Moreover, patient reports wet productive cough that has been similar for months.  Denies pleuritic chest pain, SOB, nasal congestion, sore throat, diarrhea or constipation. Reports sick contact with some of the same symptoms. Says she has hx of chronic bronchitis, copd and reports still smoking cigarettes. Denies any leg swelling/pain or swelling.      Upon arrival to ED, patient was tachycardic, RR 22, UA positive, CT abdomen pelvis and CXR unrevealing. \"    Review of Systems:     Constitutional:  negative for chills, fevers, sweats  Respiratory:  Negative for dyspnea on exertion, shortness of breath, wheezing, +cough  Cardiovascular:  negative for chest pain, chest pressure/discomfort, lower extremity edema, palpitations  Gastrointestinal:  negative for abdominal pain, constipation, diarrhea, nausea, vomiting  Neurological:  negative for dizziness, headache    Medications: Allergies:  No Known Allergies    Current Meds:   Scheduled Meds:    atorvastatin  20 mg Oral Daily    haloperidol  5 mg Oral BID    tiotropium  1 puff Inhalation Daily    traZODone  100 mg Oral Nightly    sodium chloride flush  5-40 mL IntraVENous 2 times per day    enoxaparin  40 mg SubCUTAneous Daily     Continuous Infusions:    sodium chloride      sodium chloride 75 mL/hr at 04/29/22 0720     PRN Meds: sodium chloride flush, sodium chloride, ondansetron **OR** ondansetron, acetaminophen **OR** acetaminophen, polyethylene glycol, albuterol, benzonatate, oxyCODONE    Data:     Past Medical History:   has a past medical history of Arthritis, Bronchitis, Chronic obstructive pulmonary disease ([address] Colon polyps, Controlled type 2 diabetes mellitus without complication, without long-term current use of insulin ([facility] [address] Dental neglect, Depression, Environmental allergies, GERD (gastroesophageal reflux disease), Hyperlipidemia, Hypertension, Obesity, Onychomycosis, Poor historian, RBBB, Treadmill stress test negative for angina pectoris, and Wears glasses. Social History:   reports that she quit smoking about 6 years ago. Her smoking use included cigarettes.  She has a 17.50 pack-year smoking history. She has never used smokeless tobacco. She reports that she does not drink alcohol and does not use drugs. Family History:   Family History   Problem Relation Age of Onset    Heart Disease Mother     Cancer Father        Vitals:  BP (!) 128/58   Pulse 84   Temp 97.7 °F (36.5 °C) (Oral)   Resp 20   Ht 5' 5\" (1.651 m)   Wt 179 lb (81.2 kg)   SpO2 97%   BMI 29.79 kg/m²   Temp (24hrs), Av.2 °F (36.8 °C), Min:97.7 °F (36.5 °C), Max:98.7 °F (37.1 °C)    No results for input(s): POCGLU in the last 72 hours. I/O (24Hr):     Intake/Output Summary (Last 24 hours) at 2022 1546  Last data filed at 2022 0720  Gross per 24 hour   Intake 2005.95 ml   Output 350 ml   Net 1655.95 ml       Labs:  Hematology:  Recent Labs     22  1527   WBC 6.6   RBC 4.12   HGB 12.0   HCT 38.7   MCV 93.9   MCH 29.1   MCHC 31.0   RDW 13.2      MPV 9.1   SEDRATE 18     Chemistry:  Recent Labs     22  1527 22  1657     --    K 4.0  --      --    CO2 27  --    GLUCOSE 96  --    BUN 7*  --    CREATININE 0.66  --    MG 2.1  --    ANIONGAP 11  --    LABGLOM >60  --    GFRAA >60  --    CALCIUM 9.6  --    TROPHS 11 10     Recent Labs     22  1527 22  1657   PROT 7.1  --    LABALBU 4.1  --    TSH  --  1.02   AST 19  --    ALT 15  --    ALKPHOS 129*  --    BILITOT 0.35  --    BILIDIR 0.08  --    LIPASE 14  --      ABG:No results found for: POCPH, PHART, PH, POCPCO2, BHS4WID, PCO2, POCPO2, PO2ART, PO2, POCHCO3, MKK1JFP, HCO3, NBEA, PBEA, BEART, BE, THGBART, THB, JTC5FLS, ZCFI4GFE, E3PJNJCM, O2SAT, FIO2  Lab Results   Component Value Date/Time    SPECIAL R FA 10 ML 2022 09:17 PM    SPECIAL L HAND 7 ML 2022 09:17 PM     Lab Results   Component Value Date/Time    CULTURE NO GROWTH 1 DAY 2022 09:17 PM    CULTURE NO GROWTH 1 DAY 2022 09:17 PM       Radiology:  XR CHEST PORTABLE    Result Date: 2022  No acute cardiopulmonary disease       Physical Examination:        General appearance:  alert, cooperative and no distress  Mental Status:  oriented to person, place and time and normal affect  Lungs:  clear to auscultation bilaterally, normal effort  Heart:  regular rate and rhythm, no murmur  Abdomen:  soft, nontender, obese, normal bowel sounds, no masses, hepatomegaly, splenomegaly  Extremities:  no edema, redness, tenderness in the calves  Skin:  no gross lesions, rashes, induration    Assessment:        Hospital Problems           Last Modified POA    * (Principal) UTI (urinary tract infection) 4/27/2022 Yes    GERD (gastroesophageal reflux disease) 4/28/2022 Yes    Depression 4/28/2022 Yes    Chronic obstructive pulmonary disease ([address] 4/28/2022 Yes    Smoker 4/28/2022 Yes    Cognitive and behavioral changes 4/28/2022 Yes          Plan:        1. Acute UTI - stop IV Rocephin, Urine culture is negative, UA is contaminated with epithelials  2. URI? Tessalon perles prn, Covid negative, Influenza negative, CXR negative, doubt URI  3. COPD- stable, inhalers, continues to smoke intermittently, on RA  4. Hx RBBB  5. Depression- Haldol, Trazodone qhs   6. Tobacco- encouraged cessation  7. MRDD?  - patient is very slow cognitively  8. Gi/ DVT proph  9.  PT/OT    Okay to [place] home with home care, she lives with son    Ivette Kulkarni MD  4/29/2022  3:46 PM

## 2022-04-29 NOTE — DISCHARGE INSTR - COC
Continuity of Care Form    Patient Name: Smith Alejandro   :  1950  MRN:  [id number]    Admit date:  2022  Discharge date:  22    Code Status Order: Full Code   Advance Directives:      Admitting Physician:  Ernestina Mcbride MD  PCP: Ildefonso Flores    Discharging Nurse: [address] [place] [address] Unit/Room#: 6652/1508-09  Discharging Unit Phone Number: [phone]    Emergency Contact:   Extended Emergency Contact Information  Primary Emergency Contact: Sanfordterell Frederick  Address: Kam Gutierrez [address] Phone: [phone]  Relation: Child  [facility] Phone: [phone]  Relation: Child    Past Surgical History:  Past Surgical History:   Procedure Laterality Date    CHOLECYSTECTOMY      COLONOSCOPY  2013    one hyperplastic polyp    DOPPLER ECHOCARDIOGRAPHY      cardiac    TUBAL LIGATION         Immunization History:   Immunization History   Administered Date(s) Administered    Influenza 2012, 10/09/2013    Influenza Virus Vaccine 2014, 10/06/2015    Influenza, High Dose (Fluzone 65 yrs and older) 11/15/2011    Influenza, Quadv, IM, (6 mo and older Fluzone, Flulaval, Fluarix and 3 yrs and older Afluria) 2016, 2017    Influenza, Quadv, IM, PF (6 mo and older Fluzone, Flulaval, Fluarix, and 3 yrs and older Afluria) 2019    Influenza, Triv, inactivated, subunit, adjuvanted, IM (Fluad 65 yrs and older) 10/11/2018    Pneumococcal Conjugate 13-valent ([id number]) 2016    Pneumococcal Polysaccharide ([id number]) 2013, 2018    Tdap (Boostrix, Adacel) 2016       Active Problems:  Patient Active Problem List   Diagnosis Code    GERD (gastroesophageal reflux disease) K21.9    Hypertension I10    Hyperlipidemia E78.5    Depression F32. A    Bronchitis J40    HTN (hypertension) I10    Osteoarthritis M19.90    Osteoarthritis of right knee M17.11    Closed nondisplaced fracture of head of right radius V66.328L    Chronic obstructive pulmonary disease (HCC) J44.9    Smoker F17.200    Chronic pain of left knee M25.562, G89.29    Pre-diabetes R73.03    Falls frequently R29.6    Cognitive and behavioral changes R41.89, I67.39    Toxic metabolic encephalopathy W84.6    Controlled type 2 diabetes mellitus without complication, without long-term current use of insulin ([facility]) E11.9    UTI (urinary tract infection) N39.0       Isolation/Infection:   Isolation            No Isolation          Patient Infection Status       Infection Onset Added Last Indicated Last Indicated By Review Planned Expiration Resolved Resolved By    None active    Resolved    COVID-19 (Rule Out) 04/27/22 04/27/22 04/27/22 COVID-19, Rapid (Ordered)   04/27/22 Rule-Out Test Resulted    COVID-19 (Rule Out) 05/25/21 05/25/21 05/25/21 COVID-19, Rapid (Ordered)   05/25/21 Rule-Out Test Resulted            Nurse Assessment:  Last Vital Signs: BP (!) 128/58   Pulse 84   Temp 97.7 °F (36.5 °C) (Oral)   Resp 20   Ht 5' 5\" (1.651 m)   Wt 179 lb (81.2 kg)   SpO2 97%   BMI 29.79 kg/m²     Last documented pain score (0-10 scale):    Last Weight:   Wt Readings from Last 1 Encounters:   04/27/22 179 lb (81.2 kg)     Mental Status:  oriented and alert    IV Access:  - None    Nursing Mobility/ADLs:  Walking   Assisted  Transfer  Assisted  Bathing  Assisted  Dressing  Assisted  Toileting  Assisted  Feeding  Independent  Med Admin  Assisted  Med Delivery   whole    Wound Care Documentation and Therapy:        Elimination:  Continence: Bowel: Yes  Bladder: Yes  Urinary Catheter: None   Colostomy/Ileostomy/Ileal Conduit: No       Date of Last BM: 4/28/22    Intake/Output Summary (Last 24 hours) at 4/29/2022 1555  Last data filed at 4/29/2022 0720  Gross per 24 hour   Intake 2005.95 ml   Output 350 ml   Net 1655.95 ml     I/O last 3 completed shifts:  In: -   Out: 950 [Urine:950]    Safety Concerns:      At Risk for Falls    Impairments/Disabilities: None    Nutrition Therapy:  Current Nutrition Therapy:   - Oral Diet:  General    Routes of Feeding: Oral  Liquids: No Restrictions  Daily Fluid Restriction: no  Last Modified Barium Swallow with Video (Video Swallowing Test): not done    Treatments at the Time of Hospital Discharge:   Respiratory Treatments: ***  Oxygen Therapy:  is on oxygen at 2 L/min per nasal cannula. PRN Nightly  Ventilator:    - No ventilator support    Rehab Therapies: Physical Therapy and Occupational Therapy  Weight Bearing Status/Restrictions: No weight bearing restrictions  Other Medical Equipment (for information only, NOT a DME order):  walker  Other Treatments: ***    Patient's personal belongings (please select all that are sent with patient):  None    RN SIGNATURE:  Electronically signed by Angie Mendoza RN on 4/29/22 at 4:50 PM EDT    CASE MANAGEMENT/SOCIAL WORK SECTION    Inpatient Status Date: ***    Readmission Risk Assessment Score:  Readmission Risk              Risk of Unplanned Readmission:  15           Discharging to Facility/ Agency     [place] [address] Care Details            [address] [place] [address]       Phone: [phone]       Fax: [phone]              / signature: Electronically signed by Say Russo RN on 4/29/22 at 4:40 PM EDT    PHYSICIAN SECTION    Prognosis: Fair    Condition at Discharge: Stable    Rehab Potential (if transferring to Rehab): Good    Recommended Labs or Other Treatments After Discharge:     Physician Certification: I certify the above information and transfer of Kaylan Barrow  is necessary for the continuing treatment of the diagnosis listed and that she requires Home Care for less 30 days.      Update Admission H&P: No change in H&P    PHYSICIAN SIGNATURE:  Electronically signed by Chai Lux MD on 4/29/22 at 3:56 PM EDT

## 2022-04-29 NOTE — PROGRESS NOTES
Physical Therapy  Facility/Department: [address] ORTHO/MED SURG  Physical Therapy Daily Treatment Note    Name: Kostas Hines  : 1950  MRN: [id number]  Date of Service: 2022    Discharge Recommendations:  Patient would benefit from continued therapy after discharge          Patient Diagnosis(es): The encounter diagnosis was Acute cystitis without hematuria. Past Medical History:  has a past medical history of Arthritis, Bronchitis, Chronic obstructive pulmonary disease ([facility] [address] Colon polyps, Controlled type 2 diabetes mellitus without complication, without long-term current use of insulin ([facility] [address] Dental neglect, Depression, Environmental allergies, GERD (gastroesophageal reflux disease), Hyperlipidemia, Hypertension, Obesity, Onychomycosis, Poor historian, RBBB, Treadmill stress test negative for angina pectoris, and Wears glasses. Past Surgical History:  has a past surgical history that includes Cholecystectomy; doppler echocardiography; Colonoscopy (2013); and Tubal ligation. Assessment   Body Structures, Functions, Activity Limitations Requiring Skilled Therapeutic Intervention: Decreased functional mobility ; Decreased strength;Decreased endurance  Assessment: Pt had increasing HR t/o therapy with activity from 77 BPM at rest initially to 123 BPM with ambulation; HR decreased to 88-90 BPM with seated rest periods. Pt had c/o dizziness with functional mobility but was able to recover with seated rest periods. The pt ambulated 50 ft with a RW x min assist. Pt ambulated with a decreased stride length and required verbal cues for proper use of the walker. Pt could benefit from a continuation of PT for gait and strengthening following her DC  Therapy Prognosis: Good  Activity Tolerance  Activity Tolerance: Patient limited by fatigue;Patient limited by endurance  Activity Tolerance Comments: Pt had increasing HR during therapy. Initially 77 BPM and increased to 123 BPM being the highest during session. RN aware     Plan   Plan  Plan: 5-7 times per week  Current Treatment Recommendations: Strengthening,Functional mobility training,Endurance training,Gait training,Stair training,Pain management,Safety education & training  Safety Devices  Type of Devices: Gait belt,Call light within reach,Patient at risk for falls,Nurse notified,Left in chair,Chair alarm in place  Restraints  Restraints Initially in Place: No     Restrictions  Restrictions/Precautions  Restrictions/Precautions: Fall Risk  Position Activity Restriction  Other position/activity restrictions: Up with assist     Subjective   General  Chart Reviewed: Yes  Response To Previous Treatment: Patient with no complaints from previous session. Family / Caregiver Present: No  General Comment  Comments: Pt retired to [facility] with call light and chair alarm. RN notified. Subjective  Subjective: RN and pt agreeable to PT treatment. Pt supine upon arival.  Pt is pleasant and cooperative. Cognition   Orientation  Overall Orientation Status: Within Functional Limits  Cognition  Overall Cognitive Status: Exceptions  Arousal/Alertness: Appropriate responses to stimuli  Following Commands: Follows multistep commands consistently  Attention Span: Appears intact  Safety Judgement: Decreased awareness of need for assistance  Problem Solving: Assistance required to identify errors made;Assistance required to generate solutions  Initiation: Requires cues for some  Sequencing: Requires cues for some     Objective   Bed mobility  Supine to Sit: Minimal assistance  Sit to Supine:  ([place] as pt retired in recliner)  Comment: pt became dizzy upon sitting EOB but recovered with rest; assessed with HOB elevated  Transfers  Sit to Stand: Minimal Assistance  Stand to sit: Minimal Assistance  Comment: Assessed to RW; pt became dizzy upon standing but recovered with static standing. HR increased from 77 BPM at rest to 100 with standing.   RN notified and came in room to assist writer due to increased HR during activity  Ambulation  Surface: level tile  Device: Rolling Walker  Assistance: Minimal assistance  Quality of Gait: Constant verbal cues for safety with RW placement and B LE placement. Gait Deviations: Decreased step length;Decreased step height  Distance: 50 ft, seated rest period, 10 ft  Comments: Pt's HR increasing with ambulation to 130. Pt became dizzy after about 25 ft with initial ambulation. Dizziness recovered when sitting EOB. RN notifed of increasing HR during activity     Balance  Posture: Good  Sitting - Static: Good  Sitting - Dynamic: Fair  Standing - Static: Fair  Standing - Dynamic: Fair  Comments: Assessed sitting EOB and ambulating with RW  A/AROM Exercises: Supine therapeutic exercises 2x10 reps B LE ankle pumps, quad sets, heel slides, and hip abd/add with tactile and verbal cues for proper technique. Static Sitting Balance Exercises: Pt sat EOB x 20 minutes t/o session as pt became dizzy and had increasing HR t/o session with activity. RN was notified and came in room to assist with therapy. Static Standing Balance Exercises: B LE x 10 reps LAQ with tactile and verbal cues for technique.         OutComes Score     AM-PAC Score  AM-PAC Inpatient Mobility Raw Score : 16 (04/29/22 1102)  AM-PAC Inpatient T-Scale Score : 40.78 (04/29/22 1102)  Mobility Inpatient CMS 0-100% Score: 54.16 (04/29/22 1102)  Mobility Inpatient CMS G-Code Modifier : CK (04/29/22 1102)          Goals  Short Term Goals  Time Frame for Short term goals: 10 visits  Short term goal 1: transfers with SBA  Short term goal 2: amb 150 ft with a RW x SBA  Short term goal 3: ascend/descend 4 steps with SBA  Short term goal 4: to be independent with bed mobility  Patient Goals   Patient goals : Return home       Therapy Time   Individual Concurrent Group Co-treatment   Time In 1013         Time Out 1118         Minutes 65         Timed Code Treatment Minutes: 40 Minutes Neri Masters

## 2022-05-02 LAB
CULTURE: NORMAL
CULTURE: NORMAL
Lab: NORMAL
Lab: NORMAL
SPECIMEN DESCRIPTION: NORMAL
SPECIMEN DESCRIPTION: NORMAL

## 2022-05-04 RX ORDER — CEPHALEXIN 500 MG/1
CAPSULE ORAL
Qty: 28 CAPSULE | OUTPATIENT
Start: 2022-05-04

## 2022-05-17 ENCOUNTER — HOSPITAL ENCOUNTER (EMERGENCY)
Age: 72
Discharge: HOME | End: 2022-05-17
Attending: EMERGENCY MEDICINE
Payer: COMMERCIAL

## 2022-05-17 ENCOUNTER — APPOINTMENT (OUTPATIENT)
Dept: GENERAL RADIOLOGY | Age: 72
End: 2022-05-17
Payer: COMMERCIAL

## 2022-05-17 VITALS
DIASTOLIC BLOOD PRESSURE: 79 MMHG | SYSTOLIC BLOOD PRESSURE: 139 MMHG | HEART RATE: 92 BPM | OXYGEN SATURATION: 99 % | TEMPERATURE: 98.2 F | RESPIRATION RATE: 13 BRPM

## 2022-05-17 DIAGNOSIS — U07.1 ACUTE COVID-19: Primary | ICD-10-CM

## 2022-05-17 LAB
-: ABNORMAL
ABSOLUTE EOS #: 0.04 K/UL (ref 0–0.44)
ABSOLUTE IMMATURE GRANULOCYTE: 0.03 K/UL (ref 0–0.3)
ABSOLUTE LYMPH #: 0.94 K/UL (ref 1.1–3.7)
ABSOLUTE MONO #: 0.84 K/UL (ref 0.1–1.2)
ALBUMIN SERPL-MCNC: 3.6 G/DL (ref 3.5–5.2)
ALBUMIN/GLOBULIN RATIO: 1.2 (ref 1–2.5)
ALP BLD-CCNC: 145 U/L (ref 35–104)
ALT SERPL-CCNC: 16 U/L (ref 5–33)
ANION GAP SERPL CALCULATED.3IONS-SCNC: 9 MMOL/L (ref 9–17)
AST SERPL-CCNC: 19 U/L
BACTERIA: ABNORMAL
BASOPHILS # BLD: 0 % (ref 0–2)
BASOPHILS ABSOLUTE: <0.03 K/UL (ref 0–0.2)
BILIRUB SERPL-MCNC: 0.26 MG/DL (ref 0.3–1.2)
BILIRUBIN URINE: NEGATIVE
BUN BLDV-MCNC: 9 MG/DL (ref 8–23)
CALCIUM SERPL-MCNC: 9.4 MG/DL (ref 8.6–10.4)
CASTS UA: ABNORMAL /LPF (ref 0–8)
CHLORIDE BLD-SCNC: 103 MMOL/L (ref 98–107)
CO2: 28 MMOL/L (ref 20–31)
COLOR: YELLOW
CREAT SERPL-MCNC: 0.68 MG/DL (ref 0.5–0.9)
D-DIMER QUANTITATIVE: 0.49 MG/L FEU
EOSINOPHILS RELATIVE PERCENT: 0 % (ref 1–4)
EPITHELIAL CELLS UA: ABNORMAL /HPF (ref 0–5)
FLU A ANTIGEN: NEGATIVE
FLU B ANTIGEN: NEGATIVE
GFR AFRICAN AMERICAN: >60 ML/MIN
GFR NON-AFRICAN AMERICAN: >60 ML/MIN
GFR SERPL CREATININE-BSD FRML MDRD: ABNORMAL ML/MIN/{1.73_M2}
GLUCOSE BLD-MCNC: 105 MG/DL (ref 70–99)
GLUCOSE URINE: NEGATIVE
HCT VFR BLD CALC: 34.9 % (ref 36.3–47.1)
HEMOGLOBIN: 10.8 G/DL (ref 11.9–15.1)
IMMATURE GRANULOCYTES: 0 %
KETONES, URINE: NEGATIVE
LACTIC ACID, SEPSIS WHOLE BLOOD: 2 MMOL/L (ref 0.5–1.9)
LEUKOCYTE ESTERASE, URINE: ABNORMAL
LIPASE: 10 U/L (ref 13–60)
LYMPHOCYTES # BLD: 10 % (ref 24–43)
MCH RBC QN AUTO: 29 PG (ref 25.2–33.5)
MCHC RBC AUTO-ENTMCNC: 30.9 G/DL (ref 28.4–34.8)
MCV RBC AUTO: 93.6 FL (ref 82.6–102.9)
MONOCYTES # BLD: 9 % (ref 3–12)
NITRITE, URINE: NEGATIVE
NRBC AUTOMATED: 0 PER 100 WBC
PARTIAL THROMBOPLASTIN TIME: 20.2 SEC (ref 20.5–30.5)
PDW BLD-RTO: 13.8 % (ref 11.8–14.4)
PH UA: 8 (ref 5–8)
PLATELET # BLD: 287 K/UL (ref 138–453)
PMV BLD AUTO: 9.4 FL (ref 8.1–13.5)
POTASSIUM SERPL-SCNC: 3.7 MMOL/L (ref 3.7–5.3)
PRO-BNP: 544 PG/ML
PROTEIN UA: NEGATIVE
RBC # BLD: 3.73 M/UL (ref 3.95–5.11)
RBC UA: ABNORMAL /HPF (ref 0–4)
SARS-COV-2, RAPID: DETECTED
SEG NEUTROPHILS: 81 % (ref 36–65)
SEGMENTED NEUTROPHILS ABSOLUTE COUNT: 7.27 K/UL (ref 1.5–8.1)
SODIUM BLD-SCNC: 140 MMOL/L (ref 135–144)
SPECIFIC GRAVITY UA: 1.02 (ref 1–1.03)
SPECIMEN DESCRIPTION: ABNORMAL
TOTAL PROTEIN: 6.6 G/DL (ref 6.4–8.3)
TROPONIN, HIGH SENSITIVITY: 12 NG/L (ref 0–14)
TROPONIN, HIGH SENSITIVITY: 13 NG/L (ref 0–14)
TURBIDITY: CLEAR
URINE HGB: NEGATIVE
UROBILINOGEN, URINE: NORMAL
WBC # BLD: 9.1 K/UL (ref 3.5–11.3)
WBC UA: ABNORMAL /HPF (ref 0–5)

## 2022-05-17 PROCEDURE — 87804 INFLUENZA ASSAY W/OPTIC: CPT

## 2022-05-17 PROCEDURE — 83690 ASSAY OF LIPASE: CPT

## 2022-05-17 PROCEDURE — 96361 HYDRATE IV INFUSION ADD-ON: CPT

## 2022-05-17 PROCEDURE — 81001 URINALYSIS AUTO W/SCOPE: CPT

## 2022-05-17 PROCEDURE — 87186 SC STD MICRODIL/AGAR DIL: CPT

## 2022-05-17 PROCEDURE — 6370000000 HC RX 637 (ALT 250 FOR IP): Performed by: STUDENT IN AN ORGANIZED HEALTH CARE EDUCATION/TRAINING PROGRAM

## 2022-05-17 PROCEDURE — 71045 X-RAY EXAM CHEST 1 VIEW: CPT

## 2022-05-17 PROCEDURE — 96360 HYDRATION IV INFUSION INIT: CPT

## 2022-05-17 PROCEDURE — 93005 ELECTROCARDIOGRAM TRACING: CPT | Performed by: STUDENT IN AN ORGANIZED HEALTH CARE EDUCATION/TRAINING PROGRAM

## 2022-05-17 PROCEDURE — 84484 ASSAY OF TROPONIN QUANT: CPT

## 2022-05-17 PROCEDURE — 80053 COMPREHEN METABOLIC PANEL: CPT

## 2022-05-17 PROCEDURE — 99285 EMERGENCY DEPT VISIT HI MDM: CPT

## 2022-05-17 PROCEDURE — 85379 FIBRIN DEGRADATION QUANT: CPT

## 2022-05-17 PROCEDURE — 2580000003 HC RX 258: Performed by: STUDENT IN AN ORGANIZED HEALTH CARE EDUCATION/TRAINING PROGRAM

## 2022-05-17 PROCEDURE — 87635 SARS-COV-2 COVID-19 AMP PRB: CPT

## 2022-05-17 PROCEDURE — 87086 URINE CULTURE/COLONY COUNT: CPT

## 2022-05-17 PROCEDURE — 87077 CULTURE AEROBIC IDENTIFY: CPT

## 2022-05-17 PROCEDURE — 85025 COMPLETE CBC W/AUTO DIFF WBC: CPT

## 2022-05-17 PROCEDURE — 83605 ASSAY OF LACTIC ACID: CPT

## 2022-05-17 PROCEDURE — 83880 ASSAY OF NATRIURETIC PEPTIDE: CPT

## 2022-05-17 PROCEDURE — 85730 THROMBOPLASTIN TIME PARTIAL: CPT

## 2022-05-17 RX ORDER — ACETAMINOPHEN 500 MG
1000 TABLET ORAL ONCE
Status: COMPLETED | OUTPATIENT
Start: 2022-05-17 | End: 2022-05-17

## 2022-05-17 RX ORDER — IBUPROFEN 800 MG/1
800 TABLET ORAL EVERY 8 HOURS PRN
Qty: 15 TABLET | Refills: 0 | Status: SHIPPED | OUTPATIENT
Start: 2022-05-17 | End: 2022-08-05

## 2022-05-17 RX ORDER — ACETAMINOPHEN 500 MG
1000 TABLET ORAL 4 TIMES DAILY PRN
Qty: 60 TABLET | Refills: 0 | Status: SHIPPED | OUTPATIENT
Start: 2022-05-17

## 2022-05-17 RX ORDER — 0.9 % SODIUM CHLORIDE 0.9 %
1000 INTRAVENOUS SOLUTION INTRAVENOUS ONCE
Status: COMPLETED | OUTPATIENT
Start: 2022-05-17 | End: 2022-05-17

## 2022-05-17 RX ADMIN — ACETAMINOPHEN 1000 MG: 500 TABLET ORAL at 15:29

## 2022-05-17 RX ADMIN — SODIUM CHLORIDE 1000 ML: 9 INJECTION, SOLUTION INTRAVENOUS at 12:50

## 2022-05-17 ASSESSMENT — ENCOUNTER SYMPTOMS
RHINORRHEA: 0
CHEST TIGHTNESS: 0
SHORTNESS OF BREATH: 1
CONSTIPATION: 0
PHOTOPHOBIA: 0
ABDOMINAL PAIN: 1
VOMITING: 1
BACK PAIN: 0
COLOR CHANGE: 0
COUGH: 1
DIARRHEA: 1
WHEEZING: 0
NAUSEA: 1

## 2022-05-17 NOTE — ED PROVIDER NOTES
[facility]     Emergency Department     Faculty Attestation    I performed a history and physical examination of the patient and discussed management with the resident. I reviewed the residents note and agree with the documented findings and plan of care. Any areas of disagreement are noted on the chart. I was personally present for the key portions of any procedures. I have documented in the chart those procedures where I was not present during the key portions. I have reviewed the emergency nurses triage note. I agree with the chief complaint, past medical history, past surgical history, allergies, medications, social and family history as documented unless otherwise noted below. For Physician Assistant/ Nurse Practitioner cases/documentation I have personally evaluated this patient and have completed at least one if not all key elements of the E/M (history, physical exam, and MDM). Additional findings are as noted. I have personally seen and evaluated the patient. I find the patient's history and physical exam are consistent with the NP/PA documentation. I agree with the care provided, treatment rendered, disposition and follow-up plan. This patient was evaluated in the Emergency Department for symptoms described in the history of present illness. The patient was evaluated in the context of the global COVID-19 pandemic, which necessitated consideration that the patient might be at risk for infection with the SARS-CoV-2 virus that causes COVID-19. Institutional protocols and algorithms that pertain to the evaluation of patients at risk for COVID-19 are in a state of rapid change based on information released by regulatory bodies including the CDC and federal and state organizations. These policies and algorithms were followed during the patient's care in the ED. 43-year-old female presenting with chest pain, shoulder pain, abdominal pain, and cough.   Nonspecific pain, all over, gradually worsening. Pain with urination. Brother has been ill.     Exam:  General: Laying on the bed, awake, alert and in no acute distress  CV: regular rhythm and Tachycardia  Lungs: Breathing comfortably on room air with no tachypnea, hypoxia, or increased work of breathing  Abdomen: soft, non-tender, non-distended    Plan:  Lab work-up including CBC, electrolytes, cardiac work-up  Patient is SIRS positive, initiated septic pathway  COVID and flu swabs  Anticipate admission    EKG Interpretation    Interpreted by emergency department physician    Rhythm: normal sinus   Rate: 95  Axis: left  Ectopy: premature ventricular contractions (infrequent) and premature atrial contraction  Conduction: right bundle branch block (complete)  ST Segments: no acute change  T Waves: no acute change    Clinical Impression: non-specific EKG    MD Ralf Collins MD   Attending Emergency  Physician    (Please note that portions of this note were completed with a voice recognition program. Efforts were made to edit the dictations but occasionally words are mis-transcribed.)             Ralf Truong MD  05/17/22 6724

## 2022-05-17 NOTE — ED NOTES
Pt presents to ED c/o headache, ABD pain, chest pain, chills, and a cough. Pt states symptoms started two days ago. Patient alert and oriented x4, talking in complete sentences. Respirations even and unlabored. Patient placed on continuous cardiac monitoring, BP cuff, and pulse ox. EKG obtained, blood work obtained.          Stephen Keating RN  05/17/22 6556

## 2022-05-17 NOTE — ED PROVIDER NOTES
Start Date End Date Taking? Authorizing Provider   acetaminophen (TYLENOL) 500 MG tablet Take 2 tablets by mouth 4 times daily as needed for Pain 5/17/22  Yes Sudha Valdivia,    ibuprofen (IBU) 800 MG tablet Take 1 tablet by mouth every 8 hours as needed for Pain 5/17/22  Yes Eladio Valdivia, DO   ciclopirox (LOPROX) 0.77 % cream ciclopirox 0.77 % topical cream    Historical Provider, MD   albuterol sulfate  (90 Base) MCG/ACT inhaler albuterol sulfate HFA 90 mcg/actuation aerosol inhaler    Historical Provider, MD   atorvastatin (LIPITOR) 20 MG tablet Take 1 tablet by mouth daily 6/29/21   France Real MD   tiotropium (SPIRIVA RESPIMAT) 1.25 MCG/ACT AERS inhaler Inhale 2 puffs into the lungs daily 8/12/19   Sesar Moseley MD   haloperidol (HALDOL) 5 MG tablet Take 5 mg by mouth 2 times daily    Historical Provider, MD   traZODone (DESYREL) 100 MG tablet  8/9/17   Historical Provider, MD   Elastic Bandages & Supports (ACE ARM SLING) MISC Apply 1 Units topically daily 4/17/17   Jacklyn Simpson MD   trihexyphenidyl (ARTANE) 2 MG tablet Take 2 mg by mouth 2 times daily  10/10/14   Historical Provider, MD       REVIEW OF SYSTEMS    (2-9 systems for level 4, 10 or more for level 5)      Review of Systems   Constitutional: Positive for fatigue. Negative for chills, diaphoresis and fever. HENT: Negative for congestion and rhinorrhea. Eyes: Negative for photophobia and visual disturbance. Respiratory: Positive for cough and shortness of breath. Negative for chest tightness and wheezing. Cardiovascular: Positive for chest pain. Negative for palpitations and leg swelling. Gastrointestinal: Positive for abdominal pain, diarrhea, nausea and vomiting. Negative for constipation. Endocrine: Negative for polydipsia, polyphagia and polyuria. Genitourinary: Positive for dysuria. Negative for difficulty urinating and urgency.    Musculoskeletal: Negative for arthralgias, back pain, neck pain and neck stiffness. Skin: Negative for color change, pallor and rash. Neurological: Positive for headaches. Negative for dizziness, weakness and light-headedness. PHYSICAL EXAM   (up to 7 for level 4, 8 or more for level 5)      INITIAL VITALS:   /79   Pulse 92   Temp 98.2 °F (36.8 °C) (Oral)   Resp 13   SpO2 99%     Physical Exam  Vitals and nursing note reviewed. Constitutional:       General: She is in acute distress (Uncomfortable appearing). Appearance: She is well-developed. She is obese. She is not diaphoretic. HENT:      Head: Normocephalic and atraumatic. Mouth/Throat:      Comments: Dry, clear  Eyes:      General: No scleral icterus. Conjunctiva/sclera: Conjunctivae normal.      Pupils: Pupils are equal, round, and reactive to light. Neck:      Vascular: No JVD. Trachea: No tracheal deviation. Cardiovascular:      Rate and Rhythm: Regular rhythm. Tachycardia present. Heart sounds: Normal heart sounds. No murmur heard. No friction rub. Pulmonary:      Effort: Pulmonary effort is normal. No respiratory distress. Breath sounds: Normal breath sounds. No wheezing. Chest:      Chest wall: No tenderness. Abdominal:      General: Abdomen is flat. Bowel sounds are normal. There is no distension. Palpations: Abdomen is soft. Tenderness: There is generalized abdominal tenderness (Minimal). There is no right CVA tenderness or guarding. Musculoskeletal:      Cervical back: Normal range of motion and neck supple. Skin:     General: Skin is warm and dry. Capillary Refill: Capillary refill takes less than 2 seconds. Coloration: Skin is not pale. Findings: No erythema. Neurological:      Mental Status: She is alert and oriented to person, place, and time. Cranial Nerves: No cranial nerve deficit. Sensory: No sensory deficit.    Psychiatric:         Behavior: Behavior normal.         DIFFERENTIAL  DIAGNOSIS     PLAN (LABS / IMAGING / EKG):  Orders Placed This Encounter   Procedures    Culture, Urine    COVID-19, Rapid    RAPID INFLUENZA A/B ANTIGENS    XR CHEST PORTABLE    CBC with Auto Differential    Comprehensive Metabolic Panel w/ Reflex to MG    Troponin    Brain Natriuretic Peptide    APTT    D-Dimer, Quantitative    Lactate, Sepsis    Urinalysis    Lipase    Troponin    Microscopic Urinalysis    Straight cath    EKG 12 Lead       MEDICATIONS ORDERED:  Orders Placed This Encounter   Medications    0.9 % sodium chloride bolus    acetaminophen (TYLENOL) tablet 1,000 mg    acetaminophen (TYLENOL) 500 MG tablet     Sig: Take 2 tablets by mouth 4 times daily as needed for Pain     Dispense:  60 tablet     Refill:  0    ibuprofen (IBU) 800 MG tablet     Sig: Take 1 tablet by mouth every 8 hours as needed for Pain     Dispense:  15 tablet     Refill:  0       DDX: Illness, UTI, COVID-19, intra-abdominal infection, pneumonia, pneumothorax, ACS, PE    MDM/IMPRESSION: This is a 76-year-old female presenting with multiple symptoms to include abdominal pain, nausea vomiting diarrhea, chest pain. Her son who lives with her at home has symptoms as well but are more upper respiratory symptoms. Afebrile, mildly tachycardic, mildly hypoxic on initial evaluation. Patient also complains of some dysuria but this is chronic in nature and was recently on antibiotics with a negative urine culture. Plan for broad work-up the patient is tachycardic and tachypneic on arrival.  Will give IV fluids, swab for COVID and flu, reevaluate pending labs. We will also obtain urinalysis given history of frequent UTIs. DIAGNOSTIC RESULTS / EMERGENCY DEPARTMENT COURSE / MDM   LAB RESULTS:  Results for orders placed or performed during the hospital encounter of 05/17/22   COVID-19, Rapid    Specimen: Nasopharyngeal Swab   Result Value Ref Range    Specimen Description . NASOPHARYNGEAL SWAB     SARS-CoV-2, Rapid DETECTED (A) Not Detected RAPID INFLUENZA A/B ANTIGENS    Specimen: Nasopharyngeal   Result Value Ref Range    Flu A Antigen NEGATIVE NEGATIVE    Flu B Antigen NEGATIVE NEGATIVE   CBC with Auto Differential   Result Value Ref Range    WBC 9.1 3.5 - 11.3 k/uL    RBC 3.73 (L) 3.95 - 5.11 m/uL    Hemoglobin 10.8 (L) 11.9 - 15.1 g/dL    Hematocrit 34.9 (L) 36.3 - 47.1 %    MCV 93.6 82.6 - 102.9 fL    MCH 29.0 25.2 - 33.5 pg    MCHC 30.9 28.4 - 34.8 g/dL    RDW 13.8 11.8 - 14.4 %    Platelets 693 947 - 591 k/uL    MPV 9.4 8.1 - 13.5 fL    NRBC Automated 0.0 0.0 per 100 WBC    Seg Neutrophils 81 (H) 36 - 65 %    Lymphocytes 10 (L) 24 - 43 %    Monocytes 9 3 - 12 %    Eosinophils % 0 (L) 1 - 4 %    Basophils 0 0 - 2 %    Immature Granulocytes 0 0 %    Segs Absolute 7.27 1.50 - 8.10 k/uL    Absolute Lymph # 0.94 (L) 1.10 - 3.70 k/uL    Absolute Mono # 0.84 0.10 - 1.20 k/uL    Absolute Eos # 0.04 0.00 - 0.44 k/uL    Basophils Absolute <0.03 0.00 - 0.20 k/uL    Absolute Immature Granulocyte 0.03 0.00 - 0.30 k/uL   Comprehensive Metabolic Panel w/ Reflex to MG   Result Value Ref Range    Glucose 105 (H) 70 - 99 mg/dL    BUN 9 8 - 23 mg/dL    CREATININE 0.68 0.50 - 0.90 mg/dL    Calcium 9.4 8.6 - 10.4 mg/dL    Sodium 140 135 - 144 mmol/L    Potassium 3.7 3.7 - 5.3 mmol/L    Chloride 103 98 - 107 mmol/L    CO2 28 20 - 31 mmol/L    Anion Gap 9 9 - 17 mmol/L    Alkaline Phosphatase 145 (H) 35 - 104 U/L    ALT 16 5 - 33 U/L    AST 19 <32 U/L    Total Bilirubin 0.26 (L) 0.3 - 1.2 mg/dL    Total Protein 6.6 6.4 - 8.3 g/dL    Albumin 3.6 3.5 - 5.2 g/dL    Albumin/Globulin Ratio 1.2 1.0 - 2.5    GFR Non-African American >60 >60 mL/min    GFR African American >60 >60 mL/min    GFR Comment         Troponin   Result Value Ref Range    Troponin, High Sensitivity 12 0 - 14 ng/L   Brain Natriuretic Peptide   Result Value Ref Range    Pro- (H) <300 pg/mL   APTT   Result Value Ref Range    PTT 20.2 (L) 20.5 - 30.5 sec   D-Dimer, Quantitative   Result Value Ref Patient able to ambulate in place with some dyspnea but maintains oxygen saturation above 92 to 93%. [JG]      ED Course User Index  [JG] Suyapa Brown DO        PROCEDURES:      CONSULTS:  None    CRITICAL CARE:      FINAL IMPRESSION      1.  Acute COVID-19          DISPOSITION / PLAN     DISPOSITION Decision To Discharge 05/17/2022 04:39:39 PM      PATIENT REFERRED TO:  Estela House  [address] [facility]  [phone]    Go in 3 days      [facility]  [address] [place] [address]  [phone]  Go to   If symptoms worsen      DISCHARGE MEDICATIONS:  Discharge Medication List as of 5/17/2022  4:42 PM          Suyapa Brown DO  Emergency Medicine Resident    (Please note that portions of thisnote were completed with a voice recognition program.  Efforts were made to edit the dictations but occasionally words are mis-transcribed.)     Suyapa Brown DO  05/17/22 9943

## 2022-05-17 NOTE — ED PROVIDER NOTES
[facility] HANDOFF       Handoff taken on the following patient from prior Attending Physician:  Pt Name: Garibay Staff  PCP:  Cinthia Babcock  I was available and discussed any additional care issues that arose and coordinated the management plans with the resident(s) caring for the patient during my duty period. Any areas of disagreement with resident's documentation of care or procedures are noted on the chart. I was personally present for the key portions of any/all procedures during my duty period. I have documented in the chart those procedures where I was not present during the key portions.          CHIEF COMPLAINT       Chief Complaint   Patient presents with    Abdominal Pain     N/V/D    Chest Pain     since yesterday    Shortness of Breath     since yesterday         CURRENT MEDICATIONS     Previous Medications  Discharge Medication List as of 5/17/2022  4:42 PM      CONTINUE these medications which have NOT CHANGED    Details   ciclopirox (LOPROX) 0.77 % cream ciclopirox 0.77 % topical cream, Historical Med      albuterol sulfate  (90 Base) MCG/ACT inhaler albuterol sulfate HFA 90 mcg/actuation aerosol inhalerHistorical Med      atorvastatin (LIPITOR) 20 MG tablet Take 1 tablet by mouth daily, Disp-30 tablet, R-3Normal      tiotropium (SPIRIVA RESPIMAT) 1.25 MCG/ACT AERS inhaler Inhale 2 puffs into the lungs daily, Disp-1 Inhaler, R-3Normal      haloperidol (HALDOL) 5 MG tablet Take 5 mg by mouth 2 times dailyHistorical Med      traZODone (DESYREL) 100 MG tablet Historical Med      Elastic Bandages & Supports (ACE ARM SLING) MISC DAILY Starting 4/17/2017, Until Discontinued, Disp-1 each, R-0, Normal      trihexyphenidyl (ARTANE) 2 MG tablet Take 2 mg by mouth 2 times daily Historical Med             Encounter Medications  Orders Placed This Encounter   Medications    0.9 % sodium chloride bolus    acetaminophen (TYLENOL) tablet 1,000 mg    acetaminophen (TYLENOL) 500 MG tablet     Sig: Take 2 tablets by mouth 4 times daily as needed for Pain     Dispense:  60 tablet     Refill:  0    ibuprofen (IBU) 800 MG tablet     Sig: Take 1 tablet by mouth every 8 hours as needed for Pain     Dispense:  15 tablet     Refill:  0       ALLERGIES     has No Known Allergies. RECENT VITALS:   Temp: 98.2 °F (36.8 °C),  Pulse: 92, Resp: 13, BP: 139/79    RADIOLOGY:   XR CHEST PORTABLE   Final Result   Mild senescent changes compatible with the age of the patient. No evidence of acute cardiopulmonary process.              LABS:  Labs Reviewed   COVID-19, RAPID - Abnormal; Notable for the following components:       Result Value    SARS-CoV-2, Rapid DETECTED (*)     All other components within normal limits   CBC WITH AUTO DIFFERENTIAL - Abnormal; Notable for the following components:    RBC 3.73 (*)     Hemoglobin 10.8 (*)     Hematocrit 34.9 (*)     Seg Neutrophils 81 (*)     Lymphocytes 10 (*)     Eosinophils % 0 (*)     Absolute Lymph # 0.94 (*)     All other components within normal limits   COMPREHENSIVE METABOLIC PANEL W/ REFLEX TO MG FOR LOW K - Abnormal; Notable for the following components:    Glucose 105 (*)     Alkaline Phosphatase 145 (*)     Total Bilirubin 0.26 (*)     All other components within normal limits   BRAIN NATRIURETIC PEPTIDE - Abnormal; Notable for the following components:    Pro- (*)     All other components within normal limits   APTT - Abnormal; Notable for the following components:    PTT 20.2 (*)     All other components within normal limits   LACTATE, SEPSIS - Abnormal; Notable for the following components:    Lactic Acid, Sepsis, Whole Blood 2.0 (*)     All other components within normal limits   URINALYSIS - Abnormal; Notable for the following components:    Leukocyte Esterase, Urine MODERATE (*)     All other components within normal limits   LIPASE - Abnormal; Notable for the following components:    Lipase 10 (*) All other components within normal limits   MICROSCOPIC URINALYSIS - Abnormal; Notable for the following components:    Bacteria, UA FEW (*)     All other components within normal limits   RAPID INFLUENZA A/B ANTIGENS   CULTURE, URINE   TROPONIN   D-DIMER, QUANTITATIVE   TROPONIN   LACTATE, SEPSIS       COVID symptoms, normal chest x-ray and pulse oximetry, anticipate discharge    PLAN/ TASKS OUTSTANDING     Reassess, disposition      (Please note that portions of this note were completed with a voice recognition program.  Efforts were made to edit the dictations but occasionally words are mis-transcribed. )    Charolotte Severance, MD,, MD, F.A.C.E.P.   Attending Emergency Physician       Charolotte Severance, MD  05/17/22 1924

## 2022-05-17 NOTE — ED NOTES
RN requested assistance as she called patient's son for a ride and the son said to just call [address] for patient to get home. Due to patient having Covid and some confusion, this is not an option for staff to call a cab for the patient. SW called patient's daughter to explain the situation and that family will need to transport patient home from the ER. Daughter told that patient will have a mask on and if family is worried about giancarlo Covid they should wear a mask too. Daughter, Gilford Reason, states she will get a ride for patient. [facility].  [address], [address]  05/17/22 5238

## 2022-05-18 ENCOUNTER — CARE COORDINATION (OUTPATIENT)
Dept: CARE COORDINATION | Age: 72
End: 2022-05-18

## 2022-05-18 LAB
CULTURE: ABNORMAL
SPECIMEN DESCRIPTION: ABNORMAL

## 2022-05-18 NOTE — CARE COORDINATION
First attempt to reach pt for covid risk education s/p er visit left vm to call [facility] [phone]

## 2022-05-19 ENCOUNTER — CARE COORDINATION (OUTPATIENT)
Dept: CARE COORDINATION | Age: 72
End: 2022-05-19

## 2022-05-20 LAB
EKG ATRIAL RATE: 95 BPM
EKG P AXIS: 69 DEGREES
EKG P-R INTERVAL: 114 MS
EKG Q-T INTERVAL: 376 MS
EKG QRS DURATION: 108 MS
EKG QTC CALCULATION (BAZETT): 472 MS
EKG R AXIS: -35 DEGREES
EKG T AXIS: 2 DEGREES
EKG VENTRICULAR RATE: 95 BPM

## 2022-05-20 PROCEDURE — 93010 ELECTROCARDIOGRAM REPORT: CPT | Performed by: INTERNAL MEDICINE

## 2022-05-27 PROBLEM — N39.0 UTI (URINARY TRACT INFECTION): Status: RESOLVED | Noted: 2022-04-27 | Resolved: 2022-05-27

## 2022-06-02 ENCOUNTER — APPOINTMENT (OUTPATIENT)
Dept: GENERAL RADIOLOGY | Age: 72
End: 2022-06-02
Payer: COMMERCIAL

## 2022-06-02 ENCOUNTER — HOSPITAL ENCOUNTER (EMERGENCY)
Age: 72
Discharge: HOME | End: 2022-06-02
Attending: EMERGENCY MEDICINE
Payer: COMMERCIAL

## 2022-06-02 VITALS
WEIGHT: 175 LBS | SYSTOLIC BLOOD PRESSURE: 158 MMHG | RESPIRATION RATE: 17 BRPM | HEART RATE: 88 BPM | DIASTOLIC BLOOD PRESSURE: 73 MMHG | HEIGHT: 65 IN | TEMPERATURE: 98.6 F | BODY MASS INDEX: 29.16 KG/M2 | OXYGEN SATURATION: 98 %

## 2022-06-02 DIAGNOSIS — R10.84 GENERALIZED ABDOMINAL PAIN: Primary | ICD-10-CM

## 2022-06-02 LAB
-: NORMAL
ABSOLUTE EOS #: 0.1 K/UL (ref 0–0.44)
ABSOLUTE IMMATURE GRANULOCYTE: <0.03 K/UL (ref 0–0.3)
ABSOLUTE LYMPH #: 2.19 K/UL (ref 1.1–3.7)
ABSOLUTE MONO #: 0.72 K/UL (ref 0.1–1.2)
ALBUMIN SERPL-MCNC: 4.1 G/DL (ref 3.5–5.2)
ALBUMIN/GLOBULIN RATIO: 1.3 (ref 1–2.5)
ALP BLD-CCNC: 126 U/L (ref 35–104)
ALT SERPL-CCNC: 13 U/L (ref 5–33)
ANION GAP SERPL CALCULATED.3IONS-SCNC: 13 MMOL/L (ref 9–17)
AST SERPL-CCNC: 22 U/L
BASOPHILS # BLD: 1 % (ref 0–2)
BASOPHILS ABSOLUTE: 0.03 K/UL (ref 0–0.2)
BILIRUB SERPL-MCNC: 0.32 MG/DL (ref 0.3–1.2)
BILIRUBIN URINE: NEGATIVE
BUN BLDV-MCNC: 14 MG/DL (ref 8–23)
CALCIUM SERPL-MCNC: 9.7 MG/DL (ref 8.6–10.4)
CASTS UA: NORMAL /LPF (ref 0–8)
CHLORIDE BLD-SCNC: 99 MMOL/L (ref 98–107)
CO2: 24 MMOL/L (ref 20–31)
COLOR: YELLOW
CREAT SERPL-MCNC: 0.75 MG/DL (ref 0.5–0.9)
EKG ATRIAL RATE: 85 BPM
EKG P AXIS: 69 DEGREES
EKG P-R INTERVAL: 126 MS
EKG Q-T INTERVAL: 422 MS
EKG QRS DURATION: 118 MS
EKG QTC CALCULATION (BAZETT): 502 MS
EKG R AXIS: -11 DEGREES
EKG T AXIS: 4 DEGREES
EKG VENTRICULAR RATE: 85 BPM
EOSINOPHILS RELATIVE PERCENT: 2 % (ref 1–4)
EPITHELIAL CELLS UA: NORMAL /HPF (ref 0–5)
GFR AFRICAN AMERICAN: >60 ML/MIN
GFR NON-AFRICAN AMERICAN: >60 ML/MIN
GFR SERPL CREATININE-BSD FRML MDRD: ABNORMAL ML/MIN/{1.73_M2}
GLUCOSE BLD-MCNC: 89 MG/DL (ref 70–99)
GLUCOSE URINE: NEGATIVE
HCT VFR BLD CALC: 37.5 % (ref 36.3–47.1)
HEMOGLOBIN: 11.4 G/DL (ref 11.9–15.1)
IMMATURE GRANULOCYTES: 0 %
KETONES, URINE: NEGATIVE
LEUKOCYTE ESTERASE, URINE: ABNORMAL
LYMPHOCYTES # BLD: 33 % (ref 24–43)
MCH RBC QN AUTO: 28.6 PG (ref 25.2–33.5)
MCHC RBC AUTO-ENTMCNC: 30.4 G/DL (ref 28.4–34.8)
MCV RBC AUTO: 94.2 FL (ref 82.6–102.9)
MONOCYTES # BLD: 11 % (ref 3–12)
NITRITE, URINE: NEGATIVE
NRBC AUTOMATED: 0 PER 100 WBC
PDW BLD-RTO: 14 % (ref 11.8–14.4)
PH UA: 6 (ref 5–8)
PLATELET # BLD: 328 K/UL (ref 138–453)
PMV BLD AUTO: 9.3 FL (ref 8.1–13.5)
POTASSIUM SERPL-SCNC: 4 MMOL/L (ref 3.7–5.3)
PROTEIN UA: NEGATIVE
RBC # BLD: 3.98 M/UL (ref 3.95–5.11)
RBC UA: NORMAL /HPF (ref 0–4)
SEG NEUTROPHILS: 53 % (ref 36–65)
SEGMENTED NEUTROPHILS ABSOLUTE COUNT: 3.54 K/UL (ref 1.5–8.1)
SODIUM BLD-SCNC: 136 MMOL/L (ref 135–144)
SPECIFIC GRAVITY UA: 1.01 (ref 1–1.03)
TOTAL PROTEIN: 7.3 G/DL (ref 6.4–8.3)
TROPONIN, HIGH SENSITIVITY: 12 NG/L (ref 0–14)
TROPONIN, HIGH SENSITIVITY: 13 NG/L (ref 0–14)
TURBIDITY: CLEAR
URINE HGB: NEGATIVE
UROBILINOGEN, URINE: NORMAL
WBC # BLD: 6.6 K/UL (ref 3.5–11.3)
WBC UA: NORMAL /HPF (ref 0–5)

## 2022-06-02 PROCEDURE — 93005 ELECTROCARDIOGRAM TRACING: CPT | Performed by: STUDENT IN AN ORGANIZED HEALTH CARE EDUCATION/TRAINING PROGRAM

## 2022-06-02 PROCEDURE — 71045 X-RAY EXAM CHEST 1 VIEW: CPT

## 2022-06-02 PROCEDURE — 93010 ELECTROCARDIOGRAM REPORT: CPT | Performed by: INTERNAL MEDICINE

## 2022-06-02 PROCEDURE — 85025 COMPLETE CBC W/AUTO DIFF WBC: CPT

## 2022-06-02 PROCEDURE — 87086 URINE CULTURE/COLONY COUNT: CPT

## 2022-06-02 PROCEDURE — 99285 EMERGENCY DEPT VISIT HI MDM: CPT

## 2022-06-02 PROCEDURE — 81001 URINALYSIS AUTO W/SCOPE: CPT

## 2022-06-02 PROCEDURE — 84484 ASSAY OF TROPONIN QUANT: CPT

## 2022-06-02 PROCEDURE — 80053 COMPREHEN METABOLIC PANEL: CPT

## 2022-06-02 PROCEDURE — 2580000003 HC RX 258: Performed by: STUDENT IN AN ORGANIZED HEALTH CARE EDUCATION/TRAINING PROGRAM

## 2022-06-02 RX ORDER — SODIUM CHLORIDE, SODIUM LACTATE, POTASSIUM CHLORIDE, AND CALCIUM CHLORIDE .6; .31; .03; .02 G/100ML; G/100ML; G/100ML; G/100ML
1000 INJECTION, SOLUTION INTRAVENOUS ONCE
Status: COMPLETED | OUTPATIENT
Start: 2022-06-02 | End: 2022-06-02

## 2022-06-02 RX ADMIN — SODIUM CHLORIDE, POTASSIUM CHLORIDE, SODIUM LACTATE AND CALCIUM CHLORIDE 1000 ML: 600; 310; 30; 20 INJECTION, SOLUTION INTRAVENOUS at 04:33

## 2022-06-02 ASSESSMENT — PAIN SCALES - GENERAL: PAINLEVEL_OUTOF10: 0

## 2022-06-02 ASSESSMENT — ENCOUNTER SYMPTOMS
VOMITING: 0
RECTAL PAIN: 0
SHORTNESS OF BREATH: 0
ABDOMINAL PAIN: 1
NAUSEA: 1
TROUBLE SWALLOWING: 0
COUGH: 0
DIARRHEA: 0
CONSTIPATION: 1

## 2022-06-02 ASSESSMENT — PAIN - FUNCTIONAL ASSESSMENT: PAIN_FUNCTIONAL_ASSESSMENT: NONE - DENIES PAIN

## 2022-06-02 NOTE — ED NOTES
The following labs labeled with pt sticker and tubed to lab:     [] Blue     [] Lavender   [] on ice  [x] Green/yellow  [] Green/black [] on ice  [] Yellow  [] Red  [] Pink      [] COVID-19 swab    [] Rapid  [] PCR  [] Flu swab  [] Peds Viral Panel     [] Urine Sample  [] Pelvic Cultures  [] Blood Cultures            Sariah Leonardo RN  06/02/22 7007

## 2022-06-02 NOTE — ED NOTES
The following labs labeled with pt sticker and tubed to lab:     [] Blue     [] Lavender   [] on ice  [] Green/yellow  [] Green/black [] on ice  [] Yellow  [] Red  [] Pink      [] COVID-19 swab    [] Rapid  [] PCR  [] Flu swab  [] Peds Viral Panel     [x] Urine Sample  [] Pelvic Cultures  [] Blood Cultures            Roni Ro RN  06/02/22 2282

## 2022-06-02 NOTE — ED NOTES
.Patient in need of transportation home. SW confirmed patient address. SW contacted [facility]. ETA unknown.

## 2022-06-02 NOTE — ED PROVIDER NOTES
[facility]  ED  Emergency Department Encounter  Emergency Medicine Resident     Pt Luz Calderon  MRN: [id number]  [place] 1950  Date of evaluation: 6/2/22  PCP:  Jack Angel [facility]       Chief Complaint   Patient presents with    Abdominal Pain       HISTORY OF PRESENT ILLNESS  (Location/Symptom, Timing/Onset, Context/Setting, Quality, Duration, Modifying Factors, Severity.)      Tommy Jarrett is a 66 y.o. female who presents with concern for not having a bowel movement for the last several days, patient is noted decreased fluid intake, still tolerating liquids but having a decreased appetite. Patient is a poor historian and unable to elaborate much further. Patient does have different chief complaints for different history takers, for nursing patient complains of difficulty breathing, tested positive for COVID several weeks ago, but does not volunteer any complaint of shortness of breath on my exam.  Patient does have a pan positive review of systems and will only note that she is concerned that she is not having bowel movements but will endorse a positive review of systems all questions asked. Patient does deny any fevers, no dysuria. PAST MEDICAL / SURGICAL / SOCIAL / FAMILY HISTORY      has a past medical history of Arthritis, Bronchitis, Chronic obstructive pulmonary disease ([address] Colon polyps, Controlled type 2 diabetes mellitus without complication, without long-term current use of insulin ([address] Dental neglect, Depression, Environmental allergies, GERD (gastroesophageal reflux disease), Hyperlipidemia, Hypertension, Obesity, Onychomycosis, Poor historian, RBBB, Treadmill stress test negative for angina pectoris, and Wears glasses. has a past surgical history that includes Cholecystectomy; doppler echocardiography; Colonoscopy (6/2013); and Tubal ligation.     Social History     Socioeconomic History    Marital status: Single     Spouse name: Not on file    Number of children: Not on file    Years of education: Not on file    Highest education level: Not on file   Occupational History    Not on file   Tobacco Use    Smoking status: Former Smoker     Packs/day: 0.50     Years: 35.00     Pack years: 17.50     Types: Cigarettes     Quit date: 2015     Years since quittin.8    Smokeless tobacco: Never Used   Substance and Sexual Activity    Alcohol use: No     Alcohol/week: 0.0 standard drinks    Drug use: No    Sexual activity: Not on file   Other Topics Concern    Not on file   Social History Narrative    Not on file     Social Determinants of Health     Financial Resource Strain: Low Risk     Difficulty of Paying Living Expenses: Not hard at all   Food Insecurity: No Food Insecurity    Worried About [address] [employer] in the Last Year: Never true    [address]  [employer] in the Last Year: Never true   Transportation Needs: No Transportation Needs    Lack of Transportation (Medical): No    Lack of Transportation (Non-Medical):  No   Physical Activity:     Days of Exercise per Week: Not on file    Minutes of Exercise per Session: Not on file   Stress:     Feeling of Stress : Not on file   Social Connections:     Frequency of Communication with Friends and Family: Not on file    Frequency of Social Gatherings with Friends and Family: Not on file    Attends Mu-ism Services: Not on file    Active Member of [address] [place] [address] or Organizations: Not on file    Attends Club or Organization Meetings: Not on file    Marital Status: Not on file   Intimate Partner Violence:     Fear of Current or Ex-Partner: Not on file    Emotionally Abused: Not on file    Physically Abused: Not on file    Sexually Abused: Not on file   Housing Stability:     Unable to Pay for Housing in the Last Year: Not on file    Number of [place] in the Last Year: Not on file    Unstable Housing in the Last Year: Not on file       Family History   Problem Relation Age of Onset    Heart Disease Mother     Cancer Father        Allergies:  Patient has no known allergies. Home Medications:  Prior to Admission medications    Medication Sig Start Date End Date Taking? Authorizing Provider   acetaminophen (TYLENOL) 500 MG tablet Take 2 tablets by mouth 4 times daily as needed for Pain 5/17/22   Philip Valdivia DO   ibuprofen (IBU) 800 MG tablet Take 1 tablet by mouth every 8 hours as needed for Pain 5/17/22   Philip Valdivia DO   ciclopirox (LOPROX) 0.77 % cream ciclopirox 0.77 % topical cream    Historical Provider, MD   albuterol sulfate  (90 Base) MCG/ACT inhaler albuterol sulfate HFA 90 mcg/actuation aerosol inhaler    Historical Provider, MD   atorvastatin (LIPITOR) 20 MG tablet Take 1 tablet by mouth daily 6/29/21   Estela Hilario MD   tiotropium (SPIRIVA RESPIMAT) 1.25 MCG/ACT AERS inhaler Inhale 2 puffs into the lungs daily 8/12/19   Jacklyn Olivera MD   haloperidol (HALDOL) 5 MG tablet Take 5 mg by mouth 2 times daily    Historical Provider, MD   traZODone (DESYREL) 100 MG tablet  8/9/17   Historical Provider, MD   Elastic Bandages & Supports (ACE ARM SLING) MISC Apply 1 Units topically daily 4/17/17   Tasha Benavides MD   trihexyphenidyl (ARTANE) 2 MG tablet Take 2 mg by mouth 2 times daily  10/10/14   Historical Provider, MD       REVIEW OF SYSTEMS    (2-9 systems for level 4, 10 or more for level 5)      Review of Systems   Constitutional: Negative for fever. HENT: Negative for trouble swallowing. Respiratory: Negative for cough and shortness of breath. Cardiovascular: Positive for chest pain. Gastrointestinal: Positive for abdominal pain, constipation and nausea. Negative for diarrhea, rectal pain and vomiting. Genitourinary: Negative for decreased urine volume and dysuria. Musculoskeletal: Negative for arthralgias, myalgias, neck pain and neck stiffness. Skin: Negative for wound.    Neurological: Positive for weakness (Generalized) and headaches. Negative for light-headedness. Psychiatric/Behavioral: Negative for agitation. PHYSICAL EXAM   (up to 7 for level 4, 8 or more for level 5)      INITIAL VITALS:   BP (!) 158/73   Pulse 88   Temp 98.6 °F (37 °C) (Oral)   Resp 17   Ht 5' 5\" (1.651 m)   Wt 175 lb (79.4 kg)   SpO2 98%   BMI 29.12 kg/m²     Physical Exam  Vitals and nursing note reviewed. Constitutional:       General: She is not in acute distress. Appearance: Normal appearance. She is well-developed. She is obese. She is not diaphoretic. HENT:      Head: Normocephalic and atraumatic. Right Ear: External ear normal.      Left Ear: External ear normal.      Nose: Nose normal.      Mouth/Throat:      Mouth: Mucous membranes are moist.      Pharynx: Uvula midline. No oropharyngeal exudate. Eyes:      General:         Right eye: No discharge. Left eye: No discharge. Extraocular Movements: Extraocular movements intact. Conjunctiva/sclera: Conjunctivae normal.      Pupils: Pupils are equal, round, and reactive to light. Cardiovascular:      Rate and Rhythm: Normal rate and regular rhythm. Heart sounds: Normal heart sounds. Pulmonary:      Effort: Pulmonary effort is normal. No respiratory distress. Abdominal:      Palpations: Abdomen is soft. Tenderness: There is generalized abdominal tenderness. There is no right CVA tenderness, left CVA tenderness, guarding or rebound. Comments: Nonperitoneal, nonspecific, distractible   Musculoskeletal:         General: No deformity. Normal range of motion. Cervical back: Normal range of motion. Skin:     General: Skin is warm and dry. Capillary Refill: Capillary refill takes less than 2 seconds. Neurological:      Mental Status: She is alert and oriented to person, place, and time. Psychiatric:         Speech: Speech is tangential.         Behavior: Behavior is slowed. Behavior is cooperative. Thought Content:  Thought content does not include homicidal or suicidal ideation.          DIFFERENTIAL  DIAGNOSIS     PLAN (LABS / IMAGING / EKG):  Orders Placed This Encounter   Procedures    XR CHEST PORTABLE    CBC with Auto Differential    Comprehensive Metabolic Panel w/ Reflex to MG    Troponin    Urinalysis with Reflex to Culture    Microscopic Urinalysis    Straight cath    EKG 12 Lead    Place CT Compatible peripheral IV       MEDICATIONS ORDERED:  Orders Placed This Encounter   Medications    lactated ringers bolus       DDX:     DIAGNOSTIC RESULTS / EMERGENCY DEPARTMENT COURSE / MDM     LABS:  Results for orders placed or performed during the hospital encounter of 06/02/22   CBC with Auto Differential   Result Value Ref Range    WBC 6.6 3.5 - 11.3 k/uL    RBC 3.98 3.95 - 5.11 m/uL    Hemoglobin 11.4 (L) 11.9 - 15.1 g/dL    Hematocrit 37.5 36.3 - 47.1 %    MCV 94.2 82.6 - 102.9 fL    MCH 28.6 25.2 - 33.5 pg    MCHC 30.4 28.4 - 34.8 g/dL    RDW 14.0 11.8 - 14.4 %    Platelets 516 540 - 106 k/uL    MPV 9.3 8.1 - 13.5 fL    NRBC Automated 0.0 0.0 per 100 WBC    Seg Neutrophils 53 36 - 65 %    Lymphocytes 33 24 - 43 %    Monocytes 11 3 - 12 %    Eosinophils % 2 1 - 4 %    Basophils 1 0 - 2 %    Immature Granulocytes 0 0 %    Segs Absolute 3.54 1.50 - 8.10 k/uL    Absolute Lymph # 2.19 1.10 - 3.70 k/uL    Absolute Mono # 0.72 0.10 - 1.20 k/uL    Absolute Eos # 0.10 0.00 - 0.44 k/uL    Basophils Absolute 0.03 0.00 - 0.20 k/uL    Absolute Immature Granulocyte <0.03 0.00 - 0.30 k/uL   Comprehensive Metabolic Panel w/ Reflex to MG   Result Value Ref Range    Glucose 89 70 - 99 mg/dL    BUN 14 8 - 23 mg/dL    CREATININE 0.75 0.50 - 0.90 mg/dL    Calcium 9.7 8.6 - 10.4 mg/dL    Sodium 136 135 - 144 mmol/L    Potassium 4.0 3.7 - 5.3 mmol/L    Chloride 99 98 - 107 mmol/L    CO2 24 20 - 31 mmol/L    Anion Gap 13 9 - 17 mmol/L    Alkaline Phosphatase 126 (H) 35 - 104 U/L    ALT 13 5 - 33 U/L    AST 22 <32 U/L    Total Bilirubin 0.32 Impression: Nonspecific EKG with PVCs and right bundle branch block    All EKG's are interpreted by the Emergency Department Physician who either signs or Co-signs this chart in the absence of a cardiologist.    EMERGENCY DEPARTMENT COURSE:  Patient found lying in bed, no acute distress, not ill or toxic appearing. Cooperative in exam but patient is a very poor historian and provides little contribution to her history of chief complaint. Patient's chief complaint is inconsistent but she seems primarily focused on her lack of bowel movements after decreased p.o. intake. Patient notes she would like to continue having bowel movements even though she is not eating much solid food. Patient notes she has a decreased appetite but is able to tolerate p.o. intake if necessary. No lab abnormalities, urinalysis not consistent with acute cystitis, nonspecific EKG. No need for additional imaging or work-up at this time, patient follow-up with PCP as needed. Discharge plan discussed with patient who is in agreement. Educated on likely pathology, medications, return precautions, and follow-up. Patient understood all educated materials with all questions answered to their satisfaction. PROCEDURES:  None    CONSULTS:  None    CRITICAL CARE:  None    FINAL IMPRESSION      1.  Generalized abdominal pain          DISPOSITION / PLAN     DISPOSITION Decision To Discharge 06/02/2022 06:11:05 AM      PATIENT REFERRED TO:  Chato Freeman  [address] [facility]  [phone]    Schedule an appointment as soon as possible for a visit   Regarding this visit    [facility]  [address] [place] [address]  [phone]  Go to   If symptoms worsen      DISCHARGE MEDICATIONS:  New Prescriptions    No medications on file       Yudi Quiroz MD  Emergency Medicine Resident    (Please note that portions of this note were completed with a voice recognition program.  Efforts were made to edit the dictations but occasionally words are mis-transcribed.)        Benito Dominguez MD  Resident  06/02/22 5646

## 2022-06-02 NOTE — ED NOTES
The following labs labeled with pt sticker and tubed to lab:     [x] Blue     [x] Lavender   [] on ice  [x] Green/yellow  [x] Green/black [] on ice  [] Yellow  [x] Red  [] Pink      [] COVID-19 swab    [] Rapid  [] PCR  [] Flu swab  [] Peds Viral Panel     [] Urine Sample  [] Pelvic Cultures  [] Blood Cultures            Syl Garcia RN  06/02/22 5855

## 2022-06-02 NOTE — ED TRIAGE NOTES
Pt reports to ED per self via [facility]. Pt states she hasnt been feeling well and was having difficulty breathing. Pt tested positive for covid one week ago. Pt is poor historian and is able to identify where she is and what year but identified the president as Edgar Cortez.  She also states it has been four days since she had a bowel movement

## 2022-06-03 LAB
CULTURE: NORMAL
SPECIMEN DESCRIPTION: NORMAL

## 2022-06-07 NOTE — ED PROVIDER NOTES
[address] [facility]   Emergency Department  Faculty Attestation       I performed a history and physical examination of the patient and discussed management with the resident. I reviewed the residents note and agree with the documented findings including all diagnostic interpretations and plan of care. Any areas of disagreement are noted on the chart. I was personally present for the key portions of any procedures. I have documented in the chart those procedures where I was not present during the key portions. I have reviewed the emergency nurses triage note. I agree with the chief complaint, past medical history, past surgical history, allergies, medications, social and family history as documented unless otherwise noted below. Documentation of the HPI, Physical Exam and Medical Decision Making performed by scribann marie is based on my personal performance of the HPI, PE and MDM. For Physician Assistant/ Nurse Practitioner cases/documentation I have personally evaluated this patient and have completed at least one if not all key elements of the E/M (history, physical exam, and MDM). Additional findings are as noted. Pertinent Comments     Primary Care Physician: Michelle Encarnacion      ED Triage Vitals [06/02/22 0403]   BP Temp Temp Source Heart Rate Resp SpO2 Height Weight   (!) 158/73 98.6 °F (37 °C) Oral 88 17 98 % 5' 5\" (1.651 m) 175 lb (79.4 kg)        History/Physical:   This is a 66 y.o. female who presents to the Emergency Department with complaint of constipation and decreased appetite. Patient had CoVID several weeks ago and per chart review she was seen at another facility a few days prior due to decreased oral intake. Patient states that she has not had a bowel movement in several days. However, she reports that she also has had literal to no food intake because she has not had an appetite. Otherwise had generalized malaise. Patient is a poor historian.      On exam patient is awake and alert in no distress. Normocephalic atraumatic. There is mildly dry mucus membranes. Heart sounds regular with no rubs or gallops. Lungs CTAB. Abdomen is soft and nondistended. Normoactive bowel sounds with no significant tenderness. No rebound or guarding. Patient is alert and oriented but slow to respond at times (per baseline). No significant extremity deformity. MDM/Plan:   Patient c/o constipation. Likely related to decreased oral intake. Patient is tearful when I discussed this with her stating that she has no appetite. Abdomen is non-peritoneal  Given that patient is a poor historian with vague complaints we did check basic labs  LR bolus    EKG Interpretation    Interpreted by emergency department physician    Clinical Impression: Sinus with PVCs and RBBB.      Christofer Duque MD        Critical Care: None     Christofer Duque MD  Attending Emergency Physician        Christofer Duque MD  06/06/22 5851

## 2022-06-09 ENCOUNTER — APPOINTMENT (OUTPATIENT)
Dept: GENERAL RADIOLOGY | Age: 72
End: 2022-06-09
Payer: COMMERCIAL

## 2022-06-09 ENCOUNTER — HOSPITAL ENCOUNTER (EMERGENCY)
Age: 72
Discharge: HOME | End: 2022-06-10
Attending: EMERGENCY MEDICINE
Payer: COMMERCIAL

## 2022-06-09 VITALS
OXYGEN SATURATION: 98 % | HEART RATE: 67 BPM | SYSTOLIC BLOOD PRESSURE: 118 MMHG | RESPIRATION RATE: 16 BRPM | DIASTOLIC BLOOD PRESSURE: 64 MMHG | TEMPERATURE: 97.2 F

## 2022-06-09 DIAGNOSIS — R33.9 URINARY RETENTION: ICD-10-CM

## 2022-06-09 DIAGNOSIS — N30.00 ACUTE CYSTITIS WITHOUT HEMATURIA: Primary | ICD-10-CM

## 2022-06-09 LAB
-: ABNORMAL
ABSOLUTE EOS #: 0.11 K/UL (ref 0–0.44)
ABSOLUTE IMMATURE GRANULOCYTE: <0.03 K/UL (ref 0–0.3)
ABSOLUTE LYMPH #: 2.05 K/UL (ref 1.1–3.7)
ABSOLUTE MONO #: 0.7 K/UL (ref 0.1–1.2)
ANION GAP SERPL CALCULATED.3IONS-SCNC: 11 MMOL/L (ref 9–17)
BACTERIA: ABNORMAL
BASOPHILS # BLD: 1 % (ref 0–2)
BASOPHILS ABSOLUTE: 0.03 K/UL (ref 0–0.2)
BILIRUBIN URINE: NEGATIVE
BUN BLDV-MCNC: 9 MG/DL (ref 8–23)
CALCIUM SERPL-MCNC: 9.2 MG/DL (ref 8.6–10.4)
CHLORIDE BLD-SCNC: 105 MMOL/L (ref 98–107)
CO2: 25 MMOL/L (ref 20–31)
COLOR: YELLOW
CREAT SERPL-MCNC: 0.68 MG/DL (ref 0.5–0.9)
EOSINOPHILS RELATIVE PERCENT: 2 % (ref 1–4)
EPITHELIAL CELLS UA: ABNORMAL /HPF (ref 0–5)
GFR AFRICAN AMERICAN: >60 ML/MIN
GFR NON-AFRICAN AMERICAN: >60 ML/MIN
GFR SERPL CREATININE-BSD FRML MDRD: ABNORMAL ML/MIN/{1.73_M2}
GLUCOSE BLD-MCNC: 81 MG/DL (ref 70–99)
GLUCOSE URINE: NEGATIVE
HCT VFR BLD CALC: 34.9 % (ref 36.3–47.1)
HEMOGLOBIN: 11 G/DL (ref 11.9–15.1)
IMMATURE GRANULOCYTES: 0 %
KETONES, URINE: NEGATIVE
LEUKOCYTE ESTERASE, URINE: ABNORMAL
LYMPHOCYTES # BLD: 33 % (ref 24–43)
MAGNESIUM: 1.7 MG/DL (ref 1.6–2.6)
MCH RBC QN AUTO: 28.6 PG (ref 25.2–33.5)
MCHC RBC AUTO-ENTMCNC: 31.5 G/DL (ref 28.4–34.8)
MCV RBC AUTO: 90.9 FL (ref 82.6–102.9)
MONOCYTES # BLD: 11 % (ref 3–12)
NITRITE, URINE: NEGATIVE
NRBC AUTOMATED: 0 PER 100 WBC
PDW BLD-RTO: 14.5 % (ref 11.8–14.4)
PH UA: 6.5 (ref 5–8)
PLATELET # BLD: 260 K/UL (ref 138–453)
PMV BLD AUTO: 9.1 FL (ref 8.1–13.5)
POTASSIUM SERPL-SCNC: 3.3 MMOL/L (ref 3.7–5.3)
PROTEIN UA: NEGATIVE
RBC # BLD: 3.84 M/UL (ref 3.95–5.11)
RBC # BLD: ABNORMAL 10*6/UL
RBC UA: ABNORMAL /HPF (ref 0–2)
SEG NEUTROPHILS: 53 % (ref 36–65)
SEGMENTED NEUTROPHILS ABSOLUTE COUNT: 3.39 K/UL (ref 1.5–8.1)
SODIUM BLD-SCNC: 141 MMOL/L (ref 135–144)
SPECIFIC GRAVITY UA: 1 (ref 1–1.03)
TURBIDITY: CLEAR
URINE HGB: NEGATIVE
UROBILINOGEN, URINE: NORMAL
WBC # BLD: 6.3 K/UL (ref 3.5–11.3)
WBC UA: ABNORMAL /HPF (ref 0–5)

## 2022-06-09 PROCEDURE — 96374 THER/PROPH/DIAG INJ IV PUSH: CPT

## 2022-06-09 PROCEDURE — 99284 EMERGENCY DEPT VISIT MOD MDM: CPT

## 2022-06-09 PROCEDURE — 81001 URINALYSIS AUTO W/SCOPE: CPT

## 2022-06-09 PROCEDURE — 83735 ASSAY OF MAGNESIUM: CPT

## 2022-06-09 PROCEDURE — 74018 RADEX ABDOMEN 1 VIEW: CPT

## 2022-06-09 PROCEDURE — 80048 BASIC METABOLIC PNL TOTAL CA: CPT

## 2022-06-09 PROCEDURE — 85025 COMPLETE CBC W/AUTO DIFF WBC: CPT

## 2022-06-09 ASSESSMENT — PAIN DESCRIPTION - LOCATION: LOCATION: ABDOMEN

## 2022-06-09 ASSESSMENT — PAIN SCALES - GENERAL: PAINLEVEL_OUTOF10: 10

## 2022-06-09 ASSESSMENT — ENCOUNTER SYMPTOMS
ABDOMINAL PAIN: 1
BACK PAIN: 0
DIARRHEA: 0
RECTAL PAIN: 0
SHORTNESS OF BREATH: 0
VOMITING: 0
NAUSEA: 0
TROUBLE SWALLOWING: 0
CONSTIPATION: 0

## 2022-06-09 ASSESSMENT — PAIN DESCRIPTION - FREQUENCY: FREQUENCY: CONTINUOUS

## 2022-06-09 ASSESSMENT — PAIN - FUNCTIONAL ASSESSMENT: PAIN_FUNCTIONAL_ASSESSMENT: 0-10

## 2022-06-09 ASSESSMENT — PAIN DESCRIPTION - DESCRIPTORS: DESCRIPTORS: BURNING

## 2022-06-10 PROCEDURE — 6360000002 HC RX W HCPCS: Performed by: HEALTH CARE PROVIDER

## 2022-06-10 PROCEDURE — 2500000003 HC RX 250 WO HCPCS: Performed by: HEALTH CARE PROVIDER

## 2022-06-10 RX ORDER — CEPHALEXIN 500 MG/1
500 CAPSULE ORAL 4 TIMES DAILY
Qty: 28 CAPSULE | Refills: 0 | Status: SHIPPED | OUTPATIENT
Start: 2022-06-10 | End: 2022-06-17

## 2022-06-10 RX ADMIN — CEFTRIAXONE SODIUM 1000 MG: 1 INJECTION, POWDER, FOR SOLUTION INTRAMUSCULAR; INTRAVENOUS at 00:36

## 2022-06-10 NOTE — ED PROVIDER NOTES
[facility]     Emergency Department     Faculty Note/ Attestation      Pt Name: Eliud Stanley                                       MRN: [id number]  Sharongfofelia 1950  Date of evaluation: 6/9/2022    Patients PCP:    Estefany Vicente      Attestation  I performed a history and physical examination of the patient and discussed management with the resident. I reviewed the residents note and agree with the documented findings and plan of care. Any areas of disagreement are noted on the chart. I was personally present for the key portions of any procedures. I have documented in the chart those procedures where I was not present during the key portions. I have reviewed the emergency nurses triage note. I agree with the chief complaint, past medical history, past surgical history, allergies, medications, social and family history as documented unless otherwise noted below. For Physician Assistant/ Nurse Practitioner cases/documentation I have personally evaluated this patient and have completed at least one if not all key elements of the E/M (history, physical exam, and MDM). Additional findings are as noted.       Initial Screens:             Vitals:    Vitals:    06/09/22 2004   BP: 118/64   Pulse: 67   Resp: 16   Temp: 97.2 °F (36.2 °C)   TempSrc: Oral   SpO2: 98%       CHIEF COMPLAINT       Chief Complaint   Patient presents with    Abdominal Pain             DIAGNOSTIC RESULTS             RADIOLOGY:   No orders to display         LABS:  Labs Reviewed   CBC WITH AUTO DIFFERENTIAL   BASIC METABOLIC PANEL W/ REFLEX TO MG FOR LOW K   URINALYSIS WITH REFLEX TO CULTURE         EMERGENCY DEPARTMENT COURSE:     -------------------------  BP: 118/64, Temp: 97.2 °F (36.2 °C), Heart Rate: 67, Resp: 16      Comments    Dysuria, dec UOP, emesis x1, abd pain  Ha had abd pain like this prior, can't remember when or what treatment she had    Plan for basic labs and UA, bladder scan +/- cath      11:38 PM EDT  Patient was able to void, likely UTI however states she has had constipation issues. X-ray shows nonobstructive pattern, urology was consulted, they recommend Mays placement and discharge. We will decompress the bladder with a Mays, if patient is feeling better we will discharge her with antibiotics otherwise may need further imaging.   We will sign out the patient to Dr. Breanna Rubi    (Please note that portions of this note were completed with a voice recognition program.  Efforts were made to edit the dictations but occasionally words are mis-transcribed.)      Adeola Salvador MD,, MD  Attending Emergency Physician         Adeola Salvador MD  06/09/22 0241

## 2022-06-10 NOTE — ED PROVIDER NOTES
[facility] ED  Emergency Department Encounter  Emergency Medicine Resident     Pt Name: Tamara Chaudhry  MRN: [id number]  [place]elia 1950  Date of evaluation: 6/9/22  PCP:  Jack Angel 5       Chief Complaint   Patient presents with    Abdominal Pain       HISTORY OFPRESENT ILLNESS  (Location/Symptom, Timing/Onset, Context/Setting, Quality, Duration, Modifying Factors,Severity.)      Tamara Chaudhry is a 66 y.o. female who presents with urinary retention. Patient states that she has not voided since approximately 1 PM yesterday. Complains of severe suprapubic pain. No radiation. No exacerbating or alleviating factors. She states that she has had 1 episode of nonbilious, nonbloody emesis, as well as 1 loose, watery, brown bowel movement earlier today. Patient reports having had similar symptoms when she had a UTI, for which she was admitted recently. She denies fever, chills, chest pain, shortness of breath, weakness, back pain, saddle anesthesia, hematuria or dysuria. PAST MEDICAL / SURGICAL / SOCIAL / FAMILY HISTORY      has a past medical history of Arthritis, Bronchitis, Chronic obstructive pulmonary disease ([address] Colon polyps, Controlled type 2 diabetes mellitus without complication, without long-term current use of insulin ([address] Dental neglect, Depression, Environmental allergies, GERD (gastroesophageal reflux disease), Hyperlipidemia, Hypertension, Obesity, Onychomycosis, Poor historian, RBBB, Treadmill stress test negative for angina pectoris, and Wears glasses. has a past surgical history that includes Cholecystectomy; doppler echocardiography; Colonoscopy (6/2013); and Tubal ligation.      Social History     Socioeconomic History    Marital status: Single     Spouse name: Not on file    Number of children: Not on file    Years of education: Not on file    Highest education level: Not on file   Occupational History    Not on file   Tobacco Use    Smoking status: Former Smoker     Packs/day: 0.50     Years: 35.00     Pack years: 17.50     Types: Cigarettes     Quit date: 2015     Years since quittin.8    Smokeless tobacco: Never Used   Substance and Sexual Activity    Alcohol use: No     Alcohol/week: 0.0 standard drinks    Drug use: No    Sexual activity: Not on file   Other Topics Concern    Not on file   Social History Narrative    Not on file     Social Determinants of Health     Financial Resource Strain: Low Risk     Difficulty of Paying Living Expenses: Not hard at all   Food Insecurity: No Food Insecurity    Worried About Running Out of Food in the Last Year: Never true    Marcos of Food in the Last Year: Never true   Transportation Needs: No Transportation Needs    Lack of Transportation (Medical): No    Lack of Transportation (Non-Medical): No   Physical Activity:     Days of Exercise per Week: Not on file    Minutes of Exercise per Session: Not on file   Stress:     Feeling of Stress : Not on file   Social Connections:     Frequency of Communication with Friends and Family: Not on file    Frequency of Social Gatherings with Friends and Family: Not on file    Attends Evangelical Services: Not on file    Active Member of [address] [place] [address] [employer] or Organizations: Not on file    Attends Club or Organization Meetings: Not on file    Marital Status: Not on file   Intimate Partner Violence:     Fear of Current or Ex-Partner: Not on file    Emotionally Abused: Not on file    Physically Abused: Not on file    Sexually Abused: Not on file   Housing Stability:     Unable to Pay for Housing in the Last Year: Not on file    Number of [place] in the Last Year: Not on file    Unstable Housing in the Last Year: Not on file       Family History   Problem Relation Age of Onset    Heart Disease Mother     Cancer Father         Allergies:  Patient has no known allergies.     Home Medications:  Prior to Admission medications    Medication Sig Start Date End Date Taking? Authorizing Provider   cephALEXin (KEFLEX) 500 MG capsule Take 1 capsule by mouth 4 times daily for 7 days 6/10/22 6/17/22 Yes Adriana Melton MD   acetaminophen (TYLENOL) 500 MG tablet Take 2 tablets by mouth 4 times daily as needed for Pain 5/17/22   Trae Rash Gruenbaum, DO   ibuprofen (IBU) 800 MG tablet Take 1 tablet by mouth every 8 hours as needed for Pain 5/17/22   Trae Rash Gruenbaum, DO   ciclopirox (LOPROX) 0.77 % cream ciclopirox 0.77 % topical cream    Historical Provider, MD   albuterol sulfate  (90 Base) MCG/ACT inhaler albuterol sulfate HFA 90 mcg/actuation aerosol inhaler    Historical Provider, MD   atorvastatin (LIPITOR) 20 MG tablet Take 1 tablet by mouth daily 6/29/21   Jaziel Velasco MD   tiotropium (SPIRIVA RESPIMAT) 1.25 MCG/ACT AERS inhaler Inhale 2 puffs into the lungs daily 8/12/19   Claudette Staples MD   haloperidol (HALDOL) 5 MG tablet Take 5 mg by mouth 2 times daily    Historical Provider, MD   traZODone (DESYREL) 100 MG tablet  8/9/17   Historical Provider, MD   Elastic Bandages & Supports (ACE ARM SLING) MISC Apply 1 Units topically daily 4/17/17   Randi Calvert MD   trihexyphenidyl (ARTANE) 2 MG tablet Take 2 mg by mouth 2 times daily  10/10/14   Historical Provider, MD       REVIEW OFSYSTEMS    (2-9 systems for level 4, 10 or more for level 5)      Review of Systems   Constitutional: Negative for chills and fever. HENT: Negative for trouble swallowing. Respiratory: Negative for shortness of breath. Cardiovascular: Negative for chest pain. Gastrointestinal: Positive for abdominal pain. Negative for constipation, diarrhea, nausea, rectal pain and vomiting. Genitourinary: Positive for difficulty urinating. Negative for dysuria, hematuria, vaginal bleeding, vaginal discharge and vaginal pain. Musculoskeletal: Negative for back pain. Allergic/Immunologic: Negative for immunocompromised state. Neurological: Negative for weakness. Hematological: Does not bruise/bleed easily. PHYSICAL EXAM   (up to 7 for level 4, 8 or more forlevel 5)      INITIAL VITALS:   ED Triage Vitals [06/09/22 2004]   BP Temp Temp Source Heart Rate Resp SpO2 Height Weight   118/64 97.2 °F (36.2 °C) Oral 67 16 98 % -- --       Physical Exam  Vitals reviewed. Exam conducted with a chaperone present. Constitutional:       General: She is not in acute distress. Appearance: She is well-developed. HENT:      Head: Normocephalic and atraumatic. Mouth/Throat:      Mouth: Mucous membranes are moist.      Pharynx: Oropharynx is clear. Eyes:      Extraocular Movements: Extraocular movements intact. Cardiovascular:      Rate and Rhythm: Normal rate and regular rhythm. Heart sounds: Normal heart sounds. Pulmonary:      Effort: Pulmonary effort is normal.      Breath sounds: Normal breath sounds. Abdominal:      General: Abdomen is flat. Palpations: Abdomen is soft. Tenderness: There is abdominal tenderness in the suprapubic area. There is no right CVA tenderness or left CVA tenderness. Hernia: No hernia is present. Genitourinary:     Rectum: Tenderness present. Comments: Stool in rectal vault, but not impacted  Skin:     General: Skin is warm and dry. Capillary Refill: Capillary refill takes less than 2 seconds. Neurological:      General: No focal deficit present. Mental Status: She is alert and oriented to person, place, and time.    Psychiatric:         Mood and Affect: Mood normal.         Behavior: Behavior normal.         DIFFERENTIAL  DIAGNOSIS     PLAN (LABS / IMAGING / EKG):  Orders Placed This Encounter   Procedures    XR ABDOMEN (KUB) (SINGLE AP VIEW)    CBC with Auto Differential    Basic Metabolic Panel w/ Reflex to MG    Urinalysis with Reflex to Culture    Magnesium    Microscopic Urinalysis    Catheter removal    Inpatient consult to Urology       MEDICATIONS ORDERED:  Orders Placed This Encounter   Medications    DISCONTD: cefTRIAXone (ROCEPHIN) 1000 mg IVPB in NS 50ml minibag     Order Specific Question:   Antimicrobial Indications     Answer:   Urinary Tract Infection    cephALEXin (KEFLEX) 500 MG capsule     Sig: Take 1 capsule by mouth 4 times daily for 7 days     Dispense:  28 capsule     Refill:  0    cefTRIAXone (ROCEPHIN) 1,000 mg in sterile water 10 mL IV syringe     Order Specific Question:   Antimicrobial Indications     Answer:   Urinary Tract Infection       DDX: UTI, urinary outflow obstruction, constipation, new onset incontinence    Initial MDM/Plan: 66 y.o. female who presents with urinary retention and suprapubic pain. Symptoms most likely secondary to urinary tract infection, given previous symptoms. Will attempt spontaneous voiding and measure postvoid residual.  Will place Mays if necessary. Patient continues to have pain after either voiding or Mays placement, will obtain abdominal CT.     DIAGNOSTIC RESULTS / EMERGENCYDEPARTMENT COURSE / MDM     LABS:  Labs Reviewed   CBC WITH AUTO DIFFERENTIAL - Abnormal; Notable for the following components:       Result Value    RBC 3.84 (*)     Hemoglobin 11.0 (*)     Hematocrit 34.9 (*)     RDW 14.5 (*)     All other components within normal limits   BASIC METABOLIC PANEL W/ REFLEX TO MG FOR LOW K - Abnormal; Notable for the following components:    Potassium 3.3 (*)     All other components within normal limits   URINALYSIS WITH REFLEX TO CULTURE - Abnormal; Notable for the following components:    Specific Gravity, UA 1.004 (*)     Leukocyte Esterase, Urine SMALL (*)     All other components within normal limits   MICROSCOPIC URINALYSIS - Abnormal; Notable for the following components:    Bacteria, UA FEW (*)     All other components within normal limits   MAGNESIUM         RADIOLOGY:  XR ABDOMEN (KUB) (SINGLE AP VIEW)    Result Date: 6/9/2022  EXAMINATION: ONE SUPINE XRAY VIEW(S) OF THE ABDOMEN 6/9/2022 10:53 pm COMPARISON: None. HISTORY: ORDERING SYSTEM PROVIDED HISTORY: Abdominal pain, urinary retention TECHNOLOGIST PROVIDED HISTORY: Abdominal pain, urinary retention Reason for Exam: Supine port, urinary retention FINDINGS: There is mild nonobstructive gaseous distension of large and small bowel. Urinary bladder does not appear significantly distended. There are no abnormal calcifications or soft tissue masses. Lung bases are clear. Mild nonobstructive gaseous distention of bowel. Urinary bladder does not appear significantly distended. EMERGENCY DEPARTMENT COURSE:  ED Course as of 06/11/22 0203   Fri Dalton 10, 2022   0120 Urinalysis consistent with acute cystitis. Patient did have postvoid residual of 370 cc. Mays was placed and drained this amount. Patient reported a resolution of symptoms thereafter. Discussed patient with urology, who recommends that she be seen as an outpatient for further work-up of her urinary retention. Mays was removed. Will plan for discharge. [GG]      ED Course User Index  [GG] Mason Juarez MD          PROCEDURES:  None    CONSULTS:  IP CONSULT TO UROLOGY    CRITICAL CARE:  Please see attending note    FINAL IMPRESSION      1. Acute cystitis without hematuria    2.  Urinary retention          DISPOSITION / PLAN     DISPOSITION Decision To Discharge 06/10/2022 01:28:08 AM      PATIENT REFERRED TO:  Lyndsey Valentine MD  [address] [facility] [address]  [phone]    In 1 week  Follow up on Friday, June 17th    [facility]  [address] [facility]  [phone]    If symptoms worsen      DISCHARGE MEDICATIONS:  Discharge Medication List as of 6/10/2022  1:28 AM      START taking these medications    Details   cephALEXin (KEFLEX) 500 MG capsule Take 1 capsule by mouth 4 times daily for 7 days, Disp-28 capsule, R-0Print             Mason Juarez MD  Emergency Medicine Resident    (Please note that portions of this note were completed with a voice recognition program.Efforts were made to edit the dictations but occasionally words are mis-transcribed.)        Mason Juarez MD  Resident  06/11/22 1155

## 2022-06-10 NOTE — ED NOTES
SW approached by RN in triage wondering where patient's cab was as she reports pt was getting inpatient.  arrived as SW went to lobby and pt became calm. Per RN  son reported pt is to take a cab back to home once discharged and he will not be picking pt up. Son reported pt ok to cab. SW spoke with  who will bring pt back if son does not come to door.       JAYNA Mensah  06/10/22 8673

## 2022-06-10 NOTE — ED NOTES
SW consulted for transportation back to address on file. [facility] utilized. Pt reports she has key to get in. Pt provided address of [address]. Confirmation # [id number] Per  Attending Physician Dr. Tawanna Yusuf pt ok to cab.       Elida Gama, hospitals  06/10/22 [address] [facility] [address], hospitals  06/10/22 [facility] [address], hospitals  06/10/22 1012

## 2022-06-17 NOTE — ED PROVIDER NOTES
[facility]   Emergency Department  Emergency Medicine Attending Sign-out     Care of Quyen Odonnell was assumed from previous attending Dr. Reagan Del Rosario and is being seen for Abdominal Pain  . The patient's initial evaluation and plan have been discussed with the prior provider who initially evaluated the patient. Attestation  I was available and discussed any additional care issues that arose and coordinated the management plans with the resident(s) caring for the patient during my duty period. Any areas of disagreement with resident's documentation of care or procedures are noted on the chart. I was personally present for the key portions of any/all procedures, during my duty period. I have documented in the chart those procedures where I was not present during the key portions. BRIEF PATIENT SUMMARY/MDM COURSE PER INITIAL PROVIDER:   RECENT VITALS:     Temp: 97.2 °F (36.2 °C),  Heart Rate: 67, Resp: 16, BP: 118/64, SpO2: 98 %    This patient is a 66 y.o. Female with urinary retention, found to have a urinary tract infection.   Plan for likely discharge    DIAGNOSTICS/MEDICATIONS:     MEDICATIONS GIVEN:  ED Medication Orders (From admission, onward)    Start Ordered     Status Ordering Provider    06/10/22 0030 06/10/22 0018  cefTRIAXone (ROCEPHIN) 1,000 mg in sterile water 10 mL IV syringe  ONCE        Question:  Antimicrobial Indications  Answer:  Urinary Tract Infection    Last MAR action: Given - by Payton Ruiz on 06/10/22 at [address] [place] [address] Reviewed   CBC WITH AUTO DIFFERENTIAL - Abnormal; Notable for the following components:       Result Value    RBC 3.84 (*)     Hemoglobin 11.0 (*)     Hematocrit 34.9 (*)     RDW 14.5 (*)     All other components within normal limits   BASIC METABOLIC PANEL W/ REFLEX TO MG FOR LOW K - Abnormal; Notable for the following components:    Potassium 3.3 (*)     All other components within normal limits   URINALYSIS WITH REFLEX TO CULTURE - Abnormal; Notable for the following components:    Specific Gravity, UA 1.004 (*)     Leukocyte Esterase, Urine SMALL (*)     All other components within normal limits   MICROSCOPIC URINALYSIS - Abnormal; Notable for the following components:    Bacteria, UA FEW (*)     All other components within normal limits   MAGNESIUM       RADIOLOGY  XR ABDOMEN (KUB) (SINGLE AP VIEW)    Result Date: 6/9/2022  EXAMINATION: ONE SUPINE XRAY VIEW(S) OF THE ABDOMEN 6/9/2022 10:53 pm COMPARISON: None. HISTORY: ORDERING SYSTEM PROVIDED HISTORY: Abdominal pain, urinary retention TECHNOLOGIST PROVIDED HISTORY: Abdominal pain, urinary retention Reason for Exam: Supine port, urinary retention FINDINGS: There is mild nonobstructive gaseous distension of large and small bowel. Urinary bladder does not appear significantly distended. There are no abnormal calcifications or soft tissue masses. Lung bases are clear. Mild nonobstructive gaseous distention of bowel. Urinary bladder does not appear significantly distended. OUTSTANDING TASKS / ADDITIONAL COMMENTS   1.  Plan for discharge    Lisebth Toro MD  Emergency Medicine Attending  [address] Sandro Mora MD  06/17/22 4285

## 2022-06-18 ENCOUNTER — APPOINTMENT (OUTPATIENT)
Dept: GENERAL RADIOLOGY | Age: 72
End: 2022-06-18
Payer: COMMERCIAL

## 2022-06-18 ENCOUNTER — HOSPITAL ENCOUNTER (EMERGENCY)
Age: 72
Discharge: HOME | End: 2022-06-18
Attending: EMERGENCY MEDICINE
Payer: COMMERCIAL

## 2022-06-18 VITALS
HEIGHT: 65 IN | OXYGEN SATURATION: 98 % | DIASTOLIC BLOOD PRESSURE: 64 MMHG | TEMPERATURE: 98 F | RESPIRATION RATE: 16 BRPM | WEIGHT: 175 LBS | BODY MASS INDEX: 29.16 KG/M2 | HEART RATE: 72 BPM | SYSTOLIC BLOOD PRESSURE: 117 MMHG

## 2022-06-18 DIAGNOSIS — U07.1 COVID: Primary | ICD-10-CM

## 2022-06-18 LAB
ABSOLUTE EOS #: 0.1 K/UL (ref 0–0.4)
ABSOLUTE LYMPH #: 2 K/UL (ref 1–4.8)
ABSOLUTE MONO #: 0.9 K/UL (ref 0.1–1.3)
ALBUMIN SERPL-MCNC: 3.7 G/DL (ref 3.5–5.2)
ALP BLD-CCNC: 110 U/L (ref 35–104)
ALT SERPL-CCNC: 26 U/L (ref 5–33)
ANION GAP SERPL CALCULATED.3IONS-SCNC: 8 MMOL/L (ref 9–17)
AST SERPL-CCNC: 19 U/L
BACTERIA: ABNORMAL
BASOPHILS # BLD: 1 % (ref 0–2)
BASOPHILS ABSOLUTE: 0 K/UL (ref 0–0.2)
BILIRUB SERPL-MCNC: 0.19 MG/DL (ref 0.3–1.2)
BILIRUBIN URINE: NEGATIVE
BUN BLDV-MCNC: 17 MG/DL (ref 8–23)
CALCIUM SERPL-MCNC: 9.2 MG/DL (ref 8.6–10.4)
CASTS UA: ABNORMAL /LPF
CHLORIDE BLD-SCNC: 103 MMOL/L (ref 98–107)
CO2: 27 MMOL/L (ref 20–31)
COLOR: YELLOW
CREAT SERPL-MCNC: 0.76 MG/DL (ref 0.5–0.9)
EOSINOPHILS RELATIVE PERCENT: 1 % (ref 0–4)
EPITHELIAL CELLS UA: ABNORMAL /HPF
GFR AFRICAN AMERICAN: >60 ML/MIN
GFR NON-AFRICAN AMERICAN: >60 ML/MIN
GFR SERPL CREATININE-BSD FRML MDRD: ABNORMAL ML/MIN/{1.73_M2}
GLUCOSE BLD-MCNC: 132 MG/DL (ref 70–99)
GLUCOSE URINE: NEGATIVE
HCT VFR BLD CALC: 37.3 % (ref 36–46)
HEMOGLOBIN: 12.4 G/DL (ref 12–16)
INFLUENZA A: NOT DETECTED
INFLUENZA B: NOT DETECTED
KETONES, URINE: NEGATIVE
LEUKOCYTE ESTERASE, URINE: ABNORMAL
LIPASE: 20 U/L (ref 13–60)
LYMPHOCYTES # BLD: 22 % (ref 24–44)
MAGNESIUM: 2 MG/DL (ref 1.6–2.6)
MCH RBC QN AUTO: 28.8 PG (ref 26–34)
MCHC RBC AUTO-ENTMCNC: 33.2 G/DL (ref 31–37)
MCV RBC AUTO: 86.8 FL (ref 80–100)
MONOCYTES # BLD: 10 % (ref 1–7)
NITRITE, URINE: NEGATIVE
PDW BLD-RTO: 15.9 % (ref 11.5–14.9)
PH UA: 5.5 (ref 5–8)
PLATELET # BLD: 299 K/UL (ref 150–450)
PMV BLD AUTO: 7.1 FL (ref 6–12)
POTASSIUM SERPL-SCNC: 3.9 MMOL/L (ref 3.7–5.3)
PROTEIN UA: NEGATIVE
RBC # BLD: 4.3 M/UL (ref 4–5.2)
RBC UA: ABNORMAL /HPF
SARS-COV-2 RNA, RT PCR: DETECTED
SEG NEUTROPHILS: 66 % (ref 36–66)
SEGMENTED NEUTROPHILS ABSOLUTE COUNT: 6 K/UL (ref 1.3–9.1)
SODIUM BLD-SCNC: 138 MMOL/L (ref 135–144)
SOURCE: ABNORMAL
SPECIFIC GRAVITY UA: 1.01 (ref 1–1.03)
SPECIMEN DESCRIPTION: ABNORMAL
TOTAL PROTEIN: 6.5 G/DL (ref 6.4–8.3)
TROPONIN, HIGH SENSITIVITY: 12 NG/L (ref 0–14)
TROPONIN, HIGH SENSITIVITY: 13 NG/L (ref 0–14)
TURBIDITY: CLEAR
URINE HGB: NEGATIVE
UROBILINOGEN, URINE: NORMAL
WBC # BLD: 9.1 K/UL (ref 3.5–11)
WBC UA: ABNORMAL /HPF

## 2022-06-18 PROCEDURE — 83735 ASSAY OF MAGNESIUM: CPT

## 2022-06-18 PROCEDURE — 85025 COMPLETE CBC W/AUTO DIFF WBC: CPT

## 2022-06-18 PROCEDURE — 81001 URINALYSIS AUTO W/SCOPE: CPT

## 2022-06-18 PROCEDURE — 71045 X-RAY EXAM CHEST 1 VIEW: CPT

## 2022-06-18 PROCEDURE — 80053 COMPREHEN METABOLIC PANEL: CPT

## 2022-06-18 PROCEDURE — 36415 COLL VENOUS BLD VENIPUNCTURE: CPT

## 2022-06-18 PROCEDURE — 93005 ELECTROCARDIOGRAM TRACING: CPT | Performed by: STUDENT IN AN ORGANIZED HEALTH CARE EDUCATION/TRAINING PROGRAM

## 2022-06-18 PROCEDURE — 96374 THER/PROPH/DIAG INJ IV PUSH: CPT

## 2022-06-18 PROCEDURE — 83690 ASSAY OF LIPASE: CPT

## 2022-06-18 PROCEDURE — 99285 EMERGENCY DEPT VISIT HI MDM: CPT

## 2022-06-18 PROCEDURE — 96361 HYDRATE IV INFUSION ADD-ON: CPT

## 2022-06-18 PROCEDURE — 84484 ASSAY OF TROPONIN QUANT: CPT

## 2022-06-18 PROCEDURE — 2580000003 HC RX 258: Performed by: STUDENT IN AN ORGANIZED HEALTH CARE EDUCATION/TRAINING PROGRAM

## 2022-06-18 PROCEDURE — 87636 SARSCOV2 & INF A&B AMP PRB: CPT

## 2022-06-18 PROCEDURE — 6360000002 HC RX W HCPCS: Performed by: STUDENT IN AN ORGANIZED HEALTH CARE EDUCATION/TRAINING PROGRAM

## 2022-06-18 RX ORDER — 0.9 % SODIUM CHLORIDE 0.9 %
1000 INTRAVENOUS SOLUTION INTRAVENOUS ONCE
Status: COMPLETED | OUTPATIENT
Start: 2022-06-18 | End: 2022-06-18

## 2022-06-18 RX ORDER — ONDANSETRON 2 MG/ML
4 INJECTION INTRAMUSCULAR; INTRAVENOUS ONCE
Status: COMPLETED | OUTPATIENT
Start: 2022-06-18 | End: 2022-06-18

## 2022-06-18 RX ORDER — ONDANSETRON 4 MG/1
4 TABLET, ORALLY DISINTEGRATING ORAL EVERY 8 HOURS PRN
Qty: 15 TABLET | Refills: 0 | Status: SHIPPED | OUTPATIENT
Start: 2022-06-18

## 2022-06-18 RX ADMIN — SODIUM CHLORIDE 1000 ML: 9 INJECTION, SOLUTION INTRAVENOUS at 17:33

## 2022-06-18 RX ADMIN — ONDANSETRON 4 MG: 2 INJECTION INTRAMUSCULAR; INTRAVENOUS at 17:33

## 2022-06-18 ASSESSMENT — PAIN - FUNCTIONAL ASSESSMENT
PAIN_FUNCTIONAL_ASSESSMENT: 0-10

## 2022-06-18 ASSESSMENT — ENCOUNTER SYMPTOMS
SHORTNESS OF BREATH: 0
CONSTIPATION: 0
BLOOD IN STOOL: 0
COUGH: 1
NAUSEA: 1
DIARRHEA: 0
VOMITING: 1
ABDOMINAL PAIN: 1

## 2022-06-18 ASSESSMENT — PAIN DESCRIPTION - LOCATION: LOCATION: ABDOMEN

## 2022-06-18 ASSESSMENT — PAIN SCALES - GENERAL
PAINLEVEL_OUTOF10: 5

## 2022-06-18 NOTE — ED TRIAGE NOTES
Mode of arrival (squad #, walk in, police, etc) : walk in        Chief complaint(s): dehydration        Arrival Note (brief scenario, treatment PTA, etc). : pt states she has not been drinking for the last 2 days. Pt unable to provide a reason as to why. Pt also c/o abdominal pain that she has had for \"a long time\". Pt feels dehydrated. C= \"Have you ever felt that you should Cut down on your drinking? \"  No  A= \"Have people Annoyed you by criticizing your drinking? \"  No  G= \"Have you ever felt bad or Guilty about your drinking? \"  No  E= \"Have you ever had a drink as an Eye-opener first thing in the morning to steady your nerves or to help a hangover? \"  No      Deferred []      Reason for deferring: N/A    *If yes to two or more: probable alcohol abuse. *

## 2022-06-18 NOTE — ED PROVIDER NOTES
[address]  Emergency Department Encounter  Emergency Medicine Resident     Pt Name: Cassy Lance  NCS:[id number]  [place] 1950  Date of evaluation: 6/18/22  PCP:  Je Rubin       Chief Complaint   Patient presents with    Dehydration     HISTORY OF PRESENT ILLNESS  (Location/Symptom, Timing/Onset, Context/Setting, Quality, Duration, ModifyingFactors, Severity.)      Cassy Lance is a 66 y.o. female with PMH of hypertension, hyperlipidemia, COPD, diabetes who presents for evaluation of \"dehydration\", abdominal pain, vomiting. Patient reports that she has had diffuse abdominal pain and vomiting for the past 2 days and not drinking much water. Now feels like she is dehydrated and \"going to pass out\". Patient does admit to cough but is unsure if this is changed from baseline or not. No numbness, weakness, changes in vision, chest pain, shortness of breath, hematemesis, constipation, diarrhea, blood in stool, urinary symptoms, vaginal bleeding/discharge. Patient lives with her son and feels safe at home. Does not have air-conditioning but does have fans at home that she uses to keep cool. No history of blood clots. No recent travel or surgery. No hormone use. PAST MEDICAL / SURGICAL / SOCIAL /FAMILY HISTORY      has a past medical history of Arthritis, Bronchitis, Chronic obstructive pulmonary disease ([address] Colon polyps, Controlled type 2 diabetes mellitus without complication, without long-term current use of insulin ([address] Dental neglect, Depression, Environmental allergies, GERD (gastroesophageal reflux disease), Hyperlipidemia, Hypertension, Obesity, Onychomycosis, Poor historian, RBBB, Treadmill stress test negative for angina pectoris, and Wears glasses. No other pertinent PMH on review with patient/guardian. has a past surgical history that includes Cholecystectomy; doppler echocardiography; Colonoscopy (6/2013); and Tubal ligation.   No other pertinent PSH on review with patient/guardian. Social History     Socioeconomic History    Marital status: Single     Spouse name: Not on file    Number of children: Not on file    Years of education: Not on file    Highest education level: Not on file   Occupational History    Not on file   Tobacco Use    Smoking status: Former Smoker     Packs/day: 0.50     Years: 35.00     Pack years: 17.50     Types: Cigarettes     Quit date: 2015     Years since quittin.9    Smokeless tobacco: Never Used   Substance and Sexual Activity    Alcohol use: No     Alcohol/week: 0.0 standard drinks    Drug use: No    Sexual activity: Not on file   Other Topics Concern    Not on file   Social History Narrative    Not on file     Social Determinants of Health     Financial Resource Strain: Low Risk     Difficulty of Paying Living Expenses: Not hard at all   Food Insecurity: No Food Insecurity    Worried About [address] in the Last Year: Never true    [facility] in the Last Year: Never true   Transportation Needs: No Transportation Needs    Lack of Transportation (Medical): No    Lack of Transportation (Non-Medical):  No   Physical Activity:     Days of Exercise per Week: Not on file    Minutes of Exercise per Session: Not on file   Stress:     Feeling of Stress : Not on file   Social Connections:     Frequency of Communication with Friends and Family: Not on file    Frequency of Social Gatherings with Friends and Family: Not on file    Attends Confucianist Services: Not on file    Active Member of Clubs or Organizations: Not on file    Attends Club or Organization Meetings: Not on file    Marital Status: Not on file   Intimate Partner Violence:     Fear of Current or Ex-Partner: Not on file    Emotionally Abused: Not on file    Physically Abused: Not on file    Sexually Abused: Not on file   Housing Stability:     Unable to Pay for Housing in the Last Year: Not on file    Number of Places Lived in the Last Year: Not on file    Unstable Housing in the Last Year: Not on file       I counseled the patient against using tobacco products. Family History   Problem Relation Age of Onset    Heart Disease Mother     Cancer Father      No other pertinent FamHx on review with patient/guardian. Allergies:  Patient has no known allergies. Home Medications:  Prior to Admission medications    Medication Sig Start Date End Date Taking?  Authorizing Provider   Nirmatrelvir & Ritonavir (PAXLOVID) 10 x 150 MG & 10 x 100MG TBPK Take 150 mg by mouth in the morning and at bedtime 6/18/22  Yes Yolette Je, DO   ondansetron (ZOFRAN ODT) 4 MG disintegrating tablet Take 1 tablet by mouth every 8 hours as needed for Nausea 6/18/22  Yes Yolette Je, DO   acetaminophen (TYLENOL) 500 MG tablet Take 2 tablets by mouth 4 times daily as needed for Pain 5/17/22   [facility] Ricardo, DO   ibuprofen (IBU) 800 MG tablet Take 1 tablet by mouth every 8 hours as needed for Pain 5/17/22   [facility], DO   ciclopirox (LOPROX) 0.77 % cream ciclopirox 0.77 % topical cream    Historical Provider, MD   albuterol sulfate  (90 Base) MCG/ACT inhaler albuterol sulfate HFA 90 mcg/actuation aerosol inhaler    Historical Provider, MD   atorvastatin (LIPITOR) 20 MG tablet Take 1 tablet by mouth daily 6/29/21   Lukas Ellison MD   tiotropium (SPIRIVA RESPIMAT) 1.25 MCG/ACT AERS inhaler Inhale 2 puffs into the lungs daily 8/12/19   Nina Garrett MD   haloperidol (HALDOL) 5 MG tablet Take 5 mg by mouth 2 times daily    Historical Provider, MD   traZODone (DESYREL) 100 MG tablet  8/9/17   Historical Provider, MD   Elastic Bandages & Supports (ACE ARM SLING) MISC Apply 1 Units topically daily 4/17/17   Deandra Ellis MD   trihexyphenidyl (ARTANE) 2 MG tablet Take 2 mg by mouth 2 times daily  10/10/14   Historical Provider, MD       REVIEW OF SYSTEMS    (2-9 systems for level 4, 10 ormore for level 5)      Review of Systems   Constitutional: Negative for fever. Eyes: Negative for visual disturbance. Respiratory: Positive for cough. Negative for shortness of breath. Cardiovascular: Negative for chest pain, palpitations and leg swelling. Gastrointestinal: Positive for abdominal pain, nausea and vomiting. Negative for blood in stool, constipation and diarrhea. Genitourinary: Negative for dysuria, frequency, urgency, vaginal bleeding and vaginal discharge. Skin: Negative for rash. Allergic/Immunologic: Negative for immunocompromised state. Neurological: Positive for light-headedness. Negative for dizziness, weakness, numbness and headaches. Hematological: Does not bruise/bleed easily. PHYSICAL EXAM   (up to 7 for level 4, 8 or more for level 5)      INITIAL VITALS:   /64   Pulse 72   Temp 98 °F (36.7 °C) (Oral)   Resp 16   Ht 5' 5\" (1.651 m)   Wt 175 lb (79.4 kg)   SpO2 100%   BMI 29.12 kg/m²     Physical Exam  Constitutional:       General: She is not in acute distress. Appearance: Normal appearance. She is not ill-appearing, toxic-appearing or diaphoretic. HENT:      Head: Normocephalic and atraumatic. Right Ear: External ear normal.      Left Ear: External ear normal.      Mouth/Throat:      Mouth: Mucous membranes are dry. Eyes:      General:         Right eye: No discharge. Left eye: No discharge. Cardiovascular:      Rate and Rhythm: Normal rate and regular rhythm. Pulses: Normal pulses. Heart sounds: No murmur heard. Pulmonary:      Effort: Pulmonary effort is normal. No respiratory distress. Breath sounds: Normal breath sounds. No wheezing, rhonchi or rales. Abdominal:      Palpations: Abdomen is soft. Tenderness: There is no abdominal tenderness. There is no right CVA tenderness, left CVA tenderness, guarding or rebound. Musculoskeletal:      Right lower leg: No edema. Left lower leg: No edema. Skin:     General: Skin is dry. Capillary Refill: Capillary refill takes less than 2 seconds. Neurological:      General: No focal deficit present. Mental Status: She is alert. DIFFERENTIAL  DIAGNOSIS     PLAN (LABS / IMAGING / EKG):  Orders Placed This Encounter   Procedures    COVID-19 & Influenza Combo    XR CHEST PORTABLE    CBC with Auto Differential    Magnesium    Comprehensive Metabolic Panel    Troponin    Lipase    Urinalysis with Reflex to Culture    Troponin    Microscopic Urinalysis    EKG 12 Lead     MEDICATIONS ORDERED:  Orders Placed This Encounter   Medications    0.9 % sodium chloride bolus    ondansetron (ZOFRAN) injection 4 mg    Nirmatrelvir & Ritonavir (PAXLOVID) 10 x 150 MG & 10 x 100MG TBPK     Sig: Take 150 mg by mouth in the morning and at bedtime     Dispense:  10 each     Refill:  0    ondansetron (ZOFRAN ODT) 4 MG disintegrating tablet     Sig: Take 1 tablet by mouth every 8 hours as needed for Nausea     Dispense:  15 tablet     Refill:  0     DIAGNOSTIC RESULTS / EMERGENCY DEPARTMENT COURSE / MDM     LABS:  Results for orders placed or performed during the hospital encounter of 06/18/22   COVID-19 & Influenza Combo    Specimen: Nasopharyngeal Swab   Result Value Ref Range    Specimen Description . NASOPHARYNGEAL SWAB     Source . NASOPHARYNGEAL SWAB     SARS-CoV-2 RNA, RT PCR DETECTED (A) Not Detected    INFLUENZA A Not Detected Not Detected    INFLUENZA B Not Detected Not Detected   CBC with Auto Differential   Result Value Ref Range    WBC 9.1 3.5 - 11.0 k/uL    RBC 4.30 4.0 - 5.2 m/uL    Hemoglobin 12.4 12.0 - 16.0 g/dL    Hematocrit 37.3 36 - 46 %    MCV 86.8 80 - 100 fL    MCH 28.8 26 - 34 pg    MCHC 33.2 31 - 37 g/dL    RDW 15.9 (H) 11.5 - 14.9 %    Platelets 211 307 - 418 k/uL    MPV 7.1 6.0 - 12.0 fL    Seg Neutrophils 66 36 - 66 %    Lymphocytes 22 (L) 24 - 44 %    Monocytes 10 (H) 1 - 7 %    Eosinophils % 1 0 - 4 %    Basophils 1 0 - 2 %    Segs Absolute 6.00 1.3 - 9.1 k/uL Absolute Lymph # 2.00 1.0 - 4.8 k/uL    Absolute Mono # 0.90 0.1 - 1.3 k/uL    Absolute Eos # 0.10 0.0 - 0.4 k/uL    Basophils Absolute 0.00 0.0 - 0.2 k/uL   Magnesium   Result Value Ref Range    Magnesium 2.0 1.6 - 2.6 mg/dL   Comprehensive Metabolic Panel   Result Value Ref Range    Glucose 132 (H) 70 - 99 mg/dL    BUN 17 8 - 23 mg/dL    CREATININE 0.76 0.50 - 0.90 mg/dL    Calcium 9.2 8.6 - 10.4 mg/dL    Sodium 138 135 - 144 mmol/L    Potassium 3.9 3.7 - 5.3 mmol/L    Chloride 103 98 - 107 mmol/L    CO2 27 20 - 31 mmol/L    Anion Gap 8 (L) 9 - 17 mmol/L    Alkaline Phosphatase 110 (H) 35 - 104 U/L    ALT 26 5 - 33 U/L    AST 19 <32 U/L    Total Bilirubin 0.19 (L) 0.3 - 1.2 mg/dL    Total Protein 6.5 6.4 - 8.3 g/dL    Albumin 3.7 3.5 - 5.2 g/dL    GFR Non-African American >60 >60 mL/min    GFR African American >60 >60 mL/min    GFR Comment         Troponin   Result Value Ref Range    Troponin, High Sensitivity 13 0 - 14 ng/L   Lipase   Result Value Ref Range    Lipase 20 13 - 60 U/L   Urinalysis with Reflex to Culture    Specimen: Urine   Result Value Ref Range    Color, UA Yellow Yellow    Turbidity UA Clear Clear    Glucose, Ur NEGATIVE NEGATIVE    Bilirubin Urine NEGATIVE NEGATIVE    Ketones, Urine NEGATIVE NEGATIVE    Specific Gravity, UA 1.007 1.000 - 1.030    Urine Hgb NEGATIVE NEGATIVE    pH, UA 5.5 5.0 - 8.0    Protein, UA NEGATIVE NEGATIVE    Urobilinogen, Urine Normal Normal    Nitrite, Urine NEGATIVE NEGATIVE    Leukocyte Esterase, Urine SMALL (A) NEGATIVE   Troponin   Result Value Ref Range    Troponin, High Sensitivity 12 0 - 14 ng/L   Microscopic Urinalysis   Result Value Ref Range    WBC, UA 6 TO 9 /HPF    RBC, UA 0 TO 2 /HPF    Casts UA 0 TO 2 /LPF    Epithelial Cells UA 0 TO 2 /HPF    Bacteria, UA FEW (A) None   EKG 12 Lead   Result Value Ref Range    Ventricular Rate 48 BPM    Atrial Rate 48 BPM    P-R Interval 112 ms    QRS Duration 112 ms    Q-T Interval 462 ms    QTc Calculation (Bazett) 412 ms    P Axis 74 degrees    R Axis -33 degrees    T Axis -7 degrees       IMPRESSION/MDM/ED COURSE:  66 y.o. female presented with abdominal pain, vomiting, x2 days. Borderline bradycardic but afebrile and vitals otherwise unremarkable. On exam patient appears disheveled but resting comfortably in no acute distress, nontoxic-appearing. Dry skin and mucus membranes. Heart bradycardic but regular. Lungs clear. Abdomen soft nontender. No CVA tenderness. Will obtain cardiac work-up as well as abdominal pain lab work. Patient does complain of abdominal pain but has no tenderness on exam with deep palpation therefore do not feel imaging is necessary at this time. UA and COVID/influenza also obtained. Symptomatic treatment with IV fluids and Zofran. ED Course as of 06/18/22 2118   Sat Jun 18, 2022   1651 Bradycardia, ventricular rate 48. Left axis deviation. RBBB no ST elevation or depression. QTc 412 [AF]   1717 CXR FINDINGS:  The cardiac size is normal. No acute infiltrates or pleural effusions are  seen. Pulmonary vascularity appears normal. Mild scoliosis. There are  degenerative changes in the spine . No acute bony abnormalities.  The hilar  structures are normal. [AF]   1746 Comprehensive Metabolic Panel(!):    GLUCOSE, FASTING,(!)   BUN,BUNPL 17   Creatinine 0.76   CALCIUM, SERUM, 375838 9.2   Sodium 138   Potassium 3.9   Chloride 103   CO2 27   Anion Gap 8(!)   Alk Phos 110(!)   ALT 26   AST 19   Bilirubin 0.19(!)   Total Protein 6.5   Albumin 3.7   GFR Non- >60   GFR  >60   GFR Comment      [AF]   1746 CBC with Auto Differential(!):    WBC 9.1   RBC 4.30   Hemoglobin Quant 12.4   Hematocrit 37.3   MCV 86.8   MCH 28.8   MCHC 33.2   RDW 15.9(!)   Platelet Count 025   MPV 7.1   Seg Neutrophils 66   Lymphocytes 22(!)   Monocytes 10(!)   Eosinophils % 1   Basophils 1   Segs Absolute 6.00   Absolute Lymph # 2.00   Absolute Mono # 0.90   Absolute Eos # 0.10 Basophils Absolute 0.00 [AF]   1746 Magnesium:    Magnesium 2.0 [AF]   1746 Lipase:    Lipase 20 [AF]   1746 COVID-19 & Influenza Combo(!):    Specimen Description . NASOPHARYNGEAL SWAB   SOURCE, [id number] . NASOPHARYNGEAL SWAB   SARS-CoV-2 RNA, RT PCR DETECTED(!)   INFLUENZA A Not Detected   INFLUENZA B Not Detected [AF]   9320 Troponin:    Troponin, High Sensitivity 13 [AF]   2030 Troponin:    Troponin, High Sensitivity 12 [AF]   2043 Urinalysis with Reflex to Culture(!):    Color, UA Yellow   Turbidity UA Clear   Glucose, UA NEGATIVE   Bilirubin, Urine NEGATIVE   Ketones, Urine NEGATIVE   Specific [place], UA 1.007   Urine Hgb NEGATIVE   pH, UA 5.5   Protein, UA NEGATIVE   Urobilinogen, Urine Normal   Nitrite, Urine NEGATIVE   Leukocyte Esterase, Urine SMALL(!) [AF]   2043 Microscopic Urinalysis(!):    WBC, UA 6 TO 9   RBC, UA 0 TO 2   Casts UA 0 TO 2   Epithelial Cells, UA 0 TO 2   Bacteria, UA FEW(!) [AF]   2043 Patient without urinary symptoms and has another clear cause of symptoms therefore will not treat UA [AF]   2115 Patient with multiple comorbidities, will treat with Paxil COVID. Zofran, Tylenol, ibuprofen for symptomatic treatment. Follow-up with PCP if symptoms persist.  I discussed signs and symptoms that would require reevaluation in the ED. The patient expressed understanding and agreement with plan. All questions answered. [AF]      ED Course User Index  [AF] Benito Santoyo DO       RADIOLOGY:  XR CHEST PORTABLE   Final Result   No acute cardiopulmonary disease             EKG  Sinus bradycardia. Left axis deviation. No ST elevation or depression. Nonspecific T wave changes.     All EKG's are interpreted by the Emergency Department Physician who either signs or Co-signs this chart in the absence of a cardiologist.    PROCEDURES:  None    CONSULTS:  None    FINAL IMPRESSION      1. COVID          DISPOSITION / PLAN     DISPOSITION Decision To Discharge 06/18/2022 09:09:40 PM      [facility]  [address] [facility]3]  [phone]      As needed      DISCHARGE MEDICATIONS:  New Prescriptions    NIRMATRELVIR & RITONAVIR (PAXLOVID) 10 X 150 MG & 10 X 100MG TBPK    Take 150 mg by mouth in the morning and at bedtime    ONDANSETRON (ZOFRAN ODT) 4 MG DISINTEGRATING TABLET    Take 1 tablet by mouth every 8 hours as needed for Nausea       Mandie Jorgensen DO  PGY 2  Resident Physician Emergency Medicine  06/18/22 9:18 PM    (Please note that portions of this note were completed with a voice recognition program.Efforts were made to edit the dictations but occasionally words are mis-transcribed.)       Al Carrillo DO  Resident  06/18/22 5941

## 2022-06-18 NOTE — ED PROVIDER NOTES
[facility] [address] [facility] ED     Emergency Department     Faculty Attestation        I performed a history and physical examination of the patient and discussed management with the resident. I reviewed the residents note and agree with the documented findings and plan of care. Any areas of disagreement are noted on the chart. I was personally present for the key portions of any procedures. I have documented in the chart those procedures where I was not present during the key portions. I have reviewed the emergency nurses triage note. I agree with the chief complaint, past medical history, past surgical history, allergies, medications, social and family history as documented unless otherwise noted below. Documentation of the HPI, Physical Exam and Medical Decision Making performed by medical students or scribes is based on my personal performance of the HPI, PE and MDM. For Physician Assistant/ Nurse Practitioner cases/documentation I have have had a face to face evaluation with this patient and have completed at least one if not all key elements of the E/M (history, physical exam, and MDM). Additional findings are as noted. Pertinent Comments     EKG Interpretation    Interpreted by me    Rhythm: Sinus bradycardia  Rate: 48  Axis: Left  Ectopy: none  Conduction: normal  ST Segments: no acute change  T Waves: no acute change  Q Waves: none    Clinical Impression: Nonspecific EKG, sinus bradycardia      Nausea, vomiting, generalized weakness. Concerned that she is dehydrated. She is afebrile, nontoxic, mildly bradycardic otherwise vital signs normal.    Suspect viral syndrome, dehydration, metabolic abnormality. The care is provided during an unprecedented national emergency due to the novel coronavirus, COVID-19.     (Please note that portions of this note were completed with a voice recognition program.  Efforts were made to edit the dictations but occasionally words are mis-transcribed.)    Dunia Vargas,   Attending Emergency Physician         Dunia Vargas,   06/18/22 [address] [place] [address], DO  06/18/22 9195

## 2022-06-20 ENCOUNTER — CARE COORDINATION (OUTPATIENT)
Dept: CARE COORDINATION | Age: 72
End: 2022-06-20

## 2022-06-20 LAB
EKG ATRIAL RATE: 48 BPM
EKG P AXIS: 74 DEGREES
EKG P-R INTERVAL: 112 MS
EKG Q-T INTERVAL: 462 MS
EKG QRS DURATION: 112 MS
EKG QTC CALCULATION (BAZETT): 412 MS
EKG R AXIS: -33 DEGREES
EKG T AXIS: -7 DEGREES
EKG VENTRICULAR RATE: 48 BPM

## 2022-06-21 ENCOUNTER — CARE COORDINATION (OUTPATIENT)
Dept: CARE COORDINATION | Age: 72
End: 2022-06-21

## 2022-06-26 ENCOUNTER — APPOINTMENT (OUTPATIENT)
Dept: GENERAL RADIOLOGY | Age: 72
End: 2022-06-26
Payer: COMMERCIAL

## 2022-06-26 ENCOUNTER — HOSPITAL ENCOUNTER (OUTPATIENT)
Age: 72
Setting detail: OBSERVATION
Discharge: HOME | End: 2022-06-27
Attending: EMERGENCY MEDICINE | Admitting: EMERGENCY MEDICINE
Payer: COMMERCIAL

## 2022-06-26 DIAGNOSIS — R07.89 OTHER CHEST PAIN: Primary | ICD-10-CM

## 2022-06-26 PROBLEM — R07.9 CHEST PAIN: Status: ACTIVE | Noted: 2022-06-26

## 2022-06-26 LAB
-: NORMAL
ABSOLUTE EOS #: 0.07 K/UL (ref 0–0.44)
ABSOLUTE IMMATURE GRANULOCYTE: <0.03 K/UL (ref 0–0.3)
ABSOLUTE LYMPH #: 2.15 K/UL (ref 1.1–3.7)
ABSOLUTE MONO #: 0.73 K/UL (ref 0.1–1.2)
ALBUMIN SERPL-MCNC: 4.1 G/DL (ref 3.5–5.2)
ALBUMIN/GLOBULIN RATIO: 1.5 (ref 1–2.5)
ALP BLD-CCNC: 111 U/L (ref 35–104)
ALT SERPL-CCNC: 13 U/L (ref 5–33)
ANION GAP SERPL CALCULATED.3IONS-SCNC: 14 MMOL/L (ref 9–17)
AST SERPL-CCNC: 20 U/L
BASOPHILS # BLD: 0 % (ref 0–2)
BASOPHILS ABSOLUTE: 0.03 K/UL (ref 0–0.2)
BILIRUB SERPL-MCNC: 0.36 MG/DL (ref 0.3–1.2)
BILIRUBIN URINE: NEGATIVE
BUN BLDV-MCNC: 12 MG/DL (ref 8–23)
CALCIUM SERPL-MCNC: 9 MG/DL (ref 8.6–10.4)
CASTS UA: NORMAL /LPF (ref 0–8)
CHLORIDE BLD-SCNC: 100 MMOL/L (ref 98–107)
CO2: 22 MMOL/L (ref 20–31)
COLOR: YELLOW
CREAT SERPL-MCNC: 0.74 MG/DL (ref 0.5–0.9)
EOSINOPHILS RELATIVE PERCENT: 1 % (ref 1–4)
EPITHELIAL CELLS UA: NORMAL /HPF (ref 0–5)
GFR AFRICAN AMERICAN: >60 ML/MIN
GFR NON-AFRICAN AMERICAN: >60 ML/MIN
GFR SERPL CREATININE-BSD FRML MDRD: ABNORMAL ML/MIN/{1.73_M2}
GLUCOSE BLD-MCNC: 90 MG/DL (ref 70–99)
GLUCOSE URINE: NEGATIVE
HCT VFR BLD CALC: 39.3 % (ref 36.3–47.1)
HEMOGLOBIN: 12.4 G/DL (ref 11.9–15.1)
IMMATURE GRANULOCYTES: 0 %
KETONES, URINE: NEGATIVE
LEUKOCYTE ESTERASE, URINE: ABNORMAL
LIPASE: 14 U/L (ref 13–60)
LYMPHOCYTES # BLD: 26 % (ref 24–43)
MCH RBC QN AUTO: 28.7 PG (ref 25.2–33.5)
MCHC RBC AUTO-ENTMCNC: 31.6 G/DL (ref 28.4–34.8)
MCV RBC AUTO: 91 FL (ref 82.6–102.9)
MONOCYTES # BLD: 9 % (ref 3–12)
NITRITE, URINE: NEGATIVE
NRBC AUTOMATED: 0 PER 100 WBC
PDW BLD-RTO: 15.1 % (ref 11.8–14.4)
PH UA: 5.5 (ref 5–8)
PLATELET # BLD: 256 K/UL (ref 138–453)
PMV BLD AUTO: 9.5 FL (ref 8.1–13.5)
POTASSIUM SERPL-SCNC: 4.3 MMOL/L (ref 3.7–5.3)
PROTEIN UA: NEGATIVE
RBC # BLD: 4.32 M/UL (ref 3.95–5.11)
RBC # BLD: ABNORMAL 10*6/UL
RBC UA: NORMAL /HPF (ref 0–4)
SARS-COV-2, RAPID: NOT DETECTED
SEG NEUTROPHILS: 64 % (ref 36–65)
SEGMENTED NEUTROPHILS ABSOLUTE COUNT: 5.15 K/UL (ref 1.5–8.1)
SODIUM BLD-SCNC: 136 MMOL/L (ref 135–144)
SPECIFIC GRAVITY UA: 1 (ref 1–1.03)
SPECIMEN DESCRIPTION: NORMAL
TOTAL PROTEIN: 6.8 G/DL (ref 6.4–8.3)
TROPONIN, HIGH SENSITIVITY: 11 NG/L (ref 0–14)
TROPONIN, HIGH SENSITIVITY: 12 NG/L (ref 0–14)
TURBIDITY: CLEAR
URINE HGB: NEGATIVE
UROBILINOGEN, URINE: NORMAL
WBC # BLD: 8.2 K/UL (ref 3.5–11.3)
WBC UA: NORMAL /HPF (ref 0–5)

## 2022-06-26 PROCEDURE — 93005 ELECTROCARDIOGRAM TRACING: CPT | Performed by: STUDENT IN AN ORGANIZED HEALTH CARE EDUCATION/TRAINING PROGRAM

## 2022-06-26 PROCEDURE — 87088 URINE BACTERIA CULTURE: CPT

## 2022-06-26 PROCEDURE — 80053 COMPREHEN METABOLIC PANEL: CPT

## 2022-06-26 PROCEDURE — 6370000000 HC RX 637 (ALT 250 FOR IP): Performed by: STUDENT IN AN ORGANIZED HEALTH CARE EDUCATION/TRAINING PROGRAM

## 2022-06-26 PROCEDURE — 87186 SC STD MICRODIL/AGAR DIL: CPT

## 2022-06-26 PROCEDURE — 2580000003 HC RX 258: Performed by: STUDENT IN AN ORGANIZED HEALTH CARE EDUCATION/TRAINING PROGRAM

## 2022-06-26 PROCEDURE — G0378 HOSPITAL OBSERVATION PER HR: HCPCS

## 2022-06-26 PROCEDURE — 85025 COMPLETE CBC W/AUTO DIFF WBC: CPT

## 2022-06-26 PROCEDURE — 81001 URINALYSIS AUTO W/SCOPE: CPT

## 2022-06-26 PROCEDURE — 83690 ASSAY OF LIPASE: CPT

## 2022-06-26 PROCEDURE — 71045 X-RAY EXAM CHEST 1 VIEW: CPT

## 2022-06-26 PROCEDURE — 87086 URINE CULTURE/COLONY COUNT: CPT

## 2022-06-26 PROCEDURE — 87635 SARS-COV-2 COVID-19 AMP PRB: CPT

## 2022-06-26 PROCEDURE — 99285 EMERGENCY DEPT VISIT HI MDM: CPT

## 2022-06-26 PROCEDURE — 96360 HYDRATION IV INFUSION INIT: CPT

## 2022-06-26 PROCEDURE — 84484 ASSAY OF TROPONIN QUANT: CPT

## 2022-06-26 RX ORDER — ACETAMINOPHEN 325 MG/1
650 TABLET ORAL EVERY 4 HOURS PRN
Status: DISCONTINUED | OUTPATIENT
Start: 2022-06-26 | End: 2022-06-27 | Stop reason: HOSPADM

## 2022-06-26 RX ORDER — SODIUM CHLORIDE 9 MG/ML
INJECTION, SOLUTION INTRAVENOUS PRN
Status: DISCONTINUED | OUTPATIENT
Start: 2022-06-26 | End: 2022-06-27 | Stop reason: HOSPADM

## 2022-06-26 RX ORDER — SODIUM CHLORIDE 0.9 % (FLUSH) 0.9 %
5-40 SYRINGE (ML) INJECTION PRN
Status: DISCONTINUED | OUTPATIENT
Start: 2022-06-26 | End: 2022-06-27 | Stop reason: HOSPADM

## 2022-06-26 RX ORDER — HALOPERIDOL 5 MG
5 TABLET ORAL 2 TIMES DAILY
Status: DISCONTINUED | OUTPATIENT
Start: 2022-06-26 | End: 2022-06-27 | Stop reason: HOSPADM

## 2022-06-26 RX ORDER — ENOXAPARIN SODIUM 100 MG/ML
40 INJECTION SUBCUTANEOUS DAILY
Status: DISCONTINUED | OUTPATIENT
Start: 2022-06-27 | End: 2022-06-27 | Stop reason: HOSPADM

## 2022-06-26 RX ORDER — SODIUM CHLORIDE 0.9 % (FLUSH) 0.9 %
5-40 SYRINGE (ML) INJECTION EVERY 12 HOURS SCHEDULED
Status: DISCONTINUED | OUTPATIENT
Start: 2022-06-26 | End: 2022-06-27 | Stop reason: HOSPADM

## 2022-06-26 RX ORDER — ATORVASTATIN CALCIUM 20 MG/1
20 TABLET, FILM COATED ORAL DAILY
Status: DISCONTINUED | OUTPATIENT
Start: 2022-06-27 | End: 2022-06-27 | Stop reason: HOSPADM

## 2022-06-26 RX ORDER — ONDANSETRON 4 MG/1
4 TABLET, ORALLY DISINTEGRATING ORAL EVERY 8 HOURS PRN
Status: DISCONTINUED | OUTPATIENT
Start: 2022-06-26 | End: 2022-06-27 | Stop reason: HOSPADM

## 2022-06-26 RX ORDER — TRIHEXYPHENIDYL HYDROCHLORIDE 2 MG/1
2 TABLET ORAL 2 TIMES DAILY
Status: DISCONTINUED | OUTPATIENT
Start: 2022-06-26 | End: 2022-06-27 | Stop reason: HOSPADM

## 2022-06-26 RX ORDER — SODIUM CHLORIDE, SODIUM LACTATE, POTASSIUM CHLORIDE, AND CALCIUM CHLORIDE .6; .31; .03; .02 G/100ML; G/100ML; G/100ML; G/100ML
1000 INJECTION, SOLUTION INTRAVENOUS ONCE
Status: COMPLETED | OUTPATIENT
Start: 2022-06-26 | End: 2022-06-26

## 2022-06-26 RX ORDER — ONDANSETRON 2 MG/ML
4 INJECTION INTRAMUSCULAR; INTRAVENOUS EVERY 6 HOURS PRN
Status: DISCONTINUED | OUTPATIENT
Start: 2022-06-26 | End: 2022-06-27 | Stop reason: HOSPADM

## 2022-06-26 RX ADMIN — HALOPERIDOL 5 MG: 5 TABLET ORAL at 22:13

## 2022-06-26 RX ADMIN — ACETAMINOPHEN 650 MG: 325 TABLET ORAL at 22:13

## 2022-06-26 RX ADMIN — SODIUM CHLORIDE, PRESERVATIVE FREE 10 ML: 5 INJECTION INTRAVENOUS at 22:16

## 2022-06-26 RX ADMIN — SODIUM CHLORIDE, POTASSIUM CHLORIDE, SODIUM LACTATE AND CALCIUM CHLORIDE 1000 ML: 600; 310; 30; 20 INJECTION, SOLUTION INTRAVENOUS at 16:50

## 2022-06-26 ASSESSMENT — ENCOUNTER SYMPTOMS
SHORTNESS OF BREATH: 0
ABDOMINAL PAIN: 0
RHINORRHEA: 0
NAUSEA: 0
BACK PAIN: 0
COUGH: 0
DIARRHEA: 0
VOMITING: 0

## 2022-06-26 ASSESSMENT — PAIN DESCRIPTION - LOCATION: LOCATION: HEAD

## 2022-06-26 ASSESSMENT — PAIN SCALES - GENERAL: PAINLEVEL_OUTOF10: 3

## 2022-06-26 NOTE — ED NOTES
The following labs labeled with pt sticker and tubed to lab:     [] Blue     [] Lavender   [] on ice  [x] Green/yellow  [] Green/black [] on ice  [] Yellow  [] Red  [] Pink      [] COVID-19 swab    [] Rapid  [] PCR  [] Flu swab  [] Peds Viral Panel     [] Urine Sample  [] Pelvic Cultures  [] Blood Cultures          Lissa Councilman, RN  06/26/22 9889

## 2022-06-26 NOTE — ED NOTES
Pt to ER via [facility] for c/o chest pain that started 30 minutes ago. Per EMS pt was given  0.4 of Nitro and 324mg of ASA. Pt states the chest pain is midsternal and radiates to L arm. Pt denies any difficulty breathing but pt wears 3L at home prn. Pt denies any N/V/D. Upon assessment, pt is tender to the touch in the abdominal area. Pt placed on full monitor, RR even and NL. No acute distress noted. Dr. Lynch Pae at bedside to evalaute pt.       Homa Boyd, JUSTINO  06/26/22 0525

## 2022-06-26 NOTE — ED NOTES
The following labs labeled with pt sticker and tubed to lab:     [] Blue     [] Deonna    [] on ice  [] Green/yellow  [] Green/black [] on ice  [] Yellow  [] Red  [] Pink      [x] COVID-19 swab    [x] Rapid  [] PCR  [] Flu swab  [] Peds Viral Panel     [] Urine Sample  [] Pelvic Cultures  [] Blood Cultures            Hermann Mike RN  06/26/22 9120

## 2022-06-26 NOTE — ED NOTES
Pt started on 2L of O2 per her home O2 order. Pt states she wears O2 at home as needed. Dr. Pastor Solomon notified.       Nga Gannon RN  06/26/22 6193

## 2022-06-26 NOTE — ED PROVIDER NOTES
[facility] ED  Emergency Department Encounter  Emergency Medicine Resident     Pt Name: Ashley Sage  MRN: [id number]  Sharongfofelia 1950  Date of evaluation: 6/26/22  PCP:  Jack Angel 5       Chief Complaint   Patient presents with    Chest Pain     STARTED 30 MINS AGO       HISTORY OFPRESENT ILLNESS  (Location/Symptom, Timing/Onset, Context/Setting, Quality, Duration, Modifying Kinsey Quiet.)      Ashley Sage is a 66 y.o. female who presents with chest pain that began about 30 minutes prior to arrival.  She states this is left-sided. She was not do anything at the time. According to EMR, she was diagnosed with COVID on the 18th, 8 days ago. She is difficult to obtain history from, she does appear to be confused at times or may be hard of hearing. She denies shortness of breath. She feels that her taste has changed and she is not drinking enough and may just be dehydrated. She is denying any abdominal pain or urinary complaints, no diarrhea or constipation. No cough symptoms. No fevers or chills by report. She has not seen any leg swelling and has not had any pain in the calves. Patient did receive 0.4 of nitro and 324 mg of aspirin in route. She wears 3 L nasal cannula oxygen as needed at home. No nausea, vomiting, no other complaints. PAST MEDICAL / SURGICAL / SOCIAL / FAMILY HISTORY      has a past medical history of Arthritis, Bronchitis, Chronic obstructive pulmonary disease ([address] Colon polyps, Controlled type 2 diabetes mellitus without complication, without long-term current use of insulin ([address] Dental neglect, Depression, Environmental allergies, GERD (gastroesophageal reflux disease), Hyperlipidemia, Hypertension, Obesity, Onychomycosis, Poor historian, RBBB, Treadmill stress test negative for angina pectoris, and Wears glasses.      has a past surgical history that includes Cholecystectomy; doppler echocardiography; Colonoscopy (6/2013); and Tubal ligation. Social History     Socioeconomic History    Marital status: Single     Spouse name: Not on file    Number of children: Not on file    Years of education: Not on file    Highest education level: Not on file   Occupational History    Not on file   Tobacco Use    Smoking status: Former Smoker     Packs/day: 0.50     Years: 35.00     Pack years: 17.50     Types: Cigarettes     Quit date: 2015     Years since quittin.9    Smokeless tobacco: Never Used   Substance and Sexual Activity    Alcohol use: No     Alcohol/week: 0.0 standard drinks    Drug use: No    Sexual activity: Not on file   Other Topics Concern    Not on file   Social History Narrative    Not on file     Social Determinants of Health     Financial Resource Strain: Low Risk     Difficulty of Paying Living Expenses: Not hard at all   Food Insecurity: No Food Insecurity    Worried About [employer] [employer] in the Last Year: Never true    [facility] in the Last Year: Never true   Transportation Needs: No Transportation Needs    Lack of Transportation (Medical): No    Lack of Transportation (Non-Medical):  No   Physical Activity:     Days of Exercise per Week: Not on file    Minutes of Exercise per Session: Not on file   Stress:     Feeling of Stress : Not on file   Social Connections:     Frequency of Communication with Friends and Family: Not on file    Frequency of Social Gatherings with Friends and Family: Not on file    Attends Denominational Services: Not on file    Active Member of Clubs or Organizations: Not on file    Attends Club or Organization Meetings: Not on file    Marital Status: Not on file   Intimate Partner Violence:     Fear of Current or Ex-Partner: Not on file    Emotionally Abused: Not on file    Physically Abused: Not on file    Sexually Abused: Not on file   Housing Stability:     Unable to Pay for Housing in the Last Year: Not on file    Number of [place] in the Last Year: Not on file    Unstable Housing in the Last Year: Not on file       Family History   Problem Relation Age of Onset    Heart Disease Mother     Cancer Father         Allergies:  Patient has no known allergies. Home Medications:  Prior to Admission medications    Medication Sig Start Date End Date Taking? Authorizing Provider   Nirmatrelvir & Ritonavir (PAXLOVID) 10 x 150 MG & 10 x 100MG TBPK Take 150 mg by mouth in the morning and at bedtime 6/18/22   Kala Johnson, DO   ondansetron (ZOFRAN ODT) 4 MG disintegrating tablet Take 1 tablet by mouth every 8 hours as needed for Nausea 6/18/22   Kala Johnson, DO   acetaminophen (TYLENOL) 500 MG tablet Take 2 tablets by mouth 4 times daily as needed for Pain 5/17/22   Mariana Valdivia, DO   ibuprofen (IBU) 800 MG tablet Take 1 tablet by mouth every 8 hours as needed for Pain 5/17/22   Mariana Valdivia, DO   ciclopirox (LOPROX) 0.77 % cream ciclopirox 0.77 % topical cream    Historical Provider, MD   albuterol sulfate  (90 Base) MCG/ACT inhaler albuterol sulfate HFA 90 mcg/actuation aerosol inhaler    Historical Provider, MD   atorvastatin (LIPITOR) 20 MG tablet Take 1 tablet by mouth daily 6/29/21   Bobby Del Toro MD   tiotropium (SPIRIVA RESPIMAT) 1.25 MCG/ACT AERS inhaler Inhale 2 puffs into the lungs daily 8/12/19   Mathew David MD   haloperidol (HALDOL) 5 MG tablet Take 5 mg by mouth 2 times daily    Historical Provider, MD   traZODone (DESYREL) 100 MG tablet  8/9/17   Historical Provider, MD   Elastic Bandages & Supports (ACE ARM SLING) MISC Apply 1 Units topically daily 4/17/17   Verenicewilliams Mueller MD   trihexyphenidyl (ARTANE) 2 MG tablet Take 2 mg by mouth 2 times daily  10/10/14   Historical Provider, MD       REVIEW OFSYSTEMS    (2-9 systems for level 4, 10 or more for level 5)      Review of Systems   Constitutional: Negative for chills and fever. HENT: Negative for congestion and rhinorrhea. Eyes: Negative for visual disturbance. Respiratory: Negative for cough and shortness of breath. Cardiovascular: Positive for chest pain. Gastrointestinal: Negative for abdominal pain, diarrhea, nausea and vomiting. Genitourinary: Negative for dysuria. Musculoskeletal: Negative for back pain and neck pain. Skin: Negative for rash. Neurological: Negative for weakness, numbness and headaches. PHYSICAL EXAM   (up to 7 for level 4, 8 or more forlevel 5)      INITIAL VITALS:   Vitals:    06/26/22 1602 06/26/22 1618 06/26/22 1701 06/26/22 1833   BP: 138/76 122/64 129/62 (!) 152/73   Pulse: 67 73 64 78   Resp:  21 18 14   Temp:       TempSrc:       SpO2: 98% 98% 100% 100%   Weight:             Physical Exam  Constitutional:       General: She is not in acute distress. Appearance: Normal appearance. She is normal weight. She is not ill-appearing, toxic-appearing or diaphoretic. HENT:      Head: Normocephalic and atraumatic. Nose: Nose normal.      Mouth/Throat:      Mouth: Mucous membranes are moist.      Pharynx: Oropharynx is clear. No oropharyngeal exudate or posterior oropharyngeal erythema. Eyes:      Extraocular Movements: Extraocular movements intact. Pupils: Pupils are equal, round, and reactive to light. Cardiovascular:      Rate and Rhythm: Normal rate and regular rhythm. Heart sounds: Normal heart sounds. No murmur heard. Comments: Patient has normal rate and regular rhythm most of the time, but appears to be fluctuating between the 40s to the 70s and 80s for heart rate. Usually not sustained in the 40s. Still appears to have P waves. Pulmonary:      Effort: Pulmonary effort is normal. No respiratory distress. Breath sounds: Normal breath sounds. No wheezing, rhonchi or rales. Abdominal:      General: There is no distension. Palpations: Abdomen is soft. Tenderness: There is no abdominal tenderness. There is no guarding or rebound. Musculoskeletal:         General: No tenderness. Normal range of motion. Cervical back: Normal range of motion and neck supple. Right lower leg: No edema. Left lower leg: No edema. Skin:     General: Skin is warm and dry. Neurological:      General: No focal deficit present. Mental Status: She is alert and oriented to person, place, and time. Motor: No weakness. Psychiatric:         Mood and Affect: Mood normal.         DIFFERENTIAL  DIAGNOSIS     PLAN (LABS / IMAGING / EKG):  Orders Placed This Encounter   Procedures    COVID-19, Rapid    Culture, Urine    XR CHEST PORTABLE    CBC with Auto Differential    Comprehensive Metabolic Panel w/ Reflex to MG    Lipase    Troponin    Urinalysis with Reflex to Culture    Microscopic Urinalysis    EKG 12 Lead    Place in Observation Service       MEDICATIONS ORDERED:  Orders Placed This Encounter   Medications    lactated ringers bolus       DDX: ACS, pneumonia, dehydration, CHF, pneumothorax, electrolyte abnormality, other    Initial MDM/Plan/ED COURSE:    66 y.o. female who presents with left-sided chest pain that began prior to arrival.  She received aspirin and nitro in route, but continues to have pain. No nausea or diaphoresis. No abdominal pain ported, but on exam, patient has tenderness to palpation in all regions. When attending physician evaluate the patient, she did not have any tenderness of the abdomen. Not currently nauseous, lower concern for something intra-abdominal going on. No significant lower extremity swelling. Patient does appear dry on exam, she is in no acute distress otherwise. Will obtain cardiac work-up to rule out ACS, evaluate for electrolyte abnormalities or other cause of patient's symptoms.     ED Course as of 06/26/22 1944   Sun Jun 26, 2022   1821 Troponin, High Sensitivity: 11  Previous trop 12 [JS]   1911 Discussed admission to the observation unit for further cardiac work-up as she is not had any previous cardiac work-up in the full recent visit she has had. Patient has continued to fluctuate between 40s to 70s and 80s for heart rate here. No obvious history of this. She requested that I update her son as she has been admitted. Number given to me is [phone], contact name is Malgorzata Hall. Unable to get a hold of him despite multiple attempts [JS]      ED Course User Index  [JS] Monica Long, DO    :     Patient admitted to observation unit for cardiology evaluation morning. Given frequent fluctuations in heart rate to the low 40s and then back to the 80s. Her chest pain has been intermittent since here, currently chest pain-free. COVID swab sent but patient had initial positive COVID in May, this appeared to be positive again recently a week ago. She does not have any upper respiratory complaints for nausea, vomiting, diarrhea, abdominal pain to suggest her symptoms are due to COVID. DIAGNOSTIC RESULTS / EMERGENCYDEPARTMENT COURSE / MDM     LABS:  Labs Reviewed   CBC WITH AUTO DIFFERENTIAL - Abnormal; Notable for the following components:       Result Value    RDW 15.1 (*)     All other components within normal limits   COMPREHENSIVE METABOLIC PANEL W/ REFLEX TO MG FOR LOW K - Abnormal; Notable for the following components:    Alkaline Phosphatase 111 (*)     All other components within normal limits   URINALYSIS WITH REFLEX TO CULTURE - Abnormal; Notable for the following components:    Leukocyte Esterase, Urine LARGE (*)     All other components within normal limits   COVID-19, RAPID   CULTURE, URINE   LIPASE   TROPONIN   TROPONIN   MICROSCOPIC URINALYSIS           XR CHEST PORTABLE    Result Date: 6/26/2022  EXAMINATION: ONE XRAY VIEW OF THE CHEST 6/26/2022 3:51 pm COMPARISON: 06/18/2022 chest radiograph HISTORY: ORDERING SYSTEM PROVIDED HISTORY: chest pain TECHNOLOGIST PROVIDED HISTORY: chest pain FINDINGS: The cardiomediastinal silhouette is normal in size and contour. No focal airspace disease.   No pleural effusion or pneumothorax. No evidence of acute osseous abnormality. No evidence of acute cardiopulmonary disease. EKG  EKG shows normal sinus rhythm, right bundle branch block, Left axis deviation, occasional PVC and PAC. There are no acute ST or T wave changes. T waves appear to be V2, V3, V4, V3. All EKG's are interpreted by the Emergency Department Physicianwho either signs or Co-signs this chart in the absence of a cardiologist.      PROCEDURES:  None    CONSULTS:  IP CONSULT TO CARDIOLOGY    CRITICAL CARE:  Please see attending note    FINAL IMPRESSION      1. Other chest pain          DISPOSITION / PLAN     DISPOSITION Admitted 06/26/2022 07:04:50 PM      PATIENT REFERRED TO:  No follow-up provider specified.     DISCHARGE MEDICATIONS:  New Prescriptions    No medications on file       Abrahan Verduzco DO  Emergency Medicine Resident    (Please note that portions of this note were completed with a voice recognition program.Efforts were made to edit the dictations but occasionally words are mis-transcribed.)       Abrahan Verduzco DO  Resident  06/26/22 [id number]

## 2022-06-26 NOTE — ACP (ADVANCE CARE PLANNING)
Advance Care Planning     Advance Care Planning Activator (Inpatient)  Conversation Note      Date of ACP Conversation: 6/26/2022     Conversation Conducted with: Patient with Decision Making Capacity    ACP Activator: Philippe Milkauzair, [address] Decision Maker:     Current Designated Health Care Decision Maker:     Primary Decision Maker: Brandon Miller - [phone]    Secondary Decision Maker: Elaine Wetzelr - [phone]  Click here to complete Healthcare Decision Makers including section of the Healthcare Decision Maker Relationship (ie \"Primary\")  Today we documented Decision Maker(s) consistent with Legal Next of Kin hierarchy. Care Preferences    Ventilation: \"If you were in your present state of health and suddenly became very ill and were unable to breathe on your own, what would your preference be about the use of a ventilator (breathing machine) if it were available to you? \"      Would the patient desire the use of ventilator (breathing machine)?: yes    \"If your health worsens and it becomes clear that your chance of recovery is unlikely, what would your preference be about the use of a ventilator (breathing machine) if it were available to you? \"     Would the patient desire the use of ventilator (breathing machine)?: Yes      Resuscitation  \"CPR works best to restart the heart when there is a sudden event, like a heart attack, in someone who is otherwise healthy. Unfortunately, CPR does not typically restart the heart for people who have serious health conditions or who are very sick. \"    \"In the event your heart stopped as a result of an underlying serious health condition, would you want attempts to be made to restart your heart (answer \"yes\" for attempt to resuscitate) or would you prefer a natural death (answer \"no\" for do not attempt to resuscitate)? \" yes       [] Yes   [] No   Educated Patient / Decision Maker regarding differences between Advance Directives and portable DNR orders.     Length of ACP Conversation in minutes:      Conversation Outcomes:  [x] ACP discussion completed  [] Existing advance directive reviewed with patient; no changes to patient's previously recorded wishes  [] New Advance Directive completed  [] Portable Do Not Rescitate prepared for Provider review and signature  [] POLST/POST/MOLST/MOST prepared for Provider review and signature      Follow-up plan:    [] Schedule follow-up conversation to continue planning  [] Referred individual to Provider for additional questions/concerns   [] Advised patient/agent/surrogate to review completed ACP document and update if needed with changes in condition, patient preferences or care setting    [] This note routed to one or more involved healthcare providers

## 2022-06-26 NOTE — ED PROVIDER NOTES
[facility] ED  eMERGENCY dEPARTMENT eNCOUnter   Attending Attestation     Pt Name: Quyen Odonnell  MRN: [id number]  [place]ofelia 1950  Date of evaluation: 6/26/22       Qyuen Odonnell is a 66 y.o. female who presents with Chest Pain (STARTED 30 MINS AGO)      History: Patient presents with chest pain however patient only complains of dehydration to me. Patient says that symptoms are constant. She says she cannot tell me why she is dehydrated. Patient is very poor historian. Exam: Heart rate and rhythm are regular. Lungs are clear to auscultation bilaterally. Abdomen is soft. Patient is awake and alert. Plan for ACS work-up, consider admission. EKG shows sinus rhythm with occasional PVC and PAC. Right bundle noted. Left axis deviation. No ST elevation or depression. T waves are upright except in V2 V3 V4 and lead III. I performed a history and physical examination of the patient and discussed management with the resident. I reviewed the residents note and agree with the documented findings and plan of care. Any areas of disagreement are noted on the chart. I was personally present for the key portions of any procedures. I have documented in the chart those procedures where I was not present during the key portions. I have personally reviewed all images and agree with the resident's interpretation. I have reviewed the emergency nurses triage note. I agree with the chief complaint, past medical history, past surgical history, allergies, medications, social and family history as documented unless otherwise noted below. Documentation of the HPI, Physical Exam and Medical Decision Making performed by medical students or scribes is based on my personal performance of the HPI, PE and MDM.  For Phys Assistant/ Nurse Practitioner cases/documentation I have had a face to face evaluation of this patient and have completed at least one if not all key elements of the E/M (history, physical exam, and MDM). Additional findings are as noted. For APC cases I have personally evaluated and examined the patient in conjunction with the APC and agree with the treatment plan and disposition of the patient as recorded by the APC.     Jaqueline Garcia MD  Attending Emergency  Physician       Charlie Vallejo MD  06/26/22 [id number]

## 2022-06-27 ENCOUNTER — APPOINTMENT (OUTPATIENT)
Dept: NUCLEAR MEDICINE | Age: 72
End: 2022-06-27
Payer: COMMERCIAL

## 2022-06-27 VITALS
RESPIRATION RATE: 18 BRPM | HEART RATE: 55 BPM | DIASTOLIC BLOOD PRESSURE: 56 MMHG | SYSTOLIC BLOOD PRESSURE: 104 MMHG | WEIGHT: 175 LBS | TEMPERATURE: 97.5 F | OXYGEN SATURATION: 99 % | HEIGHT: 65 IN | BODY MASS INDEX: 29.16 KG/M2

## 2022-06-27 LAB
EKG ATRIAL RATE: 80 BPM
EKG P AXIS: 66 DEGREES
EKG P-R INTERVAL: 116 MS
EKG Q-T INTERVAL: 468 MS
EKG QRS DURATION: 114 MS
EKG QTC CALCULATION (BAZETT): 539 MS
EKG R AXIS: -22 DEGREES
EKG T AXIS: -21 DEGREES
EKG VENTRICULAR RATE: 80 BPM
LV EF: 55 %
LV EF: 57 %
LVEF MODALITY: NORMAL
LVEF MODALITY: NORMAL
TROPONIN, HIGH SENSITIVITY: 13 NG/L (ref 0–14)

## 2022-06-27 PROCEDURE — 78452 HT MUSCLE IMAGE SPECT MULT: CPT

## 2022-06-27 PROCEDURE — 84484 ASSAY OF TROPONIN QUANT: CPT

## 2022-06-27 PROCEDURE — 6370000000 HC RX 637 (ALT 250 FOR IP): Performed by: STUDENT IN AN ORGANIZED HEALTH CARE EDUCATION/TRAINING PROGRAM

## 2022-06-27 PROCEDURE — 93010 ELECTROCARDIOGRAM REPORT: CPT | Performed by: INTERNAL MEDICINE

## 2022-06-27 PROCEDURE — 93306 TTE W/DOPPLER COMPLETE: CPT

## 2022-06-27 PROCEDURE — G0378 HOSPITAL OBSERVATION PER HR: HCPCS

## 2022-06-27 PROCEDURE — 2700000000 HC OXYGEN THERAPY PER DAY

## 2022-06-27 PROCEDURE — 94640 AIRWAY INHALATION TREATMENT: CPT

## 2022-06-27 PROCEDURE — A9500 TC99M SESTAMIBI: HCPCS | Performed by: INTERNAL MEDICINE

## 2022-06-27 PROCEDURE — 93005 ELECTROCARDIOGRAM TRACING: CPT | Performed by: STUDENT IN AN ORGANIZED HEALTH CARE EDUCATION/TRAINING PROGRAM

## 2022-06-27 PROCEDURE — 93017 CV STRESS TEST TRACING ONLY: CPT

## 2022-06-27 PROCEDURE — 3430000000 HC RX DIAGNOSTIC RADIOPHARMACEUTICAL: Performed by: INTERNAL MEDICINE

## 2022-06-27 PROCEDURE — 2580000003 HC RX 258: Performed by: INTERNAL MEDICINE

## 2022-06-27 PROCEDURE — 6360000002 HC RX W HCPCS: Performed by: INTERNAL MEDICINE

## 2022-06-27 PROCEDURE — 36415 COLL VENOUS BLD VENIPUNCTURE: CPT

## 2022-06-27 RX ORDER — AMINOPHYLLINE DIHYDRATE 25 MG/ML
50 INJECTION, SOLUTION INTRAVENOUS PRN
Status: DISCONTINUED | OUTPATIENT
Start: 2022-06-27 | End: 2022-06-27 | Stop reason: ALTCHOICE

## 2022-06-27 RX ORDER — SODIUM CHLORIDE 9 MG/ML
500 INJECTION, SOLUTION INTRAVENOUS CONTINUOUS PRN
Status: DISCONTINUED | OUTPATIENT
Start: 2022-06-27 | End: 2022-06-27 | Stop reason: ALTCHOICE

## 2022-06-27 RX ORDER — SODIUM CHLORIDE 0.9 % (FLUSH) 0.9 %
5-40 SYRINGE (ML) INJECTION PRN
Status: DISCONTINUED | OUTPATIENT
Start: 2022-06-27 | End: 2022-06-27 | Stop reason: ALTCHOICE

## 2022-06-27 RX ORDER — CEPHALEXIN 250 MG/1
250 CAPSULE ORAL EVERY 6 HOURS SCHEDULED
Status: DISCONTINUED | OUTPATIENT
Start: 2022-06-27 | End: 2022-06-27 | Stop reason: HOSPADM

## 2022-06-27 RX ORDER — ATROPINE SULFATE 0.1 MG/ML
0.5 INJECTION INTRAVENOUS EVERY 5 MIN PRN
Status: DISCONTINUED | OUTPATIENT
Start: 2022-06-27 | End: 2022-06-27 | Stop reason: ALTCHOICE

## 2022-06-27 RX ORDER — METOPROLOL TARTRATE 5 MG/5ML
5 INJECTION INTRAVENOUS EVERY 5 MIN PRN
Status: DISCONTINUED | OUTPATIENT
Start: 2022-06-27 | End: 2022-06-27 | Stop reason: ALTCHOICE

## 2022-06-27 RX ORDER — NITROGLYCERIN 0.4 MG/1
0.4 TABLET SUBLINGUAL EVERY 5 MIN PRN
Status: DISCONTINUED | OUTPATIENT
Start: 2022-06-27 | End: 2022-06-27 | Stop reason: ALTCHOICE

## 2022-06-27 RX ORDER — SODIUM CHLORIDE 0.9 % (FLUSH) 0.9 %
10 SYRINGE (ML) INJECTION PRN
Status: DISCONTINUED | OUTPATIENT
Start: 2022-06-27 | End: 2022-06-27 | Stop reason: HOSPADM

## 2022-06-27 RX ORDER — CEPHALEXIN 500 MG/1
500 CAPSULE ORAL EVERY 12 HOURS SCHEDULED
Status: DISCONTINUED | OUTPATIENT
Start: 2022-06-27 | End: 2022-06-27

## 2022-06-27 RX ORDER — CEPHALEXIN 250 MG/1
250 CAPSULE ORAL 4 TIMES DAILY
Qty: 28 CAPSULE | Refills: 0 | Status: SHIPPED | OUTPATIENT
Start: 2022-06-27 | End: 2022-07-04

## 2022-06-27 RX ORDER — ALBUTEROL SULFATE 90 UG/1
2 AEROSOL, METERED RESPIRATORY (INHALATION) PRN
Status: DISCONTINUED | OUTPATIENT
Start: 2022-06-27 | End: 2022-06-27 | Stop reason: ALTCHOICE

## 2022-06-27 RX ADMIN — ATORVASTATIN CALCIUM 20 MG: 20 TABLET, FILM COATED ORAL at 15:06

## 2022-06-27 RX ADMIN — REGADENOSON 0.4 MG: 0.08 INJECTION, SOLUTION INTRAVENOUS at 11:06

## 2022-06-27 RX ADMIN — HALOPERIDOL 5 MG: 5 TABLET ORAL at 15:06

## 2022-06-27 RX ADMIN — SODIUM CHLORIDE, PRESERVATIVE FREE 10 ML: 5 INJECTION INTRAVENOUS at 11:04

## 2022-06-27 RX ADMIN — SODIUM CHLORIDE, PRESERVATIVE FREE 10 ML: 5 INJECTION INTRAVENOUS at 12:44

## 2022-06-27 RX ADMIN — TETRAKIS(2-METHOXYISOBUTYLISOCYANIDE)COPPER(I) TETRAFLUOROBORATE 12 MILLICURIE: 1 INJECTION, POWDER, LYOPHILIZED, FOR SOLUTION INTRAVENOUS at 11:08

## 2022-06-27 RX ADMIN — SODIUM CHLORIDE, PRESERVATIVE FREE 10 ML: 5 INJECTION INTRAVENOUS at 11:07

## 2022-06-27 RX ADMIN — SODIUM CHLORIDE, PRESERVATIVE FREE 10 ML: 5 INJECTION INTRAVENOUS at 11:08

## 2022-06-27 RX ADMIN — TETRAKIS(2-METHOXYISOBUTYLISOCYANIDE)COPPER(I) TETRAFLUOROBORATE 43 MILLICURIE: 1 INJECTION, POWDER, LYOPHILIZED, FOR SOLUTION INTRAVENOUS at 12:44

## 2022-06-27 ASSESSMENT — PAIN SCALES - GENERAL
PAINLEVEL_OUTOF10: 0

## 2022-06-27 ASSESSMENT — PAIN SCALES - WONG BAKER

## 2022-06-27 NOTE — PROGRESS NOTES
[facility]  Documentation Note                Admission Dx: Other chest pain [R07.89]  Chest pain [R07.9]    Past Medical History:   has a past medical history of Arthritis, Bronchitis, Chronic obstructive pulmonary disease ([facility] [address] Colon polyps, Controlled type 2 diabetes mellitus without complication, without long-term current use of insulin ([facility] [address] Dental neglect, Depression, Environmental allergies, GERD (gastroesophageal reflux disease), Hyperlipidemia, Hypertension, Obesity, Onychomycosis, Poor historian, RBBB, Treadmill stress test negative for angina pectoris, and Wears glasses. Previous Testing:     STRESS 6/18/15: No ischemia/infarct. EF 55%. ECHO 5/21/15: EF 55%, no valvular abnormalities, reduced LV diastolic compliance. Previous office/hospital visit:   Dr. Zarina Marley 6/1/17:   1. Hypertension. 2. Obesity. 3. Hyperlipidemia. 4. GERD. 5. Depression. 6. Colon polyps. 7. Bronchitis. 8. Arthritis. 9. RBBB. 10. Poor historian. 11. Cardiolite stress test 06/18/2015 showed no ischemia or infarct. EF 50%. 12. Total cholesterol on 2/7/17 230, , HDL 65. Plan --   1. A 39-year-old female with hypertension fairly controlled. 2. Hyperlipidemia, quite high LDL, increased Lipitor and given a prescription to check after two months. Patient is advised to continue to watch for any falls. I will see her back in six months.  If there is any change, go to the hospital.     Carlos Leong [facility]

## 2022-06-27 NOTE — CONSULTS
as needed for Nausea 6/18/22   Ofelia Bishop, DO   acetaminophen (TYLENOL) 500 MG tablet Take 2 tablets by mouth 4 times daily as needed for Pain 5/17/22   Lila Valdivia, DO   ibuprofen (IBU) 800 MG tablet Take 1 tablet by mouth every 8 hours as needed for Pain 5/17/22   Lila Valdivia, DO   ciclopirox (LOPROX) 0.77 % cream ciclopirox 0.77 % topical cream    Historical Provider, MD   albuterol sulfate  (90 Base) MCG/ACT inhaler albuterol sulfate HFA 90 mcg/actuation aerosol inhaler    Historical Provider, MD   atorvastatin (LIPITOR) 20 MG tablet Take 1 tablet by mouth daily 6/29/21   Steven Carson MD   tiotropium (SPIRIVA RESPIMAT) 1.25 MCG/ACT AERS inhaler Inhale 2 puffs into the lungs daily 8/12/19   Marita Hernandez MD   haloperidol (HALDOL) 5 MG tablet Take 5 mg by mouth 2 times daily    Historical Provider, MD   traZODone (DESYREL) 100 MG tablet  8/9/17   Historical Provider, MD   Elastic Bandages & Supports (ACE ARM SLING) MISC Apply 1 Units topically daily 4/17/17   Jose Roberto Salas MD   trihexyphenidyl (ARTANE) 2 MG tablet Take 2 mg by mouth 2 times daily  10/10/14   Historical Provider, MD       atorvastatin, 20 mg, Oral, Daily    haloperidol, 5 mg, Oral, BID    tiotropium, 1 puff, Inhalation, Daily    trihexyphenidyl, 2 mg, Oral, BID    sodium chloride flush, 5-40 mL, IntraVENous, 2 times per day    enoxaparin, 40 mg, SubCUTAneous, Daily      Allergies:  Patient has no known allergies. Social History:   reports that she quit smoking about 6 years ago. Her smoking use included cigarettes. She has a 17.50 pack-year smoking history. She has never used smokeless tobacco. She reports that she does not drink alcohol and does not use drugs. Family History: family history includes Cancer in her father; Heart Disease in her mother. No h/o sudden cardiac death. REVIEW OF SYSTEMS:    · Constitutional: there has been no unanticipated weight loss.  There's been No change in energy level, No  ·  Skin: Warm and dry  Neurological:  · Alert and oriented. · Moves all extremities well  · No abnormalities of mood, affect, memory, mentation, or behavior are noted        EKG:    Date: 06/27/22  Reading: No acute ischemia      LAST ECHO:  Date:  Findings Summary:  Normal left ventricle size, wall thickness and function with an estimated EF   > 55%. No segmental wall motion abnormalities seen. Right ventricular dilatation with normal systolic function. Right atrial dilatation. Estimated right ventricular systolic pressure is 28 mmHg. LAST Stress Test:   Date of last ST:  Major Findings:    LAST Cardiac Angiography:.  Date:  Findings:      Labs:     CBC:   Recent Labs     06/26/22  1553   WBC 8.2   HGB 12.4   HCT 39.3        BMP:   Recent Labs     06/26/22  1553      K 4.3   CO2 22   BUN 12   CREATININE 0.74   LABGLOM >60   GLUCOSE 90     FASTING LIPID PANEL:  Lab Results   Component Value Date    HDL 78 10/06/2021    TRIG 109 01/22/2020     LIVER PROFILE:  Recent Labs     06/26/22  1553   AST 20   ALT 13   LABALBU 4.1         Other Current Problems  Patient Active Problem List   Diagnosis    GERD (gastroesophageal reflux disease)    Hypertension    Hyperlipidemia    Depression    Bronchitis    HTN (hypertension)    Osteoarthritis    Osteoarthritis of right knee    Closed nondisplaced fracture of head of right radius    Chronic obstructive pulmonary disease ([facility] [address]    Smoker    Chronic pain of left knee    Pre-diabetes    Falls frequently    Cognitive and behavioral changes    Toxic metabolic encephalopathy    Controlled type 2 diabetes mellitus without complication, without long-term current use of insulin ([facility])    Chest pain           IMPRESSION & Recommendations:    1. Patient with atypical chest pain, CAD risk factors of hypertension diabetes dyslipidemia and former smoker.   Troponins normal, EKG right bundle branch block, normal sinus rhythm, no ST changes or T wave inversion. pretest probability 19%  2. History of hypertension  3. History of diabetes  4. History of COPD  5. Most recent echo 2014 EF 55%  6. Asymptomatic sinus bradycardia on telemetry    Plan  1. Lexiscan  2. Do echo  3. Monitor electrolytes keep potassium between 4 and 5  4. Telemetry        Discussed with patient, family, and Nurse. Electronically signed by Obey Del Rosario MD on 6/27/2022 at [address] [place] [address], MD  Internal Medicine Resident, PGY- [address] [place], [place]  6/27/2022, 6:14 AM       Attending Physician Statement  I have discussed the case of Dada Kim including pertinent history and exam findings with the resident. I have seen and examined the patient and the key elements of the encounter have been performed by me. I agree with the assessment, plan and orders as documented by the resident With changes made to the note.      Electronically signed by Letty Manzano MD on 6/27/2022 at 2:03 PM.    [facility]      [phone]

## 2022-06-27 NOTE — FLOWSHEET NOTE
[address] [facility] [address]  PROGRESS NOTE    Shift date: 06/27/2022  Shift day: Monday   Shift # 1    Room # 2321/8828-66   Name: Stephanie Cheney                  Referral: Routine Visit    Admit Date & Time: 6/26/2022  3:26 PM    Assessment:  Stephanie Cheney is a 66 y.o. female in the hospital. Upon entering the room writer observed patient sitting up in bed watching tv. The patient welcomed writer into the room and shared she would be discharged to home sometime today. Initially, the patient appeared withdrawn, but later seemed to be hopeful about her return home, and expressed anticipatory coco for an upcoming outdoor event and time with family. Intervention:  Writer introduced self and title as  Writer offered space for patient  to express feelings, needs, and concerns and provided a ministry presence. Outcome: The patient expressed gratitude for the visit. Plan:  Chaplains will remain available to offer spiritual and emotional support as needed. 06/27/22 1603   Encounter Summary   Service Provided For: Patient   Referral/Consult From: [facility]   [employer] [facility]   Last Encounter  06/27/22   Complexity of Encounter Low   Begin Time 1515   End Time  1535   Total Time Calculated 20 min   Spiritual/Emotional needs   Type Spiritual Support   Assessment/Intervention/Outcome   Assessment Calm; Hopeful   Intervention Active listening;Explored/Affirmed feelings, thoughts, concerns;Explored Coping Skills/Resources;Sustaining Presence/Ministry of presence   Outcome Expressed Gratitude;Expressed feelings of Coco, Peace and/or Love       Electronically signed by Sally Cardona on 6/27/2022 at 4:05 PM.  [facility]  [phone]

## 2022-06-27 NOTE — FLOWSHEET NOTE
[facility] [address]  PROGRESS [facility]    Shift date: 06/27/22  Shift day: Monday   Shift # 1    Room # 4805/7411-27   Name: Pattie Overton                Taoism:    Place of Sabianist:     Referral: Routine Visit. Admit Date & Time: 6/26/2022  3:26 PM    Assessment:  Pattie Overton is a 66 y.o. female       Intervention:  Writer introduced self and title as  Writer offered space for patient  to express feelings, needs, and concerns and provided a ministry presence. Patient appeared to be resting, calm, and coping.  was a ministry of presence to patient. Outcome:  While raina/spirituality were not discussed, patient expressed gratitude for visit. Plan:  Chaplains will remain available to offer spiritual and emotional support as needed.       Electronically signed by Maria E Edge, on 6/27/2022 at 3:30 PM.  [address] [facility]  [phone]

## 2022-06-27 NOTE — PROCEDURES
[facility]                              CARDIAC STRESS TEST    PATIENT NAME: Kelsy Vicente                    :        1950  MED REC NO:   [id number]                             ROOM:       8591  ACCOUNT NO:   [de-identified]                           ADMIT DATE: 2022  PROVIDER:     Daniel Corona    DATE OF STUDY:  2022    ORDERING PROVIDER:  Caty Costa MD    PRIMARY CARE PROVIDER:  Mark Newton    INTERPRETING PHYSICIAN:  Daniel Corona MD    LEXISCAN STRESS STUDY:  Indication:  chest pain    Procedure was explained and consent form was signed    Medications:  Lexiscan, 0.4 mg  Resting heart rate:  66 bpm  Resting blood pressure:  149/75 mm/Hg  Infusion heart rate:  105 bpm  Infusion blood pressure:  108/61 mm/Hg  Resting EKG:  Abnormal (Sinus rhythm with right bundle branch block and  nonspecific T wave changes)  Stress heart response:  Normal response  Stress BP response:  Appropriate  Stress EKG(S):  No changes seen  Chest discomfort:  None  Ischemic EKG changes:  Uninterpretable    Comments:  Abnormal pretest ECG precludes ECG criteria for myocardial  ischemia. Rare PVC's. IMPRESSION:  Electrocardiographically uninterpretable Lexiscan stress study. Radio-isotope results to follow from the department of Nuclear Medicine.         Jamar Whittington    D: 2022 11:38:40       T: 2022 11:40:55     AS/FLAQUITA  Job#: [id number]     Doc#: Unknown    CC:    (Retain this field even if not dictated or not decipherable)

## 2022-06-27 NOTE — PROGRESS NOTES
Discharge teaching and instructions completed with patient using teachback method. AVS reviewed. Prescriptions given to patient. Patient voiced understanding regarding prescriptions, follow up appointments, and care of self at home. Discharged home. All questions answered.

## 2022-06-27 NOTE — CARE COORDINATION
Cab arranged with [facility], confirmation number [id number].   They state [employer] cab will be here within an hour    [facility]  Written by: Noam Blake RN    Patient Name: Stephanie Cheney  Attending Provider: Brenda Barbosa MD  Admit Date: 2022  3:26 PM  MRN: [id number]  Account: [de-identified]                     : 1950  Discharge Date:  2022        Disposition: home    Noam Blake RN

## 2022-06-27 NOTE — H&P
[address] [facility]  CDU / OBSERVATION eNCOUnter  Resident Note     Pt Name: Cassy Lance  MRN: [id number]  [place] 1950  Date of evaluation: 6/27/22  Patient's PCP is :  [facility]       Chief Complaint   Patient presents with    Chest Pain     STARTED [address]    Cassy Lance is a 66 y.o. female who presents ED with complaints of intermittent left-sided chest pain that began prior to arrival.  Also initially complained of abdominal pain but did not have any tenderness. Troponin at baseline. Irregular heart rate noted in ED ranging from 40s to 70s and 80s. No previous history of this. Recently diagnosed with COVID last week and initially was positive in May. Patient denied any chest pain this morning. No nausea, vomiting. No lightheadedness, syncope. Denied history of irregular heart rate in past.    Location/Symptom: Left chest pain  Timing/Onset: Yesterday  Provocation: N/A  Quality: Unclear  Radiation: Nonradiating  Severity: Mild  Timing/Duration: Intermittent  Modifying Factors: None    REVIEW OF SYSTEMS       General ROS - No fevers, No malaise   Ophthalmic ROS - No discharge, No changes in vision  ENT ROS -  No sore throat, No rhinorrhea,   Respiratory ROS - no shortness of breath, no cough, no  wheezing  Cardiovascular ROS - + chest pain, no dyspnea on exertion  Gastrointestinal ROS - No abdominal pain, no nausea or vomiting, no change in bowel habits, no black or bloody stools  Genito-Urinary ROS - No dysuria, trouble voiding, or hematuria  Musculoskeletal ROS - No myalgias, No arthalgias  Neurological ROS - No headache, no dizziness/lightheadedness, No focal weakness, no loss of sensation  Dermatological ROS - No lesions, No rash     (PQRS) Advance directives on face sheet per hospital policy.  No change unless specifically mentioned in chart    PAST MEDICAL HISTORY    has a past medical history of Arthritis, Bronchitis, Chronic obstructive pulmonary disease ([address] Colon polyps, Controlled type 2 diabetes mellitus without complication, without long-term current use of insulin ([facility] [address] Dental neglect, Depression, Environmental allergies, GERD (gastroesophageal reflux disease), Hyperlipidemia, Hypertension, Obesity, Onychomycosis, Poor historian, RBBB, Treadmill stress test negative for angina pectoris, and Wears glasses. I have reviewed the past medical history with the patient and it is pertinent to this complaint. SURGICAL HISTORY      has a past surgical history that includes Cholecystectomy; doppler echocardiography; Colonoscopy (2013); and Tubal ligation. I have reviewed and agree with Surgical History entered and it is pertinent to this complaint. CURRENT MEDICATIONS     atorvastatin (LIPITOR) tablet 20 mg, Daily  haloperidol (HALDOL) tablet 5 mg, BID  tiotropium (SPIRIVA RESPIMAT) 2.5 MCG/ACT inhaler 1 puff, Daily  trihexyphenidyl (ARTANE) tablet 2 mg, BID  sodium chloride flush 0.9 % injection 5-40 mL, 2 times per day  sodium chloride flush 0.9 % injection 5-40 mL, PRN  0.9 % sodium chloride infusion, PRN  enoxaparin (LOVENOX) injection 40 mg, Daily  acetaminophen (TYLENOL) tablet 650 mg, Q4H PRN  ondansetron (ZOFRAN-ODT) disintegrating tablet 4 mg, Q8H PRN   Or  ondansetron (ZOFRAN) injection 4 mg, Q6H PRN        All medication charted and reviewed. ALLERGIES     has No Known Allergies. FAMILY HISTORY     She indicated that her mother is . She indicated that her father is . She indicated that her maternal grandmother is . She indicated that her maternal grandfather is . She indicated that her paternal grandmother is . She indicated that her paternal grandfather is . family history includes Cancer in her father; Heart Disease in her mother. The patient denies any pertinent family history.   I have reviewed and agree with the family history entered. I have reviewed the Family History and it is not significant to the case    SOCIAL HISTORY      reports that she quit smoking about 6 years ago. Her smoking use included cigarettes. She has a 17.50 pack-year smoking history. She has never used smokeless tobacco. She reports that she does not drink alcohol and does not use drugs. I have reviewed and agree with all Social.  There are no concerns for substance abuse/use. PHYSICAL EXAM     INITIAL VITALS:  height is 5' 5\" (1.651 m) and weight is 175 lb (79.4 kg). Her oral temperature is 97.3 °F (36.3 °C). Her blood pressure is 121/61 and her pulse is 51. Her respiration is 16 and oxygen saturation is 96%. CONSTITUTIONAL: AOx4, no apparent distress, appears stated age    HEAD: normocephalic, atraumatic   EYES: PERRLA, EOMI    ENT: moist mucous membranes, uvula midline   NECK: supple, symmetric   BACK: symmetric   LUNGS: clear to auscultation bilaterally   CARDIOVASCULAR: regular rate and rhythm, no murmurs, rubs or gallops   ABDOMEN: soft, non-tender, non-distended with normal active bowel sounds   NEUROLOGIC:  MAEx4, no focal sensory or motor deficits   MUSCULOSKELETAL: no clubbing, cyanosis or edema   SKIN: no rash or wounds       DIFFERENTIAL DIAGNOSIS/MDM:   Chest Pain:  DDX: Emergent: ACS/NSTEMI/STEMI/angina, arrhythmia, trauma, aortic dissection,  PE, PNA, pneumothroax, esophageal rupture, tamponade, Cocaine use  Nonemergent: pneumonia, pericarditis, GERD, MSK, Endocarditis, anxiety  Evaluated for: diaphoresis, present chest pain, tachypnea, BP both arms, heart sounds, JVD, tender chest wall, wheezing    DIAGNOSTIC RESULTS     EKG: All EKG's are interpreted by the Observation Physician who either signs or Co-signs this chart in the absence of a cardiologist.    EKG Interpretation    EKG shows normal sinus rhythm, right bundle branch block, Left axis deviation, occasional PVC and PAC. There are no acute ST or T wave changes.   T waves appear to be V2, V3, V4, V3.    RADIOLOGY:   I directly visualized the following  images and reviewed the radiologist interpretations:    XR CHEST PORTABLE    Result Date: 6/26/2022  EXAMINATION: ONE XRAY VIEW OF THE CHEST 6/26/2022 3:51 pm COMPARISON: 06/18/2022 chest radiograph HISTORY: ORDERING SYSTEM PROVIDED HISTORY: chest pain TECHNOLOGIST PROVIDED HISTORY: chest pain FINDINGS: The cardiomediastinal silhouette is normal in size and contour. No focal airspace disease. No pleural effusion or pneumothorax. No evidence of acute osseous abnormality. No evidence of acute cardiopulmonary disease. LABS:  I have reviewed and interpreted all available lab results.   Labs Reviewed   CBC WITH AUTO DIFFERENTIAL - Abnormal; Notable for the following components:       Result Value    RDW 15.1 (*)     All other components within normal limits   COMPREHENSIVE METABOLIC PANEL W/ REFLEX TO MG FOR LOW K - Abnormal; Notable for the following components:    Alkaline Phosphatase 111 (*)     All other components within normal limits   URINALYSIS WITH REFLEX TO CULTURE - Abnormal; Notable for the following components:    Leukocyte Esterase, Urine LARGE (*)     All other components within normal limits   COVID-19, RAPID   CULTURE, URINE   LIPASE   TROPONIN   TROPONIN   MICROSCOPIC URINALYSIS   TROPONIN       SCREENING TOOLS:    HEART Risk Score for Chest Pain Patients   History and Physical Exam Suspicion Level  (Nausea, Vomiting, Diaphoresis, Radiation, Exertion)   Slightly Suspicious (0 pts)   Moderately Suspicious (1 pt)   Highly Suspicious (2 pts)   EKG Interpretation   Normal (0 pts)   Non-Specific Repolarization Disturbance (1 pt)   Significant ST-Depression (2 pts)   Age of Patient (in years)   = 39 (0 pts)   46-64 (1 pt)   = 65 (2 pts)   Risk Factors   No Risk Factors (0 pts)   1-2 Risk Factors (1 pt)   = 3 Risk Factors (2 pts)   Risk Factors Include:   Hypercholesterolemia   Hypertension   Diabetes Mellitus   Cigarette smoking   Positive family history   Obesity   CAD   (SLE, CKDz, HIV, Cocaine abuse)   Troponin Levels   = Normal Limit (0 pts)   1-3 Times Normal Limit (1 pt)   > 3 Times Normal Limit (2 pts)  TOTAL:6    Percent Risk for Major Adverse Cardiac Event (MACE)  0-3 pts indicates low risk for MACE   2.5% (DISCHARGE)   4-7 pts indicates moderate risk for MACE  20.3% (OBS)  8-10 pts indicates high risk for MACE  72.7% (EARLY INVASIVE TX)    CDU IMPRESSION / Brent Common is a 66 y.o. female who presents with plaints of intermittent left-sided chest pain. Had COVID last week. Initially complained of abdominal pain but no tenderness on exam in ED or this morning. Heart score 6. Troponins 12, 11, 13 similar in past.  Echo done in 2014 showed 55% EF. Was also noted to have varied heart rate in ED from 40s to 80s. Placed in observation unit to see cardiology    · Cardiology consulted, appreciate recommendations  · Echo, stress  · Continue home medications and pain control  · Monitor vitals, labs, and imaging  · DISPO: pending consults and clinical improvement    CONSULTS:    IP CONSULT TO CARDIOLOGY    PROCEDURES:  Not indicated       PATIENT REFERRED TO:    No follow-up provider specified. --  Matthew Lay MD   Emergency Medicine Resident     This dictation was generated by voice recognition computer software. Although all attempts are made to edit the dictation for accuracy, there may be errors in the transcription that are not intended.

## 2022-06-27 NOTE — DISCHARGE INSTR - COC
Continuity of Care Form    Patient Name: Hali Hines   :  1950  MRN:  [id number]    [address] [place] [address] date:  2022  Discharge date:  ***    Code Status Order: Full Code   Advance Directives:      Admitting Physician:  Ann James MD  PCP: Chato Freeman    Discharging Nurse: [facility] Unit/Room#: 2952/7122-84  Discharging Unit Phone Number: ***    Emergency Contact:   Extended Emergency Contact Information  Primary Emergency Contact: Marlin Crane  Address: n/a   [address] Phone: [phone]  Relation: Child  Secondary Emergency Contact: [address] [facility],  Lexii Sandoval  Phone: [phone]  Relation: Child    Past Surgical History:  Past Surgical History:   Procedure Laterality Date    CHOLECYSTECTOMY      COLONOSCOPY  2013    one hyperplastic polyp    DOPPLER ECHOCARDIOGRAPHY      cardiac    TUBAL LIGATION         Immunization History:   Immunization History   Administered Date(s) Administered    COVID-19, [address] top, DO NOT Dilute, Kieran-Sucrose, 12+ yrs, PF, 30 mcg/0.3 mL dose 2022    Influenza 2012, 10/09/2013    Influenza Virus Vaccine 2014, 10/06/2015    Influenza, High Dose (Fluzone 65 yrs and older) 11/15/2011    Influenza, Quadv, IM, (6 mo and older Fluzone, Flulaval, Fluarix and 3 yrs and older Afluria) 2016, 2017    Influenza, Quadv, IM, PF (6 mo and older Fluzone, Flulaval, Fluarix, and 3 yrs and older Afluria) 2019    Influenza, Triv, inactivated, subunit, adjuvanted, IM (Fluad 65 yrs and older) 10/11/2018    Pneumococcal Conjugate 13-valent (Wpallyc69) 2016    Pneumococcal Polysaccharide (Pmbbofvbq66) 2013, 2018    Tdap (Boostrix, Adacel) 2016       Active Problems:  Patient Active Problem List   Diagnosis Code    GERD (gastroesophageal reflux disease) K21.9    Hypertension I10    Hyperlipidemia E78.5    Depression F32. A    Bronchitis J40    HTN (hypertension) I10    Osteoarthritis M19.90    Osteoarthritis of right knee M17.11    Closed nondisplaced fracture of head of right radius S52.124A    Chronic obstructive pulmonary disease (HCC) J44.9    Smoker F17.200    Chronic pain of left knee M25.562, G89.29    Pre-diabetes R73.03    Falls frequently R29.6    Cognitive and behavioral changes R41.89, G62.73    Toxic metabolic encephalopathy Y57.5    Controlled type 2 diabetes mellitus without complication, without long-term current use of insulin ([facility]) E11.9    Chest pain R07.9       Isolation/Infection:   Isolation            No Isolation          Patient Infection Status       Infection Onset Added Last Indicated Last Indicated By Review Planned Expiration Resolved Resolved By    None active    Resolved    COVID-19 22 COVID-19 & Influenza Combo   22 Serena Klinefelter, RN    COVID-19 (Rule Out) 22 COVID-19 & Influenza Combo (Ordered)   22 Rule-Out Test Resulted    COVID-19 22 COVID-19, Rapid   22     COVID-19 (Rule Out) 22 COVID-19, Rapid (Ordered)   22 Rule-Out Test Resulted    COVID-19 (Rule Out) 22 COVID-19, Rapid (Ordered)   22 Rule-Out Test Resulted    COVID-19 (Rule Out) 21 COVID-19, Rapid (Ordered)   21 Rule-Out Test Resulted            Nurse Assessment:  Last Vital Signs: BP (!) 104/56   Pulse 55   Temp 97.5 °F (36.4 °C) (Oral)   Resp 18   Ht 5' 5\" (1.651 m)   Wt 175 lb (79.4 kg)   SpO2 99%   BMI 29.12 kg/m²     Last documented pain score (0-10 scale): Pain Level: 0  Last Weight:   Wt Readings from Last 1 Encounters:   22 175 lb (79.4 kg)     Mental Status:  {IP PT MENTAL STATUS:}    IV Access:  { MELONY IV ACCESS:607745039}    Nursing Mobility/ADLs:  Walking   {CHP DME OGQU:507706532}  Transfer  {CHP DME SPXR:448588353}  Bathing  {CHP DME RZFA:509367349}  Dressing  {CHP DME EBCI:671406695}  Toileting  {CHP DME YRUK:199803862}  Feeding  {CHP DME THLD:650143077}  Med Admin  {CHP DME YQBT:978006511}  Med Delivery   { MELONY MED Delivery:445204165}    Wound Care Documentation and Therapy:        Elimination:  Continence: Bowel: {YES / MC:63033}  Bladder: {YES / VF:59223}  Urinary Catheter: {Urinary Catheter:685029852}   Colostomy/Ileostomy/Ileal Conduit: {YES / TC:93502}       Date of Last BM: ***  No intake or output data in the 24 hours ending 22 1756  No intake/output data recorded.     Safety Concerns:     [address] Safety Concerns:126951919}    Impairments/Disabilities:      [address] Impairments/Disabilities:748023576}    Nutrition Therapy:  Current Nutrition Therapy:   [address] Diet List:571770441}    Routes of Feeding: {CHP DME Other Feedings:240710279}  Liquids: {Slp liquid thickness:78238}  Daily Fluid Restriction: {CHP DME Yes amt example:580458415}  Last Modified Barium Swallow with Video (Video Swallowing Test): {Done Not Done YOXE:909052456}    Treatments at the Time of Hospital Discharge:   Respiratory Treatments: ***  Oxygen Therapy:  {Therapy; copd oxygen:81826}  Ventilator:    { CC Vent DTEC:026034594}    Rehab Therapies: {THERAPEUTIC INTERVENTION:2036219279}  Weight Bearing Status/Restrictions: [address] Weight Bearin}  Other Medical Equipment (for information only, NOT a DME order):  {EQUIPMENT:340634867}  Other Treatments: ***    Patient's personal belongings (please select all that are sent with patient):  {P DME Belongings:844125828}    RN SIGNATURE:  {Esignature:424961766}    CASE MANAGEMENT/SOCIAL WORK SECTION    Inpatient Status Date: ***    Readmission Risk Assessment Score:  Readmission Risk              Risk of Unplanned Readmission:  0           Discharging to Facility/ Agency   Name:   Address:  Phone:  Fax:    Dialysis Facility (if applicable)   Name:  Address:  Dialysis Schedule:  Phone:  Fax:    / signature: {Esignature:022597284}    PHYSICIAN SECTION    Prognosis: {Prognosis:5731455525}    Condition at Discharge: Quinten Franks Patient Condition:716997535}    Rehab Potential (if transferring to Rehab): {Prognosis:8001768964}    Recommended Labs or Other Treatments After Discharge: ***    Physician Certification: I certify the above information and transfer of Kaylan Barrow  is necessary for the continuing treatment of the diagnosis listed and that she requires {Admit to Appropriate Level of Care:52907} for {GREATER/LESS:345355071} 30 days.      Update Admission H&P: {CHP DME Changes in LGWOK:436410195}    PHYSICIAN SIGNATURE:  {Esignature:375490632}

## 2022-06-27 NOTE — CARE COORDINATION
06/27/22 1210   Service Assessment   History Provided By Child/Family  (son Augustine)   [address]   Can patient return to prior living arrangement Yes   Family able to assist with home care needs: Yes   Community Resources ECF/Home Care   Social/Functional History   Lives With Son   Type of [address] One level   Home Equipment Cane;Walker, rolling;Oxygen   Receives Help From Family   Mode of Transportation Cab   Discharge Planning   Type of Residence [address]   Patient expects to be discharged to: [facility] Discharge   Mode of Transport at Discharge Other (see comment)  (cab)   Return home with son, referral to [facility], needs cab

## 2022-06-28 LAB
CULTURE: ABNORMAL
EKG ATRIAL RATE: 83 BPM
EKG P AXIS: 54 DEGREES
EKG P-R INTERVAL: 112 MS
EKG Q-T INTERVAL: 432 MS
EKG QRS DURATION: 114 MS
EKG QTC CALCULATION (BAZETT): 507 MS
EKG R AXIS: -18 DEGREES
EKG T AXIS: -18 DEGREES
EKG VENTRICULAR RATE: 83 BPM
SPECIMEN DESCRIPTION: ABNORMAL

## 2022-06-28 PROCEDURE — 93010 ELECTROCARDIOGRAM REPORT: CPT | Performed by: INTERNAL MEDICINE

## 2022-06-28 NOTE — PROGRESS NOTES
[facility]  CDU / OBSERVATION ENCOUNTER  ATTENDING NOTE       Cultures returned. Pt should be switched to macrobid from keflex. Attempted to call patient and emergency contact. Will make an effort to try patient later again today.     Jeanette Klein MD  Attending Emergency  Physician

## 2022-06-28 NOTE — DISCHARGE SUMMARY
trihexyphenidyl 2 MG tablet  Commonly known as: ARTANE           Where to Get Your Medications      [facility], [address]  [address], [address] [place] [address]    Phone: [phone]   · cephALEXin 250 MG capsule         Diet:  No diet orders on file , Advance as tolerated     Activity:  As tolerated    Consultants: IP CONSULT TO CARDIOLOGY    Procedures:  Not indicated     Diagnostic Test:   Results for orders placed or performed during the hospital encounter of 06/26/22   COVID-19, Rapid    Specimen: Nasopharyngeal Swab   Result Value Ref Range    Specimen Description . NASOPHARYNGEAL SWAB     SARS-CoV-2, Rapid Not Detected Not Detected   Culture, Urine    Specimen: Urine, clean catch   Result Value Ref Range    Specimen Description . CLEAN CATCH URINE     Culture ESCHERICHIA COLI 10 to 50,000 CFU/ML (A)    CBC with Auto Differential   Result Value Ref Range    WBC 8.2 3.5 - 11.3 k/uL    RBC 4.32 3.95 - 5.11 m/uL    Hemoglobin 12.4 11.9 - 15.1 g/dL    Hematocrit 39.3 36.3 - 47.1 %    MCV 91.0 82.6 - 102.9 fL    MCH 28.7 25.2 - 33.5 pg    MCHC 31.6 28.4 - 34.8 g/dL    RDW 15.1 (H) 11.8 - 14.4 %    Platelets 872 589 - 932 k/uL    MPV 9.5 8.1 - 13.5 fL    NRBC Automated 0.0 0.0 per 100 WBC    Seg Neutrophils 64 36 - 65 %    Lymphocytes 26 24 - 43 %    Monocytes 9 3 - 12 %    Eosinophils % 1 1 - 4 %    Basophils 0 0 - 2 %    Immature Granulocytes 0 0 %    Segs Absolute 5.15 1.50 - 8.10 k/uL    Absolute Lymph # 2.15 1.10 - 3.70 k/uL    Absolute Mono # 0.73 0.10 - 1.20 k/uL    Absolute Eos # 0.07 0.00 - 0.44 k/uL    Basophils Absolute 0.03 0.00 - 0.20 k/uL    Absolute Immature Granulocyte <0.03 0.00 - 0.30 k/uL    RBC Morphology ANISOCYTOSIS PRESENT    Comprehensive Metabolic Panel w/ Reflex to MG   Result Value Ref Range    Glucose 90 70 - 99 mg/dL    BUN 12 8 - 23 mg/dL    CREATININE 0.74 0.50 - 0.90 mg/dL    Calcium 9.0 8.6 - 10.4 mg/dL    Sodium 136 135 - 144 mmol/L    Potassium 4.3 3.7 - 5.3 mmol/L    Chloride 100 98 - 107 mmol/L    CO2 22 20 - 31 mmol/L    Anion Gap 14 9 - 17 mmol/L    Alkaline Phosphatase 111 (H) 35 - 104 U/L    ALT 13 5 - 33 U/L    AST 20 <32 U/L    Total Bilirubin 0.36 0.3 - 1.2 mg/dL    Total Protein 6.8 6.4 - 8.3 g/dL    Albumin 4.1 3.5 - 5.2 g/dL    Albumin/Globulin Ratio 1.5 1.0 - 2.5    GFR Non-African American >60 >60 mL/min    GFR African American >60 >60 mL/min    GFR Comment         Lipase   Result Value Ref Range    Lipase 14 13 - 60 U/L   Troponin   Result Value Ref Range    Troponin, High Sensitivity 12 0 - 14 ng/L   Troponin   Result Value Ref Range    Troponin, High Sensitivity 11 0 - 14 ng/L   Urinalysis with Reflex to Culture    Specimen: Urine   Result Value Ref Range    Color, UA Yellow Yellow    Turbidity UA Clear Clear    Glucose, Ur NEGATIVE NEGATIVE    Bilirubin Urine NEGATIVE NEGATIVE    Ketones, Urine NEGATIVE NEGATIVE    Specific [place], UA 1.005 1.005 - 1.030    Urine Hgb NEGATIVE NEGATIVE    pH, UA 5.5 5.0 - 8.0    Protein, UA NEGATIVE NEGATIVE    Urobilinogen, Urine Normal Normal    Nitrite, Urine NEGATIVE NEGATIVE    Leukocyte Esterase, Urine LARGE (A) NEGATIVE   Microscopic Urinalysis   Result Value Ref Range    -          WBC, UA 10 TO 20 0 - 5 /HPF    RBC, UA 0 TO 2 0 - 4 /HPF    Casts UA  0 - 8 /LPF     0 TO 2 HYALINE Reference range defined for non-centrifuged specimen.     Epithelial Cells UA 2 TO 5 0 - 5 /HPF   Troponin   Result Value Ref Range    Troponin, High Sensitivity 13 0 - 14 ng/L   EKG 12 Lead   Result Value Ref Range    Ventricular Rate 83 BPM    Atrial Rate 83 BPM    P-R Interval 112 ms    QRS Duration 114 ms    Q-T Interval 432 ms    QTc Calculation (Bazett) 507 ms    P Axis 54 degrees    R Axis -18 degrees    T Axis -18 degrees   EKG 12 Lead   Result Value Ref Range    Ventricular Rate 80 BPM    Atrial Rate 80 BPM    P-R Interval 116 ms    QRS Duration 114 ms Q-T Interval 468 ms    QTc Calculation (Bazett) 539 ms    P Axis 66 degrees    R Axis -22 degrees    T Axis -21 degrees     ECHO Complete 2D W Doppler W Color    Result Date: 6/27/2022  Transthoracic Echocardiography Report (TTE)  Patient Name Diego Mojica       Date of Study               06/27/2022               Monserrat Meraz   Date of      1950  Gender                      Female  Birth   Age          66 year(s)  Race                           Room Number  4116        Height:                     65 inch, 165.1 cm   Corporate ID [id number]    Weight:                     175 pounds, 79.4 kg  #   Patient Acct [de-identified]   BSA:          1.87 m^2      BMI:      29.12  #                                                              kg/m^2   MR #         [id number]     Sonographer                 Theresa Dyer   Accession #  [id number]  Interpreting Physician      Charo Miles   Fellow                   Referring Nurse                           Practitioner   Interpreting             Referring Physician         Dulce Andrade, *  Fellow  Type of Study   TTE procedure:2D Echocardiogram, M-Mode, Doppler, Color Doppler. Procedure Date Date: 06/27/2022 Start: 11:42 AM Study Location: [facility] Technical Quality: Adequate visualization Indications:Chest pain. History / Tech. Comments: Procedure explained to patient. Patient Status: Inpatient Height: 65 inches Weight: 175 pounds BSA: 1.87 m^2 BMI: 29.12 kg/m^2 HR: 74 bpm CONCLUSIONS Summary Normal LV size and wall thickness. No obvious wall motion abnormality seen. Normal LV systolic function with LVEF >55%. Normal RV size and function. LA and RA appears normal in size. No obvious significant structural valvular abnormality noted. No significant valvular stenosis or regurgitation noted. Normal aortic root dimension. No significant pericardial effusion noted. No obvious intra-cardiac mass or shunt noted.  IVC normal diameter and inspiratory variation indicating normal RA filling pressure. Signature ----------------------------------------------------------------------------  Electronically signed by Charo Miles(Interpreting physician) on  06/27/2022 04:35 PM ---------------------------------------------------------------------------- ----------------------------------------------------------------------------  Electronically signed by Tess Perla(Sonographer) on 06/27/2022  12:25 PM ---------------------------------------------------------------------------- FINDINGS Left Atrium Left atrium is normal in size. Left Ventricle Left ventricle is normal in size. Global left ventricular systolic function is normal. Estimated ejection fraction is 60 % . Calculated EF via 3D Heart Model is 61 %. Mild left ventricular hypertrophy. No wall motion abnormality seen. Right Atrium Right atrium is normal in size. Right Ventricle Normal right ventricular size and function. Mitral Valve Normal mitral valve structure. Mitral annular calcification is seen. No mitral regurgitation. No mitral stenosis. Aortic Valve Aortic valve structure and function normal. Aortic valve is trileaflet. No aortic insufficiency. No aortic stenosis. Tricuspid Valve Normal tricuspid valve leaflets. No tricuspid regurgitation. No tricuspid stenosis. Insignificant tricuspid regurgitation, unable to estimate RVSP. Pulmonic Valve Pulmonic valve is normal in structure and function. No pulmonic insufficiency. No evidence of pulmonic stenosis. Pericardial Effusion No pericardial effusion. Miscellaneous Normal aortic root dimension. The ascending aorta is normal in size. E/E' average = 9.6. IVC normal diameter & inspiratory collapse indicating normal RA filling pressure .  M-mode / 2D Measurements & Calculations:   LVIDd:3.6 cm(3.7 - 5.6 cm)       Diastolic HVZPLS:70 ml  LVIDs:3 cm(2.2 - 4.0 cm)         Systolic RFUJYE:97 ml  PCBU:3.6 cm(0.6 - 1.1 cm)        Aortic Root:3.5 cm(2.0 - 3.7 cm)  LVPWd:1.3 cm(0.6 - 1.1 cm)       LA Dimension: 2.9 cm(1.9 - 4.0 cm)  Fractional Shortenin.67 %    LA volume/Index: 36.73 ml /20m^2  Calculated LVEF (%): 61.22 %     LVOT:1.9 cm                                   RVDd:2.9 cm   Mitral:                                 Aortic   Valve Area (P1/2-Time): 3.06 cm^2       Peak Velocity: 1.12 m/s  Peak E-Wave: 0.81 m/s                   Mean Velocity: 0.79 m/s  Peak A-Wave: 0.91 m/s                   Peak Gradient: 5.02 mmHg  E/A Ratio: 0.89                         Mean Gradient: 3 mmHg  Peak Gradient: 2.62 mmHg  Mean Gradient: 2 mmHg  Deceleration Time: 211 msec             Area (continuity): 2.35 cm^2  P1/2t: 72 msec                          AV VTI: 27.3 cm   Area (continuity): 1.86 cm^2  Mean Velocity: 0.70 m/s                                           Pulmonic:                                           Peak Velocity: 1.25 m/s                                          Peak Gradient: 6.25 mmHg  Diastology / Tissue Doppler Septal Wall E' velocity:0.09 m/s Septal Wall E/E':9.1 Lateral Wall E' velocity:0.08 m/s Lateral Wall E/E':10.1    XR ABDOMEN (KUB) (SINGLE AP VIEW)    Result Date: 2022  EXAMINATION: ONE SUPINE XRAY VIEW(S) OF THE ABDOMEN 2022 10:53 pm COMPARISON: None. HISTORY: ORDERING SYSTEM PROVIDED HISTORY: Abdominal pain, urinary retention TECHNOLOGIST PROVIDED HISTORY: Abdominal pain, urinary retention Reason for Exam: Supine port, urinary retention FINDINGS: There is mild nonobstructive gaseous distension of large and small bowel. Urinary bladder does not appear significantly distended. There are no abnormal calcifications or soft tissue masses. Lung bases are clear. Mild nonobstructive gaseous distention of bowel. Urinary bladder does not appear significantly distended.      XR CHEST PORTABLE    Result Date: 2022  EXAMINATION: ONE XRAY VIEW OF THE CHEST 2022 3:51 pm COMPARISON: 2022 chest radiograph HISTORY: ORDERING SYSTEM PROVIDED HISTORY: chest pain TECHNOLOGIST PROVIDED HISTORY: chest pain FINDINGS: The cardiomediastinal silhouette is normal in size and contour. No focal airspace disease. No pleural effusion or pneumothorax. No evidence of acute osseous abnormality. No evidence of acute cardiopulmonary disease. XR CHEST PORTABLE    Result Date: 6/18/2022  EXAMINATION: ONE XRAY VIEW OF THE CHEST 6/18/2022 1:32 pm COMPARISON: 06/02/2022 HISTORY: ORDERING SYSTEM PROVIDED HISTORY: Pre-synope TECHNOLOGIST PROVIDED HISTORY: Pre-synope Reason for Exam: Pt states she is dehydrated. . no chest complaints at this time. FINDINGS: . The cardiac size is normal. No acute infiltrates or pleural effusions are seen. Pulmonary vascularity appears normal. Mild scoliosis. There are degenerative changes in the spine . No acute bony abnormalities. The hilar structures are normal.     No acute cardiopulmonary disease     XR CHEST PORTABLE    Result Date: 6/2/2022  EXAMINATION: ONE XRAY VIEW OF THE CHEST 6/2/2022 4:37 am COMPARISON: 05/17/2022 HISTORY: ORDERING SYSTEM PROVIDED HISTORY: L RUQ/chest pain TECHNOLOGIST PROVIDED HISTORY: L RUQ/chest pain Reason for Exam: Upright port, L RUQ pain FINDINGS: Cardiac mediastinal silhouettes appear within normal limits for size. Pulmonary vascularity is normal.  No focal consolidation is seen. No new infiltrate. No acute osseous abnormality. Similar chest without acute focal infiltrate. NM Cardiac Stress Test Nuclear Imaging    Result Date: 6/27/2022  EXAMINATION: MYOCARDIAL PERFUSION IMAGING 6/27/2022 10:48 am TECHNIQUE: For the rest study, 43 mCi of Tc 99 labeled sestamibi were injected. SPECT images with ECG gating were acquired. Under cardiology supervision, 0.4mg Miguel Angel Kevin was infused. After pharmacologic stress, 12 mCi of Tc 99 labeled sestamibi were injected. SPECT images were acquired. COMPARISON: None Available.  HISTORY: ORDERING SYSTEM PROVIDED HISTORY: Chest pain TECHNOLOGIST PROVIDED HISTORY: Reason for Exam: Chest pain Procedure Type->Rx chest pain Reason for Exam: chest pain radiates to arm, nausea, vomiting, sweating, HTN, tachycardia and bradycardia Additional signs and symptoms: former smoker quit 2015, COPD FINDINGS: Images interpreted utilizing [employer]. There is no evidence for a significant reversible or fixed perfusion defect. The gated images show no wall motion abnormalities. Normal myocardial thickening. Perfusion scores are visually adjusted to account for artifact. Summed stress score:  0 Summed rest score:  0 Summed reversibility score:  0 Function: End diastolic volume:  37YB Left ventricular ejection fraction:  57% TID score:  1.11 (scores greater than 1.39 are considered elevated for Lexiscan stress with Tc99m) Notes concerning risk stratification: Risk stratification incorporates both clinical history and some testing results. Final risk determination is the responsibility of the ordering provider as other patient information and test results may increase or decrease the risk assessment reported for this examination. Risk stratification criteria are adapted from \"Noninvasive Risk Stratification\" criteria from [employer] [place]. Al, ACC/AATS/AHA/ASE/ASNC/SCAI/SCCT/STS 2017 Appropriate Use Criteria For Coronary Revascularization in Patients With Stable Ischemic Heart Disease [facility] Volume 69, Issue 17, May 2017 High risk (>3% annual death or MI) 1. Severe resting LV dysfunction (LVEF <35%) not readily explained by non coronary causes 2. Resting perfusion abnormalities greater than 10% of the myocardium in patients without prior history or evidence of MI 3. Stress-induced perfusion abnormalities encumbering greater than or equal to 10% myocardium or stress segmental scores indicating multiple vascular territories with abnormalities 4.  Stress-induced LV dilatation (TID ratio greater than 1.19 for exercise and greater than 1.39 for regadenoson) Intermediate risk (1% to 3% annual death or MI) 1. Mild/moderate resting LV dysfunction (LVEF 35% to 49%) not readily explained by non coronary causes. 2. Resting perfusion abnormalities in 5%-9.9% of the myocardium in patients without a history or prior evidence of MI 3. Stress-induced perfusion abnormality encumbering 5%-9.9% of the myocardium or stress segmental scores indicating 1 vascular territory with abnormalities but without LV dilation 4. Small wall motion abnormality involving 1-2 segments and only 1 coronary bed. Low Risk (Less than 1% annual death or MI) 1. Normal or small myocardial perfusion defect at rest or with stress encumbering less than 5% of the myocardium. No stress-induced ischemia. No infarct. Normal LVEF. Risk stratification: Low           Physical Exam:    General appearance - NAD, AOx 3   Lungs -CTAB, no R/R/R  Heart - RRR, no M/R/G  Abdomen - Soft, NT/ND  Neurological:  MAEx4, No focal motor deficit, sensory loss  Extremities - Cap refil <2 sec in all ext., no edema  Skin -warm, dry      Hospital Course:  Clinical course has improved, labs and imaging reviewed. Ashley Sage originally presented to the hospital on 6/26/2022  3:26 PM. with chest pain, history HTN, DM, former smoker. Troponins normal, EKG showed right bundle branch block without ST or T wave changes. .  At that time it was determined that She required further observation and cardiology consult. She was admitted and labs and imaging were followed daily. Stress test showed low risk. Echo with EF within normal limits, greater than 55%. Imaging results as above. She is medically stable to be discharged. Disposition: Home    Patient stated that they will not drive themselves home from the hospital if they have gotten pain killers/ narcotics earlier that day and that they will arrange for transportation on their own or work with the  for a ride.  Patient counseled NOT to drive while under the influence of narcotics/ pain killers. Condition: Good    Patient stable and ready for discharge home. I have discussed plan of care with patient and they are in understanding. They were instructed to read discharge paperwork. All of their questions and concerns were addressed. Time Spent: 0 day      --  Ulises Dodd MD  Emergency Medicine Resident Physician    This dictation was generated by voice recognition computer software. Although all attempts are made to edit the dictation for accuracy, there may be errors in the transcription that are not intended.

## 2022-06-28 NOTE — PROGRESS NOTES
[facility]  CDU / OBSERVATION ENCOUNTER  ATTENDING NOTE       I performed a history and physical examination of the patient and discussed management with the resident or midlevel provider. I reviewed the resident or midlevel provider's note and agree with the documented findings and plan of care. Any areas of disagreement are noted on the chart. I was personally present for the key portions of any procedures. I have documented in the chart those procedures where I was not present during the key portions. I have reviewed the nurses notes. I agree with the chief complaint, past medical history, past surgical history, allergies, medications, social and family history as documented unless otherwise noted below. The Family history, social history, and ROS are effectively unchanged since admission unless noted elsewhere in the chart. Patient asymptomatic after testing. Patient was admitted for complaints of chest pain. Patient was seen by allergy and stress and echo were ordered. Patient had negative cardiac testing. No further work-up necessary at this time. Patient was comfortable and comfortable with discharge plan. Patient subsidy discharged in good condition for follow-up with PCP.     Josesito Marcial MD  Attending Emergency  Physician

## 2022-06-29 NOTE — PROGRESS NOTES
Reviewed patient's urine culture - culture positive for E. coli. Patient was discharged on cephalexin, and culture is not sensitive to prescribed medication. Appears observation unit physicians have already followed up on culture results and plan to make appropriate changes.     Jacek Roblero PharmD, [facility]  6/29/2022  12:42 PM

## 2022-06-30 ENCOUNTER — HOSPITAL ENCOUNTER (EMERGENCY)
Age: 72
Discharge: HOME | End: 2022-07-01
Attending: EMERGENCY MEDICINE
Payer: COMMERCIAL

## 2022-06-30 DIAGNOSIS — N30.00 ACUTE CYSTITIS WITHOUT HEMATURIA: Primary | ICD-10-CM

## 2022-06-30 LAB
ABSOLUTE EOS #: 0.1 K/UL (ref 0–0.4)
ABSOLUTE LYMPH #: 1.5 K/UL (ref 1–4.8)
ABSOLUTE MONO #: 0.6 K/UL (ref 0.1–1.3)
BASOPHILS # BLD: 1 % (ref 0–2)
BASOPHILS ABSOLUTE: 0 K/UL (ref 0–0.2)
EOSINOPHILS RELATIVE PERCENT: 1 % (ref 0–4)
HCT VFR BLD CALC: 34.8 % (ref 36–46)
HEMOGLOBIN: 11.6 G/DL (ref 12–16)
LYMPHOCYTES # BLD: 28 % (ref 24–44)
MCH RBC QN AUTO: 29 PG (ref 26–34)
MCHC RBC AUTO-ENTMCNC: 33.4 G/DL (ref 31–37)
MCV RBC AUTO: 86.9 FL (ref 80–100)
MONOCYTES # BLD: 12 % (ref 1–7)
PDW BLD-RTO: 16.7 % (ref 11.5–14.9)
PLATELET # BLD: 225 K/UL (ref 150–450)
PMV BLD AUTO: 7.2 FL (ref 6–12)
RBC # BLD: 4 M/UL (ref 4–5.2)
SEG NEUTROPHILS: 58 % (ref 36–66)
SEGMENTED NEUTROPHILS ABSOLUTE COUNT: 3.1 K/UL (ref 1.3–9.1)
WBC # BLD: 5.4 K/UL (ref 3.5–11)

## 2022-06-30 PROCEDURE — 96365 THER/PROPH/DIAG IV INF INIT: CPT

## 2022-06-30 PROCEDURE — 6360000002 HC RX W HCPCS: Performed by: EMERGENCY MEDICINE

## 2022-06-30 PROCEDURE — 36415 COLL VENOUS BLD VENIPUNCTURE: CPT

## 2022-06-30 PROCEDURE — 96361 HYDRATE IV INFUSION ADD-ON: CPT

## 2022-06-30 PROCEDURE — 96375 TX/PRO/DX INJ NEW DRUG ADDON: CPT

## 2022-06-30 PROCEDURE — 85025 COMPLETE CBC W/AUTO DIFF WBC: CPT

## 2022-06-30 PROCEDURE — 2580000003 HC RX 258: Performed by: EMERGENCY MEDICINE

## 2022-06-30 PROCEDURE — 99284 EMERGENCY DEPT VISIT MOD MDM: CPT

## 2022-06-30 PROCEDURE — 80053 COMPREHEN METABOLIC PANEL: CPT

## 2022-06-30 RX ORDER — ONDANSETRON 2 MG/ML
4 INJECTION INTRAMUSCULAR; INTRAVENOUS ONCE
Status: COMPLETED | OUTPATIENT
Start: 2022-06-30 | End: 2022-06-30

## 2022-06-30 RX ORDER — 0.9 % SODIUM CHLORIDE 0.9 %
1000 INTRAVENOUS SOLUTION INTRAVENOUS ONCE
Status: COMPLETED | OUTPATIENT
Start: 2022-06-30 | End: 2022-07-01

## 2022-06-30 RX ADMIN — ONDANSETRON 4 MG: 2 INJECTION INTRAMUSCULAR; INTRAVENOUS at 22:40

## 2022-06-30 RX ADMIN — SODIUM CHLORIDE 1000 ML: 9 INJECTION, SOLUTION INTRAVENOUS at 22:39

## 2022-06-30 RX ADMIN — CEFTRIAXONE SODIUM 1000 MG: 1 INJECTION, POWDER, FOR SOLUTION INTRAMUSCULAR; INTRAVENOUS at 22:40

## 2022-06-30 ASSESSMENT — PAIN SCALES - GENERAL: PAINLEVEL_OUTOF10: 7

## 2022-06-30 ASSESSMENT — PAIN DESCRIPTION - LOCATION: LOCATION: ABDOMEN

## 2022-06-30 ASSESSMENT — ENCOUNTER SYMPTOMS
DIARRHEA: 0
BACK PAIN: 0
COUGH: 0
NAUSEA: 1
VOMITING: 0
ABDOMINAL PAIN: 1

## 2022-06-30 ASSESSMENT — PAIN - FUNCTIONAL ASSESSMENT: PAIN_FUNCTIONAL_ASSESSMENT: 0-10

## 2022-06-30 NOTE — PROGRESS NOTES
CLINICAL PHARMACY NOTE: MEDS TO BEDS    Total # of Prescriptions Filled: 1   The following medications were delivered to the patient:  · Cephalexin 250mg    Additional Documentation: delivered to patient in room 341 6/27 at 5:44pm. No co-pay.

## 2022-07-01 VITALS
SYSTOLIC BLOOD PRESSURE: 141 MMHG | TEMPERATURE: 98.5 F | DIASTOLIC BLOOD PRESSURE: 65 MMHG | OXYGEN SATURATION: 98 % | RESPIRATION RATE: 16 BRPM | HEART RATE: 78 BPM

## 2022-07-01 LAB
ALBUMIN SERPL-MCNC: 3.9 G/DL (ref 3.5–5.2)
ALP BLD-CCNC: 110 U/L (ref 35–104)
ALT SERPL-CCNC: 12 U/L (ref 5–33)
ANION GAP SERPL CALCULATED.3IONS-SCNC: 8 MMOL/L (ref 9–17)
AST SERPL-CCNC: 15 U/L
BILIRUB SERPL-MCNC: 0.4 MG/DL (ref 0.3–1.2)
BUN BLDV-MCNC: 10 MG/DL (ref 8–23)
CALCIUM SERPL-MCNC: 9.6 MG/DL (ref 8.6–10.4)
CHLORIDE BLD-SCNC: 105 MMOL/L (ref 98–107)
CO2: 28 MMOL/L (ref 20–31)
CREAT SERPL-MCNC: 0.72 MG/DL (ref 0.5–0.9)
GFR AFRICAN AMERICAN: >60 ML/MIN
GFR NON-AFRICAN AMERICAN: >60 ML/MIN
GFR SERPL CREATININE-BSD FRML MDRD: ABNORMAL ML/MIN/{1.73_M2}
GLUCOSE BLD-MCNC: 115 MG/DL (ref 70–99)
POTASSIUM SERPL-SCNC: 4.3 MMOL/L (ref 3.7–5.3)
SODIUM BLD-SCNC: 141 MMOL/L (ref 135–144)
TOTAL PROTEIN: 7 G/DL (ref 6.4–8.3)

## 2022-07-01 RX ORDER — SULFAMETHOXAZOLE AND TRIMETHOPRIM 800; 160 MG/1; MG/1
1 TABLET ORAL 2 TIMES DAILY
Qty: 20 TABLET | Refills: 0 | Status: SHIPPED | OUTPATIENT
Start: 2022-07-01 | End: 2022-07-11

## 2022-07-01 NOTE — ED NOTES
Mode of arrival (squad #, walk in, police, etc) : Squad        Chief complaint(s): Nausea        Arrival Note (brief scenario, treatment PTA, etc). : Patient states that she feels \"sick to her stomach\" and dehydrated. Onset was 2 days ago and she states she feels bloated. C= \"Have you ever felt that you should Cut down on your drinking? \"  No  A= \"Have people Annoyed you by criticizing your drinking? \"  No  G= \"Have you ever felt bad or Guilty about your drinking? \"  No  E= \"Have you ever had a drink as an Eye-opener first thing in the morning to steady your nerves or to help a hangover? \"  No      Deferred []      Reason for deferring: N/A    *If yes to two or more: probable alcohol abuse. Lela Han RN  06/30/22 0641

## 2022-07-01 NOTE — ED PROVIDER NOTES
[address]  EMERGENCY DEPARTMENT ENCOUNTER      Pt Name: Hemalatha Abdul  MRN: [id number]  [place] 1950  Date of evaluation: 6/30/22      CHIEF COMPLAINT       Chief Complaint   Patient presents with    Nausea     HISTORY OF PRESENT ILLNESS   HPI 66 y.o. female presents with c/o nausea and fever. Symptoms been worsening over the last few days. She reports feeling sick to her stomach, but she denies any vomiting. No chest pain or shortness of breath. The patient was recently on the observation unit at [facility] where she was being evaluated for intermittent left-sided chest pain. During her evaluation she had a urinalysis that appeared infected. She was put on Keflex. The urine culture returned yesterday growing E. coli that was resistant. They attempted to switch her to [facility], but does not appear that the patient received this message or has started taking the new antibiotics. She says that since she left the hospital she has been feeling nauseated weak fatigued and she feels like she is getting dehydrated. She is also noted subjective fevers. [place] REVIEW OF SYSTEMS       Review of Systems   Constitutional: Positive for activity change, appetite change, fatigue and fever. HENT: Negative for congestion. Eyes: Negative for visual disturbance. Respiratory: Negative for cough. Cardiovascular: Negative for chest pain. Gastrointestinal: Positive for abdominal pain (Suprapubic) and nausea. Negative for diarrhea and vomiting. Genitourinary: Positive for dysuria. Negative for flank pain. Musculoskeletal: Negative for back pain. Skin: Negative for rash. Neurological: Negative for headaches. Psychiatric/Behavioral: Negative for confusion.        PAST MEDICAL HISTORY     Past Medical History:   Diagnosis Date    Arthritis     knees    Bronchitis x1 long ago    Chronic obstructive pulmonary disease ([facility] [address] 9/12/2017    Colon polyps     Controlled type 2 diabetes mellitus . She indicated that her father is . She indicated that her maternal grandmother is . She indicated that her maternal grandfather is . She indicated that her paternal grandmother is . She indicated that her paternal grandfather is . SOCIAL HISTORY      reports that she quit smoking about 6 years ago. Her smoking use included cigarettes. She has a 17.50 pack-year smoking history. She has never used smokeless tobacco. She reports that she does not drink alcohol and does not use drugs. PHYSICAL EXAM     INITIAL VITALS: BP (!) 141/65   Pulse 78   Temp 98.5 °F (36.9 °C) (Oral)   Resp 16   SpO2 98%   Gen: nad  Head: Normocephalic, atraumatic  Eye: Pupils equal round reactive to light, no conjunctivitis  ENT: Dry oral mucosa  Neck: no adenopathy or JVD  Heart: Regular rate and rhythm no murmurs  Lungs: Clear to auscultation bilaterally, no respiratory distress  Abdomen: Soft, suprapubic ttp, nondistended, with no peritoneal signs  MSK: No cva ttp  Skin no rash or ecchymosis  Neurologic:Alert, motor and sensation is intact in all 4 extremities. MEDICAL DECISION MAKING:     MDM  66 y.o. female presenting with urinary tract infection fever. Suspect an early pyelonephritis. She is hemodynamically stable. She does not have any CVA tenderness on exam.  She does have a low-grade fever. I do not think she should be on Macrobid given the fever and possible renal involvement. We will put her on Bactrim. Checking laboratory studies white blood cell count renal function giving IV fluids and dose of IV antibiotics. Emergency Department course:  1:37 AM EDT  WBC count normal.   Renal function normal.   Abx sent to her pharmacy. The patient is nontoxic and well appearing. The patient is tolerating PO. I do not feel the patient has evidence of significant dehydration or end organ failure at this time.   I do not feel the patient has an emergent medical condition at this time. The patient is referred to appropriate outpatient follow up through their PCP or [facility]. I discussed with the patient home isolation measures, anticipatory guidance, discharge instructions, and to return immediately for worsening of symptoms. The patient is agreeable with this plan. DIAGNOSTIC RESULTS   LABS: All lab results were reviewed by myself, and all abnormals are listed below. Labs Reviewed   CBC WITH AUTO DIFFERENTIAL - Abnormal; Notable for the following components:       Result Value    Hemoglobin 11.6 (*)     Hematocrit 34.8 (*)     RDW 16.7 (*)     Monocytes 12 (*)     All other components within normal limits   COMPREHENSIVE METABOLIC PANEL - Abnormal; Notable for the following components:    Glucose 115 (*)     Anion Gap 8 (*)     Alkaline Phosphatase 110 (*)     All other components within normal limits       EMERGENCY DEPARTMENT COURSE:   Vitals:    Vitals:    06/30/22 2117 07/01/22 0055   BP: (!) 130/59 (!) 141/65   Pulse: 85 78   Resp: 18 16   Temp: (!) 100.5 °F (38.1 °C) 98.5 °F (36.9 °C)   TempSrc:  Oral   SpO2: 97% 98%       The patient was given the following medications while in the emergency department:  Orders Placed This Encounter   Medications    ondansetron (ZOFRAN) injection 4 mg    cefTRIAXone (ROCEPHIN) 1000 mg IVPB in 50 mL D5W minibag     Order Specific Question:   Antimicrobial Indications     Answer:   Urinary Tract Infection    0.9 % sodium chloride bolus    sulfamethoxazole-trimethoprim (BACTRIM DS) 800-160 MG per tablet     Sig: Take 1 tablet by mouth 2 times daily for 10 days     Dispense:  20 tablet     Refill:  0     -------------------------  CRITICAL CARE:   CONSULTS: None  PROCEDURES: Procedures     FINAL IMPRESSION      1.  Acute cystitis without hematuria          DISPOSITION/PLAN   DISPOSITION Decision To Discharge 07/01/2022 01:35:37 AM      PATIENT REFERRED TO:  Sonal Gonzalez  [address] [facility]  [phone]    In 2 days      [facility] ED  [address] [facility] [address]  [phone]    If symptoms worsen      DISCHARGE MEDICATIONS:  New Prescriptions    SULFAMETHOXAZOLE-TRIMETHOPRIM (BACTRIM DS) 800-160 MG PER TABLET    Take 1 tablet by mouth 2 times daily for 10 days         Roxi Villanueva MD  Attending Emergency Physician                      Roxi Villanueva MD  07/01/22 0177

## 2022-07-26 ENCOUNTER — HOSPITAL ENCOUNTER (EMERGENCY)
Age: 72
Discharge: HOME | End: 2022-07-27
Attending: EMERGENCY MEDICINE
Payer: COMMERCIAL

## 2022-07-26 VITALS
HEART RATE: 88 BPM | TEMPERATURE: 97.2 F | OXYGEN SATURATION: 97 % | SYSTOLIC BLOOD PRESSURE: 147 MMHG | DIASTOLIC BLOOD PRESSURE: 85 MMHG | BODY MASS INDEX: 29.99 KG/M2 | HEIGHT: 65 IN | WEIGHT: 180 LBS | RESPIRATION RATE: 16 BRPM

## 2022-07-26 DIAGNOSIS — R10.84 GENERALIZED ABDOMINAL PAIN: Primary | ICD-10-CM

## 2022-07-26 PROCEDURE — 99285 EMERGENCY DEPT VISIT HI MDM: CPT

## 2022-07-26 ASSESSMENT — PAIN - FUNCTIONAL ASSESSMENT: PAIN_FUNCTIONAL_ASSESSMENT: 0-10

## 2022-07-26 ASSESSMENT — PAIN SCALES - GENERAL: PAINLEVEL_OUTOF10: 5

## 2022-07-26 ASSESSMENT — PAIN DESCRIPTION - LOCATION: LOCATION: ABDOMEN

## 2022-07-27 ENCOUNTER — APPOINTMENT (OUTPATIENT)
Dept: GENERAL RADIOLOGY | Age: 72
End: 2022-07-27
Payer: COMMERCIAL

## 2022-07-27 LAB
ABSOLUTE EOS #: 0.08 K/UL (ref 0–0.44)
ABSOLUTE IMMATURE GRANULOCYTE: 0.04 K/UL (ref 0–0.3)
ABSOLUTE LYMPH #: 1.67 K/UL (ref 1.1–3.7)
ABSOLUTE MONO #: 0.67 K/UL (ref 0.1–1.2)
ALBUMIN SERPL-MCNC: 3.8 G/DL (ref 3.5–5.2)
ALBUMIN/GLOBULIN RATIO: 1.2 (ref 1–2.5)
ALP BLD-CCNC: 124 U/L (ref 35–104)
ALT SERPL-CCNC: 14 U/L (ref 5–33)
ANION GAP SERPL CALCULATED.3IONS-SCNC: 14 MMOL/L (ref 9–17)
AST SERPL-CCNC: 36 U/L
BASOPHILS # BLD: 0 % (ref 0–2)
BASOPHILS ABSOLUTE: 0.03 K/UL (ref 0–0.2)
BILIRUB SERPL-MCNC: 0.22 MG/DL (ref 0.3–1.2)
BILIRUBIN DIRECT: <0.08 MG/DL
BILIRUBIN, INDIRECT: ABNORMAL MG/DL (ref 0–1)
BUN BLDV-MCNC: 9 MG/DL (ref 8–23)
CALCIUM SERPL-MCNC: 9.3 MG/DL (ref 8.6–10.4)
CHLORIDE BLD-SCNC: 101 MMOL/L (ref 98–107)
CO2: 24 MMOL/L (ref 20–31)
CREAT SERPL-MCNC: 0.71 MG/DL (ref 0.5–0.9)
EKG ATRIAL RATE: 79 BPM
EKG P AXIS: 70 DEGREES
EKG P-R INTERVAL: 124 MS
EKG Q-T INTERVAL: 436 MS
EKG QRS DURATION: 116 MS
EKG QTC CALCULATION (BAZETT): 499 MS
EKG R AXIS: -12 DEGREES
EKG T AXIS: 4 DEGREES
EKG VENTRICULAR RATE: 79 BPM
EOSINOPHILS RELATIVE PERCENT: 1 % (ref 1–4)
FLU A ANTIGEN: NEGATIVE
FLU B ANTIGEN: NEGATIVE
GFR AFRICAN AMERICAN: >60 ML/MIN
GFR NON-AFRICAN AMERICAN: >60 ML/MIN
GFR SERPL CREATININE-BSD FRML MDRD: NORMAL ML/MIN/{1.73_M2}
GLUCOSE BLD-MCNC: 98 MG/DL (ref 70–99)
HCT VFR BLD CALC: 39.9 % (ref 36.3–47.1)
HEMOGLOBIN: 12.9 G/DL (ref 11.9–15.1)
IMMATURE GRANULOCYTES: 1 %
LIPASE: 19 U/L (ref 13–60)
LYMPHOCYTES # BLD: 23 % (ref 24–43)
MCH RBC QN AUTO: 29.3 PG (ref 25.2–33.5)
MCHC RBC AUTO-ENTMCNC: 32.3 G/DL (ref 28.4–34.8)
MCV RBC AUTO: 90.5 FL (ref 82.6–102.9)
MONOCYTES # BLD: 9 % (ref 3–12)
NRBC AUTOMATED: 0 PER 100 WBC
PDW BLD-RTO: 15.9 % (ref 11.8–14.4)
PLATELET # BLD: ABNORMAL K/UL (ref 138–453)
PLATELET, FLUORESCENCE: NORMAL K/UL (ref 138–453)
POTASSIUM SERPL-SCNC: 5.1 MMOL/L (ref 3.7–5.3)
RBC # BLD: 4.41 M/UL (ref 3.95–5.11)
RBC # BLD: ABNORMAL 10*6/UL
SARS-COV-2, RAPID: NOT DETECTED
SEG NEUTROPHILS: 65 % (ref 36–65)
SEGMENTED NEUTROPHILS ABSOLUTE COUNT: 4.67 K/UL (ref 1.5–8.1)
SODIUM BLD-SCNC: 139 MMOL/L (ref 135–144)
SPECIMEN DESCRIPTION: NORMAL
TOTAL PROTEIN: 7 G/DL (ref 6.4–8.3)
TROPONIN, HIGH SENSITIVITY: 9 NG/L (ref 0–14)
WBC # BLD: 7.2 K/UL (ref 3.5–11.3)

## 2022-07-27 PROCEDURE — 80048 BASIC METABOLIC PNL TOTAL CA: CPT

## 2022-07-27 PROCEDURE — 93005 ELECTROCARDIOGRAM TRACING: CPT | Performed by: STUDENT IN AN ORGANIZED HEALTH CARE EDUCATION/TRAINING PROGRAM

## 2022-07-27 PROCEDURE — 83690 ASSAY OF LIPASE: CPT

## 2022-07-27 PROCEDURE — 85055 RETICULATED PLATELET ASSAY: CPT

## 2022-07-27 PROCEDURE — 80076 HEPATIC FUNCTION PANEL: CPT

## 2022-07-27 PROCEDURE — 84484 ASSAY OF TROPONIN QUANT: CPT

## 2022-07-27 PROCEDURE — 85025 COMPLETE CBC W/AUTO DIFF WBC: CPT

## 2022-07-27 PROCEDURE — 71045 X-RAY EXAM CHEST 1 VIEW: CPT

## 2022-07-27 PROCEDURE — 87635 SARS-COV-2 COVID-19 AMP PRB: CPT

## 2022-07-27 PROCEDURE — 87804 INFLUENZA ASSAY W/OPTIC: CPT

## 2022-07-27 PROCEDURE — 93010 ELECTROCARDIOGRAM REPORT: CPT | Performed by: INTERNAL MEDICINE

## 2022-07-27 NOTE — ED NOTES
Attempted to call pt's son per pt request. Number on file for son is disconnected and the number the pt continues to give does not work.       Starr Washburn RN  07/27/22 2773

## 2022-07-27 NOTE — ED NOTES
TREVOR consulted by Dr. Vivi Doherty to provide transportation of pt back to residence. Pt was on phone with son Yoon Viera who confirms pt is going to [address] and that he will be present to let pt in  confirmation # [id number].       Ramses Vargas, JAYNA  07/27/22 6927

## 2022-07-27 NOTE — ED PROVIDER NOTES
[address]  ED  Emergency Department Encounter  Emergency Medicine Resident     Pt Name: Wendy Martin  MRN: [id number]  [place] 1950  Date of evaluation: 22  PCP:  Jack Angel [facility]       Chief Complaint   Patient presents with    Abdominal Pain     \"I don't feel good\"    Joint Swelling       HISTORY OFPRESENT ILLNESS  (Location/Symptom, Timing/Onset, Context/Setting, Quality, Duration, Modifying Factors,Severity.)      Wendy Martin is a 66 y. o.yo female who presents with feeling generally unwell. Patient complaining of chest pain, shortness of breath, abdominal pain. Patient denies any fevers. States her symptoms started this evening after she had been crying. Denies any vomiting or fevers. States she has had similar events in the past that have resolved on their own. PAST MEDICAL / SURGICAL / SOCIAL / FAMILY HISTORY      has a past medical history of Arthritis, Bronchitis, Chronic obstructive pulmonary disease ([address] Colon polyps, Controlled type 2 diabetes mellitus without complication, without long-term current use of insulin ([address] Dental neglect, Depression, Environmental allergies, GERD (gastroesophageal reflux disease), Hyperlipidemia, Hypertension, Obesity, Onychomycosis, Poor historian, RBBB, Treadmill stress test negative for angina pectoris, and Wears glasses. has a past surgical history that includes Cholecystectomy; doppler echocardiography; Colonoscopy (2013); and Tubal ligation.      Social History     Socioeconomic History    Marital status: Single     Spouse name: Not on file    Number of children: Not on file    Years of education: Not on file    Highest education level: Not on file   Occupational History    Not on file   Tobacco Use    Smoking status: Former     Packs/day: 0.50     Years: 35.00     Pack years: 17.50     Types: Cigarettes     Quit date: 2015     Years since quittin.0    Smokeless tobacco: Never   Substance and Sexual Activity    Alcohol use: No     Alcohol/week: 0.0 standard drinks    Drug use: No    Sexual activity: Not on file   Other Topics Concern    Not on file   Social History Narrative    Not on file     Social Determinants of Health     Financial Resource Strain: Not on file   Food Insecurity: Not on file   Transportation Needs: Not on file   Physical Activity: Not on file   Stress: Not on file   Social Connections: Not on file   Intimate Partner Violence: Not on file   Housing Stability: Not on file       Family History   Problem Relation Age of Onset    Heart Disease Mother     Cancer Father         Allergies:  Patient has no known allergies. Home Medications:  Prior to Admission medications    Medication Sig Start Date End Date Taking?  Authorizing Provider   Nirmatrelvir & Ritonavir (PAXLOVID) 10 x 150 MG & 10 x 100MG TBPK Take 150 mg by mouth in the morning and at bedtime 6/18/22   Josh Aviles, DO   ondansetron (ZOFRAN ODT) 4 MG disintegrating tablet Take 1 tablet by mouth every 8 hours as needed for Nausea 6/18/22   Josh Aviles, DO   acetaminophen (TYLENOL) 500 MG tablet Take 2 tablets by mouth 4 times daily as needed for Pain 5/17/22   Krunal Valdivia,    ibuprofen (IBU) 800 MG tablet Take 1 tablet by mouth every 8 hours as needed for Pain 5/17/22   Krunal Valdivia, DO   ciclopirox (LOPROX) 0.77 % cream ciclopirox 0.77 % topical cream    Historical Provider, MD   albuterol sulfate  (90 Base) MCG/ACT inhaler albuterol sulfate HFA 90 mcg/actuation aerosol inhaler    Historical Provider, MD   atorvastatin (LIPITOR) 20 MG tablet Take 1 tablet by mouth daily 6/29/21   Jesse Mckeon MD   tiotropium (SPIRIVA RESPIMAT) 1.25 MCG/ACT AERS inhaler Inhale 2 puffs into the lungs daily 8/12/19   Kriss Alatorre MD   haloperidol (HALDOL) 5 MG tablet Take 5 mg by mouth 2 times daily    Historical Provider, MD   traZODone (DESYREL) 100 MG tablet  8/9/17   Historical Provider, MD   Elastic Bandages & Supports (ACE ARM SLING) MISC Apply 1 Units topically daily 4/17/17   Jaja Subramanian MD   trihexyphenidyl (ARTANE) 2 MG tablet Take 2 mg by mouth 2 times daily  10/10/14   Historical Provider, MD       REVIEW OFSYSTEMS    (2-9 systems for level 4, 10 or more for level 5)      Review of Systems   Constitutional:  Negative for chills and fever. HENT:  Negative for congestion, rhinorrhea and trouble swallowing. Eyes:  Negative for pain and redness. Respiratory:  Positive for shortness of breath. Negative for cough. Cardiovascular:  Positive for chest pain and leg swelling. Negative for palpitations. Gastrointestinal:  Positive for abdominal pain. Negative for diarrhea, nausea and vomiting. Genitourinary:  Negative for difficulty urinating and dysuria. Musculoskeletal:  Negative for arthralgias, joint swelling, myalgias and neck pain. Skin:  Negative for rash and wound. Neurological:  Negative for dizziness and headaches. Psychiatric/Behavioral:  Negative for behavioral problems and confusion. PHYSICAL EXAM   (up to 7 for level 4, 8 or more forlevel 5)      INITIAL VITALS:   Vitals:    07/26/22 2256   BP: (!) 147/85   Pulse: 88   Resp: 16   Temp: 97.2 °F (36.2 °C)   TempSrc: Oral   SpO2: 97%   Weight: 180 lb (81.6 kg)   Height: 5' 5\" (1.651 m)         Physical Exam  Vitals reviewed. Constitutional:       General: She is not in acute distress. Comments: Crying, tearful   HENT:      Head: Normocephalic and atraumatic. Right Ear: External ear normal.      Left Ear: External ear normal.      Nose: Nose normal.      Mouth/Throat:      Mouth: Mucous membranes are moist.      Pharynx: No oropharyngeal exudate or posterior oropharyngeal erythema. Eyes:      General:         Right eye: No discharge. Left eye: No discharge. Extraocular Movements: Extraocular movements intact. Cardiovascular:      Rate and Rhythm: Normal rate and regular rhythm.    Pulmonary:      Effort: Notable for the following components:    Alkaline Phosphatase 124 (*)     AST 36 (*)     Total Bilirubin 0.22 (*)     All other components within normal limits   COVID-19, RAPID   RAPID INFLUENZA A/B ANTIGENS   BASIC METABOLIC PANEL   LIPASE   TROPONIN   IMMATURE PLATELET FRACTION   TROPONIN         RADIOLOGY:  XR CHEST PORTABLE   Final Result   Normal examination. EKG  EKG Interpretation    Interpreted by emergency department physician    Rhythm: normal sinus   Rate: 79  Axis: normal  Ectopy: none  Conduction: right bundle branch block (complete)  ST Segments: no acute change  T Waves: no acute change  Q Waves: none    Clinical Impression: non-specific EKG    Akil Ray DO      All EKG's are interpreted by the Emergency Department Physicianwho either signs or Co-signs this chart in the absence of a cardiologist.    EMERGENCY DEPARTMENT COURSE:  ED Course as of 07/28/22 0839   Wed Jul 27, 2022 0052 WBC: 7.2 [AB]   0052 Hemoglobin Quant: 12.9 [AB]   0052 Troponin, High Sensitivity: 9 [AB]   0108 Flu A Antigen: NEGATIVE [AB]   0108 Flu B Antigen: NEGATIVE [AB]   0110 SARS-CoV-2, Rapid: Not Detected [AB]   0132 Refused second troponin [AB]   0138 Patient reevaluated. Will discharge home. We will cab patient back to house. Given strict return precautions including she develops any fevers, worsening symptoms, pain, decreased oral intake, difficulty urinating, any other concerning symptoms. Advise follow-up with primary care doctor. Patient agreed with discharge plan this time. [AB]      ED Course User Index  [AB] Camacho Friedman DO          PROCEDURES:  None    CONSULTS:  None    CRITICAL CARE:  See attending physician note    FINAL IMPRESSION      1.  Generalized abdominal pain          DISPOSITION / PLAN     DISPOSITION Decision To Discharge 07/27/2022 01:34:42 AM      PATIENT REFERRED TO:  [facility]  [address] [place] [address]  [phone]  Go to   If symptoms worsen    Radha Robertson  [address] [facility]  [phone]    Schedule an appointment as soon as possible for a visit in 3 days      DISCHARGE MEDICATIONS:  Discharge Medication List as of 7/27/2022  2:20 AM          Doug Asif DO  Emergency Medicine Resident    (Please note that portions of this note were completed with a voice recognition program.Efforts were made to edit the dictations but occasionally words are mis-transcribed.)        Aldean Blizzard, DO  Resident  07/28/22 9128

## 2022-07-27 NOTE — ED TRIAGE NOTES
Pt presents to ED with abdominal pain, chest pain, shortness of breath, and bilateral foot swelling. Pt is alert, oriented, and ambulatory. Pt denies any injury.

## 2022-07-28 ASSESSMENT — ENCOUNTER SYMPTOMS
COUGH: 0
NAUSEA: 0
EYE REDNESS: 0
ABDOMINAL PAIN: 1
TROUBLE SWALLOWING: 0
VOMITING: 0
RHINORRHEA: 0
DIARRHEA: 0
SHORTNESS OF BREATH: 1
EYE PAIN: 0

## 2022-08-03 ENCOUNTER — HOSPITAL ENCOUNTER (INPATIENT)
Age: 72
LOS: 1 days | Discharge: HOME | DRG: 247 | End: 2022-08-05
Attending: EMERGENCY MEDICINE | Admitting: INTERNAL MEDICINE
Payer: COMMERCIAL

## 2022-08-03 ENCOUNTER — APPOINTMENT (OUTPATIENT)
Dept: CT IMAGING | Age: 72
DRG: 247 | End: 2022-08-03
Payer: COMMERCIAL

## 2022-08-03 ENCOUNTER — APPOINTMENT (OUTPATIENT)
Dept: GENERAL RADIOLOGY | Age: 72
DRG: 247 | End: 2022-08-03
Payer: COMMERCIAL

## 2022-08-03 DIAGNOSIS — R33.9 URINARY RETENTION: ICD-10-CM

## 2022-08-03 DIAGNOSIS — N30.00 ACUTE CYSTITIS WITHOUT HEMATURIA: ICD-10-CM

## 2022-08-03 DIAGNOSIS — K56.7 ILEUS (HCC): Primary | ICD-10-CM

## 2022-08-03 LAB
ABSOLUTE EOS #: 0.1 K/UL (ref 0–0.44)
ABSOLUTE IMMATURE GRANULOCYTE: <0.03 K/UL (ref 0–0.3)
ABSOLUTE LYMPH #: 2.28 K/UL (ref 1.1–3.7)
ABSOLUTE MONO #: 0.7 K/UL (ref 0.1–1.2)
ALBUMIN SERPL-MCNC: 4.3 G/DL (ref 3.5–5.2)
ALBUMIN/GLOBULIN RATIO: 1.5 (ref 1–2.5)
ALP BLD-CCNC: 117 U/L (ref 35–104)
ALT SERPL-CCNC: 10 U/L (ref 5–33)
ANION GAP SERPL CALCULATED.3IONS-SCNC: 12 MMOL/L (ref 9–17)
AST SERPL-CCNC: 17 U/L
BACTERIA: ABNORMAL
BASOPHILS # BLD: 0 % (ref 0–2)
BASOPHILS ABSOLUTE: 0.03 K/UL (ref 0–0.2)
BILIRUB SERPL-MCNC: 0.21 MG/DL (ref 0.3–1.2)
BILIRUBIN URINE: NEGATIVE
BUN BLDV-MCNC: 10 MG/DL (ref 8–23)
CALCIUM SERPL-MCNC: 9.4 MG/DL (ref 8.6–10.4)
CASTS UA: ABNORMAL /LPF (ref 0–8)
CHLORIDE BLD-SCNC: 103 MMOL/L (ref 98–107)
CO2: 23 MMOL/L (ref 20–31)
COLOR: YELLOW
CREAT SERPL-MCNC: 1 MG/DL (ref 0.5–0.9)
EOSINOPHILS RELATIVE PERCENT: 2 % (ref 1–4)
EPITHELIAL CELLS UA: ABNORMAL /HPF (ref 0–5)
GFR AFRICAN AMERICAN: >60 ML/MIN
GFR NON-AFRICAN AMERICAN: 55 ML/MIN
GFR SERPL CREATININE-BSD FRML MDRD: ABNORMAL ML/MIN/{1.73_M2}
GLUCOSE BLD-MCNC: 86 MG/DL (ref 70–99)
GLUCOSE URINE: NEGATIVE
HCT VFR BLD CALC: 39 % (ref 36.3–47.1)
HEMOGLOBIN: 12.4 G/DL (ref 11.9–15.1)
IMMATURE GRANULOCYTES: 0 %
KETONES, URINE: NEGATIVE
LEUKOCYTE ESTERASE, URINE: ABNORMAL
LIPASE: 21 U/L (ref 13–60)
LYMPHOCYTES # BLD: 33 % (ref 24–43)
MAGNESIUM: 2 MG/DL (ref 1.6–2.6)
MCH RBC QN AUTO: 29 PG (ref 25.2–33.5)
MCHC RBC AUTO-ENTMCNC: 31.8 G/DL (ref 28.4–34.8)
MCV RBC AUTO: 91.1 FL (ref 82.6–102.9)
MONOCYTES # BLD: 10 % (ref 3–12)
NITRITE, URINE: NEGATIVE
NRBC AUTOMATED: 0 PER 100 WBC
PDW BLD-RTO: 15.5 % (ref 11.8–14.4)
PH UA: 6 (ref 5–8)
PLATELET # BLD: 278 K/UL (ref 138–453)
PMV BLD AUTO: 9.2 FL (ref 8.1–13.5)
POTASSIUM SERPL-SCNC: 3.7 MMOL/L (ref 3.7–5.3)
PRO-BNP: 175 PG/ML
PROTEIN UA: NEGATIVE
RBC # BLD: 4.28 M/UL (ref 3.95–5.11)
RBC # BLD: ABNORMAL 10*6/UL
RBC UA: ABNORMAL /HPF (ref 0–4)
SEG NEUTROPHILS: 55 % (ref 36–65)
SEGMENTED NEUTROPHILS ABSOLUTE COUNT: 3.7 K/UL (ref 1.5–8.1)
SODIUM BLD-SCNC: 138 MMOL/L (ref 135–144)
SPECIFIC GRAVITY UA: 1 (ref 1–1.03)
TOTAL PROTEIN: 7.2 G/DL (ref 6.4–8.3)
TROPONIN, HIGH SENSITIVITY: 9 NG/L (ref 0–14)
TURBIDITY: CLEAR
URINE HGB: NEGATIVE
UROBILINOGEN, URINE: NORMAL
WBC # BLD: 6.8 K/UL (ref 3.5–11.3)
WBC UA: ABNORMAL /HPF (ref 0–5)

## 2022-08-03 PROCEDURE — 85025 COMPLETE CBC W/AUTO DIFF WBC: CPT

## 2022-08-03 PROCEDURE — 81001 URINALYSIS AUTO W/SCOPE: CPT

## 2022-08-03 PROCEDURE — 96365 THER/PROPH/DIAG IV INF INIT: CPT

## 2022-08-03 PROCEDURE — 6360000004 HC RX CONTRAST MEDICATION

## 2022-08-03 PROCEDURE — 99285 EMERGENCY DEPT VISIT HI MDM: CPT

## 2022-08-03 PROCEDURE — 84484 ASSAY OF TROPONIN QUANT: CPT

## 2022-08-03 PROCEDURE — 93005 ELECTROCARDIOGRAM TRACING: CPT

## 2022-08-03 PROCEDURE — 2580000003 HC RX 258

## 2022-08-03 PROCEDURE — 87086 URINE CULTURE/COLONY COUNT: CPT

## 2022-08-03 PROCEDURE — 71045 X-RAY EXAM CHEST 1 VIEW: CPT

## 2022-08-03 PROCEDURE — 83690 ASSAY OF LIPASE: CPT

## 2022-08-03 PROCEDURE — 74177 CT ABD & PELVIS W/CONTRAST: CPT

## 2022-08-03 PROCEDURE — 96375 TX/PRO/DX INJ NEW DRUG ADDON: CPT

## 2022-08-03 PROCEDURE — 80053 COMPREHEN METABOLIC PANEL: CPT

## 2022-08-03 PROCEDURE — 83880 ASSAY OF NATRIURETIC PEPTIDE: CPT

## 2022-08-03 PROCEDURE — 84443 ASSAY THYROID STIM HORMONE: CPT

## 2022-08-03 PROCEDURE — 6360000002 HC RX W HCPCS

## 2022-08-03 PROCEDURE — 83735 ASSAY OF MAGNESIUM: CPT

## 2022-08-03 RX ORDER — ONDANSETRON 2 MG/ML
4 INJECTION INTRAMUSCULAR; INTRAVENOUS EVERY 6 HOURS PRN
Status: DISCONTINUED | OUTPATIENT
Start: 2022-08-03 | End: 2022-08-04

## 2022-08-03 RX ORDER — SODIUM CHLORIDE, SODIUM LACTATE, POTASSIUM CHLORIDE, AND CALCIUM CHLORIDE .6; .31; .03; .02 G/100ML; G/100ML; G/100ML; G/100ML
1000 INJECTION, SOLUTION INTRAVENOUS ONCE
Status: COMPLETED | OUTPATIENT
Start: 2022-08-03 | End: 2022-08-03

## 2022-08-03 RX ADMIN — IOPAMIDOL 75 ML: 755 INJECTION, SOLUTION INTRAVENOUS at 23:18

## 2022-08-03 RX ADMIN — ONDANSETRON 4 MG: 2 INJECTION INTRAMUSCULAR; INTRAVENOUS at 22:36

## 2022-08-03 RX ADMIN — CEFTRIAXONE SODIUM 1000 MG: 1 INJECTION, POWDER, FOR SOLUTION INTRAMUSCULAR; INTRAVENOUS at 23:27

## 2022-08-03 RX ADMIN — SODIUM CHLORIDE, POTASSIUM CHLORIDE, SODIUM LACTATE AND CALCIUM CHLORIDE 1000 ML: 600; 310; 30; 20 INJECTION, SOLUTION INTRAVENOUS at 22:29

## 2022-08-03 ASSESSMENT — ENCOUNTER SYMPTOMS
NAUSEA: 1
SHORTNESS OF BREATH: 0
RHINORRHEA: 0
VOMITING: 1
COUGH: 0
ABDOMINAL PAIN: 1
BACK PAIN: 0
SORE THROAT: 0
CONSTIPATION: 1

## 2022-08-04 ENCOUNTER — APPOINTMENT (OUTPATIENT)
Dept: GENERAL RADIOLOGY | Age: 72
DRG: 247 | End: 2022-08-04
Payer: COMMERCIAL

## 2022-08-04 PROBLEM — K56.7 ILEUS (HCC): Status: ACTIVE | Noted: 2022-08-04

## 2022-08-04 PROBLEM — R33.9 URINARY RETENTION: Status: ACTIVE | Noted: 2022-08-04

## 2022-08-04 PROBLEM — N39.0 UTI (URINARY TRACT INFECTION): Status: ACTIVE | Noted: 2022-08-04

## 2022-08-04 LAB
ABSOLUTE EOS #: 0.05 K/UL (ref 0–0.44)
ABSOLUTE IMMATURE GRANULOCYTE: <0.03 K/UL (ref 0–0.3)
ABSOLUTE LYMPH #: 1.21 K/UL (ref 1.1–3.7)
ABSOLUTE MONO #: 0.38 K/UL (ref 0.1–1.2)
ANION GAP SERPL CALCULATED.3IONS-SCNC: 9 MMOL/L (ref 9–17)
BASOPHILS # BLD: 0 % (ref 0–2)
BASOPHILS ABSOLUTE: <0.03 K/UL (ref 0–0.2)
BUN BLDV-MCNC: 7 MG/DL (ref 8–23)
CALCIUM SERPL-MCNC: 8.9 MG/DL (ref 8.6–10.4)
CHLORIDE BLD-SCNC: 106 MMOL/L (ref 98–107)
CO2: 24 MMOL/L (ref 20–31)
CREAT SERPL-MCNC: 0.76 MG/DL (ref 0.5–0.9)
EOSINOPHILS RELATIVE PERCENT: 1 % (ref 1–4)
ESTIMATED AVERAGE GLUCOSE: 105 MG/DL
GFR AFRICAN AMERICAN: >60 ML/MIN
GFR NON-AFRICAN AMERICAN: >60 ML/MIN
GFR SERPL CREATININE-BSD FRML MDRD: ABNORMAL ML/MIN/{1.73_M2}
GLUCOSE BLD-MCNC: 100 MG/DL (ref 65–105)
GLUCOSE BLD-MCNC: 120 MG/DL (ref 65–105)
GLUCOSE BLD-MCNC: 192 MG/DL (ref 65–105)
GLUCOSE BLD-MCNC: 60 MG/DL (ref 65–105)
GLUCOSE BLD-MCNC: 61 MG/DL (ref 65–105)
GLUCOSE BLD-MCNC: 72 MG/DL (ref 65–105)
GLUCOSE BLD-MCNC: 81 MG/DL (ref 65–105)
GLUCOSE BLD-MCNC: 91 MG/DL (ref 65–105)
GLUCOSE BLD-MCNC: 95 MG/DL (ref 70–99)
GLUCOSE BLD-MCNC: 99 MG/DL (ref 65–105)
HBA1C MFR BLD: 5.3 % (ref 4–6)
HCT VFR BLD CALC: 37.1 % (ref 36.3–47.1)
HEMOGLOBIN: 11.5 G/DL (ref 11.9–15.1)
IMMATURE GRANULOCYTES: 0 %
LACTIC ACID, WHOLE BLOOD: 0.8 MMOL/L (ref 0.7–2.1)
LYMPHOCYTES # BLD: 25 % (ref 24–43)
MCH RBC QN AUTO: 29.2 PG (ref 25.2–33.5)
MCHC RBC AUTO-ENTMCNC: 31 G/DL (ref 28.4–34.8)
MCV RBC AUTO: 94.2 FL (ref 82.6–102.9)
MONOCYTES # BLD: 8 % (ref 3–12)
NRBC AUTOMATED: 0 PER 100 WBC
PDW BLD-RTO: 15.4 % (ref 11.8–14.4)
PLATELET # BLD: 233 K/UL (ref 138–453)
PMV BLD AUTO: 9.5 FL (ref 8.1–13.5)
POTASSIUM SERPL-SCNC: 3.8 MMOL/L (ref 3.7–5.3)
RBC # BLD: 3.94 M/UL (ref 3.95–5.11)
RBC # BLD: ABNORMAL 10*6/UL
SEG NEUTROPHILS: 66 % (ref 36–65)
SEGMENTED NEUTROPHILS ABSOLUTE COUNT: 3.1 K/UL (ref 1.5–8.1)
SODIUM BLD-SCNC: 139 MMOL/L (ref 135–144)
TROPONIN, HIGH SENSITIVITY: 7 NG/L (ref 0–14)
TSH SERPL DL<=0.05 MIU/L-ACNC: 1.42 UIU/ML (ref 0.3–5)
WBC # BLD: 4.8 K/UL (ref 3.5–11.3)

## 2022-08-04 PROCEDURE — 80048 BASIC METABOLIC PNL TOTAL CA: CPT

## 2022-08-04 PROCEDURE — 83036 HEMOGLOBIN GLYCOSYLATED A1C: CPT

## 2022-08-04 PROCEDURE — 6360000002 HC RX W HCPCS: Performed by: NURSE PRACTITIONER

## 2022-08-04 PROCEDURE — 51702 INSERT TEMP BLADDER CATH: CPT

## 2022-08-04 PROCEDURE — 85025 COMPLETE CBC W/AUTO DIFF WBC: CPT

## 2022-08-04 PROCEDURE — 74018 RADEX ABDOMEN 1 VIEW: CPT

## 2022-08-04 PROCEDURE — 94761 N-INVAS EAR/PLS OXIMETRY MLT: CPT

## 2022-08-04 PROCEDURE — 6370000000 HC RX 637 (ALT 250 FOR IP): Performed by: FAMILY MEDICINE

## 2022-08-04 PROCEDURE — 2580000003 HC RX 258: Performed by: NURSE PRACTITIONER

## 2022-08-04 PROCEDURE — 96376 TX/PRO/DX INJ SAME DRUG ADON: CPT

## 2022-08-04 PROCEDURE — 1200000000 HC SEMI PRIVATE

## 2022-08-04 PROCEDURE — 2580000003 HC RX 258: Performed by: FAMILY MEDICINE

## 2022-08-04 PROCEDURE — 84484 ASSAY OF TROPONIN QUANT: CPT

## 2022-08-04 PROCEDURE — 96372 THER/PROPH/DIAG INJ SC/IM: CPT

## 2022-08-04 PROCEDURE — G0378 HOSPITAL OBSERVATION PER HR: HCPCS

## 2022-08-04 PROCEDURE — 2700000000 HC OXYGEN THERAPY PER DAY

## 2022-08-04 PROCEDURE — 82947 ASSAY GLUCOSE BLOOD QUANT: CPT

## 2022-08-04 PROCEDURE — 83605 ASSAY OF LACTIC ACID: CPT

## 2022-08-04 PROCEDURE — 6370000000 HC RX 637 (ALT 250 FOR IP): Performed by: NURSE PRACTITIONER

## 2022-08-04 PROCEDURE — 2500000003 HC RX 250 WO HCPCS: Performed by: NURSE PRACTITIONER

## 2022-08-04 PROCEDURE — 36415 COLL VENOUS BLD VENIPUNCTURE: CPT

## 2022-08-04 PROCEDURE — 99223 1ST HOSP IP/OBS HIGH 75: CPT | Performed by: FAMILY MEDICINE

## 2022-08-04 RX ORDER — ONDANSETRON 2 MG/ML
4 INJECTION INTRAMUSCULAR; INTRAVENOUS EVERY 6 HOURS PRN
Status: DISCONTINUED | OUTPATIENT
Start: 2022-08-04 | End: 2022-08-05 | Stop reason: HOSPADM

## 2022-08-04 RX ORDER — POLYETHYLENE GLYCOL 3350 17 G/17G
17 POWDER, FOR SOLUTION ORAL 2 TIMES DAILY
Status: DISCONTINUED | OUTPATIENT
Start: 2022-08-04 | End: 2022-08-05 | Stop reason: HOSPADM

## 2022-08-04 RX ORDER — ACETAMINOPHEN 650 MG/1
650 SUPPOSITORY RECTAL EVERY 6 HOURS PRN
Status: DISCONTINUED | OUTPATIENT
Start: 2022-08-04 | End: 2022-08-05 | Stop reason: HOSPADM

## 2022-08-04 RX ORDER — ENOXAPARIN SODIUM 100 MG/ML
40 INJECTION SUBCUTANEOUS DAILY
Status: DISCONTINUED | OUTPATIENT
Start: 2022-08-04 | End: 2022-08-05 | Stop reason: HOSPADM

## 2022-08-04 RX ORDER — MORPHINE SULFATE 2 MG/ML
2 INJECTION, SOLUTION INTRAMUSCULAR; INTRAVENOUS
Status: DISCONTINUED | OUTPATIENT
Start: 2022-08-04 | End: 2022-08-05 | Stop reason: HOSPADM

## 2022-08-04 RX ORDER — HALOPERIDOL 5 MG
5 TABLET ORAL 2 TIMES DAILY
Status: DISCONTINUED | OUTPATIENT
Start: 2022-08-04 | End: 2022-08-05 | Stop reason: HOSPADM

## 2022-08-04 RX ORDER — TAMSULOSIN HYDROCHLORIDE 0.4 MG/1
0.4 CAPSULE ORAL DAILY
Status: DISCONTINUED | OUTPATIENT
Start: 2022-08-04 | End: 2022-08-05 | Stop reason: HOSPADM

## 2022-08-04 RX ORDER — ONDANSETRON 4 MG/1
4 TABLET, ORALLY DISINTEGRATING ORAL EVERY 8 HOURS PRN
Status: DISCONTINUED | OUTPATIENT
Start: 2022-08-04 | End: 2022-08-05

## 2022-08-04 RX ORDER — ACETAMINOPHEN 325 MG/1
650 TABLET ORAL EVERY 6 HOURS PRN
Status: DISCONTINUED | OUTPATIENT
Start: 2022-08-04 | End: 2022-08-05 | Stop reason: HOSPADM

## 2022-08-04 RX ORDER — DEXTROSE MONOHYDRATE 100 MG/ML
INJECTION, SOLUTION INTRAVENOUS CONTINUOUS PRN
Status: DISCONTINUED | OUTPATIENT
Start: 2022-08-04 | End: 2022-08-05 | Stop reason: HOSPADM

## 2022-08-04 RX ORDER — TRAZODONE HYDROCHLORIDE 100 MG/1
100 TABLET ORAL NIGHTLY
Status: DISCONTINUED | OUTPATIENT
Start: 2022-08-04 | End: 2022-08-05 | Stop reason: HOSPADM

## 2022-08-04 RX ORDER — INSULIN LISPRO 100 [IU]/ML
0-4 INJECTION, SOLUTION INTRAVENOUS; SUBCUTANEOUS EVERY 4 HOURS
Status: DISCONTINUED | OUTPATIENT
Start: 2022-08-04 | End: 2022-08-05 | Stop reason: HOSPADM

## 2022-08-04 RX ORDER — SODIUM CHLORIDE 9 MG/ML
INJECTION, SOLUTION INTRAVENOUS PRN
Status: DISCONTINUED | OUTPATIENT
Start: 2022-08-04 | End: 2022-08-05 | Stop reason: HOSPADM

## 2022-08-04 RX ORDER — SODIUM CHLORIDE 9 MG/ML
INJECTION, SOLUTION INTRAVENOUS CONTINUOUS
Status: DISCONTINUED | OUTPATIENT
Start: 2022-08-04 | End: 2022-08-05

## 2022-08-04 RX ORDER — POLYETHYLENE GLYCOL 3350 17 G/17G
17 POWDER, FOR SOLUTION ORAL DAILY PRN
Status: DISCONTINUED | OUTPATIENT
Start: 2022-08-04 | End: 2022-08-04

## 2022-08-04 RX ORDER — ATORVASTATIN CALCIUM 20 MG/1
20 TABLET, FILM COATED ORAL DAILY
Status: DISCONTINUED | OUTPATIENT
Start: 2022-08-04 | End: 2022-08-05 | Stop reason: HOSPADM

## 2022-08-04 RX ORDER — SODIUM CHLORIDE 0.9 % (FLUSH) 0.9 %
5-40 SYRINGE (ML) INJECTION PRN
Status: DISCONTINUED | OUTPATIENT
Start: 2022-08-04 | End: 2022-08-05 | Stop reason: HOSPADM

## 2022-08-04 RX ORDER — MORPHINE SULFATE 4 MG/ML
4 INJECTION, SOLUTION INTRAMUSCULAR; INTRAVENOUS
Status: DISCONTINUED | OUTPATIENT
Start: 2022-08-04 | End: 2022-08-05

## 2022-08-04 RX ORDER — SODIUM CHLORIDE 0.9 % (FLUSH) 0.9 %
5-40 SYRINGE (ML) INJECTION EVERY 12 HOURS SCHEDULED
Status: DISCONTINUED | OUTPATIENT
Start: 2022-08-04 | End: 2022-08-05 | Stop reason: HOSPADM

## 2022-08-04 RX ADMIN — SODIUM CHLORIDE: 9 INJECTION, SOLUTION INTRAVENOUS at 03:22

## 2022-08-04 RX ADMIN — DEXTROSE MONOHYDRATE 125 ML: 100 INJECTION, SOLUTION INTRAVENOUS at 09:31

## 2022-08-04 RX ADMIN — HALOPERIDOL 5 MG: 5 TABLET ORAL at 09:33

## 2022-08-04 RX ADMIN — SODIUM CHLORIDE: 9 INJECTION, SOLUTION INTRAVENOUS at 22:30

## 2022-08-04 RX ADMIN — TAMSULOSIN HYDROCHLORIDE 0.4 MG: 0.4 CAPSULE ORAL at 09:33

## 2022-08-04 RX ADMIN — TRAZODONE HYDROCHLORIDE 100 MG: 100 TABLET ORAL at 22:17

## 2022-08-04 RX ADMIN — CEFTRIAXONE SODIUM 1000 MG: 1 INJECTION, POWDER, FOR SOLUTION INTRAMUSCULAR; INTRAVENOUS at 22:18

## 2022-08-04 RX ADMIN — HALOPERIDOL 5 MG: 5 TABLET ORAL at 22:17

## 2022-08-04 RX ADMIN — ATORVASTATIN CALCIUM 20 MG: 20 TABLET, FILM COATED ORAL at 09:33

## 2022-08-04 RX ADMIN — DEXTROSE MONOHYDRATE 125 ML: 100 INJECTION, SOLUTION INTRAVENOUS at 06:57

## 2022-08-04 RX ADMIN — ENOXAPARIN SODIUM 40 MG: 100 INJECTION SUBCUTANEOUS at 09:34

## 2022-08-04 ASSESSMENT — ENCOUNTER SYMPTOMS
COUGH: 0
WHEEZING: 0
NAUSEA: 0
STRIDOR: 0
SHORTNESS OF BREATH: 0
ABDOMINAL PAIN: 1
VOMITING: 0
DIARRHEA: 0
CONSTIPATION: 1
BLOOD IN STOOL: 0

## 2022-08-04 ASSESSMENT — PAIN SCALES - GENERAL
PAINLEVEL_OUTOF10: 0
PAINLEVEL_OUTOF10: 0

## 2022-08-04 ASSESSMENT — LIFESTYLE VARIABLES: HOW OFTEN DO YOU HAVE A DRINK CONTAINING ALCOHOL: NEVER

## 2022-08-04 NOTE — H&P
[facility]  Office: [address], DO, Fela Agustin, DO, Brandon Serenity, DO, Marily Gus Miles, DO, Tali Sanford MD, Geoff Henrandez MD, Jessica David MD, Vladimir Ocampo MD,  Maxine Kramer MD, Brody Arias MD, Mireille Guerrier, DO, Yary Juarez MD,  Quirino Ramos MD, Johanna Juarez MD, Rita Tavares DO, Prnaeeth Vargas MD, Teresa Stevens MD, Sonia Alonzo MD, Santa Lockwood DO, Daniel Noyola MD, Rosy Schirmer, MD, Candy Maldonado, CNP,  Sandrine Aceves CNP, Brenda Khan CNP, Emerald Caraballo CNP, Nirali Barriga PA-C, Cami Blackmon, [facility], Nadia Lawson, CNP, Sandhya Arroyo, CNP, Carol Morse, CNP, Funmi Cabezas, CNP, Racheal Cheng, CNP, Shin Osorio, CNS, Leslie Harris, [facility], Khoa Rodrigues, CNP, Radha Zamora, CNP, Juanjose Saab, [facility]    HISTORY AND PHYSICAL EXAMINATION            Date:   8/4/2022  Patient name:  Isela Atkins  Date of admission:  8/3/2022  9:54 PM  MRN:   [id number]  Account:  [de-identified]  YOB: 1950  PCP:    Betsy Weinstein  Room:   [address] [place] [address]  Code Status:    Full Code    Chief Complaint:     Chief Complaint   Patient presents with    Dizziness    Emesis    Urinary Frequency       History Obtained From:     patient, electronic medical record    History of Present Illness:     Isela Atkins is a 66 y.o. Non- / non  female who presents with Dizziness, Emesis, and Urinary Frequency   and is admitted to the hospital for the management of Ileus ([facility] [address]     56-year-old female with known past medical history of recurrent UTIs, COPD, constipation, depression , recent history of COVID 23 in June of this year and tobacco use presented to ER complaining of fatigue and tiredness, not able to void appropriately over the last couple of days as well not able to have bowel movement, she started to feel worse and dizzy had an episode of abdominal pain, nausea and vomiting today which prompted her to come to the ER for further evaluation  In ER  patient is essentially unremarkable with no leukocytosis, she was afebrile. CT abdomen pelvis revealed ileus with no other acute pathology, patient also had urinary retention with bladder scan more than 300 mL per ED documentation and Mays catheter was placed at that time  Patient does have recurrent UTI in the past cultures growing Klebsiella pneumonia/E. coli no resistant bacteria was detected on my review    Past Medical History:     Past Medical History:   Diagnosis Date    Arthritis     knees    Bronchitis x1 long ago    Chronic obstructive pulmonary disease ([facility] [address] 9/12/2017    Colon polyps     Controlled type 2 diabetes mellitus without complication, without long-term current use of insulin ([facility] [address] 6/29/2021    Dental neglect     poor dentation. Missing teeth. No loose teeth    Depression     Environmental allergies     GERD (gastroesophageal reflux disease)     Hyperlipidemia     Hypertension     Obesity     Onychomycosis     Poor historian     RBBB     Treadmill stress test negative for angina pectoris 2009    Wears glasses     reading only        Past Surgical History:     Past Surgical History:   Procedure Laterality Date    CHOLECYSTECTOMY      COLONOSCOPY  6/2013    one hyperplastic polyp    DOPPLER ECHOCARDIOGRAPHY      cardiac    TUBAL LIGATION          Medications Prior to Admission:     Prior to Admission medications    Medication Sig Start Date End Date Taking?  Authorizing Provider   Nirmatrelvir & Ritonavir (PAXLOVID) 10 x 150 MG & 10 x 100MG TBPK Take 150 mg by mouth in the morning and at bedtime 6/18/22   Awa Haider, DO   ondansetron (ZOFRAN ODT) 4 MG disintegrating tablet Take 1 tablet by mouth every 8 hours as needed for Nausea 6/18/22   Awa Haider DO   acetaminophen (TYLENOL) 500 MG tablet Take 2 tablets by mouth 4 times daily as needed for Pain 5/17/22   Jared Kelly, DO ibuprofen (IBU) 800 MG tablet Take 1 tablet by mouth every 8 hours as needed for Pain 5/17/22   Conchis Valdivia,    ciclopirox (LOPROX) 0.77 % cream ciclopirox 0.77 % topical cream    Historical Provider, MD   albuterol sulfate  (90 Base) MCG/ACT inhaler albuterol sulfate HFA 90 mcg/actuation aerosol inhaler    Historical Provider, MD   atorvastatin (LIPITOR) 20 MG tablet Take 1 tablet by mouth daily 6/29/21   Shashank Pena MD   tiotropium (SPIRIVA RESPIMAT) 1.25 MCG/ACT AERS inhaler Inhale 2 puffs into the lungs daily 8/12/19   Malinda Chung MD   haloperidol (HALDOL) 5 MG tablet Take 5 mg by mouth 2 times daily    Historical Provider, MD   traZODone (DESYREL) 100 MG tablet  8/9/17   Historical Provider, MD   Elastic Bandages & Supports (ACE ARM SLING) MISC Apply 1 Units topically daily 4/17/17   Alejo Moon MD   trihexyphenidyl (ARTANE) 2 MG tablet Take 2 mg by mouth 2 times daily  10/10/14   Historical Provider, MD        Allergies:     Patient has no known allergies. Social History:     Tobacco:    reports that she quit smoking about 7 years ago. Her smoking use included cigarettes. She has a 17.50 pack-year smoking history. She has never used smokeless tobacco.  Alcohol:      reports no history of alcohol use. Drug Use:  reports no history of drug use. Family History:     Family History   Problem Relation Age of Onset    Heart Disease Mother     Cancer Father        Review of Systems:     Positive and Negative as described in HPI. Review of Systems   Constitutional:  Positive for activity change and appetite change. Negative for chills, diaphoresis and fever. HENT:  Negative for congestion and hearing loss. Respiratory:  Negative for cough, shortness of breath, wheezing and stridor. Cardiovascular:  Negative for chest pain, palpitations and leg swelling. Gastrointestinal:  Positive for abdominal pain and constipation.  Negative for blood in stool, diarrhea, nausea and vomiting. Genitourinary:  Negative for dysuria and frequency. Musculoskeletal:  Negative for myalgias. Skin:  Negative for rash. Neurological:  Negative for dizziness, seizures and headaches. Psychiatric/Behavioral:  The patient is not nervous/anxious. Physical Exam:   /64   Pulse 56   Temp 97.9 °F (36.6 °C) (Oral)   Resp 18   Ht 5' 5\" (1.651 m)   Wt 168 lb 10.4 oz (76.5 kg)   SpO2 98%   BMI 28.07 kg/m²   Temp (24hrs), Av.1 °F (36.7 °C), Min:97.9 °F (36.6 °C), Max:98.4 °F (36.9 °C)    Recent Labs     22  0709 22  0850 22  1052 22  1210   POCGLU 91 61* 120* 100       Intake/Output Summary (Last 24 hours) at 2022 1300  Last data filed at 2022 0608  Gross per 24 hour   Intake 206 ml   Output 1600 ml   Net -1394 ml       Physical Exam  Vitals and nursing note reviewed. Constitutional:       General: She is not in acute distress. Appearance: She is well-developed. She is not diaphoretic. HENT:      Head: Normocephalic and atraumatic. Right Ear: Hearing normal.      Left Ear: Hearing normal.      Nose: Nose normal. No rhinorrhea. Eyes:      General: Lids are normal.      Extraocular Movements:      Right eye: Normal extraocular motion. Left eye: Normal extraocular motion. Conjunctiva/sclera: Conjunctivae normal.      Right eye: Right conjunctiva is not injected. Left eye: Left conjunctiva is not injected. Pupils: Pupils are equal, round, and reactive to light. Pupils are equal.      Right eye: Pupil is reactive. Left eye: Pupil is reactive. Neck:      Thyroid: No thyromegaly. Vascular: No carotid bruit. Trachea: Trachea and phonation normal. No tracheal deviation. Cardiovascular:      Rate and Rhythm: Normal rate and regular rhythm. Pulses: Normal pulses. Heart sounds: Normal heart sounds. No murmur heard. Pulmonary:      Effort: Pulmonary effort is normal. No respiratory distress.       Breath sounds: Normal breath sounds. No stridor. No decreased breath sounds. Abdominal:      General: Bowel sounds are normal. There is no distension. Palpations: Abdomen is soft. There is no mass. Tenderness: There is abdominal tenderness in the periumbilical area. There is no guarding. Musculoskeletal:         General: No tenderness. Cervical back: Neck supple. Skin:     General: Skin is warm and dry. Findings: No erythema, lesion or rash. Neurological:      Mental Status: She is alert and oriented to person, place, and time. She is not disoriented. Cranial Nerves: No cranial nerve deficit. Psychiatric:         Speech: Speech normal.         Behavior: Behavior normal. Behavior is cooperative.        Investigations:      Laboratory Testing:  Recent Results (from the past 24 hour(s))   CBC with Auto Differential    Collection Time: 08/03/22 10:22 PM   Result Value Ref Range    WBC 6.8 3.5 - 11.3 k/uL    RBC 4.28 3.95 - 5.11 m/uL    Hemoglobin 12.4 11.9 - 15.1 g/dL    Hematocrit 39.0 36.3 - 47.1 %    MCV 91.1 82.6 - 102.9 fL    MCH 29.0 25.2 - 33.5 pg    MCHC 31.8 28.4 - 34.8 g/dL    RDW 15.5 (H) 11.8 - 14.4 %    Platelets 890 603 - 296 k/uL    MPV 9.2 8.1 - 13.5 fL    NRBC Automated 0.0 0.0 per 100 WBC    Seg Neutrophils 55 36 - 65 %    Lymphocytes 33 24 - 43 %    Monocytes 10 3 - 12 %    Eosinophils % 2 1 - 4 %    Basophils 0 0 - 2 %    Immature Granulocytes 0 0 %    Segs Absolute 3.70 1.50 - 8.10 k/uL    Absolute Lymph # 2.28 1.10 - 3.70 k/uL    Absolute Mono # 0.70 0.10 - 1.20 k/uL    Absolute Eos # 0.10 0.00 - 0.44 k/uL    Basophils Absolute 0.03 0.00 - 0.20 k/uL    Absolute Immature Granulocyte <0.03 0.00 - 0.30 k/uL    RBC Morphology ANISOCYTOSIS PRESENT    Comprehensive Metabolic Panel    Collection Time: 08/03/22 10:22 PM   Result Value Ref Range    Glucose 86 70 - 99 mg/dL    BUN 10 8 - 23 mg/dL    Creatinine 1.00 (H) 0.50 - 0.90 mg/dL    Calcium 9.4 8.6 - 10.4 mg/dL    Sodium 138 135 - 144 mmol/L    Potassium 3.7 3.7 - 5.3 mmol/L    Chloride 103 98 - 107 mmol/L    CO2 23 20 - 31 mmol/L    Anion Gap 12 9 - 17 mmol/L    Alkaline Phosphatase 117 (H) 35 - 104 U/L    ALT 10 5 - 33 U/L    AST 17 <32 U/L    Total Bilirubin 0.21 (L) 0.3 - 1.2 mg/dL    Total Protein 7.2 6.4 - 8.3 g/dL    Albumin 4.3 3.5 - 5.2 g/dL    Albumin/Globulin Ratio 1.5 1.0 - 2.5    GFR Non-African American 55 (L) >60 mL/min    GFR African American >60 >60 mL/min    GFR Comment         Magnesium    Collection Time: 08/03/22 10:22 PM   Result Value Ref Range    Magnesium 2.0 1.6 - 2.6 mg/dL   Troponin    Collection Time: 08/03/22 10:22 PM   Result Value Ref Range    Troponin, High Sensitivity 9 0 - 14 ng/L   Brain Natriuretic Peptide    Collection Time: 08/03/22 10:22 PM   Result Value Ref Range    Pro- <300 pg/mL   Lipase    Collection Time: 08/03/22 10:22 PM   Result Value Ref Range    Lipase 21 13 - 60 U/L   TSH with Reflex    Collection Time: 08/03/22 10:22 PM   Result Value Ref Range    TSH 1.42 0.30 - 5.00 uIU/mL   Urinalysis with Microscopic    Collection Time: 08/03/22 10:24 PM   Result Value Ref Range    Color, UA Yellow Yellow    Turbidity UA Clear Clear    Glucose, Ur NEGATIVE NEGATIVE    Bilirubin Urine NEGATIVE NEGATIVE    Ketones, Urine NEGATIVE NEGATIVE    Specific [place], UA 1.005 1.005 - 1.030    Urine Hgb NEGATIVE NEGATIVE    pH, UA 6.0 5.0 - 8.0    Protein, UA NEGATIVE NEGATIVE    Urobilinogen, Urine Normal Normal    Nitrite, Urine NEGATIVE NEGATIVE    Leukocyte Esterase, Urine LARGE (A) NEGATIVE    WBC, UA 20 TO 50 0 - 5 /HPF    RBC, UA 0 TO 2 0 - 4 /HPF    Casts UA  0 - 8 /LPF     2 TO 5 HYALINE Reference range defined for non-centrifuged specimen.     Epithelial Cells UA 2 TO 5 0 - 5 /HPF    Bacteria, UA FEW (A) None   Troponin    Collection Time: 08/04/22 12:16 AM   Result Value Ref Range    Troponin, High Sensitivity 7 0 - 14 ng/L   POC Glucose Fingerstick    Collection Time: 08/04/22  2:17 AM GFR Non-African American >60 >60 mL/min    GFR African American >60 >60 mL/min    GFR Comment         Lactic Acid    Collection Time: 08/04/22 12:16 PM   Result Value Ref Range    Lactic Acid, Whole Blood 0.8 0.7 - 2.1 mmol/L       Imaging/Diagnostics:  CT ABDOMEN PELVIS W IV CONTRAST Additional Contrast? None    Result Date: 8/3/2022  Ileus. XR CHEST PORTABLE    Result Date: 8/3/2022  No acute cardiopulmonary process. May have underlying COPD. Assessment :      Hospital Problems             Last Modified POA    * (Principal) Ileus ([address] 8/4/2022 Yes    UTI (urinary tract infection) 8/4/2022 Yes    Urinary retention 8/4/2022 Yes    GERD (gastroesophageal reflux disease) 8/4/2022 Yes    Hyperlipidemia 8/4/2022 Yes    Depression 8/4/2022 Yes    Osteoarthritis 8/4/2022 Yes    Chronic obstructive pulmonary disease ([address] 8/4/2022 Yes    Cognitive and behavioral changes 8/4/2022 Yes    Controlled type 2 diabetes mellitus without complication, without long-term current use of insulin ([address] 8/4/2022 Yes    HLD (hyperlipidemia) 8/4/2022 Yes       Plan:     Patient status inpatient in the Progressive Unit/Step down    Ileus : on CT A/P , repeat KUB this am suggest mild ileus, continue with aggressive bowel regimen, patient might start on clear liquid diet  Continue to monitor closely  Continue with gentle hydration, titrate to stop once patient is improving and tolerating p.o. Recurrent UTI: Continue antibiotic pending culture and sensitivity, in the past patient grew Klebsiella pneumonia and E.  Coli    Urinary retention: Mays catheter was placed in ER due to bladder scan of more than 300 mL, will get urology input likely will be able to discontinue and void trial  Started Flomax    Cognitive impairment/ Depression : Continue home medications    HTN: continue home medications    HPL: continue home medications    DM2: Continue diabetes home medications , insulin sliding scale, hypoglycemia protocol    DVT

## 2022-08-04 NOTE — ED PROVIDER NOTES
Faculty Sign-Out Attestation  Handoff taken on the following patient from prior Attending Physician: Hilda Granados    I was available and discussed any additional care issues that arose and coordinated the management plans with the resident(s) caring for the patient during my duty period. Any areas of disagreement with residents documentation of care or procedures are noted on the chart. I was personally present for the key portions of any/all procedures during my duty period. I have documented in the chart those procedures where I was not present during the key portions.     Dizziness / abdominal pain,   Ct abdomen > ileus, distended bladder, recurrent uti   Probable admit    Elise Jeronimo DO  Attending Physician       Elise Jeronimo DO  08/04/22 0007    Pt admitted     Elise Jeronimo DO  08/04/22 0028

## 2022-08-04 NOTE — ED PROVIDER NOTES
[facility] ED  Emergency Department Encounter  Emergency Medicine Resident     Pt Christina Males  MRN: [id number]  Sharongfofelia 1950  Date of evaluation: 8/3/22  PCP:  Coy Ramos       Chief Complaint   Patient presents with    Dizziness    Emesis    Urinary Frequency       HISTORY OF PRESENT ILLNESS  (Location/Symptom, Timing/Onset, Context/Setting, Quality, Duration, Modifying Factors, Severity.)      Leonard Vergara is a 66 y.o. female who presents with chest pain, abdominal pain, constipation, and trouble urinating. She states she has not been feeling well for weeks. She has been unable to eat or drink well. She did not urinate all day until coming to the emergency department. Complains of dysuria. She has not had a bowel movement in 3 days. Patient is currently nauseous and had 1 episode of emesis earlier today. Patient's son said that she has been acting confused which is similar to how she acted when she had a UTI previously. Denies any fever, headaches, or back pain. Patient was found to have a UTI on 7/1/2022 and was prescribed Bactrim however patient did not complete the antibiotic course. Patient was also started on cefadroxil on 7/25/2022 however patient did not complete this antibiotic course either. Prior cultures grew Klebsiella and E. coli. Patient is noncompliant with her other home medications. Patient also complaining of a sore on her right second toe that has been there for weeks.     PAST MEDICAL / SURGICAL / SOCIAL / FAMILY HISTORY      has a past medical history of Arthritis, Bronchitis, Chronic obstructive pulmonary disease ([address] Colon polyps, Controlled type 2 diabetes mellitus without complication, without long-term current use of insulin ([address] Dental neglect, Depression, Environmental allergies, GERD (gastroesophageal reflux disease), Hyperlipidemia, Hypertension, Obesity, Onychomycosis, Poor historian, RBBB, Treadmill stress test negative for angina pectoris, and Wears glasses. Reviewed with patient however patient is a poor historian. has a past surgical history that includes Cholecystectomy; doppler echocardiography; Colonoscopy (2013); and Tubal ligation. Reviewed with patient however patient is a poor historian. Social History     Socioeconomic History    Marital status: Single     Spouse name: Not on file    Number of children: Not on file    Years of education: Not on file    Highest education level: Not on file   Occupational History    Not on file   Tobacco Use    Smoking status: Former     Packs/day: 0.50     Years: 35.00     Pack years: 17.50     Types: Cigarettes     Quit date: 2015     Years since quittin.0    Smokeless tobacco: Never   Substance and Sexual Activity    Alcohol use: No     Alcohol/week: 0.0 standard drinks    Drug use: No    Sexual activity: Not on file   Other Topics Concern    Not on file   Social History Narrative    Not on file     Social Determinants of Health     Financial Resource Strain: Not on file   Food Insecurity: Not on file   Transportation Needs: Not on file   Physical Activity: Not on file   Stress: Not on file   Social Connections: Not on file   Intimate Partner Violence: Not on file   Housing Stability: Not on file       Family History   Problem Relation Age of Onset    Heart Disease Mother     Cancer Father        Allergies:  Patient has no known allergies. Home Medications:  Prior to Admission medications    Medication Sig Start Date End Date Taking?  Authorizing Provider   Nirmatrelvir & Ritonavir (PAXLOVID) 10 x 150 MG & 10 x 100MG TBPK Take 150 mg by mouth in the morning and at bedtime 22   Skippy Antwon, DO   ondansetron (ZOFRAN ODT) 4 MG disintegrating tablet Take 1 tablet by mouth every 8 hours as needed for Nausea 22   Skippy Mason, DO   acetaminophen (TYLENOL) 500 MG tablet Take 2 tablets by mouth 4 times daily as needed for Pain 22 Stephanie Valdivia DO   ibuprofen (IBU) 800 MG tablet Take 1 tablet by mouth every 8 hours as needed for Pain 5/17/22   Stephanie Valdivia DO   ciclopirox (LOPROX) 0.77 % cream ciclopirox 0.77 % topical cream    Historical Provider, MD   albuterol sulfate  (90 Base) MCG/ACT inhaler albuterol sulfate HFA 90 mcg/actuation aerosol inhaler    Historical Provider, MD   atorvastatin (LIPITOR) 20 MG tablet Take 1 tablet by mouth daily 6/29/21   Lin Greene MD   tiotropium (SPIRIVA RESPIMAT) 1.25 MCG/ACT AERS inhaler Inhale 2 puffs into the lungs daily 8/12/19   Cynthia Carbajal MD   haloperidol (HALDOL) 5 MG tablet Take 5 mg by mouth 2 times daily    Historical Provider, MD   traZODone (DESYREL) 100 MG tablet  8/9/17   Historical Provider, MD   Elastic Bandages & Supports (ACE ARM SLING) MISC Apply 1 Units topically daily 4/17/17   Dominick Pérez MD   trihexyphenidyl (ARTANE) 2 MG tablet Take 2 mg by mouth 2 times daily  10/10/14   Historical Provider, MD       REVIEW OF SYSTEMS    (2-9 systems for level 4, 10 or more for level 5)      Review of Systems   Constitutional:  Positive for appetite change. Negative for fever. HENT:  Negative for rhinorrhea and sore throat. Respiratory:  Negative for cough and shortness of breath. Cardiovascular:  Positive for chest pain. Negative for leg swelling. Gastrointestinal:  Positive for abdominal pain, constipation, nausea and vomiting. Genitourinary:  Positive for difficulty urinating and dysuria. Musculoskeletal:  Negative for back pain and neck pain. Neurological:  Positive for dizziness and light-headedness. Negative for headaches. PHYSICAL EXAM   (up to 7 for level 4, 8 or more for level 5)      INITIAL VITALS:   /65   Pulse 63   Temp 98.1 °F (36.7 °C) (Oral)   Resp 26   SpO2 100%     Physical Exam  Constitutional:       General: She is in acute distress. Appearance: She is not diaphoretic. Comments: Tearful.   Patient appears very unkept. HENT:      Head: Normocephalic and atraumatic. Right Ear: External ear normal.      Left Ear: External ear normal.      Nose: Nose normal.      Mouth/Throat:      Mouth: Mucous membranes are dry. Pharynx: No oropharyngeal exudate or posterior oropharyngeal erythema. Eyes:      Extraocular Movements: Extraocular movements intact. Conjunctiva/sclera: Conjunctivae normal.   Cardiovascular:      Rate and Rhythm: Normal rate. Pulses: Normal pulses. Heart sounds: Normal heart sounds. Pulmonary:      Effort: Pulmonary effort is normal. No respiratory distress. Breath sounds: Normal breath sounds. No wheezing or rales. Abdominal:      General: There is distension. Tenderness: There is abdominal tenderness. There is no guarding. Comments: LUQ feels firm but not rigid. Generalized tenderness in all four quadrants but most prominent in LUQ. Genitourinary:     Comments: Vulva erythematous with white, flaky, dry discharge. Patient was extremely tender when Mays catheter was placed by the nurse. Musculoskeletal:         General: No swelling. Normal range of motion. Cervical back: Normal range of motion. Skin:     Coloration: Skin is not jaundiced. Findings: No rash. Comments: Right second toe with circular punched out lesion. No drainage. Neurological:      General: No focal deficit present. Mental Status: She is oriented to person, place, and time.        DIFFERENTIAL  DIAGNOSIS     PLAN (LABS / IMAGING / EKG):  Orders Placed This Encounter   Procedures    Culture, Urine    XR CHEST PORTABLE    CT ABDOMEN PELVIS W IV CONTRAST Additional Contrast? None    CBC with Auto Differential    Comprehensive Metabolic Panel    Urinalysis with Microscopic    Magnesium    Troponin    Brain Natriuretic Peptide    Lipase    TSH with Reflex    Insert indwelling urinary catheter    Discontinue indwelling urinary catheter when documented indications no longer apply per hospital policy    Inpatient consult to Hospitalist    ADMIT TO INPATIENT       MEDICATIONS ORDERED:  Orders Placed This Encounter   Medications    ondansetron (ZOFRAN) injection 4 mg    lactated ringers bolus    cefTRIAXone (ROCEPHIN) 1,000 mg in sodium chloride 0.9 % 50 mL IVPB mini-bag     Order Specific Question:   Antimicrobial Indications     Answer:   Urinary Tract Infection    iopamidol (ISOVUE-370) 76 % injection 75 mL       DDX: GERD, PUD, pancreatitis, cholecystitis, GB colic, cholangitis, Xden-Jmyu-Urgvlz, ACS/ MI, pneumonia, SBO, DKA, AAA, mesenteric ischemia, perforated viscous, acute gastroenteritis, NSAP, pyelonephritis, kidney stone, appendicitis, hernia, UTI, constipation, ovarian torsion, ovarian cyst, PID, tuboovarian abscess, period/ fibroid    As a part of this differential, I evaluated for the presence of diaphoresis,  chest pain (including the QRST of the chest pain), Shortness of Breath or Dyspnea, BP in both arms, and the presence of systemic and peripheral edema. I evaluated the patient's blood glucose status and urinalysis. I also calculated a [place]'s criteria score, the A[place] Score, and the BISAP to consider acute pancreatitis and acute appendicitis. The relevant score is documented below.     Ransons criteria: WBC>16, age >49, glucose>200, AST>80, LDH>350  Evaluate for: EtOH abuse, ACS risk factors, point tenderness, rebound, guarding, Silva sign, GB US (stone, sono Silva, wall thick>3mm, CBD>6mm)      Benson Score for appendicitis  Migratory R iliac Fossa Pain 2   Anorexia (loss of appetite) or ketones in the urine 1   Nausea or vomiting  1   Tenderness in R iliac fossa 2   Rebound to R iliac fossa 1   Fever of 37.3 °C/ 99.1 F +  1   Leukocytosis > 00798 WBC 1   Neutrophilia, or an increase in % of neutrophils in WBC 1   TOTAL 2/10      Clinical Decision: Probability of an acute appendicitis   <3 no CT 5-6 is consistent with diagnosis of acute appendicitis    4-6 CT 7-8 indicates a probable appendicitis   7-8 Surgery consult 9-10 indicates a very probable acute appendicitis     BISAP Score for pancreatitis  BUN >25  1   Impaired mental status  1   SIRS criteria (HR >90, T 100.4, 36, RR >20/ CO2 <32, WBC >12, <4) 1   Age >60 1   Pleural Effusion  1   TOTAL 1/5      Probability of an acute appendicitis   > 0 increasing risk of mortality   3 to 4 mortality increasing significantly    > 4 mortality rate of 22%.          DIAGNOSTIC RESULTS / EMERGENCY DEPARTMENT COURSE / MDM   LAB RESULTS:  Results for orders placed or performed during the hospital encounter of 08/03/22   CBC with Auto Differential   Result Value Ref Range    WBC 6.8 3.5 - 11.3 k/uL    RBC 4.28 3.95 - 5.11 m/uL    Hemoglobin 12.4 11.9 - 15.1 g/dL    Hematocrit 39.0 36.3 - 47.1 %    MCV 91.1 82.6 - 102.9 fL    MCH 29.0 25.2 - 33.5 pg    MCHC 31.8 28.4 - 34.8 g/dL    RDW 15.5 (H) 11.8 - 14.4 %    Platelets 445 541 - 813 k/uL    MPV 9.2 8.1 - 13.5 fL    NRBC Automated 0.0 0.0 per 100 WBC    Seg Neutrophils 55 36 - 65 %    Lymphocytes 33 24 - 43 %    Monocytes 10 3 - 12 %    Eosinophils % 2 1 - 4 %    Basophils 0 0 - 2 %    Immature Granulocytes 0 0 %    Segs Absolute 3.70 1.50 - 8.10 k/uL    Absolute Lymph # 2.28 1.10 - 3.70 k/uL    Absolute Mono # 0.70 0.10 - 1.20 k/uL    Absolute Eos # 0.10 0.00 - 0.44 k/uL    Basophils Absolute 0.03 0.00 - 0.20 k/uL    Absolute Immature Granulocyte <0.03 0.00 - 0.30 k/uL    RBC Morphology ANISOCYTOSIS PRESENT    Comprehensive Metabolic Panel   Result Value Ref Range    Glucose 86 70 - 99 mg/dL    BUN 10 8 - 23 mg/dL    Creatinine 1.00 (H) 0.50 - 0.90 mg/dL    Calcium 9.4 8.6 - 10.4 mg/dL    Sodium 138 135 - 144 mmol/L    Potassium 3.7 3.7 - 5.3 mmol/L    Chloride 103 98 - 107 mmol/L    CO2 23 20 - 31 mmol/L    Anion Gap 12 9 - 17 mmol/L    Alkaline Phosphatase 117 (H) 35 - 104 U/L    ALT 10 5 - 33 U/L    AST 17 <32 U/L    Total Bilirubin 0.21 (L) 0.3 - 1.2 mg/dL    Total Protein 7.2 6.4 - 8.3 g/dL    Albumin 4.3 3.5 - 5.2 g/dL    Albumin/Globulin Ratio 1.5 1.0 - 2.5    GFR Non-African American 55 (L) >60 mL/min    GFR African American >60 >60 mL/min    GFR Comment         Urinalysis with Microscopic   Result Value Ref Range    Color, UA Yellow Yellow    Turbidity UA Clear Clear    Glucose, Ur NEGATIVE NEGATIVE    Bilirubin Urine NEGATIVE NEGATIVE    Ketones, Urine NEGATIVE NEGATIVE    Specific [place], UA 1.005 1.005 - 1.030    Urine Hgb NEGATIVE NEGATIVE    pH, UA 6.0 5.0 - 8.0    Protein, UA NEGATIVE NEGATIVE    Urobilinogen, Urine Normal Normal    Nitrite, Urine NEGATIVE NEGATIVE    Leukocyte Esterase, Urine LARGE (A) NEGATIVE    WBC, UA 20 TO 50 0 - 5 /HPF    RBC, UA 0 TO 2 0 - 4 /HPF    Casts UA  0 - 8 /LPF     2 TO 5 HYALINE Reference range defined for non-centrifuged specimen. Epithelial Cells UA 2 TO 5 0 - 5 /HPF    Bacteria, UA FEW (A) None   Magnesium   Result Value Ref Range    Magnesium 2.0 1.6 - 2.6 mg/dL   Troponin   Result Value Ref Range    Troponin, High Sensitivity 9 0 - 14 ng/L   Brain Natriuretic Peptide   Result Value Ref Range    Pro- <300 pg/mL   Lipase   Result Value Ref Range    Lipase 21 13 - 60 U/L   TSH with Reflex   Result Value Ref Range    TSH 1.42 0.30 - 5.00 uIU/mL       IMPRESSION: 59-year-old female who presents with chest pain, abdominal pain, constipation, urinary retention, nausea, vomiting, and decreased appetite. Patient was previously diagnosed with a UTI on 7/1/2022 and was prescribed Bactrim. Patient also found to have a UTI on 7/25/2022 and was prescribed cefadroxil. Patient was noncompliant with medications. Cardiac work-up unremarkable. EKG shows no acute changes. Chest x-ray negative. Troponins within normal limits. BNP at baseline. UA positive for UTI. Started on Rocephin. Patient received 1 L of lactated Ringer's. Lipase, CMP, TSH magnesium within normal limits. CT abdomen/pelvis consistent with ileus.   Bladder scanned patient as bladder looked large on CT abdomen/pelvis. Bladder scan showed approximately 349 cc. Mays catheter placed with 600 cc of urine out. Possible yeast infection. Will admit to [facility] due to ileus, urinary retention, and treatment of UTI. RADIOLOGY:  CT ABDOMEN PELVIS W IV CONTRAST Additional Contrast? None   Final Result   Ileus. XR CHEST PORTABLE   Final Result   No acute cardiopulmonary process. May have underlying COPD. EKG  EKG Interpretation    Interpreted by emergency department physician    Rhythm: normal sinus   Rate: normal  Axis: normal  Ectopy: none  Conduction: right bundle branch block (complete)  ST Segments: no acute change  T Waves: no acute change  Q Waves: none    Clinical Impression: no acute changes compared to EKG on 7/27/22    Rosaland Hodgkin, MD      All EKG's are interpreted by the Emergency Department Physician who either signs or Co-signs this chart in the absence of a cardiologist.    EMERGENCY DEPARTMENT COURSE:  ED Course as of 08/04/22 0044   Thu Aug 04, 2022   0032 Reevaluated patient. Patient was desatting to 85 while sleeping. Placed her on 2 L nasal cannula. Now satting 99 to 100%. Patient states she is feeling a little better. [KR]   0036 Spoke with [facility]. They will admit. [KR]   6596 Spoke with social work. I believe patient would benefit from a social work assessment as patient is noncompliant with her medications and appears unkept. [KR]      ED Course User Index  [KR] Rosaland Hodgkin, MD       No notes of [facility] Admission Criteria type on file. CONSULTS:  IP CONSULT TO HOSPITALIST      FINAL IMPRESSION      1. Ileus ([address]    2. Urinary retention    3. Acute cystitis without hematuria          DISPOSITION / PLAN     DISPOSITION Admitted 08/04/2022 12:27:33 AM      PATIENT REFERRED TO:  No follow-up provider specified.     DISCHARGE MEDICATIONS:  New Prescriptions    No medications on file       Rosaland Hodgkin, MD  Emergency Medicine Resident    (Please note that portions of thisnote were completed with a voice recognition program.  Efforts were made to edit the dictations but occasionally words are mis-transcribed.)       Diandra Fowler MD  Resident  08/04/22 [id number]

## 2022-08-04 NOTE — ED PROVIDER NOTES
[facility]     Emergency Department     Faculty Attestation    I performed a history and physical examination of the patient and discussed management with the resident. I reviewed the residents note and agree with the documented findings and plan of care. Any areas of disagreement are noted on the chart. I was personally present for the key portions of any procedures. I have documented in the chart those procedures where I was not present during the key portions. I have reviewed the emergency nurses triage note. I agree with the chief complaint, past medical history, past surgical history, allergies, medications, social and family history as documented unless otherwise noted below. For Physician Assistant/ Nurse Practitioner cases/documentation I have personally evaluated this patient and have completed at least one if not all key elements of the E/M (history, physical exam, and MDM). Additional findings are as noted. I have personally seen and evaluated the patient. I find the patient's history and physical exam are consistent with the NP/PA documentation. I agree with the care provided, treatment rendered, disposition and follow-up plan. This patient was evaluated in the Emergency Department for symptoms described in the history of present illness. The patient was evaluated in the context of the global COVID-19 pandemic, which necessitated consideration that the patient might be at risk for infection with the SARS-CoV-2 virus that causes COVID-19. Institutional protocols and algorithms that pertain to the evaluation of patients at risk for COVID-19 are in a state of rapid change based on information released by regulatory bodies including the CDC and federal and state organizations. These policies and algorithms were followed during the patient's care in the ED. 62-year-old female with history of recurrent UTIs, abdominal pain presenting with difficulty urinating.   [facility] that she has been sick for weeks, having nausea and vomiting today. States that she had not peed today, but was able to urinate once getting to the emergency department. Having pain in her low abdomen. Exam:  General: Laying on the bed and awake, alert  CV: normal rate and regular rhythm  Lungs: Breathing comfortably on room air with no tachypnea, hypoxia, or increased work of breathing  Abdomen: Soft, mildly tender to palpation in the lower abdomen, left greater than right    Plan:  History of UTIs, will check urine to rule out recurrent infection. Patient states she has not had a bowel movement in quite some time. However, given left lower quadrant tenderness, will obtain CT imaging to rule out intra-abdominal abnormalities. Initial post void residual shows a volume of 69 mL, which does not suggest urinary retention. UA positive for UTI, will send culture, as she has had multiple resistant bacteria's in the past.  Awaiting CT results. Patient appears to have distended bladder on CT. Repeated post-void residual, now has >300cc in her bladder after voiding. Will place hernandez for urinary retention. Signed out to overnight physician pending CT results.         Shin Riley MD   Attending Emergency  Physician    (Please note that portions of this note were completed with a voice recognition program. Efforts were made to edit the dictations but occasionally words are mis-transcribed.)            Shin Riley MD  08/03/22 [id number]

## 2022-08-04 NOTE — ED NOTES
Pt's son at bedside. Pt answers questions appropriately at times but answers \" yes\" to most questions. Son is not primary caregiver. Pt lives with a friend. Pt's son and/or pt is not able to verify home medications.       Gregory Silva LPN  49/39/06 [id number]

## 2022-08-04 NOTE — ED NOTES
Pt resting on cot with eyes closed. Pt showing no s/s of distress. Respirations even and unlabored. Needs met at this time.       [place] Must, LPN  42/13/79 [id number]

## 2022-08-04 NOTE — ED NOTES
SW met with pt regarding concerns about wellbeing and medical needs being met at home from resident physician. Pt reports she is currently living with friends and reports \"they take care of me\". Pt reports her needs are currently met at home and she has no concerns for her safety or wellbeing. SW has no concerns at this time.       Duyen Farias, JAYNA  08/04/22 0125

## 2022-08-04 NOTE — ED NOTES
Pt placed on 2L of oxygen per Dr Rory Malone. Pt desating down to 85% on RA. Pt has recovered on 2L and now at 98%. Pt showing no s/s of distress at this time.       Brandie Hickman, DANYAN  42/43/15 [id number]

## 2022-08-04 NOTE — PROGRESS NOTES
Patient's blood sugar was 60. Notified .  D10 bolus given. Blood sugar was rechecked and was 91. Continue to monitor.

## 2022-08-04 NOTE — ED NOTES
The following labs labeled with pt sticker and tubed to lab:     [x] Blue     [x] Lavender   [] on ice  [x] Green/yellow  [x] Green/black [x] on ice  [] Yellow  [] Red  [] Pink      [] COVID-19 swab    [] Rapid  [] PCR  [] Flu swab  [] Peds Viral Panel     [x] Urine Sample  [] Pelvic Cultures  [] Blood Cultures           Moni Huang LPN  62/18/70 [id number]

## 2022-08-04 NOTE — CARE COORDINATION
08/04/22 1323   Service Assessment   Patient Orientation Alert and Oriented;Person;Place   Cognition Alert   Primary Caregiver Self   Support Systems Children;Friends/Neighbors   Patient's Healthcare Decision Maker is: Legal Next of Kin  (Daughter Rosemary Constantino,  [phone])   PCP Verified by CM Yes   Last Visit to PCP Within last 3 months   Prior Functional Level Assistance with the following:;Housework;Cooking; Shopping   Current Functional Level Independent in ADLs/IADLs   Can patient return to prior living arrangement Unknown at present   Ability to make needs known: Good   Family able to assist with home care needs: Yes   Would you like for me to discuss the discharge plan with any other family members/significant others, and if so, who? Yes  (Children or friend [place])   Financial Resources Medicaid; Food [place]   Social/Functional History   Lives With Friend(s)   Type of [address] [place], [place] [address]e One level   Home Access Stairs to enter with rails   Entrance Stairs - Number of Steps 1   Entrance Stairs - Rails Left   Bathroom Shower/Tub Walk-in shower   Bathroom Toilet Standard   Bathroom Equipment Grab bars in shower   Bathroom Accessibility Accessible   Home Equipment Cane;Oxygen   Receives Help From Friend(s)   ADL Assistance Independent   Homemaking Assistance Needs assistance   Meal Prep Moderate   Laundry Total   Vacuuming Total   Cleaning Total   Gardening Total   Driving Total   Shopping Total   Homemaking Responsibilities No   Ambulation Assistance Independent   Transfer Assistance Independent   Active  No   Patient's  Info Children or friend [place] assist with transportation and also uses [employer]   Mode of Transportation Car;Cab   Occupation On disability   Discharge Planning   Type of [place] Other (Comment)  (Lives with friend [place])   Current Services Prior To Admission Oxygen Therapy  (Pt on O2 at home but states it is not working, message left for daughter to see who supplies home oxygen)   Potential Assistance Needed Home Care;Skilled Nursing Facility   Potential Assistance Purchasing Medications No   Meds-to-Beds: Does the patient want to have any new prescriptions delivered to bedside prior to discharge? Yes   Type of Home Care Services OT;PT   Patient expects to be discharged to: House   One/Two Story Residence One story   History of falls? 0   Services At/After Discharge   Transition of Care Consult (CM Consult) Home Health   Internal Home Health   (Pt has used [facility] in past and would want to use agency again)   [address] Discharge DME   [address] Provided? No   Mode of Transport at Discharge Other (see comment)  (family vs cab)   Condition of Participation: Discharge Planning   The Plan for Transition of Care is related to the following treatment goals: Plan to return home at discharge   The Patient and/or Patient Representative was provided with a Choice of Provider? Patient   The Patient and/Or Patient Representative agree with the Discharge Plan? Yes   Freedom of Choice list was provided with basic dialogue that supports the patient's individualized plan of care/goals, treatment preferences, and shares the quality data associated with the providers? Yes  (If home care is needed pt would like [facility])   Pt states she lives with her friend Hussein Johnson, no number for friend listed. Message left for patients daughter ΣΑΡΑΝΤΙ requesting return call to confirm living situation. Pt has had [facility] in the past and also has home oxygen. Pt states the oxygen equipment is not working. Message for daughter requesting information on DME company used. Await return call from daughter to confirm information from pt and PT/OT to be ordered to determine safety of pt returning home at discharge. Follow for discharge needs.

## 2022-08-04 NOTE — PLAN OF CARE
Problem: Discharge Planning  Goal: Discharge to home or other facility with appropriate resources  Outcome: Progressing  Flowsheets (Taken 8/4/2022 0205)  Discharge to home or other facility with appropriate resources:   Identify barriers to discharge with patient and caregiver   Identify discharge learning needs (meds, wound care, etc)   Refer to discharge planning if patient needs post-hospital services based on physician order or complex needs related to functional status, cognitive ability or social support system   Arrange for needed discharge resources and transportation as appropriate     Problem: Safety - Adult  Goal: Free from fall injury  Outcome: Progressing     Problem: ABCDS Injury Assessment  Goal: Absence of physical injury  Outcome: Progressing     Problem: Skin/Tissue Integrity  Goal: Absence of new skin breakdown  Description: 1. Monitor for areas of redness and/or skin breakdown  2. Assess vascular access sites hourly  3. Every 4-6 hours minimum:  Change oxygen saturation probe site  4. Every 4-6 hours:  If on nasal continuous positive airway pressure, respiratory therapy assess nares and determine need for appliance change or resting period.   Outcome: Progressing     Problem: Chronic Conditions and Co-morbidities  Goal: Patient's chronic conditions and co-morbidity symptoms are monitored and maintained or improved  Outcome: Progressing

## 2022-08-04 NOTE — CONSULTS
Juan C Morales, Oumou Castillo, Germain Ortez, & Abdiaziz   Urology Consultation      Patient:  Jaziel Chance  MRN: [id number]  YOB: 1950    CHIEF COMPLAINT:  Urinary retention    HISTORY OF PRESENT ILLNESS:   The patient is a 66 y.o. female who presents with various complaints including chest pain, abdominal pain, constipation, urinary retention, N/V and decreased appetite. Per chart notes, Son states she has been confused and acting like she has in the past with prior UTIs. Patient is not able to answer my questions at bedside, just keeps asking for drink of water but she is NPO due to ileus. Patient was previously diagnosed with a UTI on 7/1/2022 and was prescribed Bactrim. Patient also found to have a UTI on 7/25/2022 and was prescribed cefadroxil. Patient did not finish antibiotics, history of non-compliance with meds. Cardiac work-up unremarkable per ED ntoes. UA with leukocytes, bacteria, no blood. Started on Rocephin. CT abdomen/pelvis consistent with ileus, no  abnormality, no hydronephrosis or obstructing stone. Bladder scan showed approximately 349 cc. Hernandez catheter placed with 600 cc of urine out. Possible yeast infection based on external genitalia exam/hernandez placement per ED notes. Patient admitted to medicine for ileus, UTI, urinary retention. Patient was admitted in spring of this year for UTI, she followed up outpatient with [address] for her recurrent UTIs over the past year. She was bladder scanned for 166ml, but  unable to urinate to give urine sample. She was to follow up in 1 month. She has history of diabetes and constipation. Unable to tell me when last BM was, none charted since admission. Currently denies any pain, fevers, chills. Patient's old records, notes and chart reviewed and summarized above.     Past Medical History:    Past Medical History:   Diagnosis Date    Arthritis     knees    Bronchitis x1 long ago    Chronic obstructive pulmonary disease ([facility] 9/12/2017    Colon polyps     Controlled type 2 diabetes mellitus without complication, without long-term current use of insulin ([facility] 6/29/2021    Dental neglect     poor dentation. Missing teeth.  No loose teeth    Depression     Environmental allergies     GERD (gastroesophageal reflux disease)     Hyperlipidemia     Hypertension     Obesity     Onychomycosis     Poor historian     RBBB     Treadmill stress test negative for angina pectoris 2009    Wears glasses     reading only       Past Surgical History:    Past Surgical History:   Procedure Laterality Date    CHOLECYSTECTOMY      COLONOSCOPY  6/2013    one hyperplastic polyp    DOPPLER ECHOCARDIOGRAPHY      cardiac    TUBAL LIGATION         Medications:      Current Facility-Administered Medications:     atorvastatin (LIPITOR) tablet 20 mg, 20 mg, Oral, Daily, Dain Runner, APRN - CNP    haloperidol (HALDOL) tablet 5 mg, 5 mg, Oral, BID, Dain Runner, APRN - CNP    traZODone (DESYREL) tablet 100 mg, 100 mg, Oral, Nightly, Dain Runner, APRN - CNP    glucose chewable tablet 16 g, 4 tablet, Oral, PRN, Dain Runner, APRN - CNP    dextrose bolus 10% 125 mL, 125 mL, IntraVENous, PRN, Last Rate: 937.5 mL/hr at 08/04/22 0657, 125 mL at 08/04/22 0657 **OR** dextrose bolus 10% 250 mL, 250 mL, IntraVENous, PRN, Dain Runner, APRN - CNP    glucagon (rDNA) injection 1 mg, 1 mg, SubCUTAneous, PRN, Dain Runner, APRN - CNP    dextrose 10 % infusion, , IntraVENous, Continuous PRN, Dain Runner, APRN - CNP    sodium chloride flush 0.9 % injection 5-40 mL, 5-40 mL, IntraVENous, 2 times per day, Robinson Runbreanne, APRN - CNP    sodium chloride flush 0.9 % injection 5-40 mL, 5-40 mL, IntraVENous, PRN, Dain Runner, APRN - CNP    0.9 % sodium chloride infusion, , IntraVENous, PRN, Dain Runner, APRN - CNP    enoxaparin (LOVENOX) injection 40 mg, 40 mg, SubCUTAneous, Daily, PRABHAKAR Brady CNP    ondansetron (ZOFRAN-ODT) disintegrating tablet 4 mg, 4 mg, Oral, Q8H PRN **OR** ondansetron (ZOFRAN) injection 4 mg, 4 mg, IntraVENous, Q6H PRN, PRABHAKAR Brady CNP    acetaminophen (TYLENOL) tablet 650 mg, 650 mg, Oral, Q6H PRN **OR** acetaminophen (TYLENOL) suppository 650 mg, 650 mg, Rectal, Q6H PRN, PRABHAKAR Brady CNP    morphine (PF) injection 2 mg, 2 mg, IntraVENous, Q2H PRN **OR** morphine injection 4 mg, 4 mg, IntraVENous, Q2H PRN, PRABHAKAR Brady CNP    0.9 % sodium chloride infusion, , IntraVENous, Continuous, PRABHAKAR Brady CNP, Last Rate: 75 mL/hr at 22 032, New Bag at 22 032    insulin lispro (HUMALOG) injection vial 0-4 Units, 0-4 Units, SubCUTAneous, Q4H, PRABHAKAR Brady CNP    cefTRIAXone (ROCEPHIN) 1,000 mg in sterile water 10 mL IV syringe, 1,000 mg, IntraVENous, Q24H, PRABHAKAR Brady CNP    polyethylene glycol (GLYCOLAX) packet 17 g, 17 g, Oral, BID, Erum Chapin MD    tamsulosin (FLOMAX) capsule 0.4 mg, 0.4 mg, Oral, Daily, Erum Chapin MD    Allergies:   No Known Allergies    Social History:   Social History     Socioeconomic History    Marital status: Single     Spouse name: Not on file    Number of children: Not on file    Years of education: Not on file    Highest education level: Not on file   Occupational History    Not on file   Tobacco Use    Smoking status: Former     Packs/day: 0.50     Years: 35.00     Pack years: 17.50     Types: Cigarettes     Quit date: 2015     Years since quittin.0    Smokeless tobacco: Never   Substance and Sexual Activity    Alcohol use: No     Alcohol/week: 0.0 standard drinks    Drug use: No    Sexual activity: Not Currently   Other Topics Concern    Not on file   Social History Narrative    Not on file     Social Determinants of Health     Financial Resource Strain: Not on file   Food Insecurity: Not on file   Transportation Needs: Not on file   Physical Activity: Not on file   Stress: Not on file   Social Connections: Not on file   Intimate Partner Violence: Not on file   Housing Stability: Not on file       Family History:    Family History   Problem Relation Age of Onset    Heart Disease Mother     Cancer Father        REVIEW OF SYSTEMS:  A comprehensive 14 point review of systems was obtained. Constitutional: No fatigue  Eyes: No blurry vision  Ears, nose, mouth, throat, face: No ringing in the ears; no facial droop  Respiratory: No cough or cold  Cardiovascular: No palpitations  Gastrointestinal: No diarrhea or constipation  Genitourinary: See HPI  Integument/Skin: No rashes  Hematologic/Lymphatic: No easy bruising  Musculoskeletal: No muscle pains  Neurologic: No weakness in the extremities  Psychiatric: No depression or suicidal thoughts  Endocrine: No heat or cold intolerances  Allergic/Immunologic: No current seasonal allergies; no skin hives    Physical Exam:      This a 66 y.o. female   Vitals:    08/04/22 0800   BP: 123/64   Pulse: 56   Resp: 18   Temp: 97.9 °F (36.6 °C)   SpO2: 98%       Constitutional: Patient in no acute distress  Neuro: Alert and oriented to person place and time   Psych: Mood and affect normal  Head: Atraumatic and normocephalic  Neck: Trachea midline  Lungs: Respiratory effort normal  Cardiovascular:  Regular rhythm  Abdomen: Soft, non-tender, non-distended.  CVA tenderness none  Bladder: Non-tender and not distended  Ext: 2+ DP pulses bilaterally  Skin: No rashes or bruising present  Lymphatics: No palpable lymphadenopathy  Pelvic exam: Deferred  Rectal exam: Not indicated    Labs:  Recent Labs     08/03/22  2222   WBC 6.8   HGB 12.4   HCT 39.0   MCV 91.1        Recent Labs     08/03/22  2222      K 3.7      CO2 23   BUN 10   CREATININE 1.00*       Recent Labs     08/03/22  2224   COLORU Yellow   PHUR 6.0   WBCUA 20 TO 50   RBCUA 0 TO 2   BACTERIA FEW* SPECGRAV 1.005   LEUKOCYTESUR LARGE*   UROBILINOGEN Normal   BILIRUBINUR NEGATIVE       Culture results:  Urine culture pending    -----------------------------------------------------------------  Imaging Results:  CT ABDOMEN PELVIS W IV CONTRAST Additional Contrast? None    Result Date: 8/3/2022  EXAMINATION: CT OF THE ABDOMEN AND PELVIS WITH CONTRAST 8/3/2022 11:14 pm TECHNIQUE: CT of the abdomen and pelvis was performed with the administration of intravenous contrast. Multiplanar reformatted images are provided for review. Automated exposure control, iterative reconstruction, and/or weight based adjustment of the mA/kV was utilized to reduce the radiation dose to as low as reasonably achievable. COMPARISON: April 9, 2022 CT abdomen and pelvis HISTORY: ORDERING SYSTEM PROVIDED HISTORY: abdominal pain, constipation, nausea, vomiting TECHNOLOGIST PROVIDED HISTORY: abdominal pain, constipation, nausea, vomiting Decision Support Exception - unselect if not a suspected or confirmed emergency medical condition->Emergency Medical Condition (MA) Reason for Exam: abdominal pain, constipation, nausea, vomiting FINDINGS: Lower Chest: Clear Organs: The abdominal wall appears normal. The liver, spleen, pancreas, and adrenals appear normal.  Gallbladder not identified. Kidneys appear normal. The bladder appears normal. GI/Bowel: Air-fluid levels noted throughout the small and large bowel. Appendix normal.  Stomach normal. Pelvis: Uterus normal. Peritoneum/Retroperitoneum: The abdominal aorta and iliac arteries are normal in caliber. There is no pathologic adenopathy. . Bones/Soft Tissues: Normal     Ileus.        Assessment and Plan   Impression:  65 yo female with UTI, Ileus     problem list -   -Urinary retention - 600mL with hernandez placement  -UTI, acute on recurrent    Patient Active Problem List   Diagnosis    GERD (gastroesophageal reflux disease)    Hypertension    Hyperlipidemia    Depression    Bronchitis    HTN (hypertension)    Osteoarthritis    Osteoarthritis of right knee    Closed nondisplaced fracture of head of right radius    Chronic obstructive pulmonary disease (HCC)    Smoker    Chronic pain of left knee    Pre-diabetes    Falls frequently    Cognitive and behavioral changes    Toxic metabolic encephalopathy    Controlled type 2 diabetes mellitus without complication, without long-term current use of insulin (HCC)    Chest pain    UTI (urinary tract infection)     Plan:   -Continue hernandez at this time  -Follow up on urine culture. Continue Rocephin and adjust based on culture result. No history of MDRO UTI. -Encourage ambulation as WB status allows  -Avoid narcotics, anticholinergics or other medications contributing to retention  -Bowel regimen for regular Bms.   -Patient likely in retention secondary to infection; possibly related to her constipation as well. She may benefit from outpatient urodynamics study to evaluate lower urinary tract function  -Call for void trial when anticipating discharge  -Call with questions    Thank you for involving us in the care of [facility]. Should you have any questions, please do not hesitate to contact us at any time.     Rocio Esteves PA-C  Urology Service   9:02 AM 8/4/2022

## 2022-08-04 NOTE — PLAN OF CARE
Problem: Discharge Planning  Goal: Discharge to home or other facility with appropriate resources  8/4/2022 1905 by Linnette Orosco RN  Outcome: Progressing  Flowsheets (Taken 8/4/2022 1905)  Discharge to home or other facility with appropriate resources:   Identify barriers to discharge with patient and caregiver   Identify discharge learning needs (meds, wound care, etc)   Arrange for needed discharge resources and transportation as appropriate  8/4/2022 0808 by Shayy Parks RN  Outcome: Progressing  Flowsheets (Taken 8/4/2022 0205)  Discharge to home or other facility with appropriate resources:   Identify barriers to discharge with patient and caregiver   Identify discharge learning needs (meds, wound care, etc)   Refer to discharge planning if patient needs post-hospital services based on physician order or complex needs related to functional status, cognitive ability or social support system   Arrange for needed discharge resources and transportation as appropriate     Problem: Safety - Adult  Goal: Free from fall injury  8/4/2022 1905 by Linnette Orosco RN  Outcome: Progressing  Flowsheets (Taken 8/4/2022 1600)  Free From Fall Injury: Instruct family/caregiver on patient safety  8/4/2022 0808 by Shayy Parks RN  Outcome: Progressing     Problem: ABCDS Injury Assessment  Goal: Absence of physical injury  8/4/2022 1905 by Linnette Orosco RN  Outcome: Progressing  Flowsheets (Taken 8/4/2022 1600)  Absence of Physical Injury: Implement safety measures based on patient assessment  8/4/2022 0808 by Shayy Parks RN  Outcome: Progressing     Problem: Skin/Tissue Integrity  Goal: Absence of new skin breakdown  Description: 1. Monitor for areas of redness and/or skin breakdown  2. Assess vascular access sites hourly  3. Every 4-6 hours minimum:  Change oxygen saturation probe site  4.   Every 4-6 hours:  If on nasal continuous positive airway pressure, respiratory therapy assess nares and determine need for appliance change or resting period.   8/4/2022 1905 by Henry Strong RN  Outcome: Progressing  8/4/2022 0808 by Nathaniel Ibrahim RN  Outcome: Progressing     Problem: Chronic Conditions and Co-morbidities  Goal: Patient's chronic conditions and co-morbidity symptoms are monitored and maintained or improved  8/4/2022 1905 by Henry Strong RN  Outcome: Progressing  Flowsheets (Taken 8/4/2022 1905)  Care Plan - Patient's Chronic Conditions and Co-Morbidity Symptoms are Monitored and Maintained or Improved:   Monitor and assess patient's chronic conditions and comorbid symptoms for stability, deterioration, or improvement   Collaborate with multidisciplinary team to address chronic and comorbid conditions and prevent exacerbation or deterioration  8/4/2022 0808 by Nathaniel Ibrahim RN  Outcome: Progressing

## 2022-08-04 NOTE — ACP (ADVANCE CARE PLANNING)
Advance Care Planning     Advance Care Planning Activator (Inpatient)  Conversation Note      Date of ACP Conversation: 8/4/2022     Conversation Conducted with: Patient with Decision Making Capacity    ACP Activator: Amberly Hoover, [address] [place]:     Current Designated Health Care Decision Maker:     Primary Decision Maker: Asif Erickson Child - [phone]    Secondary Decision Maker: Chang Orozco - Child - [phone]  Click here to complete Healthcare Decision Makers including section of the Healthcare Decision Maker Relationship (ie \"Primary\")  Today we documented Decision Maker(s) consistent with Legal Next of Kin hierarchy. Care Preferences    Ventilation: \"If you were in your present state of health and suddenly became very ill and were unable to breathe on your own, what would your preference be about the use of a ventilator (breathing machine) if it were available to you? \"      Would the patient desire the use of ventilator (breathing machine)?: yes    \"If your health worsens and it becomes clear that your chance of recovery is unlikely, what would your preference be about the use of a ventilator (breathing machine) if it were available to you? \"     Would the patient desire the use of ventilator (breathing machine)?: Yes      Resuscitation  \"CPR works best to restart the heart when there is a sudden event, like a heart attack, in someone who is otherwise healthy. Unfortunately, CPR does not typically restart the heart for people who have serious health conditions or who are very sick. \"    \"In the event your heart stopped as a result of an underlying serious health condition, would you want attempts to be made to restart your heart (answer \"yes\" for attempt to resuscitate) or would you prefer a natural death (answer \"no\" for do not attempt to resuscitate)? \" yes       [] Yes   [x] No   Educated Patient / Rosetta Butler regarding differences between Advance Directives and portable DNR orders.     Length of ACP Conversation in minutes:      Conversation Outcomes:  [x] ACP discussion completed  [] Existing advance directive reviewed with patient; no changes to patient's previously recorded wishes  [] New Advance Directive completed  [] Portable Do Not Rescitate prepared for Provider review and signature  [] POLST/POST/MOLST/MOST prepared for Provider review and signature      Follow-up plan:    [] Schedule follow-up conversation to continue planning  [] Referred individual to Provider for additional questions/concerns   [] Advised patient/agent/surrogate to review completed ACP document and update if needed with changes in condition, patient preferences or care setting    [] This note routed to one or more involved healthcare providers

## 2022-08-04 NOTE — ED TRIAGE NOTES
Pt arrived to Ed with c/o urinary frequency and dizziness. Pt states she was started on Bactrim for Uti but stopped due to abdominal pain and vomiting. Pt states she is not been feeling well for quite some time. Pt A&O x 3, does not appear in acute distress, RR even and unlabored, resting comfortably on stretcher with eyes open and call light in reach. Vital signs obtained, medical hx and allergies reviewed with pt. Initial assessment performed by physician, Shericeet Finder will carry out initial orders/tasks and reassess pt.

## 2022-08-05 VITALS
BODY MASS INDEX: 28.1 KG/M2 | HEIGHT: 65 IN | WEIGHT: 168.65 LBS | DIASTOLIC BLOOD PRESSURE: 69 MMHG | SYSTOLIC BLOOD PRESSURE: 125 MMHG | RESPIRATION RATE: 18 BRPM | TEMPERATURE: 97.3 F | HEART RATE: 73 BPM | OXYGEN SATURATION: 97 %

## 2022-08-05 PROBLEM — D64.9 NORMOCYTIC NORMOCHROMIC ANEMIA: Status: ACTIVE | Noted: 2022-08-05

## 2022-08-05 LAB
ABSOLUTE EOS #: 0.13 K/UL (ref 0–0.44)
ABSOLUTE IMMATURE GRANULOCYTE: <0.03 K/UL (ref 0–0.3)
ABSOLUTE LYMPH #: 1.72 K/UL (ref 1.1–3.7)
ABSOLUTE MONO #: 0.43 K/UL (ref 0.1–1.2)
ANION GAP SERPL CALCULATED.3IONS-SCNC: 6 MMOL/L (ref 9–17)
BASOPHILS # BLD: 1 % (ref 0–2)
BASOPHILS ABSOLUTE: 0.03 K/UL (ref 0–0.2)
BUN BLDV-MCNC: 7 MG/DL (ref 8–23)
CALCIUM SERPL-MCNC: 8.5 MG/DL (ref 8.6–10.4)
CHLORIDE BLD-SCNC: 110 MMOL/L (ref 98–107)
CO2: 24 MMOL/L (ref 20–31)
CREAT SERPL-MCNC: 0.72 MG/DL (ref 0.5–0.9)
CULTURE: NO GROWTH
EKG ATRIAL RATE: 69 BPM
EKG P AXIS: 61 DEGREES
EKG P-R INTERVAL: 116 MS
EKG Q-T INTERVAL: 472 MS
EKG QRS DURATION: 118 MS
EKG QTC CALCULATION (BAZETT): 505 MS
EKG R AXIS: -18 DEGREES
EKG T AXIS: 5 DEGREES
EKG VENTRICULAR RATE: 69 BPM
EOSINOPHILS RELATIVE PERCENT: 3 % (ref 1–4)
GFR AFRICAN AMERICAN: >60 ML/MIN
GFR NON-AFRICAN AMERICAN: >60 ML/MIN
GFR SERPL CREATININE-BSD FRML MDRD: ABNORMAL ML/MIN/{1.73_M2}
GLUCOSE BLD-MCNC: 129 MG/DL (ref 65–105)
GLUCOSE BLD-MCNC: 72 MG/DL (ref 65–105)
GLUCOSE BLD-MCNC: 75 MG/DL (ref 70–99)
GLUCOSE BLD-MCNC: 87 MG/DL (ref 65–105)
GLUCOSE BLD-MCNC: 88 MG/DL (ref 65–105)
HCT VFR BLD CALC: 35.2 % (ref 36.3–47.1)
HEMOGLOBIN: 10.6 G/DL (ref 11.9–15.1)
IMMATURE GRANULOCYTES: 0 %
LYMPHOCYTES # BLD: 36 % (ref 24–43)
MCH RBC QN AUTO: 28.9 PG (ref 25.2–33.5)
MCHC RBC AUTO-ENTMCNC: 30.1 G/DL (ref 28.4–34.8)
MCV RBC AUTO: 95.9 FL (ref 82.6–102.9)
MONOCYTES # BLD: 9 % (ref 3–12)
NRBC AUTOMATED: 0 PER 100 WBC
PDW BLD-RTO: 15.6 % (ref 11.8–14.4)
PLATELET # BLD: 205 K/UL (ref 138–453)
PMV BLD AUTO: 9.5 FL (ref 8.1–13.5)
POTASSIUM SERPL-SCNC: 3.8 MMOL/L (ref 3.7–5.3)
RBC # BLD: 3.67 M/UL (ref 3.95–5.11)
RBC # BLD: ABNORMAL 10*6/UL
SEG NEUTROPHILS: 51 % (ref 36–65)
SEGMENTED NEUTROPHILS ABSOLUTE COUNT: 2.42 K/UL (ref 1.5–8.1)
SODIUM BLD-SCNC: 140 MMOL/L (ref 135–144)
SPECIMEN DESCRIPTION: NORMAL
WBC # BLD: 4.7 K/UL (ref 3.5–11.3)

## 2022-08-05 PROCEDURE — 99239 HOSP IP/OBS DSCHRG MGMT >30: CPT | Performed by: INTERNAL MEDICINE

## 2022-08-05 PROCEDURE — 85025 COMPLETE CBC W/AUTO DIFF WBC: CPT

## 2022-08-05 PROCEDURE — 93010 ELECTROCARDIOGRAM REPORT: CPT | Performed by: INTERNAL MEDICINE

## 2022-08-05 PROCEDURE — 6360000002 HC RX W HCPCS: Performed by: NURSE PRACTITIONER

## 2022-08-05 PROCEDURE — 36415 COLL VENOUS BLD VENIPUNCTURE: CPT

## 2022-08-05 PROCEDURE — G0378 HOSPITAL OBSERVATION PER HR: HCPCS

## 2022-08-05 PROCEDURE — 96372 THER/PROPH/DIAG INJ SC/IM: CPT

## 2022-08-05 PROCEDURE — 82947 ASSAY GLUCOSE BLOOD QUANT: CPT

## 2022-08-05 PROCEDURE — 6370000000 HC RX 637 (ALT 250 FOR IP): Performed by: FAMILY MEDICINE

## 2022-08-05 PROCEDURE — 80048 BASIC METABOLIC PNL TOTAL CA: CPT

## 2022-08-05 PROCEDURE — 6370000000 HC RX 637 (ALT 250 FOR IP): Performed by: NURSE PRACTITIONER

## 2022-08-05 RX ORDER — TAMSULOSIN HYDROCHLORIDE 0.4 MG/1
0.4 CAPSULE ORAL DAILY
Qty: 30 CAPSULE | Refills: 3 | Status: SHIPPED | OUTPATIENT
Start: 2022-08-06

## 2022-08-05 RX ORDER — CEFADROXIL 500 MG/1
500 CAPSULE ORAL 2 TIMES DAILY
Qty: 6 CAPSULE | Refills: 0 | Status: SHIPPED | OUTPATIENT
Start: 2022-08-05 | End: 2022-08-08

## 2022-08-05 RX ORDER — BISACODYL 10 MG
10 SUPPOSITORY, RECTAL RECTAL DAILY
Status: DISCONTINUED | OUTPATIENT
Start: 2022-08-05 | End: 2022-08-05 | Stop reason: HOSPADM

## 2022-08-05 RX ORDER — POLYETHYLENE GLYCOL 3350 17 G/17G
17 POWDER, FOR SOLUTION ORAL DAILY PRN
Qty: 60 EACH | Refills: 0 | Status: SHIPPED | OUTPATIENT
Start: 2022-08-05 | End: 2022-09-04

## 2022-08-05 RX ADMIN — ENOXAPARIN SODIUM 40 MG: 100 INJECTION SUBCUTANEOUS at 09:20

## 2022-08-05 RX ADMIN — HALOPERIDOL 5 MG: 5 TABLET ORAL at 09:20

## 2022-08-05 RX ADMIN — ATORVASTATIN CALCIUM 20 MG: 20 TABLET, FILM COATED ORAL at 09:20

## 2022-08-05 RX ADMIN — TAMSULOSIN HYDROCHLORIDE 0.4 MG: 0.4 CAPSULE ORAL at 09:20

## 2022-08-05 NOTE — DISCHARGE INSTRUCTIONS
-Please see your primary care physician within 3 days of discharge for hospital follow-up  -Follow-up with urology on an outpatient basis for urodynamic testing, maintain Mays catheter until you are able to see them in office  -Continue taking MiraLAX to help with constipation, stop taking if you are developing diarrhea  -Return to the hospital if you develop any chest pain, shortness of breath conical to breathing, nausea, vomiting, diarrhea or nausea or having abdominal distention  -Complete antibiotics unless otherwise directed by your primary care doctor

## 2022-08-05 NOTE — PROGRESS NOTES
[facility]  Office: [address], DO, Elenaroberto carlos Romano, DO, Babsmindy Mackey, DO, Luis Euceda Blood, DO, Tali Valentine MD, Saman Hernandez MD, Neema Steward MD, Tatyana Coello MD,  Dotty Awad MD, Fidel Del Cid MD, Huma Ridley, DO, Binh Guerrero MD,  Emily Farris MD, Moncho Dugan MD, Leonid Lyon, DO, Cash Parks MD, Radames Greenfield MD, Adilene Schaffer MD, Noris Choudhury DO, Armida Burden MD, Rei Pineda MD, Alferd Felty, CNP,  Lisandra Pelt, CNP, Ethyl Mercury, CNP, Ros Brush, CNP, Justin Clay PAAnnieC, Gabby Carbone, DNP, Kendell Allison, CNP, Gibran De La Torre, CNP, Mauricio Brittle, CNP, Kortney Fisher, CNP, Bakari Stanley, CNP, Corrie Orourke, CNS, Eric Simon, [facility], Jose Alejandro Banuelos, CNP, Daisy Lennon, CNP, Sandra Weiner, CNP           [address]    Progress Note    8/5/2022    8:30 AM    Name:   Kathie Rodas  MRN:     [id number]     Acct:      [de-identified]   Room:   [address] [place] [address] Day:  1  Admit Date:  8/3/2022  9:54 PM    PCP:   Anaya Petersen  Code Status:  Full Code    Subjective:     C/C:   Chief Complaint   Patient presents with    Dizziness    Emesis    Urinary Frequency     Interval History Status: First time seeing patient. .     She sitting in bed, comfortable appearing, no acute distress. No fevers, chills, nausea, vomiting, diarrhea. Patient did 2 bowel movements this morning and denies any abdominal pain. Brief History: This is a 69-year-old female who initially presented to the hospital with complaints of fatigue, weakness, and inability to void. KUB did show scattered, relatively proportionate bowel gas in the small and large bowel suggesting mild ileus. Patient was found to have urinary retention along with mild dysuria so she was started treatment for urinary tract infection.   Bowel ileus was treated conservatively with bowel rest and fluids with complete resolution prior to discharge. Patient's urinary tension was thought to be secondary to the patient, medication. She was seen by urology who recommended patient be discharged with Mays catheter and follow-up outpatient for urodynamic testing. This was discussed with patient who expressed understanding and was agreeable with plan. Currently, she is stable for discharge. She will need to follow-up with her primary care physician within 1 week of discharge for reevaluation  Review of Systems:     Constitutional:  negative for chills, fevers, sweats  Respiratory:  negative for cough, dyspnea on exertion, shortness of breath, wheezing  Cardiovascular:  negative for chest pain, chest pressure/discomfort, lower extremity edema, palpitations  Gastrointestinal:  negative for abdominal pain, constipation, diarrhea, nausea, vomiting  Neurological:  negative for dizziness, headache    Medications:      Allergies:  No Known Allergies    Current Meds:   Scheduled Meds:    bisacodyl  10 mg Rectal Daily    atorvastatin  20 mg Oral Daily    haloperidol  5 mg Oral BID    traZODone  100 mg Oral Nightly    sodium chloride flush  5-40 mL IntraVENous 2 times per day    enoxaparin  40 mg SubCUTAneous Daily    insulin lispro  0-4 Units SubCUTAneous Q4H    cefTRIAXone (ROCEPHIN) IV  1,000 mg IntraVENous Q24H    polyethylene glycol  17 g Oral BID    tamsulosin  0.4 mg Oral Daily     Continuous Infusions:    dextrose      sodium chloride      sodium chloride 50 mL/hr at 08/04/22 2230     PRN Meds: glucose, dextrose bolus **OR** dextrose bolus, glucagon (rDNA), dextrose, sodium chloride flush, sodium chloride, [DISCONTINUED] ondansetron **OR** ondansetron, acetaminophen **OR** acetaminophen, morphine **OR** [DISCONTINUED] morphine    Data:     Past Medical History:   has a past medical history of Arthritis, Bronchitis, Chronic obstructive pulmonary disease ([facility] [address] Colon polyps, Controlled type 2 diabetes mellitus without complication, without long-term current use of insulin ([address] Dental neglect, Depression, Environmental allergies, GERD (gastroesophageal reflux disease), Hyperlipidemia, Hypertension, Obesity, Onychomycosis, Poor historian, RBBB, Treadmill stress test negative for angina pectoris, and Wears glasses. Social History:   reports that she quit smoking about 7 years ago. Her smoking use included cigarettes. She has a 17.50 pack-year smoking history. She has never used smokeless tobacco. She reports that she does not drink alcohol and does not use drugs. Family History:   Family History   Problem Relation Age of Onset    Heart Disease Mother     Cancer Father        Vitals:  BP (!) 109/53   Pulse 68   Temp 97.3 °F (36.3 °C) (Oral)   Resp 20   Ht 5' 5\" (1.651 m)   Wt 168 lb 10.4 oz (76.5 kg)   SpO2 99%   BMI 28.07 kg/m²   Temp (24hrs), Av.2 °F (36.8 °C), Min:97.3 °F (36.3 °C), Max:99 °F (37.2 °C)    Recent Labs     22  1833 22  2153 22  0626 22  0805   POCGLU 192* 72 87 88       I/O (24Hr):     Intake/Output Summary (Last 24 hours) at 2022 0830  Last data filed at 2022 1655  Gross per 24 hour   Intake --   Output 1000 ml   Net -1000 ml       Labs:  Hematology:  Recent Labs     22  1216 22  0557   WBC 6.8 4.8 4.7   RBC 4.28 3.94* 3.67*   HGB 12.4 11.5* 10.6*   HCT 39.0 37.1 35.2*   MCV 91.1 94.2 95.9   MCH 29.0 29.2 28.9   MCHC 31.8 31.0 30.1   RDW 15.5* 15.4* 15.6*    233 205   MPV 9.2 9.5 9.5     Chemistry:  Recent Labs     22  0016 22  1216 22  0557     --  139 140   K 3.7  --  3.8 3.8     --  106 110*   CO2 23  --  24 24   GLUCOSE 86  --  95 75   BUN 10  --  7* 7*   CREATININE 1.00*  --  0.76 0.72   MG 2.0  --   --   --    ANIONGAP 12  --  9 6*   LABGLOM 55*  --  >60 >60   GFRAA >60  --  >60 >60   CALCIUM 9.4  --  8.9 8.5*   PROBNP 175  --   --   --    TROPHS 9 7  --   --    LACTACIDWB  --   --  0.8  -- Recent Labs     08/03/22  2222 08/04/22  0216 08/04/22  0217 08/04/22  1210 08/04/22  1607 08/04/22  1833 08/04/22  2153 08/05/22  0626 08/05/22  0805   PROT 7.2  --   --   --   --   --   --   --   --    LABALBU 4.3  --   --   --   --   --   --   --   --    LABA1C  --  5.3  --   --   --   --   --   --   --    TSH 1.42  --   --   --   --   --   --   --   --    AST 17  --   --   --   --   --   --   --   --    ALT 10  --   --   --   --   --   --   --   --    ALKPHOS 117*  --   --   --   --   --   --   --   --    BILITOT 0.21*  --   --   --   --   --   --   --   --    LIPASE 21  --   --   --   --   --   --   --   --    POCGLU  --   --    < > 100 99 192* 72 87 88    < > = values in this interval not displayed. ABG:No results found for: POCPH, PHART, PH, POCPCO2, UEO1GHJ, PCO2, POCPO2, PO2ART, PO2, POCHCO3, TXK6WWI, HCO3, NBEA, PBEA, BEART, BE, THGBART, THB, UHE7IME, VIEH1VGD, K7KJZCVW, O2SAT, FIO2  Lab Results   Component Value Date/Time    SPECIAL R FA 10 ML 04/27/2022 09:17 PM    SPECIAL L HAND 7 ML 04/27/2022 09:17 PM     Lab Results   Component Value Date/Time    CULTURE NO GROWTH 08/03/2022 09:38 AM       Radiology:  XR ABDOMEN (KUB) (SINGLE AP VIEW)    Result Date: 8/4/2022  Scattered, relatively proportionate, bowel gas in the small and large bowel, suggesting mild ileus, similar to previous radiographs dated 06/09/2022. CT ABDOMEN PELVIS W IV CONTRAST Additional Contrast? None    Result Date: 8/3/2022  Ileus. XR CHEST PORTABLE    Result Date: 8/3/2022  No acute cardiopulmonary process. May have underlying COPD.        Physical Examination:        General appearance:  alert, cooperative and no distress  Mental Status:  oriented to person, place and time and normal affect  Lungs:  clear to auscultation bilaterally, normal effort  Heart:  regular rate and rhythm, no murmur  Abdomen:  soft, nontender, nondistended, normal bowel sounds, no masses, hepatomegaly, splenomegaly  Extremities:  no edema, redness, tenderness in the calves  Skin:  no gross lesions, rashes, induration    Assessment:        Hospital Problems             Last Modified POA    * (Principal) Ileus ([address] 8/5/2022 Yes    UTI (urinary tract infection) 8/5/2022 Yes    Urinary retention 8/4/2022 Yes    Normocytic normochromic anemia 8/5/2022 Yes    BMI 28.0-28.9,adult 8/5/2022 Yes    GERD (gastroesophageal reflux disease) 8/5/2022 Yes    Hyperlipidemia 8/5/2022 Yes    Depression 8/5/2022 Yes    HLD (hyperlipidemia) 8/5/2022 Yes    Osteoarthritis 8/5/2022 Yes    Chronic obstructive pulmonary disease ([address] 8/5/2022 Yes    Cognitive and behavioral changes 8/5/2022 Yes        Plan:        Ileus: She did have two BM today. Stop morphine,  continue miralax prn. Advance diet then can be discharged. Cystitis: Urine culture without growth but patient was already started on antibiotics prior. Swill stop rocephin and switch to PO to complete 5 days of treatment. Acute urinary retention: Continue treatment for constipation. Hold narcotics, anticholinergics. Discussed with Urology, would like to maintain hernandez catheter on discharge and have patient follow up in clinic for urodynamics. Continue flomax. COPD without exacerbation: continue nebulizer. Goal oxygen 88-92%. Normocytic normochromic anemia: Hgb stable.  Monitor, transfuse prn for symptomatic anemia or hgb <7   Overweight BMI 28: recommend weight loss and lifestyle modification   Dyslipidemia: continue Lipitor  Labs, imaging, ECG reviewed  PTOT      Dispo: plan for discharge home today   Eulalia Awad DO  8/5/2022  8:30 AM

## 2022-08-05 NOTE — PLAN OF CARE
Problem: Discharge Planning  Goal: Discharge to home or other facility with appropriate resources  8/5/2022 1418 by Uzma Patterson RN  Outcome: Completed  8/5/2022 1014 by Uzma Patterson RN  Outcome: Adequate for Discharge  Flowsheets  Taken 8/5/2022 0800 by Uzma Patterson RN  Discharge to home or other facility with appropriate resources:   Identify barriers to discharge with patient and caregiver   Arrange for needed discharge resources and transportation as appropriate   Identify discharge learning needs (meds, wound care, etc)  Taken 8/4/2022 2218 by Landon Treadwell RN  Discharge to home or other facility with appropriate resources: Identify barriers to discharge with patient and caregiver     Problem: Safety - Adult  Goal: Free from fall injury  8/5/2022 1418 by Uzma Patterson RN  Outcome: Completed  8/5/2022 1014 by Uzma Patterson RN  Outcome: Adequate for Discharge  Flowsheets (Taken 8/4/2022 2218 by Landon Treadwell RN)  Free From Fall Injury: Instruct family/caregiver on patient safety     Problem: ABCDS Injury Assessment  Goal: Absence of physical injury  8/5/2022 1418 by Uzma Patterson RN  Outcome: Completed  8/5/2022 1014 by Uzma Patterson RN  Outcome: Adequate for Discharge  Flowsheets (Taken 8/4/2022 1600)  Absence of Physical Injury: Implement safety measures based on patient assessment     Problem: Skin/Tissue Integrity  Goal: Absence of new skin breakdown  Description: 1. Monitor for areas of redness and/or skin breakdown  2. Assess vascular access sites hourly  3. Every 4-6 hours minimum:  Change oxygen saturation probe site  4. Every 4-6 hours:  If on nasal continuous positive airway pressure, respiratory therapy assess nares and determine need for appliance change or resting period.   8/5/2022 1418 by Uzma Patterson RN  Outcome: Completed  8/5/2022 1014 by Uzma Patterson RN  Outcome: Adequate for Discharge     Problem: Chronic Conditions and Co-morbidities  Goal: Patient's chronic conditions and co-morbidity symptoms are monitored and maintained or improved  8/5/2022 1418 by Madeleine Carney RN  Outcome: Completed  8/5/2022 1014 by Madeleine Carney RN  Outcome: Adequate for Discharge  Flowsheets  Taken 8/5/2022 0800 by Madeleine Carney RN  Care Plan - Patient's Chronic Conditions and Co-Morbidity Symptoms are Monitored and Maintained or Improved:   Monitor and assess patient's chronic conditions and comorbid symptoms for stability, deterioration, or improvement   Collaborate with multidisciplinary team to address chronic and comorbid conditions and prevent exacerbation or deterioration  Taken 8/4/2022 2218 by Azeem Jimenez 34 - Patient's Chronic Conditions and Co-Morbidity Symptoms are Monitored and Maintained or Improved: Monitor and assess patient's chronic conditions and comorbid symptoms for stability, deterioration, or improvement

## 2022-08-05 NOTE — ED PROVIDER NOTES
[facility]   Emergency Department  Faculty Attestation       I performed a history and physical examination of the patient and discussed management with the resident. I reviewed the residents note and agree with the documented findings including all diagnostic interpretations and plan of care. Any areas of disagreement are noted on the chart. I was personally present for the key portions of any procedures. I have documented in the chart those procedures where I was not present during the key portions. I have reviewed the emergency nurses triage note. I agree with the chief complaint, past medical history, past surgical history, allergies, medications, social and family history as documented unless otherwise noted below. Documentation of the HPI, Physical Exam and Medical Decision Making performed by brooklynibann marie is based on my personal performance of the HPI, PE and MDM. For Physician Assistant/ Nurse Practitioner cases/documentation I have personally evaluated this patient and have completed at least one if not all key elements of the E/M (history, physical exam, and MDM). Additional findings are as noted. Pertinent Comments     Primary Care Physician: Rafael Garza      ED Triage Vitals [07/26/22 2256]   BP Temp Temp Source Heart Rate Resp SpO2 Height Weight   (!) 147/85 97.2 °F (36.2 °C) Oral 88 16 97 % 5' 5\" (1.651 m) 180 lb (81.6 kg)        History/Physical:   This is a 66 y.o. female who presents to the Emergency Department with complaint of feeling generally unwell. States that she hurts \"everyhwere\"  including abdomen and chest pain. No fevers. No urinary symptoms. I examined patient after symptomatic treatment and she feels improved. Alert and oriented in no distress. NC/AT with moist mucus membranes. Heart sounds regular and lungs clear to ausculation. Abdomen is soft and nontender. Moving all extremities with no deficit    MDM/Plan:   Generalized malaise.    Basic labs and cardiac

## 2022-08-05 NOTE — PROGRESS NOTES
CLINICAL PHARMACY NOTE: MEDS TO BEDS    Total # of Prescriptions Filled: 3   The following medications were delivered to the patient:  Tamsulosin 0.4 mg cap  Cefadroxil 500 mg cap  Clearlax 17 gm powder    Additional Documentation: Medications delivered to the patient in [id number] @ 1:32 pm no copay.

## 2022-08-05 NOTE — DISCHARGE SUMMARY
[facility]  Office: [address], DO, Matthew Prophet, DO, Larissa Crowe, DO, Severino Miles, DO, Kevyn Maya MD, China Corbett MD, Nicolasa Watson MD, Arturo Garcia MD,  Smiley Mo MD, Marlon Natarajan MD, Olivier Natarajan, DO, Kayli Greenberg MD,  Donna Law MD, Goldie Pulliam MD, Anni Arita, DO, Shana Souza MD, James Herndon MD, Augie Menon MD, Cely Maguire DO, Tripp Sotelo MD, Santa Holden MD, Valerio Garnica, CNP,  Cinthya Buck, CNP, [address], CNP, Kortney Casanova, CNP, German Goldstein PA-C, Nanci Daniel, [facility], Carmen Clark, CNP, Mirta Pro, CNP, Sabrina Armando, CNP, Erinn Palma, CNP, Olaf Quintana, CNP, Jermaine Merchant, CNS, Daniel Fagan, [facility], Anabel Stinson, CNP, Jody Jaquez, CNP, Samia Samayoa, [address] [place] [address]    Discharge Summary     Patient ID: Lisa Gaitan  :  1950   MRN: [id number]     ACCOUNT:  [de-identified]   Patient's PCP: Pati December  Admit Date: 8/3/2022   Discharge Date: 2022     Length of Stay: 1  Code Status:  Full Code  Admitting Physician: Leann Chavez DO  Discharge Physician: Leann Chavez DO     Active Discharge Diagnoses:     Hospital Problem Lists:  Principal Problem:    Ileus [facility])  Active Problems:    UTI (urinary tract infection)    Urinary retention    Normocytic normochromic anemia    BMI 28.0-28.9,adult    GERD (gastroesophageal reflux disease)    Hyperlipidemia    Depression    HLD (hyperlipidemia)    Osteoarthritis    Chronic obstructive pulmonary disease (HCC)    Cognitive and behavioral changes  Resolved Problems:    * No resolved hospital problems. *      Admission Condition:  stable     Discharged Condition: stable    Hospital Stay:     Hospital Course: Lisa Gaitan is [id number] female who initially presented to the hospital with complaints of fatigue, weakness, and inability to void. KUB did show scattered, relatively proportionate bowel gas in the small and large bowel suggesting mild ileus. Patient was found to have urinary retention along with mild dysuria so she was started treatment for urinary tract infection. Bowel ileus was treated conservatively with bowel rest and fluids with complete resolution prior to discharge. Patient's urinary tension was thought to be secondary to the patient, medication. She was seen by urology who recommended patient be discharged with Mays catheter and follow-up outpatient for urodynamic testing. This was discussed with patient who expressed understanding and was agreeable with plan. Currently, she is stable for discharge.   She will need to follow-up with her primary care physician within 1 week of discharge for reevaluation     Significant therapeutic interventions: see above    Significant Diagnostic Studies:   Labs / Micro:  CBC:   Lab Results   Component Value Date/Time    WBC 4.7 08/05/2022 05:57 AM    RBC 3.67 08/05/2022 05:57 AM    RBC 4.60 04/24/2012 11:26 AM    HGB 10.6 08/05/2022 05:57 AM    HCT 35.2 08/05/2022 05:57 AM    MCV 95.9 08/05/2022 05:57 AM    MCH 28.9 08/05/2022 05:57 AM    MCHC 30.1 08/05/2022 05:57 AM    RDW 15.6 08/05/2022 05:57 AM     08/05/2022 05:57 AM     04/24/2012 11:26 AM     BMP:    Lab Results   Component Value Date/Time    GLUCOSE 75 08/05/2022 05:57 AM    GLUCOSE 87 04/24/2012 11:26 AM     08/05/2022 05:57 AM    K 3.8 08/05/2022 05:57 AM     08/05/2022 05:57 AM    CO2 24 08/05/2022 05:57 AM    ANIONGAP 6 08/05/2022 05:57 AM    BUN 7 08/05/2022 05:57 AM    CREATININE 0.72 08/05/2022 05:57 AM    BUNCRER NOT REPORTED 12/13/2021 03:29 PM    CALCIUM 8.5 08/05/2022 05:57 AM    LABGLOM >60 08/05/2022 05:57 AM    GFRAA >60 08/05/2022 05:57 AM    GFR      08/05/2022 05:57 AM     HFP:    Lab Results   Component Value Date/Time    PROT 7.2 08/03/2022 10:22 PM        Radiology:  XR ABDOMEN (KUB) (SINGLE AP VIEW)    Result Date: 8/4/2022  Scattered, relatively proportionate, bowel gas in the small and large bowel, suggesting mild ileus, similar to previous radiographs dated 06/09/2022. CT ABDOMEN PELVIS W IV CONTRAST Additional Contrast? None    Result Date: 8/3/2022  Ileus. XR CHEST PORTABLE    Result Date: 8/3/2022  No acute cardiopulmonary process. May have underlying COPD. Consultations:    Consults:     Final Specialist Recommendations/Findings:   IP CONSULT TO HOSPITALIST  IP CONSULT TO UROLOGY      The patient was seen and examined on day of discharge and this discharge summary is in conjunction with any daily progress note from day of discharge. Discharge plan:     Disposition: Home    Physician Follow Up:     Manuel Khan  [address] [facility]  [phone]    Schedule an appointment as soon as possible for a visit in 3 day(s)  Follow up after hospitalization    Edward Robles MD  [address] [facility] [address] [place] [address]  [phone]    Schedule an appointment as soon as possible for a visit  Urinary retention    Edward Robles MD  [address] [facility], [address] [facility]  [phone]           Requiring Further Evaluation/Follow Up POST HOSPITALIZATION/Incidental Findings:     -Please see your primary care physician within 3 days of discharge for hospital follow-up  -Follow-up with urology on an outpatient basis for urodynamic testing, maintain Mays catheter until you are able to see them in office  -Continue taking MiraLAX to help with constipation, stop taking if you are developing diarrhea  -Return to the hospital if you develop any chest pain, shortness of breath conical to breathing, nausea, vomiting, diarrhea or nausea or having abdominal distention  -Complete antibiotics unless otherwise directed by your primary care doctor  Diet: regular diet    Activity: As tolerated    Instructions to Patient: see above    Discharge Medications: Medication List        START taking these medications      cefadroxil 500 MG capsule  Commonly known as: DURICEF  Take 1 capsule by mouth in the morning and 1 capsule before bedtime. Do all this for 3 days. polyethylene glycol 17 g packet  Commonly known as: GLYCOLAX  Take 17 g by mouth daily as needed for Constipation     tamsulosin 0.4 MG capsule  Commonly known as: FLOMAX  Take 1 capsule by mouth in the morning.   Start taking on: August 6, 2022            CONTINUE taking these medications      ACE Arm Sling Misc  Apply 1 Units topically daily     acetaminophen 500 MG tablet  Commonly known as: TYLENOL  Take 2 tablets by mouth 4 times daily as needed for Pain     albuterol sulfate  (90 Base) MCG/ACT inhaler  Commonly known as: PROVENTIL;VENTOLIN;PROAIR     atorvastatin 20 MG tablet  Commonly known as: Lipitor  Take 1 tablet by mouth daily     ciclopirox 0.77 % cream  Commonly known as: LOPROX     haloperidol 5 MG tablet  Commonly known as: HALDOL     ondansetron 4 MG disintegrating tablet  Commonly known as: Zofran ODT  Take 1 tablet by mouth every 8 hours as needed for Nausea     tiotropium 1.25 MCG/ACT Aers inhaler  Commonly known as: Spiriva Respimat  Inhale 2 puffs into the lungs daily     traZODone 100 MG tablet  Commonly known as: DESYREL     trihexyphenidyl 2 MG tablet  Commonly known as: ARTANE            STOP taking these medications      ibuprofen 800 MG tablet  Commonly known as: IBU     Paxlovid 10 x 150 MG & 10 x 100MG  Generic drug: nirmatrelvir & ritonavir               Where to Get Your Medications        These medications were sent to [facility] [address]  [address] [facility], [address] [address]      Phone: [phone]   cefadroxil 500 MG capsule  polyethylene glycol 17 g packet  tamsulosin 0.4 MG capsule         Discharge Procedure Orders   RONNI - Hannah Tineo MD, Urology, [facility]   Referral Priority: Routine Referral Type: Eval and Treat   Referral Reason: Specialty Services Required   Referred to Provider: Mercedez Alejo Requested Specialty: Urology   Number of Visits Requested: 1       Time Spent on discharge is  35 mins in patient examination, evaluation, counseling as well as medication reconciliation, prescriptions for required medications, discharge plan and follow up. Electronically signed by   Tru Lopez DO  8/5/2022  11:57 AM      Thank you Dr. Elana Lucero for the opportunity to be involved in this patient's care.

## 2022-08-05 NOTE — PLAN OF CARE
Problem: Discharge Planning  Goal: Discharge to home or other facility with appropriate resources  Outcome: Adequate for Discharge  Flowsheets  Taken 8/5/2022 0800 by Sofia Ibanez RN  Discharge to home or other facility with appropriate resources:   Identify barriers to discharge with patient and caregiver   Arrange for needed discharge resources and transportation as appropriate   Identify discharge learning needs (meds, wound care, etc)  Taken 8/4/2022 2218 by Saurav Ryan RN  Discharge to home or other facility with appropriate resources: Identify barriers to discharge with patient and caregiver     Problem: Safety - Adult  Goal: Free from fall injury  Outcome: Adequate for Discharge  Flowsheets (Taken 8/4/2022 2218 by Saurav Ryan RN)  Free From Fall Injury: Instruct family/caregiver on patient safety     Problem: ABCDS Injury Assessment  Goal: Absence of physical injury  Outcome: Adequate for Discharge  Flowsheets (Taken 8/4/2022 1600)  Absence of Physical Injury: Implement safety measures based on patient assessment     Problem: Skin/Tissue Integrity  Goal: Absence of new skin breakdown  Description: 1. Monitor for areas of redness and/or skin breakdown  2. Assess vascular access sites hourly  3. Every 4-6 hours minimum:  Change oxygen saturation probe site  4. Every 4-6 hours:  If on nasal continuous positive airway pressure, respiratory therapy assess nares and determine need for appliance change or resting period.   Outcome: Adequate for Discharge     Problem: Chronic Conditions and Co-morbidities  Goal: Patient's chronic conditions and co-morbidity symptoms are monitored and maintained or improved  Outcome: Adequate for Discharge  Flowsheets  Taken 8/5/2022 0800 by Azeem Norwood 34 - Patient's Chronic Conditions and Co-Morbidity Symptoms are Monitored and Maintained or Improved:   Monitor and assess patient's chronic conditions and comorbid symptoms for stability, deterioration, or improvement   Collaborate with multidisciplinary team to address chronic and comorbid conditions and prevent exacerbation or deterioration  Taken 8/4/2022 2218 by Azeem Easley 34 - Patient's Chronic Conditions and Co-Morbidity Symptoms are Monitored and Maintained or Improved: Monitor and assess patient's chronic conditions and comorbid symptoms for stability, deterioration, or improvement

## 2022-08-09 ENCOUNTER — HOSPITAL ENCOUNTER (EMERGENCY)
Age: 72
Discharge: HOME | End: 2022-08-09
Attending: EMERGENCY MEDICINE
Payer: COMMERCIAL

## 2022-08-09 VITALS
SYSTOLIC BLOOD PRESSURE: 179 MMHG | HEART RATE: 99 BPM | DIASTOLIC BLOOD PRESSURE: 74 MMHG | OXYGEN SATURATION: 96 % | WEIGHT: 168 LBS | RESPIRATION RATE: 16 BRPM | BODY MASS INDEX: 27.96 KG/M2 | TEMPERATURE: 99.7 F

## 2022-08-09 DIAGNOSIS — N30.00 ACUTE CYSTITIS WITHOUT HEMATURIA: ICD-10-CM

## 2022-08-09 DIAGNOSIS — T83.9XXA PROBLEM WITH FOLEY CATHETER, INITIAL ENCOUNTER (HCC): Primary | ICD-10-CM

## 2022-08-09 LAB
BILIRUBIN URINE: NEGATIVE
CASTS UA: NORMAL /LPF (ref 0–2)
COLOR: YELLOW
EPITHELIAL CELLS UA: NORMAL /HPF (ref 0–5)
GLUCOSE URINE: NEGATIVE
KETONES, URINE: NEGATIVE
LEUKOCYTE ESTERASE, URINE: ABNORMAL
NITRITE, URINE: NEGATIVE
PH UA: 6.5 (ref 5–8)
PROTEIN UA: NEGATIVE
RBC UA: NORMAL /HPF (ref 0–2)
SPECIFIC GRAVITY UA: 1 (ref 1–1.03)
TURBIDITY: CLEAR
URINE HGB: ABNORMAL
UROBILINOGEN, URINE: NORMAL
WBC UA: NORMAL /HPF (ref 0–5)

## 2022-08-09 PROCEDURE — 99283 EMERGENCY DEPT VISIT LOW MDM: CPT

## 2022-08-09 PROCEDURE — 87086 URINE CULTURE/COLONY COUNT: CPT

## 2022-08-09 PROCEDURE — 81001 URINALYSIS AUTO W/SCOPE: CPT

## 2022-08-09 RX ORDER — CEPHALEXIN 500 MG/1
500 CAPSULE ORAL 4 TIMES DAILY
Qty: 28 CAPSULE | Refills: 0 | Status: SHIPPED | OUTPATIENT
Start: 2022-08-09 | End: 2022-08-16

## 2022-08-09 ASSESSMENT — ENCOUNTER SYMPTOMS
SORE THROAT: 0
RHINORRHEA: 0
SHORTNESS OF BREATH: 0
COUGH: 0
CONSTIPATION: 0
DIARRHEA: 0
ABDOMINAL PAIN: 0
NAUSEA: 0
VOMITING: 0

## 2022-08-09 NOTE — ED PROVIDER NOTES
[address] ED  Emergency Department Encounter  Emergency Medicine Resident     Pt Christina Males  MRN: [id number]  [place] 1950  Date of evaluation: 8/9/22  PCP:  Coy Ramos       Chief Complaint   Patient presents with    Urinary Catheter     Reports that catheter is causing her a lot of pain, states that she had it in place x2 weeks now. Eye Problem     Reports bilateral pain, watery, bilateral eyes         HISTORY OF PRESENT ILLNESS  (Location/Symptom, Timing/Onset, Context/Setting, Quality, Duration, Modifying Factors, Severity.)      Leonard Vergara is a 66 y.o. female who presents with pain related to her catheter. As well as pain and watering in her eyes bilaterally. She states her eye watering and pain started today. States the Mays has been in place for 2 weeks and she is now having pain in her suprapubic area as well as her urethra. She states the pain started this morning along with the watering eyes. The patient is having significant discomfort to the point that she is unable to answer questions at this time. Feels like the Mays is coming out. PAST MEDICAL / SURGICAL / SOCIAL / FAMILY HISTORY      has a past medical history of Arthritis, Bronchitis, Chronic obstructive pulmonary disease ([address] Colon polyps, Controlled type 2 diabetes mellitus without complication, without long-term current use of insulin ([address] Dental neglect, Depression, Environmental allergies, GERD (gastroesophageal reflux disease), Hyperlipidemia, Hypertension, Obesity, Onychomycosis, Poor historian, RBBB, Treadmill stress test negative for angina pectoris, and Wears glasses. has a past surgical history that includes Cholecystectomy; doppler echocardiography; Colonoscopy (6/2013); and Tubal ligation.       Social History     Socioeconomic History    Marital status: Single     Spouse name: Not on file    Number of children: Not on file    Years of education: Not on file    Highest education level: Not on file   Occupational History    Not on file   Tobacco Use    Smoking status: Former     Packs/day: 0.50     Years: 35.00     Pack years: 17.50     Types: Cigarettes     Quit date: 2015     Years since quittin.0    Smokeless tobacco: Never   Substance and Sexual Activity    Alcohol use: No     Alcohol/week: 0.0 standard drinks    Drug use: No    Sexual activity: Not Currently   Other Topics Concern    Not on file   Social History Narrative    Not on file     Social Determinants of Health     Financial Resource Strain: Not on file   Food Insecurity: Not on file   Transportation Needs: Not on file   Physical Activity: Not on file   Stress: Not on file   Social Connections: Not on file   Intimate Partner Violence: Not on file   Housing Stability: Not on file       Family History   Problem Relation Age of Onset    Heart Disease Mother     Cancer Father        Allergies:  Patient has no known allergies. Home Medications:  Prior to Admission medications    Medication Sig Start Date End Date Taking? Authorizing Provider   polyethylene glycol (GLYCOLAX) 17 g packet Take 17 g by mouth daily as needed for Constipation 22  Ernestine Valle, DO   tamsulosin (FLOMAX) 0.4 MG capsule Take 1 capsule by mouth in the morning.  22   Ernestine Valle, DO   ondansetron (ZOFRAN ODT) 4 MG disintegrating tablet Take 1 tablet by mouth every 8 hours as needed for Nausea 22   Skippy Antwon, DO   acetaminophen (TYLENOL) 500 MG tablet Take 2 tablets by mouth 4 times daily as needed for Pain 22   Catherene Marsha Gruenbaum, DO   ibuprofen (IBU) 800 MG tablet Take 1 tablet by mouth every 8 hours as needed for Pain 22  Catherene Marsha Gruenbaum, DO   ciclopirox (LOPROX) 0.77 % cream ciclopirox 0.77 % topical cream    Historical Provider, MD   albuterol sulfate  (90 Base) MCG/ACT inhaler albuterol sulfate HFA 90 mcg/actuation aerosol inhaler    Historical Provider, MD   atorvastatin (LIPITOR) 20 MG tablet Take 1 tablet by mouth daily 6/29/21   Shashank Pena MD   tiotropium (SPIRIVA RESPIMAT) 1.25 MCG/ACT AERS inhaler Inhale 2 puffs into the lungs daily 8/12/19   Malinda Chung MD   haloperidol (HALDOL) 5 MG tablet Take 5 mg by mouth 2 times daily    Historical Provider, MD   traZODone (DESYREL) 100 MG tablet  8/9/17   Historical Provider, MD   Elastic Bandages & Supports (ACE ARM SLING) MISC Apply 1 Units topically daily 4/17/17   Alejo Moon MD   trihexyphenidyl (ARTANE) 2 MG tablet Take 2 mg by mouth 2 times daily  10/10/14   Historical Provider, MD       REVIEW OF SYSTEMS    (2-9 systems for level 4, 10 or more for level 5)      Review of Systems   Constitutional:  Negative for chills and fever. HENT:  Negative for congestion, rhinorrhea and sore throat. Eyes:  Negative for visual disturbance. Respiratory:  Negative for cough and shortness of breath. Cardiovascular:  Negative for chest pain and leg swelling. Gastrointestinal:  Negative for abdominal pain, constipation, diarrhea, nausea and vomiting. Endocrine: Negative for polyuria. Genitourinary:  Negative for dysuria and hematuria. Musculoskeletal:  Negative for arthralgias and myalgias. Skin:  Negative for rash. Neurological:  Negative for light-headedness and headaches. Psychiatric/Behavioral:  Negative for behavioral problems. PHYSICAL EXAM   (up to 7 for level 4, 8 or more for level 5)      INITIAL VITALS:   BP (!) 179/74   Pulse 99   Temp 99.7 °F (37.6 °C) (Oral)   Resp 16   Wt 168 lb (76.2 kg)   SpO2 96%   BMI 27.96 kg/m²     Physical Exam  Constitutional:       General: She is not in acute distress. Appearance: She is not toxic-appearing. HENT:      Head: Normocephalic and atraumatic. Nose: No congestion or rhinorrhea.       Mouth/Throat:      Mouth: Mucous membranes are moist.   Eyes:      Conjunctiva/sclera: Conjunctivae normal.      Pupils: Pupils are equal, round, and reactive to light. Cardiovascular:      Rate and Rhythm: Normal rate and regular rhythm. Pulses: Normal pulses. Pulmonary:      Effort: Pulmonary effort is normal. No respiratory distress. Breath sounds: Normal breath sounds. No wheezing. Abdominal:      General: Bowel sounds are normal. There is no distension. Palpations: Abdomen is soft. Tenderness: There is no abdominal tenderness. There is no rebound. Musculoskeletal:      Right lower leg: No edema. Left lower leg: No edema. Skin:     General: Skin is warm and dry. Coloration: Skin is not jaundiced. Neurological:      Mental Status: She is alert and oriented to person, place, and time. Psychiatric:         Mood and Affect: Mood normal.         Behavior: Behavior normal.         Judgment: Judgment normal.       DIFFERENTIAL  DIAGNOSIS     PLAN (LABS / IMAGING / EKG):  Orders Placed This Encounter   Procedures    Culture, Urine    Urinalysis    Microscopic Urinalysis    Bladder scan         MEDICATIONS ORDERED:  No orders of the defined types were placed in this encounter. DDX: Mays problem, eye pain, eye watering, conjunctivitis    InitialMDM/Plan: The patient was having significant discomfort during her evaluation, but the decision was made to remove the Mays catheter and see if this would improve her pain. The patient reports immediate relief following removal of the catheter. Her eye watering has also stopped at this point. She states her eyes feel much better. Plan to give the patient water to drink and see if she is able to urinate on her own. If not, we will replace the Mays catheter. The patient does have follow-up appointment with urology on August 25.     DIAGNOSTIC RESULTS / EMERGENCY DEPARTMENT COURSE / MDM   LAB RESULTS:  Results for orders placed or performed during the hospital encounter of 08/09/22   Urinalysis   Result Value Ref Range    Color, UA Yellow Yellow    Turbidity UA Clear Clear    Glucose, Ur NEGATIVE NEGATIVE    Bilirubin Urine NEGATIVE NEGATIVE    Ketones, Urine NEGATIVE NEGATIVE    Specific Gravity, UA 1.003 (L) 1.005 - 1.030    Urine Hgb MODERATE (A) NEGATIVE    pH, UA 6.5 5.0 - 8.0    Protein, UA NEGATIVE NEGATIVE    Urobilinogen, Urine Normal Normal    Nitrite, Urine NEGATIVE NEGATIVE    Leukocyte Esterase, Urine MODERATE (A) NEGATIVE       IMPRESSION: Final impression pending microscopic analysis of urine. RADIOLOGY:  No orders to display         SCREENING TOOLS:          Mer[place] Coma Scale  Eye Opening: Spontaneous  Best Verbal Response: Oriented  Best Motor Response: Obeys commands  [place] Coma Scale Score: 15    EMERGENCY DEPARTMENT COURSE:      ED Course as of 08/09/22 2129   Tue Aug 09, 2022   1945 Following removal of the Mays catheter, the patient reports immediate relief of her symptoms, she also has improvement of her eye watering at this time. [BL]   2015 Patient was able to urinate on her own. Proceed with postvoid residual volume to see if the patient is retaining urine. If not, patient may discharge home and keep her follow-up with urology outpatient at the end of August. [BL]   2127 Bedside ultrasound shows no urinary retention. Pending final microscopic urinalysis, patient okay to discharge home. [BL]      ED Course User Index  [BL] Sydney Cortes DO       No notes of EC Admission Criteria type on file. PROCEDURES:  None    CONSULTS:  None    CRITICAL CARE:  None      FINAL IMPRESSION      1. Problem with Mays catheter, initial encounter ([facility] [address]          DISPOSITION / PLAN     DISPOSITION        PATIENT REFERRED TO:  No follow-up provider specified.     DISCHARGE MEDICATIONS:  New Prescriptions    No medications on file       Sydney Cortes DO  Emergency Medicine Resident    (Please note that portions of thisnote were completed with a voice recognition program.          Sydney Cortes DO  Resident  08/09/22 2129

## 2022-08-09 NOTE — ED NOTES
ER resident at bedside to speak with patient    Patient reports that she ambulated to bathroom awhile ago to urinate     Arnol Woo RN  08/09/22 9209

## 2022-08-09 NOTE — ED PROVIDER NOTES
[facility]  Emergency Department  Faculty Attestation     I performed a history and physical examination of the patient and discussed management with the resident. I reviewed the residents note and agree with the documented findings and plan of care. Any areas of disagreement are noted on the chart. I was personally present for the key portions of any procedures. I have documented in the chart those procedures where I was not present during the key portions. I have reviewed the emergency nurses triage note. I agree with the chief complaint, past medical history, past surgical history, allergies, medications, social and family history as documented unless otherwise noted below. For Physician Assistant/ Nurse Practitioner cases/documentation I have personally evaluated this patient and have completed at least one if not all key elements of the E/M (history, physical exam, and MDM). Additional findings are as noted. Primary Care Physician:  Sherryle Divine    Screenings:  [unfilled]    CHIEF COMPLAINT       Chief Complaint   Patient presents with    Urinary Catheter     Reports that catheter is causing her a lot of pain, states that she had it in place x2 weeks now. Eye Problem     Reports bilateral pain, watery, bilateral eyes         RECENT VITALS:   Temp: 99.7 °F (37.6 °C),  Heart Rate: 99, Resp: 16, BP: (!) 179/74    LABS:  Labs Reviewed - No data to display    Radiology  No orders to display         Attending Physician Additional  Notes    Patient is had a Mays catheter in place for the past several days after urinary retention presumably from UTI although cultures negative. She has significant amount of pain in the perineum with a catheter and is requesting to have it removed. No fever chills or sweats. No nausea vomiting. She was recently tearful and had some eye irritation however she states her eyes are back to normal now that her pain is resolved.   She does have a history of recurrent UTIs. No low back pain or lower extremity weakness. On exam she nontoxic mildly hypertensive afebrile vital signs normal.  There is mild redness to the lids but not periorbital tissues. No edema. Normal conjunctiva. Normal pupils next ocular movements. Normal motor strength in lower extremities. Reflexes are decreased but no clonus or hyperreflexia. Abdomen is soft and nontender without peritoneal findings. She does have poor hygiene. Impression is perineal pain from Mays catheter, anticipate initial issue was from perineal adhesions from poor hygiene, no evidence at that time of yeast infection. Plan is Mays catheter removal, oral fluids, attempt at ability to urinate, consider Naphcon eyedrops if eye irritation returns, outpatient follow-up. Yasmin Fleming.  Aline Whiting MD, [facility]  Attending Emergency  Physician                Richard Huber MD  08/09/22 Karuna Bray

## 2022-08-09 NOTE — ED NOTES
Patient into ER with c/o hernandez pain/tenderness  Reports that she had placement of indwelling hernandez 1 week ago but states she is having increased pain in krystal region  Patient a/so c/o abdominal tenderness, lower suprapubic region  Also c/o bilateral eye pain/watery started today.  Denies injury to eyes    Nondistressed  GCS=15  VS stable    Call light within reach  Updated on plan of care and processes         Mini Nation RN  08/09/22 5770

## 2022-08-10 LAB
CULTURE: NORMAL
SPECIMEN DESCRIPTION: NORMAL

## 2022-08-10 NOTE — ED NOTES
Patient placed in brief and ambulates back to restroom. Patient's son at bedside.      Jackelyn Dickens RN  08/09/22 7420

## 2022-08-10 NOTE — ED PROVIDER NOTES
[facility] ED  Emergency Department  Emergency Medicine Resident Sign-out     Care of Komal Guzman was assumed from Dr. Pancho Hdez and is being seen for Urinary Catheter (Reports that catheter is causing her a lot of pain, states that she had it in place x2 weeks now.) and Eye Problem (Reports bilateral pain, watery, bilateral eyes/)  . The patient's initial evaluation and plan have been discussed with the prior provider who initially evaluated the patient. EMERGENCY DEPARTMENT COURSE / MEDICAL DECISION MAKING:       MEDICATIONS GIVEN:  Orders Placed This Encounter   Medications    cephALEXin (KEFLEX) 500 MG capsule     Sig: Take 1 capsule by mouth in the morning and 1 capsule at noon and 1 capsule in the evening and 1 capsule before bedtime. Do all this for 7 days. Dispense:  28 capsule     Refill:  0       LABS / RADIOLOGY:     Labs Reviewed   URINALYSIS - Abnormal; Notable for the following components:       Result Value    Specific Gravity, UA 1.003 (*)     Urine Hgb MODERATE (*)     Leukocyte Esterase, Urine MODERATE (*)     All other components within normal limits   CULTURE, URINE   MICROSCOPIC URINALYSIS       XR ABDOMEN (KUB) (SINGLE AP VIEW)    Result Date: 8/4/2022  EXAMINATION: ONE SUPINE XRAY VIEW(S) OF THE ABDOMEN 8/4/2022 8:42 am COMPARISON: CT abdomen and pelvis dated 08/03/2022, abdominal radiograph dated 06/09/2022 HISTORY: ORDERING SYSTEM PROVIDED HISTORY: ileus? TECHNOLOGIST PROVIDED HISTORY: ileus? FINDINGS: There is scattered bowel gas throughout the small and large bowel loops. No abnormally dilated loops of bowel are identified. The distribution of bowel gas is similar to the previous abdominal radiograph dated 06/09/2022. Degenerative changes noted in the spine and hips. Scattered, relatively proportionate, bowel gas in the small and large bowel, suggesting mild ileus, similar to previous radiographs dated 06/09/2022.      CT ABDOMEN PELVIS W IV CONTRAST Additional Contrast? None    Result Date: 8/3/2022  EXAMINATION: CT OF THE ABDOMEN AND PELVIS WITH CONTRAST 8/3/2022 11:14 pm TECHNIQUE: CT of the abdomen and pelvis was performed with the administration of intravenous contrast. Multiplanar reformatted images are provided for review. Automated exposure control, iterative reconstruction, and/or weight based adjustment of the mA/kV was utilized to reduce the radiation dose to as low as reasonably achievable. COMPARISON: April 9, 2022 CT abdomen and pelvis HISTORY: ORDERING SYSTEM PROVIDED HISTORY: abdominal pain, constipation, nausea, vomiting TECHNOLOGIST PROVIDED HISTORY: abdominal pain, constipation, nausea, vomiting Decision Support Exception - unselect if not a suspected or confirmed emergency medical condition->Emergency Medical Condition (MA) Reason for Exam: abdominal pain, constipation, nausea, vomiting FINDINGS: Lower Chest: Clear Organs: The abdominal wall appears normal. The liver, spleen, pancreas, and adrenals appear normal.  Gallbladder not identified. Kidneys appear normal. The bladder appears normal. GI/Bowel: Air-fluid levels noted throughout the small and large bowel. Appendix normal.  Stomach normal. Pelvis: Uterus normal. Peritoneum/Retroperitoneum: The abdominal aorta and iliac arteries are normal in caliber. There is no pathologic adenopathy. . Bones/Soft Tissues: Normal     Ileus. XR CHEST PORTABLE    Result Date: 8/3/2022  EXAMINATION: ONE XRAY VIEW OF THE CHEST 8/3/2022 10:28 pm COMPARISON: 07/27/2022 and 07/26/2022 HISTORY: ORDERING SYSTEM PROVIDED HISTORY: chest pain, adominal pain TECHNOLOGIST PROVIDED HISTORY: chest pain, adominal pain FINDINGS: There is no new pulmonary opacities, pleural effusion, or pneumothorax. Cardiac silhouette is not enlarged and there is no pulmonary vascular congestion. The felicia are within normal limits. Mild degenerative changes at the acromioclavicular joints and likely along the spine.   No lines or tubes in the chest.  No free air identified under the diaphragm. No acute cardiopulmonary process. May have underlying COPD. XR CHEST PORTABLE    Result Date: 7/27/2022  EXAMINATION: ONE XRAY VIEW OF THE CHEST 7/27/2022 12:24 am COMPARISON: 06/26/2022 HISTORY: ORDERING SYSTEM PROVIDED HISTORY: chest pain, SOB TECHNOLOGIST PROVIDED HISTORY: chest pain, SOB FINDINGS: Heart size and pulmonary vasculature are normal.  The lungs are clear and normally expanded. Surrounding osseous and soft tissue structures are unremarkable. Normal examination. RECENT VITALS:     Temp: 99.7 °F (37.6 °C),  Heart Rate: 99, Resp: 16, BP: (!) 179/74, SpO2: 96 %      This patient is a 66 y.o. Female with a catheter problem. She presented with significant pain related to an indwelling catheter. It was found to not be attached to her leg and there was significant tension on the tubing. The catheter was removed upon presentation which immediately relieved the patient's pain as well as the watering in her eyes. Repeat urinalysis is pending. She has been able to void in the department. Bedside ultrasound of the bladder shows no retention. Pending final microscopic analysis of her urine, patient okay to discharge home. ED Course as of 08/09/22 2157 Tue Aug 09, 2022   1945 Following removal of the Mays catheter, the patient reports immediate relief of her symptoms, she also has improvement of her eye watering at this time. [BL]   2015 Patient was able to urinate on her own. Proceed with postvoid residual volume to see if the patient is retaining urine. If not, patient may discharge home and keep her follow-up with urology outpatient at the end of August. [BL]   2127 Bedside ultrasound shows no urinary retention. Pending final microscopic urinalysis, patient okay to discharge home.  [BL]      ED Course User Index  [BL] Kenzie Kaufman DO       OUTSTANDING TASKS / RECOMMENDATIONS:    F/u urine micro  D/C home     FINAL IMPRESSION:     1. Problem with Mays catheter, initial encounter ([facility] [address]    2. Acute cystitis without hematuria        DISPOSITION:         DISPOSITION:  [x]  Discharge   []  Transfer -    []  Admission -     []  Against Medical Advice   []  Eloped   FOLLOW-UP: [facility]  [address]       DISCHARGE MEDICATIONS: New Prescriptions    CEPHALEXIN (KEFLEX) 500 MG CAPSULE    Take 1 capsule by mouth in the morning and 1 capsule at noon and 1 capsule in the evening and 1 capsule before bedtime. Do all this for 7 days.           Sarah Cheung MD  Emergency Medicine Resident  [address] [facility]       Sarah Cheung MD  Resident  08/09/22 9305

## 2022-08-10 NOTE — ED NOTES
Patient requesting discharge paperwork to leave  Son remains at bedside with patient      Mala Horne RN  08/09/22 9331

## 2022-08-10 NOTE — DISCHARGE INSTRUCTIONS
Seen in the emergency department due to problem with your Mays catheter. Based on your urinalysis, there is a high likelihood that you have a urinary tract infection. Take your medication as indicated and prescribed. If you are given an antibiotic then, make sure you get the prescription filled and take the antibiotics until finished. Drink plenty of water while taking the antibiotics. Avoid drinking alcohol or drinks that have caffeine in it while taking antibiotics. If you were given the medication pyridium (or take over the counter Azo) - do not wear any contacts for the next week since this medication will turn your tears an orange color and will stain the contacts. For pain use acetaminophen (Tylenol) or ibuprofen (Motrin / Advil), unless prescribed medications that have acetaminophen or ibuprofen (or similar medications) in it. You can take over the counter acetaminophen tablets (1 - 2 tablets of the 500-mg strength every 6 hours) or ibuprofen tablets (2 tablets every 4 hours). PLEASE RETURN TO THE EMERGENCY DEPARTMENT IMMEDIATELY for worsening symptoms, inability to urinate, worsening of blood in your urine, or if you develop any concerning symptoms such as: high fever not relieved by acetaminophen (Tylenol) and/or ibuprofen (Motrin / Advil), chills, shortness of breath, chest pain, feeling of your heart fluttering or racing, persistent nausea and/or vomiting, vomiting up blood, blood in your stool, loss of consciousness, numbness, weakness or tingling in the arms or legs or change in color of the extremities, changes in mental status, persistent headache, blurry vision, loss of bladder / bowel.

## 2022-08-11 ENCOUNTER — HOSPITAL ENCOUNTER (EMERGENCY)
Age: 72
Discharge: HOME | End: 2022-08-11
Attending: EMERGENCY MEDICINE
Payer: COMMERCIAL

## 2022-08-11 ENCOUNTER — APPOINTMENT (OUTPATIENT)
Dept: CT IMAGING | Age: 72
End: 2022-08-11
Payer: COMMERCIAL

## 2022-08-11 ENCOUNTER — APPOINTMENT (OUTPATIENT)
Dept: GENERAL RADIOLOGY | Age: 72
End: 2022-08-11
Payer: COMMERCIAL

## 2022-08-11 VITALS
RESPIRATION RATE: 15 BRPM | HEART RATE: 76 BPM | TEMPERATURE: 97.7 F | SYSTOLIC BLOOD PRESSURE: 130 MMHG | OXYGEN SATURATION: 99 % | DIASTOLIC BLOOD PRESSURE: 65 MMHG

## 2022-08-11 DIAGNOSIS — N39.0 URINARY TRACT INFECTION ASSOCIATED WITH CATHETERIZATION OF URINARY TRACT, UNSPECIFIED INDWELLING URINARY CATHETER TYPE, SUBSEQUENT ENCOUNTER: Primary | ICD-10-CM

## 2022-08-11 DIAGNOSIS — T83.511D URINARY TRACT INFECTION ASSOCIATED WITH CATHETERIZATION OF URINARY TRACT, UNSPECIFIED INDWELLING URINARY CATHETER TYPE, SUBSEQUENT ENCOUNTER: Primary | ICD-10-CM

## 2022-08-11 LAB
ABSOLUTE EOS #: 0.12 K/UL (ref 0–0.44)
ABSOLUTE IMMATURE GRANULOCYTE: <0.03 K/UL (ref 0–0.3)
ABSOLUTE LYMPH #: 1.86 K/UL (ref 1.1–3.7)
ABSOLUTE MONO #: 0.46 K/UL (ref 0.1–1.2)
ALBUMIN SERPL-MCNC: 3.8 G/DL (ref 3.5–5.2)
ALBUMIN/GLOBULIN RATIO: 1.2 (ref 1–2.5)
ALP BLD-CCNC: 115 U/L (ref 35–104)
ALT SERPL-CCNC: 14 U/L (ref 5–33)
ANION GAP SERPL CALCULATED.3IONS-SCNC: 9 MMOL/L (ref 9–17)
AST SERPL-CCNC: 33 U/L
BASOPHILS # BLD: 1 % (ref 0–2)
BASOPHILS ABSOLUTE: 0.03 K/UL (ref 0–0.2)
BILIRUB SERPL-MCNC: 0.25 MG/DL (ref 0.3–1.2)
BILIRUBIN URINE: NEGATIVE
BUN BLDV-MCNC: 10 MG/DL (ref 8–23)
CALCIUM SERPL-MCNC: 9.5 MG/DL (ref 8.6–10.4)
CASTS UA: ABNORMAL /LPF (ref 0–2)
CASTS UA: ABNORMAL /LPF (ref 0–2)
CHLORIDE BLD-SCNC: 101 MMOL/L (ref 98–107)
CO2: 26 MMOL/L (ref 20–31)
COLOR: ABNORMAL
CREAT SERPL-MCNC: 0.62 MG/DL (ref 0.5–0.9)
CRYSTALS, UA: ABNORMAL /HPF
CRYSTALS, UA: ABNORMAL /HPF
EOSINOPHILS RELATIVE PERCENT: 2 % (ref 1–4)
EPITHELIAL CELLS UA: ABNORMAL /HPF (ref 0–5)
GFR AFRICAN AMERICAN: >60 ML/MIN
GFR NON-AFRICAN AMERICAN: >60 ML/MIN
GFR SERPL CREATININE-BSD FRML MDRD: ABNORMAL ML/MIN/{1.73_M2}
GLUCOSE BLD-MCNC: 105 MG/DL (ref 70–99)
GLUCOSE URINE: NEGATIVE
HCT VFR BLD CALC: 35.6 % (ref 36.3–47.1)
HEMOGLOBIN: 12.1 G/DL (ref 11.9–15.1)
IMMATURE GRANULOCYTES: 0 %
KETONES, URINE: ABNORMAL
LEUKOCYTE ESTERASE, URINE: ABNORMAL
LIPASE: 14 U/L (ref 13–60)
LYMPHOCYTES # BLD: 30 % (ref 24–43)
MAGNESIUM: 1.9 MG/DL (ref 1.6–2.6)
MCH RBC QN AUTO: 30.9 PG (ref 25.2–33.5)
MCHC RBC AUTO-ENTMCNC: 34 G/DL (ref 28.4–34.8)
MCV RBC AUTO: 90.8 FL (ref 82.6–102.9)
MONOCYTES # BLD: 7 % (ref 3–12)
MUCUS: ABNORMAL
NITRITE, URINE: NEGATIVE
NRBC AUTOMATED: 0 PER 100 WBC
PDW BLD-RTO: 15.3 % (ref 11.8–14.4)
PH UA: 5.5 (ref 5–8)
PLATELET # BLD: 257 K/UL (ref 138–453)
PMV BLD AUTO: 10.5 FL (ref 8.1–13.5)
POTASSIUM SERPL-SCNC: 4.4 MMOL/L (ref 3.7–5.3)
PROTEIN UA: ABNORMAL
RBC # BLD: 3.92 M/UL (ref 3.95–5.11)
RBC # BLD: ABNORMAL 10*6/UL
RBC UA: ABNORMAL /HPF (ref 0–2)
SEG NEUTROPHILS: 60 % (ref 36–65)
SEGMENTED NEUTROPHILS ABSOLUTE COUNT: 3.79 K/UL (ref 1.5–8.1)
SODIUM BLD-SCNC: 136 MMOL/L (ref 135–144)
SPECIFIC GRAVITY UA: 1.03 (ref 1–1.03)
TOTAL PROTEIN: 7.1 G/DL (ref 6.4–8.3)
TROPONIN, HIGH SENSITIVITY: 10 NG/L (ref 0–14)
TROPONIN, HIGH SENSITIVITY: 11 NG/L (ref 0–14)
TURBIDITY: ABNORMAL
URINE HGB: ABNORMAL
UROBILINOGEN, URINE: NORMAL
WBC # BLD: 6.3 K/UL (ref 3.5–11.3)
WBC UA: ABNORMAL /HPF (ref 0–5)

## 2022-08-11 PROCEDURE — 99285 EMERGENCY DEPT VISIT HI MDM: CPT

## 2022-08-11 PROCEDURE — 81001 URINALYSIS AUTO W/SCOPE: CPT

## 2022-08-11 PROCEDURE — 80053 COMPREHEN METABOLIC PANEL: CPT

## 2022-08-11 PROCEDURE — 93005 ELECTROCARDIOGRAM TRACING: CPT | Performed by: EMERGENCY MEDICINE

## 2022-08-11 PROCEDURE — 83735 ASSAY OF MAGNESIUM: CPT

## 2022-08-11 PROCEDURE — 71045 X-RAY EXAM CHEST 1 VIEW: CPT

## 2022-08-11 PROCEDURE — 87086 URINE CULTURE/COLONY COUNT: CPT

## 2022-08-11 PROCEDURE — 84484 ASSAY OF TROPONIN QUANT: CPT

## 2022-08-11 PROCEDURE — 74177 CT ABD & PELVIS W/CONTRAST: CPT

## 2022-08-11 PROCEDURE — 83690 ASSAY OF LIPASE: CPT

## 2022-08-11 PROCEDURE — 6360000004 HC RX CONTRAST MEDICATION: Performed by: EMERGENCY MEDICINE

## 2022-08-11 PROCEDURE — 85025 COMPLETE CBC W/AUTO DIFF WBC: CPT

## 2022-08-11 RX ADMIN — IOPAMIDOL 75 ML: 755 INJECTION, SOLUTION INTRAVENOUS at 20:26

## 2022-08-11 NOTE — ED PROVIDER NOTES
[facility] ED  Emergency Department Encounter  EmergencyMedicine Resident     Pt Lana Leal  MRN: [id number]  [place] 1950  Date of evaluation: 8/11/22  PCP:  Jack Angel 5       Chief Complaint   Patient presents with    Nausea    Emesis    Abdominal Pain       HISTORY OF PRESENT ILLNESS  (Location/Symptom, Timing/Onset, Context/Setting, Quality, Duration, Modifying Factors, Severity.)      Shweta Houston is a 66 y.o. female who presents with abdominal pain. Patient is well-known to the emergency department and reports abdominal pain frequently. Patient unable to localize abdominal pain. Patient uses regularly yes or no answers to answer questions, patient will follow commands when asking her to point where the abdominal pain is. Patient does describe constipation. Patient does state that she has been urinating but does state that his decreased frequency. Patient did recently have Mays catheter that was placed for urinary retention removed due to having pain and patient was started on Keflex for urinary tract infection, patient was not answering questions appropriately about her antibiotic use. Patient says no to fevers or chills. Does say yes to chest pain, unable to localize the chest pain or describe it. Patient states yes to shortness of breath. PAST MEDICAL / SURGICAL / SOCIAL / FAMILY HISTORY      has a past medical history of Arthritis, Bronchitis, Chronic obstructive pulmonary disease ([address] Colon polyps, Controlled type 2 diabetes mellitus without complication, without long-term current use of insulin ([address] Dental neglect, Depression, Environmental allergies, GERD (gastroesophageal reflux disease), Hyperlipidemia, Hypertension, Obesity, Onychomycosis, Poor historian, RBBB, Treadmill stress test negative for angina pectoris, and Wears glasses.   No additional pertinent     has a past surgical history that includes Cholecystectomy; doppler echocardiography; Colonoscopy (2013); and Tubal ligation. No additional pertinent    Social History     Socioeconomic History    Marital status: Single     Spouse name: Not on file    Number of children: Not on file    Years of education: Not on file    Highest education level: Not on file   Occupational History    Not on file   Tobacco Use    Smoking status: Former     Packs/day: 0.50     Years: 35.00     Pack years: 17.50     Types: Cigarettes     Quit date: 2015     Years since quittin.0    Smokeless tobacco: Never   Substance and Sexual Activity    Alcohol use: No     Alcohol/week: 0.0 standard drinks    Drug use: No    Sexual activity: Not Currently   Other Topics Concern    Not on file   Social History Narrative    Not on file     Social Determinants of Health     Financial Resource Strain: Not on file   Food Insecurity: Not on file   Transportation Needs: Not on file   Physical Activity: Not on file   Stress: Not on file   Social Connections: Not on file   Intimate Partner Violence: Not on file   Housing Stability: Not on file       Family History   Problem Relation Age of Onset    Heart Disease Mother     Cancer Father        Allergies:  Patient has no known allergies. Home Medications:  Prior to Admission medications    Medication Sig Start Date End Date Taking? Authorizing Provider   cephALEXin (KEFLEX) 500 MG capsule Take 1 capsule by mouth in the morning and 1 capsule at noon and 1 capsule in the evening and 1 capsule before bedtime. Do all this for 7 days. 22  Lauren Brown MD   polyethylene glycol (GLYCOLAX) 17 g packet Take 17 g by mouth daily as needed for Constipation 22  Ernestine Valle, DO   tamsulosin (FLOMAX) 0.4 MG capsule Take 1 capsule by mouth in the morning.  22   Ernestine Valle, DO   ondansetron (ZOFRAN ODT) 4 MG disintegrating tablet Take 1 tablet by mouth every 8 hours as needed for Nausea 22   Juliano Kapoor, DO   acetaminophen (TYLENOL) 500 MG tablet Take 2 tablets by mouth 4 times daily as needed for Pain 5/17/22   Cameron Sebas Valdivia, DO   ibuprofen (IBU) 800 MG tablet Take 1 tablet by mouth every 8 hours as needed for Pain 5/17/22 8/5/22  Krunal Valdivia, DO   ciclopirox (LOPROX) 0.77 % cream ciclopirox 0.77 % topical cream    Historical Provider, MD   albuterol sulfate  (90 Base) MCG/ACT inhaler albuterol sulfate HFA 90 mcg/actuation aerosol inhaler    Historical Provider, MD   atorvastatin (LIPITOR) 20 MG tablet Take 1 tablet by mouth daily 6/29/21   Jesse Mckeon MD   tiotropium (SPIRIVA RESPIMAT) 1.25 MCG/ACT AERS inhaler Inhale 2 puffs into the lungs daily 8/12/19   Kriss Alatorre MD   haloperidol (HALDOL) 5 MG tablet Take 5 mg by mouth 2 times daily    Historical Provider, MD   traZODone (DESYREL) 100 MG tablet  8/9/17   Historical Provider, MD   Elastic Bandages & Supports (ACE ARM SLING) MISC Apply 1 Units topically daily 4/17/17   Remigio Bullard MD   trihexyphenidyl (ARTANE) 2 MG tablet Take 2 mg by mouth 2 times daily  10/10/14   Historical Provider, MD       REVIEW OF SYSTEMS    (2-9 systems for level 4, 10 or more for level 5)      Review of Systems   Constitutional:  Negative for chills and fever. HENT:  Negative for congestion. Respiratory:  Negative for chest tightness and shortness of breath. Cardiovascular:  Positive for chest pain. Negative for leg swelling. Gastrointestinal:  Positive for abdominal pain. Negative for constipation, diarrhea, nausea and vomiting. Endocrine: Negative for polyuria. Genitourinary:  Negative for difficulty urinating. Skin:  Negative for color change. Neurological:  Negative for dizziness, weakness, light-headedness and headaches. Psychiatric/Behavioral:  Negative for confusion.       PHYSICAL EXAM   (up to 7 for level 4, 8 or more for level 5)      INITIAL VITALS:   /65   Pulse 76   Temp 97.7 °F (36.5 °C)   Resp 15   SpO2 99%     Physical Exam  Constitutional:       Appearance: Normal appearance. HENT:      Head: Normocephalic and atraumatic. Mouth/Throat:      Mouth: Mucous membranes are moist.      Pharynx: Oropharynx is clear. Eyes:      Extraocular Movements: Extraocular movements intact. Conjunctiva/sclera: Conjunctivae normal.   Cardiovascular:      Rate and Rhythm: Normal rate and regular rhythm. Pulses: Normal pulses. Heart sounds: Normal heart sounds. No murmur heard. Pulmonary:      Effort: Pulmonary effort is normal.      Breath sounds: Normal breath sounds. Abdominal:      General: Bowel sounds are normal. There is no distension. Palpations: Abdomen is soft. There is no fluid wave. Tenderness: There is abdominal tenderness. There is no guarding. Negative signs include Silva's sign and McBurney's sign. Musculoskeletal:         General: Normal range of motion. Comments: Range of motion noted to be normal with patient's natural movements   Skin:     General: Skin is warm and dry. Findings: No rash (On exposed skin). Neurological:      General: No focal deficit present. Mental Status: She is alert and oriented to person, place, and time.    Psychiatric:         Mood and Affect: Mood normal.         Behavior: Behavior normal.     DIFFERENTIAL  DIAGNOSIS     PLAN (LABS / IMAGING / EKG):  Orders Placed This Encounter   Procedures    Culture, Urine    XR CHEST PORTABLE    CT ABDOMEN PELVIS W IV CONTRAST Additional Contrast? None    CBC with Auto Differential    CMP    Lipase    Magnesium    Troponin    Urinalysis with Microscopic    Troponin    EKG 12 Lead       MEDICATIONS ORDERED:  Orders Placed This Encounter   Medications    iopamidol (ISOVUE-370) 76 % injection 75 mL       DIAGNOSTIC RESULTS / EMERGENCY DEPARTMENT COURSE / MDM   LAB RESULTS:  Results for orders placed or performed during the hospital encounter of 08/11/22   CBC with Auto Differential   Result Value Ref Range    WBC 6.3 LARGE (A) NEGATIVE    pH, UA 5.5 5.0 - 8.0    Protein, UA 2+ (A) NEGATIVE    Urobilinogen, Urine Normal Normal    Nitrite, Urine NEGATIVE NEGATIVE    Leukocyte Esterase, Urine MODERATE (A) NEGATIVE    WBC, UA 20 TO 50 0 - 5 /HPF    RBC, UA 20 TO 50 0 - 2 /HPF    Casts UA 20 TO 50 0 - 2 /LPF    Casts UA HYALINE 0 - 2 /LPF    Crystals, UA CALCIUM OXALATE (A) None /HPF    Crystals, UA FEW (A) None /HPF    Epithelial Cells UA 10 TO 20 0 - 5 /HPF    Mucus, UA 2+ (A) None   Troponin   Result Value Ref Range    Troponin, High Sensitivity 10 0 - 14 ng/L   EKG 12 Lead   Result Value Ref Range    Ventricular Rate 85 BPM    Atrial Rate 85 BPM    P-R Interval 120 ms    QRS Duration 118 ms    Q-T Interval 442 ms    QTc Calculation (Bazett) 525 ms    P Axis 83 degrees    R Axis -17 degrees    T Axis 17 degrees       RADIOLOGY:  CT ABDOMEN PELVIS W IV CONTRAST Additional Contrast? None   Final Result   No acute abnormality. XR CHEST PORTABLE   Final Result   No acute process. EKG  EKG Interpretation    Interpreted by emergency department physician    Rhythm: normal sinus   Rate: normal  Axis: left  Ectopy: none  Conduction: right bundle branch block (complete)  ST Segments: normal  T Waves: non specific changes  Q Waves: nonspecific    Clinical Impression: right bundle branch block    Kiya Singer,      All EKG's are interpreted by the Emergency Department Physician who either signs or Co-signs this chart in the absence of a cardiologist.      PROCEDURES:  None    CONSULTS:  None    MEDICAL DECISION MAKING:  Patient presenting with abdominal pain, diffuse throughout abdomen. Patient has had minimal surgeries on her abdomen and still has her appendix, does have history of cholecystectomy. Patient is tenderness throughout abdomen and poor historian CT abdomen pelvis was obtained which was noted to be negative.   Patient had a CT abdomen pelvis with IV contrast 8 days ago that was noted to demonstrate concerns for ileus. This appears to is resolved in the repeat CT scan. Patient have been diagnosed with a urinary tract infection on August 9 after having a Mays catheter placed. Patient has been taking her antibiotics after discussing this with her caretaker over the phone. They state the patient has enough antibiotics for couple more days. Patient continued to have what appeared to be a urinary tract infection on urine. All her labs were noted to be normal except for mild alk phos elevation. Patient may still be having abdominal pain secondary to a UTI as she just recently started antibiotics. Patient started to continue her antibiotics as prescribed. Patient was instructed to return if the pain does not improve after the full course antibiotics, if the pain is worsening, any blood in her stool or dark tarry stools, or any other concerns including fevers that cannot be controlled with Tylenol or ibuprofen that the patient may have. CRITICAL CARE:  Please see attending note    FINAL IMPRESSION      1.  Urinary tract infection associated with catheterization of urinary tract, unspecified indwelling urinary catheter type, subsequent encounter          DISPOSITION / PLAN     DISPOSITION Decision To Discharge 08/11/2022 10:16:56 PM      PATIENT REFERRED TO:  Abrahan Echols  [address] [facility]  [phone]    Schedule an appointment as soon as possible for a visit       DISCHARGE MEDICATIONS:  Discharge Medication List as of 8/11/2022 10:18 PM          Miguel Angel Hernández DO  Emergency Medicine Resident    (Please note that portions of thisnote were completed with a voice recognition program.  Efforts were made to edit the dictations but occasionally words are mis-transcribed.)        Miguel Angel Hernández DO  Resident  08/14/22 [id number]

## 2022-08-12 LAB
CULTURE: NORMAL
EKG ATRIAL RATE: 85 BPM
EKG P AXIS: 83 DEGREES
EKG P-R INTERVAL: 120 MS
EKG Q-T INTERVAL: 442 MS
EKG QRS DURATION: 118 MS
EKG QTC CALCULATION (BAZETT): 525 MS
EKG R AXIS: -17 DEGREES
EKG T AXIS: 17 DEGREES
EKG VENTRICULAR RATE: 85 BPM
SPECIMEN DESCRIPTION: NORMAL

## 2022-08-12 PROCEDURE — 93010 ELECTROCARDIOGRAM REPORT: CPT | Performed by: INTERNAL MEDICINE

## 2022-08-12 ASSESSMENT — ENCOUNTER SYMPTOMS
NAUSEA: 0
SHORTNESS OF BREATH: 0
ABDOMINAL PAIN: 1
CHEST TIGHTNESS: 0
DIARRHEA: 0
VOMITING: 0
CONSTIPATION: 0
COLOR CHANGE: 0

## 2022-08-12 NOTE — DISCHARGE INSTRUCTIONS
Continue taking the Keflex medication you were prescribed 2 days ago. If you are given an antibiotic then, make sure you get the prescription filled and take the antibiotics until finished. Drink plenty of water while taking the antibiotics. Avoid drinking alcohol or drinks that have caffeine in it while taking antibiotics. If you were given the medication pyridium (or take over the counter Azo) - do not wear any contacts for the next week since this medication will turn your tears an orange color and will stain the contacts. For pain use acetaminophen (Tylenol) or ibuprofen (Motrin / Advil), unless prescribed medications that have acetaminophen or ibuprofen (or similar medications) in it. You can take over the counter acetaminophen tablets (1 - 2 tablets of the 500-mg strength every 6 hours) or ibuprofen tablets (2 tablets every 4 hours). PLEASE RETURN TO THE EMERGENCY DEPARTMENT IMMEDIATELY for worsening symptoms, inability to urinate, worsening of blood in your urine, or if you develop any concerning symptoms such as: high fever not relieved by acetaminophen (Tylenol) and/or ibuprofen (Motrin / Advil), chills, shortness of breath, chest pain, feeling of your heart fluttering or racing, persistent nausea and/or vomiting, vomiting up blood, blood in your stool, loss of consciousness, numbness, weakness or tingling in the arms or legs or change in color of the extremities, changes in mental status, persistent headache, blurry vision, loss of bladder / bowel.

## 2022-08-12 NOTE — ED NOTES
Patient incontinent of urine. Pt repositioned in bed. Linens changed. Naidne care performed. External catheter applied along with new brief.      Holly Serna RN  08/11/22 [id number]

## 2022-08-12 NOTE — ED NOTES
Patient now c/o of urinary frequency. Pt initially c/o urinary retention.       Jovany Colin RN  08/11/22 [id number]

## 2022-09-04 PROBLEM — N39.0 UTI (URINARY TRACT INFECTION): Status: RESOLVED | Noted: 2022-08-04 | Resolved: 2022-09-04

## 2022-09-30 ENCOUNTER — APPOINTMENT (OUTPATIENT)
Dept: CT IMAGING | Age: 72
End: 2022-09-30
Payer: COMMERCIAL

## 2022-09-30 ENCOUNTER — APPOINTMENT (OUTPATIENT)
Dept: GENERAL RADIOLOGY | Age: 72
End: 2022-09-30
Payer: COMMERCIAL

## 2022-09-30 ENCOUNTER — HOSPITAL ENCOUNTER (EMERGENCY)
Age: 72
Discharge: HOME | End: 2022-09-30
Attending: EMERGENCY MEDICINE
Payer: COMMERCIAL

## 2022-09-30 VITALS
TEMPERATURE: 97.7 F | SYSTOLIC BLOOD PRESSURE: 146 MMHG | BODY MASS INDEX: 24.99 KG/M2 | HEIGHT: 65 IN | OXYGEN SATURATION: 100 % | WEIGHT: 150 LBS | HEART RATE: 96 BPM | RESPIRATION RATE: 17 BRPM | DIASTOLIC BLOOD PRESSURE: 84 MMHG

## 2022-09-30 DIAGNOSIS — N30.00 ACUTE CYSTITIS WITHOUT HEMATURIA: Primary | ICD-10-CM

## 2022-09-30 LAB
ABSOLUTE EOS #: 0.13 K/UL (ref 0–0.44)
ABSOLUTE IMMATURE GRANULOCYTE: 0.01 K/UL (ref 0–0.3)
ABSOLUTE LYMPH #: 1.86 K/UL (ref 1.1–3.7)
ABSOLUTE MONO #: 0.68 K/UL (ref 0.1–1.2)
ALBUMIN SERPL-MCNC: 4 G/DL (ref 3.5–5.2)
ALP BLD-CCNC: 119 U/L (ref 35–104)
ALT SERPL-CCNC: 16 U/L (ref 5–33)
ANION GAP SERPL CALCULATED.3IONS-SCNC: 13 MMOL/L (ref 9–17)
AST SERPL-CCNC: 22 U/L
BASOPHILS # BLD: 1 % (ref 0–2)
BASOPHILS ABSOLUTE: 0.03 K/UL (ref 0–0.2)
BILIRUB SERPL-MCNC: 0.4 MG/DL (ref 0.3–1.2)
BILIRUBIN URINE: NEGATIVE
BUN BLDV-MCNC: 15 MG/DL (ref 8–23)
BUN/CREAT BLD: 21 (ref 9–20)
CALCIUM SERPL-MCNC: 10 MG/DL (ref 8.6–10.4)
CHLORIDE BLD-SCNC: 99 MMOL/L (ref 98–107)
CHP ED QC CHECK: NORMAL
CO2: 24 MMOL/L (ref 20–31)
COLOR: YELLOW
CREAT SERPL-MCNC: 0.72 MG/DL (ref 0.5–0.9)
EKG ATRIAL RATE: 91 BPM
EKG P AXIS: 70 DEGREES
EKG P-R INTERVAL: 122 MS
EKG Q-T INTERVAL: 438 MS
EKG QRS DURATION: 110 MS
EKG QTC CALCULATION (BAZETT): 538 MS
EKG R AXIS: -16 DEGREES
EKG T AXIS: 4 DEGREES
EKG VENTRICULAR RATE: 91 BPM
EOSINOPHILS RELATIVE PERCENT: 2 % (ref 1–4)
EPITHELIAL CELLS UA: ABNORMAL /HPF (ref 0–5)
GFR AFRICAN AMERICAN: >60 ML/MIN
GFR NON-AFRICAN AMERICAN: >60 ML/MIN
GFR SERPL CREATININE-BSD FRML MDRD: ABNORMAL ML/MIN/{1.73_M2}
GLUCOSE BLD-MCNC: 101 MG/DL
GLUCOSE BLD-MCNC: 101 MG/DL (ref 65–105)
GLUCOSE BLD-MCNC: 107 MG/DL (ref 70–99)
GLUCOSE URINE: NEGATIVE
HCT VFR BLD CALC: 41.3 % (ref 36.3–47.1)
HEMOGLOBIN: 13.2 G/DL (ref 11.9–15.1)
IMMATURE GRANULOCYTES: 0 %
KETONES, URINE: NEGATIVE
LEUKOCYTE ESTERASE, URINE: ABNORMAL
LIPASE: 19 U/L (ref 13–60)
LYMPHOCYTES # BLD: 30 % (ref 24–43)
MCH RBC QN AUTO: 29.3 PG (ref 25.2–33.5)
MCHC RBC AUTO-ENTMCNC: 32 G/DL (ref 28.4–34.8)
MCV RBC AUTO: 91.6 FL (ref 82.6–102.9)
MONOCYTES # BLD: 11 % (ref 3–12)
MUCUS: ABNORMAL
NITRITE, URINE: NEGATIVE
NRBC AUTOMATED: 0 PER 100 WBC
PDW BLD-RTO: 13.6 % (ref 11.8–14.4)
PH UA: 6 (ref 5–8)
PLATELET # BLD: 253 K/UL (ref 138–453)
PMV BLD AUTO: 9.1 FL (ref 8.1–13.5)
POTASSIUM SERPL-SCNC: 3.9 MMOL/L (ref 3.7–5.3)
PROTEIN UA: NEGATIVE
RBC # BLD: 4.51 M/UL (ref 3.95–5.11)
RBC UA: ABNORMAL /HPF (ref 0–2)
SEG NEUTROPHILS: 56 % (ref 36–65)
SEGMENTED NEUTROPHILS ABSOLUTE COUNT: 3.43 K/UL (ref 1.5–8.1)
SODIUM BLD-SCNC: 136 MMOL/L (ref 135–144)
SPECIFIC GRAVITY UA: 1.01 (ref 1–1.03)
TOTAL PROTEIN: 7.3 G/DL (ref 6.4–8.3)
TROPONIN, HIGH SENSITIVITY: 12 NG/L (ref 0–14)
TURBIDITY: CLEAR
URINE HGB: NEGATIVE
UROBILINOGEN, URINE: NORMAL
WBC # BLD: 6.1 K/UL (ref 3.5–11.3)
WBC UA: ABNORMAL /HPF (ref 0–5)

## 2022-09-30 PROCEDURE — 6360000002 HC RX W HCPCS: Performed by: EMERGENCY MEDICINE

## 2022-09-30 PROCEDURE — 80053 COMPREHEN METABOLIC PANEL: CPT

## 2022-09-30 PROCEDURE — 87086 URINE CULTURE/COLONY COUNT: CPT

## 2022-09-30 PROCEDURE — 96376 TX/PRO/DX INJ SAME DRUG ADON: CPT

## 2022-09-30 PROCEDURE — 83690 ASSAY OF LIPASE: CPT

## 2022-09-30 PROCEDURE — 85025 COMPLETE CBC W/AUTO DIFF WBC: CPT

## 2022-09-30 PROCEDURE — 84484 ASSAY OF TROPONIN QUANT: CPT

## 2022-09-30 PROCEDURE — 96374 THER/PROPH/DIAG INJ IV PUSH: CPT

## 2022-09-30 PROCEDURE — 93010 ELECTROCARDIOGRAM REPORT: CPT | Performed by: INTERNAL MEDICINE

## 2022-09-30 PROCEDURE — 82947 ASSAY GLUCOSE BLOOD QUANT: CPT

## 2022-09-30 PROCEDURE — 71045 X-RAY EXAM CHEST 1 VIEW: CPT

## 2022-09-30 PROCEDURE — 96375 TX/PRO/DX INJ NEW DRUG ADDON: CPT

## 2022-09-30 PROCEDURE — 93005 ELECTROCARDIOGRAM TRACING: CPT | Performed by: EMERGENCY MEDICINE

## 2022-09-30 PROCEDURE — 74176 CT ABD & PELVIS W/O CONTRAST: CPT

## 2022-09-30 PROCEDURE — 81001 URINALYSIS AUTO W/SCOPE: CPT

## 2022-09-30 PROCEDURE — 6370000000 HC RX 637 (ALT 250 FOR IP): Performed by: EMERGENCY MEDICINE

## 2022-09-30 PROCEDURE — 99285 EMERGENCY DEPT VISIT HI MDM: CPT

## 2022-09-30 RX ORDER — ONDANSETRON 2 MG/ML
4 INJECTION INTRAMUSCULAR; INTRAVENOUS ONCE
Status: COMPLETED | OUTPATIENT
Start: 2022-09-30 | End: 2022-09-30

## 2022-09-30 RX ORDER — CEPHALEXIN 500 MG/1
500 CAPSULE ORAL ONCE
Status: COMPLETED | OUTPATIENT
Start: 2022-09-30 | End: 2022-09-30

## 2022-09-30 RX ORDER — CEPHALEXIN 500 MG/1
500 CAPSULE ORAL 2 TIMES DAILY
Qty: 10 CAPSULE | Refills: 0 | Status: SHIPPED | OUTPATIENT
Start: 2022-09-30 | End: 2022-11-03

## 2022-09-30 RX ORDER — AZITHROMYCIN 500 MG/1
500 TABLET, FILM COATED ORAL DAILY
Qty: 4 TABLET | Refills: 0 | Status: SHIPPED | OUTPATIENT
Start: 2022-10-01 | End: 2022-10-05

## 2022-09-30 RX ORDER — MORPHINE SULFATE 2 MG/ML
2 INJECTION, SOLUTION INTRAMUSCULAR; INTRAVENOUS ONCE
Status: COMPLETED | OUTPATIENT
Start: 2022-09-30 | End: 2022-09-30

## 2022-09-30 RX ORDER — AZITHROMYCIN 250 MG/1
500 TABLET, FILM COATED ORAL ONCE
Status: COMPLETED | OUTPATIENT
Start: 2022-09-30 | End: 2022-09-30

## 2022-09-30 RX ORDER — KETOROLAC TROMETHAMINE 15 MG/ML
15 INJECTION, SOLUTION INTRAMUSCULAR; INTRAVENOUS ONCE
Status: COMPLETED | OUTPATIENT
Start: 2022-09-30 | End: 2022-09-30

## 2022-09-30 RX ADMIN — KETOROLAC TROMETHAMINE 15 MG: 15 INJECTION, SOLUTION INTRAMUSCULAR; INTRAVENOUS at 07:09

## 2022-09-30 RX ADMIN — ONDANSETRON 4 MG: 2 INJECTION INTRAMUSCULAR; INTRAVENOUS at 06:16

## 2022-09-30 RX ADMIN — MORPHINE SULFATE 2 MG: 2 INJECTION, SOLUTION INTRAMUSCULAR; INTRAVENOUS at 06:16

## 2022-09-30 RX ADMIN — CEPHALEXIN 500 MG: 500 CAPSULE ORAL at 08:12

## 2022-09-30 RX ADMIN — MORPHINE SULFATE 2 MG: 2 INJECTION, SOLUTION INTRAMUSCULAR; INTRAVENOUS at 07:09

## 2022-09-30 RX ADMIN — AZITHROMYCIN MONOHYDRATE 500 MG: 250 TABLET ORAL at 08:12

## 2022-09-30 ASSESSMENT — PAIN SCALES - GENERAL
PAINLEVEL_OUTOF10: 10
PAINLEVEL_OUTOF10: 10

## 2022-09-30 ASSESSMENT — PAIN DESCRIPTION - LOCATION: LOCATION: ABDOMEN

## 2022-09-30 ASSESSMENT — PAIN - FUNCTIONAL ASSESSMENT: PAIN_FUNCTIONAL_ASSESSMENT: 0-10

## 2022-09-30 NOTE — ED NOTES
Patient education flyer \"[facility] Taking Antibiotics: What you need to know\" was provided and reviewed. Questions answered and understanding was verbalized by the patient and/or family.         Vladimir Gonzalez RN  09/30/22 [id number]

## 2022-09-30 NOTE — ED NOTES
Pt called out multiple times for abdominal pain. Pt repeats \"my stomach hurts\". Pt redirected and assured she has been given medication. Dr notified. Safety maintained.       Thuy Ferreira RN  09/30/22 [id number]

## 2022-09-30 NOTE — ED NOTES
Patient presents to ED for various complaints. Arrived to ED via cab, thaddeus historian with hx of encephalopathy and cognitive delays. States that she is here for blood sugar problems, states that it is high and asks if it \"will kill her\". Also complaining of generalized abdominal tenderness and nausea. Following commands and answering questions appropriately, unsure what patients baseline is. Patient ambulates with a cane normally.        Camie Epley, RN  09/30/22 [id number]

## 2022-09-30 NOTE — ED PROVIDER NOTES
EMERGENCY DEPARTMENT ENCOUNTER    Pt Name: Oumou Ellsworth  MRN: [id number]  [place] 1950  Date of evaluation: 9/30/22  CHIEF COMPLAINT       Chief Complaint   Patient presents with    Shortness of Breath    Abdominal Pain    Constipation     X3 days     HISTORY OF PRESENT ILLNESS   76-year-old female presents emergency room for abdominal discomfort nausea and burning with urination. Patient has history of encephalopathy and cognitive changes. She is a limited historian. Patient currently only tell me the above information. She appears to have some associated nausea. Per EMR records patient was seen a couple weeks ago for similar symptoms. She was discharged with antibiotics for home. It is unclear if symptoms had resolved prior to patient coming in today. REVIEW OF SYSTEMS     Review of Systems   Unable to perform ROS: Other   Cognitive delay  PASTMEDICAL HISTORY     Past Medical History:   Diagnosis Date    Arthritis     knees    Bronchitis x1 long ago    Chronic obstructive pulmonary disease ([address] 9/12/2017    Colon polyps     Controlled type 2 diabetes mellitus without complication, without long-term current use of insulin ([address] 6/29/2021    Dental neglect     poor dentation. Missing teeth. No loose teeth    Depression     Environmental allergies     GERD (gastroesophageal reflux disease)     Hyperlipidemia     Hypertension     Obesity     Onychomycosis     Poor historian     RBBB     Treadmill stress test negative for angina pectoris 2009    Wears glasses     reading only     Past Problem List  Patient Active Problem List   Diagnosis Code    GERD (gastroesophageal reflux disease) K21.9    Hypertension I10    Hyperlipidemia E78.5    Depression F32. A    Bronchitis J40    HTN (hypertension) I10    HLD (hyperlipidemia) E78.5    Osteoarthritis M19.90    Osteoarthritis of right knee M17.11    Closed nondisplaced fracture of head of right radius S52.124A    Chronic obstructive pulmonary disease [facility]) J44.9    Smoker F17.200    Chronic pain of left knee M25.562, G89.29    Pre-diabetes R73.03    Falls frequently R29.6    Cognitive and behavioral changes R41.89, S03.95    Toxic metabolic encephalopathy U78.1    Chest pain R07.9    Ileus (HCC) K56.7    Urinary retention R33.9    Normocytic normochromic anemia D64.9    BMI 28.0-28.9,adult Z68.28     SURGICAL HISTORY       Past Surgical History:   Procedure Laterality Date    CHOLECYSTECTOMY      COLONOSCOPY  6/2013    one hyperplastic polyp    DOPPLER ECHOCARDIOGRAPHY      cardiac    TUBAL LIGATION       CURRENT MEDICATIONS       Discharge Medication List as of 9/30/2022  7:35 AM        CONTINUE these medications which have NOT CHANGED    Details   tamsulosin (FLOMAX) 0.4 MG capsule Take 1 capsule by mouth in the morning., Disp-30 capsule, R-3Normal      ondansetron (ZOFRAN ODT) 4 MG disintegrating tablet Take 1 tablet by mouth every 8 hours as needed for Nausea, Disp-15 tablet, R-0Print      acetaminophen (TYLENOL) 500 MG tablet Take 2 tablets by mouth 4 times daily as needed for Pain, Disp-60 tablet, R-0Print      ciclopirox (LOPROX) 0.77 % cream ciclopirox 0.77 % topical cream, Historical Med      albuterol sulfate  (90 Base) MCG/ACT inhaler albuterol sulfate HFA 90 mcg/actuation aerosol inhalerHistorical Med      atorvastatin (LIPITOR) 20 MG tablet Take 1 tablet by mouth daily, Disp-30 tablet, R-3Normal      tiotropium (SPIRIVA RESPIMAT) 1.25 MCG/ACT AERS inhaler Inhale 2 puffs into the lungs daily, Disp-1 Inhaler, R-3Normal      haloperidol (HALDOL) 5 MG tablet Take 5 mg by mouth 2 times dailyHistorical Med      traZODone (DESYREL) 100 MG tablet Historical Med      Elastic Bandages & Supports (ACE ARM SLING) MISC DAILY Starting 4/17/2017, Until Discontinued, Disp-1 each, R-0, Normal      trihexyphenidyl (ARTANE) 2 MG tablet Take 2 mg by mouth 2 times daily Historical Med           ALLERGIES     has No Known Allergies.   FAMILY HISTORY     She indicated that her mother is . She indicated that her father is . She indicated that her maternal grandmother is . She indicated that her maternal grandfather is . She indicated that her paternal grandmother is . She indicated that her paternal grandfather is . SOCIAL HISTORY       Social History     Tobacco Use    Smoking status: Former     Packs/day: 0.50     Years: 35.00     Pack years: 17.50     Types: Cigarettes     Quit date: 2015     Years since quittin.2    Smokeless tobacco: Never   Substance Use Topics    Alcohol use: No     Alcohol/week: 0.0 standard drinks    Drug use: No     PHYSICAL EXAM     INITIAL VITALS: BP (!) 146/84   Pulse 96   Temp 97.7 °F (36.5 °C) (Oral)   Resp 17   Ht 5' 5\" (1.651 m)   Wt 150 lb (68 kg)   SpO2 100%   BMI 24.96 kg/m²    Physical Exam  Constitutional:       General: She is not in acute distress. Appearance: She is well-developed. HENT:      Head: Normocephalic. Eyes:      Pupils: Pupils are equal, round, and reactive to light. Cardiovascular:      Rate and Rhythm: Normal rate and regular rhythm. Heart sounds: Normal heart sounds. Pulmonary:      Effort: Pulmonary effort is normal. No respiratory distress. Breath sounds: Normal breath sounds. Abdominal:      General: Bowel sounds are normal.      Palpations: Abdomen is soft. Tenderness: There is generalized abdominal tenderness. Musculoskeletal:         General: Normal range of motion. Skin:     General: Skin is warm and dry. Neurological:      Mental Status: She is alert and oriented to person, place, and time. MEDICAL DECISION MAKIN-year-old female presenting to the emergency room with dysuria and generalized abdominal discomfort. Patient has stable vitals here in the ED.   She has generalized tenderness on examination however no peritoneal signs and have is overall a soft benign abdominal exam.  I have low suspicion for acute surgical abdomen. Disposition pending CT abdomen pelvis results. Case signed out to Dr. Sheila Hayes at shift change. CRITICAL CARE:       PROCEDURES:    Procedures    DIAGNOSTIC RESULTS   EKG:All EKG's are interpreted by the Emergency Department Physician who either signs or Co-signs this chart in the absence of a cardiologist.    EKG sinus rhythm with premature atrial complexes  Right bundle branch block  Ventricular rate 91    QTc 538    RADIOLOGY:All plain film, CT, MRI, and formal ultrasound images (except ED bedside ultrasound) are read by the radiologist, see reports below, unless otherwisenoted in MDM or here. CT ABDOMEN PELVIS WO CONTRAST Additional Contrast? None   Final Result   1. Right lower lobe airspace disease which could represent mild infiltrate or   sequela of aspiration. Follow-up is recommended to document resolution. 2. Mild stool burden. Moderate stool in the rectal vault. Segmental area of   narrowing in the sigmoid colon which could be related to stricture or   incomplete distension. Please correlate with any recent colonoscopy result. 3. Normal appendix. 4. Left-sided parapelvic cyst formation. No obstructing calculus or   hydronephrosis. 5. Atherosclerotic calcification and tortuosity of the abdominal aorta and   branch vasculature. 6. Prior cholecystectomy. 7. Small hiatal hernia. 8. Coronary artery disease. 9. 2.7 cm duodenal diverticulum. XR CHEST PORTABLE   Final Result   No acute airspace disease identified. LABS: All lab results were reviewed by myself, and all abnormals are listed below.   Labs Reviewed   COMPREHENSIVE METABOLIC PANEL - Abnormal; Notable for the following components:       Result Value    Glucose 107 (*)     Bun/Cre Ratio 21 (*)     Alkaline Phosphatase 119 (*)     All other components within normal limits   URINALYSIS WITH REFLEX TO CULTURE - Abnormal; Notable for the following components: Leukocyte Esterase, Urine MODERATE (*)     All other components within normal limits   MICROSCOPIC URINALYSIS - Abnormal; Notable for the following components:    Mucus, UA 1+ (*)     All other components within normal limits   POCT GLUCOSE - Normal   CULTURE, URINE   CBC WITH AUTO DIFFERENTIAL   TROPONIN   LIPASE   POC GLUCOSE FINGERSTICK       EMERGENCY DEPARTMENTCOURSE:         Vitals:    Vitals:    09/30/22 0552   BP: (!) 146/84   Pulse: 96   Resp: 17   Temp: 97.7 °F (36.5 °C)   TempSrc: Oral   SpO2: 100%   Weight: 150 lb (68 kg)   Height: 5' 5\" (1.651 m)       The patient was given the following medications while in the emergency department:  Orders Placed This Encounter   Medications    ondansetron (ZOFRAN) injection 4 mg    morphine (PF) injection 2 mg    ketorolac (TORADOL) injection 15 mg    morphine (PF) injection 2 mg    azithromycin (ZITHROMAX) tablet 500 mg     Order Specific Question:   Antimicrobial Indications     Answer:   Pneumonia (VAP)    cephALEXin (KEFLEX) capsule 500 mg     Order Specific Question:   Antimicrobial Indications     Answer:   Urinary Tract Infection    azithromycin (ZITHROMAX) 500 MG tablet     Sig: Take 1 tablet by mouth daily for 4 days     Dispense:  4 tablet     Refill:  0    cephALEXin (KEFLEX) 500 MG capsule     Sig: Take 1 capsule by mouth 2 times daily     Dispense:  10 capsule     Refill:  0     CONSULTS:  None    FINAL IMPRESSION      1.  Acute cystitis without hematuria          DISPOSITION/PLAN   DISPOSITION Decision To Discharge 09/30/2022 07:31:46 AM      PATIENT REFERRED TO:  Elda Sheikh  [address] [place] [address]  [phone]    In 2 days    DISCHARGE MEDICATIONS:  Discharge Medication List as of 9/30/2022  7:35 AM        START taking these medications    Details   azithromycin (ZITHROMAX) 500 MG tablet Take 1 tablet by mouth daily for 4 days, Disp-4 tablet, R-0Normal      cephALEXin (KEFLEX) 500 MG capsule Take 1 capsule by mouth 2 times daily, Disp-10 capsule, R-0Normal           Redia Meigs, MD  Attending Emergency Physician      Care during this encounter was due to an unprecedented national emergency due to COVID-19.              Cholo Chan MD  10/03/22 9216

## 2022-10-01 LAB
CULTURE: NO GROWTH
SPECIMEN DESCRIPTION: NORMAL

## 2022-10-14 ENCOUNTER — HOSPITAL ENCOUNTER (EMERGENCY)
Age: 72
Discharge: HOME | End: 2022-10-15
Attending: STUDENT IN AN ORGANIZED HEALTH CARE EDUCATION/TRAINING PROGRAM
Payer: COMMERCIAL

## 2022-10-14 ENCOUNTER — APPOINTMENT (OUTPATIENT)
Dept: CT IMAGING | Age: 72
End: 2022-10-14
Payer: COMMERCIAL

## 2022-10-14 DIAGNOSIS — R10.84 GENERALIZED ABDOMINAL PAIN: Primary | ICD-10-CM

## 2022-10-14 LAB
ABSOLUTE EOS #: 0.1 K/UL (ref 0–0.4)
ABSOLUTE LYMPH #: 1.4 K/UL (ref 1–4.8)
ABSOLUTE MONO #: 0.5 K/UL (ref 0.1–1.3)
ALBUMIN SERPL-MCNC: 3.9 G/DL (ref 3.5–5.2)
ALP BLD-CCNC: 118 U/L (ref 35–104)
ALT SERPL-CCNC: 15 U/L (ref 5–33)
ANION GAP SERPL CALCULATED.3IONS-SCNC: 8 MMOL/L (ref 9–17)
AST SERPL-CCNC: 24 U/L
BASOPHILS # BLD: 1 % (ref 0–2)
BASOPHILS ABSOLUTE: 0 K/UL (ref 0–0.2)
BILIRUB SERPL-MCNC: 0.3 MG/DL (ref 0.3–1.2)
BUN BLDV-MCNC: 15 MG/DL (ref 8–23)
CALCIUM SERPL-MCNC: 9.5 MG/DL (ref 8.6–10.4)
CHLORIDE BLD-SCNC: 103 MMOL/L (ref 98–107)
CO2: 27 MMOL/L (ref 20–31)
CREAT SERPL-MCNC: 0.83 MG/DL (ref 0.5–0.9)
EOSINOPHILS RELATIVE PERCENT: 1 % (ref 0–4)
GFR SERPL CREATININE-BSD FRML MDRD: >60 ML/MIN/1.73M2
GLUCOSE BLD-MCNC: 116 MG/DL (ref 70–99)
HCT VFR BLD CALC: 36.8 % (ref 36–46)
HEMOGLOBIN: 12.7 G/DL (ref 12–16)
LACTIC ACID: 0.9 MMOL/L (ref 0.5–2.2)
LIPASE: 19 U/L (ref 13–60)
LYMPHOCYTES # BLD: 30 % (ref 24–44)
MAGNESIUM: 2 MG/DL (ref 1.6–2.6)
MCH RBC QN AUTO: 30.9 PG (ref 26–34)
MCHC RBC AUTO-ENTMCNC: 34.6 G/DL (ref 31–37)
MCV RBC AUTO: 89.4 FL (ref 80–100)
MONOCYTES # BLD: 11 % (ref 1–7)
PDW BLD-RTO: 14.7 % (ref 11.5–14.9)
PLATELET # BLD: 251 K/UL (ref 150–450)
PMV BLD AUTO: 7.3 FL (ref 6–12)
POTASSIUM SERPL-SCNC: 4.1 MMOL/L (ref 3.7–5.3)
RBC # BLD: 4.12 M/UL (ref 4–5.2)
SEG NEUTROPHILS: 57 % (ref 36–66)
SEGMENTED NEUTROPHILS ABSOLUTE COUNT: 2.7 K/UL (ref 1.3–9.1)
SODIUM BLD-SCNC: 138 MMOL/L (ref 135–144)
TOTAL PROTEIN: 6.7 G/DL (ref 6.4–8.3)
WBC # BLD: 4.8 K/UL (ref 3.5–11)

## 2022-10-14 PROCEDURE — 83605 ASSAY OF LACTIC ACID: CPT

## 2022-10-14 PROCEDURE — 6360000004 HC RX CONTRAST MEDICATION: Performed by: STUDENT IN AN ORGANIZED HEALTH CARE EDUCATION/TRAINING PROGRAM

## 2022-10-14 PROCEDURE — 96375 TX/PRO/DX INJ NEW DRUG ADDON: CPT

## 2022-10-14 PROCEDURE — 2580000003 HC RX 258: Performed by: STUDENT IN AN ORGANIZED HEALTH CARE EDUCATION/TRAINING PROGRAM

## 2022-10-14 PROCEDURE — 93005 ELECTROCARDIOGRAM TRACING: CPT

## 2022-10-14 PROCEDURE — 83735 ASSAY OF MAGNESIUM: CPT

## 2022-10-14 PROCEDURE — 36415 COLL VENOUS BLD VENIPUNCTURE: CPT

## 2022-10-14 PROCEDURE — 6360000002 HC RX W HCPCS: Performed by: STUDENT IN AN ORGANIZED HEALTH CARE EDUCATION/TRAINING PROGRAM

## 2022-10-14 PROCEDURE — 96374 THER/PROPH/DIAG INJ IV PUSH: CPT

## 2022-10-14 PROCEDURE — 80053 COMPREHEN METABOLIC PANEL: CPT

## 2022-10-14 PROCEDURE — 83690 ASSAY OF LIPASE: CPT

## 2022-10-14 PROCEDURE — 74177 CT ABD & PELVIS W/CONTRAST: CPT

## 2022-10-14 PROCEDURE — 99285 EMERGENCY DEPT VISIT HI MDM: CPT

## 2022-10-14 PROCEDURE — 85025 COMPLETE CBC W/AUTO DIFF WBC: CPT

## 2022-10-14 RX ORDER — 0.9 % SODIUM CHLORIDE 0.9 %
1000 INTRAVENOUS SOLUTION INTRAVENOUS ONCE
Status: COMPLETED | OUTPATIENT
Start: 2022-10-14 | End: 2022-10-15

## 2022-10-14 RX ORDER — SODIUM CHLORIDE 0.9 % (FLUSH) 0.9 %
10 SYRINGE (ML) INJECTION AS NEEDED
Status: DISCONTINUED | OUTPATIENT
Start: 2022-10-14 | End: 2022-10-15 | Stop reason: HOSPADM

## 2022-10-14 RX ORDER — ONDANSETRON 2 MG/ML
4 INJECTION INTRAMUSCULAR; INTRAVENOUS ONCE
Status: COMPLETED | OUTPATIENT
Start: 2022-10-14 | End: 2022-10-14

## 2022-10-14 RX ORDER — 0.9 % SODIUM CHLORIDE 0.9 %
100 INTRAVENOUS SOLUTION INTRAVENOUS ONCE
Status: COMPLETED | OUTPATIENT
Start: 2022-10-14 | End: 2022-10-15

## 2022-10-14 RX ORDER — MORPHINE SULFATE 4 MG/ML
4 INJECTION, SOLUTION INTRAMUSCULAR; INTRAVENOUS
Status: COMPLETED | OUTPATIENT
Start: 2022-10-14 | End: 2022-10-14

## 2022-10-14 RX ADMIN — SODIUM CHLORIDE 1000 ML: 9 INJECTION, SOLUTION INTRAVENOUS at 22:24

## 2022-10-14 RX ADMIN — ONDANSETRON 4 MG: 2 INJECTION INTRAMUSCULAR; INTRAVENOUS at 22:24

## 2022-10-14 RX ADMIN — MORPHINE SULFATE 4 MG: 4 INJECTION, SOLUTION INTRAMUSCULAR; INTRAVENOUS at 22:27

## 2022-10-14 RX ADMIN — IOPAMIDOL 75 ML: 755 INJECTION, SOLUTION INTRAVENOUS at 23:03

## 2022-10-14 RX ADMIN — SODIUM CHLORIDE 100 ML: 9 INJECTION, SOLUTION INTRAVENOUS at 23:03

## 2022-10-14 RX ADMIN — SODIUM CHLORIDE, PRESERVATIVE FREE 10 ML: 5 INJECTION INTRAVENOUS at 23:03

## 2022-10-14 ASSESSMENT — PAIN DESCRIPTION - ORIENTATION: ORIENTATION: LOWER

## 2022-10-14 ASSESSMENT — ENCOUNTER SYMPTOMS
ABDOMINAL PAIN: 1
VOMITING: 1
SORE THROAT: 0
NAUSEA: 1
EYE DISCHARGE: 0
RHINORRHEA: 0
SHORTNESS OF BREATH: 0
EYE REDNESS: 0
DIARRHEA: 0

## 2022-10-14 ASSESSMENT — PAIN SCALES - GENERAL
PAINLEVEL_OUTOF10: 7
PAINLEVEL_OUTOF10: 5

## 2022-10-14 ASSESSMENT — PAIN - FUNCTIONAL ASSESSMENT: PAIN_FUNCTIONAL_ASSESSMENT: 0-10

## 2022-10-14 ASSESSMENT — PAIN DESCRIPTION - DESCRIPTORS: DESCRIPTORS: CRAMPING

## 2022-10-15 VITALS
WEIGHT: 150 LBS | HEART RATE: 70 BPM | TEMPERATURE: 97.6 F | SYSTOLIC BLOOD PRESSURE: 111 MMHG | DIASTOLIC BLOOD PRESSURE: 58 MMHG | OXYGEN SATURATION: 100 % | RESPIRATION RATE: 16 BRPM | BODY MASS INDEX: 24.96 KG/M2

## 2022-10-15 LAB
BILIRUBIN URINE: NEGATIVE
COLOR: YELLOW
COMMENT UA: NORMAL
GLUCOSE URINE: NEGATIVE
KETONES, URINE: NEGATIVE
LEUKOCYTE ESTERASE, URINE: NEGATIVE
NITRITE, URINE: NEGATIVE
PH UA: 5.5 (ref 5–8)
PROTEIN UA: NEGATIVE
SPECIFIC GRAVITY UA: 1.03 (ref 1–1.03)
TURBIDITY: CLEAR
URINE HGB: NEGATIVE
UROBILINOGEN, URINE: NORMAL

## 2022-10-15 PROCEDURE — 81003 URINALYSIS AUTO W/O SCOPE: CPT

## 2022-10-15 RX ORDER — ONDANSETRON 4 MG/1
4 TABLET, ORALLY DISINTEGRATING ORAL 3 TIMES DAILY PRN
Qty: 21 TABLET | Refills: 0 | Status: SHIPPED | OUTPATIENT
Start: 2022-10-15

## 2022-10-15 NOTE — ED PROVIDER NOTES
EMERGENCY DEPARTMENT ENCOUNTER    Pt Name: Gunnar Montero  MRN: [id number]  [place] 1950  Date of evaluation: 10/14/22  CHIEF COMPLAINT       Chief Complaint   Patient presents with    Abdominal Cramping     States that she has a UTI and lower abd pain      HISTORY OF PRESENT ILLNESS   Is a 57-year-old woman history of hypertension, hyperlipidemia, previous episodes of urinary retention and ileus presenting with abdominal pain she states she is having some diffuse abdominal pain. Crampy. Having some chills. Feels like she has pain with urination    No vaginal discharge or bleeding    Mild cough no chest pain or pressure no shortness of breath    She has had nausea and vomiting nonbloody nonbilious. Passing gas but not having bowel movements            REVIEW OF SYSTEMS     Review of Systems   Constitutional:  Negative for chills and fever. HENT:  Negative for rhinorrhea and sore throat. Eyes:  Negative for discharge and redness. Respiratory:  Negative for shortness of breath. Cardiovascular:  Negative for chest pain. Gastrointestinal:  Positive for abdominal pain, nausea and vomiting. Negative for diarrhea. Genitourinary:  Negative for dysuria, frequency and urgency. Musculoskeletal:  Negative for arthralgias and myalgias. Skin:  Negative for rash. Neurological:  Negative for weakness and numbness. Psychiatric/Behavioral:  Negative for suicidal ideas. All other systems reviewed and are negative. PASTMEDICAL HISTORY     Past Medical History:   Diagnosis Date    Arthritis     knees    Bronchitis x1 long ago    Chronic obstructive pulmonary disease ([address] 9/12/2017    Colon polyps     Controlled type 2 diabetes mellitus without complication, without long-term current use of insulin ([address] 6/29/2021    Dental neglect     poor dentation. Missing teeth.  No loose teeth    Depression     Environmental allergies     GERD (gastroesophageal reflux disease)     Hyperlipidemia     Hypertension Obesity     Onychomycosis     Poor historian     RBBB     Treadmill stress test negative for angina pectoris 2009    Wears glasses     reading only     Past Problem List  Patient Active Problem List   Diagnosis Code    GERD (gastroesophageal reflux disease) K21.9    Hypertension I10    Hyperlipidemia E78.5    Depression F32. A    Bronchitis J40    HTN (hypertension) I10    HLD (hyperlipidemia) E78.5    Osteoarthritis M19.90    Osteoarthritis of right knee M17.11    Closed nondisplaced fracture of head of right radius S52.124A    Chronic obstructive pulmonary disease (HCC) J44.9    Smoker F17.200    Chronic pain of left knee M25.562, G89.29    Pre-diabetes R73.03    Falls frequently R29.6    Cognitive and behavioral changes R41.89, Y75.86    Toxic metabolic encephalopathy A23.4    Chest pain R07.9    Ileus (HCC) K56.7    Urinary retention R33.9    Normocytic normochromic anemia D64.9    BMI 28.0-28.9,adult Z68.28     SURGICAL HISTORY       Past Surgical History:   Procedure Laterality Date    CHOLECYSTECTOMY      COLONOSCOPY  6/2013    one hyperplastic polyp    DOPPLER ECHOCARDIOGRAPHY      cardiac    TUBAL LIGATION       CURRENT MEDICATIONS       Previous Medications    ACETAMINOPHEN (TYLENOL) 500 MG TABLET    Take 2 tablets by mouth 4 times daily as needed for Pain    ALBUTEROL SULFATE  (90 BASE) MCG/ACT INHALER    albuterol sulfate HFA 90 mcg/actuation aerosol inhaler    ATORVASTATIN (LIPITOR) 20 MG TABLET    Take 1 tablet by mouth daily    CEPHALEXIN (KEFLEX) 500 MG CAPSULE    Take 1 capsule by mouth 2 times daily    CICLOPIROX (LOPROX) 0.77 % CREAM    ciclopirox 0.77 % topical cream    ELASTIC BANDAGES & SUPPORTS (ACE ARM SLING) MISC    Apply 1 Units topically daily    HALOPERIDOL (HALDOL) 5 MG TABLET    Take 5 mg by mouth 2 times daily    ONDANSETRON (ZOFRAN ODT) 4 MG DISINTEGRATING TABLET    Take 1 tablet by mouth every 8 hours as needed for Nausea    TAMSULOSIN (FLOMAX) 0.4 MG CAPSULE    Take 1 capsule by mouth in the morning. TIOTROPIUM (SPIRIVA RESPIMAT) 1.25 MCG/ACT AERS INHALER    Inhale 2 puffs into the lungs daily    TRAZODONE (DESYREL) 100 MG TABLET        TRIHEXYPHENIDYL (ARTANE) 2 MG TABLET    Take 2 mg by mouth 2 times daily      ALLERGIES     has No Known Allergies. FAMILY HISTORY     She indicated that her mother is . She indicated that her father is . She indicated that her maternal grandmother is . She indicated that her maternal grandfather is . She indicated that her paternal grandmother is . She indicated that her paternal grandfather is . SOCIAL HISTORY       Social History     Tobacco Use    Smoking status: Former     Packs/day: 0.50     Years: 35.00     Pack years: 17.50     Types: Cigarettes     Quit date: 2015     Years since quittin.2    Smokeless tobacco: Never   Substance Use Topics    Alcohol use: No     Alcohol/week: 0.0 standard drinks    Drug use: No     PHYSICAL EXAM     INITIAL VITALS: BP (!) 111/58   Pulse 70   Temp 97.6 °F (36.4 °C) (Oral)   Resp 16   Wt 150 lb (68 kg)   SpO2 100%   BMI 24.96 kg/m²    Physical Exam  Vitals and nursing note reviewed. Constitutional:       Appearance: Normal appearance. HENT:      Head: Normocephalic and atraumatic. Nose: Nose normal.      Mouth/Throat:      Mouth: Mucous membranes are moist.   Eyes:      Conjunctiva/sclera: Conjunctivae normal.      Pupils: Pupils are equal, round, and reactive to light. Cardiovascular:      Rate and Rhythm: Normal rate and regular rhythm. Pulses: Normal pulses. Heart sounds: Normal heart sounds. Pulmonary:      Effort: Pulmonary effort is normal.      Breath sounds: Normal breath sounds. Abdominal:      Palpations: Abdomen is soft. Tenderness: There is abdominal tenderness. Musculoskeletal:         General: No swelling or deformity. Cervical back: Normal range of motion.    Skin:     General: Skin is warm.      Findings: No rash. Neurological:      General: No focal deficit present. Mental Status: She is alert and oriented to person, place, and time. Psychiatric:         Mood and Affect: Mood normal.       MEDICAL DECISION MAKINyear-old abdominal pain nausea vomiting dysuria    Differential UTI, obstruction, appendicitis, gastroenteritis, sepsis    Will obtain labs and CT  ED Course as of 10/15/22 0133   [facility] Oct 14, 2022   2214 CBC with Auto Differential(!):    WBC 4.8   RBC 4.12   Hemoglobin Quant 12.7   Hematocrit 36.8   MCV 89.4   MCH 30.9   MCHC 34.6   RDW 14.7   Platelet Count 293   MPV 7.3   Seg Neutrophils 57   Lymphocytes 30   Monocytes 11(!)   Eosinophils % 1   Basophils 1   Segs Absolute 2.70   Absolute Lymph # 1.40   Absolute Mono # 0.50   Absolute Eos # 0.10   Basophils Absolute 0.00  Normal [ANJELICA]   2235 CMP(!):    Glucose, Random 116(!)   BUN,BUNPL 15   Creatinine 0.83   Est, Glom Filt Rate >60   CALCIUM, SERUM, 524749 9.5   Sodium 138   Potassium 4.1   Chloride 103   CO2 27   Anion Gap 8(!)   Alk Phos 118(!)   ALT 15   AST 24   Bilirubin 0.3   Total Protein 6.7   Albumin 3.9  Normal [ANJELICA]   2235 Magnesium:    Magnesium 2.0  Normal [ANJELICA]   2235 Lipase:    Lipase 19  Normal [ANJELICA]   2235 Lactic Acid:    Lactic Acid 0.9  Normal [ANJELICA]   2351 CT ABDOMEN PELVIS W IV CONTRAST Additional Contrast? None  Normal [ANJELICA]   Sat Oct 15, 2022   0053 Urinalysis with Reflex to Culture:    Color, UA Yellow   Turbidity UA Clear   Glucose, UA NEGATIVE   Bilirubin, Urine NEGATIVE   Ketones, Urine NEGATIVE   Specific [place], UA 1.027   Urine Hgb NEGATIVE   pH, UA 5.5   Protein, UA NEGATIVE   Urobilinogen, Urine Normal   Nitrite, Urine NEGATIVE   Leukocyte Esterase, Urine NEGATIVE   Urinalysis Comments Microscopic exam not performed based on chemical results unless requested in original order.   Normal [ANJELICA]      ED Course User Index  [ANJELICA] Yaneth Peterson MD       CRITICAL CARE:       PROCEDURES:    EKG 12 Lead    Date/Time: 10/14/2022 9:04 PM  Performed by: Giana Whitehead MD  Authorized by: Giana Whitehead MD     ECG reviewed by ED Physician in the absence of a cardiologist: yes    Interpretation:     Interpretation: normal    Rate:     ECG rate:  69    ECG rate assessment: normal    Rhythm:     Rhythm: sinus rhythm    Ectopy:     Ectopy: none    QRS:     QRS axis:  Normal    QRS intervals:  Normal    QRS conduction: normal    ST segments:     ST segments:  Normal  T waves:     T waves: inverted      Inverted:  III and aVF  Q waves:     Abnormal Q-waves: not present      DIAGNOSTIC RESULTS   EKG:All EKG's are interpreted by the Emergency Department Physician who either signs or Co-signs this chart in the absence of a cardiologist.        RADIOLOGY:All plain film, CT, MRI, and formal ultrasound images (except ED bedside ultrasound) are read by the radiologist, see reports below, unless otherwisenoted in MDM or here. CT ABDOMEN PELVIS W IV CONTRAST Additional Contrast? None   Final Result   No acute or concerning abnormality identified. No finding to explain the   patient's symptoms. LABS: All lab results were reviewed by myself, and all abnormals are listed below.   Labs Reviewed   CBC WITH AUTO DIFFERENTIAL - Abnormal; Notable for the following components:       Result Value    Monocytes 11 (*)     All other components within normal limits   COMPREHENSIVE METABOLIC PANEL - Abnormal; Notable for the following components:    Glucose 116 (*)     Anion Gap 8 (*)     Alkaline Phosphatase 118 (*)     All other components within normal limits   LIPASE   MAGNESIUM   URINALYSIS WITH REFLEX TO CULTURE   LACTIC ACID       EMERGENCY DEPARTMENTCOURSE:     59-year-old male presenting with vague abdominal pain    Work-up here including labs and CT all unremarkable    Discharged with Zofran for nausea and return precautions    Primary doctor within two days    Vitals:    Vitals:    10/14/22 2016 10/15/22 0018 10/15/22 0130   BP: (!) 116/56 123/63 (!) 111/58   Pulse: 70 73 70   Resp: 14 16 16   Temp: 97.3 °F (36.3 °C) 97.6 °F (36.4 °C)    TempSrc:  Oral    SpO2: 99% 100% 100%   Weight: 150 lb (68 kg)         The patient was given the following medications while in the emergency department:  Orders Placed This Encounter   Medications    0.9 % sodium chloride bolus    morphine sulfate (PF) injection 4 mg    ondansetron (ZOFRAN) injection 4 mg    0.9 % sodium chloride bolus    iopamidol (ISOVUE-370) 76 % injection 75 mL    sodium chloride flush 0.9 % injection 10 mL    ondansetron (ZOFRAN-ODT) 4 MG disintegrating tablet     Sig: Take 1 tablet by mouth 3 times daily as needed for Nausea or Vomiting     Dispense:  21 tablet     Refill:  0     CONSULTS:  None    FINAL IMPRESSION      1. Generalized abdominal pain          DISPOSITION/PLAN   DISPOSITION Decision To Discharge 10/15/2022 01:05:17 AM      PATIENT REFERRED TO:  Chrissie Bruno  [address] [facility]  [phone]    Schedule an appointment as soon as possible for a visit       [facility] ED  [facility] [address] [phone]    As needed  DISCHARGE MEDICATIONS:  New Prescriptions    ONDANSETRON (ZOFRAN-ODT) 4 MG DISINTEGRATING TABLET    Take 1 tablet by mouth 3 times daily as needed for Nausea or Vomiting     The care is provided during an unprecedented national emergency due to the novel coronavirus, COVID 19.   MD Humza Dean MD  10/15/22 9808

## 2022-10-19 LAB
EKG ATRIAL RATE: 65 BPM
EKG ATRIAL RATE: 69 BPM
EKG P AXIS: 49 DEGREES
EKG P AXIS: 63 DEGREES
EKG P-R INTERVAL: 112 MS
EKG P-R INTERVAL: 112 MS
EKG Q-T INTERVAL: 460 MS
EKG Q-T INTERVAL: 470 MS
EKG QRS DURATION: 110 MS
EKG QRS DURATION: 110 MS
EKG QTC CALCULATION (BAZETT): 488 MS
EKG QTC CALCULATION (BAZETT): 492 MS
EKG R AXIS: -31 DEGREES
EKG R AXIS: -35 DEGREES
EKG T AXIS: 0 DEGREES
EKG T AXIS: 2 DEGREES
EKG VENTRICULAR RATE: 65 BPM
EKG VENTRICULAR RATE: 69 BPM

## 2022-11-03 ENCOUNTER — APPOINTMENT (OUTPATIENT)
Dept: GENERAL RADIOLOGY | Age: 72
End: 2022-11-03
Payer: COMMERCIAL

## 2022-11-03 ENCOUNTER — HOSPITAL ENCOUNTER (EMERGENCY)
Age: 72
Discharge: HOME | End: 2022-11-03
Attending: STUDENT IN AN ORGANIZED HEALTH CARE EDUCATION/TRAINING PROGRAM
Payer: COMMERCIAL

## 2022-11-03 VITALS
SYSTOLIC BLOOD PRESSURE: 120 MMHG | TEMPERATURE: 97.8 F | RESPIRATION RATE: 16 BRPM | WEIGHT: 150 LBS | HEIGHT: 65 IN | OXYGEN SATURATION: 100 % | HEART RATE: 65 BPM | BODY MASS INDEX: 24.99 KG/M2 | DIASTOLIC BLOOD PRESSURE: 62 MMHG

## 2022-11-03 DIAGNOSIS — K59.00 CONSTIPATION, UNSPECIFIED CONSTIPATION TYPE: Primary | ICD-10-CM

## 2022-11-03 DIAGNOSIS — N30.00 ACUTE CYSTITIS WITHOUT HEMATURIA: ICD-10-CM

## 2022-11-03 LAB
BACTERIA: ABNORMAL
BILIRUBIN URINE: NEGATIVE
CASTS UA: ABNORMAL /LPF
CASTS UA: ABNORMAL /LPF
COLOR: YELLOW
CRYSTALS, UA: ABNORMAL /HPF
EPITHELIAL CELLS UA: ABNORMAL /HPF (ref 0–5)
GLUCOSE URINE: NEGATIVE
KETONES, URINE: NEGATIVE
LEUKOCYTE ESTERASE, URINE: ABNORMAL
MUCUS: ABNORMAL
NITRITE, URINE: NEGATIVE
PH UA: 5.5 (ref 5–8)
PROTEIN UA: NEGATIVE
RBC UA: ABNORMAL /HPF (ref 0–2)
SPECIFIC GRAVITY UA: 1.01 (ref 1–1.03)
TURBIDITY: ABNORMAL
URINE HGB: NEGATIVE
UROBILINOGEN, URINE: NORMAL
WBC UA: ABNORMAL /HPF (ref 0–5)

## 2022-11-03 PROCEDURE — 81001 URINALYSIS AUTO W/SCOPE: CPT

## 2022-11-03 PROCEDURE — 87086 URINE CULTURE/COLONY COUNT: CPT

## 2022-11-03 PROCEDURE — 99284 EMERGENCY DEPT VISIT MOD MDM: CPT

## 2022-11-03 PROCEDURE — 6370000000 HC RX 637 (ALT 250 FOR IP): Performed by: STUDENT IN AN ORGANIZED HEALTH CARE EDUCATION/TRAINING PROGRAM

## 2022-11-03 PROCEDURE — 74018 RADEX ABDOMEN 1 VIEW: CPT

## 2022-11-03 RX ORDER — CEPHALEXIN 500 MG/1
500 CAPSULE ORAL ONCE
Status: COMPLETED | OUTPATIENT
Start: 2022-11-03 | End: 2022-11-03

## 2022-11-03 RX ORDER — DOCUSATE SODIUM 100 MG/1
100 CAPSULE, LIQUID FILLED ORAL 2 TIMES DAILY
Qty: 60 CAPSULE | Refills: 0 | Status: SHIPPED | OUTPATIENT
Start: 2022-11-03

## 2022-11-03 RX ORDER — POLYETHYLENE GLYCOL 3350 17 G/17G
17 POWDER, FOR SOLUTION ORAL DAILY
Qty: 238 G | Refills: 0 | Status: SHIPPED | OUTPATIENT
Start: 2022-11-03 | End: 2022-11-03 | Stop reason: SDUPTHER

## 2022-11-03 RX ORDER — CEPHALEXIN 500 MG/1
500 CAPSULE ORAL 2 TIMES DAILY
Qty: 14 CAPSULE | Refills: 0 | Status: SHIPPED | OUTPATIENT
Start: 2022-11-03 | End: 2022-11-03 | Stop reason: SDUPTHER

## 2022-11-03 RX ORDER — DOCUSATE SODIUM 100 MG/1
100 CAPSULE, LIQUID FILLED ORAL ONCE
Status: COMPLETED | OUTPATIENT
Start: 2022-11-03 | End: 2022-11-03

## 2022-11-03 RX ORDER — DOCUSATE SODIUM 100 MG/1
100 CAPSULE, LIQUID FILLED ORAL 2 TIMES DAILY
Qty: 60 CAPSULE | Refills: 0 | Status: SHIPPED | OUTPATIENT
Start: 2022-11-03 | End: 2022-11-03 | Stop reason: SDUPTHER

## 2022-11-03 RX ORDER — POLYETHYLENE GLYCOL 3350 17 G/17G
17 POWDER, FOR SOLUTION ORAL ONCE
Status: COMPLETED | OUTPATIENT
Start: 2022-11-03 | End: 2022-11-03

## 2022-11-03 RX ORDER — CEPHALEXIN 500 MG/1
500 CAPSULE ORAL 2 TIMES DAILY
Qty: 14 CAPSULE | Refills: 0 | Status: ON HOLD | OUTPATIENT
Start: 2022-11-03 | End: 2022-11-11 | Stop reason: HOSPADM

## 2022-11-03 RX ORDER — POLYETHYLENE GLYCOL 3350 17 G/17G
17 POWDER, FOR SOLUTION ORAL DAILY
Qty: 238 G | Refills: 0 | Status: SHIPPED | OUTPATIENT
Start: 2022-11-03 | End: 2022-11-17

## 2022-11-03 RX ADMIN — DOCUSATE SODIUM 100 MG: 100 CAPSULE ORAL at 20:22

## 2022-11-03 RX ADMIN — POLYETHYLENE GLYCOL 3350 17 G: 17 POWDER, FOR SOLUTION ORAL at 20:22

## 2022-11-03 RX ADMIN — CEPHALEXIN 500 MG: 500 CAPSULE ORAL at 20:22

## 2022-11-03 ASSESSMENT — ENCOUNTER SYMPTOMS
SHORTNESS OF BREATH: 0
EYE DISCHARGE: 0
COLOR CHANGE: 0
EYE REDNESS: 0
ABDOMINAL PAIN: 1

## 2022-11-03 ASSESSMENT — PAIN SCALES - GENERAL: PAINLEVEL_OUTOF10: 10

## 2022-11-03 ASSESSMENT — PAIN - FUNCTIONAL ASSESSMENT: PAIN_FUNCTIONAL_ASSESSMENT: 0-10

## 2022-11-03 NOTE — ED PROVIDER NOTES
[facility]  Emergency Department Encounter     Pt Name: Lb Pate  MRN: [id number]  [place] 1950  Date of evaluation: 11/3/22  PCP:  [facility]       Chief Complaint   Patient presents with    Constipation     Pt states she hasn't had BM in 2 weeks      Abdominal Pain       HISTORY OFPRESENT ILLNESS  (Location/Symptom, Timing/Onset, Context/Setting, Quality, Duration, Modifying Factors,Severity.)      Lb Pate is a 66 y.o. female who presents with 2 weeks of constipation and generalized abdominal pain. She has not tried any treatment at home. Has been tolerating p.o. intake. Reports not having any bowel movements in the last 2 weeks. Denies any pain medication. Abdominal pain is intermittent. Entire abdomen. Crampy. PAST MEDICAL / SURGICAL / SOCIAL / FAMILY HISTORY      has a past medical history of Arthritis, Bronchitis, Chronic obstructive pulmonary disease ([address] Colon polyps, Controlled type 2 diabetes mellitus without complication, without long-term current use of insulin ([address] Dental neglect, Depression, Environmental allergies, GERD (gastroesophageal reflux disease), Hyperlipidemia, Hypertension, Obesity, Onychomycosis, Poor historian, RBBB, Treadmill stress test negative for angina pectoris, and Wears glasses. has a past surgical history that includes Cholecystectomy; doppler echocardiography; Colonoscopy (2013); and Tubal ligation. Social History     Socioeconomic History    Marital status: Single     Spouse name: Not on file    Number of children: Not on file    Years of education: Not on file    Highest education level: Not on file   Occupational History    Not on file   Tobacco Use    Smoking status: Former     Packs/day: 0.50     Years: 35.00     Pack years: 17.50     Types: Cigarettes     Quit date: 2015     Years since quittin.2    Smokeless tobacco: Never   Substance and Sexual Activity    Alcohol use:  No Alcohol/week: 0.0 standard drinks    Drug use: No    Sexual activity: Not Currently   Other Topics Concern    Not on file   Social History Narrative    Not on file     Social Determinants of Health     Financial Resource Strain: Not on file   Food Insecurity: Not on file   Transportation Needs: Not on file   Physical Activity: Not on file   Stress: Not on file   Social Connections: Not on file   Intimate Partner Violence: Not on file   Housing Stability: Not on file       Family History   Problem Relation Age of Onset    Heart Disease Mother     Cancer Father        Allergies:  Patient has no known allergies. Home Medications:  Prior to Admission medications    Medication Sig Start Date End Date Taking? Authorizing Provider   cephALEXin (KEFLEX) 500 MG capsule Take 1 capsule by mouth 2 times daily for 7 days 11/3/22 11/10/22 Yes Rupesh Velasquez DO   docusate sodium (COLACE) 100 MG capsule Take 1 capsule by mouth 2 times daily 11/3/22  Yes Rupesh eVlasquez DO   polyethylene glycol (MIRALAX) 17 GM/SCOOP powder Take 17 g by mouth daily for 14 days PRN constipation 11/3/22 11/17/22 Yes Rupesh Velasquez DO   ondansetron (ZOFRAN-ODT) 4 MG disintegrating tablet Take 1 tablet by mouth 3 times daily as needed for Nausea or Vomiting 10/15/22   Rebecca Tran MD   tamsulosin (FLOMAX) 0.4 MG capsule Take 1 capsule by mouth in the morning.  8/6/22   Bonnetta Kayser,    ondansetron (ZOFRAN ODT) 4 MG disintegrating tablet Take 1 tablet by mouth every 8 hours as needed for Nausea 6/18/22   Anel Langley,    acetaminophen (TYLENOL) 500 MG tablet Take 2 tablets by mouth 4 times daily as needed for Pain 5/17/22   Malini Valdivia DO   ibuprofen (IBU) 800 MG tablet Take 1 tablet by mouth every 8 hours as needed for Pain 5/17/22 8/5/22  Malini Valdivia DO   ciclopirox (LOPROX) 0.77 % cream ciclopirox 0.77 % topical cream    Historical Provider, MD   albuterol sulfate  (90 Base) MCG/ACT inhaler albuterol sulfate HFA 90 mcg/actuation aerosol inhaler    Historical Provider, MD   atorvastatin (LIPITOR) 20 MG tablet Take 1 tablet by mouth daily 6/29/21   Haven Perry MD   tiotropium (SPIRIVA RESPIMAT) 1.25 MCG/ACT AERS inhaler Inhale 2 puffs into the lungs daily 8/12/19   Aurther Spatz, MD   haloperidol (HALDOL) 5 MG tablet Take 5 mg by mouth 2 times daily    Historical Provider, MD   traZODone (DESYREL) 100 MG tablet  8/9/17   Historical Provider, MD   Elastic Bandages & Supports (ACE ARM SLING) MISC Apply 1 Units topically daily 4/17/17   Matheus Woodruff MD   trihexyphenidyl (ARTANE) 2 MG tablet Take 2 mg by mouth 2 times daily  10/10/14   Historical Provider, MD       REVIEW OF SYSTEMS    (2-9 systems for level 4, 10 or more for level 5)      Review of Systems   Constitutional:  Negative for chills and fever. Eyes:  Negative for discharge and redness. Respiratory:  Negative for shortness of breath. Cardiovascular:  Negative for chest pain. Gastrointestinal:  Positive for abdominal pain. Genitourinary:  Negative for flank pain. Musculoskeletal:  Negative for myalgias. Skin:  Negative for color change and rash. Allergic/Immunologic: Negative for environmental allergies. Neurological:  Negative for headaches. Psychiatric/Behavioral:  Negative for agitation and confusion. PHYSICAL EXAM   (up to 7 for level 4, 8 or more for level 5)     INITIAL VITALS:    height is 5' 5\" (1.651 m) and weight is 150 lb (68 kg). Her oral temperature is 97.8 °F (36.6 °C). Her blood pressure is 120/62 and her pulse is 65. Her respiration is 16 and oxygen saturation is 100%. Physical Exam  Vitals and nursing note reviewed. Constitutional:       Appearance: She is well-developed. HENT:      Head: Normocephalic and atraumatic. Nose: Nose normal.      Mouth/Throat:      Mouth: Mucous membranes are moist.   Eyes:      General: No scleral icterus.      Conjunctiva/sclera: Conjunctivae normal.      Pupils: Pupils are equal, round, and reactive to light. Cardiovascular:      Rate and Rhythm: Normal rate and regular rhythm. Heart sounds: Normal heart sounds. No murmur heard. No friction rub. No gallop. Pulmonary:      Effort: Pulmonary effort is normal. No respiratory distress. Breath sounds: Normal breath sounds. No wheezing or rales. Abdominal:      Palpations: Abdomen is soft. Tenderness: There is abdominal tenderness. Comments: Soft abdomen, mild generalized abdominal tenderness to palpation   Musculoskeletal:         General: Normal range of motion. Skin:     General: Skin is warm and dry. Findings: No erythema or rash. Neurological:      Mental Status: She is alert and oriented to person, place, and time.    Psychiatric:         Behavior: Behavior normal.       DIFFERENTIAL  DIAGNOSIS     PLAN ([facility] / IMAGING / EKG):  Orders Placed This Encounter   Procedures    Culture, Urine    XR ABDOMEN (KUB) (SINGLE AP VIEW)    Urinalysis with Reflex to Culture    Microscopic Urinalysis       MEDICATIONS ORDERED:  Orders Placed This Encounter   Medications    docusate sodium (COLACE) capsule 100 mg    polyethylene glycol (GLYCOLAX) packet 17 g    cephALEXin (KEFLEX) capsule 500 mg     Order Specific Question:   Antimicrobial Indications     Answer:   Urinary Tract Infection    DISCONTD: cephALEXin (KEFLEX) 500 MG capsule     Sig: Take 1 capsule by mouth 2 times daily for 7 days     Dispense:  14 capsule     Refill:  0    DISCONTD: docusate sodium (COLACE) 100 MG capsule     Sig: Take 1 capsule by mouth 2 times daily     Dispense:  60 capsule     Refill:  0    DISCONTD: polyethylene glycol (MIRALAX) 17 GM/SCOOP powder     Sig: Take 17 g by mouth daily for 7 days PRN constipation     Dispense:  238 g     Refill:  0    cephALEXin (KEFLEX) 500 MG capsule     Sig: Take 1 capsule by mouth 2 times daily for 7 days     Dispense:  14 capsule     Refill:  0    docusate sodium (COLACE) 100 MG capsule Sig: Take 1 capsule by mouth 2 times daily     Dispense:  60 capsule     Refill:  0    polyethylene glycol (MIRALAX) 17 GM/SCOOP powder     Sig: Take 17 g by mouth daily for 14 days PRN constipation     Dispense:  238 g     Refill:  0       DDX: UTI versus constipation versus dehydration    Initial MDM/Plan: 66 y.o. female who presents with constipation and abdominal pain. Otherwise nontoxic completely normal vital signs. Ongoing for 2 weeks. Will get KUB renal anomalies free air however doubt this plan symptomatic treatment. Check urine sample    DIAGNOSTIC RESULTS / EMERGENCY DEPARTMENT COURSE / MDM     LABS:  Labs Reviewed   URINALYSIS WITH REFLEX TO CULTURE - Abnormal; Notable for the following components:       Result Value    Turbidity UA SLIGHTLY CLOUDY (*)     Leukocyte Esterase, Urine MOD (*)     All other components within normal limits   MICROSCOPIC URINALYSIS - Abnormal; Notable for the following components:    Crystals, UA 0 TO 2 CALCIUM OXALATE (*)     Bacteria, UA FEW (*)     Mucus, UA 1+ (*)     All other components within normal limits   CULTURE, URINE         RADIOLOGY:  XR ABDOMEN (KUB) (SINGLE AP VIEW)    Result Date: 11/3/2022  EXAMINATION: ONE SUPINE XRAY VIEW(S) OF THE ABDOMEN 11/3/2022 4:36 pm COMPARISON: 08/04/2022 and correlated with CT of 10/14/2022 HISTORY: ORDERING SYSTEM PROVIDED HISTORY: Constipation TECHNOLOGIST PROVIDED HISTORY: Constipation Reason for Exam: Constipation, abdomen pain FINDINGS: Nonspecific bowel gas pattern without evidence of obstruction. 10 mm cluster of calcifications projected over the left upper quadrant which are outside the UPJ. .  No acute osseous abnormality. No evidence of bowel obstruction.         EMERGENCY DEPARTMENT COURSE:  ED Course as of 11/04/22 0509   u Nov 03, 2022 2010 Bacteria, UA(!): FEW [MS]   2011 WBC, UA: 10 TO 20 [MS]   2011 Leukocyte Esterase, Urine(!): MOD [MS]      ED Course User Index  [MS] Alexandra Truong DO   X-ray negative. Does have some bacteria in urine as well as 10-20 whites. Will cover with Keflex given constipation. MiraLAX Colace. Follow-up with primary care. Based on the low acuity of concerning symptoms and improvement of symptoms, patient will be discharged with follow up and prescription information listed in the Disposition section. Patient states they will follow-up with primary care physician and/or return to the emergency department should they experience a change or worsening of symptoms. Patient will be discharged with resources: summary of visit, instructions, follow-up information, prescriptions if necessary. Patient/ family instructed to read discharge paperwork. All of their questions and concerns were addressed. Suspicion for any acute life-threatening processes is low. Patient voices understanding of plan. PROCEDURES:  None    CONSULTS:  None    CRITICAL CARE:  0    FINAL IMPRESSION      1. Constipation, unspecified constipation type    2.  Acute cystitis without hematuria          DISPOSITION / PLAN     DISPOSITION Decision To Discharge 11/03/2022 08:14:51 PM    Discharge    PATIENTREFERRED TO:  Alexia Littlejohn  [address] [facility]  [phone]    Schedule an appointment as soon as possible for a visit in 1 week  As needed    DISCHARGE MEDICATIONS:  Discharge Medication List as of 11/3/2022  8:26 PM          Tammi James DO  EmergencyMedicine Attending    (Please note that portions of this note were completed with a voice recognition program.  Efforts were made to edit the dictations but occasionally words are mis-transcribed.)       Tammi James DO  11/04/22 0522

## 2022-11-05 LAB
CULTURE: NO GROWTH
SPECIMEN DESCRIPTION: NORMAL

## 2022-11-09 ENCOUNTER — APPOINTMENT (OUTPATIENT)
Dept: CT IMAGING | Age: 72
End: 2022-11-09
Payer: COMMERCIAL

## 2022-11-09 ENCOUNTER — HOSPITAL ENCOUNTER (OUTPATIENT)
Age: 72
Setting detail: OBSERVATION
Discharge: HOME | End: 2022-11-11
Attending: EMERGENCY MEDICINE | Admitting: INTERNAL MEDICINE
Payer: COMMERCIAL

## 2022-11-09 DIAGNOSIS — R45.851 SUICIDAL THOUGHTS: ICD-10-CM

## 2022-11-09 DIAGNOSIS — R41.82 ALTERED MENTAL STATUS, UNSPECIFIED ALTERED MENTAL STATUS TYPE: ICD-10-CM

## 2022-11-09 DIAGNOSIS — N30.00 ACUTE CYSTITIS WITHOUT HEMATURIA: Primary | ICD-10-CM

## 2022-11-09 LAB
ABSOLUTE EOS #: 0.1 K/UL (ref 0–0.4)
ABSOLUTE LYMPH #: 1.4 K/UL (ref 1–4.8)
ABSOLUTE MONO #: 0.5 K/UL (ref 0.1–1.3)
ALBUMIN SERPL-MCNC: 3.9 G/DL (ref 3.5–5.2)
ALP BLD-CCNC: 109 U/L (ref 35–104)
ALT SERPL-CCNC: 31 U/L (ref 5–33)
AMORPHOUS: ABNORMAL
ANION GAP SERPL CALCULATED.3IONS-SCNC: 6 MMOL/L (ref 9–17)
AST SERPL-CCNC: 27 U/L
BACTERIA: ABNORMAL
BASOPHILS # BLD: 1 % (ref 0–2)
BASOPHILS ABSOLUTE: 0 K/UL (ref 0–0.2)
BILIRUB SERPL-MCNC: 0.4 MG/DL (ref 0.3–1.2)
BILIRUBIN URINE: NEGATIVE
BUN BLDV-MCNC: 11 MG/DL (ref 8–23)
CALCIUM SERPL-MCNC: 9.5 MG/DL (ref 8.6–10.4)
CHLORIDE BLD-SCNC: 105 MMOL/L (ref 98–107)
CO2: 30 MMOL/L (ref 20–31)
COLOR: YELLOW
CREAT SERPL-MCNC: 0.66 MG/DL (ref 0.5–0.9)
EOSINOPHILS RELATIVE PERCENT: 2 % (ref 0–4)
EPITHELIAL CELLS UA: ABNORMAL /HPF
GFR SERPL CREATININE-BSD FRML MDRD: >60 ML/MIN/1.73M2
GLUCOSE BLD-MCNC: 83 MG/DL (ref 70–99)
GLUCOSE URINE: NEGATIVE
HCT VFR BLD CALC: 36.5 % (ref 36–46)
HEMOGLOBIN: 11.8 G/DL (ref 12–16)
KETONES, URINE: NEGATIVE
LEUKOCYTE ESTERASE, URINE: ABNORMAL
LIPASE: 17 U/L (ref 13–60)
LYMPHOCYTES # BLD: 35 % (ref 24–44)
MAGNESIUM: 2 MG/DL (ref 1.6–2.6)
MCH RBC QN AUTO: 30.2 PG (ref 26–34)
MCHC RBC AUTO-ENTMCNC: 32.4 G/DL (ref 31–37)
MCV RBC AUTO: 93.1 FL (ref 80–100)
MONOCYTES # BLD: 11 % (ref 1–7)
MYOGLOBIN: 29 NG/ML (ref 25–58)
MYOGLOBIN: 29 NG/ML (ref 25–58)
NITRITE, URINE: NEGATIVE
PDW BLD-RTO: 15.3 % (ref 11.5–14.9)
PH UA: 7 (ref 5–8)
PLATELET # BLD: 221 K/UL (ref 150–450)
PMV BLD AUTO: 6.8 FL (ref 6–12)
POTASSIUM SERPL-SCNC: 3.6 MMOL/L (ref 3.7–5.3)
PRO-BNP: 300 PG/ML
PROTEIN UA: NEGATIVE
RBC # BLD: 3.92 M/UL (ref 4–5.2)
RBC UA: ABNORMAL /HPF
SEG NEUTROPHILS: 51 % (ref 36–66)
SEGMENTED NEUTROPHILS ABSOLUTE COUNT: 2.1 K/UL (ref 1.3–9.1)
SODIUM BLD-SCNC: 141 MMOL/L (ref 135–144)
SPECIFIC GRAVITY UA: 1 (ref 1–1.03)
THYROXINE, FREE: 1.04 NG/DL (ref 0.93–1.7)
TOTAL PROTEIN: 6.6 G/DL (ref 6.4–8.3)
TROPONIN, HIGH SENSITIVITY: 12 NG/L (ref 0–14)
TROPONIN, HIGH SENSITIVITY: 12 NG/L (ref 0–14)
TSH SERPL DL<=0.05 MIU/L-ACNC: 1.41 UIU/ML (ref 0.3–5)
TURBIDITY: CLEAR
URINE HGB: NEGATIVE
UROBILINOGEN, URINE: NORMAL
WBC # BLD: 4.1 K/UL (ref 3.5–11)
WBC UA: ABNORMAL /HPF

## 2022-11-09 PROCEDURE — 96372 THER/PROPH/DIAG INJ SC/IM: CPT

## 2022-11-09 PROCEDURE — 6370000000 HC RX 637 (ALT 250 FOR IP): Performed by: STUDENT IN AN ORGANIZED HEALTH CARE EDUCATION/TRAINING PROGRAM

## 2022-11-09 PROCEDURE — 84443 ASSAY THYROID STIM HORMONE: CPT

## 2022-11-09 PROCEDURE — 83874 ASSAY OF MYOGLOBIN: CPT

## 2022-11-09 PROCEDURE — 96365 THER/PROPH/DIAG IV INF INIT: CPT

## 2022-11-09 PROCEDURE — G0378 HOSPITAL OBSERVATION PER HR: HCPCS

## 2022-11-09 PROCEDURE — 81001 URINALYSIS AUTO W/SCOPE: CPT

## 2022-11-09 PROCEDURE — 83735 ASSAY OF MAGNESIUM: CPT

## 2022-11-09 PROCEDURE — 6360000002 HC RX W HCPCS: Performed by: EMERGENCY MEDICINE

## 2022-11-09 PROCEDURE — 84439 ASSAY OF FREE THYROXINE: CPT

## 2022-11-09 PROCEDURE — 96375 TX/PRO/DX INJ NEW DRUG ADDON: CPT

## 2022-11-09 PROCEDURE — 36415 COLL VENOUS BLD VENIPUNCTURE: CPT

## 2022-11-09 PROCEDURE — 83880 ASSAY OF NATRIURETIC PEPTIDE: CPT

## 2022-11-09 PROCEDURE — 94760 N-INVAS EAR/PLS OXIMETRY 1: CPT

## 2022-11-09 PROCEDURE — 84484 ASSAY OF TROPONIN QUANT: CPT

## 2022-11-09 PROCEDURE — 87086 URINE CULTURE/COLONY COUNT: CPT

## 2022-11-09 PROCEDURE — 87186 SC STD MICRODIL/AGAR DIL: CPT

## 2022-11-09 PROCEDURE — 6360000002 HC RX W HCPCS: Performed by: INTERNAL MEDICINE

## 2022-11-09 PROCEDURE — 74177 CT ABD & PELVIS W/CONTRAST: CPT

## 2022-11-09 PROCEDURE — 80053 COMPREHEN METABOLIC PANEL: CPT

## 2022-11-09 PROCEDURE — 99285 EMERGENCY DEPT VISIT HI MDM: CPT

## 2022-11-09 PROCEDURE — 87077 CULTURE AEROBIC IDENTIFY: CPT

## 2022-11-09 PROCEDURE — 6370000000 HC RX 637 (ALT 250 FOR IP)

## 2022-11-09 PROCEDURE — 6360000004 HC RX CONTRAST MEDICATION: Performed by: EMERGENCY MEDICINE

## 2022-11-09 PROCEDURE — 93005 ELECTROCARDIOGRAM TRACING: CPT | Performed by: EMERGENCY MEDICINE

## 2022-11-09 PROCEDURE — 94640 AIRWAY INHALATION TREATMENT: CPT

## 2022-11-09 PROCEDURE — 2580000003 HC RX 258: Performed by: INTERNAL MEDICINE

## 2022-11-09 PROCEDURE — 85025 COMPLETE CBC W/AUTO DIFF WBC: CPT

## 2022-11-09 PROCEDURE — 83690 ASSAY OF LIPASE: CPT

## 2022-11-09 PROCEDURE — 2580000003 HC RX 258: Performed by: EMERGENCY MEDICINE

## 2022-11-09 RX ORDER — DICYCLOMINE HYDROCHLORIDE 10 MG/ML
20 INJECTION INTRAMUSCULAR ONCE
Status: COMPLETED | OUTPATIENT
Start: 2022-11-09 | End: 2022-11-09

## 2022-11-09 RX ORDER — POLYETHYLENE GLYCOL 3350 17 G/17G
17 POWDER, FOR SOLUTION ORAL DAILY PRN
Status: DISCONTINUED | OUTPATIENT
Start: 2022-11-09 | End: 2022-11-11 | Stop reason: HOSPADM

## 2022-11-09 RX ORDER — ONDANSETRON 4 MG/1
4 TABLET, ORALLY DISINTEGRATING ORAL EVERY 8 HOURS PRN
Status: DISCONTINUED | OUTPATIENT
Start: 2022-11-09 | End: 2022-11-11 | Stop reason: HOSPADM

## 2022-11-09 RX ORDER — SODIUM CHLORIDE 9 MG/ML
25 INJECTION, SOLUTION INTRAVENOUS PRN
Status: DISCONTINUED | OUTPATIENT
Start: 2022-11-09 | End: 2022-11-11 | Stop reason: HOSPADM

## 2022-11-09 RX ORDER — SODIUM CHLORIDE 0.9 % (FLUSH) 0.9 %
10 SYRINGE (ML) INJECTION PRN
Status: DISCONTINUED | OUTPATIENT
Start: 2022-11-09 | End: 2022-11-11 | Stop reason: HOSPADM

## 2022-11-09 RX ORDER — POTASSIUM CHLORIDE 20 MEQ/1
40 TABLET, EXTENDED RELEASE ORAL ONCE
Status: COMPLETED | OUTPATIENT
Start: 2022-11-09 | End: 2022-11-09

## 2022-11-09 RX ORDER — SODIUM CHLORIDE 0.9 % (FLUSH) 0.9 %
5-40 SYRINGE (ML) INJECTION PRN
Status: DISCONTINUED | OUTPATIENT
Start: 2022-11-09 | End: 2022-11-11 | Stop reason: HOSPADM

## 2022-11-09 RX ORDER — ENOXAPARIN SODIUM 100 MG/ML
40 INJECTION SUBCUTANEOUS DAILY
Status: DISCONTINUED | OUTPATIENT
Start: 2022-11-09 | End: 2022-11-11 | Stop reason: HOSPADM

## 2022-11-09 RX ORDER — TRIHEXYPHENIDYL HYDROCHLORIDE 2 MG/1
2 TABLET ORAL 2 TIMES DAILY
Status: DISCONTINUED | OUTPATIENT
Start: 2022-11-09 | End: 2022-11-11 | Stop reason: HOSPADM

## 2022-11-09 RX ORDER — HALOPERIDOL 5 MG/1
5 TABLET ORAL 2 TIMES DAILY
Status: DISCONTINUED | OUTPATIENT
Start: 2022-11-09 | End: 2022-11-10

## 2022-11-09 RX ORDER — ONDANSETRON 2 MG/ML
4 INJECTION INTRAMUSCULAR; INTRAVENOUS EVERY 6 HOURS PRN
Status: DISCONTINUED | OUTPATIENT
Start: 2022-11-09 | End: 2022-11-11 | Stop reason: HOSPADM

## 2022-11-09 RX ORDER — SODIUM CHLORIDE 0.9 % (FLUSH) 0.9 %
5-40 SYRINGE (ML) INJECTION EVERY 12 HOURS SCHEDULED
Status: DISCONTINUED | OUTPATIENT
Start: 2022-11-09 | End: 2022-11-11 | Stop reason: HOSPADM

## 2022-11-09 RX ORDER — SENNA AND DOCUSATE SODIUM 50; 8.6 MG/1; MG/1
2 TABLET, FILM COATED ORAL 2 TIMES DAILY
Status: DISCONTINUED | OUTPATIENT
Start: 2022-11-09 | End: 2022-11-11 | Stop reason: HOSPADM

## 2022-11-09 RX ORDER — ACETAMINOPHEN 650 MG/1
650 SUPPOSITORY RECTAL EVERY 6 HOURS PRN
Status: DISCONTINUED | OUTPATIENT
Start: 2022-11-09 | End: 2022-11-11 | Stop reason: HOSPADM

## 2022-11-09 RX ORDER — 0.9 % SODIUM CHLORIDE 0.9 %
100 INTRAVENOUS SOLUTION INTRAVENOUS ONCE
Status: DISCONTINUED | OUTPATIENT
Start: 2022-11-09 | End: 2022-11-11 | Stop reason: HOSPADM

## 2022-11-09 RX ORDER — ACETAMINOPHEN 325 MG/1
650 TABLET ORAL EVERY 6 HOURS PRN
Status: DISCONTINUED | OUTPATIENT
Start: 2022-11-09 | End: 2022-11-11 | Stop reason: HOSPADM

## 2022-11-09 RX ADMIN — SENNOSIDES AND DOCUSATE SODIUM 2 TABLET: 50; 8.6 TABLET ORAL at 14:32

## 2022-11-09 RX ADMIN — DICYCLOMINE HYDROCHLORIDE 20 MG: 20 INJECTION, SOLUTION INTRAMUSCULAR at 09:51

## 2022-11-09 RX ADMIN — CEFTRIAXONE SODIUM 1000 MG: 1 INJECTION, POWDER, FOR SOLUTION INTRAMUSCULAR; INTRAVENOUS at 12:04

## 2022-11-09 RX ADMIN — POTASSIUM CHLORIDE 40 MEQ: 1500 TABLET, EXTENDED RELEASE ORAL at 16:41

## 2022-11-09 RX ADMIN — SODIUM CHLORIDE, PRESERVATIVE FREE 10 ML: 5 INJECTION INTRAVENOUS at 10:46

## 2022-11-09 RX ADMIN — SODIUM CHLORIDE, PRESERVATIVE FREE 10 ML: 5 INJECTION INTRAVENOUS at 20:58

## 2022-11-09 RX ADMIN — IOPAMIDOL 75 ML: 755 INJECTION, SOLUTION INTRAVENOUS at 10:45

## 2022-11-09 RX ADMIN — HALOPERIDOL 5 MG: 5 TABLET ORAL at 20:58

## 2022-11-09 RX ADMIN — ENOXAPARIN SODIUM 40 MG: 100 INJECTION SUBCUTANEOUS at 13:11

## 2022-11-09 RX ADMIN — TRIHEXYPHENIDYL HYDROCHLORIDE 2 MG: 2 TABLET ORAL at 20:58

## 2022-11-09 RX ADMIN — TIOTROPIUM BROMIDE INHALATION SPRAY 2 PUFF: 3.12 SPRAY, METERED RESPIRATORY (INHALATION) at 15:42

## 2022-11-09 ASSESSMENT — ENCOUNTER SYMPTOMS
EYE DISCHARGE: 0
COLOR CHANGE: 0
BLOOD IN STOOL: 0
VOMITING: 0
WHEEZING: 0
COUGH: 0
BACK PAIN: 0
FACIAL SWELLING: 0
TROUBLE SWALLOWING: 0
CONSTIPATION: 1
RHINORRHEA: 0
CHEST TIGHTNESS: 0
SORE THROAT: 0
EYE PAIN: 0
DIARRHEA: 0
NAUSEA: 0
EYE REDNESS: 0
SHORTNESS OF BREATH: 1
NAUSEA: 1
SINUS PRESSURE: 0
ABDOMINAL PAIN: 1

## 2022-11-09 ASSESSMENT — PATIENT HEALTH QUESTIONNAIRE - PHQ9: SUM OF ALL RESPONSES TO PHQ QUESTIONS 1-9: 8

## 2022-11-09 ASSESSMENT — LIFESTYLE VARIABLES
HOW MANY STANDARD DRINKS CONTAINING ALCOHOL DO YOU HAVE ON A TYPICAL DAY: PATIENT DOES NOT DRINK
HOW OFTEN DO YOU HAVE A DRINK CONTAINING ALCOHOL: NEVER
HOW MANY STANDARD DRINKS CONTAINING ALCOHOL DO YOU HAVE ON A TYPICAL DAY: PATIENT DOES NOT DRINK
HOW OFTEN DO YOU HAVE A DRINK CONTAINING ALCOHOL: NEVER

## 2022-11-09 ASSESSMENT — PAIN - FUNCTIONAL ASSESSMENT
PAIN_FUNCTIONAL_ASSESSMENT: NONE - DENIES PAIN
PAIN_FUNCTIONAL_ASSESSMENT: 0-10

## 2022-11-09 ASSESSMENT — PAIN SCALES - GENERAL
PAINLEVEL_OUTOF10: 0
PAINLEVEL_OUTOF10: 10

## 2022-11-09 NOTE — H&P
[address] [place] [address]     HISTORY AND PHYSICAL EXAMINATION            Date:   11/9/2022  Patient name:  America Castillo  Date of admission:  11/9/2022  9:37 AM  MRN:   [id number]  Account:  [de-identified]  YOB: 1950  PCP:    Karol Rocha  Room:   2059/2059-01  Code Status:    Full Code    Chief Complaint:     Chief Complaint   Patient presents with    Abdominal Pain    Foot Swelling    Mental Health Problem     History Obtained From:     patient, electronic medical record    History of Present Illness: The patient is a 66 y.o. Non- / non  female who presents withAbdominal Pain, Foot Swelling, and Mental Health Problem   and she is admitted to the hospital for the management of cystitis. Patient is a 78-year-old female past medical history significant for COPD, osteoarthritis, depression, hypertension, and hyperlipidemia who was brought in by the staff at [facility] because was complaining of abdominal pain. Per patient, she has been having generalized abdominal pain for the past couple days has not had much of an appetite and has felt nauseous. Patient is a very poor historian. Per chart review, patient son called to update staff on patient's current condition. Per son, patient received her psych medications from [facility] and she has not been compliant with them for the past few months, patient has been having difficulty sleeping, is having crying spells and has endorsed passive suicidal ideations which prompted the son to take her to rehab center to be evaluated. In ED, vitals were stable. Labs significant for low potassium of 3.6, elevated proBNP 300. Liver enzymes unremarkable, TSH normal at 1.41 thyroxine normal at 1.04. CBC significant for low hemoglobin of 11.8.   UA was positive for small leukocyte Estrace with 0-2 WBCs and few bacteria. CT abdomen was remarkable for cystitis and mild stool burden. Patient was given IV Rocephin x1 in ED. She was admitted to [facility] for further management of cystitis. Past Medical History:     Past Medical History:   Diagnosis Date    Arthritis     knees    Bronchitis x1 long ago    Chronic obstructive pulmonary disease ([facility] [address] 9/12/2017    Colon polyps     Controlled type 2 diabetes mellitus without complication, without long-term current use of insulin ([facility] [address] 6/29/2021    Dental neglect     poor dentation. Missing teeth. No loose teeth    Depression     Environmental allergies     GERD (gastroesophageal reflux disease)     Hyperlipidemia     Hypertension     Obesity     Onychomycosis     Poor historian     RBBB     Treadmill stress test negative for angina pectoris 2009    Wears glasses     reading only        Past SurgicalHistory:     Past Surgical History:   Procedure Laterality Date    CHOLECYSTECTOMY      COLONOSCOPY  6/2013    one hyperplastic polyp    DOPPLER ECHOCARDIOGRAPHY      cardiac    TUBAL LIGATION          Medications Prior to Admission:        Prior to Admission medications    Medication Sig Start Date End Date Taking? Authorizing Provider   cephALEXin (KEFLEX) 500 MG capsule Take 1 capsule by mouth 2 times daily for 7 days 11/3/22 11/10/22  Dorian Rich, DO   docusate sodium (COLACE) 100 MG capsule Take 1 capsule by mouth 2 times daily 11/3/22   Dorian Rich, DO   polyethylene glycol [facility]) 17 GM/SCOOP powder Take 17 g by mouth daily for 14 days PRN constipation 11/3/22 11/17/22  Dorian Rich, DO   ondansetron (ZOFRAN-ODT) 4 MG disintegrating tablet Take 1 tablet by mouth 3 times daily as needed for Nausea or Vomiting 10/15/22   Miguel Lloyd MD   tamsulosin (FLOMAX) 0.4 MG capsule Take 1 capsule by mouth in the morning.  8/6/22   Marialuisa Thomas,    ondansetron (ZOFRAN ODT) 4 MG disintegrating tablet Take 1 tablet by mouth every 8 hours as needed for Nausea 6/18/22   Martina Martinez, DO   acetaminophen (TYLENOL) 500 MG tablet Take 2 tablets by mouth 4 times daily as needed for Pain 5/17/22   Moedinah Humphrey Shea, DO   ibuprofen (IBU) 800 MG tablet Take 1 tablet by mouth every 8 hours as needed for Pain 5/17/22 8/5/22  Moedinah Humphrey iRcardom, DO   ciclopirox (LOPROX) 0.77 % cream ciclopirox 0.77 % topical cream    Historical Provider, MD   albuterol sulfate  (90 Base) MCG/ACT inhaler albuterol sulfate HFA 90 mcg/actuation aerosol inhaler    Historical Provider, MD   atorvastatin (LIPITOR) 20 MG tablet Take 1 tablet by mouth daily 6/29/21   Carollynn Duane, MD   tiotropium (SPIRIVA RESPIMAT) 1.25 MCG/ACT AERS inhaler Inhale 2 puffs into the lungs daily 8/12/19   Clare Verduzco MD   haloperidol (HALDOL) 5 MG tablet Take 5 mg by mouth 2 times daily    Historical Provider, MD   traZODone (DESYREL) 100 MG tablet  8/9/17   Historical Provider, MD   Elastic Bandages & Supports (ACE ARM SLING) MISC Apply 1 Units topically daily 4/17/17   Kaylee Jacques MD   trihexyphenidyl (ARTANE) 2 MG tablet Take 2 mg by mouth 2 times daily  10/10/14   Historical Provider, MD        Allergies:     Patient has no known allergies. Social History:     Tobacco:    reports that she quit smoking about 7 years ago. Her smoking use included cigarettes. She has a 17.50 pack-year smoking history. She has never used smokeless tobacco.  Alcohol:      reports no history of alcohol use. Drug Use:  reports no history of drug use. Family History:     Family History   Problem Relation Age of Onset    Heart Disease Mother     Cancer Father        Review of Systems:     Positive and Negative as described in HPI. Review of Systems   Constitutional:  Negative for chills and fever. HENT: Negative. Eyes:  Negative for visual disturbance. Gastrointestinal:  Positive for abdominal pain, constipation and nausea. Negative for vomiting.    Genitourinary:  Negative for dysuria, frequency and urgency. Musculoskeletal: Negative. Skin:  Positive for rash. Neurological:  Negative for dizziness, weakness, light-headedness, numbness and headaches. Psychiatric/Behavioral:  Positive for behavioral problems. Negative for confusion. Physical Exam:   BP (!) 142/64   Pulse 59   Temp 97.5 °F (36.4 °C) (Axillary)   Resp 17   Ht 5' 5\" (1.651 m)   Wt 151 lb 10.8 oz (68.8 kg)   SpO2 100%   BMI 25.24 kg/m²   Temp (24hrs), Av.6 °F (36.4 °C), Min:97.5 °F (36.4 °C), Max:97.6 °F (36.4 °C)    No results for input(s): POCGLU in the last 72 hours. No intake or output data in the 24 hours ending 22 1527    Physical Exam  Constitutional:       General: She is not in acute distress. Appearance: Normal appearance. She is not ill-appearing. HENT:      Head: Normocephalic and atraumatic. Mouth/Throat:      Mouth: Mucous membranes are moist.   Eyes:      Extraocular Movements: Extraocular movements intact. Conjunctiva/sclera: Conjunctivae normal.   Cardiovascular:      Rate and Rhythm: Normal rate and regular rhythm. Heart sounds: No murmur heard. No gallop. Pulmonary:      Effort: Pulmonary effort is normal. No respiratory distress. Breath sounds: Normal breath sounds. No wheezing or rales. Abdominal:      General: Bowel sounds are normal. There is no distension. Palpations: Abdomen is soft. Tenderness: There is abdominal tenderness. There is no right CVA tenderness, left CVA tenderness or guarding. Comments: Mild suprapubic tenderness   Musculoskeletal:      Right lower leg: No edema. Left lower leg: No edema. Skin:     Comments: Well-demarcated erythematous plaque with silvery white scales visualized on left-sided neck extending to left external ear and ear canal -psoriatic plaques   Neurological:      Mental Status: She is alert and oriented to person, place, and time.    Psychiatric:         Behavior: Behavior normal.      Comments: Denies homicidal/suicidal ideations       Investigations:     Laboratory Testing:  Recent Results (from the past 24 hour(s))   EKG 12 Lead    Collection Time: 11/09/22  9:50 AM   Result Value Ref Range    Ventricular Rate 60 BPM    Atrial Rate 60 BPM    P-R Interval 130 ms    QRS Duration 118 ms    Q-T Interval 508 ms    QTc Calculation (Bazett) 508 ms    P Axis 101 degrees    R Axis 21 degrees    T Axis 60 degrees   CBC with Auto Differential    Collection Time: 11/09/22  9:58 AM   Result Value Ref Range    WBC 4.1 3.5 - 11.0 k/uL    RBC 3.92 (L) 4.0 - 5.2 m/uL    Hemoglobin 11.8 (L) 12.0 - 16.0 g/dL    Hematocrit 36.5 36 - 46 %    MCV 93.1 80 - 100 fL    MCH 30.2 26 - 34 pg    MCHC 32.4 31 - 37 g/dL    RDW 15.3 (H) 11.5 - 14.9 %    Platelets 371 319 - 567 k/uL    MPV 6.8 6.0 - 12.0 fL    Seg Neutrophils 51 36 - 66 %    Lymphocytes 35 24 - 44 %    Monocytes 11 (H) 1 - 7 %    Eosinophils % 2 0 - 4 %    Basophils 1 0 - 2 %    Segs Absolute 2.10 1.3 - 9.1 k/uL    Absolute Lymph # 1.40 1.0 - 4.8 k/uL    Absolute Mono # 0.50 0.1 - 1.3 k/uL    Absolute Eos # 0.10 0.0 - 0.4 k/uL    Basophils Absolute 0.00 0.0 - 0.2 k/uL   Comprehensive Metabolic Panel    Collection Time: 11/09/22  9:58 AM   Result Value Ref Range    Glucose 83 70 - 99 mg/dL    BUN 11 8 - 23 mg/dL    Creatinine 0.66 0.50 - 0.90 mg/dL    Est, Glom Filt Rate >60 >60 mL/min/1.73m2    Calcium 9.5 8.6 - 10.4 mg/dL    Sodium 141 135 - 144 mmol/L    Potassium 3.6 (L) 3.7 - 5.3 mmol/L    Chloride 105 98 - 107 mmol/L    CO2 30 20 - 31 mmol/L    Anion Gap 6 (L) 9 - 17 mmol/L    Alkaline Phosphatase 109 (H) 35 - 104 U/L    ALT 31 5 - 33 U/L    AST 27 <32 U/L    Total Bilirubin 0.4 0.3 - 1.2 mg/dL    Total Protein 6.6 6.4 - 8.3 g/dL    Albumin 3.9 3.5 - 5.2 g/dL   Lipase    Collection Time: 11/09/22  9:58 AM   Result Value Ref Range    Lipase 17 13 - 60 U/L   TSH    Collection Time: 11/09/22  9:58 AM   Result Value Ref Range    TSH 1.41 0.30 - 5.00 uIU/mL   T4, Free Collection Time: 11/09/22  9:58 AM   Result Value Ref Range    Thyroxine, Free 1.04 0.93 - 1.70 ng/dL   TROP/MYOGLOBIN    Collection Time: 11/09/22  9:58 AM   Result Value Ref Range    Troponin, High Sensitivity 12 0 - 14 ng/L    Myoglobin 29 25 - 58 ng/mL   Brain Natriuretic Peptide    Collection Time: 11/09/22  9:58 AM   Result Value Ref Range    Pro- (H) <300 pg/mL   Urinalysis with Reflex to Culture    Collection Time: 11/09/22 10:29 AM    Specimen: Urine, clean catch   Result Value Ref Range    Color, UA Yellow Yellow    Turbidity UA Clear Clear    Glucose, Ur NEGATIVE NEGATIVE    Bilirubin Urine NEGATIVE NEGATIVE    Ketones, Urine NEGATIVE NEGATIVE    Specific Duson, UA 1.003 1.000 - 1.030    Urine Hgb NEGATIVE NEGATIVE    pH, UA 7.0 5.0 - 8.0    Protein, UA NEGATIVE NEGATIVE    Urobilinogen, Urine Normal Normal    Nitrite, Urine NEGATIVE NEGATIVE    Leukocyte Esterase, Urine SMALL (A) NEGATIVE   Microscopic Urinalysis    Collection Time: 11/09/22 10:29 AM   Result Value Ref Range    WBC, UA 0 TO 2 /HPF    RBC, UA 3 to 5 /HPF    Epithelial Cells UA 0 TO 2 /HPF    Bacteria, UA FEW (A) None    Amorphous, UA 1+ (A) None   TROP/MYOGLOBIN    Collection Time: 11/09/22 11:59 AM   Result Value Ref Range    Troponin, High Sensitivity 12 0 - 14 ng/L    Myoglobin 29 25 - 58 ng/mL       Imaging/Diagnostics:  XR ABDOMEN (KUB) (SINGLE AP VIEW)    Result Date: 11/3/2022  EXAMINATION: ONE SUPINE XRAY VIEW(S) OF THE ABDOMEN 11/3/2022 4:36 pm COMPARISON: 08/04/2022 and correlated with CT of 10/14/2022 HISTORY: ORDERING SYSTEM PROVIDED HISTORY: Constipation TECHNOLOGIST PROVIDED HISTORY: Constipation Reason for Exam: Constipation, abdomen pain FINDINGS: Nonspecific bowel gas pattern without evidence of obstruction. 10 mm cluster of calcifications projected over the left upper quadrant which are outside the UPJ. .  No acute osseous abnormality. No evidence of bowel obstruction.      CT ABDOMEN PELVIS W IV CONTRAST Additional Contrast? None    Result Date: 11/9/2022  EXAMINATION: CT OF THE ABDOMEN AND PELVIS WITH CONTRAST 11/9/2022 10:42 am TECHNIQUE: CT of the abdomen and pelvis was performed with the administration of intravenous contrast. Multiplanar reformatted images are provided for review. Automated exposure control, iterative reconstruction, and/or weight based adjustment of the mA/kV was utilized to reduce the radiation dose to as low as reasonably achievable. COMPARISON: 10/14/2022 HISTORY: ORDERING SYSTEM PROVIDED HISTORY: [facility] pain TECHNOLOGIST PROVIDED HISTORY: Abd pain Decision Support Exception - unselect if not a suspected or confirmed emergency medical condition->Emergency Medical Condition (MA) Reason for Exam: abd pain x 2 days,  unable to go pee or bm x 3 days FINDINGS: Lower Chest: No acute findings. Organs: The gallbladder is not visualized. The liver, pancreas, spleen, kidneys, and adrenals reveal no acute findings. GI/Bowel: There is no bowel dilatation or wall thickening identified. Mild stool burden in the distal colon and rectum. Pelvis: The bladder is mildly distended and there is mild diffuse bladder wall thickening. Peritoneum/Retroperitoneum: No free air or free fluid. The aorta is normal in caliber. The visceral branches are patent. No lymphadenopathy. Bones/Soft Tissues: No abnormality identified. *Unless otherwise specified, incidental findings do not require dedicated imaging follow-up. 1.  Findings suggestive of cystitis. 2.  Mild stool burden in the distal colon and rectum. CT ABDOMEN PELVIS W IV CONTRAST Additional Contrast? None    Result Date: 10/14/2022  EXAMINATION: CT OF THE ABDOMEN AND PELVIS WITH CONTRAST 10/14/2022 10:45 pm TECHNIQUE: CT of the abdomen and pelvis was performed with the administration of intravenous contrast. Multiplanar reformatted images are provided for review.  Automated exposure control, iterative reconstruction, and/or weight based adjustment of the mA/kV was utilized to reduce the radiation dose to as low as reasonably achievable. COMPARISON: Noncontrast CT abdomen/pelvis dated 09/30/2022. HISTORY: ORDERING SYSTEM PROVIDED HISTORY: abd pain, n/v TECHNOLOGIST PROVIDED HISTORY: abd pain, n/v Decision Support Exception - unselect if not a suspected or confirmed emergency medical condition->Emergency Medical Condition (MA) Reason for Exam: abd pain, n/v Relevant Medical/Surgical History: htn, diabetes, gerd, gamal FINDINGS: Lower Chest:  Visualized portion of the lower chest demonstrates no acute abnormality. Organs: The liver is unremarkable. Status post cholecystectomy. The pancreas, spleen, adrenal glands, kidneys and visualized ureters are unremarkable. GI/Bowel: Stomach and duodenal sweep demonstrate no acute abnormality. There is no evidence of bowel obstruction. No evidence of abnormal bowel wall thickening or distension. The appendix is visualized and is unremarkable. No evidence of acute appendicitis. Pelvis: The bladder and reproductive organs are unremarkable. Peritoneum/Retroperitoneum: No evidence of ascites or free air. No evidence of lymphadenopathy. Aorta is normal in caliber. Bones/Soft Tissues: No acute bone or soft tissue abnormality evident. No acute or concerning abnormality identified. No finding to explain the patient's symptoms. Assessment :      Primary Problem  AMS (altered mental status)    Active Hospital Problems    Diagnosis Date Noted    AMS (altered mental status) [R41.82] 11/09/2022     Priority: Medium       Plan:     Patient status Admit as inpatient in the  Med/Surge    Acute onset abdominal pain 2/2 acute cystitis vs. constipation  -CT abdomen and pelvis on admission significant for acute cystitis and mild stool burden  -Urine analysis positive for small leukocyte esterase  -Urine culture pending  -Patient received IV Rocephin x1 and Bentyl x1 in ED   -Start IV Rocephin   -Patient started on Senokot    2. Hypokalemia   -EKG showing prolonged QTC: 508  -Magnesium normal at two-point  -Potassium low at 3.5  -We will replace with 40 mEq of oral Kcl  -We will continue to monitor    3. COPD not on home oxygen  -Continue home inhaler Spiriva    4. Depression  -Patient is not currently suicidal/homicidal  -Continue home meds/Haldol 5 twice daily  -Continue to monitor patient closely because Haldol has a potential to further prolong QTC and cause torsades    PT prophylaxis: Lovenox  GI prophylaxis: None  Diet: Adult diet  CODE STATUS: Full code    Consultations:   IP CONSULT TO PRIMARY CARE PROVIDER    Patient is admitted as inpatient status because of co-morbiditieslisted above, severity of signs and symptoms as outlined, requirement for current medical therapies and most importantly because of direct risk to patient if care not provided in a hospital setting.     Jamie Christianson MD  11/9/2022  3:27 PM    Copy sent to Dr. Tila Cuellar

## 2022-11-09 NOTE — ED NOTES
Pt's son called to update staff on pt's current condition. Her son reports that he originally sent his mother to [facility] center for psychiatric services and evaluation because she has not been sleeping, is having crying fits, and has been saying things such as \"I wish I was head\" and \"I want to be hit by a bus\". Pts son reports that she was given medications from [facility] that she has not been compliant with for the last few months. To his knowledge pt has not acted on her SI and she does not seem homicidal. When pt was seen at [facility] this morning she complained of abdominal pain and was sent to our ER. Pt has a history of calling EMS for abdominal pain and false medical complaints, per son.        Abilio Bishop RN  11/09/22 [id number]

## 2022-11-09 NOTE — ED NOTES
Report given to JUSTINO Spear from Med Surg. Report method by phone   The following was reviewed with receiving RN:   Current vital signs:  /67   Pulse 55   Temp 97.6 °F (36.4 °C) (Oral)   Resp 18   Ht 5' 5\" (1.651 m)   Wt 150 lb (68 kg)   SpO2 99%   BMI 24.96 kg/m²                MEWS Score: 1     Any medication or safety alerts were reviewed. Any pending diagnostics and notifications were also reviewed, as well as any safety concerns or issues, abnormal labs, abnormal imaging, and abnormal assessment findings. Questions were answered.             Shanelle Hassan RN  11/09/22 [id number]

## 2022-11-09 NOTE — ED PROVIDER NOTES
[address]  eMERGENCY dEPARTMENT eNCOUnter      Pt Name: Sergio Salazar  MRN: [id number]  [place] 1950  Date of evaluation: 11/9/22      CHIEF COMPLAINT       Chief Complaint   Patient presents with    Abdominal Pain    Foot Swelling    [address] [facility]    Sergio Salazar is a 66 y.o. female who presents complaining of abdominal pain. Patient is here stating that her abdomen hurts and that this been going on for 2 weeks. Patient states she has not had any bowel movements in that time. Patient also states she is not eating because she does not feel hungry. Patient was actually brought over by the staff at [facility] because the son took her over there for a mental health evaluation. She started complaining about her feet swelling and her abdominal pain so they brought her here for medical evaluation. Patient was actually seen a week ago for similar complaints and had a work-up that was unremarkable but no CT scan was done at that time. Patient is mostly answering yes to anything I asked her in terms of review of system so it is difficult to know what is really happening. History from the son states that for months now she has been getting in trouble because she has been acting out been calling the police and ambulance and evidently has made comments about wishing that she would be hit by a car or that she would die. This is why he took her to the [facility]. Patient denies suicidal or homicidal thoughts. REVIEW OF SYSTEMS       Review of Systems   Unable to perform ROS: Mental status change   Constitutional:  Negative for activity change, appetite change, chills, diaphoresis and fever. HENT:  Negative for congestion, ear pain, facial swelling, nosebleeds, rhinorrhea, sinus pressure, sore throat and trouble swallowing. Eyes:  Negative for pain, discharge and redness. Respiratory:  Positive for shortness of breath.  Negative for cough, chest tightness and wheezing. Cardiovascular:  Positive for chest pain. Negative for palpitations and leg swelling. Gastrointestinal:  Positive for abdominal pain and constipation. Negative for blood in stool, diarrhea, nausea and vomiting. Genitourinary:  Negative for difficulty urinating, dysuria, flank pain, frequency, genital sores and hematuria. Musculoskeletal:  Negative for arthralgias, back pain, gait problem, joint swelling, myalgias and neck pain. Skin:  Negative for color change, pallor, rash and wound. Neurological:  Negative for dizziness, tremors, seizures, syncope, speech difficulty, weakness, numbness and headaches. Psychiatric/Behavioral:  Positive for dysphoric mood, sleep disturbance and suicidal ideas. Negative for confusion, decreased concentration, hallucinations and self-injury. PAST MEDICAL HISTORY     Past Medical History:   Diagnosis Date    Arthritis     knees    Bronchitis x1 long ago    Chronic obstructive pulmonary disease ([facility] [address] 9/12/2017    Colon polyps     Controlled type 2 diabetes mellitus without complication, without long-term current use of insulin ([facility] [address] 6/29/2021    Dental neglect     poor dentation. Missing teeth.  No loose teeth    Depression     Environmental allergies     GERD (gastroesophageal reflux disease)     Hyperlipidemia     Hypertension     Obesity     Onychomycosis     Poor historian     RBBB     Treadmill stress test negative for angina pectoris 2009    Wears glasses     reading only       SURGICAL HISTORY       Past Surgical History:   Procedure Laterality Date    CHOLECYSTECTOMY      COLONOSCOPY  6/2013    one hyperplastic polyp    DOPPLER ECHOCARDIOGRAPHY      cardiac    TUBAL LIGATION         CURRENT MEDICATIONS       Previous Medications    ACETAMINOPHEN (TYLENOL) 500 MG TABLET    Take 2 tablets by mouth 4 times daily as needed for Pain    ALBUTEROL SULFATE  (90 BASE) MCG/ACT INHALER    albuterol sulfate HFA 90 mcg/actuation aerosol inhaler    ATORVASTATIN (LIPITOR) 20 MG TABLET    Take 1 tablet by mouth daily    CEPHALEXIN (KEFLEX) 500 MG CAPSULE    Take 1 capsule by mouth 2 times daily for 7 days    CICLOPIROX (LOPROX) 0.77 % CREAM    ciclopirox 0.77 % topical cream    DOCUSATE SODIUM (COLACE) 100 MG CAPSULE    Take 1 capsule by mouth 2 times daily    ELASTIC BANDAGES & SUPPORTS (ACE ARM SLING) MISC    Apply 1 Units topically daily    HALOPERIDOL (HALDOL) 5 MG TABLET    Take 5 mg by mouth 2 times daily    ONDANSETRON (ZOFRAN ODT) 4 MG DISINTEGRATING TABLET    Take 1 tablet by mouth every 8 hours as needed for Nausea    ONDANSETRON (ZOFRAN-ODT) 4 MG DISINTEGRATING TABLET    Take 1 tablet by mouth 3 times daily as needed for Nausea or Vomiting    POLYETHYLENE GLYCOL (MIRALAX) 17 GM/SCOOP POWDER    Take 17 g by mouth daily for 14 days PRN constipation    TAMSULOSIN (FLOMAX) 0.4 MG CAPSULE    Take 1 capsule by mouth in the morning. TIOTROPIUM (SPIRIVA RESPIMAT) 1.25 MCG/ACT AERS INHALER    Inhale 2 puffs into the lungs daily    TRAZODONE (DESYREL) 100 MG TABLET        TRIHEXYPHENIDYL (ARTANE) 2 MG TABLET    Take 2 mg by mouth 2 times daily        ALLERGIES     has No Known Allergies. FAMILY HISTORY     [unfilled]     SOCIAL HISTORY      reports that she quit smoking about 7 years ago. Her smoking use included cigarettes. She has a 17.50 pack-year smoking history. She has never used smokeless tobacco. She reports that she does not drink alcohol and does not use drugs. PHYSICAL EXAM     INITIAL VITALS: /69   Pulse 54   Temp 97.6 °F (36.4 °C) (Oral)   Resp 18   Ht 5' 5\" (1.651 m)   Wt 150 lb (68 kg)   SpO2 100%   BMI 24.96 kg/m²      Physical Exam  Vitals and nursing note reviewed. Constitutional:       General: She is not in acute distress. Appearance: She is well-developed. She is not diaphoretic. HENT:      Head: Normocephalic and atraumatic. Eyes:      General: No scleral icterus. Right eye: No discharge. Left eye: No discharge. Conjunctiva/sclera: Conjunctivae normal.      Pupils: Pupils are equal, round, and reactive to light. Cardiovascular:      Rate and Rhythm: Normal rate and regular rhythm. Heart sounds: Normal heart sounds. No murmur heard. No friction rub. No gallop. Pulmonary:      Effort: Pulmonary effort is normal. No respiratory distress. Breath sounds: Normal breath sounds. No wheezing or rales. Chest:      Chest wall: No tenderness. Abdominal:      General: Bowel sounds are normal. There is no distension. Palpations: Abdomen is soft. There is no mass. Tenderness: There is generalized abdominal tenderness. There is no guarding or rebound. Musculoskeletal:         General: No tenderness. Normal range of motion. Skin:     General: Skin is warm and dry. Coloration: Skin is not pale. Findings: No erythema or rash. Neurological:      Mental Status: She is alert and oriented to person, place, and time. Cranial Nerves: No cranial nerve deficit. Sensory: No sensory deficit. Motor: No weakness or abnormal muscle tone. Coordination: Coordination normal.      Deep Tendon Reflexes: Reflexes normal.   Psychiatric:         Mood and Affect: Affect is tearful. Speech: Speech is delayed. Behavior: Behavior is slowed. Thought Content: Thought content normal. Thought content does not include homicidal or suicidal ideation. Judgment: Judgment normal.       MEDICAL DECISION MAKING:     Due to the patient's medical complaints and physical exam finding I will do a full abdominal pain chest pain work-up get a BNP because of the leg swelling look at her kidney function by it with her overall history it sounds like she is probably has dementia and is getting worse. Most likely I think she will end up needing to be medically admitted and have a psychiatric consult.     DIAGNOSTIC RESULTS     EKG: All EKG's are interpreted by the Emergency Department Physician who either signs or Co-signs this chart in the absence of a cardiologist.    EKG Interpretation    Interpreted by emergency department physician    Rhythm: normal sinus   Rate: normal  Axis: normal  Ectopy: none  Conduction: right bundle branch block (complete)  ST Segments: nonspecific changes  T Waves: non specific changes  Q Waves: none    Clinical Impression: EKG: normal sinus rhythm, nonspecific ST and T waves changes, RBBB. Piotr Lobo MD      RADIOLOGY:All plain film, CT, MRI, and formal ultrasound images (except ED bedside ultrasound)are read by the radiologist and interpretations are directly viewed by the emergency physician. CT ABDOMEN PELVIS W IV CONTRAST Additional Contrast? None    Result Date: 11/9/2022  EXAMINATION: CT OF THE ABDOMEN AND PELVIS WITH CONTRAST 11/9/2022 10:42 am TECHNIQUE: CT of the abdomen and pelvis was performed with the administration of intravenous contrast. Multiplanar reformatted images are provided for review. Automated exposure control, iterative reconstruction, and/or weight based adjustment of the mA/kV was utilized to reduce the radiation dose to as low as reasonably achievable. COMPARISON: 10/14/2022 HISTORY: ORDERING SYSTEM PROVIDED HISTORY: [facility] pain TECHNOLOGIST PROVIDED HISTORY: Abd pain Decision Support Exception - unselect if not a suspected or confirmed emergency medical condition->Emergency Medical Condition (MA) Reason for Exam: abd pain x 2 days,  unable to go pee or bm x 3 days FINDINGS: Lower Chest: No acute findings. Organs: The gallbladder is not visualized. The liver, pancreas, spleen, kidneys, and adrenals reveal no acute findings. GI/Bowel: There is no bowel dilatation or wall thickening identified. Mild stool burden in the distal colon and rectum. Pelvis: The bladder is mildly distended and there is mild diffuse bladder wall thickening. Peritoneum/Retroperitoneum: No free air or free fluid.   The aorta is normal in caliber. The visceral branches are patent. No lymphadenopathy. Bones/Soft Tissues: No abnormality identified. *Unless otherwise specified, incidental findings do not require dedicated imaging follow-up. 1.  Findings suggestive of cystitis. 2.  Mild stool burden in the distal colon and rectum. LABS: All lab results were reviewed bymyself, and all abnormals are listed below.   Labs Reviewed   CBC WITH AUTO DIFFERENTIAL - Abnormal; Notable for the following components:       Result Value    RBC 3.92 (*)     Hemoglobin 11.8 (*)     RDW 15.3 (*)     Monocytes 11 (*)     All other components within normal limits   COMPREHENSIVE METABOLIC PANEL - Abnormal; Notable for the following components:    Potassium 3.6 (*)     Anion Gap 6 (*)     Alkaline Phosphatase 109 (*)     All other components within normal limits   URINALYSIS WITH REFLEX TO CULTURE - Abnormal; Notable for the following components:    Leukocyte Esterase, Urine SMALL (*)     All other components within normal limits   BRAIN NATRIURETIC PEPTIDE - Abnormal; Notable for the following components:    Pro- (*)     All other components within normal limits   MICROSCOPIC URINALYSIS - Abnormal; Notable for the following components:    Bacteria, UA FEW (*)     Amorphous, UA 1+ (*)     All other components within normal limits   LIPASE   TSH   T4, FREE   TROP/MYOGLOBIN   TROP/MYOGLOBIN         EMERGENCY DEPARTMENT COURSE:   Vitals:    Vitals:    11/09/22 0944 11/09/22 1135   BP: (!) 142/89 134/69   Pulse: 65 54   Resp: 20 18   Temp: 97.6 °F (36.4 °C)    TempSrc: Oral    SpO2: 99% 100%   Weight: 150 lb (68 kg)    Height: 5' 5\" (1.651 m)        The patient was given the following medications while in the emergency department:     Orders Placed This Encounter   Medications    dicyclomine (BENTYL) injection 20 mg    iopamidol (ISOVUE-370) 76 % injection 75 mL    sodium chloride flush 0.9 % injection 10 mL    0.9 % sodium chloride bolus cefTRIAXone (ROCEPHIN) 1,000 mg in sodium chloride 0.9 % 50 mL IVPB mini-bag     Order Specific Question:   Antimicrobial Indications     Answer:   Urinary Tract Infection       -------------------------  12:02 PM EST  Patient was updated on results she is still does not seem to be comprehending what I am talking about. I think she probably does need a psychiatric evaluation but I think she needs to be admitted medically we will go ahead and do an IV Rocephin and have her evaluated. I spoke with Dr. Farhat Blankenship for the admitting physicians who agreed to admit the patient. CRITICAL CARE:   None    CONSULTS:  IP CONSULT TO PRIMARY CARE PROVIDER    PROCEDURES:  None    FINAL IMPRESSION      1. Acute cystitis without hematuria    2. Altered mental status, unspecified altered mental status type    3. Suicidal thoughts          DISPOSITION/PLAN   DISPOSITION Decision To Admit 11/09/2022 11:30:57 AM      PATIENT REFERRED TO:  No follow-up provider specified.     DISCHARGE MEDICATIONS:  New Prescriptions    No medications on file       (Please note that portions of this note were completed with a voice recognition program.  Efforts were made to edit the dictations but occasionally words are mis-transcribed.)    Breanne Doran MD  Attending Ayde Davis MD  11/09/22 3834

## 2022-11-10 PROBLEM — F39 UNSPECIFIED MOOD (AFFECTIVE) DISORDER (HCC): Status: ACTIVE | Noted: 2022-11-10

## 2022-11-10 LAB
ANION GAP SERPL CALCULATED.3IONS-SCNC: 8 MMOL/L (ref 9–17)
BUN BLDV-MCNC: 9 MG/DL (ref 8–23)
CALCIUM SERPL-MCNC: 9.2 MG/DL (ref 8.6–10.4)
CHLORIDE BLD-SCNC: 107 MMOL/L (ref 98–107)
CO2: 29 MMOL/L (ref 20–31)
CREAT SERPL-MCNC: 0.57 MG/DL (ref 0.5–0.9)
EKG ATRIAL RATE: 60 BPM
EKG P AXIS: 101 DEGREES
EKG P-R INTERVAL: 130 MS
EKG Q-T INTERVAL: 508 MS
EKG QRS DURATION: 118 MS
EKG QTC CALCULATION (BAZETT): 508 MS
EKG R AXIS: 21 DEGREES
EKG T AXIS: 60 DEGREES
EKG VENTRICULAR RATE: 60 BPM
GFR SERPL CREATININE-BSD FRML MDRD: >60 ML/MIN/1.73M2
GLUCOSE BLD-MCNC: 77 MG/DL (ref 70–99)
HCT VFR BLD CALC: 35 % (ref 36–46)
HEMOGLOBIN: 11.6 G/DL (ref 12–16)
MCH RBC QN AUTO: 30.5 PG (ref 26–34)
MCHC RBC AUTO-ENTMCNC: 33.2 G/DL (ref 31–37)
MCV RBC AUTO: 92 FL (ref 80–100)
PDW BLD-RTO: 15 % (ref 11.5–14.9)
PLATELET # BLD: 218 K/UL (ref 150–450)
PMV BLD AUTO: 6.8 FL (ref 6–12)
POTASSIUM SERPL-SCNC: 4.4 MMOL/L (ref 3.7–5.3)
RBC # BLD: 3.8 M/UL (ref 4–5.2)
SODIUM BLD-SCNC: 144 MMOL/L (ref 135–144)
WBC # BLD: 4.2 K/UL (ref 3.5–11)

## 2022-11-10 PROCEDURE — 99223 1ST HOSP IP/OBS HIGH 75: CPT | Performed by: INTERNAL MEDICINE

## 2022-11-10 PROCEDURE — 97162 PT EVAL MOD COMPLEX 30 MIN: CPT

## 2022-11-10 PROCEDURE — 97116 GAIT TRAINING THERAPY: CPT

## 2022-11-10 PROCEDURE — 85027 COMPLETE CBC AUTOMATED: CPT

## 2022-11-10 PROCEDURE — 97166 OT EVAL MOD COMPLEX 45 MIN: CPT

## 2022-11-10 PROCEDURE — 96361 HYDRATE IV INFUSION ADD-ON: CPT

## 2022-11-10 PROCEDURE — 2580000003 HC RX 258: Performed by: INTERNAL MEDICINE

## 2022-11-10 PROCEDURE — 96372 THER/PROPH/DIAG INJ SC/IM: CPT

## 2022-11-10 PROCEDURE — 94640 AIRWAY INHALATION TREATMENT: CPT

## 2022-11-10 PROCEDURE — G0378 HOSPITAL OBSERVATION PER HR: HCPCS

## 2022-11-10 PROCEDURE — 36415 COLL VENOUS BLD VENIPUNCTURE: CPT

## 2022-11-10 PROCEDURE — 93010 ELECTROCARDIOGRAM REPORT: CPT | Performed by: INTERNAL MEDICINE

## 2022-11-10 PROCEDURE — 6370000000 HC RX 637 (ALT 250 FOR IP)

## 2022-11-10 PROCEDURE — 6370000000 HC RX 637 (ALT 250 FOR IP): Performed by: STUDENT IN AN ORGANIZED HEALTH CARE EDUCATION/TRAINING PROGRAM

## 2022-11-10 PROCEDURE — 80048 BASIC METABOLIC PNL TOTAL CA: CPT

## 2022-11-10 PROCEDURE — 6360000002 HC RX W HCPCS: Performed by: INTERNAL MEDICINE

## 2022-11-10 PROCEDURE — 6360000002 HC RX W HCPCS: Performed by: STUDENT IN AN ORGANIZED HEALTH CARE EDUCATION/TRAINING PROGRAM

## 2022-11-10 PROCEDURE — 96366 THER/PROPH/DIAG IV INF ADDON: CPT

## 2022-11-10 PROCEDURE — 97535 SELF CARE MNGMENT TRAINING: CPT

## 2022-11-10 PROCEDURE — 2580000003 HC RX 258: Performed by: STUDENT IN AN ORGANIZED HEALTH CARE EDUCATION/TRAINING PROGRAM

## 2022-11-10 RX ORDER — LANOLIN ALCOHOL/MO/W.PET/CERES
3 CREAM (GRAM) TOPICAL NIGHTLY PRN
Status: DISCONTINUED | OUTPATIENT
Start: 2022-11-10 | End: 2022-11-11 | Stop reason: HOSPADM

## 2022-11-10 RX ADMIN — CEFTRIAXONE SODIUM 1000 MG: 1 INJECTION, POWDER, FOR SOLUTION INTRAMUSCULAR; INTRAVENOUS at 12:01

## 2022-11-10 RX ADMIN — SODIUM CHLORIDE, PRESERVATIVE FREE 10 ML: 5 INJECTION INTRAVENOUS at 20:18

## 2022-11-10 RX ADMIN — TRIHEXYPHENIDYL HYDROCHLORIDE 2 MG: 2 TABLET ORAL at 09:00

## 2022-11-10 RX ADMIN — TRIHEXYPHENIDYL HYDROCHLORIDE 2 MG: 2 TABLET ORAL at 20:18

## 2022-11-10 RX ADMIN — TIOTROPIUM BROMIDE INHALATION SPRAY 2 PUFF: 3.12 SPRAY, METERED RESPIRATORY (INHALATION) at 08:10

## 2022-11-10 RX ADMIN — SENNOSIDES AND DOCUSATE SODIUM 2 TABLET: 50; 8.6 TABLET ORAL at 09:00

## 2022-11-10 RX ADMIN — ENOXAPARIN SODIUM 40 MG: 100 INJECTION SUBCUTANEOUS at 08:58

## 2022-11-10 RX ADMIN — SODIUM CHLORIDE, PRESERVATIVE FREE 10 ML: 5 INJECTION INTRAVENOUS at 09:04

## 2022-11-10 RX ADMIN — SODIUM CHLORIDE 25 ML: 900 INJECTION INTRAVENOUS at 11:59

## 2022-11-10 RX ADMIN — HALOPERIDOL 5 MG: 5 TABLET ORAL at 09:00

## 2022-11-10 ASSESSMENT — ENCOUNTER SYMPTOMS
VOMITING: 0
ABDOMINAL PAIN: 1
BACK PAIN: 0
STRIDOR: 0
RHINORRHEA: 0
WHEEZING: 0
ABDOMINAL DISTENTION: 0
NAUSEA: 0
COUGH: 0
CONSTIPATION: 0
DIARRHEA: 0
SHORTNESS OF BREATH: 0

## 2022-11-10 ASSESSMENT — PAIN SCALES - GENERAL: PAINLEVEL_OUTOF10: 0

## 2022-11-10 NOTE — PROGRESS NOTES
[address] [facility]    PROGRESS NOTE             11/10/2022    8:52 AM    Name:   Abraham Abrams  MRN:     [id number]     Acct:      [de-identified]   Room:   2059/2059-01  IP Day:  0  Admit Date:  11/9/2022  9:37 AM    PCP:  Mildred Guevara  Code Status:  Full Code    Subjective:     C/C:   Chief Complaint   Patient presents with    Abdominal Pain    Foot Swelling    Mental Health Problem     Interval History Status: improved. The patient was seen and examined at the bedside. No acute overnight events as per patient RN. The patient mentioned good sleep and good appetite. Vital signs stable. The patient no longer endorses abdominal pain. She had a bowel movement. Brief History:     Patient is a 69-year-old female past medical history significant for COPD, osteoarthritis, depression, hypertension, and hyperlipidemia who was brought in by the staff at [facility] because was complaining of abdominal pain. Per patient, she has been having generalized abdominal pain for the past couple days has not had much of an appetite and has felt nauseous. Patient is a very poor historian. Per chart review, patient son called to update staff on patient's current condition. Per son, patient received her psych medications from [facility] and she has not been compliant with them for the past few months, patient has been having difficulty sleeping, is having crying spells and has endorsed passive suicidal ideations which prompted the son to take her to rehab center to be evaluated. In ED, vitals were stable. Labs significant for low potassium of 3.6, elevated proBNP 300. Liver enzymes unremarkable, TSH normal at 1.41 thyroxine normal at 1.04. CBC significant for low hemoglobin of 11.8. UA was positive for small leukocyte Estrace with 0-2 WBCs and few bacteria. CT abdomen was remarkable for cystitis and mild stool burden.   Patient was given IV Rocephin x1 in ED. She was admitted to [place] [address] for further management of cystitis. Review of Systems:     Review of Systems   Constitutional:  Negative for activity change, chills, fatigue and fever. HENT:  Negative for congestion and rhinorrhea. Respiratory:  Negative for cough, shortness of breath, wheezing and stridor. Gastrointestinal:  Positive for abdominal pain. Negative for abdominal distention, constipation, diarrhea, nausea and vomiting. Endocrine: Negative for cold intolerance and heat intolerance. Genitourinary:  Negative for dysuria, frequency and urgency. Musculoskeletal:  Negative for back pain and myalgias. Skin:  Positive for rash. Neurological:  Negative for dizziness, tremors, syncope, facial asymmetry, weakness, numbness and headaches. Psychiatric/Behavioral:  Positive for behavioral problems. Negative for agitation and confusion. Medications:      Allergies:  No Known Allergies    Current Meds:   Scheduled Meds:    sodium chloride  100 mL IntraVENous Once    sodium chloride flush  5-40 mL IntraVENous 2 times per day    enoxaparin  40 mg SubCUTAneous Daily    cefTRIAXone (ROCEPHIN) IV  1,000 mg IntraVENous Q24H    sennosides-docusate sodium  2 tablet Oral BID    tiotropium  2 puff Inhalation Daily    haloperidol  5 mg Oral BID    trihexyphenidyl  2 mg Oral BID     Continuous Infusions:    sodium chloride       PRN Meds: sodium chloride flush, sodium chloride flush, sodium chloride, ondansetron **OR** ondansetron, polyethylene glycol, acetaminophen **OR** acetaminophen    Data:     Past Medical History:   has a past medical history of Arthritis, Bronchitis, Chronic obstructive pulmonary disease ([facility] [address] Colon polyps, Controlled type 2 diabetes mellitus without complication, without long-term current use of insulin ([facility] [address] Dental neglect, Depression, Environmental allergies, GERD (gastroesophageal reflux disease), Hyperlipidemia, Hypertension, Obesity, Onychomycosis, Poor historian, TAWANNA, Treadmill stress test negative for angina pectoris, and Wears glasses. Social History:   reports that she quit smoking about 7 years ago. Her smoking use included cigarettes. She has a 17.50 pack-year smoking history. She has never used smokeless tobacco. She reports that she does not drink alcohol and does not use drugs. Family History:   Family History   Problem Relation Age of Onset    Heart Disease Mother     Cancer Father        Vitals:  /73   Pulse 61   Temp 98.6 °F (37 °C) (Oral)   Resp 16   Ht 5' 5\" (1.651 m)   Wt 151 lb 10.8 oz (68.8 kg)   SpO2 99%   BMI 25.24 kg/m²   Temp (24hrs), Av.7 °F (36.5 °C), Min:97.2 °F (36.2 °C), Max:98.6 °F (37 °C)    No results for input(s): POCGLU in the last 72 hours. I/O(24Hr): Intake/Output Summary (Last 24 hours) at 11/10/2022 0852  Last data filed at 11/10/2022 0135  Gross per 24 hour   Intake --   Output 650 ml   Net -650 ml       Labs:  [unfilled]    Lab Results   Component Value Date/Time    SPECIAL R FA 10 ML 2022 09:17 PM    SPECIAL L HAND 7 ML 2022 09:17 PM     Lab Results   Component Value Date/Time    CULTURE NO GROWTH 2022 07:10 PM       [unfilled]    Radiology:    XR ABDOMEN (KUB) (SINGLE AP VIEW)    Result Date: 11/3/2022  EXAMINATION: ONE SUPINE XRAY VIEW(S) OF THE ABDOMEN 11/3/2022 4:36 pm COMPARISON: 2022 and correlated with CT of 10/14/2022 HISTORY: ORDERING SYSTEM PROVIDED HISTORY: Constipation TECHNOLOGIST PROVIDED HISTORY: Constipation Reason for Exam: Constipation, abdomen pain FINDINGS: Nonspecific bowel gas pattern without evidence of obstruction. 10 mm cluster of calcifications projected over the left upper quadrant which are outside the UPJ. .  No acute osseous abnormality. No evidence of bowel obstruction.      CT ABDOMEN PELVIS W IV CONTRAST Additional Contrast? None    Result Date: 2022  EXAMINATION: CT OF THE ABDOMEN AND PELVIS WITH CONTRAST 2022 10:42 am TECHNIQUE: CT of the abdomen and pelvis was performed with the administration of intravenous contrast. Multiplanar reformatted images are provided for review. Automated exposure control, iterative reconstruction, and/or weight based adjustment of the mA/kV was utilized to reduce the radiation dose to as low as reasonably achievable. COMPARISON: 10/14/2022 HISTORY: ORDERING SYSTEM PROVIDED HISTORY: [facility] pain TECHNOLOGIST PROVIDED HISTORY: [facility] pain Decision Support Exception - unselect if not a suspected or confirmed emergency medical condition->Emergency Medical Condition (MA) Reason for Exam: abd pain x 2 days,  unable to go pee or bm x 3 days FINDINGS: Lower Chest: No acute findings. Organs: The gallbladder is not visualized. The liver, pancreas, spleen, kidneys, and adrenals reveal no acute findings. GI/Bowel: There is no bowel dilatation or wall thickening identified. Mild stool burden in the distal colon and rectum. Pelvis: The bladder is mildly distended and there is mild diffuse bladder wall thickening. Peritoneum/Retroperitoneum: No free air or free fluid. The aorta is normal in caliber. The visceral branches are patent. No lymphadenopathy. Bones/Soft Tissues: No abnormality identified. *Unless otherwise specified, incidental findings do not require dedicated imaging follow-up. 1.  Findings suggestive of cystitis. 2.  Mild stool burden in the distal colon and rectum. CT ABDOMEN PELVIS W IV CONTRAST Additional Contrast? None    Result Date: 10/14/2022  EXAMINATION: CT OF THE ABDOMEN AND PELVIS WITH CONTRAST 10/14/2022 10:45 pm TECHNIQUE: CT of the abdomen and pelvis was performed with the administration of intravenous contrast. Multiplanar reformatted images are provided for review. Automated exposure control, iterative reconstruction, and/or weight based adjustment of the mA/kV was utilized to reduce the radiation dose to as low as reasonably achievable.  COMPARISON: Noncontrast CT abdomen/pelvis dated 09/30/2022. HISTORY: ORDERING SYSTEM PROVIDED HISTORY: abd pain, n/v TECHNOLOGIST PROVIDED HISTORY: abd pain, n/v Decision Support Exception - unselect if not a suspected or confirmed emergency medical condition->Emergency Medical Condition (MA) Reason for Exam: abd pain, n/v Relevant Medical/Surgical History: htn, diabetes, gerd, gamal FINDINGS: Lower Chest:  Visualized portion of the lower chest demonstrates no acute abnormality. Organs: The liver is unremarkable. Status post cholecystectomy. The pancreas, spleen, adrenal glands, kidneys and visualized ureters are unremarkable. GI/Bowel: Stomach and duodenal sweep demonstrate no acute abnormality. There is no evidence of bowel obstruction. No evidence of abnormal bowel wall thickening or distension. The appendix is visualized and is unremarkable. No evidence of acute appendicitis. Pelvis: The bladder and reproductive organs are unremarkable. Peritoneum/Retroperitoneum: No evidence of ascites or free air. No evidence of lymphadenopathy. Aorta is normal in caliber. Bones/Soft Tissues: No acute bone or soft tissue abnormality evident. No acute or concerning abnormality identified. No finding to explain the patient's symptoms. Physical Examination:        Physical Exam  Constitutional:       General: She is not in acute distress. Appearance: Normal appearance. HENT:      Head: Normocephalic and atraumatic. Nose: No congestion or rhinorrhea. Eyes:      Extraocular Movements: Extraocular movements intact. Conjunctiva/sclera: Conjunctivae normal.      Pupils: Pupils are equal, round, and reactive to light. Cardiovascular:      Rate and Rhythm: Normal rate and regular rhythm. Pulses: Normal pulses. Heart sounds: Normal heart sounds. No murmur heard. No friction rub. No gallop. Pulmonary:      Effort: Pulmonary effort is normal. No respiratory distress. Breath sounds: Normal breath sounds.  No wheezing or rales.   Abdominal:      General: There is no distension. Tenderness: There is abdominal tenderness. Musculoskeletal:      Right lower leg: No edema. Left lower leg: No edema. Skin:     Capillary Refill: Capillary refill takes less than 2 seconds. Findings: Rash present. Comments:  Well-demarcated erythematous plaque with silvery white scales visualized on left-sided neck extending to left external ear and ear canal -psoriatic plaques    Neurological:      General: No focal deficit present. Mental Status: She is alert and oriented to person, place, and time. Sensory: No sensory deficit. Motor: No weakness. Psychiatric:         Mood and Affect: Mood normal.         Assessment:        Primary Problem  AMS (altered mental status)    Active Hospital Problems    Diagnosis Date Noted    AMS (altered mental status) [R41.82] 11/09/2022     Priority: Medium       Plan:        Acute onset abdominal pain 2/2 acute cystitis vs. constipation  -CT abdomen and pelvis on admission significant for acute cystitis and mild stool burden  -Urine analysis positive for small leukocyte esterase  -Urine culture pending  -Patient received IV Rocephin x1 and Bentyl x1 in ED   -Continue IV Rocephin for 5 days   -Patient started on Senokot     2. Hypokalemia (resolved)  -EKG showing prolonged QTC: 508  -Magnesium normal at 2.0  -Potassium low at 3.5 yesterday   -We will continue to monitor     3. COPD not on home oxygen  -Continue home inhaler Spiriva     4. Depression  -Patient is not currently suicidal/homicidal  -Continue home meds/Haldol 5 twice daily  -Continue to monitor patient closely because Haldol has a potential to further prolong QTC and cause torsades     PT prophylaxis: Lovenox  Diet: Adult diet  CODE STATUS: Full code    Emily Xiong MD  11/10/2022  8:52 AM     I have discussed the care of Jolene Armas , including pertinent history and exam findings,    today with the resident.   I have seen and examined the patient and the key elements of all parts of the encounter have been performed by me . I agree with the assessment, plan and orders as documented by the resident. Principal Problem:    AMS (altered mental status)  Resolved Problems:    * No resolved hospital problems. *        Overall  course ;                                   are improving over time.         Patient poor historian  Admitted with abdominal pain  CT abdomen pelvis concerning for cystitis  On antibiotic  Final urine culture awaited  Past bowel movement, abdominal pain improved  Psych consult  Patient refused suicidal ideation to staff            Electronically signed by Yessica Leyva MD

## 2022-11-10 NOTE — PROGRESS NOTES
11/10/22 1147   Encounter Summary   Support System [facility]ra[facility] community  ([facility] [place])   Plan and Referrals   Plan/Referrals Contacted support as requested per patient/family request Plan

## 2022-11-10 NOTE — PROGRESS NOTES
Physical Therapy  Facility/Department: [facility] MED SURG  Physical Therapy Initial Assessment    Name: José Miguel Horne  : 1950  MRN: [id number]  Date of Service: 11/10/2022    Discharge Recommendations:  Continue to assess pending progress   PT Equipment Recommendations  Equipment Needed:  (TBD)      Patient Diagnosis(es): The primary encounter diagnosis was Acute cystitis without hematuria. Diagnoses of Altered mental status, unspecified altered mental status type and Suicidal thoughts were also pertinent to this visit. Past Medical History:  has a past medical history of Arthritis, Bronchitis, Chronic obstructive pulmonary disease ([facility] [address] Colon polyps, Controlled type 2 diabetes mellitus without complication, without long-term current use of insulin ([facility] [address] Dental neglect, Depression, Environmental allergies, GERD (gastroesophageal reflux disease), Hyperlipidemia, Hypertension, Obesity, Onychomycosis, Poor historian, RBBB, Treadmill stress test negative for angina pectoris, and Wears glasses. Past Surgical History:  has a past surgical history that includes Cholecystectomy; doppler echocardiography; Colonoscopy (2013); and Tubal ligation. Assessment   Body Structures, Functions, Activity Limitations Requiring Skilled Therapeutic Intervention: Decreased functional mobility ; Decreased cognition;Decreased endurance;Decreased balance;Decreased strength;Decreased safe awareness  Assessment: continue per POC to maxmize potential for safe D/C  Treatment Diagnosis: impaired mobility due to weakness  Specific Instructions for Next Treatment: advance gait distance using wheeled walker, instruct in exercise program  Therapy Prognosis: Good  Decision Making: Medium Complexity  History: pt admitted due to suicidal ideation  Exam: ROM, MMT, balance and mobility assessments  Clinical Presentation: gait 10' used cane initially w/ CGA x 1 then switched to wheeled walker 20' w/ CGA x 1, SBA for bed mobility, CGA x 1 sit <> stand, FALL RISK  Barriers to Learning: cognition  Requires PT Follow-Up: Yes  Activity Tolerance  Activity Tolerance: Patient limited by fatigue;Patient limited by endurance     Plan   Physcial Therapy Plan  General Plan:  (3-5 treatmnents/ 5 days)  Specific Instructions for Next Treatment: advance gait distance using wheeled walker, instruct in exercise program  Current Treatment Recommendations: Strengthening, Equipment evaluation, education, & procurement, Gait training, Balance training, Stair training, Functional mobility training, Home exercise program, Transfer training, Safety education & training, Patient/Caregiver education & training  Safety Devices  Type of Devices: Patient at risk for falls, All fall risk precautions in place, Left in chair, Call light within reach, Chair alarm in place, Nurse notified, Gait belt (nurse Shad Fabian)     Restrictions  Restrictions/Precautions  Restrictions/Precautions: Fall Risk (external urinary catheter, peripheral IV right antecubital)  Required Braces or Orthoses?: No  Position Activity Restriction  Other position/activity restrictions: up w/ asist     Subjective   Pain: denies  General  Patient assessed for rehabilitation services?: Yes  Response To Previous Treatment: Not applicable  Family / Caregiver Present: No  Referring Practitioner: Dr. Giana Coulter  Referral Date : 11/09/22  Diagnosis: suicidal thoughts  Follows Commands: Impaired  Other (Comment): OK per nurse Shad Fabian to proceed w/ PT evaluation  General Comment  Comments: per chart, pt was admitted due to suicidal ideation- pt had a plan to be hit in traffic by car or bus. Subjective  Subjective: pt agreeable to PT evaluation. Pt requesting to use the bathroom prior to walking.          Social/Functional History  Social/Functional History  Lives With: Family (son and friend)  Type of Home: House  Home Layout: One level  Home Access: Stairs to enter with rails  Entrance Stairs - Number of Steps: 10 steps w/ 2 rails  Entrance Stairs - Rails: Both  Bathroom Shower/Tub: Tub/Shower unit, Curtain, Shower chair with back  [employer]: Handicap height  Bathroom Equipment: Hand-held shower, Grab bars in shower, Shower chair, Grab bars around toilet  Home Equipment: [address], [place] [address], [employer]  Has the patient had two or more falls in the past year or any fall with injury in the past year?: Unknown (Initially stated no then states she has fallen on stairs)  Receives Help From: Family, Friend(s)  ADL Assistance: [address]: Needs assistance (son is primary, states she sometimes helps w/ dishes)  Homemaking Responsibilities: No (son is primary, states she sometimes helps w/ dishes)  Ambulation Assistance: Independent (has s cane with her at the hospital)  Transfer Assistance: Independent  Active : No  Patient's  Info: friend or son drive her places or she takes a cab  Mode of Transportation: Friends, [address] [place] [address] Road: [facility]  IADL Comments: pt sleeps in a flat bed  Additional Comments: pt is a questionable historian, ? reliability of above information- no family present to verify information, pt states her son works first shift, dtr checks on her during the day  Vision/Hearing  Vision  Vision: Impaired  Vision Exceptions: Wears glasses at all times  Hearing  Hearing: Within functional limits    Cognition   Orientation  Overall Orientation Status: Impaired (states she was having chest pain otherwise answered questions correctly)  Orientation Level: Oriented to place;Oriented to time;Oriented to person;Disoriented to situation     Objective   O2 Device: None (Room air)     Observation/Palpation  Observation: external urinary catheter, peripheral IV right antecubital  Gross Assessment  Sensation: Intact (denies)     AROM RLE (degrees)  RLE AROM: WFL  AROM LLE (degrees)  LLE AROM : WFL  AROM RUE (degrees)  RUE General AROM: see OT for UE assessment  AROM LUE (degrees)  LUE General AROM: see OT for UE assessment  Strength RLE  Comment: grossly 4-/5  Strength LLE  Comment: grossly 4-/5  Strength RUE  Comment: see OT for UE assessment  Strength LUE  Comment: see OT for UE assessment           Bed mobility  Rolling to Left: Stand by assistance  Supine to Sit: Stand by assistance  Scooting: Stand by assistance  Bed Mobility Comments: pt dangled at the EOB w/ SBA x 1  Transfers  Sit to Stand: Contact guard assistance  Stand to Sit: Contact guard assistance  Stand Pivot Transfers: Contact guard assistance (used cane initially then switched to wheeled walker)  Comment: used cane initially then switched to wheeled walker, commode transfer completed  Ambulation  Surface: Level tile  Device: Single point cane  Assistance: Contact guard assistance  Distance: 10' to bathroom  Comments: balance is precarious w/ s cane so switched to wheeled walker  Ambulation 2  Surface - 2: level tile  Device 2: [address] [facility] 2: Contact guard assistance  Distance: 20'  Comments: better balance using wheeled walker  Stairs/Curb  Stairs?: No (has 10 steps w/ 2 rails at home)     Balance  Sitting - Static: Good  Sitting - Dynamic: Good;-  Standing - Static: Fair;+ (used cane initially then switched to wheeled walker)  Standing - Dynamic: Fair (used cane initially then switched to wheeled walker)           OutComes Score                                                  AM-PAC Score  AM-PAC Inpatient Mobility Raw Score : 16 (11/10/22 0951)  AM-PAC Inpatient T-Scale Score : 40.78 (11/10/22 0951)  Mobility Inpatient CMS 0-100% Score: 54.16 (11/10/22 0951)  Mobility Inpatient CMS G-Code Modifier : CK (11/10/22 0951)          Tinneti Score       Goals  Short Term Goals  Time Frame for Short Term Goals: 3-5 treatmnents/ 5 days  Short Term Goal 1: pt to tolerate 1/2 hour of therapuetic exercise and activity  Short Term Goal 2: pt to demonstrate good technique for LE strengthening and balance activities  Short Term Goal 3: pt to demonstrate independent bed mobility from a flat surface for position change  Short Term Goal 4: pt to demonstrate good technique  for transfers using wheeled walker w/ supervision  Short Term Goal 5: pt to demonstrate good technique  for gait 50-80'  using wheeled walker w/ supervision  Short Term Goal 6: pt to demonstrate good balance using wheeled walker  Short Term Goal 7: pt to demonstrate ability to ascend/ descend 6-8\" platform step using wheeled walker w/ min x 1  Patient Goals   Patient Goals : did not state a goal       Education  Patient Education  Education Given To: Patient  Education Provided: Role of Therapy;Plan of Care  Education Method: Demonstration;Verbal  Barriers to Learning: Cognition  Education Outcome: Continued education needed      Therapy Time   Individual Concurrent Group Co-treatment   Time In 0951         Time Out 1020         Minutes 29         Timed Code Treatment Minutes: [address], PT

## 2022-11-10 NOTE — PROGRESS NOTES
Writer spoke with Dr. Pau Mueller, patient does not need to be on Haldol at this time as she has been off of it for 6 months, and denies any voices.

## 2022-11-10 NOTE — PROGRESS NOTES
11/10/22 1727   Encounter Summary   Encounter Overview/Reason  Volunteer Encounter   Service Provided For: Patient   Referral/Consult From: Rounding   Last Encounter  11/10/22  (V)   Complexity of Encounter Low   Spiritual/Emotional needs   Type Spiritual Support   Rituals, Rites and [address]

## 2022-11-10 NOTE — CONSULTS
Department of Psychiatry   Psychiatric Assessment      Thank you very much for allowing us to participate in the care of this patient. Reason for Consult: Suicidal ideation    HISTORY OF PRESENT ILLNESS:      Patient is a 21-year-old female with history of psychosis presented for abdominal pain. Patient son reported that she was making some passive suicidal statements, hence a psych consult. Patient reports that her mood has been somewhat up and down. Reports that she has been missing her parents which is making her feel sad. Currently denies any suicidal or homicidal ideation plan or intent. Reports that she did make some suicidal statements to her son but never meant to harm herself. Reports that she was on Haldol from [facility] in the past when she was experiencing auditory hallucinations. Was unable to provide the content of the hallucinations however states that Haldol did help her. Mentions that she went off of Haldol over 6 months ago however denies any recent auditory hallucinations. Denies any visual hallucinations at this time. PSYCHIATRIC HISTORY:      Outpatient psychiatric provider: None currently but was following up with [facility] in the past  Suicide attempts: Denies any  Inpatient psychiatric admissions: Reports 1 inpatient psychiatric admission many years ago    Lifetime Psychiatric Review of Systems         Obsessions and Compulsions: Denies       Lesvia or Hypomania: Denies     Hallucinations: Denies     Panic Attacks:  Denies     Delusions:  Denies     Phobias:  Denies     Trauma: Denies    Prior to Admission medications    Medication Sig Start Date End Date Taking?  Authorizing Provider   cephALEXin (KEFLEX) 500 MG capsule Take 1 capsule by mouth 2 times daily for 7 days 11/3/22 11/10/22  Halima Bae, DO   docusate sodium (COLACE) 100 MG capsule Take 1 capsule by mouth 2 times daily 11/3/22   Halima Bae, DO   polyethylene glycol (MIRALAX) 17 GM/SCOOP powder Take 17 g by mouth daily for 14 days PRN constipation 11/3/22 11/17/22  Lucretia Medeiros, DO   ondansetron (ZOFRAN-ODT) 4 MG disintegrating tablet Take 1 tablet by mouth 3 times daily as needed for Nausea or Vomiting 10/15/22   Radames Logan MD   tamsulosin (FLOMAX) 0.4 MG capsule Take 1 capsule by mouth in the morning.  8/6/22   Danny Pacheco, DO   ondansetron (ZOFRAN ODT) 4 MG disintegrating tablet Take 1 tablet by mouth every 8 hours as needed for Nausea 6/18/22   Zandra Miller, DO   acetaminophen (TYLENOL) 500 MG tablet Take 2 tablets by mouth 4 times daily as needed for Pain 5/17/22   Ronna Valdivia, DO   ibuprofen (IBU) 800 MG tablet Take 1 tablet by mouth every 8 hours as needed for Pain 5/17/22 8/5/22  Ronna Valdivia,    ciclopirox (LOPROX) 0.77 % cream ciclopirox 0.77 % topical cream    Historical Provider, MD   albuterol sulfate  (90 Base) MCG/ACT inhaler albuterol sulfate HFA 90 mcg/actuation aerosol inhaler    Historical Provider, MD   atorvastatin (LIPITOR) 20 MG tablet Take 1 tablet by mouth daily 6/29/21   Yue Keith MD   tiotropium (SPIRIVA RESPIMAT) 1.25 MCG/ACT AERS inhaler Inhale 2 puffs into the lungs daily 8/12/19   Wil Street MD   haloperidol (HALDOL) 5 MG tablet Take 5 mg by mouth 2 times daily    Historical Provider, MD   traZODone (DESYREL) 100 MG tablet  8/9/17   Historical Provider, MD   Elastic Bandages & Supports (ACE ARM SLING) MISC Apply 1 Units topically daily 4/17/17   Adelina Godoy MD   trihexyphenidyl (ARTANE) 2 MG tablet Take 2 mg by mouth 2 times daily  10/10/14   Historical Provider, MD        Medications:    Current Facility-Administered Medications: sodium chloride flush 0.9 % injection 10 mL, 10 mL, IntraVENous, PRN  0.9 % sodium chloride bolus, 100 mL, IntraVENous, Once  sodium chloride flush 0.9 % injection 5-40 mL, 5-40 mL, IntraVENous, 2 times per day  sodium chloride flush 0.9 % injection 5-40 mL, 5-40 mL, IntraVENous, PRN  0.9 % sodium chloride infusion, 25 mL, IntraVENous, PRN  enoxaparin (LOVENOX) injection 40 mg, 40 mg, SubCUTAneous, Daily  ondansetron (ZOFRAN-ODT) disintegrating tablet 4 mg, 4 mg, Oral, Q8H PRN **OR** ondansetron (ZOFRAN) injection 4 mg, 4 mg, IntraVENous, Q6H PRN  polyethylene glycol (GLYCOLAX) packet 17 g, 17 g, Oral, Daily PRN  acetaminophen (TYLENOL) tablet 650 mg, 650 mg, Oral, Q6H PRN **OR** acetaminophen (TYLENOL) suppository 650 mg, 650 mg, Rectal, Q6H PRN  cefTRIAXone (ROCEPHIN) 1,000 mg in sodium chloride 0.9 % 50 mL IVPB mini-bag, 1,000 mg, IntraVENous, Q24H  sennosides-docusate sodium (SENOKOT-S) 8.6-50 MG tablet 2 tablet, 2 tablet, Oral, BID  tiotropium (SPIRIVA RESPIMAT) 2.5 MCG/ACT inhaler 2 puff, 2 puff, Inhalation, Daily  haloperidol (HALDOL) tablet 5 mg, 5 mg, Oral, BID  trihexyphenidyl (ARTANE) tablet 2 mg, 2 mg, Oral, BID     Past Medical History:        Diagnosis Date    Arthritis     knees    Bronchitis x1 long ago    Chronic obstructive pulmonary disease ([facility] [address] 9/12/2017    Colon polyps     Controlled type 2 diabetes mellitus without complication, without long-term current use of insulin ([facility] [address] 6/29/2021    Dental neglect     poor dentation. Missing teeth. No loose teeth    Depression     Environmental allergies     GERD (gastroesophageal reflux disease)     Hyperlipidemia     Hypertension     Obesity     Onychomycosis     Poor historian     RBBB     Treadmill stress test negative for angina pectoris 2009    Wears glasses     reading only       Past Surgical History:        Procedure Laterality Date    CHOLECYSTECTOMY      COLONOSCOPY  6/2013    one hyperplastic polyp    DOPPLER ECHOCARDIOGRAPHY      cardiac    TUBAL LIGATION         Allergies: Patient has no known allergies. Social History:    Patient was that she was born and raised in [place]. Reports that she lives with her son and a friend named Patricia Huffman at home. 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   She has 3 children    SUBSTANCE USE HISTORY: Denies any    Family Medical and Psychiatric History:             Problem Relation Age of Onset    Heart Disease Mother     Cancer Father        Physical  /73   Pulse 61   Temp 98.6 °F (37 °C) (Oral)   Resp 16   Ht 5' 5\" (1.651 m)   Wt 151 lb 10.8 oz (68.8 kg)   SpO2 99%   BMI 25.24 kg/m²     Mental Status Examination:  Level of consciousness:  Within normal limits  Appearance: hospital attire, lying in bed, fair grooming  Behavior/Motor:  no abnormalities noted  Attitude toward examiner:  cooperative, attentive and good eye contact  Speech:  Spontaneous, normal rate and volume  Mood: Anxious  Affect: mood congruent  Thought processes:  Linear, goal directed, coherent  Thought content: Denies suicidal ideations   Denies homicidal ideations    Denies hallucinations   Denies delusions  Cognition:  Oriented to self, situation, location, date  Concentration clinically adequate  Memory age appropriate  Insight & Judgment:  fair    DSM-5 DIAGNOSIS:  Unspecified mood disorder    Stressors     Severity of stressors is mild  Source of stressors include: Other psychosocial and environmental stressors    PLAN:    Can discontinue one-to-one sitter and suicide precautions. Can discharged home after she is medically stable  Recommend social work to connect her with outpatient psychiatric services at [place]. Will sign off. Please call us back with any questions. Thank you very much for allowing us to participate in the care of this patient.        Electronically signed by Anthony Silverman MD on 11/10/22 at 2:33 PM EST

## 2022-11-10 NOTE — PROGRESS NOTES
Patient denying suicidal ideation at this time. However, constant observer initiated, suicide precations initiated as patient needs to be seen by psych per suicidal comments made to son at home. Patient notified Cristine Cross that she has not been taking her haldol and trihexyphenidyl at home for a few months as she has been out of the prescription.

## 2022-11-10 NOTE — CARE COORDINATION
CASE MANAGEMENT NOTE:    Admission Date:  11/9/2022 Milo Naqvi is a 66 y.o.  female    Admitted for : Suicidal thoughts [R45.851]  Acute cystitis without hematuria [N30.00]  Altered mental status, unspecified altered mental status type [R41.82]  AMS (altered mental status) [R41.82]    Met with:  Patient    PCP:  Dr. Da Batista:  [facility]      Is patient alert and oriented at time of discussion:  Yes    Current Residence/ Living Arrangements:  independently at home with son, Isha Osei. Current Services PTA:  No    Does patient go to outpatient dialysis: No    Is patient agreeable to VNS: No    Freedom of choice provided:  Yes    List of [address] provided: No    VNS chosen:  NA    DME:  straight cane and walker    Home Oxygen: Yes 2L NC    Nebulizer: No    CPAP/BIPAP: No    Supplier: N/A    Potential Assistance Needed: No    SNF needed: No    Freedom of choice and list provided: NA    Pharmacy:  [address] [place] [address] on [place]       Is patient currently receiving oral anticoagulation therapy? No    Is the Patient an [facility] with Readmission Risk Score greater than 14%? No  If yes, pt needs a follow up appointment made within 7 days. Family Members/Caregivers that pt would like involved in their care:    Yes    If yes, list name here:  Isha Osei and Mann Beverly    Transportation Provider:  Family             Discharge Plan:  home without needs. OP follow up at [place] per psych.                  Electronically signed by: Marv Cornell RN on 11/10/2022 at 2:51 PM

## 2022-11-10 NOTE — PROGRESS NOTES
[address]   Occupational Therapy Evaluation  Date: 11/10/22  Patient Name: Gunnar Montero       Room: 3361/8676-59  MRN: [id number]  Account: [de-identified]   : 1950  (72 y.o.) Gender: female     Discharge Recommendations:  Further Occupational Therapy is recommended upon facility discharge. OT Equipment Recommendations  Other: TBD    Referring Practitioner: Yael Mills MD  Diagnosis: Suicidal thoughts      Treatment Diagnosis: Impaired self care status    Past Medical History:  has a past medical history of Arthritis, Bronchitis, Chronic obstructive pulmonary disease ([address] Colon polyps, Controlled type 2 diabetes mellitus without complication, without long-term current use of insulin ([facility] [address] Dental neglect, Depression, Environmental allergies, GERD (gastroesophageal reflux disease), Hyperlipidemia, Hypertension, Obesity, Onychomycosis, Poor historian, RBBB, Treadmill stress test negative for angina pectoris, and Wears glasses. Past Surgical History:   has a past surgical history that includes Cholecystectomy; doppler echocardiography; Colonoscopy (2013); and Tubal ligation. Restrictions  Restrictions/Precautions  Restrictions/Precautions: Fall Risk (external urinary catheter, peripheral IV right antecubital)  Required Braces or Orthoses?: No  Position Activity Restriction  Other position/activity restrictions: up w/ asist      Vitals  Vitals  Heart Rate: 61  Heart Rate Source: Monitor  BP: 131/73  BP Location: Left upper arm  MAP (Calculated): 92  Resp: 16  SpO2: 99 %  O2 Device: None (Room air)     Subjective  Subjective: \"I shit. \" Pt was pleasant and agreeable to OT/PT eval  Comments: Ok per [employer] for OT/PT eval  Subjective  Pain: denies      Social/Functional History  Social/Functional History  Lives With: Family (son and friend)  Type of Home: House  Home Layout: One level  Home Access: Stairs to enter with rails  Entrance Stairs - Number of Steps: 10 steps w/ 2 rails  Entrance Stairs - Rails: Both  Bathroom Shower/Tub: Tub/Shower unit, Curtain, Shower chair with back  [employer]: Handicap height  Bathroom Equipment: Hand-held shower, Grab bars in shower, Shower chair, Grab bars around toilet  Home Equipment: [address], [address], Cane  Has the patient had two or more falls in the past year or any fall with injury in the past year?: Unknown (Initially stated no then states she has fallen on stairs)  Receives Help From: Family, Friend(s)  ADL Assistance: [address]: Needs assistance (son is primary, states she sometimes helps w/ dishes)  Homemaking Responsibilities: No (son is primary, states she sometimes helps w/ dishes)  Ambulation Assistance: Independent (has s cane with her at the hospital)  Transfer Assistance: Independent  Active : No  Patient's  Info: friend or son drive her places or she takes a cab  Mode of Transportation: Friends, [address] [place] [address] Road: [employer] and [employer] [facility]  IADL Comments: pt sleeps in a flat bed  Additional Comments: pt is a questionable historian, ? reliability of above information- no family present to verify information, pt states her son works first shift, dtr checks on her during the day      Objective  Cognition  Orientation  Overall Orientation Status: Impaired (states she was having chest pain otherwise answered questions correctly)  Orientation Level: Oriented to place, Oriented to time, Oriented to person, Disoriented to situation      Sensation  Overall Sensation Status: WFL    Activities of Daily Living  ADL  Feeding: Setup  Grooming: Setup  UE Bathing: Stand by assistance  LE Bathing: Minimal assistance  UE Dressing: Stand by assistance  LE Dressing: Moderate assistance  LE Dressing Skilled Clinical Factors: A with pulling pants down and with threading  Toileting:  Moderate assistance  Toileting Skilled Clinical Factors: A with clothing management and hygiene  Additional Comments: ADL scores based on clinical reasoning and skilled observation unless otherwise noted. Pt currently limited due to decreased strength, balance, activity tolerance, and cognitive barriers impacting safety and indepenence with self care tasks         UE Function  LUE AROM (degrees)  LUE AROM : WFL  Left Hand AROM (degrees)  Left Hand AROM: WFL  Tone LUE  LUE Tone: Normotonic  LUE Strength  Gross LUE Strength: WFL  L Hand General: 4/5    RUE AROM (degrees)  RUE AROM : WFL  Right Hand AROM (degrees)  Right Hand AROM: WFL  Tone RUE  RUE Tone: Normotonic  RUE Strength  Gross RUE Strength: WFL  R Hand General: 4/5         Fine Motor Skills/Coordination  Coordination  Movements Are Fluid And Coordinated: Yes                Mobility  Bed Mobility  Bed mobility  Rolling to Left: Stand by assistance  Supine to Sit: Stand by assistance  Sit to Supine: Unable to assess (Pt sitting in chair at end of session)  Scooting: Stand by assistance  Bed Mobility Comments: Pt completed bed mobiloty with HOB elevated. Balance  Balance  Sitting Balance: Stand by assistance  Standing Balance: Contact guard assistance       Transfers  Transfers  Sit to stand: Contact guard assistance  Stand to sit: Contact guard assistance  Transfer Comments: Verbal cues for hand placement and safety  Toilet Transfers  Toilet - Technique: Ambulating  Equipment Used: Standard toilet  Toilet Transfer: Contact guard assistance  Toilet Transfers Comments: Verbal cues for hand placement and safety    Functional Mobility  Functional - Mobility Device: Cane  Activity: To/from bathroom  Assist Level: Contact guard assistance  Functional Mobility Comments: Verbal cues for safety. Unsteady.  Use of RW at end of session with increased safety    Assessment  Assessment  Performance deficits / Impairments: Decreased ADL status, Decreased functional mobility , Decreased safe awareness, Decreased cognition, Decreased endurance, Decreased balance, Decreased independence with self care and mobility  Short Term Goal 5: Pt will participate in 15+ minutes of therapeutic exercises/functional activities to increase safety and independence with self care and mobility    Plan  Occupational Therapy Plan  Times Per Week: 4-6  Current Treatment Recommendations: Self-Care / ADL, Strengthening, Balance training, Functional mobility training, Endurance training, Safety education & training, Patient/Caregiver education & training, Equipment evaluation, education, & procurement, Home management training, Cognitive/Perceptual training      OT Individual Minutes  OT Individual Minutes  Time In: 2538  Time Out: 1020  Minutes: 29  Time Code Minutes   Timed Code Treatment Minutes: 9 Minutes        Electronically signed by Ronni Kinney OT on 11/10/22 at 1:04 PM EST

## 2022-11-11 VITALS
HEIGHT: 65 IN | OXYGEN SATURATION: 99 % | SYSTOLIC BLOOD PRESSURE: 116 MMHG | HEART RATE: 68 BPM | DIASTOLIC BLOOD PRESSURE: 60 MMHG | BODY MASS INDEX: 25.27 KG/M2 | RESPIRATION RATE: 16 BRPM | TEMPERATURE: 98.1 F | WEIGHT: 151.68 LBS

## 2022-11-11 LAB
CULTURE: ABNORMAL
SPECIMEN DESCRIPTION: ABNORMAL

## 2022-11-11 PROCEDURE — 96372 THER/PROPH/DIAG INJ SC/IM: CPT

## 2022-11-11 PROCEDURE — 6360000002 HC RX W HCPCS: Performed by: STUDENT IN AN ORGANIZED HEALTH CARE EDUCATION/TRAINING PROGRAM

## 2022-11-11 PROCEDURE — 99239 HOSP IP/OBS DSCHRG MGMT >30: CPT | Performed by: INTERNAL MEDICINE

## 2022-11-11 PROCEDURE — 6370000000 HC RX 637 (ALT 250 FOR IP)

## 2022-11-11 PROCEDURE — 96361 HYDRATE IV INFUSION ADD-ON: CPT

## 2022-11-11 PROCEDURE — G0378 HOSPITAL OBSERVATION PER HR: HCPCS

## 2022-11-11 PROCEDURE — 2580000003 HC RX 258: Performed by: STUDENT IN AN ORGANIZED HEALTH CARE EDUCATION/TRAINING PROGRAM

## 2022-11-11 PROCEDURE — 94761 N-INVAS EAR/PLS OXIMETRY MLT: CPT

## 2022-11-11 PROCEDURE — 96366 THER/PROPH/DIAG IV INF ADDON: CPT

## 2022-11-11 PROCEDURE — 6360000002 HC RX W HCPCS: Performed by: INTERNAL MEDICINE

## 2022-11-11 PROCEDURE — 2580000003 HC RX 258: Performed by: INTERNAL MEDICINE

## 2022-11-11 PROCEDURE — 94640 AIRWAY INHALATION TREATMENT: CPT

## 2022-11-11 RX ORDER — AMOXICILLIN 500 MG/1
500 CAPSULE ORAL 2 TIMES DAILY
Qty: 10 CAPSULE | Refills: 0 | Status: SHIPPED | OUTPATIENT
Start: 2022-11-11 | End: 2022-11-16

## 2022-11-11 RX ORDER — CLOBETASOL PROPIONATE 0.5 MG/G
OINTMENT TOPICAL
Qty: 60 G | Refills: 0 | Status: SHIPPED | OUTPATIENT
Start: 2022-11-11

## 2022-11-11 RX ORDER — DIAPER,BRIEF,INFANT-TODD,DISP
EACH MISCELLANEOUS
Qty: 30 G | Refills: 0 | Status: CANCELLED | OUTPATIENT
Start: 2022-11-11

## 2022-11-11 RX ADMIN — ENOXAPARIN SODIUM 40 MG: 100 INJECTION SUBCUTANEOUS at 08:18

## 2022-11-11 RX ADMIN — TRIHEXYPHENIDYL HYDROCHLORIDE 2 MG: 2 TABLET ORAL at 08:59

## 2022-11-11 RX ADMIN — CEFTRIAXONE SODIUM 1000 MG: 1 INJECTION, POWDER, FOR SOLUTION INTRAMUSCULAR; INTRAVENOUS at 11:58

## 2022-11-11 RX ADMIN — TIOTROPIUM BROMIDE INHALATION SPRAY 2 PUFF: 3.12 SPRAY, METERED RESPIRATORY (INHALATION) at 07:54

## 2022-11-11 RX ADMIN — SODIUM CHLORIDE, PRESERVATIVE FREE 10 ML: 5 INJECTION INTRAVENOUS at 08:19

## 2022-11-11 ASSESSMENT — ENCOUNTER SYMPTOMS
BACK PAIN: 0
COUGH: 0
NAUSEA: 0
WHEEZING: 0
CONSTIPATION: 0
VOMITING: 0
DIARRHEA: 0
STRIDOR: 0
RHINORRHEA: 0
ABDOMINAL DISTENTION: 0
SHORTNESS OF BREATH: 0
ABDOMINAL PAIN: 0

## 2022-11-11 NOTE — CARE COORDINATION
ONGOING DISCHARGE PLAN:    Patient is alert and oriented x4. Spoke with patient regarding discharge plan and patient confirms that plan is still home with no needs. IV rocephin for UTI  +enterococcus    Will continue to follow for additional discharge needs.     Electronically signed by Donna Hidalgo RN on 11/11/2022 at 11:43 AM

## 2022-11-11 NOTE — PROGRESS NOTES
[address] [employer]    PROGRESS NOTE             11/11/2022    7:31 AM    Name:   Ja Castanon  MRN:     [id number]     Acct:      [de-identified]   Room:   2059/2059-01  IP Day:  0  Admit Date:  11/9/2022  9:37 AM    PCP:  Courtney Good  Code Status:  Full Code    Subjective:     C/C:   Chief Complaint   Patient presents with    Abdominal Pain    Foot Swelling    Mental Health Problem     Interval History Status: improved. The patient was seen and examined at the bedside. No acute overnight events as per patient RN. The patient mentioned good sleep and good appetite. Vital signs stable. The patient no longer endorses abdominal pain. Denies chest pain, shortness of breath, fever/chills, nausea/vomiting. Patient was evaluated by psych, Haldol was discontinued-okay for discharge later today. Brief History:     Patient is a 78-year-old female past medical history significant for COPD, osteoarthritis, depression, hypertension, and hyperlipidemia who was brought in by the staff at [facility] because was complaining of abdominal pain. Per patient, she has been having generalized abdominal pain for the past couple days has not had much of an appetite and has felt nauseous. Patient is a very poor historian. Per chart review, patient son called to update staff on patient's current condition. Per son, patient received her psych medications from [facility] and she has not been compliant with them for the past few months, patient has been having difficulty sleeping, is having crying spells and has endorsed passive suicidal ideations which prompted the son to take her to rehab center to be evaluated. In ED, vitals were stable. Labs significant for low potassium of 3.6, elevated proBNP 300. Liver enzymes unremarkable, TSH normal at 1.41 thyroxine normal at 1.04. CBC significant for low hemoglobin of 11.8.   UA was positive for small leukocyte Estrace with 0-2 WBCs and few bacteria. CT abdomen was remarkable for cystitis and mild stool burden. Patient was given IV Rocephin x1 in ED. She was admitted to [place] [address] for further management of cystitis. Review of Systems:     Review of Systems   Constitutional:  Negative for activity change, chills, fatigue and fever. HENT:  Negative for congestion and rhinorrhea. Respiratory:  Negative for cough, shortness of breath, wheezing and stridor. Gastrointestinal:  Negative for abdominal distention, abdominal pain, constipation, diarrhea, nausea and vomiting. Endocrine: Negative for cold intolerance and heat intolerance. Genitourinary:  Negative for dysuria, frequency and urgency. Musculoskeletal:  Negative for back pain and myalgias. Skin:  Positive for rash. Neurological:  Negative for dizziness, tremors, syncope, facial asymmetry, weakness, numbness and headaches. Psychiatric/Behavioral:  Negative for agitation, behavioral problems and confusion. Medications:      Allergies:  No Known Allergies    Current Meds:   Scheduled Meds:    sodium chloride  100 mL IntraVENous Once    sodium chloride flush  5-40 mL IntraVENous 2 times per day    enoxaparin  40 mg SubCUTAneous Daily    cefTRIAXone (ROCEPHIN) IV  1,000 mg IntraVENous Q24H    sennosides-docusate sodium  2 tablet Oral BID    tiotropium  2 puff Inhalation Daily    trihexyphenidyl  2 mg Oral BID     Continuous Infusions:    sodium chloride 25 mL (11/10/22 1159)     PRN Meds: melatonin, sodium chloride flush, sodium chloride flush, sodium chloride, ondansetron **OR** ondansetron, polyethylene glycol, acetaminophen **OR** acetaminophen    Data:     Past Medical History:   has a past medical history of Arthritis, Bronchitis, Chronic obstructive pulmonary disease ([address] Colon polyps, Controlled type 2 diabetes mellitus without complication, without long-term current use of insulin ([address] Dental neglect, Depression, Environmental allergies, GERD Date: 11/9/2022  EXAMINATION: CT OF THE ABDOMEN AND PELVIS WITH CONTRAST 11/9/2022 10:42 am TECHNIQUE: CT of the abdomen and pelvis was performed with the administration of intravenous contrast. Multiplanar reformatted images are provided for review. Automated exposure control, iterative reconstruction, and/or weight based adjustment of the mA/kV was utilized to reduce the radiation dose to as low as reasonably achievable. COMPARISON: 10/14/2022 HISTORY: ORDERING SYSTEM PROVIDED HISTORY: [facility] pain TECHNOLOGIST PROVIDED HISTORY: Abd pain Decision Support Exception - unselect if not a suspected or confirmed emergency medical condition->Emergency Medical Condition (MA) Reason for Exam: abd pain x 2 days,  unable to go pee or bm x 3 days FINDINGS: Lower Chest: No acute findings. Organs: The gallbladder is not visualized. The liver, pancreas, spleen, kidneys, and adrenals reveal no acute findings. GI/Bowel: There is no bowel dilatation or wall thickening identified. Mild stool burden in the distal colon and rectum. Pelvis: The bladder is mildly distended and there is mild diffuse bladder wall thickening. Peritoneum/Retroperitoneum: No free air or free fluid. The aorta is normal in caliber. The visceral branches are patent. No lymphadenopathy. Bones/Soft Tissues: No abnormality identified. *Unless otherwise specified, incidental findings do not require dedicated imaging follow-up. 1.  Findings suggestive of cystitis. 2.  Mild stool burden in the distal colon and rectum. CT ABDOMEN PELVIS W IV CONTRAST Additional Contrast? None    Result Date: 10/14/2022  EXAMINATION: CT OF THE ABDOMEN AND PELVIS WITH CONTRAST 10/14/2022 10:45 pm TECHNIQUE: CT of the abdomen and pelvis was performed with the administration of intravenous contrast. Multiplanar reformatted images are provided for review.  Automated exposure control, iterative reconstruction, and/or weight based adjustment of the mA/kV was utilized to reduce the radiation dose to as low as reasonably achievable. COMPARISON: Noncontrast CT abdomen/pelvis dated 09/30/2022. HISTORY: ORDERING SYSTEM PROVIDED HISTORY: abd pain, n/v TECHNOLOGIST PROVIDED HISTORY: abd pain, n/v Decision Support Exception - unselect if not a suspected or confirmed emergency medical condition->Emergency Medical Condition (MA) Reason for Exam: abd pain, n/v Relevant Medical/Surgical History: htn, diabetes, gerd, gamal FINDINGS: Lower Chest:  Visualized portion of the lower chest demonstrates no acute abnormality. Organs: The liver is unremarkable. Status post cholecystectomy. The pancreas, spleen, adrenal glands, kidneys and visualized ureters are unremarkable. GI/Bowel: Stomach and duodenal sweep demonstrate no acute abnormality. There is no evidence of bowel obstruction. No evidence of abnormal bowel wall thickening or distension. The appendix is visualized and is unremarkable. No evidence of acute appendicitis. Pelvis: The bladder and reproductive organs are unremarkable. Peritoneum/Retroperitoneum: No evidence of ascites or free air. No evidence of lymphadenopathy. Aorta is normal in caliber. Bones/Soft Tissues: No acute bone or soft tissue abnormality evident. No acute or concerning abnormality identified. No finding to explain the patient's symptoms. Physical Examination:        Physical Exam  Constitutional:       General: She is not in acute distress. Appearance: Normal appearance. HENT:      Head: Normocephalic and atraumatic. Nose: No congestion or rhinorrhea. Eyes:      Extraocular Movements: Extraocular movements intact. Conjunctiva/sclera: Conjunctivae normal.      Pupils: Pupils are equal, round, and reactive to light. Cardiovascular:      Rate and Rhythm: Normal rate and regular rhythm. Pulses: Normal pulses. Heart sounds: Normal heart sounds. No murmur heard. No friction rub. No gallop.    Pulmonary:      Effort: Pulmonary effort is normal. No respiratory distress. Breath sounds: Normal breath sounds. No wheezing or rales. Abdominal:      General: There is no distension. Tenderness: There is no abdominal tenderness. Musculoskeletal:      Right lower leg: No edema. Left lower leg: No edema. Skin:     Capillary Refill: Capillary refill takes less than 2 seconds. Findings: Rash present. Comments:  Well-demarcated erythematous plaque with silvery white scales visualized on left-sided neck extending to left external ear and ear canal -psoriatic plaques    Neurological:      General: No focal deficit present. Mental Status: She is alert and oriented to person, place, and time. Sensory: No sensory deficit. Motor: No weakness.    Psychiatric:         Mood and Affect: Mood normal.         Assessment:        Primary Problem  AMS (altered mental status)    Active Hospital Problems    Diagnosis Date Noted    Unspecified mood (affective) disorder ([facility] [F39] 11/10/2022     Priority: Medium    AMS (altered mental status) [R41.82] 11/09/2022     Priority: Medium       Plan:        Acute onset abdominal pain 2/2 cystitis  -CT abdomen and pelvis on admission significant for acute cystitis and mild stool burden  -Urine analysis positive for small leukocyte esterase  -Urine culture positive for Enterococcus Faecium  -We will start patient on amoxicillin 500 TID  -Discontinue IV Rocephin  -Patient started on Senokot  -Potential discharge later today to home with son      COPD not on home oxygen  -Continue home inhaler Spiriva     Depression -with history of psychosis  -Patient is not currently suicidal/homicidal  -Psych consulted, will appreciate recommendations  -Haldol was discontinued per psych recommendations  -Follow-up outpatient with psychiatric services at [facility]     PT prophylaxis: Lovenox  Diet: Adult diet  CODE STATUS: Full code    Oleksandr Banks MD  11/11/2022  7:31 AM     Electronically signed by Daisy Rosas Imani Sun MD        I have discussed the care of Andry Rosado , including pertinent history and exam findings,    today with the resident. I have seen and examined the patient and the key elements of all parts of the encounter have been performed by me . I agree with the assessment, plan and orders as documented by the resident. Principal Problem:    AMS (altered mental status)  Active Problems:    Unspecified mood (affective) disorder (HCC)  Resolved Problems:    * No resolved hospital problems. *        Overall  course ;                                   are improving over time.         Patient clinically doing better  Urine culture growing Enterococcus  Will DC on amoxicillin  Patient has chronic rash on her neck on the left side, concern for possible psoriasis  Starting on Tenovate Care           Electronically signed by Sonia Gold MD

## 2022-11-11 NOTE — PROGRESS NOTES
11/11/22 1225   Encounter Summary   Encounter Overview/Reason  Spiritual/Emotional Needs   Service Provided For: Patient   Referral/Consult From: Grzegorz   Last Encounter  11/11/22   Complexity of Encounter Low   Spiritual/Emotional needs   Type Spiritual Support   Rituals, Rites and Sacraments   Type Anointing  (Lucila Jonas 11/11/22)

## 2022-11-11 NOTE — DISCHARGE SUMMARY
[facility]    Discharge Summary     Patient ID: Maura Niño  :  1950   MRN: [id number]     ACCOUNT:  [de-identified]   Patient's PCP: Kalli Tovar  Admit Date: 2022   Discharge Date: 2022   Length of Stay: 0  Code Status:  Full Code  Admitting Physician: Richard Dean MD  Discharge Physician: Juan J Swartz MD     Active Discharge Diagnoses:       Primary Problem  AMS (altered mental status)      [place] Problems    Diagnosis Date Noted    Unspecified mood (affective) disorder ([facility] [F39] 11/10/2022     Priority: Medium    AMS (altered mental status) [R41.82] 2022     Priority: Medium       Admission Condition:  fair     Discharged Condition: good    Hospital Stay:       Hospital Course: Maura Niño is a 66 y.o. female past medical history significant for COPD, osteoarthritis, depression-and distant history of psychosis, essential hypertension and hyperlipidemia who was brought in by [facility] staff because she was complaining of abdominal pain and was admitted for the management cystitis. In ED, vitals were stable labs significant for low potassium of 3.6 which was replaced, elevated proBNP of 300. TSH and T4 normal CBC significant for low hemoglobin of 11.8 UA was positive for small leukocyte Estrace with few bacteria. CT abdomen was remarkable for cystitis and mild stool burden. Patient was started on IV Rocephin and was admitted for further management of cystitis. During the course of hospitalization, urine culture came back positive for Enterococcus faecalis and patient was discharged home with 5-day course of amoxicillin 500 twice daily. Psych was consulted during this hospitalization due to passive suicidal/homicidal ideation as reported per son, no acute intervention no changes to medication - outpatient follow-up with psych scheduled.   She was also discharged with topical steroid clobetasol for her psoriatic rash, she needs further follow-up with primary care physician. Significant therapeutic interventions: IV antibiotics    Significant Diagnostic Studies:   Labs / Micro:  CBC:   Lab Results   Component Value Date/Time    WBC 4.2 11/10/2022 06:36 AM    RBC 3.80 11/10/2022 06:36 AM    RBC 4.60 04/24/2012 11:26 AM    HGB 11.6 11/10/2022 06:36 AM    HCT 35.0 11/10/2022 06:36 AM    MCV 92.0 11/10/2022 06:36 AM    MCH 30.5 11/10/2022 06:36 AM    MCHC 33.2 11/10/2022 06:36 AM    RDW 15.0 11/10/2022 06:36 AM     11/10/2022 06:36 AM     04/24/2012 11:26 AM     BMP:    Lab Results   Component Value Date/Time    GLUCOSE 77 11/10/2022 06:36 AM    GLUCOSE 87 04/24/2012 11:26 AM     11/10/2022 06:36 AM    K 4.4 11/10/2022 06:36 AM     11/10/2022 06:36 AM    CO2 29 11/10/2022 06:36 AM    ANIONGAP 8 11/10/2022 06:36 AM    BUN 9 11/10/2022 06:36 AM    CREATININE 0.57 11/10/2022 06:36 AM    BUNCRER 21 09/30/2022 06:09 AM    CALCIUM 9.2 11/10/2022 06:36 AM    LABGLOM >60 11/10/2022 06:36 AM    GFRAA >60 09/30/2022 06:09 AM    GFR      09/30/2022 06:09 AM     Radiology:  XR ABDOMEN (KUB) (SINGLE AP VIEW)    Result Date: 11/3/2022  EXAMINATION: ONE SUPINE XRAY VIEW(S) OF THE ABDOMEN 11/3/2022 4:36 pm COMPARISON: 08/04/2022 and correlated with CT of 10/14/2022 HISTORY: ORDERING SYSTEM PROVIDED HISTORY: Constipation TECHNOLOGIST PROVIDED HISTORY: Constipation Reason for Exam: Constipation, abdomen pain FINDINGS: Nonspecific bowel gas pattern without evidence of obstruction. 10 mm cluster of calcifications projected over the left upper quadrant which are outside the UPJ. .  No acute osseous abnormality. No evidence of bowel obstruction.      CT ABDOMEN PELVIS W IV CONTRAST Additional Contrast? None    Result Date: 11/9/2022  EXAMINATION: CT OF THE ABDOMEN AND PELVIS WITH CONTRAST 11/9/2022 10:42 am TECHNIQUE: CT of the abdomen and pelvis was performed with the administration of intravenous contrast. Multiplanar reformatted images are provided for review. Automated exposure control, iterative reconstruction, and/or weight based adjustment of the mA/kV was utilized to reduce the radiation dose to as low as reasonably achievable. COMPARISON: 10/14/2022 HISTORY: ORDERING SYSTEM PROVIDED HISTORY: Abd pain TECHNOLOGIST PROVIDED HISTORY: Abd pain Decision Support Exception - unselect if not a suspected or confirmed emergency medical condition->Emergency Medical Condition (MA) Reason for Exam: abd pain x 2 days,  unable to go pee or bm x 3 days FINDINGS: Lower Chest: No acute findings. Organs: The gallbladder is not visualized. The liver, pancreas, spleen, kidneys, and adrenals reveal no acute findings. GI/Bowel: There is no bowel dilatation or wall thickening identified. Mild stool burden in the distal colon and rectum. Pelvis: The bladder is mildly distended and there is mild diffuse bladder wall thickening. Peritoneum/Retroperitoneum: No free air or free fluid. The aorta is normal in caliber. The visceral branches are patent. No lymphadenopathy. Bones/Soft Tissues: No abnormality identified. *Unless otherwise specified, incidental findings do not require dedicated imaging follow-up. 1.  Findings suggestive of cystitis. 2.  Mild stool burden in the distal colon and rectum. CT ABDOMEN PELVIS W IV CONTRAST Additional Contrast? None    Result Date: 10/14/2022  EXAMINATION: CT OF THE ABDOMEN AND PELVIS WITH CONTRAST 10/14/2022 10:45 pm TECHNIQUE: CT of the abdomen and pelvis was performed with the administration of intravenous contrast. Multiplanar reformatted images are provided for review. Automated exposure control, iterative reconstruction, and/or weight based adjustment of the mA/kV was utilized to reduce the radiation dose to as low as reasonably achievable. COMPARISON: Noncontrast CT abdomen/pelvis dated 09/30/2022.  HISTORY: ORDERING SYSTEM PROVIDED HISTORY: abd pain, n/v TECHNOLOGIST PROVIDED HISTORY: abd pain, n/v Decision Support Exception - unselect if not a suspected or confirmed emergency medical condition->Emergency Medical Condition (MA) Reason for Exam: abd pain, n/v Relevant Medical/Surgical History: htn, diabetes, gerd, gamal FINDINGS: Lower Chest:  Visualized portion of the lower chest demonstrates no acute abnormality. Organs: The liver is unremarkable. Status post cholecystectomy. The pancreas, spleen, adrenal glands, kidneys and visualized ureters are unremarkable. GI/Bowel: Stomach and duodenal sweep demonstrate no acute abnormality. There is no evidence of bowel obstruction. No evidence of abnormal bowel wall thickening or distension. The appendix is visualized and is unremarkable. No evidence of acute appendicitis. Pelvis: The bladder and reproductive organs are unremarkable. Peritoneum/Retroperitoneum: No evidence of ascites or free air. No evidence of lymphadenopathy. Aorta is normal in caliber. Bones/Soft Tissues: No acute bone or soft tissue abnormality evident. No acute or concerning abnormality identified. No finding to explain the patient's symptoms. Consultations:    Consults:     Final Specialist Recommendations/Findings:   IP CONSULT TO PRIMARY CARE PROVIDER  IP CONSULT TO PSYCHIATRY      The patient was seen and examined on day of discharge and this discharge summary is in conjunction with any daily progress note from day of discharge. Discharge plan:       Disposition: Home    Physician Follow Up:     [address]  [phone]    Schedule an appointment as soon as possible for a visit in 1 week(s)      Courtney Good  [address] [place] [address]  [phone]    Follow up in 1 week(s)         Requiring Further Evaluation/Follow Up POST HOSPITALIZATION/Incidental Findings: None    Diet: regular diet    Activity: As tolerated    Instructions to Patient:   1.   Please take an antibiotic amoxicillin 500 twice daily for the next 5 days for your urinary bladder infection. 2.  Please apply a topical steroid ointment on your psoriatic neck rash. 3.  Follow-up with [facility]/psych in 1 week. 4.  Follow-up with primary care physician in 1 week. Discharge Medications:      Medication List        START taking these medications      amoxicillin 500 MG capsule  Commonly known as: AMOXIL  Take 1 capsule by mouth 2 times daily for 5 days     clobetasol 0.05 % ointment  Commonly known as: Temovate  Apply topically 2 times daily. CHANGE how you take these medications      ondansetron 4 MG disintegrating tablet  Commonly known as: Zofran ODT  Take 1 tablet by mouth every 8 hours as needed for Nausea  What changed: Another medication with the same name was removed. Continue taking this medication, and follow the directions you see here. CONTINUE taking these medications      ACE Arm Sling Misc  Apply 1 Units topically daily     acetaminophen 500 MG tablet  Commonly known as: TYLENOL  Take 2 tablets by mouth 4 times daily as needed for Pain     albuterol sulfate  (90 Base) MCG/ACT inhaler  Commonly known as: PROVENTIL;VENTOLIN;PROAIR     atorvastatin 20 MG tablet  Commonly known as: Lipitor  Take 1 tablet by mouth daily     ciclopirox 0.77 % cream  Commonly known as: LOPROX     docusate sodium 100 MG capsule  Commonly known as: Colace  Take 1 capsule by mouth 2 times daily     haloperidol 5 MG tablet  Commonly known as: HALDOL     polyethylene glycol 17 GM/SCOOP powder  Commonly known as: MiraLax  Take 17 g by mouth daily for 14 days PRN constipation     tamsulosin 0.4 MG capsule  Commonly known as: FLOMAX  Take 1 capsule by mouth in the morning.      tiotropium 1.25 MCG/ACT Aers inhaler  Commonly known as: Spiriva Respimat  Inhale 2 puffs into the lungs daily     traZODone 100 MG tablet  Commonly known as: DESYREL     trihexyphenidyl 2 MG tablet  Commonly known as: SHAHRIARANE            STOP taking these medications      cephALEXin 500 MG capsule  Commonly known as: Keflex     ibuprofen 800 MG tablet  Commonly known as: IBU               Where to Get Your Medications        These medications were sent to [facility] [address]  [address], [address] [facility] [address]      Phone: [phone]   amoxicillin 500 MG capsule  clobetasol 0.05 % ointment       Electronically signed by   Noah Doran MD  11/11/2022  12:10 PM      Thank you Dr. Karol Rocha for the opportunity to be involved in this patient's care.

## 2022-11-11 NOTE — PROGRESS NOTES
11/11/22 0915   Encounter Summary   Encounter Overview/Reason  Volunteer Encounter   Service Provided For: Patient   Referral/Consult From: Grzegorz   Last Encounter  11/11/22  (V)   Complexity of Encounter Low   Spiritual/Emotional needs   Type Spiritual Support   Rituals, Rites and Sacraments   Type Yazidism Communion   Assessment/Intervention/Outcome   Intervention Prayer (assurance of)/[place]

## 2022-11-11 NOTE — PLAN OF CARE
Problem: Discharge Planning  Goal: Discharge to home or other facility with appropriate resources  11/10/2022 1452 by David Ibanez RN  Outcome: Progressing  Flowsheets (Taken 11/10/2022 1452)  Discharge to home or other facility with appropriate resources: Identify barriers to discharge with patient and caregiver     Problem: Skin/Tissue Integrity  Goal: Absence of new skin breakdown  Description: 1. Monitor for areas of redness and/or skin breakdown  2. Assess vascular access sites hourly  3. Every 4-6 hours minimum:  Change oxygen saturation probe site  4. Every 4-6 hours:  If on nasal continuous positive airway pressure, respiratory therapy assess nares and determine need for appliance change or resting period. 11/10/2022 1452 by David Ibanez RN  Outcome: Progressing  Note: Pt skin integrity remained intact, no new alterations noted. Head to toe completed, see chart assessment.
Problem: Discharge Planning  Goal: Discharge to home or other facility with appropriate resources  11/10/2022 2239 by Paulina Olvera RN  Outcome: Progressing  Flowsheets (Taken 11/10/2022 2239)  Discharge to home or other facility with appropriate resources: Identify barriers to discharge with patient and caregiver     Problem: Safety - Adult  Goal: Free from fall injury  Outcome: Progressing  Note: Fall risk education reinforced this shift. Problem: Skin/Tissue Integrity  Goal: Absence of new skin breakdown  Description: 1. Monitor for areas of redness and/or skin breakdown  2. Assess vascular access sites hourly  3. Every 4-6 hours minimum:  Change oxygen saturation probe site  4. Every 4-6 hours:  If on nasal continuous positive airway pressure, respiratory therapy assess nares and determine need for appliance change or resting period.   11/10/2022 2239 by Paulina Olvera RN  Outcome: Progressing
Problem: Discharge Planning  Goal: Discharge to home or other facility with appropriate resources  Outcome: Progressing     Problem: Safety - Adult  Goal: Free from fall injury  Outcome: Progressing  Note: Fall risk education reinforced this shift. Problem: Skin/Tissue Integrity  Goal: Absence of new skin breakdown  Description: 1. Monitor for areas of redness and/or skin breakdown  2. Assess vascular access sites hourly  3. Every 4-6 hours minimum:  Change oxygen saturation probe site  4. Every 4-6 hours:  If on nasal continuous positive airway pressure, respiratory therapy assess nares and determine need for appliance change or resting period.   Outcome: Progressing
Problem: Skin/Tissue Integrity  Goal: Absence of new skin breakdown  Description: 1. Monitor for areas of redness and/or skin breakdown  2. Assess vascular access sites hourly  3. Every 4-6 hours minimum:  Change oxygen saturation probe site  4. Every 4-6 hours:  If on nasal continuous positive airway pressure, respiratory therapy assess nares and determine need for appliance change or resting period. 11/9/2022 1421 by Lamont Burgos RN  Outcome: Progressing     Problem: Skin/Tissue Integrity  Goal: Absence of new skin breakdown  Description: 1. Monitor for areas of redness and/or skin breakdown  2. Assess vascular access sites hourly  3. Every 4-6 hours minimum:  Change oxygen saturation probe site  4. Every 4-6 hours:  If on nasal continuous positive airway pressure, respiratory therapy assess nares and determine need for appliance change or resting period.   11/9/2022 1420 by Lamont Burgos RN  Outcome: Progressing     Problem: Safety - Adult  Goal: Free from fall injury  11/9/2022 1421 by Lamont Burgos RN  Outcome: Progressing  Flowsheets (Taken 11/9/2022 1419)  Free From Fall Injury:   Lacey Oquendo family/caregiver on patient safety   Based on caregiver fall risk screen, instruct family/caregiver to ask for assistance with transferring infant if caregiver noted to have fall risk factors     Problem: Discharge Planning  Goal: Discharge to home or other facility with appropriate resources  11/9/2022 1421 by Lamont Burgos RN  Outcome: Progressing
(2) cough or sneeze

## 2022-11-11 NOTE — PROGRESS NOTES
Writer got Janis Weathers, pt daughter. She states she talked to [place] and she is at home to let pt in the house. I asked Tova Obregon to call her brother to let him know pt is on her way home. Cab is here to pick her up, she has all her belongings with her. Writer explained all discharge paperwork with pt and she states an understanding with no further questions.  She has her antibiotic and cream with her as well

## 2022-11-11 NOTE — PROGRESS NOTES
CLINICAL PHARMACY NOTE: MEDS TO BEDS    Total # of Prescriptions Filled: 2   The following medications were delivered to the patient:  Amoxicillin 500mg  Clobetasol Propionate 0.05% Ointment    Additional Documentation:  Delivered Medication to Patients Room

## 2022-11-11 NOTE — PROGRESS NOTES
[address] [facility]   OCCUPATIONAL THERAPY MISSED TREATMENT NOTE   INPATIENT   Date: 22  Patient Name: Mariana Gonzalez       Room: 7368/4651-32  MRN: [id number]   Account #: [de-identified]    : 1950  (72 y.o.)  Gender: female   Referring Practitioner: Jacqueline Toro MD  Diagnosis: Suicidal thoughts             REASON FOR MISSED TREATMENT:  Patient refusal   -    pty states \"tomorrow\" after writer introduces self and explains POC. Will continue to follow.            KAILASH Julian

## 2022-11-13 ENCOUNTER — HOSPITAL ENCOUNTER (EMERGENCY)
Age: 72
Discharge: HOME | End: 2022-11-13
Attending: EMERGENCY MEDICINE
Payer: COMMERCIAL

## 2022-11-13 ENCOUNTER — APPOINTMENT (OUTPATIENT)
Dept: CT IMAGING | Age: 72
End: 2022-11-13
Payer: COMMERCIAL

## 2022-11-13 VITALS
HEART RATE: 82 BPM | HEIGHT: 65 IN | TEMPERATURE: 97.2 F | OXYGEN SATURATION: 100 % | BODY MASS INDEX: 25.99 KG/M2 | WEIGHT: 156 LBS | DIASTOLIC BLOOD PRESSURE: 55 MMHG | RESPIRATION RATE: 18 BRPM | SYSTOLIC BLOOD PRESSURE: 126 MMHG

## 2022-11-13 DIAGNOSIS — R10.84 GENERALIZED ABDOMINAL PAIN: Primary | ICD-10-CM

## 2022-11-13 LAB
ABSOLUTE EOS #: 0.08 K/UL (ref 0–0.44)
ABSOLUTE IMMATURE GRANULOCYTE: 0.01 K/UL (ref 0–0.3)
ABSOLUTE LYMPH #: 1.44 K/UL (ref 1.1–3.7)
ABSOLUTE MONO #: 0.48 K/UL (ref 0.1–1.2)
ALBUMIN SERPL-MCNC: 3.7 G/DL (ref 3.5–5.2)
ALP BLD-CCNC: 97 U/L (ref 35–104)
ALT SERPL-CCNC: 22 U/L (ref 5–33)
ANION GAP SERPL CALCULATED.3IONS-SCNC: 7 MMOL/L (ref 9–17)
AST SERPL-CCNC: 24 U/L
BASOPHILS # BLD: 1 % (ref 0–2)
BASOPHILS ABSOLUTE: 0.03 K/UL (ref 0–0.2)
BILIRUB SERPL-MCNC: 0.2 MG/DL (ref 0.3–1.2)
BILIRUBIN DIRECT: <0.1 MG/DL
BILIRUBIN URINE: NEGATIVE
BILIRUBIN, INDIRECT: ABNORMAL MG/DL (ref 0–1)
BUN BLDV-MCNC: 18 MG/DL (ref 8–23)
BUN/CREAT BLD: 28 (ref 9–20)
CALCIUM SERPL-MCNC: 9.7 MG/DL (ref 8.6–10.4)
CHLORIDE BLD-SCNC: 106 MMOL/L (ref 98–107)
CO2: 30 MMOL/L (ref 20–31)
COLOR: YELLOW
COMMENT UA: NORMAL
CREAT SERPL-MCNC: 0.65 MG/DL (ref 0.5–0.9)
EOSINOPHILS RELATIVE PERCENT: 2 % (ref 1–4)
GFR SERPL CREATININE-BSD FRML MDRD: >60 ML/MIN/1.73M2
GLUCOSE BLD-MCNC: 99 MG/DL (ref 70–99)
GLUCOSE URINE: NEGATIVE
HCT VFR BLD CALC: 36.5 % (ref 36.3–47.1)
HEMOGLOBIN: 11.2 G/DL (ref 11.9–15.1)
IMMATURE GRANULOCYTES: 0 %
KETONES, URINE: NEGATIVE
LACTIC ACID, SEPSIS: 0.9 MMOL/L (ref 0.5–1.9)
LEUKOCYTE ESTERASE, URINE: NEGATIVE
LIPASE: 20 U/L (ref 13–60)
LYMPHOCYTES # BLD: 26 % (ref 24–43)
MCH RBC QN AUTO: 30.2 PG (ref 25.2–33.5)
MCHC RBC AUTO-ENTMCNC: 30.7 G/DL (ref 28.4–34.8)
MCV RBC AUTO: 98.4 FL (ref 82.6–102.9)
MONOCYTES # BLD: 9 % (ref 3–12)
NITRITE, URINE: NEGATIVE
NRBC AUTOMATED: 0 PER 100 WBC
PDW BLD-RTO: 14.6 % (ref 11.8–14.4)
PH UA: 6.5 (ref 5–8)
PLATELET # BLD: 180 K/UL (ref 138–453)
PMV BLD AUTO: 9.3 FL (ref 8.1–13.5)
POTASSIUM SERPL-SCNC: 3.9 MMOL/L (ref 3.7–5.3)
PROTEIN UA: NEGATIVE
RBC # BLD: 3.71 M/UL (ref 3.95–5.11)
RBC # BLD: ABNORMAL 10*6/UL
REASON FOR REJECTION: NORMAL
SEG NEUTROPHILS: 62 % (ref 36–65)
SEGMENTED NEUTROPHILS ABSOLUTE COUNT: 3.43 K/UL (ref 1.5–8.1)
SODIUM BLD-SCNC: 143 MMOL/L (ref 135–144)
SPECIFIC GRAVITY UA: 1.02 (ref 1–1.03)
TOTAL PROTEIN: 6.2 G/DL (ref 6.4–8.3)
TURBIDITY: CLEAR
URINE HGB: NEGATIVE
UROBILINOGEN, URINE: NORMAL
WBC # BLD: 5.5 K/UL (ref 3.5–11.3)
ZZ NTE CLEAN UP: ORDERED TEST: NORMAL
ZZ NTE WITH NAME CLEAN UP: SPECIMEN SOURCE: NORMAL

## 2022-11-13 PROCEDURE — 99284 EMERGENCY DEPT VISIT MOD MDM: CPT

## 2022-11-13 PROCEDURE — 6360000002 HC RX W HCPCS: Performed by: EMERGENCY MEDICINE

## 2022-11-13 PROCEDURE — 80076 HEPATIC FUNCTION PANEL: CPT

## 2022-11-13 PROCEDURE — 96374 THER/PROPH/DIAG INJ IV PUSH: CPT

## 2022-11-13 PROCEDURE — 80048 BASIC METABOLIC PNL TOTAL CA: CPT

## 2022-11-13 PROCEDURE — 83605 ASSAY OF LACTIC ACID: CPT

## 2022-11-13 PROCEDURE — 2580000003 HC RX 258: Performed by: EMERGENCY MEDICINE

## 2022-11-13 PROCEDURE — 85025 COMPLETE CBC W/AUTO DIFF WBC: CPT

## 2022-11-13 PROCEDURE — 83690 ASSAY OF LIPASE: CPT

## 2022-11-13 PROCEDURE — 81003 URINALYSIS AUTO W/O SCOPE: CPT

## 2022-11-13 PROCEDURE — 74176 CT ABD & PELVIS W/O CONTRAST: CPT

## 2022-11-13 RX ORDER — ONDANSETRON 2 MG/ML
4 INJECTION INTRAMUSCULAR; INTRAVENOUS ONCE
Status: COMPLETED | OUTPATIENT
Start: 2022-11-13 | End: 2022-11-13

## 2022-11-13 RX ORDER — SODIUM CHLORIDE 0.9 % (FLUSH) 0.9 %
10 SYRINGE (ML) INJECTION ONCE
Status: DISCONTINUED | OUTPATIENT
Start: 2022-11-13 | End: 2022-11-14 | Stop reason: HOSPADM

## 2022-11-13 RX ORDER — 0.9 % SODIUM CHLORIDE 0.9 %
1000 INTRAVENOUS SOLUTION INTRAVENOUS ONCE
Status: COMPLETED | OUTPATIENT
Start: 2022-11-13 | End: 2022-11-13

## 2022-11-13 RX ORDER — 0.9 % SODIUM CHLORIDE 0.9 %
80 INTRAVENOUS SOLUTION INTRAVENOUS ONCE
Status: DISCONTINUED | OUTPATIENT
Start: 2022-11-13 | End: 2022-11-14 | Stop reason: HOSPADM

## 2022-11-13 RX ADMIN — SODIUM CHLORIDE 1000 ML: 9 INJECTION, SOLUTION INTRAVENOUS at 19:06

## 2022-11-13 RX ADMIN — ONDANSETRON 4 MG: 2 INJECTION INTRAMUSCULAR; INTRAVENOUS at 19:08

## 2022-11-13 ASSESSMENT — ENCOUNTER SYMPTOMS
ABDOMINAL PAIN: 1
COLOR CHANGE: 0
VOMITING: 0
SORE THROAT: 0
RHINORRHEA: 0
DIARRHEA: 0
EYE REDNESS: 0
NAUSEA: 1
SHORTNESS OF BREATH: 0
COUGH: 0
EYE DISCHARGE: 0

## 2022-11-13 ASSESSMENT — PAIN - FUNCTIONAL ASSESSMENT: PAIN_FUNCTIONAL_ASSESSMENT: 0-10

## 2022-11-13 ASSESSMENT — PAIN SCALES - GENERAL: PAINLEVEL_OUTOF10: 10

## 2022-11-13 NOTE — ED NOTES
Pt presents to ed c/o constipation, she has not had a bowel movement in 3 days.   Bowel sounds active, denies n/v/d.     Kristi Ontiveros RN  11/13/22 1819

## 2022-11-13 NOTE — ED PROVIDER NOTES
EMERGENCY DEPARTMENT ENCOUNTER    Pt Name: Mariana Gonzalez  MRN: [id number]  [place] 1950  Date of evaluation: 11/13/22  CHIEF COMPLAINT       Chief Complaint   Patient presents with    Abdominal Pain    Constipation     X3 days. Pt is a poor historian and unable to elaborate much on PMHx in triage. HISTORY OF PRESENT ILLNESS   Is a 65-year-old female that presents with complaints of abdominal pain nausea questionable constipation. Patient is somewhat of a poor historian, she has difficulty elaborating on her symptoms but states that for the past days she has had severe abdominal pain nausea has not really been able to tolerate oral intake. Patient denies any chest pain or shortness of breath, no fevers or chills, no cough or sputum production. REVIEW OF SYSTEMS     Review of Systems   Constitutional:  Negative for chills and fever. HENT:  Negative for rhinorrhea and sore throat. Eyes:  Negative for discharge, redness and visual disturbance. Respiratory:  Negative for cough and shortness of breath. Cardiovascular:  Negative for chest pain, palpitations and leg swelling. Gastrointestinal:  Positive for abdominal pain and nausea. Negative for diarrhea and vomiting. Genitourinary:  Negative for dysuria and hematuria. Musculoskeletal:  Negative for arthralgias, myalgias and neck pain. Skin:  Negative for color change and rash. Neurological:  Negative for seizures, weakness and headaches. Psychiatric/Behavioral:  Negative for hallucinations, self-injury and suicidal ideas. PASTMEDICAL HISTORY     Past Medical History:   Diagnosis Date    Arthritis     knees    Bronchitis x1 long ago    Chronic obstructive pulmonary disease ([address] 9/12/2017    Colon polyps     Controlled type 2 diabetes mellitus without complication, without long-term current use of insulin ([address] 6/29/2021    Dental neglect     poor dentation. Missing teeth.  No loose teeth    Depression     Environmental allergies GERD (gastroesophageal reflux disease)     Hyperlipidemia     Hypertension     Obesity     Onychomycosis     Poor historian     RBBB     Treadmill stress test negative for angina pectoris 2009    Wears glasses     reading only     SURGICAL HISTORY       Past Surgical History:   Procedure Laterality Date    CHOLECYSTECTOMY      COLONOSCOPY  6/2013    one hyperplastic polyp    DOPPLER ECHOCARDIOGRAPHY      cardiac    TUBAL LIGATION       CURRENT MEDICATIONS       Discharge Medication List as of 11/13/2022 10:41 PM        CONTINUE these medications which have NOT CHANGED    Details   amoxicillin (AMOXIL) 500 MG capsule Take 1 capsule by mouth 2 times daily for 5 days, Disp-10 capsule, R-0Normal      clobetasol (TEMOVATE) 0.05 % ointment Apply topically 2 times daily. , Disp-60 g, R-0, Normal      docusate sodium (COLACE) 100 MG capsule Take 1 capsule by mouth 2 times daily, Disp-60 capsule, R-0Normal      polyethylene glycol (MIRALAX) 17 GM/SCOOP powder Take 17 g by mouth daily for 14 days PRN constipation, Disp-238 g, R-0Normal      tamsulosin (FLOMAX) 0.4 MG capsule Take 1 capsule by mouth in the morning., Disp-30 capsule, R-3Normal      ondansetron (ZOFRAN ODT) 4 MG disintegrating tablet Take 1 tablet by mouth every 8 hours as needed for Nausea, Disp-15 tablet, R-0Print      acetaminophen (TYLENOL) 500 MG tablet Take 2 tablets by mouth 4 times daily as needed for Pain, Disp-60 tablet, R-0Print      ciclopirox (LOPROX) 0.77 % cream ciclopirox 0.77 % topical cream, Historical Med      albuterol sulfate  (90 Base) MCG/ACT inhaler albuterol sulfate HFA 90 mcg/actuation aerosol inhalerHistorical Med      atorvastatin (LIPITOR) 20 MG tablet Take 1 tablet by mouth daily, Disp-30 tablet, R-3Normal      tiotropium (SPIRIVA RESPIMAT) 1.25 MCG/ACT AERS inhaler Inhale 2 puffs into the lungs daily, Disp-1 Inhaler, R-3Normal      haloperidol (HALDOL) 5 MG tablet Take 5 mg by mouth 2 times dailyHistorical Med traZODone (DESYREL) 100 MG tablet Historical Med      Elastic Bandages & Supports (ACE ARM SLING) MISC DAILY Starting 2017, Until Discontinued, Disp-1 each, R-0, Normal      trihexyphenidyl (ARTANE) 2 MG tablet Take 2 mg by mouth 2 times daily Historical Med           ALLERGIES     has No Known Allergies. FAMILY HISTORY     She indicated that her mother is . She indicated that her father is . She indicated that her maternal grandmother is . She indicated that her maternal grandfather is . She indicated that her paternal grandmother is . She indicated that her paternal grandfather is . SOCIAL HISTORY       Social History     Tobacco Use    Smoking status: Former     Packs/day: 0.50     Years: 35.00     Pack years: 17.50     Types: Cigarettes     Quit date: 2015     Years since quittin.3    Smokeless tobacco: Never   Substance Use Topics    Alcohol use: No     Alcohol/week: 0.0 standard drinks    Drug use: No     PHYSICAL EXAM     INITIAL VITALS: BP (!) 126/55   Pulse 82   Temp 97.2 °F (36.2 °C) (Oral)   Resp 18   Ht 5' 5\" (1.651 m)   Wt 156 lb (70.8 kg)   SpO2 100%   BMI 25.96 kg/m²    Physical Exam  Constitutional:       Appearance: Normal appearance. She is well-developed. She is not ill-appearing or toxic-appearing. HENT:      Head: Normocephalic and atraumatic. Eyes:      Conjunctiva/sclera: Conjunctivae normal.      Pupils: Pupils are equal, round, and reactive to light. Neck:      Trachea: Trachea normal.   Cardiovascular:      Rate and Rhythm: Normal rate and regular rhythm. Heart sounds: S1 normal and S2 normal. No murmur heard. Pulmonary:      Effort: Pulmonary effort is normal. No accessory muscle usage or respiratory distress. Breath sounds: Normal breath sounds. Chest:      Chest wall: No deformity or tenderness. Abdominal:      General: Bowel sounds are normal. There is no distension or abdominal bruit. Palpations: Abdomen is not rigid. Tenderness: There is generalized abdominal tenderness. There is no guarding or rebound. Negative signs include Silva's sign and McBurney's sign. Musculoskeletal:      Cervical back: Normal range of motion and neck supple. Skin:     General: Skin is warm. Findings: No rash. Neurological:      Mental Status: She is alert and oriented to person, place, and time. GCS: GCS eye subscore is 4. GCS verbal subscore is 5. GCS motor subscore is 6. Psychiatric:         Speech: Speech normal.       MEDICAL DECISION MAKIN-year-old presents with complaints of abdominal pain nausea and vomiting. Plan is basic labs lactic acid CT of the abdomen and pelvis and reevaluation. HEART SCORE: not indicated                                                                                                                                                                                                                                                                                                                                                                                                                                               All patient's question's and concerns were answered prior to disposition and patient and/or family expressed understanding and agreement of treatment plan. CRITICAL CARE:              NIH STROKE SCALE:            PROCEDURES:    Procedures    DIAGNOSTIC RESULTS   EKG:All EKG's are interpreted by the Emergency Department Physician who either signs or Co-signs this chart in the absence of a cardiologist.        RADIOLOGY:All plain film, CT, MRI, and formal ultrasound images (except ED bedside ultrasound) are read by the radiologist, see reports below, unless otherwisenoted in MDM or here. CT ABDOMEN PELVIS WO CONTRAST Additional Contrast? None   Final Result   1.  Interval development of pneumatosis coli at the cecum and proximal ascending colon. This can be an indicator for acute bowel ischemia or can be   seen as a benign finding. Correlate with lactic acid levels and physical   findings. Suboptimal characterization on noncontrast CT. 2. Mild vascular calcification noted throughout reflecting calcific   atherosclerosis. 3. Bilateral hip joint and SI joint osteoarthritis. 4.  Mild intra and extrahepatic bile duct dilatation status post   cholecystectomy typical of reservoir effect. The results were called by Dr. Sarabjit Nava to aTylor Hanna on   11/13/2022 at 21:04. I stressed that the findings may reflect acute colonic   ischemia, potentially life threatening illness. LABS: All lab results were reviewed by myself, and all abnormals are listed below. Labs Reviewed   BASIC METABOLIC PANEL - Abnormal; Notable for the following components:       Result Value    Bun/Cre Ratio 28 (*)     Anion Gap 7 (*)     All other components within normal limits   HEPATIC FUNCTION PANEL - Abnormal; Notable for the following components:     Total Bilirubin 0.2 (*)     Total Protein 6.2 (*)     All other components within normal limits   CBC WITH AUTO DIFFERENTIAL - Abnormal; Notable for the following components:    RBC 3.71 (*)     Hemoglobin 11.2 (*)     RDW 14.6 (*)     All other components within normal limits   URINALYSIS   LIPASE   LACTATE, SEPSIS   SPECIMEN REJECTION       EMERGENCY DEPARTMENTCOURSE:         Vitals:    Vitals:    11/13/22 1743   BP: (!) 126/55   Pulse: 82   Resp: 18   Temp: 97.2 °F (36.2 °C)   TempSrc: Oral   SpO2: 100%   Weight: 156 lb (70.8 kg)   Height: 5' 5\" (1.651 m)       The patient was given the following medications while in the emergency department:  Orders Placed This Encounter   Medications    ondansetron (ZOFRAN) injection 4 mg    0.9 % sodium chloride bolus    DISCONTD: 0.9 % sodium chloride bolus    DISCONTD: iopamidol (ISOVUE-370) 76 % injection 75 mL    DISCONTD: sodium chloride flush 0.9 % injection 10 mL     CONSULTS:  None    FINAL IMPRESSION      1. Generalized abdominal pain          DISPOSITION/PLAN   DISPOSITION Decision To Discharge 11/13/2022 10:40:47 PM      PATIENT REFERRED TO:  Karol Rocha  [address] [facility]  [phone]    Schedule an appointment as soon as possible for a visit in 1 week    DISCHARGE MEDICATIONS:  Discharge Medication List as of 11/13/2022 10:41 PM        Carma Cogan, MD  Attending Emergency Physician      The care is provided during an unprecedented national emergency due to the novel coronavirus, COVID 19. This note was created with the assistance of a speech-recognition program. While intending to generate a document that actually reflects the content of the visit, no guarantees can be provided that every mistake has been identified and corrected by editing.     Tristan Beard MD  11/17/22 4140

## 2022-11-14 NOTE — ED PROVIDER NOTES
EMERGENCY DEPARTMENT ENCOUNTER    Pt Name: Ja Castanon  MRN: [id number]  [place] 1950  Date of evaluation: 11/14/22  CHIEF COMPLAINT       Chief Complaint   Patient presents with    Abdominal Pain    Constipation     X3 days. Pt is a poor historian and unable to elaborate much on PMHx in triage. PASTMEDICAL HISTORY     Past Medical History:   Diagnosis Date    Arthritis     knees    Bronchitis x1 long ago    Chronic obstructive pulmonary disease ([facility] [address] 9/12/2017    Colon polyps     Controlled type 2 diabetes mellitus without complication, without long-term current use of insulin ([address] 6/29/2021    Dental neglect     poor dentation. Missing teeth. No loose teeth    Depression     Environmental allergies     GERD (gastroesophageal reflux disease)     Hyperlipidemia     Hypertension     Obesity     Onychomycosis     Poor historian     RBBB     Treadmill stress test negative for angina pectoris 2009    Wears glasses     reading only     Past Problem List  Patient Active Problem List   Diagnosis Code    GERD (gastroesophageal reflux disease) K21.9    Hypertension I10    Hyperlipidemia E78.5    Depression F32. A    Bronchitis J40    HTN (hypertension) I10    HLD (hyperlipidemia) E78.5    Osteoarthritis M19.90    Osteoarthritis of right knee M17.11    Closed nondisplaced fracture of head of right radius S52.124A    Chronic obstructive pulmonary disease (HCC) J44.9    Smoker F17.200    Chronic pain of left knee M25.562, G89.29    Pre-diabetes R73.03    Falls frequently R29.6    Cognitive and behavioral changes R41.89, M64.51    Toxic metabolic encephalopathy G08.0    Chest pain R07.9    Ileus (HCC) K56.7    Urinary retention R33.9    Normocytic normochromic anemia D64.9    BMI 28.0-28.9,adult Z68.28    AMS (altered mental status) R41.82    Unspecified mood (affective) disorder ([facility] [address] F39     SURGICAL HISTORY       Past Surgical History:   Procedure Laterality Date    CHOLECYSTECTOMY      COLONOSCOPY  6/2013 one hyperplastic polyp    DOPPLER ECHOCARDIOGRAPHY      cardiac    TUBAL LIGATION       CURRENT MEDICATIONS       Discharge Medication List as of 11/13/2022 10:41 PM        CONTINUE these medications which have NOT CHANGED    Details   amoxicillin (AMOXIL) 500 MG capsule Take 1 capsule by mouth 2 times daily for 5 days, Disp-10 capsule, R-0Normal      clobetasol (TEMOVATE) 0.05 % ointment Apply topically 2 times daily. , Disp-60 g, R-0, Normal      docusate sodium (COLACE) 100 MG capsule Take 1 capsule by mouth 2 times daily, Disp-60 capsule, R-0Normal      polyethylene glycol (MIRALAX) 17 GM/SCOOP powder Take 17 g by mouth daily for 14 days PRN constipation, Disp-238 g, R-0Normal      tamsulosin (FLOMAX) 0.4 MG capsule Take 1 capsule by mouth in the morning., Disp-30 capsule, R-3Normal      ondansetron (ZOFRAN ODT) 4 MG disintegrating tablet Take 1 tablet by mouth every 8 hours as needed for Nausea, Disp-15 tablet, R-0Print      acetaminophen (TYLENOL) 500 MG tablet Take 2 tablets by mouth 4 times daily as needed for Pain, Disp-60 tablet, R-0Print      ciclopirox (LOPROX) 0.77 % cream ciclopirox 0.77 % topical cream, Historical Med      albuterol sulfate  (90 Base) MCG/ACT inhaler albuterol sulfate HFA 90 mcg/actuation aerosol inhalerHistorical Med      atorvastatin (LIPITOR) 20 MG tablet Take 1 tablet by mouth daily, Disp-30 tablet, R-3Normal      tiotropium (SPIRIVA RESPIMAT) 1.25 MCG/ACT AERS inhaler Inhale 2 puffs into the lungs daily, Disp-1 Inhaler, R-3Normal      haloperidol (HALDOL) 5 MG tablet Take 5 mg by mouth 2 times dailyHistorical Med      traZODone (DESYREL) 100 MG tablet Historical Med      Elastic Bandages & Supports (ACE ARM SLING) MISC DAILY Starting 4/17/2017, Until Discontinued, Disp-1 each, R-0, Normal      trihexyphenidyl (ARTANE) 2 MG tablet Take 2 mg by mouth 2 times daily Historical Med           ALLERGIES     has No Known Allergies.   FAMILY HISTORY     She indicated that her mother is . She indicated that her father is . She indicated that her maternal grandmother is . She indicated that her maternal grandfather is . She indicated that her paternal grandmother is . She indicated that her paternal grandfather is . SOCIAL HISTORY       Social History     Tobacco Use    Smoking status: Former     Packs/day: 0.50     Years: 35.00     Pack years: 17.50     Types: Cigarettes     Quit date: 2015     Years since quittin.3    Smokeless tobacco: Never   Substance Use Topics    Alcohol use: No     Alcohol/week: 0.0 standard drinks    Drug use: No     PHYSICAL EXAM     INITIAL VITALS: BP (!) 126/55   Pulse 82   Temp 97.2 °F (36.2 °C) (Oral)   Resp 18   Ht 5' 5\" (1.651 m)   Wt 156 lb (70.8 kg)   SpO2 100%   BMI 25.96 kg/m²       MEDICAL DECISION MAKIN-year-old female presenting to the emergency room for abdominal discomfort. This is a recurring problem for the patient. She has been seen frequently in the emergency room for this before. Labs are unremarkable. Urinalysis does not show infectious process. CT abdomen pelvis does comment on pneumatosis coli. This is thought to be an incidental finding for the patient. I do not suspect mesenteric ischemia given the chronicity of this pain and the fact that her lactic acid levels are normal.  I discussed with the patient PCP follow-up. CRITICAL CARE:       PROCEDURES:    Procedures    DIAGNOSTIC RESULTS   EKG:All EKG's are interpreted by the Emergency Department Physician who either signs or Co-signs this chart in the absence of a cardiologist.        RADIOLOGY:All plain film, CT, MRI, and formal ultrasound images (except ED bedside ultrasound) are read by the radiologist, see reports below, unless otherwisenoted in MDM or here. CT ABDOMEN PELVIS WO CONTRAST Additional Contrast? None   Final Result   1.  Interval development of pneumatosis coli at the cecum and proximal ascending colon. This can be an indicator for acute bowel ischemia or can be   seen as a benign finding. Correlate with lactic acid levels and physical   findings. Suboptimal characterization on noncontrast CT. 2. Mild vascular calcification noted throughout reflecting calcific   atherosclerosis. 3. Bilateral hip joint and SI joint osteoarthritis. 4.  Mild intra and extrahepatic bile duct dilatation status post   cholecystectomy typical of reservoir effect. The results were called by Dr. Erma Gloria to Paula Mohan on   11/13/2022 at 21:04. I stressed that the findings may reflect acute colonic   ischemia, potentially life threatening illness. LABS: All lab results were reviewed by myself, and all abnormals are listed below. Labs Reviewed   BASIC METABOLIC PANEL - Abnormal; Notable for the following components:       Result Value    Bun/Cre Ratio 28 (*)     Anion Gap 7 (*)     All other components within normal limits   HEPATIC FUNCTION PANEL - Abnormal; Notable for the following components:     Total Bilirubin 0.2 (*)     Total Protein 6.2 (*)     All other components within normal limits   CBC WITH AUTO DIFFERENTIAL - Abnormal; Notable for the following components:    RBC 3.71 (*)     Hemoglobin 11.2 (*)     RDW 14.6 (*)     All other components within normal limits   URINALYSIS   LIPASE   LACTATE, SEPSIS   SPECIMEN REJECTION       EMERGENCY DEPARTMENTCOURSE:         Vitals:    Vitals:    11/13/22 1743   BP: (!) 126/55   Pulse: 82   Resp: 18   Temp: 97.2 °F (36.2 °C)   TempSrc: Oral   SpO2: 100%   Weight: 156 lb (70.8 kg)   Height: 5' 5\" (1.651 m)       The patient was given the following medications while in the emergency department:  Orders Placed This Encounter   Medications    ondansetron (ZOFRAN) injection 4 mg    0.9 % sodium chloride bolus    DISCONTD: 0.9 % sodium chloride bolus    DISCONTD: iopamidol (ISOVUE-370) 76 % injection 75 mL    DISCONTD: sodium chloride flush 0.9 % injection 10 mL     CONSULTS:  None    FINAL IMPRESSION      1. Generalized abdominal pain          DISPOSITION/PLAN   DISPOSITION Decision To Discharge 11/13/2022 10:40:47 PM      PATIENT REFERRED TO:  Lacy Olearyr  [address] [facility]  [phone]    Schedule an appointment as soon as possible for a visit in 1 week    DISCHARGE MEDICATIONS:  Discharge Medication List as of 11/13/2022 10:41 PM        Rosetta Salgado MD  Attending Emergency Physician      Care during this encounter was due to an unprecedented national emergency due to COVID-19.              Abad Chavez MD  11/14/22 0022

## 2022-11-22 ENCOUNTER — HOSPITAL ENCOUNTER (INPATIENT)
Age: 72
LOS: 3 days | Discharge: NURSING, REHAB OR LONG-TERM CARE | DRG: 463 | End: 2022-11-26
Attending: EMERGENCY MEDICINE | Admitting: INTERNAL MEDICINE
Payer: COMMERCIAL

## 2022-11-22 ENCOUNTER — APPOINTMENT (OUTPATIENT)
Dept: GENERAL RADIOLOGY | Age: 72
DRG: 463 | End: 2022-11-22
Payer: COMMERCIAL

## 2022-11-22 DIAGNOSIS — Z78.9 UNABLE TO CARE FOR SELF: Primary | ICD-10-CM

## 2022-11-22 PROBLEM — B37.31 CANDIDAL VULVOVAGINITIS: Status: ACTIVE | Noted: 2022-11-22

## 2022-11-22 PROBLEM — R62.50 DEVELOPMENTAL DELAY: Status: ACTIVE | Noted: 2022-11-22

## 2022-11-22 PROBLEM — N30.00 ACUTE CYSTITIS WITHOUT HEMATURIA: Status: ACTIVE | Noted: 2022-11-22

## 2022-11-22 LAB
ABSOLUTE EOS #: 0.09 K/UL (ref 0–0.44)
ABSOLUTE IMMATURE GRANULOCYTE: 0.02 K/UL (ref 0–0.3)
ABSOLUTE LYMPH #: 1.84 K/UL (ref 1.1–3.7)
ABSOLUTE MONO #: 0.47 K/UL (ref 0.1–1.2)
ALBUMIN SERPL-MCNC: 3.6 G/DL (ref 3.5–5.2)
ALP BLD-CCNC: 102 U/L (ref 35–104)
ALT SERPL-CCNC: 12 U/L (ref 5–33)
ANION GAP SERPL CALCULATED.3IONS-SCNC: 9 MMOL/L (ref 9–17)
AST SERPL-CCNC: 17 U/L
BACTERIA: ABNORMAL
BASOPHILS # BLD: 0 % (ref 0–2)
BASOPHILS ABSOLUTE: <0.03 K/UL (ref 0–0.2)
BILIRUB SERPL-MCNC: 0.4 MG/DL (ref 0.3–1.2)
BILIRUBIN DIRECT: 0.1 MG/DL
BILIRUBIN URINE: NEGATIVE
BILIRUBIN, INDIRECT: 0.3 MG/DL (ref 0–1)
BUN BLDV-MCNC: 13 MG/DL (ref 8–23)
BUN/CREAT BLD: 20 (ref 9–20)
CALCIUM SERPL-MCNC: 9.3 MG/DL (ref 8.6–10.4)
CHLORIDE BLD-SCNC: 104 MMOL/L (ref 98–107)
CO2: 26 MMOL/L (ref 20–31)
COLOR: YELLOW
CREAT SERPL-MCNC: 0.64 MG/DL (ref 0.5–0.9)
EKG ATRIAL RATE: 58 BPM
EKG P AXIS: 74 DEGREES
EKG P-R INTERVAL: 124 MS
EKG Q-T INTERVAL: 510 MS
EKG QRS DURATION: 126 MS
EKG QTC CALCULATION (BAZETT): 500 MS
EKG R AXIS: -16 DEGREES
EKG T AXIS: 7 DEGREES
EKG VENTRICULAR RATE: 58 BPM
EOSINOPHILS RELATIVE PERCENT: 2 % (ref 1–4)
EPITHELIAL CELLS UA: ABNORMAL /HPF (ref 0–5)
GFR SERPL CREATININE-BSD FRML MDRD: >60 ML/MIN/1.73M2
GLUCOSE BLD-MCNC: 88 MG/DL (ref 70–99)
GLUCOSE URINE: NEGATIVE
HCT VFR BLD CALC: 35.6 % (ref 36.3–47.1)
HEMOGLOBIN: 11.1 G/DL (ref 11.9–15.1)
IMMATURE GRANULOCYTES: 0 %
KETONES, URINE: NEGATIVE
LEUKOCYTE ESTERASE, URINE: ABNORMAL
LYMPHOCYTES # BLD: 35 % (ref 24–43)
MCH RBC QN AUTO: 30.7 PG (ref 25.2–33.5)
MCHC RBC AUTO-ENTMCNC: 31.2 G/DL (ref 28.4–34.8)
MCV RBC AUTO: 98.3 FL (ref 82.6–102.9)
MONOCYTES # BLD: 9 % (ref 3–12)
NITRITE, URINE: NEGATIVE
NRBC AUTOMATED: 0 PER 100 WBC
PDW BLD-RTO: 14.1 % (ref 11.8–14.4)
PH UA: 5.5 (ref 5–8)
PLATELET # BLD: 232 K/UL (ref 138–453)
PMV BLD AUTO: 9.3 FL (ref 8.1–13.5)
POTASSIUM SERPL-SCNC: 3.3 MMOL/L (ref 3.7–5.3)
PROTEIN UA: NEGATIVE
RBC # BLD: 3.62 M/UL (ref 3.95–5.11)
RBC UA: ABNORMAL /HPF (ref 0–2)
SEG NEUTROPHILS: 54 % (ref 36–65)
SEGMENTED NEUTROPHILS ABSOLUTE COUNT: 2.9 K/UL (ref 1.5–8.1)
SODIUM BLD-SCNC: 139 MMOL/L (ref 135–144)
SPECIFIC GRAVITY UA: 1.01 (ref 1–1.03)
TOTAL PROTEIN: 6 G/DL (ref 6.4–8.3)
TURBIDITY: CLEAR
URINE HGB: NEGATIVE
UROBILINOGEN, URINE: NORMAL
WBC # BLD: 5.3 K/UL (ref 3.5–11.3)
WBC UA: ABNORMAL /HPF (ref 0–5)

## 2022-11-22 PROCEDURE — 6360000002 HC RX W HCPCS: Performed by: NURSE PRACTITIONER

## 2022-11-22 PROCEDURE — 97530 THERAPEUTIC ACTIVITIES: CPT

## 2022-11-22 PROCEDURE — 74022 RADEX COMPL AQT ABD SERIES: CPT

## 2022-11-22 PROCEDURE — 93010 ELECTROCARDIOGRAM REPORT: CPT | Performed by: INTERNAL MEDICINE

## 2022-11-22 PROCEDURE — G0378 HOSPITAL OBSERVATION PER HR: HCPCS

## 2022-11-22 PROCEDURE — 2580000003 HC RX 258: Performed by: NURSE PRACTITIONER

## 2022-11-22 PROCEDURE — 73560 X-RAY EXAM OF KNEE 1 OR 2: CPT

## 2022-11-22 PROCEDURE — 6360000002 HC RX W HCPCS: Performed by: EMERGENCY MEDICINE

## 2022-11-22 PROCEDURE — 96372 THER/PROPH/DIAG INJ SC/IM: CPT

## 2022-11-22 PROCEDURE — 2500000003 HC RX 250 WO HCPCS: Performed by: NURSE PRACTITIONER

## 2022-11-22 PROCEDURE — 99285 EMERGENCY DEPT VISIT HI MDM: CPT

## 2022-11-22 PROCEDURE — 97166 OT EVAL MOD COMPLEX 45 MIN: CPT

## 2022-11-22 PROCEDURE — 80076 HEPATIC FUNCTION PANEL: CPT

## 2022-11-22 PROCEDURE — 2580000003 HC RX 258: Performed by: EMERGENCY MEDICINE

## 2022-11-22 PROCEDURE — 99222 1ST HOSP IP/OBS MODERATE 55: CPT | Performed by: NURSE PRACTITIONER

## 2022-11-22 PROCEDURE — 80048 BASIC METABOLIC PNL TOTAL CA: CPT

## 2022-11-22 PROCEDURE — 81001 URINALYSIS AUTO W/SCOPE: CPT

## 2022-11-22 PROCEDURE — 97535 SELF CARE MNGMENT TRAINING: CPT

## 2022-11-22 PROCEDURE — 87086 URINE CULTURE/COLONY COUNT: CPT

## 2022-11-22 PROCEDURE — 6370000000 HC RX 637 (ALT 250 FOR IP): Performed by: NURSE PRACTITIONER

## 2022-11-22 PROCEDURE — 93005 ELECTROCARDIOGRAM TRACING: CPT | Performed by: EMERGENCY MEDICINE

## 2022-11-22 PROCEDURE — 96365 THER/PROPH/DIAG IV INF INIT: CPT

## 2022-11-22 PROCEDURE — 85025 COMPLETE CBC W/AUTO DIFF WBC: CPT

## 2022-11-22 RX ORDER — FLUCONAZOLE 100 MG/1
150 TABLET ORAL
Status: COMPLETED | OUTPATIENT
Start: 2022-11-22 | End: 2022-11-25

## 2022-11-22 RX ORDER — MAGNESIUM SULFATE 1 G/100ML
1000 INJECTION INTRAVENOUS PRN
Status: DISCONTINUED | OUTPATIENT
Start: 2022-11-22 | End: 2022-11-26 | Stop reason: HOSPADM

## 2022-11-22 RX ORDER — SODIUM CHLORIDE 0.9 % (FLUSH) 0.9 %
5-40 SYRINGE (ML) INJECTION EVERY 12 HOURS SCHEDULED
Status: DISCONTINUED | OUTPATIENT
Start: 2022-11-22 | End: 2022-11-26 | Stop reason: HOSPADM

## 2022-11-22 RX ORDER — TRAMADOL HYDROCHLORIDE 50 MG/1
50 TABLET ORAL EVERY 4 HOURS PRN
Status: DISCONTINUED | OUTPATIENT
Start: 2022-11-22 | End: 2022-11-26 | Stop reason: HOSPADM

## 2022-11-22 RX ORDER — POTASSIUM CHLORIDE 20 MEQ/1
40 TABLET, EXTENDED RELEASE ORAL PRN
Status: DISCONTINUED | OUTPATIENT
Start: 2022-11-22 | End: 2022-11-26 | Stop reason: HOSPADM

## 2022-11-22 RX ORDER — SODIUM CHLORIDE 0.9 % (FLUSH) 0.9 %
10 SYRINGE (ML) INJECTION PRN
Status: DISCONTINUED | OUTPATIENT
Start: 2022-11-22 | End: 2022-11-26 | Stop reason: HOSPADM

## 2022-11-22 RX ORDER — POLYETHYLENE GLYCOL 3350 17 G/17G
17 POWDER, FOR SOLUTION ORAL DAILY
Status: DISCONTINUED | OUTPATIENT
Start: 2022-11-22 | End: 2022-11-26 | Stop reason: HOSPADM

## 2022-11-22 RX ORDER — HALOPERIDOL 5 MG/1
5 TABLET ORAL DAILY
Status: ON HOLD | COMMUNITY
End: 2022-11-23 | Stop reason: HOSPADM

## 2022-11-22 RX ORDER — SODIUM CHLORIDE 9 MG/ML
INJECTION, SOLUTION INTRAVENOUS PRN
Status: DISCONTINUED | OUTPATIENT
Start: 2022-11-22 | End: 2022-11-26 | Stop reason: HOSPADM

## 2022-11-22 RX ORDER — POTASSIUM CHLORIDE 7.45 MG/ML
10 INJECTION INTRAVENOUS PRN
Status: DISCONTINUED | OUTPATIENT
Start: 2022-11-22 | End: 2022-11-26 | Stop reason: HOSPADM

## 2022-11-22 RX ORDER — ACETAMINOPHEN 325 MG/1
650 TABLET ORAL EVERY 6 HOURS PRN
Status: DISCONTINUED | OUTPATIENT
Start: 2022-11-22 | End: 2022-11-26 | Stop reason: HOSPADM

## 2022-11-22 RX ORDER — ACETAMINOPHEN 650 MG/1
650 SUPPOSITORY RECTAL EVERY 6 HOURS PRN
Status: DISCONTINUED | OUTPATIENT
Start: 2022-11-22 | End: 2022-11-26 | Stop reason: HOSPADM

## 2022-11-22 RX ORDER — DOCUSATE SODIUM 100 MG/1
100 CAPSULE, LIQUID FILLED ORAL 2 TIMES DAILY
Status: DISCONTINUED | OUTPATIENT
Start: 2022-11-22 | End: 2022-11-24

## 2022-11-22 RX ORDER — ENOXAPARIN SODIUM 100 MG/ML
40 INJECTION SUBCUTANEOUS DAILY
Status: DISCONTINUED | OUTPATIENT
Start: 2022-11-22 | End: 2022-11-26 | Stop reason: HOSPADM

## 2022-11-22 RX ADMIN — ACETAMINOPHEN 650 MG: 325 TABLET ORAL at 09:26

## 2022-11-22 RX ADMIN — TRAMADOL HYDROCHLORIDE 50 MG: 50 TABLET ORAL at 12:19

## 2022-11-22 RX ADMIN — POLYETHYLENE GLYCOL 3350 17 G: 17 POWDER, FOR SOLUTION ORAL at 12:19

## 2022-11-22 RX ADMIN — CEFTRIAXONE SODIUM 1000 MG: 1 INJECTION, POWDER, FOR SOLUTION INTRAMUSCULAR; INTRAVENOUS at 06:37

## 2022-11-22 RX ADMIN — FLUCONAZOLE 150 MG: 100 TABLET ORAL at 11:48

## 2022-11-22 RX ADMIN — POTASSIUM CHLORIDE 40 MEQ: 1500 TABLET, EXTENDED RELEASE ORAL at 09:26

## 2022-11-22 RX ADMIN — SODIUM CHLORIDE, PRESERVATIVE FREE 10 ML: 5 INJECTION INTRAVENOUS at 20:32

## 2022-11-22 RX ADMIN — SODIUM CHLORIDE, PRESERVATIVE FREE 10 ML: 5 INJECTION INTRAVENOUS at 09:18

## 2022-11-22 RX ADMIN — DOCUSATE SODIUM 100 MG: 100 CAPSULE, LIQUID FILLED ORAL at 20:31

## 2022-11-22 RX ADMIN — ENOXAPARIN SODIUM 40 MG: 100 INJECTION SUBCUTANEOUS at 09:26

## 2022-11-22 RX ADMIN — DOCUSATE SODIUM 100 MG: 100 CAPSULE, LIQUID FILLED ORAL at 12:19

## 2022-11-22 RX ADMIN — ANTI-FUNGAL POWDER MICONAZOLE NITRATE TALC FREE: 1.42 POWDER TOPICAL at 20:32

## 2022-11-22 ASSESSMENT — PAIN SCALES - GENERAL
PAINLEVEL_OUTOF10: 5

## 2022-11-22 ASSESSMENT — PAIN DESCRIPTION - LOCATION
LOCATION: ABDOMEN

## 2022-11-22 ASSESSMENT — PAIN DESCRIPTION - PAIN TYPE
TYPE: CHRONIC PAIN

## 2022-11-22 ASSESSMENT — PAIN - FUNCTIONAL ASSESSMENT
PAIN_FUNCTIONAL_ASSESSMENT: PREVENTS OR INTERFERES SOME ACTIVE ACTIVITIES AND ADLS
PAIN_FUNCTIONAL_ASSESSMENT: PREVENTS OR INTERFERES SOME ACTIVE ACTIVITIES AND ADLS
PAIN_FUNCTIONAL_ASSESSMENT: 0-10

## 2022-11-22 ASSESSMENT — PAIN DESCRIPTION - DESCRIPTORS
DESCRIPTORS: ACHING

## 2022-11-22 ASSESSMENT — PAIN DESCRIPTION - ONSET
ONSET: ON-GOING
ONSET: ON-GOING

## 2022-11-22 ASSESSMENT — PAIN DESCRIPTION - FREQUENCY
FREQUENCY: CONTINUOUS
FREQUENCY: CONTINUOUS

## 2022-11-22 ASSESSMENT — ENCOUNTER SYMPTOMS
CONSTIPATION: 0
ABDOMINAL PAIN: 0

## 2022-11-22 ASSESSMENT — PAIN DESCRIPTION - ORIENTATION
ORIENTATION: LEFT
ORIENTATION: MID;UPPER
ORIENTATION: MID;UPPER

## 2022-11-22 NOTE — CARE COORDINATION
Case Management Initial Discharge Plan  Leandrew Gamma,         Readmission Risk              Risk of Unplanned Readmission:  0             Met with:patient or family member patient and daughter to discuss discharge plans. Information verified: address, contacts, phone number, , insurance Yes  PCP: Vasyl Zheng  Date of last visit:     Insurance Provider: Aakash Quinteros    Discharge Planning  Current Residence:  [address]  Living Arrangements:  Children, [address] has 2 stories/ stairs to climb  Support Systems:  Children, Family Members, Friends/Neighbors  Current Services PTA:  MH services Agency: [facility]  Patient able to perform ADL's:Assisted  DME in home:  cane  DME used to aid ambulation prior to admission:     DME used during admission:      Potential Assistance Needed:  [facility] [address] [facility]: [employer] Aide delivery    Potential Assistance Purchasing Medications:  No  Does patient want to participate in local refill/ meds to beds program?  Yes    Patient agreeable to home care: Yes  Freedom of choice provided:  yes      Type of Home Care Services:  None  Patient expects to be discharged to:       Prior SNF/Rehab Placement and Facility: none  Agreeable to SNF/Rehab: Yes  [place] of choice provided: yes   Evaluation: yes    Expected Discharge date: Follow Up Appointment: Best Day/ Time:      Transportation provider: medicaid cab  Transportation arrangements needed for discharge: Yes    Discharge Plan: Met with patient at bedside to complete discharge assessment. She lives with her Derek Arevalo and friend, Glen Cabrera. Uses a cane. Patient has another dtr, Rosalinda Rios who lives in [place] and daughter Obey Bashir who lives in [facility]. Patient states Nadine Altamirano helps with laundry, cooking, groceries and works part-time. Patient gets services through [facility], but doesn't know her CM name. Per ED notes, son is not able to care for patient at home anymore.    Patient is agreeable to SNF.    Attempted to call Miller/son, voicemail left. Called Adrienne/hua, she was unaware mom was in the hospital.  Supriya Briggs lives a supportive living group home and receives services though [employer]. Per Ness Gavin also has an DD worker. Supriya Briggs reports things went downhill when they moved in with [facility] at [facility] address. Writer questioning if patient has DD worker, as she is also diagnosed with developmental delays. Called [employer], confirmed patient had been referred for services in 2015 and 2021, but did not follow up so case was closed. If patient is agreeable to services again, intake will need to be called at [phone]. Discussed with Supriya Briggs that patient would need rehab d/t falls, she would like a facility near her group home([facility]). Referrals made to [address] [place] [address] and [facility]. Will need precert prior to snf admissions. SW called PT for notes, they will see her on Wednesday.      Electronically signed by JAYNA Stroud on 11/22/22 at 6:21 PM EST

## 2022-11-22 NOTE — PROGRESS NOTES
Pt admitted to room 2002 from ER  Patient alert and oriented x4  Oriented to room and call light/tv controls. Bed in lowest position, wheels locked, 2/4 side rails up  Call light in reach, room free of clutter, adequate lighting provided.

## 2022-11-22 NOTE — ED PROVIDER NOTES
EMERGENCY DEPARTMENT ENCOUNTER    Pt Name: Shad Velazquez  MRN: [id number]  [place] 1950  Date of evaluation: 11/22/22  CHIEF COMPLAINT       Chief Complaint   Patient presents with    Abdominal Pain    Constipation     No bm in 4 days       HISTORY OF PRESENT ILLNESS   69-year-old female presents emergency room by her son who reports that he is no longer able to care for her. Patient has history of chronic behavioral abnormalities and metabolic encephalopathy. She is overall poor historian. She is significantly cognitively delayed. She has continued issues with abdominal discomfort. Etiology is unclear. Son reports that she is calling the ambulance almost every day. He works and has been having difficulty managing her at home. He is unsure what to do. REVIEW OF SYSTEMS     Review of Systems   Unable to perform ROS: Psychiatric disorder   Gastrointestinal:  Negative for abdominal pain and constipation. PASTMEDICAL HISTORY     Past Medical History:   Diagnosis Date    Arthritis     knees    Bronchitis x1 long ago    Chronic obstructive pulmonary disease ([facility] [address] 9/12/2017    Colon polyps     Controlled type 2 diabetes mellitus without complication, without long-term current use of insulin ([facility] [address] 6/29/2021    Dental neglect     poor dentation. Missing teeth. No loose teeth    Depression     Environmental allergies     GERD (gastroesophageal reflux disease)     Hyperlipidemia     Hypertension     Obesity     Onychomycosis     Poor historian     RBBB     Treadmill stress test negative for angina pectoris 2009    Wears glasses     reading only     Past Problem List  Patient Active Problem List   Diagnosis Code    GERD (gastroesophageal reflux disease) K21.9    Hypertension I10    Hyperlipidemia E78.5    Depression F32. A    Bronchitis J40    HTN (hypertension) I10    HLD (hyperlipidemia) E78.5    Osteoarthritis M19.90    Osteoarthritis of right knee M17.11    Closed nondisplaced fracture of head of right radius S52.124A    Chronic obstructive pulmonary disease (HCC) J44.9    Smoker F17.200    Chronic pain of left knee M25.562, G89.29    Pre-diabetes R73.03    Falls frequently R29.6    Cognitive and behavioral changes R41.89, S37.01    Toxic metabolic encephalopathy P49.4    Chest pain R07.9    Ileus (HCC) K56.7    Urinary retention R33.9    Normocytic normochromic anemia D64.9    BMI 28.0-28.9,adult Z68.28    AMS (altered mental status) R41.82    Unspecified mood (affective) disorder ([facility]) F39    Unable to care for self Z78.9     SURGICAL HISTORY       Past Surgical History:   Procedure Laterality Date    CHOLECYSTECTOMY      COLONOSCOPY  6/2013    one hyperplastic polyp    DOPPLER ECHOCARDIOGRAPHY      cardiac    TUBAL LIGATION       CURRENT MEDICATIONS       Previous Medications    ACETAMINOPHEN (TYLENOL) 500 MG TABLET    Take 2 tablets by mouth 4 times daily as needed for Pain    ALBUTEROL SULFATE  (90 BASE) MCG/ACT INHALER    albuterol sulfate HFA 90 mcg/actuation aerosol inhaler    ATORVASTATIN (LIPITOR) 20 MG TABLET    Take 1 tablet by mouth daily    CICLOPIROX (LOPROX) 0.77 % CREAM    ciclopirox 0.77 % topical cream    CLOBETASOL (TEMOVATE) 0.05 % OINTMENT    Apply topically 2 times daily. DOCUSATE SODIUM (COLACE) 100 MG CAPSULE    Take 1 capsule by mouth 2 times daily    ELASTIC BANDAGES & SUPPORTS (ACE ARM SLING) MISC    Apply 1 Units topically daily    HALOPERIDOL (HALDOL) 5 MG TABLET    Take 5 mg by mouth 2 times daily    ONDANSETRON (ZOFRAN ODT) 4 MG DISINTEGRATING TABLET    Take 1 tablet by mouth every 8 hours as needed for Nausea    TAMSULOSIN (FLOMAX) 0.4 MG CAPSULE    Take 1 capsule by mouth in the morning. TIOTROPIUM (SPIRIVA RESPIMAT) 1.25 MCG/ACT AERS INHALER    Inhale 2 puffs into the lungs daily    TRAZODONE (DESYREL) 100 MG TABLET        TRIHEXYPHENIDYL (ARTANE) 2 MG TABLET    Take 2 mg by mouth 2 times daily      ALLERGIES     has No Known Allergies.   FAMILY HISTORY     She indicated that her mother is . She indicated that her father is . She indicated that her maternal grandmother is . She indicated that her maternal grandfather is . She indicated that her paternal grandmother is . She indicated that her paternal grandfather is . SOCIAL HISTORY       Social History     Tobacco Use    Smoking status: Former     Packs/day: 0.50     Years: 35.00     Pack years: 17.50     Types: Cigarettes     Quit date: 2015     Years since quittin.3    Smokeless tobacco: Never   Substance Use Topics    Alcohol use: No     Alcohol/week: 0.0 standard drinks    Drug use: No     PHYSICAL EXAM     INITIAL VITALS: BP (!) 128/57   Pulse 52   Temp 96.8 °F (36 °C)   Resp 16   SpO2 100%    Physical Exam  Constitutional:       General: She is not in acute distress. Appearance: She is well-developed. HENT:      Head: Normocephalic. Eyes:      Pupils: Pupils are equal, round, and reactive to light. Cardiovascular:      Rate and Rhythm: Normal rate and regular rhythm. Heart sounds: Normal heart sounds. Pulmonary:      Effort: Pulmonary effort is normal. No respiratory distress. Breath sounds: Normal breath sounds. Abdominal:      General: Bowel sounds are normal.      Palpations: Abdomen is soft. Tenderness: There is no abdominal tenderness. Musculoskeletal:         General: Normal range of motion. Skin:     General: Skin is warm and dry. Neurological:      Mental Status: She is alert and oriented to person, place, and time. MEDICAL DECISION MAKIN-year-old female presenting to the emergency room for generalized abdominal discomfort and issues with inability to care for self at home. Patient has significant cognitive delay and inability to understand new information. She does not appear to be in any significant distress here in the ED.   She does have recurrent UTIs and does have leukocytes moderate bacteria and 10-20 white blood cells on urinalysis here in the ED. Plan is to treat for presumed UTI. Case discussed with [facility] plan is for admission for social work consult. CRITICAL CARE:       PROCEDURES:    Procedures    DIAGNOSTIC RESULTS   EKG:All EKG's are interpreted by the Emergency Department Physician who either signs or Co-signs this chart in the absence of a cardiologist.    EKG sinus bradycardia ventricular rate 58  Occasional PVC  Right bundle branch block    QTc 500    RADIOLOGY:All plain film, CT, MRI, and formal ultrasound images (except ED bedside ultrasound) are read by the radiologist, see reports below, unless otherwisenoted in MDM or here. XR ACUTE ABD SERIES CHEST 1 VW   Final Result   No evidence of bowel obstruction. LABS: All lab results were reviewed by myself, and all abnormals are listed below.   Labs Reviewed   CBC WITH AUTO DIFFERENTIAL - Abnormal; Notable for the following components:       Result Value    RBC 3.62 (*)     Hemoglobin 11.1 (*)     Hematocrit 35.6 (*)     All other components within normal limits   BASIC METABOLIC PANEL - Abnormal; Notable for the following components:    Potassium 3.3 (*)     All other components within normal limits   URINALYSIS - Abnormal; Notable for the following components:    Leukocyte Esterase, Urine MOD (*)     All other components within normal limits   MICROSCOPIC URINALYSIS - Abnormal; Notable for the following components:    Bacteria, UA FEW (*)     All other components within normal limits       EMERGENCY DEPARTMENTCOURSE:         Vitals:    Vitals:    11/22/22 0211   BP: (!) 128/57   Pulse: 52   Resp: 16   Temp: 96.8 °F (36 °C)   SpO2: 100%       The patient was given the following medications while in the emergency department:  Orders Placed This Encounter   Medications    cefTRIAXone (ROCEPHIN) 1,000 mg in dextrose 5 % 50 mL IVPB mini-bag     Order Specific Question:   Antimicrobial Indications     Answer: Urinary Tract Infection     CONSULTS:  IP CONSULT TO INTERNAL MEDICINE  IP CONSULT TO SOCIAL WORK    FINAL IMPRESSION      1. Unable to care for self          DISPOSITION/PLAN   DISPOSITION Admitted 11/22/2022 06:24:03 AM      PATIENT REFERRED TO:  No follow-up provider specified. DISCHARGE MEDICATIONS:  New Prescriptions    No medications on file     Valeria Meza MD  Attending Emergency Physician      Care during this encounter was due to an unprecedented national emergency due to COVID-19.              Thierry Good MD  11/22/22 [facility] MD Vicki  11/22/22 8375

## 2022-11-22 NOTE — ED NOTES
ED to inpatient nurses report     Chief Complaint   Patient presents with    Abdominal Pain    Constipation     No bm in 4 days        Present to ED from home for abdominal pain/constipation. Pt lives with her son and can no longer care for herself. Pt's son wants to place pt in a nursing home. LOC: Pt is confused. Alert to self. Vital signs   Vitals:    11/22/22 0211   BP: (!) 128/57   Pulse: 52   Resp: 16   Temp: 96.8 °F (36 °C)   SpO2: 100%      Oxygen Baseline: none    Current needs required: none  SEPSIS: NO  [] Lactate X 2 ordered (Yes or No)  [] Antibiotics given (Yes or No)  [] IV Fluids ordered (Yes or No)             [] IV completed (Yes or No)  [] Hourly Vital Signs (Validated)  [] Outstanding Orders:     LDAs:   Peripheral IV 11/22/22 Right Antecubital (Active)   Site Assessment Clean, dry & intact 11/22/22 0619     Mobility: Requires assistance * 2  Fall Risk:    Pending ED orders: none  Present condition: stable  Code Status: full  Consults: IP CONSULT TO INTERNAL MEDICINE  IP CONSULT TO SOCIAL WORK  [x]  Hospitalist  Completed  [] yes [] no Who:  intermed  []  Medicine  Completed  [] yes [] No Who:   []  Cardiology  Completed  [] yes [] No Who:   []  GI   Completed  [] yes [] No Who:   []  Neurology  Completed  [] yes [] No Who:   []  Nephrology Completed  [] yes [] No Who:    []  Vascular  Completed  [] yes [] No Who:   []  Ortho  Completed  [] yes [] No Who:     []  Surgery  Completed  [] yes [] No Who:    []  Urology  Completed  [] yes [] No Who:    []  CT Surgery Completed  [] yes [] No Who:   []  Podiatry  Completed  [] yes [] No Who:    []  Other    Completed  [] yes [] No Who:  Interventions: Pt given rocephin for UTI. XR abdomen negative for bowel obstruction. Pt being admitted for SNF placement.    Important Events: None       Electronically signed by Shikha Thompson RN on 11/22/2022 at Wil[facility] Isbell [address]  11/22/22 2543

## 2022-11-22 NOTE — PLAN OF CARE
Problem: Discharge Planning  Goal: Discharge to home or other facility with appropriate resources  Outcome: Progressing  Flowsheets (Taken 11/22/2022 0906)  Discharge to home or other facility with appropriate resources:   Identify barriers to discharge with patient and caregiver   Identify discharge learning needs (meds, wound care, etc)   Refer to discharge planning if patient needs post-hospital services based on physician order or complex needs related to functional status, cognitive ability or social support system   Arrange for needed discharge resources and transportation as appropriate     Problem: Pain  Goal: Verbalizes/displays adequate comfort level or baseline comfort level  Outcome: Progressing  Flowsheets (Taken 11/22/2022 1817)  Verbalizes/displays adequate comfort level or baseline comfort level:   Encourage patient to monitor pain and request assistance   Assess pain using appropriate pain scale   Administer analgesics based on type and severity of pain and evaluate response   Implement non-pharmacological measures as appropriate and evaluate response   Consider cultural and social influences on pain and pain management   Notify Licensed Independent Practitioner if interventions unsuccessful or patient reports new pain  Note: Pain level assessment complete.    Patient educated on pain scale and control interventions  PRN pain medication given per patient request-Ultram  Patient instructed to call out with new onset of pain or unrelieved pain       Problem: Safety - Adult  Goal: Free from fall injury  Outcome: Progressing     Problem: ABCDS Injury Assessment  Goal: Absence of physical injury  Outcome: Progressing  Flowsheets (Taken 11/22/2022 1817)  Absence of Physical Injury: Implement safety measures based on patient assessment     Problem: Chronic Conditions and Co-morbidities  Goal: Patient's chronic conditions and co-morbidity symptoms are monitored and maintained or improved  Outcome: Progressing  Flowsheets (Taken 11/22/2022 0918)  Care Plan - Patient's Chronic Conditions and Co-Morbidity Symptoms are Monitored and Maintained or Improved:   Monitor and assess patient's chronic conditions and comorbid symptoms for stability, deterioration, or improvement   Collaborate with multidisciplinary team to address chronic and comorbid conditions and prevent exacerbation or deterioration   Update acute care plan with appropriate goals if chronic or comorbid symptoms are exacerbated and prevent overall improvement and discharge     Problem: Neurosensory - Adult  Goal: Achieves stable or improved neurological status  Outcome: Progressing     Problem: Respiratory - Adult  Goal: Achieves optimal ventilation and oxygenation  Outcome: Progressing     Problem: Cardiovascular - Adult  Goal: Maintains optimal cardiac output and hemodynamic stability  Outcome: Progressing     Problem: Skin/Tissue Integrity - Adult  Goal: Skin integrity remains intact  Outcome: Progressing  Flowsheets (Taken 11/22/2022 0918)  Skin Integrity Remains Intact: Monitor for areas of redness and/or skin breakdown     Problem: Musculoskeletal - Adult  Goal: Return mobility to safest level of function  Outcome: Progressing     Problem: Gastrointestinal - Adult  Goal: Minimal or absence of nausea and vomiting  Outcome: Progressing     Problem: Genitourinary - Adult  Goal: Absence of urinary retention  Outcome: Progressing     Problem: Infection - Adult  Goal: Absence of infection at discharge  Outcome: Progressing     Problem: Metabolic/Fluid and Electrolytes - Adult  Goal: Electrolytes maintained within normal limits  Outcome: Progressing     Problem: Hematologic - Adult  Goal: Maintains hematologic stability  Outcome: Progressing

## 2022-11-22 NOTE — PROGRESS NOTES
Occupational Therapy  Facility/Department: [facility] MED SURG  Occupational Therapy Initial Assessment    Name: Mariana Gonzalez  : 1950  MRN: [id number]  Date of Service: 2022    JUSTINO DE LEÓN reports patient is medically stable for therapy treatment this date. Chart reviewed prior to treatment and patient is agreeable for therapy. All lines intact and patient positioned comfortably at end of treatment. All patient needs addressed prior to ending therapy session. Discharge Recommendations:  Patient would benefit from continued therapy after discharge   Pt currently functioning below baseline. Recommend daily inpatient skilled therapy at time of discharge to maximize long term outcomes and prevent re-admission. Please refer to AM-PAC score for current level of function. Patient Diagnosis(es): The encounter diagnosis was Unable to care for self. Past Medical History:  has a past medical history of Arthritis, Bronchitis, Chronic obstructive pulmonary disease ([address] Colon polyps, Controlled type 2 diabetes mellitus without complication, without long-term current use of insulin ([address] Dental neglect, Depression, Environmental allergies, GERD (gastroesophageal reflux disease), Hyperlipidemia, Hypertension, Obesity, Onychomycosis, Poor historian, RBBB, Treadmill stress test negative for angina pectoris, and Wears glasses. Past Surgical History:  has a past surgical history that includes Cholecystectomy; doppler echocardiography; Colonoscopy (2013); and Tubal ligation. PER H&P:Katerin Bowen is a 66 y.o. Non- / non  female who presents with abdominal pain, constipation and is admitted to the hospital for the management of Acute cystitis without hematuria. She is a poor historian and and can only provide limited information. According to ED notes patient was brought in by her son who she currently lives with who stated that he was no longer able to care for her.   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 She appears to have some underlying developmental delay and psych history. Patient was recently admitted to [facility] a few weeks ago and presented there with the same complaints. She was started on oxacillin for her UTI which came back with Enterococcus faecalis. She was also discharged with a topical steroid for her psoriatic rash. Patient is also known to have frequent falls at home     Here she continues to complain of abdominal pain however etiology is unclear. KUB unremarkable for any bowel obstruction, recent CT of the abdomen on 11/13/22 revealed interval development of mild ptosis coli at the cecum and proximal a sending colon however lactic acid levels were unremarkable. Patient has had a previous cholecystectomy. LFTs unremarkable      Assessment   Performance deficits / Impairments: Decreased functional mobility ; Decreased ADL status; Decreased safe awareness;Decreased cognition;Decreased balance;Decreased posture;Decreased high-level IADLs;Decreased endurance;Decreased strength;Decreased fine motor control;Decreased coordination  Assessment: Skilled OT services are indicated to increase I and safety during functional tasks to return home at [facility] as able.   Prognosis: Good  Decision Making: Medium Complexity  Activity Tolerance  Activity Tolerance: Treatment limited secondary to decreased cognition;Patient Tolerated treatment well  Activity Tolerance Comments: fair, pt is limited by cognition and poor safety        Plan   Occupational Therapy Plan  Times Per Week: 4-5x/wk 1x/day as jj  Current Treatment Recommendations: Strengthening, Balance training, Functional mobility training, Endurance training, Safety education & training, Equipment evaluation, education, & procurement, Cognitive reorientation, Self-Care / ADL, Patient/Caregiver education & training, Cognitive/Perceptual training Restrictions  Restrictions/Precautions  Restrictions/Precautions: General Precautions, Fall Risk  Position Activity Restriction  Other position/activity restrictions: up with assist, RUE IV, ALARMS    Subjective   General  Chart Reviewed: Yes  Patient assessed for rehabilitation services?: Yes  Family / Caregiver Present: No  Subjective  Subjective: Pt resting in bed, tearful reporting having stomach pain. Pt was given reassurance and was agreeable to OT eval.     Social/Functional History  Social/Functional History  Lives With: Son, Friend(s) (Pt reporting she lives with her son Olga Aggarwal and a friend Kendy Milligan)  Type of Home: House  Home Layout: One level  Home Access: Stairs to enter with rails  Entrance Stairs - Number of Steps: Pt unsure how many steps  Bathroom Shower/Tub: Tub/Shower unit  Bathroom Equipment: Shower chair  Home Equipment: [address], Ne  Has the patient had two or more falls in the past year or any fall with injury in the past year?: Yes (Pt initally reporting she \"never falls\" has a large bruise on thigh per RN from falling into a cab, when pt was asked about it pt states that she may have fallen a few times. 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  Pt reporting inconsistencies throughout eval.       Objective   Observation/Palpation  Posture: Fair  Observation: child like, tearful  Edema: none  Scar: previous gall bladder sx scar  Safety Devices  Type of Devices: All fall risk precautions in place; Bed alarm in place;Call light within reach; Left in bed;Patient at risk for falls;Gait belt;Nurse notified      Balance  Sitting:  (CGA)  Standing:  (Min-Mod A with cane/with hand held assist)  Functional Mobility   Overall Level of Assistance: Moderate assistance (First attempt pt stood impulsivly without cane and took 2-3 steps forward and backward with handheld assistance and Mod A. Second attempt bed to toilet and back pt used cane in R hand and handheld assist with Min A. Pt refusing to use RW at this time.)  Interventions: Verbal cues; Tactile cues (Pt required Max verbal cues for pacing self, line mgmt, pursed lip breathing, slowing down, benefits of AD use, scanning environment all to increase safety. Pt was very impulsive throughout.)     AROM: Within functional limits  PROM: Within functional limits  Strength: Generally decreased, functional (Unable to formally assess MMT due to poor direction follow. Appears grossly WFL)  Coordination: Generally decreased, functional (slowed/delayed B opposition)  Tone: Normal  Sensation: Intact      ADL  Feeding: Setup;Stand by assistance  Grooming: Setup;Contact guard assistance  UE Bathing: Setup;Minimal assistance  LE Bathing: Setup; Moderate assistance  UE Dressing: Setup;Minimal assistance (to tie/adjust hosp gown seated on EOB)  LE Dressing: Setup;Maximum assistance (with B socks while seated on EOB. Pt unwilling to attempt d/t pain in abdomin)  Toileting: Minimal assistance (Pt completed hygiene standing with grab bar after urination on toilet.  Pt required assistance with gown management)  Additional Comments: Pt is limited by pain and cognition this date, pt requires verbal cues throughout        Bed mobility  Rolling to Right: Minimal assistance  Supine to Sit: Minimal assistance  Sit to Supine: Minimal assistance  Scooting: Minimal assistance  Bed Mobility Comments: Pt completed bed mobility this date without significant difficulties. Pt given Max verbal cues for pacing self, use of bedrails, initiation, sequening and pursed lip breathing. Pt denied any dizziness once seated on EOB. Transfers  Sit to stand: Minimal assistance  Stand to sit: Minimal assistance  Transfer Comments: Pt given Mod verbal cues for pacing self, controlled stand to sit, squaring self/AD up to surface and reaching back prior to sitting, slowing down, initiation, and sequencing all to increase safety. Vision  Vision: Within Functional Limits  Hearing  Hearing: Within functional limits      Cognition  Overall Cognitive Status: Exceptions  Arousal/Alertness: Delayed responses to stimuli  Following Commands: Follows one step commands with repetition; Follows one step commands with increased time  Attention Span: Attends with cues to redirect  Memory: Decreased recall of biographical Information  Safety Judgement: Decreased awareness of need for assistance;Decreased awareness of need for safety  Problem Solving: Decreased awareness of errors;Assistance required to identify errors made;Assistance required to correct errors made;Assistance required to implement solutions;Assistance required to generate solutions  Insights: Not aware of deficits  Initiation: Requires cues for some  Sequencing: Requires cues for some  Cognition Comment: Pt appears somewhat child like. Requires redirection throughout session, perseverating on talking about Ranjan. Orientation  Overall Orientation Status: Within Functional Limits  Orientation Level: Oriented X4                  Education Given To: Patient  Education Provided: Role of Therapy;Transfer Training;Plan of Care;Energy Conservation; Fall Prevention Strategies; ADL Adaptive Strategies; Equipment  Education Provided Comments: safety in function, fall prevention/call light use, OT POC, role of OT in acute care, benefits of being OOB, pursed lip breathing, recommendations for continued therapy, cognitive reorientation  Education Method: Verbal  Barriers to Learning: Cognition  Education Outcome: Continued education needed                       AM-PAC Score        AM-PAC Inpatient Daily Activity Raw Score: 16 (11/22/22 1426)  AM-PAC Inpatient ADL T-Scale Score : 35.96 (11/22/22 1426)  ADL Inpatient CMS 0-100% Score: 53.32 (11/22/22 1426)  ADL Inpatient CMS G-Code Modifier : CK (11/22/22 1426)           Goals  Short Term Goals  Time Frame for Short Term Goals: By discharge, pt to demo  Short Term Goal 1: ADL transfers and functional to SBA with use of AD as needed. Short Term Goal 2: bed mobility to SBA with use of bedrails as needed. Short Term Goal 3: increased B UE strength by 1/2 grade to assist with functional tasks/I with simple B UE HEP with use of handouts as needed. Short Term Goal 4: toileting to SBA with use of AD/grab bars as needed. Short Term Goal 5: UB ADLs to Set up and LB ADLs to Min A with use of AD/AE as needed. Long Term Goals  Long Term Goal 1: Pt to be I with fall prevention education, EC/WS tech, disease specific education, recommendations for discharge/AE with use handouts as needed. Patient Goals   Patient goals :  To go home       Therapy Time   Individual Concurrent Group Co-treatment   Time In 1055         Time Out 1136         Minutes 41          Tx time: 30 min       Jennifer Ellis, OT

## 2022-11-22 NOTE — ED NOTES
Pt updated on change of shift, RN introduced self and updated white board. Pt offered breakfast tray, pt declined. Pt denies needs, will continue to monitor.        Robin Larsen RN  11/22/22 6168

## 2022-11-22 NOTE — H&P
[facility]  Office: [address], DO, Jazmin Young, DO, Samantha Gaffney, DO, Kerline anca Blood, DO, Jojo Plaza MD, Corinna Quezada MD, Cheryl Street MD, Demarco Deutsch MD,  Bulmaro Salazar MD, Junaid Jordan MD, Lalit Worthy, DO, Kin Calvert MD,  Giana Hirsch MD, Tello Toribio MD, Ashlee Greenberg DO, Matthias Lora MD, Mel Farias MD, Yocasta Sotelo DO, Anastacio Dawson MD, Usha To MD, Supa Carcamo MD, Diandra Jean Baptiste MD, Marsha Boothe DO, Kalli De La Garza MD, Rizwana King MD, Niurka Fletcher, CNP,  Kolby Escamilla, CNP, Karissa Mccall, CNP, Kacie Don, CNP,  Nancy Metzger, [facility], Cathy Beal, CNP, Arcelia Montoya, CNP, Tamica Ennis, CNP, Kurtis Argueta, CNP, Suze Cabezas, CNP, Richard Kramer PAAnnieC, Mir Caballero, CNS, Will Liu, [facility], Kiki Mcgarry, CNP, Christine Seals, CNP, Tierney Solomon, [facility]         [address] [facility]    HISTORY AND PHYSICAL EXAMINATION            Date:   11/22/2022  Patient name:  Maura Niño  Date of admission:  11/22/2022  3:55 AM  MRN:   [id number]  Account:  [de-identified]  YOB: 1950  PCP:    Kalli Tovar  Room:   2002/2002-02  Code Status:    Full Code    Chief Complaint:     Chief Complaint   Patient presents with    Abdominal Pain    Constipation     No bm in 4 days         History Obtained From:     patient, electronic medical record    History of Present Illness:     Maura Niño is a 66 y.o. Non- / non  female who presents with abdominal pain, constipation and is admitted to the hospital for the management of Acute cystitis without hematuria. She is a poor historian and and can only provide limited information. According to ED notes patient was brought in by her son who she currently lives with who stated that he was no longer able to care for her.   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 She appears to have some underlying developmental delay and psych history. Patient was recently admitted to [facility] a few weeks ago and presented there with the same complaints. She was started on oxacillin for her UTI which came back with Enterococcus faecalis. She was also discharged with a topical steroid for her psoriatic rash. Patient is also known to have frequent falls at home    Here she continues to complain of abdominal pain however etiology is unclear. KUB unremarkable for any bowel obstruction, recent CT of the abdomen on 11/13/22 revealed interval development of mild ptosis coli at the cecum and proximal a sending colon however lactic acid levels were unremarkable. Patient has had a previous cholecystectomy. LFTs unremarkable    Urinalysis this admission shows moderate leukocytes, negative nitrites, 10-20 WBCs and few bacteria. She was covered with IV Rocephin  Past Medical History:     Past Medical History:   Diagnosis Date    Arthritis     knees    Bronchitis x1 long ago    Chronic obstructive pulmonary disease ([facility] [address] 9/12/2017    Colon polyps     Controlled type 2 diabetes mellitus without complication, without long-term current use of insulin ([facility] [address] 6/29/2021    Dental neglect     poor dentation. Missing teeth. No loose teeth    Depression     Environmental allergies     GERD (gastroesophageal reflux disease)     Hyperlipidemia     Hypertension     Obesity     Onychomycosis     Poor historian     RBBB     Treadmill stress test negative for angina pectoris 2009    Wears glasses     reading only        Past Surgical History:     Past Surgical History:   Procedure Laterality Date    CHOLECYSTECTOMY      COLONOSCOPY  6/2013    one hyperplastic polyp    DOPPLER ECHOCARDIOGRAPHY      cardiac    TUBAL LIGATION          Medications Prior to Admission:     Prior to Admission medications    Medication Sig Start Date End Date Taking?  Authorizing Provider   haloperidol (HALDOL) 5 MG tablet Take 5 mg by mouth daily  Patient not taking: Reported on 11/22/2022    Historical Provider, MD   clobetasol (TEMOVATE) 0.05 % ointment Apply topically 2 times daily. 11/11/22   Mami Brian MD   docusate sodium (COLACE) 100 MG capsule Take 1 capsule by mouth 2 times daily 11/3/22   Sheron Messer,    tamsulosin (FLOMAX) 0.4 MG capsule Take 1 capsule by mouth in the morning. 8/6/22   Arabella Ngo, DO   ondansetron (ZOFRAN ODT) 4 MG disintegrating tablet Take 1 tablet by mouth every 8 hours as needed for Nausea 6/18/22   Max Garcia, DO   ibuprofen (IBU) 800 MG tablet Take 1 tablet by mouth every 8 hours as needed for Pain 5/17/22 8/5/22  Wilfredo Valdivia, DO   tiotropium (SPIRIVA RESPIMAT) 1.25 MCG/ACT AERS inhaler Inhale 2 puffs into the lungs daily 8/12/19   Lali Gomes MD   traZODone (DESYREL) 100 MG tablet Take 100 mg by mouth nightly 8/9/17   Historical Provider, MD   Elastic Bandages & Supports (ACE ARM SLING) MISC Apply 1 Units topically daily 4/17/17   Krystle Gutierrez MD   trihexyphenidyl (ARTANE) 2 MG tablet Take 2 mg by mouth 2 times daily   Patient not taking: Reported on 11/22/2022 10/10/14   Historical Provider, MD        Allergies:     Patient has no known allergies. Social History:     Tobacco:    reports that she quit smoking about 7 years ago. Her smoking use included cigarettes. She has a 17.50 pack-year smoking history. She has never used smokeless tobacco.  Alcohol:      reports no history of alcohol use. Drug Use:  reports no history of drug use. Family History:     Family History   Problem Relation Age of Onset    Heart Disease Mother     Cancer Father        Review of Systems:     Positive and Negative as described in HPI.     CONSTITUTIONAL:  negative for fevers, chills, sweats, fatigue, weight loss  HEENT:  negative for vision, hearing changes, runny nose, throat pain  RESPIRATORY:  negative for shortness of breath, cough, congestion, wheezing  CARDIOVASCULAR:  negative for chest pain, palpitations  GASTROINTESTINAL:  negative for nausea, vomiting, diarrhea, positive for constipation and abdominal pain   GENITOURINARY:  negative for difficulty of urination, burning with urination, frequency   INTEGUMENT: Positive for rash, negative for skin lesions, easy bruising   HEMATOLOGIC/LYMPHATIC: Positive for swelling/edema   ALLERGIC/IMMUNOLOGIC:  negative for urticaria , itching  ENDOCRINE:  negative increase in drinking, increase in urination, hot or cold intolerance  MUSCULOSKELETAL:  negative joint pains, muscle aches, swelling of joints  NEUROLOGICAL:  negative for headaches, dizziness, lightheadedness, numbness, pain, tingling extremities  BEHAVIOR/PSYCH:  negative for depression, anxiety    Physical Exam:   /61   Pulse 55   Temp 98 °F (36.7 °C) (Oral)   Resp 16   Ht 5' 5\" (1.651 m)   Wt 141 lb 9.6 oz (64.2 kg)   SpO2 100%   BMI 23.56 kg/m²   Temp (24hrs), Av.4 °F (36.3 °C), Min:96.8 °F (36 °C), Max:98 °F (36.7 °C)    No results for input(s): POCGLU in the last 72 hours. Intake/Output Summary (Last 24 hours) at 2022 1318  Last data filed at 2022 0707  Gross per 24 hour   Intake 42.31 ml   Output --   Net 42.31 ml       General Appearance:  alert, well appearing, and in no acute distress  Mental status: oriented to person, place, disoriented to time and situation, developmental delay  Head:  normocephalic, atraumatic  Eye: no icterus, redness, pupils equal and reactive, extraocular eye movements intact, conjunctiva clear  Ear: normal external ear, no discharge, hearing intact  Nose:  no drainage noted  Mouth: mucous membranes moist  Neck: supple, no carotid bruits, thyroid not palpable  Lungs: Bilateral equal air entry, diminished to auscultation, no wheezing, rales or rhonchi, normal effort  Cardiovascular: normal rate, regular rhythm, no murmur, gallop, rub.   Abdomen: Soft, nontender, mildly distended, normal bowel sounds, no hepatomegaly or splenomegaly  Neurologic: There are no new focal motor or sensory deficits, normal muscle tone and bulk, no abnormal sensation, normal speech, cranial nerves II through XII grossly intact  Skin: Vaginal and groin rash noted, fungal in appearance.   Psoriatic rash to left upper shoulder to, bruising or bleeding on exposed skin area  Extremities:  peripheral pulses palpable, no calf pain with palpation, + bilateral lower extremity edema,  Psych: normal affect     Investigations:      Laboratory Testing:  Recent Results (from the past 24 hour(s))   EKG 12 Lead    Collection Time: 11/22/22  4:33 AM   Result Value Ref Range    Ventricular Rate 58 BPM    Atrial Rate 58 BPM    P-R Interval 124 ms    QRS Duration 126 ms    Q-T Interval 510 ms    QTc Calculation (Bazett) 500 ms    P Axis 74 degrees    R Axis -16 degrees    T Axis 7 degrees   CBC with Auto Differential    Collection Time: 11/22/22  4:40 AM   Result Value Ref Range    WBC 5.3 3.5 - 11.3 k/uL    RBC 3.62 (L) 3.95 - 5.11 m/uL    Hemoglobin 11.1 (L) 11.9 - 15.1 g/dL    Hematocrit 35.6 (L) 36.3 - 47.1 %    MCV 98.3 82.6 - 102.9 fL    MCH 30.7 25.2 - 33.5 pg    MCHC 31.2 28.4 - 34.8 g/dL    RDW 14.1 11.8 - 14.4 %    Platelets 167 098 - 015 k/uL    MPV 9.3 8.1 - 13.5 fL    NRBC Automated 0.0 0.0 per 100 WBC    Seg Neutrophils 54 36 - 65 %    Lymphocytes 35 24 - 43 %    Monocytes 9 3 - 12 %    Eosinophils % 2 1 - 4 %    Basophils 0 0 - 2 %    Immature Granulocytes 0 0 %    Segs Absolute 2.90 1.50 - 8.10 k/uL    Absolute Lymph # 1.84 1.10 - 3.70 k/uL    Absolute Mono # 0.47 0.10 - 1.20 k/uL    Absolute Eos # 0.09 0.00 - 0.44 k/uL    Basophils Absolute <0.03 0.00 - 0.20 k/uL    Absolute Immature Granulocyte 0.02 0.00 - 0.30 k/uL   BMP    Collection Time: 11/22/22  4:40 AM   Result Value Ref Range    Glucose 88 70 - 99 mg/dL    BUN 13 8 - 23 mg/dL    Creatinine 0.64 0.50 - 0.90 mg/dL    Est, Glom Filt Rate >60 >60 mL/min/1.73m2    Bun/Cre Ratio 20 9 - 20    Calcium 9.3 8.6 - 10.4 mg/dL    Sodium 139 135 - 144 mmol/L    Potassium 3.3 (L) 3.7 - 5.3 mmol/L    Chloride 104 98 - 107 mmol/L    CO2 26 20 - 31 mmol/L    Anion Gap 9 9 - 17 mmol/L   Hepatic Function Panel    Collection Time: 11/22/22  4:40 AM   Result Value Ref Range    Albumin 3.6 3.5 - 5.2 g/dL    Alkaline Phosphatase 102 35 - 104 U/L    ALT 12 5 - 33 U/L    AST 17 <32 U/L    Total Bilirubin 0.4 0.3 - 1.2 mg/dL    Bilirubin, Direct 0.1 <0.3 mg/dL    Bilirubin, Indirect 0.3 0.00 - 1.00 mg/dL    Total Protein 6.0 (L) 6.4 - 8.3 g/dL   Urinalysis    Collection Time: 11/22/22  4:44 AM   Result Value Ref Range    Color, UA Yellow Yellow    Turbidity UA Clear Clear    Glucose, Ur NEGATIVE NEGATIVE    Bilirubin Urine NEGATIVE NEGATIVE    Ketones, Urine NEGATIVE NEGATIVE    Specific Gravity, UA 1.010 1.005 - 1.030    Urine Hgb NEGATIVE NEGATIVE    pH, UA 5.5 5.0 - 8.0    Protein, UA NEGATIVE NEGATIVE    Urobilinogen, Urine Normal Normal    Nitrite, Urine NEGATIVE NEGATIVE    Leukocyte Esterase, Urine MOD (A) NEGATIVE   Microscopic Urinalysis    Collection Time: 11/22/22  4:44 AM   Result Value Ref Range    WBC, UA 10 TO 20 0 - 5 /HPF    RBC, UA None 0 - 2 /HPF    Epithelial Cells UA 2 TO 5 0 - 5 /HPF    Bacteria, UA FEW (A) None       Imaging/Diagnostics:  XR ACUTE ABD SERIES CHEST 1 VW    Result Date: 11/22/2022  No evidence of bowel obstruction.        Assessment :      Hospital Problems             Last Modified POA    * (Principal) Acute cystitis without hematuria 11/22/2022 Yes    Unspecified mood (affective) disorder ([facility] [address] 11/22/2022 Yes    Unable to care for self 11/22/2022 Yes    Candidal vulvovaginitis 11/22/2022 Yes    Developmental delay 11/22/2022 Yes    Hyperlipidemia 11/22/2022 Yes    HTN (hypertension) 11/22/2022 Yes    Falls frequently 11/22/2022 Yes       Plan:     Patient status observation in the Med/Surge    Continue IV Rocephin for UTI, urine culture ordered will await final results  Monitor labs, replace electrolytes as needed  Monitoring control blood pressure  Diflucan x2 doses for yeast infection  DVT prophylaxis  Start Colace twice daily and MiraLAX for bowel regimen  Ultram for pain control  General diet  PT/OT evaluation   consulted for possible placement  Plan discussed with patient and staff    Consultations:   IP CONSULT TO INTERNAL MEDICINE  IP CONSULT TO SOCIAL WORK     Patient is admitted as inpatient status because of co-morbidities listed above, severity of signs and symptoms as outlined, requirement for current medical therapies and most importantly because of direct risk to patient if care not provided in a hospital setting. Expected length of stay > 48 hours.     PRABHAKAR Darden NP  11/22/2022  1:18 PM    Copy sent to Dr. Lacy Mcwilliams

## 2022-11-23 PROBLEM — N39.0 COMPLICATED UTI (URINARY TRACT INFECTION): Status: ACTIVE | Noted: 2022-11-23

## 2022-11-23 LAB
ABSOLUTE EOS #: 0.09 K/UL (ref 0–0.44)
ABSOLUTE IMMATURE GRANULOCYTE: 0.02 K/UL (ref 0–0.3)
ABSOLUTE LYMPH #: 1.64 K/UL (ref 1.1–3.7)
ABSOLUTE MONO #: 0.44 K/UL (ref 0.1–1.2)
ANION GAP SERPL CALCULATED.3IONS-SCNC: 6 MMOL/L (ref 9–17)
BASOPHILS # BLD: 0 % (ref 0–2)
BASOPHILS ABSOLUTE: <0.03 K/UL (ref 0–0.2)
BUN BLDV-MCNC: 9 MG/DL (ref 8–23)
BUN/CREAT BLD: 12 (ref 9–20)
CALCIUM SERPL-MCNC: 8.8 MG/DL (ref 8.6–10.4)
CHLORIDE BLD-SCNC: 109 MMOL/L (ref 98–107)
CO2: 28 MMOL/L (ref 20–31)
CREAT SERPL-MCNC: 0.74 MG/DL (ref 0.5–0.9)
CULTURE: NO GROWTH
EOSINOPHILS RELATIVE PERCENT: 2 % (ref 1–4)
GFR SERPL CREATININE-BSD FRML MDRD: >60 ML/MIN/1.73M2
GLUCOSE BLD-MCNC: 84 MG/DL (ref 70–99)
HCT VFR BLD CALC: 34.4 % (ref 36.3–47.1)
HEMOGLOBIN: 10.2 G/DL (ref 11.9–15.1)
IMMATURE GRANULOCYTES: 0 %
LYMPHOCYTES # BLD: 34 % (ref 24–43)
MCH RBC QN AUTO: 29.7 PG (ref 25.2–33.5)
MCHC RBC AUTO-ENTMCNC: 29.7 G/DL (ref 28.4–34.8)
MCV RBC AUTO: 100.3 FL (ref 82.6–102.9)
MONOCYTES # BLD: 9 % (ref 3–12)
NRBC AUTOMATED: 0 PER 100 WBC
PDW BLD-RTO: 14.6 % (ref 11.8–14.4)
PLATELET # BLD: 222 K/UL (ref 138–453)
PMV BLD AUTO: 9.1 FL (ref 8.1–13.5)
POTASSIUM SERPL-SCNC: 3.8 MMOL/L (ref 3.7–5.3)
RBC # BLD: 3.43 M/UL (ref 3.95–5.11)
RBC # BLD: ABNORMAL 10*6/UL
SEG NEUTROPHILS: 55 % (ref 36–65)
SEGMENTED NEUTROPHILS ABSOLUTE COUNT: 2.61 K/UL (ref 1.5–8.1)
SODIUM BLD-SCNC: 143 MMOL/L (ref 135–144)
SPECIMEN DESCRIPTION: NORMAL
WBC # BLD: 4.8 K/UL (ref 3.5–11.3)

## 2022-11-23 PROCEDURE — 97530 THERAPEUTIC ACTIVITIES: CPT

## 2022-11-23 PROCEDURE — 97116 GAIT TRAINING THERAPY: CPT

## 2022-11-23 PROCEDURE — 6370000000 HC RX 637 (ALT 250 FOR IP): Performed by: NURSE PRACTITIONER

## 2022-11-23 PROCEDURE — 85025 COMPLETE CBC W/AUTO DIFF WBC: CPT

## 2022-11-23 PROCEDURE — 1200000000 HC SEMI PRIVATE

## 2022-11-23 PROCEDURE — 6370000000 HC RX 637 (ALT 250 FOR IP): Performed by: INTERNAL MEDICINE

## 2022-11-23 PROCEDURE — 80048 BASIC METABOLIC PNL TOTAL CA: CPT

## 2022-11-23 PROCEDURE — 97112 NEUROMUSCULAR REEDUCATION: CPT

## 2022-11-23 PROCEDURE — 36415 COLL VENOUS BLD VENIPUNCTURE: CPT

## 2022-11-23 PROCEDURE — 97162 PT EVAL MOD COMPLEX 30 MIN: CPT

## 2022-11-23 PROCEDURE — 2580000003 HC RX 258: Performed by: NURSE PRACTITIONER

## 2022-11-23 PROCEDURE — 6360000002 HC RX W HCPCS: Performed by: NURSE PRACTITIONER

## 2022-11-23 PROCEDURE — 96366 THER/PROPH/DIAG IV INF ADDON: CPT

## 2022-11-23 PROCEDURE — G0378 HOSPITAL OBSERVATION PER HR: HCPCS

## 2022-11-23 PROCEDURE — 99232 SBSQ HOSP IP/OBS MODERATE 35: CPT | Performed by: NURSE PRACTITIONER

## 2022-11-23 RX ORDER — SIMETHICONE 80 MG
80 TABLET,CHEWABLE ORAL EVERY 6 HOURS PRN
Status: DISCONTINUED | OUTPATIENT
Start: 2022-11-23 | End: 2022-11-26 | Stop reason: HOSPADM

## 2022-11-23 RX ADMIN — ENOXAPARIN SODIUM 40 MG: 100 INJECTION SUBCUTANEOUS at 08:48

## 2022-11-23 RX ADMIN — SODIUM CHLORIDE, PRESERVATIVE FREE 10 ML: 5 INJECTION INTRAVENOUS at 19:42

## 2022-11-23 RX ADMIN — SIMETHICONE 80 MG: 80 TABLET, CHEWABLE ORAL at 18:49

## 2022-11-23 RX ADMIN — DOCUSATE SODIUM 100 MG: 100 CAPSULE, LIQUID FILLED ORAL at 08:46

## 2022-11-23 RX ADMIN — POLYETHYLENE GLYCOL 3350 17 G: 17 POWDER, FOR SOLUTION ORAL at 08:46

## 2022-11-23 RX ADMIN — ANTI-FUNGAL POWDER MICONAZOLE NITRATE TALC FREE: 1.42 POWDER TOPICAL at 19:41

## 2022-11-23 RX ADMIN — CEFTRIAXONE SODIUM 1000 MG: 1 INJECTION, POWDER, FOR SOLUTION INTRAMUSCULAR; INTRAVENOUS at 05:48

## 2022-11-23 RX ADMIN — TRAMADOL HYDROCHLORIDE 50 MG: 50 TABLET ORAL at 10:22

## 2022-11-23 RX ADMIN — ANTI-FUNGAL POWDER MICONAZOLE NITRATE TALC FREE: 1.42 POWDER TOPICAL at 08:51

## 2022-11-23 RX ADMIN — DOCUSATE SODIUM 100 MG: 100 CAPSULE, LIQUID FILLED ORAL at 19:41

## 2022-11-23 ASSESSMENT — PAIN DESCRIPTION - DESCRIPTORS: DESCRIPTORS: PATIENT UNABLE TO DESCRIBE

## 2022-11-23 ASSESSMENT — PAIN SCALES - GENERAL
PAINLEVEL_OUTOF10: 7
PAINLEVEL_OUTOF10: 4

## 2022-11-23 ASSESSMENT — PAIN DESCRIPTION - ORIENTATION
ORIENTATION: MID
ORIENTATION: MID

## 2022-11-23 ASSESSMENT — PAIN DESCRIPTION - LOCATION
LOCATION: ABDOMEN
LOCATION: ABDOMEN

## 2022-11-23 ASSESSMENT — PAIN - FUNCTIONAL ASSESSMENT: PAIN_FUNCTIONAL_ASSESSMENT: PREVENTS OR INTERFERES SOME ACTIVE ACTIVITIES AND ADLS

## 2022-11-23 ASSESSMENT — PAIN DESCRIPTION - PAIN TYPE
TYPE: ACUTE PAIN
TYPE: ACUTE PAIN

## 2022-11-23 NOTE — PROGRESS NOTES
Pt c/o increased abd pain. Abd rounded and distended , painful to palpitation. Assisted pt to bathroom where she expelled large amount of explosive flatus along with stool. Pt was returned to bed where she is now resting with eyes closed.

## 2022-11-23 NOTE — CARE COORDINATION
Social work; send therapy and new progress notes to [facility] and [facility] for consideration. Will need humaira, Rx and discharge order for snf. Started hens. [facility]

## 2022-11-23 NOTE — CARE COORDINATION
Social work: spoke to Leatha Ordoñez from [facility] and they have accepted pt pending precert. Hens started. Will need humaira, Rx and discharge order for snf. Hope to hear from insurance if open on Friday.  Fredy chan

## 2022-11-23 NOTE — PROGRESS NOTES
[facility]  Office: [address], DO, Vladislav Escamilla, DO, Bernard Lanettecesario, DO, Pablo Mauriceudder Blood, DO, Gillian Carballo MD, Nazario Morales MD, Fabian Leal MD, Mustapha Augustine MD,  Kat Roy MD, Jaida Simpson MD, Ladi Bland, DO, Rossi Bryan MD,  Nickolas Mann MD, Lacey Milan MD, Isidoro Saxena, DO, Parisa Eubanks MD, Lera Mcardle, MD, Jaylin Clay, DO, Tatiana Pratt MD, Kwaku Carlos MD, Keith Lombard, MD, Marily Morales MD, Meghana Calzada DO, Maria Luisa Gasca MD, Gil Daniel MD, Erika Solitario, Lynn Trevino, CNP, Ashtyn Thomas, CNP, Braden Cain, CNP,  Ariana López, [facility], Tashi Light, CNP, Gabrielle Jacques, CNP, Praveen Keller, CNP, Zuleima Sunshine, CNP, Arin Florez, CNP, ALYSSA MirandaC, Mitchell Gonzalez, [facility], Myla Rodney, [facility], Carlos Gram, CNP, Israel Quinonez, CNP, Sharee Fam, [facility]    Progress Note    11/23/2022    8:51 AM    Name:   Daren Martin  MRN:     [id number]     Acct:      [de-identified]   Room:   2002/2002-02  IP Day:  0  Admit Date:  11/22/2022  3:55 AM    PCP:   Laura Khan  Code Status:  Full Code    Subjective:     C/C:   Chief Complaint   Patient presents with    Abdominal Pain    Constipation     No bm in 4 days       Interval History Status: improved. Patient is sitting up at the chair at the bedside denies any new complaints. Brief History:     Daren Martin is a 66 y.o. female who presents with abdominal pain, constipation and is admitted to the hospital for the management of Acute cystitis without hematuria. She is a poor historian and and can only provide limited information. According to ED notes patient was brought in by her son who she currently lives with who stated that he was no longer able to care for her. 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   She appears to have some underlying developmental delay and psych history. Patient was recently admitted to [facility] a few weeks ago and presented there with the same complaints. She was started on oxacillin for her UTI which came back with Enterococcus faecalis. She was also discharged with a topical steroid for her psoriatic rash. Patient is also known to have frequent falls at home     Here she continues to complain of abdominal pain however etiology is unclear. KUB unremarkable for any bowel obstruction, recent CT of the abdomen on 11/13/22 revealed interval development of mild ptosis coli at the cecum and proximal a sending colon however lactic acid levels were unremarkable. Patient has had a previous cholecystectomy. LFTs unremarkable     Urinalysis this admission shows moderate leukocytes, negative nitrites, 10-20 WBCs and few bacteria. She was covered with IV Rocephin  Review of Systems:     Constitutional:  negative for chills, fevers, sweats  Respiratory:  negative for cough, dyspnea on exertion, shortness of breath, wheezing  Cardiovascular:  negative for chest pain, chest pressure/discomfort, palpitations. Positive for lower extremity edema  Gastrointestinal: Positive for abdominal pain, positive for constipation, negative for diarrhea, nausea, vomiting  Neurological:  negative for dizziness, headache  Positive for rash  Medications:      Allergies:  No Known Allergies    Current Meds:   Scheduled Meds:    sodium chloride flush  5-40 mL IntraVENous 2 times per day    enoxaparin  40 mg SubCUTAneous Daily    cefTRIAXone (ROCEPHIN) IV  1,000 mg IntraVENous Q24H    fluconazole  150 mg Oral Q72H    docusate sodium  100 mg Oral BID    polyethylene glycol  17 g Oral Daily    miconazole   Topical BID     Continuous Infusions:    sodium chloride       PRN Meds: sodium chloride flush, sodium chloride, potassium chloride **OR** potassium alternative oral replacement **OR** potassium chloride, magnesium sulfate, acetaminophen **OR** acetaminophen, traMADol    Data:     Past Medical History:   has a past medical history of Arthritis, Bronchitis, Chronic obstructive pulmonary disease ([facility] [address] Colon polyps, Controlled type 2 diabetes mellitus without complication, without long-term current use of insulin ([facility] [address] Dental neglect, Depression, Environmental allergies, GERD (gastroesophageal reflux disease), Hyperlipidemia, Hypertension, Obesity, Onychomycosis, Poor historian, RBBB, Treadmill stress test negative for angina pectoris, and Wears glasses. Social History:   reports that she quit smoking about 7 years ago. Her smoking use included cigarettes. She has a 17.50 pack-year smoking history. She has never used smokeless tobacco. She reports that she does not drink alcohol and does not use drugs. Family History:   Family History   Problem Relation Age of Onset    Heart Disease Mother     Cancer Father        Vitals:  /60   Pulse 55   Temp 98.1 °F (36.7 °C)   Resp 17   Ht 5' 5\" (1.651 m)   Wt 141 lb 9.6 oz (64.2 kg)   SpO2 97%   BMI 23.56 kg/m²   Temp (24hrs), Av.1 °F (36.7 °C), Min:98.1 °F (36.7 °C), Max:98.1 °F (36.7 °C)    No results for input(s): POCGLU in the last 72 hours. I/O (24Hr):   No intake or output data in the 24 hours ending 22 0904    Labs:  Hematology:  Recent Labs     22   WBC 5.3 4.8   RBC 3.62* 3.43*   HGB 11.1* 10.2*   HCT 35.6* 34.4*   MCV 98.3 100.3   MCH 30.7 29.7   MCHC 31.2 29.7   RDW 14.1 14.6*    222   MPV 9.3 9.1     Chemistry:  Recent Labs     22    143   K 3.3* 3.8    109*   CO2 26 28   GLUCOSE 88 84   BUN 13 9   CREATININE 0.64 0.74   ANIONGAP 9 6*   LABGLOM >60 >60   CALCIUM 9.3 8.8     Recent Labs     11/22/22  0440   PROT 6.0*   LABALBU 3.6   AST 17   ALT 12   ALKPHOS 102   BILITOT 0.4   BILIDIR 0.1     ABG:No results found for: POCPH, PHART, PH, POCPCO2, HQI1TXY, PCO2, POCPO2, PO2ART, PO2, POCHCO3, NYN0CHJ, HCO3, NBEA, PBEA, BEART, BE, THGBART, THB, EWS1UPM, ZQPD0FPA, Q7CRECPD, O2SAT, FIO2  Lab Results   Component Value Date/Time    SPECIAL R FA 10 ML 04/27/2022 09:17 PM    SPECIAL L HAND 7 ML 04/27/2022 09:17 PM     Lab Results   Component Value Date/Time    CULTURE NO GROWTH 11/22/2022 04:44 AM       Radiology:  XR KNEE LEFT (1-2 VIEWS)    Result Date: 11/22/2022  Significant soft tissue edema is noted along the medial aspect of the knee. There is a moderate to large suprapatellar joint effusion. No acute osseous abnormality is identified. XR ACUTE ABD SERIES CHEST 1 VW    Result Date: 11/22/2022  No evidence of bowel obstruction. Physical Examination:        General appearance:  alert, cooperative and no distress  Mental Status:  oriented to person, place, disoriented to time and situation, developmental delay  Lungs: Diminished to auscultation bilaterally, normal effort  Heart:  regular rate and rhythm, no murmur  Abdomen:  soft, nontender, mildly distended, normal bowel sounds, no masses, hepatomegaly, splenomegaly  Extremities: Bilateral lower extremity edema, left knee swelling. No redness, tenderness in the calves  Skin:  Vaginal and groin rash noted, fungal in appearance.   Psoriatic rash to left upper shoulder     Assessment:        Hospital Problems             Last Modified POA    * (Principal) Acute cystitis without hematuria 11/22/2022 Yes    Unspecified mood (affective) disorder ([address] 11/22/2022 Yes    Unable to care for self 11/22/2022 Yes    Candidal vulvovaginitis 11/22/2022 Yes    Developmental delay 86/83/7607 Yes    Complicated UTI (urinary tract infection) 11/23/2022 Yes    Hyperlipidemia 11/22/2022 Yes    HTN (hypertension) 11/22/2022 Yes    Falls frequently 11/22/2022 Yes       Plan:        Continue IV Rocephin for UTI, urine culture in process  Monitor labs, replace electrolytes as needed  Monitoring control blood pressure  Diflucan x2 doses for yeast infection  DVT prophylaxis  Continue Colace twice daily and MiraLAX for bowel regimen has initiated yesterday and monitor for bowel movement  Continue Ultram for pain control  General diet  PT/OT evaluation   following for discharge planning to ECF, she will need pre-CERT  Plan discussed with patient and staff    PRABHAKAR Portillo NP  11/23/2022  9:04 AM

## 2022-11-23 NOTE — PROGRESS NOTES
[address]  Occupational Therapy Not Seen    DATE: 2022    NAME: America Castillo  MRN: [id number]   : 1950    Patient not seen this date for Occupational Therapy due to:      [] Cancel by RN or physician due to:    [] Hemodialysis    [] Critical Lab Value Level     [] Blood transfusion in progress    [] Acute or unstable cardiovascular status   _MAP < 55 or more than >115  _HR < 40 or > 130    [] Acute or unstable pulmonary status   -FiO2 > 60%   _RR < 5 or >40    _O2 sats < 85%    [] Strict Bedrest    [] Off Unit for surgery or procedure    [] Off Unit for testing       [] Pending imaging to R/O fracture    [x] Refusal by Patient: Pt in bed and declined due to nausea and diarrhea, wishes to rest at this time. [] Other      [] OT being discontinued at this time. Patient independent. No further needs. [] OT being discontinued at this time as the patient has been transferred to hospice care. No further needs.       Isabel Suggs KAILASH

## 2022-11-23 NOTE — PLAN OF CARE
Problem: Discharge Planning  Goal: Discharge to home or other facility with appropriate resources  Outcome: Progressing     Problem: Pain  Goal: Verbalizes/displays adequate comfort level or baseline comfort level  Outcome: Progressing  Flowsheets (Taken 11/23/2022 1022)  Verbalizes/displays adequate comfort level or baseline comfort level:   Encourage patient to monitor pain and request assistance   Administer analgesics based on type and severity of pain and evaluate response   Implement non-pharmacological measures as appropriate and evaluate response     Problem: Safety - Adult  Goal: Free from fall injury  Outcome: Progressing   Safety precautions continued. Pt gets increased abd pain prior to expelling large amounts of explosive flatus. Afterwards pt is less pain. Medicated with pain med this am with little relief until gas was expelled. Did have one loose stool today. Taking liquids well but refused solids after breakfast. Precert is pending for SNF.

## 2022-11-23 NOTE — PLAN OF CARE
Problem: Discharge Planning  Goal: Discharge to home or other facility with appropriate resources  11/23/2022 0106 by Carie Blackmon RN  Outcome: Progressing     Problem: Pain  Goal: Verbalizes/displays adequate comfort level or baseline comfort level  11/23/2022 0106 by Carie Blackmon RN  Outcome: Progressing     Problem: Safety - Adult  Goal: Free from fall injury  11/23/2022 0106 by Carie Blackmon RN  Outcome: Progressing     Problem: ABCDS Injury Assessment  Goal: Absence of physical injury  11/23/2022 0106 by Carie Blackmon RN  Outcome: Progressing     Problem: Chronic Conditions and Co-morbidities  Goal: Patient's chronic conditions and co-morbidity symptoms are monitored and maintained or improved  11/23/2022 0106 by Carie Blackmon RN  Outcome: Progressing     Problem: Neurosensory - Adult  Goal: Achieves stable or improved neurological status  11/23/2022 0106 by Carie Blackmon RN  Outcome: Progressing     Problem: Respiratory - Adult  Goal: Achieves optimal ventilation and oxygenation  11/23/2022 0106 by aCrie Blackmon RN  Outcome: Progressing     Problem: Cardiovascular - Adult  Goal: Maintains optimal cardiac output and hemodynamic stability  11/23/2022 0106 by Carie Blackmon RN  Outcome: Progressing     Problem: Skin/Tissue Integrity - Adult  Goal: Skin integrity remains intact  11/23/2022 0106 by Carie Blackmon RN  Outcome: Progressing  Flowsheets (Taken 11/22/2022 2000)  Skin Integrity Remains Intact: Monitor for areas of redness and/or skin breakdown     Problem: Musculoskeletal - Adult  Goal: Return mobility to safest level of function  11/23/2022 0106 by Carie Blackmon RN  Outcome: Progressing     Problem: Gastrointestinal - Adult  Goal: Minimal or absence of nausea and vomiting  11/23/2022 0106 by Carie Blackmon RN  Outcome: Progressing     Problem: Genitourinary - Adult  Goal: Absence of urinary retention  11/23/2022 0106 by Carie Blackmon RN  Outcome: Progressing Problem: Infection - Adult  Goal: Absence of infection at discharge  11/23/2022 0106 by Melba Cunningham RN  Outcome: Progressing     Problem: Metabolic/Fluid and Electrolytes - Adult  Goal: Electrolytes maintained within normal limits  11/23/2022 0106 by Melba Cunningham RN  Outcome: Progressing     Problem: Hematologic - Adult  Goal: Maintains hematologic stability  11/23/2022 0106 by Melba Cunningham RN  Outcome: Progressing

## 2022-11-23 NOTE — PROGRESS NOTES
Physical Therapy  Facility/Department: [facility] SURG  Physical Therapy Initial Assessment    Name: Shelby Kaplan  : 1950  MRN: [id number]  Date of Service: 2022    Discharge Recommendations:  Patient would benefit from continued therapy after discharge    Pt presented to ED on 22 with abdominal pain, constipation and is admitted to the hospital for the management of Acute cystitis without hematuria. According to ED notes patient was brought in by her son who she currently lives with who stated that he was no longer able to care for her. 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 Pt was recently admitted to [facility] a few weeks ago and presented there with the same complaints. She was started on oxacillin for her UTI which came back with Enterococcus faecalis. She was also discharged with a topical steroid for her psoriatic rash. Pt also known to have frequent falls at home  Pt admitted for further medical management of Acute cystitis without hematuria, frequent falls, & complicated UTI      Patient Diagnosis(es): The encounter diagnosis was Unable to care for self. Past Medical History:  has a past medical history of Arthritis, Bronchitis, Chronic obstructive pulmonary disease ([place]), Colon polyps, Controlled type 2 diabetes mellitus without complication, without long-term current use of insulin (M[place]uel Horse), Dental neglect, Depression, Environmental allergies, GERD (gastroesophageal reflux disease), Hyperlipidemia, Hypertension, Obesity, Onychomycosis, Poor historian, RBBB, Treadmill stress test negative for angina pectoris, and Wears glasses. Past Surgical History:  has a past surgical history that includes Cholecystectomy; doppler echocardiography; Colonoscopy (2013); and Tubal ligation.     Assessment   Body Structures, Functions, Activity Limitations Requiring Skilled Therapeutic Intervention: Decreased functional mobility ;Decreased ADL status; Decreased strength;Decreased safe awareness;Decreased cognition;Decreased endurance;Decreased balance;Decreased posture  Assessment: Pt with deficits of bed mobility, transfers, ambulation, balance, cognition, posture, safety awareness and endurance this session. With current deficits, Pt at risk for falls & requires continued PT to maximize independence with functional mobility, balance, safety awareness & activity tolerance to improve overall tolerance of ADL's, & reduce fall risk. Pt currently functioning below baseline with AM-PAC mobility score of 13/24. Recommend daily inpatient skilled therapy at time of discharge to maximize long term outcomes and prevent re-admission. Therapy Prognosis: Good  Decision Making: Medium Complexity  Exam: ROM, MMT, functional mobility, activity tolerance, Balance, & [address] [facility] [address] [address] AM-[facility] 6 Clicks Basic Mobility  Clinical Presentation: evolving  Requires PT Follow-Up: Yes  Activity Tolerance  Activity Tolerance: Patient limited by fatigue;Patient limited by endurance     Plan   Physcial Therapy Plan  General Plan: 5-7 times per week  Current Treatment Recommendations: Strengthening, Balance training, Functional mobility training, Transfer training, ADL/Self-care training, Endurance training, Gait training, Stair training, Neuromuscular re-education, Home exercise program, Safety education & training, Patient/Caregiver education & training, Positioning  Safety Devices  Type of Devices:  All fall risk precautions in place, Call light within reach, Patient at risk for falls, Gait belt, Nurse notified, Chair alarm in place, Left in chair     Restrictions  Restrictions/Precautions  Restrictions/Precautions: General Precautions, Fall Risk  Position Activity Restriction  Other position/activity restrictions: up with assist, RUE IV, ALARMS     Subjective   General  Chart Reviewed: Yes  Patient assessed for rehabilitation services?: Yes  Additional Pertinent Hx: COPD, DM, HTN, RBBB  Response To Previous Treatment: Not applicable         Social/Functional History  Social/Functional History  Lives With: Son, Friend(s) (Pt reporting she lives with her son Isha Osei and a friend Stefany Harris)  Type of Home: House  Home Layout: One level  Home Access: Stairs to enter with rails  Entrance Stairs - Number of Steps: -3  Entrance Stairs - Rails: Left  Bathroom Shower/Tub: Tub/Shower unit  Bathroom Toilet: Standard  Bathroom Equipment: Shower chair, Grab bars in [facility]: [employer], rolling  Has the patient had two or more falls in the past year or any fall with injury in the past year?: Yes (pt reports fell when she was going \"fast \" trying to get into cab & hurt her left knee)  Receives Help From: Family (pt has supportive son but he does work dayshift)  ADL Assistance: Independent (Pt reporting she completes all ADLs independently)  Homemaking Assistance: Needs assistance (Pt reports her son does most household chores but she assist with dishes as able.)  Ambulation Assistance: Independent (Pt reporting she uses her cane)  Transfer Assistance: Independent  Active : No  Patient's  Info: Pt reports family drives her  Occupation: Retired  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 Pt reporting inconsistencies throughout eval.  Vision/Hearing  Vision  Vision: Within Functional Limits (pt denies need for glasses, no vision changes or any eye sx)  Hearing  Hearing: Within functional limits    Cognition   Orientation  Overall Orientation Status: Within Functional Limits  Cognition  Overall Cognitive Status: Exceptions  Arousal/Alertness: Appropriate responses to stimuli  Following Commands: Follows one step commands with repetition; Follows one step commands with increased time  Attention Span: Attends with cues to redirect  Memory: Decreased recall of biographical Information  Problem Solving: Decreased awareness of errors;Assistance required to identify errors made;Assistance required to correct errors made;Assistance required to implement solutions;Assistance required to generate solutions  Insights: Not aware of deficits  Initiation: Requires cues for some  Sequencing: Requires cues for some     Objective   Heart Rate: 55  BP: 114/60  MAP (Calculated): 78  Resp: 17  SpO2: 97 %     Observation/Palpation  Posture: Fair  Observation: R UE IV, has large hematoma along left hip region s/p recent fall  Edema: left knee is swollen, + mild at hiren ankles  Scar: previous gall bladder sx scar  Gross Assessment  Sensation: Impaired (pt c/o intermittent paresthesias of R foot)     AROM RLE (degrees)  RLE AROM: WFL  AROM LLE (degrees)  LLE AROM : WFL  AROM RUE (degrees)  RUE General AROM: see OT assessment  AROM LUE (degrees)  LUE General AROM: see OT assessment  Strength RLE  Comment: 4/5  Strength LLE  Comment: 4-/5 hip, 3+/5 knee  Strength RUE  Comment: see OT assessment  Strength LUE  Comment: see OT assessment           Bed mobility  Bed Mobility Comments: MOD cues for hand placement on bed rail as needed, to slow down movements/pacing & pursed lip breathing tech, and use of BUE's to scoot fully out to EOB as well as awareness/assist with lines to increase safety.   Transfers  Sit to Stand: Minimal Assistance  Stand to Sit: Moderate Assistance  Bed to Chair: Moderate assistance  Stand Pivot Transfers: Minimal Assistance  Lateral Transfers: Minimal Assistance  Comment: MOD VC + tactile assist on correct use of upper body for safe sit/stand, nose over toes tech, upright posture, pacing + to back all way back to surface with walker CLOSE until she feels touch behind legs, & to ensure she reaches with UB support to arms of chair(POOR carryover & plops to [place], and  to slow down & take time for transitions to make sure she has no feeling of dizzy/unsteadiness,  Ambulation  Surface: Level tile  Device: Single point cane  Assistance: Moderate assistance  Quality of Gait: step to pattern with wide BETTINA & reaching to walls/furniture with LUE in additon to use of cane on R & alot of cues for posture/scanning & safety  Gait Deviations: Increased BETTINA; Decreased step length;Decreased step height  Distance: 15ft  Comments: Pt amb to BR for toileting, MOD Assistance to sit to toilet.   Pt stood with MIN Assistance, stood 2 minutes for pericare & amb 3ft to sink with MOD Assist for hand/oral hygiene x 4 minutes  More Ambulation?: Yes  Ambulation 2  Surface - 2: level tile  Device 2: [address] [facility] 2: Moderate assistance  Quality of Gait 2: step to pattern, MAX cues to keep walker close at all times & to amb inside base of walker & upright posture for safety/scanning  Gait Deviations: Decreased step length;Decreased step height  Distance: 20ft  Pt sat EOB & assisted donning of clean gown & PJ bottoms with MOD Assist, then amb 3 ft to chair with ModA     Balance  Sitting - Static: Good  Sitting - Dynamic: Good;-  Standing - Static: Fair;+ (cane)  Standing - Dynamic: Fair (cane)  Single Leg Stance R Le  Single Leg Stance L Le  Comments: Pt demonstrated posterior LOB upon standing, needed MOD cues & Mimi to bring wt forward to establish safe COG  Exercise Treatment:   Pt completed lateral WS seated & completed sit to stands x 3 to promote mobility and functional pre gait activities & completed static standing weight shifts & picking feet up off floor with UB support at R/walker to improve core strength & stability  Circulation/Endurance Exercises: ankle pumps x 20  Static Sitting Balance Exercises: seated EOB with Saulo foot placement x 6 minutes with CGA  Dynamic Sitting Balance Exercises: seated with alternate lifting LE's x 5 reps & UE reaches x 5  Postural Correction Exercises: upright posture       All lines intact, call light within reach, and patient positioned comfortably at end of treatment. All patient needs addressed prior to ending therapy session. AM-PAC Score  AM-PAC Inpatient Mobility Raw Score : 13 (11/23/22 0830)  AM-PAC Inpatient T-Scale Score : 36.74 (11/23/22 0830)  Mobility Inpatient CMS 0-100% Score: 64.91 (11/23/22 0830)  Mobility Inpatient CMS G-Code Modifier : CL (11/23/22 0830)        Goals  Short Term Goals  Short Term Goal 1: Inc bed-mobility & transfers to independent to enable pt to safely get in/OOB & chair to return to PLOF & decrease risk for falls  Short Term Goal 2: Inc gait to amb 200ft or > indep w/ safest device to enable pt to return to previous level of independence & able to demonstrate indep/ safe functional activities including approaching surfaces and turning to sit. Short Term Goal 3: Pt able to go up/down 3 steps with one rail indep  Short Term Goal 4: Inc strength to [address] standing balance to good with device to facilitate pt independence for performance of ADL's & functional mobility, & reduce fall risk  Short Term Goal 5: Pt able to tolerate 30-40 min of activity to include 15-20 reps of ex, NMR & functional mobility with device to facilitate activity tolerance to [facility]  Additional Goals?: Yes  Short Term Goal 6: Ed pt on home ex's, safety, balance & endurance training, fall prevention, & issue written Pt Ed       Education  Patient Education  Education Given To: Patient  Education Provided: Role of Therapy;Plan of Care;Transfer Training; Fall Prevention Strategies;Precautions  Education Provided Comments: Pt educated on purpose of acute PT eval, importance of continued mobility throughout admission, general safety awareness, fall risk prevention, safe transfers & ambulation w/ RW, circulation ex's, pressure relief/optimal breathing techniques, prevention of sedentary complications, and PT POC. Pt demonstrated POOR carryover  Pt requires continued reinforcement of education.   Education Method: Demonstration;Verbal  Education Outcome: Continued education needed      Therapy Time   Individual Concurrent Group Co-treatment   Time In 0740         Time Out 7380         Minutes 61             Additional 10 minutes for chart review    Treatment time: 54 minutes      [address], PT

## 2022-11-23 NOTE — DISCHARGE INSTR - COC
Continuity of Care Form    Patient Name: Magdaleno Bundy   :  1950  MRN:  [id number]    Admit date:  2022  Discharge date: 2022    Code Status Order: Full Code   Advance Directives:     Admitting Physician:  Nina Masterson MD  PCP: Sarah Jones    Discharging Nurse: [facility] Unit/Room#:   Discharging Unit Phone Number: [phone]    Emergency Contact:   Extended Emergency Contact Information  Primary Emergency Contact: Kaylee Spence  Address: [address], [address] [place] [address] Phone: [phone]  Relation: Child  Secondary Emergency Contact: Yuridia William  Address: n/a   [address] Phone: [phone]  Relation: Child    Past Surgical History:  Past Surgical History:   Procedure Laterality Date    CHOLECYSTECTOMY      COLONOSCOPY  2013    one hyperplastic polyp    DOPPLER ECHOCARDIOGRAPHY      cardiac    TUBAL LIGATION         Immunization History:   Immunization History   Administered Date(s) Administered    COVID-19, PFIZER Bivalent BOOSTER, (age 12y+), IM, 30 mcg/0.3 mL dose 10/06/2022    COVID-19, PFIZER GRAY top, DO NOT Dilute, (age 15 y+), IM, 27 mcg/0.3 mL 2022, 2022    Influenza 2012, 10/09/2013    Influenza Virus Vaccine 2014, 10/06/2015    Influenza, AFLURIA (age 1 yrs+), FLUZONE, (age 10 mo+), MDV, 0.5mL 2016, 2017    Influenza, FLUARIX, FLULAVAL, FLUZONE (age 10 mo+) AND AFLURIA, (age 1 y+), PF, 0.5mL 2019    Influenza, High Dose (Fluzone 65 yrs and older) 11/15/2011    Influenza, Triv, inactivated, subunit, adjuvanted, IM (Fluad 65 yrs and older) 10/11/2018    Pneumococcal Conjugate 13-valent (Tuufyqx30) 2016    Pneumococcal Polysaccharide (Arseaqqys82) 2013, 2018    Tdap (Boostrix, Adacel) 2016       Active Problems:  Patient Active Problem List   Diagnosis Code    GERD (gastroesophageal reflux disease) K21.9    Hypertension I10    Hyperlipidemia E78.5    Depression F32. A    Bronchitis J40    HTN (hypertension) I10    HLD (hyperlipidemia) E78.5    Osteoarthritis M19.90    Osteoarthritis of right knee M17.11    Closed nondisplaced fracture of head of right radius S52.124A    Chronic obstructive pulmonary disease (HCC) J44.9    Smoker F17.200    Chronic pain of left knee M25.562, G89.29    Pre-diabetes R73.03    Falls frequently R29.6    Cognitive and behavioral changes R41.89, K13.59    Toxic metabolic encephalopathy L04.7    Chest pain R07.9    Ileus (HCC) K56.7    Urinary retention R33.9    Normocytic normochromic anemia D64.9    BMI 28.0-28.9,adult Z68.28    AMS (altered mental status) R41.82    Unspecified mood (affective) disorder (HCC) F39    Unable to care for self Z78.9    Candidal vulvovaginitis B37.31    Acute cystitis without hematuria N30.00    Developmental delay D81.55    Complicated UTI (urinary tract infection) N39.0       Isolation/Infection:   Isolation            No Isolation          Patient Infection Status       Infection Onset Added Last Indicated Last Indicated By Review Planned Expiration Resolved Resolved By    None active    Resolved    COVID-19 (Rule Out) 22 COVID-19, Rapid (Ordered)   22 Rule-Out Test Resulted    COVID-19 22 COVID-19 & Influenza Combo   22 Raul Antonio RN    COVID-19 (Rule Out) 22 COVID-19 & Influenza Combo (Ordered)   22 Rule-Out Test Resulted    COVID-19 22 COVID-19, Rapid   22     COVID-19 (Rule Out) 22 COVID-19, Rapid (Ordered)   22 Rule-Out Test Resulted    COVID-19 (Rule Out) 22 COVID-19, Rapid (Ordered)   22 Rule-Out Test Resulted    COVID-19 (Rule Out) 21 COVID-19, Rapid (Ordered)   21 Rule-Out Test Resulted            Nurse Assessment:  Last Vital Signs: BP 114/60   Pulse 55   Temp 98.1 °F (36.7 °C)   Resp 16   Ht 5' 5\" (1.651 m)   Wt 141 lb 9.6 oz (64.2 kg)   SpO2 97%   BMI 23.56 kg/m²     Last documented pain score (0-10 scale): Pain Level: 7 (pt has trouble giving pain level number)  Last Weight:   Wt Readings from Last 1 Encounters:   11/22/22 141 lb 9.6 oz (64.2 kg)     Mental Status:  oriented and alert, development delay    IV Access:  - None    Nursing Mobility/ADLs:  Walking   Assisted  Transfer  Assisted  Bathing  Assisted  Dressing  Assisted  Toileting  Assisted  Feeding  Assisted  Med Admin  Assisted  Med Delivery   whole    Wound Care Documentation and Therapy:  Wound 08/03/22 Toe (Comment  which one) Anterior;Right (Active)   Number of days: 111        Elimination:  Continence: Bowel: Yes  Bladder: Yes  Urinary Catheter: None   Colostomy/Ileostomy/Ileal Conduit: No       Date of Last BM: 11/25/2022  No intake or output data in the 24 hours ending 11/23/22 1112  I/O last 3 completed shifts: In: 42.3 [IV Piggyback:42.3]  Out: -     Safety Concerns:     History of Falls (last 30 days)    Impairments/Disabilities:      Speech and Left Lower Extremity    Nutrition Therapy:  Current Nutrition Therapy:   - Oral Diet:  General    Routes of Feeding: Oral  Liquids: No Restrictions  Daily Fluid Restriction: no  Last Modified Barium Swallow with Video (Video Swallowing Test): not done    Treatments at the Time of Hospital Discharge:   Respiratory Treatments: N/A  Oxygen Therapy:  is not on home oxygen therapy.   Ventilator:    - No ventilator support    Rehab Therapies: Physical Therapy and Occupational Therapy  Weight Bearing Status/Restrictions: No weight bearing restrictions  Other Medical Equipment (for information only, NOT a DME order):  cane and walker  Other Treatments: N/A    Patient's personal belongings (please select all that are sent with patient):  None    RN SIGNATURE:  Electronically signed by Kinjal Yoder RN on 11/26/22 at 11:29 AM EST    CASE MANAGEMENT/SOCIAL WORK SECTION    Inpatient Status Date: ***    Readmission Risk Assessment Score:  Readmission Risk              Risk of Unplanned Readmission:  44           Discharging to Facility/ Agency   Name: [facility]   Address:  Phone:[phone] for [facility] location  Fax: [phone] or direct  fax [phone]    Dialysis Facility (if applicable)   Name:  Address:  Dialysis Schedule:  Phone:  Fax:    / signature: Electronically signed by ADDISON Alanis, CUBAW on 11/23/22 at 12:50 PM EST    PHYSICIAN SECTION    Prognosis: Guarded    Condition at Discharge: Stable    Rehab Potential (if transferring to Rehab): Fair    Recommended Labs or Other Treatments After Discharge: PT, OT, nurising evaluation and treamtent, monitor glucose, monitor BP, off antihypertensivs    Physician Certification: I certify the above information and transfer of America Castillo  is necessary for the continuing treatment of the diagnosis listed and that she requires [place] for less 30 days.      Update Admission H&P: No change in H&P    PHYSICIAN SIGNATURE:  Electronically signed by Jamaal Green MD on 11/24/22 at 9:28 AM EST

## 2022-11-23 NOTE — PROGRESS NOTES
[facility] NOTE    Room # 2002/2002-02   Name: Cirilo Bravo            Mandaeism: [address]     Reason for visit: Routine    I visited the patient. Admit Date & Time: 11/22/2022  3:55 AM    Assessment:  Cirilo Bravo is a 66 y.o. female in the hospital because she has UTI. Upon entering the room pt. Is found resting in bed and watching television. Pt. Is open to visit. Intervention:  I introduced myself and my title as  I offered space for patient  to express feelings, needs, and concerns and provided a ministry presence. Pt. Reports that she is Shinto and that she is looking forward to [facility] tomorrow when  comes. Pt. Is MR/DD and both children are as well. Pt. Reports that her son brought her to ED because he did not what to do with her anymore as her health has declined.  offers active listening, prayer and emotional support. Outcome:  Pt. Calm and coping. Plan:  Chaplains will remain available to offer spiritual and emotional support as needed. Electronically signed by Linda Weems on 11/22/2022 at 8:25 PM.  Charles     11/22/22 2023   Encounter Summary   Service Provided For: Patient   Referral/Consult From: [facility]   Last Encounter  11/22/22   Complexity of Encounter Moderate   Begin Time 1955   End Time  2020   Total Time Calculated 25 min   Encounter    Type Initial Screen/Assessment   Spiritual/Emotional needs   Type Spiritual Support   Assessment/Intervention/Outcome   Assessment Calm; Compromised coping; Impaired social interaction   Intervention Active listening;Explored/Affirmed feelings, thoughts, concerns;Nurtured Hope;Sustaining Presence/Ministry of presence   Outcome Comfort; Connection/Belonging;Receptive

## 2022-11-24 PROCEDURE — 6360000002 HC RX W HCPCS: Performed by: NURSE PRACTITIONER

## 2022-11-24 PROCEDURE — 97535 SELF CARE MNGMENT TRAINING: CPT

## 2022-11-24 PROCEDURE — 6370000000 HC RX 637 (ALT 250 FOR IP): Performed by: NURSE PRACTITIONER

## 2022-11-24 PROCEDURE — 99232 SBSQ HOSP IP/OBS MODERATE 35: CPT | Performed by: STUDENT IN AN ORGANIZED HEALTH CARE EDUCATION/TRAINING PROGRAM

## 2022-11-24 PROCEDURE — 2580000003 HC RX 258: Performed by: NURSE PRACTITIONER

## 2022-11-24 PROCEDURE — 1200000000 HC SEMI PRIVATE

## 2022-11-24 PROCEDURE — 6370000000 HC RX 637 (ALT 250 FOR IP): Performed by: INTERNAL MEDICINE

## 2022-11-24 PROCEDURE — 6370000000 HC RX 637 (ALT 250 FOR IP): Performed by: STUDENT IN AN ORGANIZED HEALTH CARE EDUCATION/TRAINING PROGRAM

## 2022-11-24 RX ORDER — POLYETHYLENE GLYCOL 3350 17 G/17G
17 POWDER, FOR SOLUTION ORAL DAILY PRN
Qty: 527 G | Refills: 1 | Status: SHIPPED | OUTPATIENT
Start: 2022-11-24 | End: 2022-12-24

## 2022-11-24 RX ORDER — MAGNESIUM HYDROXIDE/ALUMINUM HYDROXICE/SIMETHICONE 120; 1200; 1200 MG/30ML; MG/30ML; MG/30ML
30 SUSPENSION ORAL ONCE
Status: COMPLETED | OUTPATIENT
Start: 2022-11-24 | End: 2022-11-24

## 2022-11-24 RX ADMIN — SODIUM CHLORIDE, PRESERVATIVE FREE 10 ML: 5 INJECTION INTRAVENOUS at 21:24

## 2022-11-24 RX ADMIN — POLYETHYLENE GLYCOL 3350 17 G: 17 POWDER, FOR SOLUTION ORAL at 09:18

## 2022-11-24 RX ADMIN — ANTI-FUNGAL POWDER MICONAZOLE NITRATE TALC FREE: 1.42 POWDER TOPICAL at 21:24

## 2022-11-24 RX ADMIN — SIMETHICONE 80 MG: 80 TABLET, CHEWABLE ORAL at 21:24

## 2022-11-24 RX ADMIN — PROBIOTIC PRODUCT - TAB 1 TABLET: TAB at 16:06

## 2022-11-24 RX ADMIN — ALUMINA, MAGNESIA, AND SIMETHICONE ORAL SUSPENSION REGULAR STRENGTH 30 ML: 1200; 1200; 120 SUSPENSION ORAL at 09:18

## 2022-11-24 RX ADMIN — SODIUM CHLORIDE, PRESERVATIVE FREE 10 ML: 5 INJECTION INTRAVENOUS at 10:49

## 2022-11-24 RX ADMIN — CEFTRIAXONE SODIUM 1000 MG: 1 INJECTION, POWDER, FOR SOLUTION INTRAMUSCULAR; INTRAVENOUS at 05:35

## 2022-11-24 RX ADMIN — ENOXAPARIN SODIUM 40 MG: 100 INJECTION SUBCUTANEOUS at 09:17

## 2022-11-24 RX ADMIN — DOCUSATE SODIUM 100 MG: 100 CAPSULE, LIQUID FILLED ORAL at 09:18

## 2022-11-24 RX ADMIN — ANTI-FUNGAL POWDER MICONAZOLE NITRATE TALC FREE: 1.42 POWDER TOPICAL at 16:07

## 2022-11-24 ASSESSMENT — ENCOUNTER SYMPTOMS
EYE DISCHARGE: 0
CHEST TIGHTNESS: 0
ABDOMINAL PAIN: 1
APNEA: 0
CONSTIPATION: 0
FACIAL SWELLING: 0
COLOR CHANGE: 0
EYE ITCHING: 0
DIARRHEA: 1
ABDOMINAL DISTENTION: 1
BACK PAIN: 0

## 2022-11-24 ASSESSMENT — PAIN DESCRIPTION - ORIENTATION: ORIENTATION: OTHER (COMMENT)

## 2022-11-24 ASSESSMENT — PAIN DESCRIPTION - LOCATION
LOCATION: ABDOMEN
LOCATION: ABDOMEN

## 2022-11-24 ASSESSMENT — PAIN DESCRIPTION - FREQUENCY: FREQUENCY: CONTINUOUS

## 2022-11-24 ASSESSMENT — PAIN - FUNCTIONAL ASSESSMENT: PAIN_FUNCTIONAL_ASSESSMENT: ACTIVITIES ARE NOT PREVENTED

## 2022-11-24 ASSESSMENT — PAIN DESCRIPTION - DESCRIPTORS: DESCRIPTORS: PATIENT UNABLE TO DESCRIBE

## 2022-11-24 ASSESSMENT — PAIN SCALES - GENERAL
PAINLEVEL_OUTOF10: 5
PAINLEVEL_OUTOF10: 0

## 2022-11-24 ASSESSMENT — PAIN DESCRIPTION - PAIN TYPE: TYPE: ACUTE PAIN

## 2022-11-24 ASSESSMENT — PAIN DESCRIPTION - ONSET: ONSET: ON-GOING

## 2022-11-24 NOTE — PLAN OF CARE
Problem: Discharge Planning  Goal: Discharge to home or other facility with appropriate resources  11/23/2022 1938 by Renea Esteves RN  Outcome: Progressing     Problem: Pain  Goal: Verbalizes/displays adequate comfort level or baseline comfort level  11/23/2022 1938 by Renea Esteves RN  Outcome: Progressing     Problem: Safety - Adult  Goal: Free from fall injury  11/23/2022 1938 by Renea Esteves RN  Outcome: Progressing     Problem: ABCDS Injury Assessment  Goal: Absence of physical injury  Outcome: Progressing     Problem: Infection - Adult  Goal: Absence of infection at discharge  Outcome: Progressing     Problem: Metabolic/Fluid and Electrolytes - Adult  Goal: Electrolytes maintained within normal limits  Outcome: Progressing

## 2022-11-24 NOTE — PROGRESS NOTES
[facility]  Office: [address], DO, Wilma Dayanara, DO, Jose Luis Phlegm, DO, Rolando Bianchi Blood, DO, Milana Hamilton MD, Siva Rahman MD, Juana Reese MD, Sudha Bay MD,  Tayla Trejo MD, Remi Whitten MD, Claritza Marie, DO, Delmy Caraballo MD,  Rossana Rae MD, Arnel Pool MD, Therese Restrepo, DO, Pam Farah MD, Amanda Roblero MD, Sunil Delgado, DO, Harpreet Carson MD, Maisha Stephenson MD, Danelle Garnica MD, Blanca Platt MD, Parish Wolfe, DO, Jose Luna MD, Remigio Alvares MD, Danisha Stoner, Valerio Sosa, CNP, Mago Mckeon, CNP, Chavez Castro, CNP,  Travis Barrios, DNP, Alexis Dale, CNP, Malgorzata Bangura, CNP, Rand Woodard, CNP, Alex Freeman, CNP, Antony Libman, CNP, Carlene Quinteros PA-C, Radha Carranza, CNS, Car Christensen, DNP, Abad Han, CNP, Valentnia Ventura, CNP, Keo Jones, [facility]    Progress Note    11/24/2022    9:24 AM    Name:   Shar Spence  MRN:     [id number]     Acct:      [de-identified]   Room:   2002/2002-02  IP Day:  1  Admit Date:  11/22/2022  3:55 AM    PCP:   Ashley Green  Code Status:  Full Code    Subjective:     C/C:   Chief Complaint   Patient presents with    Abdominal Pain    Constipation     No bm in 4 days       Interval History Status: not changed. Patient seen and examined at bedside. No acute issues overnight. Still having intermittent episodes of abdominal bloating no diarrhea. Did eat her breakfast this morning    Brief History:     69-year-old female past medical history of hypertension, developmental delay, hyperlipidemia presents with acute cystitis currently being treated with IV Rocephin. Discharge planning to skilled nursing facility once bed is available. Review of Systems:     Review of Systems   Constitutional:  Negative for activity change, appetite change, chills and fever.    HENT:  Negative for congestion, ear pain, facial swelling and mouth sores. Eyes:  Negative for discharge and itching. Respiratory:  Negative for apnea and chest tightness. Cardiovascular:  Negative for chest pain and leg swelling. Gastrointestinal:  Positive for abdominal distention, abdominal pain and diarrhea. Negative for constipation. Endocrine: Negative for cold intolerance, polyphagia and polyuria. Genitourinary:  Negative for difficulty urinating, dysuria and flank pain. Musculoskeletal:  Negative for arthralgias, back pain and joint swelling. Skin:  Negative for color change and wound. Neurological:  Negative for dizziness, seizures, light-headedness and headaches. Psychiatric/Behavioral:  Negative for agitation, behavioral problems and self-injury. Medications:      Allergies:  No Known Allergies    Current Meds:   Scheduled Meds:    lactobacillus  1 tablet Oral Daily    sodium chloride flush  5-40 mL IntraVENous 2 times per day    enoxaparin  40 mg SubCUTAneous Daily    cefTRIAXone (ROCEPHIN) IV  1,000 mg IntraVENous Q24H    fluconazole  150 mg Oral Q72H    docusate sodium  100 mg Oral BID    polyethylene glycol  17 g Oral Daily    miconazole   Topical BID     Continuous Infusions:    sodium chloride       PRN Meds: simethicone, sodium chloride flush, sodium chloride, potassium chloride **OR** potassium alternative oral replacement **OR** potassium chloride, magnesium sulfate, acetaminophen **OR** acetaminophen, traMADol    Data:     Past Medical History:   has a past medical history of Arthritis, Bronchitis, Chronic obstructive pulmonary disease ([facility] [address] Colon polyps, Controlled type 2 diabetes mellitus without complication, without long-term current use of insulin ([facility] [address] Dental neglect, Depression, Environmental allergies, GERD (gastroesophageal reflux disease), Hyperlipidemia, Hypertension, Obesity, Onychomycosis, Poor historian, RBBB, Treadmill stress test negative for angina pectoris, and Wears glasses. Social History:   reports that she quit smoking about 7 years ago. Her smoking use included cigarettes. She has a 17.50 pack-year smoking history. She has never used smokeless tobacco. She reports that she does not drink alcohol and does not use drugs. Family History:   Family History   Problem Relation Age of Onset    Heart Disease Mother     Cancer Father        Vitals:  BP (!) 111/55   Pulse 54   Temp 98 °F (36.7 °C) (Oral)   Resp 16   Ht 5' 5\" (1.651 m)   Wt 141 lb 9.6 oz (64.2 kg)   SpO2 97%   BMI 23.56 kg/m²   Temp (24hrs), Av.9 °F (36.6 °C), Min:97.9 °F (36.6 °C), Max:98 °F (36.7 °C)    No results for input(s): POCGLU in the last 72 hours. I/O (24Hr):     Intake/Output Summary (Last 24 hours) at 2022 0924  Last data filed at 2022 0000  Gross per 24 hour   Intake --   Output 1750 ml   Net -1750 ml       Labs:  Hematology:  Recent Labs     22   WBC 5.3 4.8   RBC 3.62* 3.43*   HGB 11.1* 10.2*   HCT 35.6* 34.4*   MCV 98.3 100.3   MCH 30.7 29.7   MCHC 31.2 29.7   RDW 14.1 14.6*    222   MPV 9.3 9.1     Chemistry:  Recent Labs     22    143   K 3.3* 3.8    109*   CO2 26 28   GLUCOSE 88 84   BUN 13 9   CREATININE 0.64 0.74   ANIONGAP 9 6*   LABGLOM >60 >60   CALCIUM 9.3 8.8     Recent Labs     22   PROT 6.0*   LABALBU 3.6   AST 17   ALT 12   ALKPHOS 102   BILITOT 0.4   BILIDIR 0.1     ABG:No results found for: POCPH, PHART, PH, POCPCO2, ALJ3GQF, PCO2, POCPO2, PO2ART, PO2, POCHCO3, OHM1EKQ, HCO3, NBEA, PBEA, BEART, BE, THGBART, THB, HVJ1AYD, ULGM3WJB, E5NWKNGI, O2SAT, FIO2  Lab Results   Component Value Date/Time    SPECIAL R FA 10 ML 2022 09:17 PM    SPECIAL L HAND 7 ML 2022 09:17 PM     Lab Results   Component Value Date/Time    CULTURE NO GROWTH 2022 04:44 AM       Radiology:  XR KNEE LEFT (1-2 VIEWS)    Result Date: 2022  Significant soft tissue edema is noted along the medial aspect of the knee. There is a moderate to large suprapatellar joint effusion. No acute osseous abnormality is identified. XR ACUTE ABD SERIES CHEST 1 VW    Result Date: 11/22/2022  No evidence of bowel obstruction. Physical Examination:        Physical Exam  Constitutional:       Appearance: She is not ill-appearing or diaphoretic. HENT:      Head: Normocephalic. Right Ear: External ear normal.      Left Ear: External ear normal.      Nose: Nose normal.      Mouth/Throat:      Mouth: Mucous membranes are moist.   Eyes:      Pupils: Pupils are equal, round, and reactive to light. Cardiovascular:      Rate and Rhythm: Normal rate and regular rhythm. Pulses: Normal pulses. Pulmonary:      Breath sounds: No wheezing or rales. Abdominal:      Comments: Soft, mild epigastric abdominal tenderness, bowel sounds present, nondistended   Musculoskeletal:      Cervical back: Neck supple. Right lower leg: No edema. Left lower leg: No edema. Skin:     General: Skin is warm and dry. Capillary Refill: Capillary refill takes less than 2 seconds. Neurological:      Mental Status: She is alert and oriented to person, place, and time. Psychiatric:         Mood and Affect: Mood normal.         Behavior: Behavior normal.       Assessment:        Hospital Problems             Last Modified POA    * (Principal) Acute cystitis without hematuria 11/22/2022 Yes    Unspecified mood (affective) disorder ([address] 11/22/2022 Yes    Unable to care for self 11/22/2022 Yes    Candidal vulvovaginitis 11/22/2022 Yes    Developmental delay 37/61/1338 Yes    Complicated UTI (urinary tract infection) 11/23/2022 Yes    Hyperlipidemia 11/22/2022 Yes    HTN (hypertension) 11/22/2022 Yes    Falls frequently 11/22/2022 Yes       Plan:        Acute cystitis. Continue Rocephin. Abdominal distention.   1 dose Maalox  \Start probiotics daily  Diflucan for yeast infection secondary to antibiotics  Monitor off antihypertensives  Discharge planning to skilled nursing facility once bed is available.         Ofelia Saldivar MD  11/24/2022  9:24 AM

## 2022-11-24 NOTE — PROGRESS NOTES
Occupational Therapy  Facility/Department: [facility] MED SURG  Rehabilitation Occupational Therapy Daily Treatment Note    Date: 22  Patient Name: Dalia Ng       Room: 3283/3471-23  MRN: [id number]  Account: [de-identified]   : 1950  (66 y.o.) Gender: female        Pt currently functioning below baseline. Recommend daily inpatient skilled therapy at time of discharge to maximize long term outcomes and prevent re-admission. Please refer to AM-PAC score for current level of function. Past Medical History:  has a past medical history of Arthritis, Bronchitis, Chronic obstructive pulmonary disease ([facility] [address] Colon polyps, Controlled type 2 diabetes mellitus without complication, without long-term current use of insulin ([facility] [address] Dental neglect, Depression, Environmental allergies, GERD (gastroesophageal reflux disease), Hyperlipidemia, Hypertension, Obesity, Onychomycosis, Poor historian, RBBB, Treadmill stress test negative for angina pectoris, and Wears glasses. Past Surgical History:   has a past surgical history that includes Cholecystectomy; doppler echocardiography; Colonoscopy (2013); and Tubal ligation. Restrictions  Restrictions/Precautions: General Precautions; Fall Risk;Up as Tolerated  Other position/activity restrictions: up with assist, RUE IV, ALARMS  Required Braces or Orthoses?: No    Subjective  Subjective: Pt in bed upon arrival and agreeable to therapy. Restrictions/Precautions: General Precautions; Fall Risk;Up as Tolerated             Objective     Cognition  Overall Cognitive Status: Exceptions  Arousal/Alertness: Appropriate responses to stimuli  Following Commands: Follows one step commands with repetition; Follows one step commands with increased time; Inconsistently follows commands  Attention Span: Attends with cues to redirect; Difficulty attending to directions  Memory: Decreased recall of recent events;Decreased short term memory  Safety Judgement: Decreased awareness of need for assistance;Decreased awareness of need for safety  Problem Solving: Decreased awareness of errors;Assistance required to identify errors made;Assistance required to correct errors made;Assistance required to implement solutions;Assistance required to generate solutions  Insights: Not aware of deficits  Initiation: Requires cues for all  Cognition Comment: Pt appears somewhat child like. Needs cues for all tasks. Difficulty processing/following commands. Orientation  Overall Orientation Status: Within Functional Limits   Perception  Overall Perceptual Status: Impaired  Initiation: Cues to initiate tasks     ADL  Upper Extremity Bathing  Assistance Level: Set-up; Verbal cues; Minimal assistance; Moderate assistance  Skilled Clinical Factors: Pt sat on shower bench to complete UBB. Pt req'd Min-Mod A for thoroughness and verbal cues for sequencing. Lower Extremity Bathing  Assistance Level: Set-up; Moderate assistance;Verbal cues  Skilled Clinical Factors: Pt stood in shower to bathe front/back krystal areas with MAX cues for sequencing/thoroughness and then sat on bench to bathe legs. Upper Extremity Dressing  Assistance Level: Set-up; Minimal assistance  Skilled Clinical Factors: to change gowns  Lower Extremity Dressing  Assistance Level: Maximum assistance;Verbal cues  Skilled Clinical Factors: to allyson brief  Putting On/Taking Off Footwear  Assistance Level: Minimal assistance;Verbal cues  Skilled Clinical Factors: socks  Toileting  Assistance Level:  Moderate assistance;Maximum assistance  Skilled Clinical Factors: thoroughness with hygiene after BM and brief mgmt  Toilet Transfers  Technique: Stand step  Equipment: Standard toilet;Grab bars  Additional Factors: Verbal cues;Cues for hand placement  Assistance Level: Stand by assist;Contact guard assist  Skilled Clinical Factors: Max cues for safety  Tub/Shower Transfers  Type: Shower  Transfer From: [facility]  Transfer To: Tub transfer bench  Additional Factors: Verbal cues;Cues for hand placement  Assistance Level: Stand by assist;Contact guard assist  Skilled Clinical Factors: Max cues for safety          Functional Mobility  Device: Rolling walker  Activity: To/From bathroom (mobility in room)  Assistance Level: Stand by assist;Contact guard assist  Skilled Clinical Factors: Verbal cues for walker safety/mgmt  Bed Mobility  Overall Assistance Level: Stand By Assist  Additional Factors: Verbal cues; Head of bed raised; With handrails  Supine to Sit  Assistance Level: Stand by assist  Scooting  Assistance Level: Stand by assist  Skilled Clinical Factors: verbal cues for safety  Transfers  Surface: From bed; To chair with arms;Standard toilet (shower bench)  Additional Factors: Verbal cues; Hand placement cues  Device: Walker  Sit to Stand  Assistance Level: Stand by assist;Contact guard assist  Stand to Sit  Assistance Level: Stand by assist;Contact guard assist  Skilled Clinical Factors: Max verbal cues for hand placement, nose over toes, upright posture, walker safety/mgmt, squaring self up and reaching back prior to sitting down, controlling stand to sit and overall safety. Neuromuscular Education  Neuromuscular education: Yes  NDT Treatment: Gait ;Sitting;Standing     Assessment  Assessment  Assessment: Pt tolerated session well and is progressing towards goals. Pt req's SBA/CGA with bed mobility, transfers and functional mobility with RW. Pt req's A LOT of assist with ADLs. Pt is limited by cognitive deficits and would benefit from 24 hour care/supervision to properly care for self. Skilled OT indicated to increase safety and IND with all functional tasks to ensure a safe return to PLOF as able. Activity Tolerance: Patient tolerated treatment well;Treatment limited secondary to decreased cognition  Discharge Recommendations: Patient would benefit from continued therapy after discharge  Safety Devices  Safety Devices in place: Yes  Type of devices:  All fall risk precautions in place; Left in chair;Nurse notified;Call light within reach; Chair alarm in place;Gait belt;Patient at risk for falls    Patient Education  Education  Education Given To: Patient  Education Provided: Mobility Training;Transfer Training;Energy Conservation;Cognition; Safety;Equipment  Education Method: Demonstration;Verbal;Teach Back  Barriers to Learning: Cognition; Hearing  Education Outcome: Continued education needed    Plan  Occupational Therapy Plan  Times Per Week: 4-5x/wk 1x/day as jj  Current Treatment Recommendations: Strengthening;Balance training;Functional mobility training; Endurance training; Safety education & training;Equipment evaluation, education, & procurement;Cognitive reorientation;Self-Care / ADL; Patient/Caregiver education & training;Cognitive/Perceptual training    Goals  Patient Goals   Patient goals : To go home  Short Term Goals  Time Frame for Short Term Goals: By discharge, pt to demo  Short Term Goal 1: ADL transfers and functional to SBA with use of AD as needed. Short Term Goal 2: bed mobility to SBA with use of bedrails as needed. Short Term Goal 3: increased B UE strength by 1/2 grade to assist with functional tasks/I with simple B UE HEP with use of handouts as needed. Short Term Goal 4: toileting to SBA with use of AD/grab bars as needed. Short Term Goal 5: UB ADLs to Set up and LB ADLs to Min A with use of AD/AE as needed. Long Term Goals  Long Term Goal 1: Pt to be I with fall prevention education, EC/WS tech, disease specific education, recommendations for discharge/AE with use handouts as needed.     AM-PAC Score        AM-[facility] Inpatient Daily Activity Raw Score: 16 (11/24/22 1015)  AM-PAC Inpatient ADL T-Scale Score : 35.96 (11/24/22 1015)  ADL Inpatient CMS 0-100% Score: 53.32 (11/24/22 1015)  ADL Inpatient CMS G-Code Modifier : CK (11/24/22 1015)      Therapy Time   Individual Concurrent Group Co-treatment   Time In 0942         Time Out 1003 KAILASH Dewitt

## 2022-11-25 PROCEDURE — 6360000002 HC RX W HCPCS: Performed by: NURSE PRACTITIONER

## 2022-11-25 PROCEDURE — 97110 THERAPEUTIC EXERCISES: CPT

## 2022-11-25 PROCEDURE — 2580000003 HC RX 258: Performed by: NURSE PRACTITIONER

## 2022-11-25 PROCEDURE — 97116 GAIT TRAINING THERAPY: CPT

## 2022-11-25 PROCEDURE — 6370000000 HC RX 637 (ALT 250 FOR IP): Performed by: NURSE PRACTITIONER

## 2022-11-25 PROCEDURE — 6370000000 HC RX 637 (ALT 250 FOR IP): Performed by: INTERNAL MEDICINE

## 2022-11-25 PROCEDURE — 6370000000 HC RX 637 (ALT 250 FOR IP): Performed by: STUDENT IN AN ORGANIZED HEALTH CARE EDUCATION/TRAINING PROGRAM

## 2022-11-25 PROCEDURE — 99231 SBSQ HOSP IP/OBS SF/LOW 25: CPT | Performed by: STUDENT IN AN ORGANIZED HEALTH CARE EDUCATION/TRAINING PROGRAM

## 2022-11-25 PROCEDURE — 1200000000 HC SEMI PRIVATE

## 2022-11-25 RX ADMIN — SODIUM CHLORIDE, PRESERVATIVE FREE 10 ML: 5 INJECTION INTRAVENOUS at 08:32

## 2022-11-25 RX ADMIN — POLYETHYLENE GLYCOL 3350 17 G: 17 POWDER, FOR SOLUTION ORAL at 08:25

## 2022-11-25 RX ADMIN — PROBIOTIC PRODUCT - TAB 1 TABLET: TAB at 08:25

## 2022-11-25 RX ADMIN — SIMETHICONE 80 MG: 80 TABLET, CHEWABLE ORAL at 13:03

## 2022-11-25 RX ADMIN — ANTI-FUNGAL POWDER MICONAZOLE NITRATE TALC FREE: 1.42 POWDER TOPICAL at 21:00

## 2022-11-25 RX ADMIN — SODIUM CHLORIDE: 9 INJECTION, SOLUTION INTRAVENOUS at 05:33

## 2022-11-25 RX ADMIN — CEFTRIAXONE SODIUM 1000 MG: 1 INJECTION, POWDER, FOR SOLUTION INTRAMUSCULAR; INTRAVENOUS at 05:34

## 2022-11-25 RX ADMIN — FLUCONAZOLE 150 MG: 100 TABLET ORAL at 13:02

## 2022-11-25 RX ADMIN — ENOXAPARIN SODIUM 40 MG: 100 INJECTION SUBCUTANEOUS at 08:25

## 2022-11-25 RX ADMIN — ANTI-FUNGAL POWDER MICONAZOLE NITRATE TALC FREE: 1.42 POWDER TOPICAL at 08:25

## 2022-11-25 RX ADMIN — TRAMADOL HYDROCHLORIDE 50 MG: 50 TABLET ORAL at 13:01

## 2022-11-25 ASSESSMENT — ENCOUNTER SYMPTOMS
DIARRHEA: 0
CONSTIPATION: 0
APNEA: 0
BACK PAIN: 0
COLOR CHANGE: 0
ABDOMINAL DISTENTION: 0
ABDOMINAL PAIN: 0
EYE DISCHARGE: 0
FACIAL SWELLING: 0
EYE ITCHING: 0
CHEST TIGHTNESS: 0

## 2022-11-25 ASSESSMENT — PAIN DESCRIPTION - DESCRIPTORS: DESCRIPTORS: ACHING

## 2022-11-25 ASSESSMENT — PAIN SCALES - GENERAL
PAINLEVEL_OUTOF10: 4
PAINLEVEL_OUTOF10: 5

## 2022-11-25 ASSESSMENT — PAIN - FUNCTIONAL ASSESSMENT: PAIN_FUNCTIONAL_ASSESSMENT: PREVENTS OR INTERFERES SOME ACTIVE ACTIVITIES AND ADLS

## 2022-11-25 ASSESSMENT — PAIN DESCRIPTION - ORIENTATION: ORIENTATION: MID

## 2022-11-25 ASSESSMENT — PAIN DESCRIPTION - LOCATION: LOCATION: ABDOMEN

## 2022-11-25 NOTE — PLAN OF CARE
Problem: Discharge Planning  Goal: Discharge to home or other facility with appropriate resources  Outcome: Adequate for Discharge  Flowsheets (Taken 11/25/2022 7056)  Discharge to home or other facility with appropriate resources:   Identify barriers to discharge with patient and caregiver   Arrange for needed discharge resources and transportation as appropriate   Identify discharge learning needs (meds, wound care, etc)   Refer to discharge planning if patient needs post-hospital services based on physician order or complex needs related to functional status, cognitive ability or social support system     Problem: Pain  Goal: Verbalizes/displays adequate comfort level or baseline comfort level  Outcome: Adequate for Discharge  Flowsheets (Taken 11/25/2022 1754)  Verbalizes/displays adequate comfort level or baseline comfort level:   Encourage patient to monitor pain and request assistance   Assess pain using appropriate pain scale   Administer analgesics based on type and severity of pain and evaluate response   Implement non-pharmacological measures as appropriate and evaluate response   Consider cultural and social influences on pain and pain management   Notify Licensed Independent Practitioner if interventions unsuccessful or patient reports new pain  Note: Pain level assessment complete.    Patient educated on pain scale and control interventions  PRN pain medication given per patient request-Tramadol  Patient instructed to call out with new onset of pain or unrelieved pain     Problem: Safety - Adult  Goal: Free from fall injury  Outcome: Adequate for Discharge  Flowsheets (Taken 11/25/2022 1754)  Free From Fall Injury: Instruct family/caregiver on patient safety     Problem: ABCDS Injury Assessment  Goal: Absence of physical injury  Outcome: Adequate for Discharge  Flowsheets (Taken 11/25/2022 1754)  Absence of Physical Injury: Implement safety measures based on patient assessment     Problem: Chronic Conditions and Co-morbidities  Goal: Patient's chronic conditions and co-morbidity symptoms are monitored and maintained or improved  Outcome: Adequate for Discharge  Flowsheets (Taken 11/25/2022 7331)  Care Plan - Patient's Chronic Conditions and Co-Morbidity Symptoms are Monitored and Maintained or Improved:   Monitor and assess patient's chronic conditions and comorbid symptoms for stability, deterioration, or improvement   Collaborate with multidisciplinary team to address chronic and comorbid conditions and prevent exacerbation or deterioration   Update acute care plan with appropriate goals if chronic or comorbid symptoms are exacerbated and prevent overall improvement and discharge     Problem: Neurosensory - Adult  Goal: Achieves stable or improved neurological status  Outcome: Adequate for Discharge  Flowsheets (Taken 11/25/2022 0833)  Achieves stable or improved neurological status:   Assess for and report changes in neurological status   Initiate measures to prevent increased intracranial pressure   Maintain blood pressure and fluid volume within ordered parameters to optimize cerebral perfusion and minimize risk of hemorrhage     Problem: Respiratory - Adult  Goal: Achieves optimal ventilation and oxygenation  Outcome: Adequate for Discharge  Flowsheets (Taken 11/25/2022 0838)  Achieves optimal ventilation and oxygenation:   Assess for changes in respiratory status   Assess for changes in mentation and behavior   Position to facilitate oxygenation and minimize respiratory effort   Assess the need for suctioning and aspirate as needed   Assess and instruct to report shortness of breath or any respiratory difficulty     Problem: Cardiovascular - Adult  Goal: Maintains optimal cardiac output and hemodynamic stability  Outcome: Adequate for Discharge  Flowsheets (Taken 11/25/2022 0218)  Maintains optimal cardiac output and hemodynamic stability:   Monitor blood pressure and heart rate   Monitor urine output and notify Licensed Independent Practitioner for values outside of normal range   Assess for signs of decreased cardiac output     Problem: Skin/Tissue Integrity - Adult  Goal: Skin integrity remains intact  Outcome: Adequate for Discharge  Flowsheets (Taken 11/25/2022 1734)  Skin Integrity Remains Intact: Monitor for areas of redness and/or skin breakdown     Problem: Musculoskeletal - Adult  Goal: Return mobility to safest level of function  Outcome: Adequate for Discharge  Flowsheets (Taken 11/25/2022 0833)  Return Mobility to Safest Level of Function:   Assess patient stability and activity tolerance for standing, transferring and ambulating with or without assistive devices   Assist with transfers and ambulation using safe patient handling equipment as needed   Ensure adequate protection for wounds/incisions during mobilization   Obtain physical therapy/occupational therapy consults as needed   Instruct patient/family in ordered activity level     Problem: Gastrointestinal - Adult  Goal: Minimal or absence of nausea and vomiting  Outcome: Adequate for Discharge  Flowsheets (Taken 11/25/2022 0833)  Minimal or absence of nausea and vomiting:   Administer ordered antiemetic medications as needed   Provide nonpharmacologic comfort measures as appropriate     Problem: Genitourinary - Adult  Goal: Absence of urinary retention  Outcome: Adequate for Discharge  Flowsheets (Taken 11/25/2022 4611)  Absence of urinary retention:   Assess patients ability to void and empty bladder   Monitor intake/output and perform bladder scan as needed     Problem: Infection - Adult  Goal: Absence of infection at discharge  Outcome: Adequate for Discharge  Flowsheets (Taken 11/25/2022 0909)  Absence of infection at discharge:   Assess and monitor for signs and symptoms of infection   Monitor lab/diagnostic results   Monitor all insertion sites i.e., indwelling lines, tubes and drains   Administer medications as ordered     Problem: Metabolic/Fluid and Electrolytes - Adult  Goal: Electrolytes maintained within normal limits  Outcome: Adequate for Discharge  Flowsheets (Taken 11/25/2022 4043)  Electrolytes maintained within normal limits: Monitor labs and assess patient for signs and symptoms of electrolyte imbalances     Problem: Hematologic - Adult  Goal: Maintains hematologic stability  Outcome: Adequate for Discharge  Flowsheets (Taken 11/25/2022 0833)  Maintains hematologic stability:   Assess for signs and symptoms of bleeding or hemorrhage   Monitor labs for bleeding or clotting disorders

## 2022-11-25 NOTE — PLAN OF CARE
Problem: Discharge Planning  Goal: Discharge to home or other facility with appropriate resources  Outcome: Progressing  Flowsheets (Taken 11/24/2022 2125)  Discharge to home or other facility with appropriate resources:   Identify barriers to discharge with patient and caregiver   Arrange for needed discharge resources and transportation as appropriate   Identify discharge learning needs (meds, wound care, etc)   Refer to discharge planning if patient needs post-hospital services based on physician order or complex needs related to functional status, cognitive ability or social support system     Problem: Pain  Goal: Verbalizes/displays adequate comfort level or baseline comfort level  Outcome: Progressing     Problem: Safety - Adult  Goal: Free from fall injury  Outcome: Progressing     Problem: ABCDS Injury Assessment  Goal: Absence of physical injury  Outcome: Progressing     Problem: Chronic Conditions and Co-morbidities  Goal: Patient's chronic conditions and co-morbidity symptoms are monitored and maintained or improved  Outcome: Progressing  Flowsheets (Taken 11/24/2022 2125)  Care Plan - Patient's Chronic Conditions and Co-Morbidity Symptoms are Monitored and Maintained or Improved:   Monitor and assess patient's chronic conditions and comorbid symptoms for stability, deterioration, or improvement   Collaborate with multidisciplinary team to address chronic and comorbid conditions and prevent exacerbation or deterioration   Update acute care plan with appropriate goals if chronic or comorbid symptoms are exacerbated and prevent overall improvement and discharge     Problem: Neurosensory - Adult  Goal: Achieves stable or improved neurological status  Outcome: Progressing     Problem: Respiratory - Adult  Goal: Achieves optimal ventilation and oxygenation  Outcome: Progressing     Problem: Cardiovascular - Adult  Goal: Maintains optimal cardiac output and hemodynamic stability  Outcome: Progressing Problem: Skin/Tissue Integrity - Adult  Goal: Skin integrity remains intact  Outcome: Progressing  Flowsheets (Taken 11/24/2022 2125)  Skin Integrity Remains Intact: Monitor for areas of redness and/or skin breakdown     Problem: Musculoskeletal - Adult  Goal: Return mobility to safest level of function  Outcome: Progressing  Flowsheets (Taken 11/24/2022 2125)  Return Mobility to Safest Level of Function:   Assess patient stability and activity tolerance for standing, transferring and ambulating with or without assistive devices   Assist with transfers and ambulation using safe patient handling equipment as needed   Ensure adequate protection for wounds/incisions during mobilization   Obtain physical therapy/occupational therapy consults as needed   Apply continuous passive motion per provider or physical therapy orders to increase flexion toward goal   Instruct patient/family in ordered activity level     Problem: Gastrointestinal - Adult  Goal: Minimal or absence of nausea and vomiting  Outcome: Progressing  Flowsheets (Taken 11/24/2022 2125)  Minimal or absence of nausea and vomiting:   Administer IV fluids as ordered to ensure adequate hydration   Administer ordered antiemetic medications as needed   Provide nonpharmacologic comfort measures as appropriate   Advance diet as tolerated, if ordered   Nutrition consult to assist patient with adequate nutrition and appropriate food choices     Problem: Genitourinary - Adult  Goal: Absence of urinary retention  Outcome: Progressing  Flowsheets (Taken 11/24/2022 2125)  Absence of urinary retention:   Assess patients ability to void and empty bladder   Monitor intake/output and perform bladder scan as needed     Problem: Infection - Adult  Goal: Absence of infection at discharge  Outcome: Progressing  Flowsheets (Taken 11/24/2022 2125)  Absence of infection at discharge:   Assess and monitor for signs and symptoms of infection   Monitor lab/diagnostic results Administer medications as ordered   Instruct and encourage patient and family to use good hand hygiene technique     Problem: Metabolic/Fluid and Electrolytes - Adult  Goal: Electrolytes maintained within normal limits  Outcome: Progressing  Flowsheets (Taken 11/24/2022 2125)  Electrolytes maintained within normal limits:   Monitor labs and assess patient for signs and symptoms of electrolyte imbalances   Administer electrolyte replacement as ordered   Monitor response to electrolyte replacements, including repeat lab results as appropriate     Problem: Hematologic - Adult  Goal: Maintains hematologic stability  Outcome: Progressing  Flowsheets (Taken 11/24/2022 2125)  Maintains hematologic stability:   Assess for signs and symptoms of bleeding or hemorrhage   Monitor labs for bleeding or clotting disorders   Administer blood products/factors as ordered

## 2022-11-25 NOTE — PROGRESS NOTES
Physical Therapy  Facility/Department: [facility]  Rehabilitation Physical Therapy Treatment Note    NAME: Maura Niño  : 1950 (66 y.o.)  MRN: [id number]  CODE STATUS: Full Code    Date of Service: 22   Pt currently functioning below baseline. Recommend daily inpatient skilled therapy at time of discharge to maximize long term outcomes and prevent re-admission. Please refer to AM-PAC score for current level of function. Restrictions:  Restrictions/Precautions: General Precautions; Fall Risk;Up as Tolerated  Position Activity Restriction  Other position/activity restrictions: up with assist, RUE IV, ALARMS     SUBJECTIVE  Subjective  Subjective: Patient up in bed upon therapists arrival.  Patient agreeable to PT treatment this date. RN states that patient is appropriate for PT treatemnt this date. OBJECTIVE  Cognition  Overall Cognitive Status: Exceptions  Arousal/Alertness: Appropriate responses to stimuli  Following Commands: Follows one step commands with repetition; Follows one step commands with increased time; Inconsistently follows commands  Attention Span: Attends with cues to redirect; Difficulty attending to directions  Memory: Decreased recall of recent events;Decreased short term memory  Safety Judgement: Decreased awareness of need for assistance;Decreased awareness of need for safety  Problem Solving: Decreased awareness of errors;Assistance required to identify errors made;Assistance required to correct errors made;Assistance required to implement solutions;Assistance required to generate solutions  Insights: Not aware of deficits  Initiation: Requires cues for all  Sequencing: Requires cues for some  Cognition Comment: Pt appears somewhat child like. Needs cues for all tasks. Difficulty processing/following commands.   Orientation  Overall Orientation Status: Within Functional Limits  Orientation Level: Oriented X4    Functional Mobility  Roll Left  Assistance Level: Stand by assist  Roll Right  Assistance Level: Stand by assist  Skilled Clinical Factors: vc's for hand placement and progression techniques. Sit to Supine  Assistance Level: Contact guard assist  Skilled Clinical Factors: VC's to slow down and self pace, patient with impulsivity  Supine to Sit  Assistance Level: Contact guard assist  Skilled Clinical Factors: MOD vc's for hand placement and progression techniques  Scooting  Assistance Level: Contact guard assist  Balance  Sitting Balance: Stand by assistance  Standing Balance: Contact guard assistance  Standing Balance  Time: 3 minutes  Activity: patient stands in front of toilet performs self pericare, Patient with increased BETTINA requires MAX vc's for hand placement on RW for stability  Transfers  Surface: To bed;From bed  Additional Factors: Set-up; Verbal cues; Hand placement cues; Increased time to complete  Device: Walker  Sit to Stand  Assistance Level: Contact guard assist  Stand to Sit  Assistance Level: Contact guard assist      Environmental Mobility  Ambulation  Surface: Level surface  Device: Rolling walker  Distance: 15 feet x1, 50 feet x1  Activity: Within Unit  Activity Comments: Patient with slow antalgic gait pattern. Patient requires MOD vc's for use of RW as patient walks away from device and demo's decreased safety awareness. Additional Factors: Set-up; Verbal cues; Hand placement cues  Assistance Level: Contact guard assist;Minimal assistance  Gait Deviations: Slow oliver;Decreased step length bilateral;Decreased step length right;Decreased weight shift left;Decreased weight shift right;Decreased heel strike right;Decreased heel strike left;Path deviations      Neuromuscular Education  NDT Treatment: Gait ;Sitting;Standing      PT Exercises  A/AROM Exercises: Patient performs seated VALORIE LE exercises including AP, Glute sets, LAQ, Marches and hip ABD 1 set x10 reps  Pressure Relief Exercises: seated anterior/posterior and lateral WS with education provided on the importance of pressure relief techniques to prevent break down of sensitive tissues. ASSESSMENT/PROGRESS TOWARDS GOALS     AM-PAC Inpatient Mobility Raw Score  15   AM-PAC Inpatient T-Scale Score  39.45   Mobility Inpatient CMS 0-100% Score 57.7   Mobility Inpatient CMS G-Code Modifier  CK       Assessment  Assessment: Patient with decreased aerobic capacity and fatigues easiliy with minimal activity. Patient would benefit from continued skilled PT treatment to maximize return to PLOF>  Activity Tolerance: Patient tolerated treatment well;Treatment limited secondary to decreased cognition  Discharge Recommendations: Patient would benefit from continued therapy after discharge    Goals  Short Term Goals  Short Term Goal 1: Inc bed-mobility & transfers to independent to enable pt to safely get in/OOB & chair to return to PLOF & decrease risk for falls  Short Term Goal 2: Inc gait to amb 200ft or > indep w/ safest device to enable pt to return to previous level of independence & able to demonstrate indep/ safe functional activities including approaching surfaces and turning to sit. Short Term Goal 3: Pt able to go up/down 3 steps with one rail indep  Short Term Goal 4: Inc strength to [address] standing balance to good with device to facilitate pt independence for performance of ADL's & functional mobility, & reduce fall risk  Short Term Goal 5: Pt able to tolerate 30-40 min of activity to include 15-20 reps of ex, NMR & functional mobility with device to facilitate activity tolerance to [facility]  Additional Goals?: Yes  Short Term Goal 6: Ed pt on home ex's, safety, balance & endurance training, fall prevention, & issue written Pt Ed    PLAN OF CARE/SAFETY  Physcial Therapy Plan  General Plan: 5-7 times per week  Current Treatment Recommendations: Strengthening;Balance training;Functional mobility training;Transfer training;ADL/Self-care training; Endurance training;Gait training;Stair training;Neuromuscular re-education;Home exercise program;Safety education & training;Patient/Caregiver education & training;Positioning  Safety Devices  Type of Devices: All fall risk precautions in place;Call light within reach; Patient at risk for falls;Gait belt;Nurse notified; Bed alarm in place; Left in bed    EDUCATION  Education  Education Given To: Patient  Education Provided: Safety; Energy Conservation;Transfer Training;Mobility Training; Fall Prevention Strategies  Education Method: Demonstration;Verbal  Barriers to Learning: Cognition  Education Outcome: Verbalized understanding;Continued education needed        Therapy Time   Individual Concurrent Group Co-treatment   Time In 4137         Time Out 1419         Minutes [address], 11/25/22 at 3:37 PM

## 2022-11-25 NOTE — PROGRESS NOTES
[facility]  Office: [address], DO, Dom Es, DO, Mike Hayley, DO, Ward Hollowayro Blood, DO, Devika Bailey MD, Johann Riedel, MD, Gabriel Clements MD, Margie Pinto MD,  Reji Miranda MD, Jelly Toribio MD, Chrissie Rebolledo, DO, Inderjit Vazquez MD,  Praneeth Riley MD, Kathie Ch MD, Keith Pierce, DO, Paras Shepherd MD, Regis Smith MD, Babatunde Hickman DO, Wai Nicholson MD, Ina Elizondo MD, Yoon Seals MD, Franklin Mcknight MD, Jody Pedersen DO, Jo Spears MD, Felicita Solano MD, Edith Canales, Rosmery Moya, CNP, Betty Mccloud, CNP, Maritza Fernandez, CNP,  Lisa Kaye, [facility], Ama Hannah, CNP, Delfino Boo, CNP, Syl Barboza, CNP, Farshad Romero, CNP, Mario Palma, CNP, Sj Cee PA-C, Desi Roldan, CNS, Tess Arriaza [facility], Ashlyn Randall, CNP, Kriss Srinivasan, CNP, Silvio Srinivasan, [facility]    Progress Note    11/25/2022    7:54 AM    Name:   Rizwan Noble  MRN:     [id number]     Acct:      [de-identified]   Room:   2002/2002-02  IP Day:  2  Admit Date:  11/22/2022  3:55 AM    PCP:   Salma Gamble  Code Status:  Full Code    Subjective:     C/C:   Chief Complaint   Patient presents with    Abdominal Pain    Constipation     No bm in 4 days       Interval History Status: not changed. Patient seen and examined at bedside. Feeling well, watching tv no acute issues. Brief History:     67-year-old female past medical history of hypertension, developmental delay, hyperlipidemia presents with acute cystitis currently being treated with IV Rocephin. Discharge planning to skilled nursing facility once bed is available. Review of Systems:     Review of Systems   Constitutional:  Negative for activity change, appetite change, chills and fever. HENT:  Negative for congestion, ear pain, facial swelling and mouth sores. Eyes:  Negative for discharge and itching. smokeless tobacco. She reports that she does not drink alcohol and does not use drugs. Family History:   Family History   Problem Relation Age of Onset    Heart Disease Mother     Cancer Father        Vitals:  BP (!) 110/55   Pulse 58   Temp 98 °F (36.7 °C) (Oral)   Resp 18   Ht 5' 5\" (1.651 m)   Wt 141 lb 9.6 oz (64.2 kg)   SpO2 96%   BMI 23.56 kg/m²   Temp (24hrs), Av.9 °F (36.6 °C), Min:97.7 °F (36.5 °C), Max:98 °F (36.7 °C)    No results for input(s): POCGLU in the last 72 hours. I/O (24Hr): Intake/Output Summary (Last 24 hours) at 2022 0754  Last data filed at 2022 5000  Gross per 24 hour   Intake 261.06 ml   Output 1850 ml   Net -1588.94 ml         Labs:  Hematology:  Recent Labs     22  0622   WBC 4.8   RBC 3.43*   HGB 10.2*   HCT 34.4*   .3   MCH 29.7   MCHC 29.7   RDW 14.6*      MPV 9.1       Chemistry:  Recent Labs     22  0622      K 3.8   *   CO2 28   GLUCOSE 84   BUN 9   CREATININE 0.74   ANIONGAP 6*   LABGLOM >60   CALCIUM 8.8       No results for input(s): PROT, LABALBU, LABA1C, V5FAFIF, Q9LUZOT, FT4, TSH, AST, ALT, LDH, GGT, ALKPHOS, LABGGT, BILITOT, BILIDIR, AMMONIA, AMYLASE, LIPASE, LACTATE, CHOL, HDL, LDLCHOLESTEROL, CHOLHDLRATIO, TRIG, VLDL, AIB60TE, PHENYTOIN, PHENYF, URICACID, POCGLU in the last 72 hours. ABG:No results found for: POCPH, PHART, PH, POCPCO2, CQD3ZMU, PCO2, POCPO2, PO2ART, PO2, POCHCO3, WJF2YJH, HCO3, NBEA, PBEA, BEART, BE, THGBART, THB, BIL5CPR, KBMM5WAQ, F9KLDVOR, O2SAT, FIO2  Lab Results   Component Value Date/Time    SPECIAL R FA 10 ML 2022 09:17 PM    SPECIAL L HAND 7 ML 2022 09:17 PM     Lab Results   Component Value Date/Time    CULTURE NO GROWTH 2022 04:44 AM       Radiology:  XR KNEE LEFT (1-2 VIEWS)    Result Date: 2022  Significant soft tissue edema is noted along the medial aspect of the knee. There is a moderate to large suprapatellar joint effusion.   No acute osseous abnormality is identified. XR ACUTE ABD SERIES CHEST 1 VW    Result Date: 11/22/2022  No evidence of bowel obstruction. Physical Examination:        Physical Exam  Constitutional:       Appearance: She is not ill-appearing or diaphoretic. HENT:      Head: Normocephalic. Right Ear: External ear normal.      Left Ear: External ear normal.      Nose: Nose normal.      Mouth/Throat:      Mouth: Mucous membranes are moist.   Eyes:      Pupils: Pupils are equal, round, and reactive to light. Cardiovascular:      Rate and Rhythm: Normal rate and regular rhythm. Pulses: Normal pulses. Pulmonary:      Breath sounds: No wheezing or rales. Abdominal:      General: There is no distension. Palpations: Abdomen is soft. Tenderness: There is no abdominal tenderness. Musculoskeletal:      Cervical back: Neck supple. Right lower leg: No edema. Left lower leg: No edema. Skin:     General: Skin is warm and dry. Capillary Refill: Capillary refill takes less than 2 seconds. Neurological:      Mental Status: She is alert and oriented to person, place, and time. Psychiatric:         Mood and Affect: Mood normal.         Behavior: Behavior normal.       Assessment:        Hospital Problems             Last Modified POA    * (Principal) Acute cystitis without hematuria 11/22/2022 Yes    Unspecified mood (affective) disorder ([address] 11/22/2022 Yes    Unable to care for self 11/22/2022 Yes    Candidal vulvovaginitis 11/22/2022 Yes    Developmental delay 61/15/7551 Yes    Complicated UTI (urinary tract infection) 11/23/2022 Yes    Hyperlipidemia 11/22/2022 Yes    HTN (hypertension) 11/22/2022 Yes    Falls frequently 11/22/2022 Yes     Plan:        Acute cystitis. Continue Rocephin.   Abdominal distention, resolving, maalox, probiotics  Diflucan for yeast infection secondary to antibiotics  Monitor off antihypertensives  Discharge planning to skilled nursing facility once bed is available.         Mike Hale MD  11/25/2022  7:54 AM

## 2022-11-26 VITALS
OXYGEN SATURATION: 100 % | DIASTOLIC BLOOD PRESSURE: 56 MMHG | SYSTOLIC BLOOD PRESSURE: 108 MMHG | HEIGHT: 65 IN | TEMPERATURE: 98.8 F | WEIGHT: 141.6 LBS | BODY MASS INDEX: 23.59 KG/M2 | RESPIRATION RATE: 17 BRPM | HEART RATE: 55 BPM

## 2022-11-26 PROCEDURE — 99231 SBSQ HOSP IP/OBS SF/LOW 25: CPT | Performed by: STUDENT IN AN ORGANIZED HEALTH CARE EDUCATION/TRAINING PROGRAM

## 2022-11-26 PROCEDURE — 97535 SELF CARE MNGMENT TRAINING: CPT | Performed by: NURSE PRACTITIONER

## 2022-11-26 PROCEDURE — 97116 GAIT TRAINING THERAPY: CPT

## 2022-11-26 PROCEDURE — 6360000002 HC RX W HCPCS: Performed by: NURSE PRACTITIONER

## 2022-11-26 PROCEDURE — 2580000003 HC RX 258: Performed by: NURSE PRACTITIONER

## 2022-11-26 PROCEDURE — 6370000000 HC RX 637 (ALT 250 FOR IP): Performed by: STUDENT IN AN ORGANIZED HEALTH CARE EDUCATION/TRAINING PROGRAM

## 2022-11-26 PROCEDURE — 97530 THERAPEUTIC ACTIVITIES: CPT | Performed by: NURSE PRACTITIONER

## 2022-11-26 PROCEDURE — 97110 THERAPEUTIC EXERCISES: CPT

## 2022-11-26 RX ADMIN — ENOXAPARIN SODIUM 40 MG: 100 INJECTION SUBCUTANEOUS at 08:32

## 2022-11-26 RX ADMIN — PROBIOTIC PRODUCT - TAB 1 TABLET: TAB at 08:31

## 2022-11-26 RX ADMIN — SODIUM CHLORIDE, PRESERVATIVE FREE 10 ML: 5 INJECTION INTRAVENOUS at 08:33

## 2022-11-26 RX ADMIN — ANTI-FUNGAL POWDER MICONAZOLE NITRATE TALC FREE: 1.42 POWDER TOPICAL at 08:32

## 2022-11-26 ASSESSMENT — ENCOUNTER SYMPTOMS
APNEA: 0
ABDOMINAL PAIN: 0
COLOR CHANGE: 0
CONSTIPATION: 0
CHEST TIGHTNESS: 0
FACIAL SWELLING: 0
DIARRHEA: 0
EYE DISCHARGE: 0
ABDOMINAL DISTENTION: 0
EYE ITCHING: 0
BACK PAIN: 0

## 2022-11-26 ASSESSMENT — PAIN SCALES - GENERAL: PAINLEVEL_OUTOF10: 0

## 2022-11-26 NOTE — PROGRESS NOTES
Pt discharged to [facility] in stable condition with belongings  Discharge instructions given  Pt denies having any further questions at this time  Personal items given to patient at discharge  Patient/family state they have everything they were admitted with.   Report called to Gonazlo Talbert RN at [phone]

## 2022-11-26 NOTE — CARE COORDINATION
Social work: spoke to son Margie Strickland to give him the phone number for Rubi miller. [phone]  Faxed humaira to fax [phone]. RN has number to call report. Met with pt also.  Waldo Aschoff lsw

## 2022-11-26 NOTE — PROGRESS NOTES
Physical Therapy  Facility/Department: [facility]  Daily Treatment Note  NAME: Delfino Connelly  : 1950  MRN: [id number]    Date of Service: 2022    Discharge Recommendations:  Patient would benefit from continued therapy after discharge   Patient Diagnosis(es): The encounter diagnosis was Unable to care for self. Assessment   Assessment: Pt presents with cognitive deficits as well as decreased balance and coordination L LE with functional movement and when ambulating. Pt is at High risk for falling and would benefit from continued skilled therapy after discharge to maximize return to PLOF. AM-PAC score of 16/24 for current level of function. Activity Tolerance: Patient tolerated treatment well     Plan    Physcial Therapy Plan  General Plan: 5-7 times per week  Current Treatment Recommendations: Strengthening;Balance training;Functional mobility training;Transfer training;ADL/Self-care training; Endurance training;Gait training;Stair training;Neuromuscular re-education;Home exercise program;Safety education & training;Patient/Caregiver education & training;Positioning     Restrictions  Restrictions/Precautions  Restrictions/Precautions: General Precautions, Fall Risk, Up as Tolerated  Required Braces or Orthoses?: No  Position Activity Restriction  Other position/activity restrictions: up with assist, RUE IV, ALARMS     Subjective    Subjective  Subjective: Pt agreeable to PT session (Nurse gives approval for PT session)  Pain: denies  Orientation  Overall Orientation Status: Impaired  Orientation Level: Oriented X4  Cognition  Overall Cognitive Status: Exceptions  Following Commands: Follows one step commands with repetition; Follows one step commands with increased time; Inconsistently follows commands  Attention Span: Attends with cues to redirect; Difficulty attending to directions  Memory: Decreased recall of recent events;Decreased short term memory  Safety Judgement: Decreased awareness of need for assistance;Decreased awareness of need for safety  Problem Solving: Decreased awareness of errors;Assistance required to identify errors made;Assistance required to correct errors made;Assistance required to implement solutions;Assistance required to generate solutions  Insights: Not aware of deficits  Initiation: Requires cues for all  Sequencing: Requires cues for all  Cognition Comment: Pt appears somewhat child like. Needs cues for all tasks. Difficulty processing/following commands. Objective   Bed Mobility Training  Bed Mobility Training: Yes  Overall Level of Assistance: Stand-by assistance;Contact-guard assistance  Interventions: Safety awareness training; Tactile cues; Verbal cues  Rolling: Stand-by assistance  Supine to Sit: Contact-guard assistance  Scooting: Contact-guard assistance  Balance  Sitting: Intact  Standing: With support  Transfer Training  Transfer Training: Yes  Overall Level of Assistance: Contact-guard assistance  Interventions: Safety awareness training; Tactile cues; Verbal cues  Sit to Stand: Contact-guard assistance (Verbal and tactile cues for correct use of UEs with sit to stand transfers using a rw)  Stand to Sit: Contact-guard assistance  Stand Pivot Transfers: Contact-guard assistance  Bed to Chair: Contact-guard assistance  Toilet Transfer: Contact-guard assistance: Minimal assistance  Comments: Verbal cues and tactile cues for safety with ADL's in the bathroom. Gait Training  Gait Training: Yes  Gait  Overall Level of Assistance: Minimum assistance  Interventions: Safety awareness training; Tactile cues; Verbal cues  Speed/Inez: Slow  Step Length: Left shortened  Swing Pattern: Left asymmetrical  Gait Abnormalities: Step to gait; Antalgic ,L knee locked into extension with L ankle fixed in neutral DF.   Distance (ft): 50 Feet  Assistive Device: Walker, rolling;Gait belt  Comments: Attempted ambulation with a st cane as pt states this is what she uses at home however gait is unsteady requiring moderate assistance for balance. Pt gait presents hemiplegic when using a st cane. Gait distance: 5ft with st cane then switched back to a rolling walker  Recommend using a RW at this time to decrease risk of falling. PT Exercises  Exercise Treatment: yes  A/AROM Exercises: LAQ, seated marching, hip ABD  Circulation/Endurance Exercises: ankle pumps  Dynamic Standing Balance Exercises: marching, hip ABD, heel raises  Safety Devices  Type of Devices: All fall risk precautions in place;Call light within reach; Patient at risk for falls;Gait belt;Nurse notified; Chair alarm in place; Left in chair   Goals  Short Term Goals  Short Term Goal 1: Inc bed-mobility & transfers to independent to enable pt to safely get in/OOB & chair to return to PLOF & decrease risk for falls  Short Term Goal 2: Inc gait to amb 200ft or > indep w/ safest device to enable pt to return to previous level of independence & able to demonstrate indep/ safe functional activities including approaching surfaces and turning to sit. Short Term Goal 3: Pt able to go up/down 3 steps with one rail indep  Short Term Goal 4: Inc strength to [address] standing balance to good with device to facilitate pt independence for performance of ADL's & functional mobility, & reduce fall risk  Short Term Goal 5: Pt able to tolerate 30-40 min of activity to include 15-20 reps of ex, NMR & functional mobility with device to facilitate activity tolerance to [facility]  Additional Goals?: Yes  Short Term Goal 6: Ed pt on home ex's, safety, balance & endurance training, fall prevention, & issue written Pt Ed    Education  Patient Education  Education Given To: Patient  Education Provided: Role of Therapy;Plan of Care;Transfer Training;Home Exercise Program  Education Provided Comments: Education on technique and safety with all transfers and exercises.   Education Method: Demonstration;Verbal  Barriers to Learning: Cognition  Education Outcome: Continued education needed    Therapy Time   Individual Concurrent Group Co-treatment   Time In 1006         Time Out 1031         Minutes [address], [place]

## 2022-11-26 NOTE — PLAN OF CARE
Problem: Discharge Planning  Goal: Discharge to home or other facility with appropriate resources  11/26/2022 1145 by Autry Curling, RN  Outcome: Completed  11/26/2022 1103 by Autry Curling, RN  Outcome: Progressing  Flowsheets (Taken 11/26/2022 3512)  Discharge to home or other facility with appropriate resources: Identify barriers to discharge with patient and caregiver  11/26/2022 0146 by India Jefferson RN  Outcome: Progressing     Problem: Pain  Goal: Verbalizes/displays adequate comfort level or baseline comfort level  11/26/2022 1145 by Autry Curling, RN  Outcome: Completed  11/26/2022 1103 by Autry Curling, RN  Outcome: Progressing  11/26/2022 0146 by India Jefferson RN  Outcome: Progressing     Problem: Safety - Adult  Goal: Free from fall injury  11/26/2022 1145 by Autry Curling, RN  Outcome: Completed  11/26/2022 1103 by Autry Curling, RN  Outcome: Progressing  11/26/2022 0146 by India Jefferson RN  Outcome: Progressing     Problem: ABCDS Injury Assessment  Goal: Absence of physical injury  11/26/2022 1145 by Autry Curling, RN  Outcome: Completed  11/26/2022 1103 by Autry Curling, RN  Outcome: Progressing  11/26/2022 0146 by India Jefferson RN  Outcome: Progressing     Problem: Neurosensory - Adult  Goal: Achieves stable or improved neurological status  11/26/2022 1145 by Autry Curling, RN  Outcome: Completed  11/26/2022 1103 by Autry Curling, RN  Outcome: Progressing  Flowsheets (Taken 11/26/2022 8293)  Achieves stable or improved neurological status: Assess for and report changes in neurological status  11/26/2022 0146 by India Jefferson RN  Outcome: Progressing  Flowsheets (Taken 11/25/2022 1900)  Achieves stable or improved neurological status: Assess for and report changes in neurological status     Problem: Respiratory - Adult  Goal: Achieves optimal ventilation and oxygenation  11/26/2022 1145 by Autry Curling, RN  Outcome: Completed  11/26/2022 1103 by Autry Curling, RN  Outcome: Progressing  Flowsheets (Taken 11/26/2022 2408)  Achieves optimal ventilation and oxygenation: Assess for changes in respiratory status  11/26/2022 0146 by Jacquie Dhillon RN  Outcome: Progressing  Flowsheets (Taken 11/25/2022 1900)  Achieves optimal ventilation and oxygenation: Assess for changes in respiratory status     Problem: Chronic Conditions and Co-morbidities  Goal: Patient's chronic conditions and co-morbidity symptoms are monitored and maintained or improved  11/26/2022 1145 by Oscar Scott RN  Outcome: Completed  11/26/2022 1103 by Oscar Scott RN  Outcome: Progressing  [address] (Taken 11/26/2022 8767)  Care Plan - Patient's Chronic Conditions and Co-Morbidity Symptoms are Monitored and Maintained or Improved: Monitor and assess patient's chronic conditions and comorbid symptoms for stability, deterioration, or improvement  11/26/2022 0146 by Jacquie Dhillon RN  Outcome: Progressing     Problem: Musculoskeletal - Adult  Goal: Return mobility to safest level of function  11/26/2022 1145 by Oscar Scott RN  Outcome: Completed  11/26/2022 1103 by Oscar Scott RN  Outcome: Progressing  Flowsheets (Taken 11/26/2022 2889)  Return Mobility to Safest Level of Function: Assess patient stability and activity tolerance for standing, transferring and ambulating with or without assistive devices  11/26/2022 0146 by Jacquie Dhillon RN  Outcome: Progressing     Problem: Gastrointestinal - Adult  Goal: Minimal or absence of nausea and vomiting  11/26/2022 1145 by Oscar Scott RN  Outcome: Completed  11/26/2022 1103 by Oscar Scott RN  Outcome: Progressing  Flowsheets (Taken 11/26/2022 2964)  Minimal or absence of nausea and vomiting: Administer IV fluids as ordered to ensure adequate hydration  11/26/2022 0146 by Jacquie Dhillon RN  Outcome: Progressing  Flowsheets (Taken 11/25/2022 1900)  Minimal or absence of nausea and vomiting: Administer IV fluids as ordered to ensure adequate hydration Problem: Genitourinary - Adult  Goal: Absence of urinary retention  11/26/2022 1145 by Autry Curling, RN  Outcome: Completed  11/26/2022 1103 by Autry Curling, RN  Outcome: Progressing  Flowsheets (Taken 11/26/2022 3719)  Absence of urinary retention: Assess patients ability to void and empty bladder  11/26/2022 0146 by India Jefferson RN  Outcome: Progressing  Flowsheets (Taken 11/25/2022 1900)  Absence of urinary retention: Assess patients ability to void and empty bladder     Problem: Infection - Adult  Goal: Absence of infection at discharge  11/26/2022 1145 by Autry Curling, RN  Outcome: Completed  11/26/2022 1103 by Autry Curling, RN  Outcome: Progressing  Flowsheets (Taken 11/26/2022 9699)  Absence of infection at discharge: Assess and monitor for signs and symptoms of infection  11/26/2022 0146 by India Jefferson RN  Outcome: Progressing     Problem: Metabolic/Fluid and Electrolytes - Adult  Goal: Electrolytes maintained within normal limits  11/26/2022 1145 by Autry Curling, RN  Outcome: Completed  11/26/2022 1103 by Autry Curling, RN  Outcome: Progressing  [address] (Taken 11/26/2022 4502)  Electrolytes maintained within normal limits: Monitor labs and assess patient for signs and symptoms of electrolyte imbalances  11/26/2022 0146 by India Jefferson RN  Outcome: Progressing     Problem: Hematologic - Adult  Goal: Maintains hematologic stability  11/26/2022 1145 by Autry Curling, RN  Outcome: Completed  11/26/2022 1103 by Autry Curling, RN  Outcome: Progressing  Flowsheets (Taken 11/26/2022 8588)  Maintains hematologic stability: Assess for signs and symptoms of bleeding or hemorrhage  11/26/2022 0146 by India Jefferson RN  Outcome: Progressing

## 2022-11-26 NOTE — PLAN OF CARE
Problem: Discharge Planning  Goal: Discharge to home or other facility with appropriate resources  11/26/2022 1103 by Basia Coronel RN  Outcome: Progressing  Flowsheets (Taken 11/26/2022 8193)  Discharge to home or other facility with appropriate resources: Identify barriers to discharge with patient and caregiver  11/26/2022 0146 by Praneeth Briceno RN  Outcome: Progressing     Problem: Pain  Goal: Verbalizes/displays adequate comfort level or baseline comfort level  11/26/2022 1103 by Basia Coronel RN  Outcome: Progressing  11/26/2022 0146 by Praneeth Briceno RN  Outcome: Progressing     Problem: Safety - Adult  Goal: Free from fall injury  11/26/2022 1103 by Basia Coronel RN  Outcome: Progressing  11/26/2022 0146 by Praneeth Briceno RN  Outcome: Progressing     Problem: ABCDS Injury Assessment  Goal: Absence of physical injury  11/26/2022 1103 by Basia Coronel RN  Outcome: Progressing  11/26/2022 0146 by Praneeth Briceno RN  Outcome: Progressing     Problem: Chronic Conditions and Co-morbidities  Goal: Patient's chronic conditions and co-morbidity symptoms are monitored and maintained or improved  11/26/2022 1103 by Basia Coronel RN  Outcome: Progressing  Flowsheets (Taken 11/26/2022 5700)  Care Plan - Patient's Chronic Conditions and Co-Morbidity Symptoms are Monitored and Maintained or Improved: Monitor and assess patient's chronic conditions and comorbid symptoms for stability, deterioration, or improvement  11/26/2022 0146 by Praneeth Briceno RN  Outcome: Progressing     Problem: Neurosensory - Adult  Goal: Achieves stable or improved neurological status  11/26/2022 1103 by Basia Coronel RN  Outcome: Progressing  Flowsheets (Taken 11/26/2022 5126)  Achieves stable or improved neurological status: Assess for and report changes in neurological status  11/26/2022 0146 by Praneeth Briceno RN  Outcome: Progressing  Flowsheets (Taken 11/25/2022 1900)  Achieves stable or improved neurological status: Assess for and report changes in neurological status     Problem: Respiratory - Adult  Goal: Achieves optimal ventilation and oxygenation  11/26/2022 1103 by Patricia Delacruz RN  Outcome: Progressing  Flowsheets (Taken 11/26/2022 9012)  Achieves optimal ventilation and oxygenation: Assess for changes in respiratory status  11/26/2022 0146 by Caio Kendrick RN  Outcome: Progressing  Flowsheets (Taken 11/25/2022 1900)  Achieves optimal ventilation and oxygenation: Assess for changes in respiratory status     Problem: Cardiovascular - Adult  Goal: Maintains optimal cardiac output and hemodynamic stability  11/26/2022 1103 by Patricia Delacruz RN  Outcome: Progressing  Flowsheets (Taken 11/26/2022 2351)  Maintains optimal cardiac output and hemodynamic stability: Monitor blood pressure and heart rate  11/26/2022 0146 by Caio Kendrick RN  Outcome: Progressing  Flowsheets (Taken 11/25/2022 1900)  Maintains optimal cardiac output and hemodynamic stability:   Monitor blood pressure and heart rate   Monitor urine output and notify Licensed Independent Practitioner for values outside of normal range   Administer fluid and/or volume expanders as ordered     Problem: Skin/Tissue Integrity - Adult  Goal: Skin integrity remains intact  Recent Flowsheet Documentation  Taken 11/26/2022 2554 by Patricia Delacruz RN  Skin Integrity Remains Intact: Monitor for areas of redness and/or skin breakdown  Taken 11/26/2022 0148 by Caio Kendrick RN  Skin Integrity Remains Intact: Monitor for areas of redness and/or skin breakdown  11/26/2022 0146 by Caio Kendrick RN  Outcome: Progressing     Problem: Musculoskeletal - Adult  Goal: Return mobility to safest level of function  11/26/2022 1103 by Patricia Delacruz RN  Outcome: Progressing  Flowsheets (Taken 11/26/2022 9448)  Return Mobility to Safest Level of Function: Assess patient stability and activity tolerance for standing, transferring and ambulating with or without assistive devices  11/26/2022 6341 by Luli [place], RN  Outcome: Progressing     Problem: Gastrointestinal - Adult  Goal: Minimal or absence of nausea and vomiting  11/26/2022 1103 by Garth Jimenez RN  Outcome: Progressing  Flowsheets (Taken 11/26/2022 5417)  Minimal or absence of nausea and vomiting: Administer IV fluids as ordered to ensure adequate hydration  11/26/2022 0146 by Luli Abdul RN  Outcome: Progressing  Flowsheets (Taken 11/25/2022 1900)  Minimal or absence of nausea and vomiting: Administer IV fluids as ordered to ensure adequate hydration     Problem: Genitourinary - Adult  Goal: Absence of urinary retention  11/26/2022 1103 by Garth Jimenez RN  Outcome: Progressing  Flowsheets (Taken 11/26/2022 1010)  Absence of urinary retention: Assess patients ability to void and empty bladder  11/26/2022 0146 by Luli Abdul RN  Outcome: Progressing  Flowsheets (Taken 11/25/2022 1900)  Absence of urinary retention: Assess patients ability to void and empty bladder     Problem: Infection - Adult  Goal: Absence of infection at discharge  11/26/2022 1103 by Garth Jimenez RN  Outcome: Progressing  [address] (Taken 11/26/2022 4176)  Absence of infection at discharge: Assess and monitor for signs and symptoms of infection  11/26/2022 0146 by Luli Abdul RN  Outcome: Progressing     Problem: Metabolic/Fluid and Electrolytes - Adult  Goal: Electrolytes maintained within normal limits  11/26/2022 1103 by Garth Jimenez RN  Outcome: Progressing  [address] (Taken 11/26/2022 5387)  Electrolytes maintained within normal limits: Monitor labs and assess patient for signs and symptoms of electrolyte imbalances  11/26/2022 0146 by Luli Abdul RN  Outcome: Progressing     Problem: Hematologic - Adult  Goal: Maintains hematologic stability  11/26/2022 1103 by Garth Jimenez RN  Outcome: Progressing  Flowsheets (Taken 11/26/2022 0764)  Maintains hematologic stability: Assess for signs and symptoms of bleeding or hemorrhage  11/26/2022 0146 by Isma Domínguez, RN  Outcome: Progressing

## 2022-11-26 NOTE — PROGRESS NOTES
[facility]  Office: [address], DO, Oleg Ards, DO, Lesvirginie Raleigh, DO, Wayne Verduzco Blood, DO, Kelechi Winston MD, Shelley Arana MD, Kiersten Almeida MD, Shmuel Tobar MD,  Armond Reina MD, Ina Wright MD, Boy Cuevas, DO, Rosamaria Neal MD,  Linda Kelly MD, Natalie Max MD, Tori Moritz, DO, Marito Gonzales MD, Jasmyne Hood MD, Cecil Buerger, DO, Rich Cha MD, Kaylee Collins MD, Jd Garza MD, Vasile Encarnacion MD, Avinash Lugo DO, Dre Roy MD, Natalie Ha MD, Rumalda Snellen, Geoff Gaming, CNP, Norm Gonzalez, CNP, Casandra Vogt, CNP,  Charley Nava, [facility], Wanda Valdez, CNP, Swapna Michelle, CNP, Candace Morejon, CNP, Sherry Frazier, CNP, Adam Aguilar, CNP, VERONICA Luna-C, Chai Capmos, CNS, Natasha Uribe, [facility], Eveline Moreno, CNP, Lolita Yip, CNP, Lori Angeles, [facility]    Progress Note    11/26/2022    9:20 AM    Name:   Yuliya Lennon  MRN:     [id number]     Acct:      [de-identified]   Room:   2002/2002-02  IP Day:  3  Admit Date:  11/22/2022  3:55 AM    PCP:   Kristyn Butt  Code Status:  Full Code    Subjective:     C/C:   Chief Complaint   Patient presents with    Abdominal Pain    Constipation     No bm in 4 days       Interval History Status: not changed. Patient seen and examined  sitiing in chair, watching tv. No complaitns feels well worked with therapy this morning. Brief History:     40-year-old female past medical history of hypertension, developmental delay, hyperlipidemia presents with acute cystitis currently being treated with IV Rocephin. Discharge planning to skilled nursing facility once bed is available. Review of Systems:     Review of Systems   Constitutional:  Negative for activity change, appetite change, chills and fever. HENT:  Negative for congestion, ear pain, facial swelling and mouth sores.     Eyes: Negative for discharge and itching. Respiratory:  Negative for apnea and chest tightness. Cardiovascular:  Negative for chest pain and leg swelling. Gastrointestinal:  Negative for abdominal distention, abdominal pain, constipation and diarrhea. Endocrine: Negative for cold intolerance, polyphagia and polyuria. Genitourinary:  Negative for difficulty urinating, dysuria and flank pain. Musculoskeletal:  Negative for arthralgias, back pain and joint swelling. Skin:  Negative for color change and wound. Neurological:  Negative for dizziness, seizures, light-headedness and headaches. Psychiatric/Behavioral:  Negative for agitation, behavioral problems and self-injury. Medications: Allergies:  No Known Allergies    Current Meds:   Scheduled Meds:    lactobacillus  1 tablet Oral Daily    sodium chloride flush  5-40 mL IntraVENous 2 times per day    enoxaparin  40 mg SubCUTAneous Daily    polyethylene glycol  17 g Oral Daily    miconazole   Topical BID     Continuous Infusions:    sodium chloride Stopped (11/25/22 0627)     PRN Meds: simethicone, sodium chloride flush, sodium chloride, potassium chloride **OR** potassium alternative oral replacement **OR** potassium chloride, magnesium sulfate, acetaminophen **OR** acetaminophen, traMADol    Data:     Past Medical History:   has a past medical history of Arthritis, Bronchitis, Chronic obstructive pulmonary disease ([facility] [address] Colon polyps, Controlled type 2 diabetes mellitus without complication, without long-term current use of insulin ([facility] [address] Dental neglect, Depression, Environmental allergies, GERD (gastroesophageal reflux disease), Hyperlipidemia, Hypertension, Obesity, Onychomycosis, Poor historian, RBBB, Treadmill stress test negative for angina pectoris, and Wears glasses. Social History:   reports that she quit smoking about 7 years ago. Her smoking use included cigarettes. She has a 17.50 pack-year smoking history.  She has never used smokeless tobacco. She reports that she does not drink alcohol and does not use drugs. Family History:   Family History   Problem Relation Age of Onset    Heart Disease Mother     Cancer Father        Vitals:  BP (!) 108/53   Pulse 53   Temp 97.9 °F (36.6 °C) (Oral)   Resp 18   Ht 5' 5\" (1.651 m)   Wt 141 lb 9.6 oz (64.2 kg)   SpO2 97%   BMI 23.56 kg/m²   Temp (24hrs), Av.7 °F (36.5 °C), Min:97.5 °F (36.4 °C), Max:97.9 °F (36.6 °C)    No results for input(s): POCGLU in the last 72 hours. I/O (24Hr): Intake/Output Summary (Last 24 hours) at 2022 0920  Last data filed at 2022 1355  Gross per 24 hour   Intake --   Output 400 ml   Net -400 ml         Labs:  Hematology:  No results for input(s): WBC, RBC, HGB, HCT, MCV, MCH, MCHC, RDW, PLT, MPV, SEDRATE, CRP, INR, DDIMER, RP3LFVOT, LABABSO in the last 72 hours. Invalid input(s): PT    Chemistry:  No results for input(s): NA, K, CL, CO2, GLUCOSE, BUN, CREATININE, MG, ANIONGAP, LABGLOM, GFRAA, CALCIUM, CAION, PHOS, PSA, PROBNP, TROPHS, CKTOTAL, CKMB, CKMBINDEX, MYOGLOBIN, DIGOXIN, LACTACIDWB in the last 72 hours. No results for input(s): PROT, LABALBU, LABA1C, Q7IYIPR, Z4LITMX, FT4, TSH, AST, ALT, LDH, GGT, ALKPHOS, LABGGT, BILITOT, BILIDIR, AMMONIA, AMYLASE, LIPASE, LACTATE, CHOL, HDL, LDLCHOLESTEROL, CHOLHDLRATIO, TRIG, VLDL, ZZS10CL, PHENYTOIN, PHENYF, URICACID, POCGLU in the last 72 hours. ABG:No results found for: POCPH, PHART, PH, POCPCO2, UIE8MMY, PCO2, POCPO2, PO2ART, PO2, POCHCO3, YEJ9OBN, HCO3, NBEA, PBEA, BEART, BE, THGBART, THB, YVE7LFI, MKGP1UFU, X1TDTBUH, O2SAT, FIO2  Lab Results   Component Value Date/Time    SPECIAL R FA 10 ML 2022 09:17 PM    SPECIAL L HAND 7 ML 2022 09:17 PM     Lab Results   Component Value Date/Time    CULTURE NO GROWTH 2022 04:44 AM       Radiology:  XR KNEE LEFT (1-2 VIEWS)    Result Date: 2022  Significant soft tissue edema is noted along the medial aspect of the knee. There is a moderate to large suprapatellar joint effusion. No acute osseous abnormality is identified. XR ACUTE ABD SERIES CHEST 1 VW    Result Date: 11/22/2022  No evidence of bowel obstruction. Physical Examination:        Physical Exam  Constitutional:       Appearance: She is not ill-appearing or diaphoretic. HENT:      Head: Normocephalic. Right Ear: External ear normal.      Left Ear: External ear normal.      Nose: Nose normal.      Mouth/Throat:      Mouth: Mucous membranes are moist.   Eyes:      Pupils: Pupils are equal, round, and reactive to light. Cardiovascular:      Rate and Rhythm: Normal rate and regular rhythm. Pulses: Normal pulses. Pulmonary:      Breath sounds: No wheezing or rales. Abdominal:      General: There is no distension. Palpations: Abdomen is soft. Tenderness: There is no abdominal tenderness. Musculoskeletal:      Cervical back: Neck supple. Right lower leg: No edema. Left lower leg: No edema. Skin:     General: Skin is warm and dry. Capillary Refill: Capillary refill takes less than 2 seconds. Neurological:      Mental Status: She is alert and oriented to person, place, and time. Psychiatric:         Mood and Affect: Mood normal.         Behavior: Behavior normal.       Assessment:        Hospital Problems             Last Modified POA    * (Principal) Acute cystitis without hematuria 11/22/2022 Yes    Unspecified mood (affective) disorder ([address] 11/22/2022 Yes    Unable to care for self 11/22/2022 Yes    Candidal vulvovaginitis 11/22/2022 Yes    Developmental delay 87/83/3207 Yes    Complicated UTI (urinary tract infection) 11/23/2022 Yes    Hyperlipidemia 11/22/2022 Yes    HTN (hypertension) 11/22/2022 Yes    Falls frequently 11/22/2022 Yes     Plan:        Acute cystitis. Continue Rocephin.   Abdominal distention, resolving, maalox, probiotics  Diflucan for yeast infection secondary to antibiotics  Monitor off antihypertensives  Discharge planning to skilled nursing facility once bed is available.         Ofelia Saldivar MD  11/26/2022  9:20 AM

## 2022-11-26 NOTE — PROGRESS NOTES
Occupational Therapy  Facility/Department: [facility]  Daily Treatment Note  NAME: Rusty Blevins  : 1950  MRN: [id number]    Date of Service: 2022    Discharge Recommendations:  Patient would benefit from continued therapy after discharge   Pt currently functioning below baseline. Recommend daily inpatient skilled therapy at time of discharge to maximize long term outcomes and prevent re-admission. Please refer to AM-PAC score for current level of function. Patient Diagnosis(es): The encounter diagnosis was Unable to care for self. Assessment    [facility]: 15  Assessment: Pt tolerated tx well although continues to require MAXA overall with all functional activities/ADLs. Pt would benefit from continued skilled OT services to address deficits in areas of functional balance, functional reach, ADL completion, safety awareness, ADL transfers, bed mobility, UE strength, and functional mobility, cognition, all limiting safe return home to [facility] and decrease caregiver burden. Activity Tolerance: Patient tolerated treatment well  Discharge Recommendations: Patient would benefit from continued therapy after discharge      Plan   Occupational Therapy Plan  Times Per Week: 4-5x/wk 1x/day as jj  Current Treatment Recommendations: Strengthening;Balance training;Functional mobility training; Endurance training; Safety education & training;Equipment evaluation, education, & procurement;Cognitive reorientation;Self-Care / ADL; Patient/Caregiver education & training;Cognitive/Perceptual training     Subjective   Subjective  Subjective: Pt on toilet upon . Agreeable to therapy. Orientation  Overall Orientation Status: Impaired  Cognition  Overall Cognitive Status: Exceptions  Following Commands: Follows one step commands with repetition; Follows one step commands with increased time; Inconsistently follows commands  Attention Span: Attends with cues to redirect; Difficulty attending to directions  Memory: Decreased recall of recent events;Decreased short term memory  Safety Judgement: Decreased awareness of need for assistance;Decreased awareness of need for safety  Problem Solving: Decreased awareness of errors;Assistance required to identify errors made;Assistance required to correct errors made;Assistance required to implement solutions;Assistance required to generate solutions  Insights: Not aware of deficits  Initiation: Requires cues for all  Sequencing: Requires cues for all  Cognition Comment: Pt appears somewhat child like. Needs cues for all tasks. Difficulty processing/following commands. Objective    Transfer Training  Transfer Training: Yes  Overall Level of Assistance: Contact-guard assistance  Interventions: Safety awareness training;Verbal cues; Visual cues  Sit to Stand: Contact-guard assistance (From bed to RW, slightly impulsive.)  Stand to Sit: Contact-guard assistance  Shower Transfer: Contact-guard assistance (From RW to TTB with use of grab bars and MAX VC for safety, Pt noted to leave RW outside BETTINA and become unsteady. S/p shower pt again, leaving RW outside BETTINA/to side and using sink/walls/door to stabilize self. Poor safety awareness noted throughout.)  Functional mobility  Overall Level of Assistance: Moderate assistance;Maximum assistance (Very poor safety awareness and use of any AE or HHA from writer.  Stabnilizing self on walls, door, bed, bedside tray etc from bathrom to chair.)  Assistive Device: Cane, straight;Walker, rolling;Gait belt (HHA)     ADL  Grooming: Maximum assistance;Stand by assistance (maxA with washing hair, Setup for pt to comb hair seated in chair.)  UE Bathing: Maximum assistance;Verbal cueing;Setup  LE Bathing: Maximum assistance;Verbal cueing;Setup  UE Dressing: Minimal assistance;Verbal cueing (With hospital gown)  LE Dressing: Minimal assistance;Maximum assistance ([place] for safety/sequencing of when to doff/don socks)  Toileting: Maximum assistance (MaxA with doff/don brief)  Additional Comments: Pt limited by decreased cognition and general awareness. Multiple VC for sequencing of abthing and dressing tasks as well as max VC for thoroughness and rinsing soap. Safety Devices  Type of Devices: All fall risk precautions in place;Call light within reach; Patient at risk for falls;Gait belt;Nurse notified; Chair alarm in place; Left in chair     Patient Education  Education Given To: Patient  Education Provided: Role of Therapy;Plan of Care;Transfer Training;Precautions; ADL Adaptive Strategies; Energy Conservation; Fall Prevention Strategies; Equipment  Education Method: Verbal;Demonstration  Barriers to Learning: Cognition  Education Outcome: Continued education needed    Goals  Short Term Goals  Time Frame for Short Term Goals: By discharge, pt to demo  Short Term Goal 1: ADL transfers and functional to SBA with use of AD as needed. Short Term Goal 2: bed mobility to SBA with use of bedrails as needed. Short Term Goal 3: increased B UE strength by 1/2 grade to assist with functional tasks/I with simple B UE HEP with use of handouts as needed. Short Term Goal 4: toileting to SBA with use of AD/grab bars as needed. Short Term Goal 5: UB ADLs to Set up and LB ADLs to Min A with use of AD/AE as needed. Long Term Goals  Long Term Goal 1: Pt to be I with fall prevention education, EC/WS tech, disease specific education, recommendations for discharge/AE with use handouts as needed. Patient Goals   Patient goals : To go home     RN reports patient is medically stable for therapy treatment this date. Chart reviewed prior to treatment and patient is agreeable for therapy. All lines intact and patient positioned comfortably at end of treatment. All patient needs addressed prior to ending therapy session.       Therapy Time   Individual Concurrent Group Co-treatment   Time In [address] [place] [address]         Time Out 0905         Minutes [address] [place] [address]

## 2022-11-29 NOTE — DISCHARGE SUMMARY
[facility]  Office: [address], DO, Aditya Ureña, DO, Ana Kern, DO, Daniella Later Blood, DO, Naseem Dubon MD, Junior Cao MD, Juno Dugan MD, Renold Galeazzi, MD,  Akbar Vinson MD, Nirali Mendez MD, Eduardo Merino, DO, Hector Conrad MD,  Priya Ocampo, DO, Rakan Bush MD, Freada Osgood, MD, Lin Acevedo, DO, Noelle Padron MD, Abi Gatica MD, Bryan Maki, DO, Lana Lobo MD, Bindu Jimenes MD, Sarwat Ji MD, Kali Najera MD, Liam Wallace, DO, Darlene Villarreal MD, Niko Corral MD, Saul Plascencia, CNP,  Kerry Gamino, CNP, Claudia Patel, CNP, Kaylie Blackman, CNP,  Franko Hooks, DNP, Tin Guerrero, CNP, Esme Boo, CNP, Amber Dumont, CNP, Gale Ndiaye, CNP, Troy Noguera, CNP, Bakari Green PA-C, Jax Galloway, CNS, Carlin Juaerz, DNP, Tali Pacheco, CNP, Yael Strange, CNP, Purcell Bloch, [facility]    Discharge Summary     Patient ID: Fela Sandoval  :  1950   MRN: [id number]     ACCOUNT:  [de-identified]   Patient's PCP: Bryan Winston  Admit Date: 2022   Discharge Date: 2022     Length of Stay: 3  Code Status:  Prior  Admitting Physician: Jenn Mcwilliams MD  Discharge Physician: Hector Conrad MD     Active Discharge Diagnoses:     Hospital Problem Lists:  Principal Problem:    Acute cystitis without hematuria  Active Problems:    Unspecified mood (affective) disorder ([facility] [address]    Unable to care for self    Candidal vulvovaginitis    Developmental delay    Complicated UTI (urinary tract infection)    Hyperlipidemia    HTN (hypertension)    Falls frequently  Resolved Problems:    * No resolved hospital problems. *      Admission Condition:  stable     Discharged Condition: stable    Hospital Stay:     Hospital Course:   Fela Sandoval is a 66 y.o. female who was admitted for the management of  Acute cystitis without hematuria , presented to ER with Abdominal Pain and Constipation (No bm in 4 days/)    26-year-old female past medical history of hypertension, developmental delay, hyperlipidemia presents with acute cystitis currently being treated with IV Rocephin. Discharge planning to skilled nursing facility once bed is available. Significant therapeutic interventions: N/A    Significant Diagnostic Studies:   Labs / Micro:  CBC:   Lab Results   Component Value Date/Time    WBC 4.8 11/23/2022 06:22 AM    RBC 3.43 11/23/2022 06:22 AM    RBC 4.60 04/24/2012 11:26 AM    HGB 10.2 11/23/2022 06:22 AM    HCT 34.4 11/23/2022 06:22 AM    .3 11/23/2022 06:22 AM    MCH 29.7 11/23/2022 06:22 AM    MCHC 29.7 11/23/2022 06:22 AM    RDW 14.6 11/23/2022 06:22 AM     11/23/2022 06:22 AM     04/24/2012 11:26 AM     BMP:    Lab Results   Component Value Date/Time    GLUCOSE 84 11/23/2022 06:22 AM    GLUCOSE 87 04/24/2012 11:26 AM     11/23/2022 06:22 AM    K 3.8 11/23/2022 06:22 AM     11/23/2022 06:22 AM    CO2 28 11/23/2022 06:22 AM    ANIONGAP 6 11/23/2022 06:22 AM    BUN 9 11/23/2022 06:22 AM    CREATININE 0.74 11/23/2022 06:22 AM    BUNCRER 12 11/23/2022 06:22 AM    CALCIUM 8.8 11/23/2022 06:22 AM    LABGLOM >60 11/23/2022 06:22 AM    GFRAA >60 09/30/2022 06:09 AM    GFR      09/30/2022 06:09 AM        Radiology:  XR KNEE LEFT (1-2 VIEWS)    Result Date: 11/22/2022  Significant soft tissue edema is noted along the medial aspect of the knee. There is a moderate to large suprapatellar joint effusion. No acute osseous abnormality is identified. Consultations:    Consults:     Final Specialist Recommendations/Findings:   IP CONSULT TO INTERNAL MEDICINE  IP CONSULT TO SOCIAL WORK      The patient was seen and examined on day of discharge and this discharge summary is in conjunction with any daily progress note from day of discharge. Discharge plan:     Disposition:  To a non-[facility] facility    Physician Follow Up:     Lester [place]ague [place] [id number] [address] [facility]  [phone]    Follow up       Requiring Further Evaluation/Follow Up POST HOSPITALIZATION/Incidental Findings: followup PCP    Diet: regular diet    Activity: As tolerated    Instructions to Patient: please take your medications as prescribed. Medications given to help you with your constipation    Discharge Medications:      Medication List        START taking these medications      lactobacillus Tabs  Take 1 tablet by mouth daily for 7 doses     polyethylene glycol 17 g packet  Commonly known as: GLYCOLAX  Take 17 g by mouth daily as needed for Constipation            CONTINUE taking these medications      ACE Arm Sling Misc  Apply 1 Units topically daily     clobetasol 0.05 % ointment  Commonly known as: Temovate  Apply topically 2 times daily. docusate sodium 100 MG capsule  Commonly known as: Colace  Take 1 capsule by mouth 2 times daily     ondansetron 4 MG disintegrating tablet  Commonly known as: Zofran ODT  Take 1 tablet by mouth every 8 hours as needed for Nausea     tamsulosin 0.4 MG capsule  Commonly known as: FLOMAX  Take 1 capsule by mouth in the morning. tiotropium 1.25 MCG/ACT Aers inhaler  Commonly known as: Spiriva Respimat  Inhale 2 puffs into the lungs daily            STOP taking these medications      haloperidol 5 MG tablet  Commonly known as: HALDOL     ibuprofen 800 MG tablet  Commonly known as: IBU     traZODone 100 MG tablet  Commonly known as: DESYREL     trihexyphenidyl 2 MG tablet  Commonly known as: ARTANE               Where to Get Your Medications        These medications were sent to [facility] [employer] [facility], [address] [place] [id number]      Phone: [phone]   lactobacillus Tabs  polyethylene glycol 17 g packet         No discharge procedures on file.     Time Spent on discharge is  15 mins in patient examination, evaluation, counseling as well as medication reconciliation, prescriptions for required medications, discharge plan and follow up. Electronically signed by   Lexi Worthy MD  11/29/2022  7:03 AM      Thank you  Randa Wu for the opportunity to be involved in this patient's care.

## 2022-12-21 ENCOUNTER — APPOINTMENT (OUTPATIENT)
Dept: CT IMAGING | Age: 72
End: 2022-12-21
Payer: COMMERCIAL

## 2022-12-21 ENCOUNTER — HOSPITAL ENCOUNTER (EMERGENCY)
Age: 72
Discharge: HOME | End: 2022-12-21
Attending: EMERGENCY MEDICINE
Payer: COMMERCIAL

## 2022-12-21 VITALS
WEIGHT: 141.6 LBS | SYSTOLIC BLOOD PRESSURE: 146 MMHG | HEART RATE: 70 BPM | OXYGEN SATURATION: 100 % | RESPIRATION RATE: 22 BRPM | DIASTOLIC BLOOD PRESSURE: 80 MMHG | TEMPERATURE: 98.1 F | BODY MASS INDEX: 23.56 KG/M2

## 2022-12-21 DIAGNOSIS — R10.84 GENERALIZED ABDOMINAL PAIN: ICD-10-CM

## 2022-12-21 DIAGNOSIS — N30.01 ACUTE CYSTITIS WITH HEMATURIA: Primary | ICD-10-CM

## 2022-12-21 LAB
ABSOLUTE EOS #: 0.06 K/UL (ref 0–0.44)
ABSOLUTE IMMATURE GRANULOCYTE: 0 K/UL (ref 0–0.3)
ABSOLUTE LYMPH #: 1.54 K/UL (ref 1.1–3.7)
ABSOLUTE MONO #: 0.42 K/UL (ref 0.1–1.2)
ALBUMIN SERPL-MCNC: 4.3 G/DL (ref 3.5–5.2)
ALP BLD-CCNC: 128 U/L (ref 35–104)
ALT SERPL-CCNC: 11 U/L (ref 5–33)
ANION GAP SERPL CALCULATED.3IONS-SCNC: 12 MMOL/L (ref 9–17)
AST SERPL-CCNC: 16 U/L
BACTERIA: ABNORMAL
BASOPHILS # BLD: 1 % (ref 0–2)
BASOPHILS ABSOLUTE: <0.03 K/UL (ref 0–0.2)
BILIRUB SERPL-MCNC: 0.3 MG/DL (ref 0.3–1.2)
BILIRUBIN DIRECT: 0.1 MG/DL
BILIRUBIN URINE: NEGATIVE
BILIRUBIN, INDIRECT: 0.2 MG/DL (ref 0–1)
BUN BLDV-MCNC: 8 MG/DL (ref 8–23)
BUN/CREAT BLD: 11 (ref 9–20)
CALCIUM SERPL-MCNC: 9.7 MG/DL (ref 8.6–10.4)
CASTS UA: ABNORMAL /LPF
CASTS UA: ABNORMAL /LPF
CHLORIDE BLD-SCNC: 105 MMOL/L (ref 98–107)
CO2: 27 MMOL/L (ref 20–31)
COLOR: YELLOW
CREAT SERPL-MCNC: 0.73 MG/DL (ref 0.5–0.9)
EOSINOPHILS RELATIVE PERCENT: 1 % (ref 1–4)
EPITHELIAL CELLS UA: ABNORMAL /HPF (ref 0–5)
GFR SERPL CREATININE-BSD FRML MDRD: >60 ML/MIN/1.73M2
GLUCOSE BLD-MCNC: 100 MG/DL (ref 70–99)
GLUCOSE URINE: NEGATIVE
HCT VFR BLD CALC: 39.7 % (ref 36.3–47.1)
HEMOGLOBIN: 12.6 G/DL (ref 11.9–15.1)
IMMATURE GRANULOCYTES: 0 %
KETONES, URINE: NEGATIVE
LACTIC ACID, SEPSIS: 0.8 MMOL/L (ref 0.5–1.9)
LEUKOCYTE ESTERASE, URINE: ABNORMAL
LIPASE: 14 U/L (ref 13–60)
LYMPHOCYTES # BLD: 36 % (ref 24–43)
MCH RBC QN AUTO: 30.8 PG (ref 25.2–33.5)
MCHC RBC AUTO-ENTMCNC: 31.7 G/DL (ref 28.4–34.8)
MCV RBC AUTO: 97.1 FL (ref 82.6–102.9)
MONOCYTES # BLD: 10 % (ref 3–12)
NITRITE, URINE: NEGATIVE
NRBC AUTOMATED: 0 PER 100 WBC
PDW BLD-RTO: 13.3 % (ref 11.8–14.4)
PH UA: 5.5 (ref 5–8)
PLATELET # BLD: 224 K/UL (ref 138–453)
PMV BLD AUTO: 9.5 FL (ref 8.1–13.5)
POTASSIUM SERPL-SCNC: 3.9 MMOL/L (ref 3.7–5.3)
PROTEIN UA: NEGATIVE
RBC # BLD: 4.09 M/UL (ref 3.95–5.11)
RBC UA: ABNORMAL /HPF (ref 0–2)
SEG NEUTROPHILS: 52 % (ref 36–65)
SEGMENTED NEUTROPHILS ABSOLUTE COUNT: 2.23 K/UL (ref 1.5–8.1)
SODIUM BLD-SCNC: 144 MMOL/L (ref 135–144)
SPECIFIC GRAVITY UA: 1.01 (ref 1–1.03)
TOTAL PROTEIN: 7.4 G/DL (ref 6.4–8.3)
TURBIDITY: ABNORMAL
URINE HGB: NEGATIVE
UROBILINOGEN, URINE: NORMAL
WBC # BLD: 4.3 K/UL (ref 3.5–11.3)
WBC UA: ABNORMAL /HPF (ref 0–5)

## 2022-12-21 PROCEDURE — 6370000000 HC RX 637 (ALT 250 FOR IP): Performed by: EMERGENCY MEDICINE

## 2022-12-21 PROCEDURE — 80048 BASIC METABOLIC PNL TOTAL CA: CPT

## 2022-12-21 PROCEDURE — 74176 CT ABD & PELVIS W/O CONTRAST: CPT

## 2022-12-21 PROCEDURE — 80076 HEPATIC FUNCTION PANEL: CPT

## 2022-12-21 PROCEDURE — 36415 COLL VENOUS BLD VENIPUNCTURE: CPT

## 2022-12-21 PROCEDURE — 99285 EMERGENCY DEPT VISIT HI MDM: CPT

## 2022-12-21 PROCEDURE — 81001 URINALYSIS AUTO W/SCOPE: CPT

## 2022-12-21 PROCEDURE — 85025 COMPLETE CBC W/AUTO DIFF WBC: CPT

## 2022-12-21 PROCEDURE — 83690 ASSAY OF LIPASE: CPT

## 2022-12-21 PROCEDURE — 83605 ASSAY OF LACTIC ACID: CPT

## 2022-12-21 PROCEDURE — 6360000004 HC RX CONTRAST MEDICATION: Performed by: EMERGENCY MEDICINE

## 2022-12-21 PROCEDURE — 2580000003 HC RX 258: Performed by: EMERGENCY MEDICINE

## 2022-12-21 RX ORDER — CEPHALEXIN 500 MG/1
500 CAPSULE ORAL ONCE
Status: COMPLETED | OUTPATIENT
Start: 2022-12-21 | End: 2022-12-21

## 2022-12-21 RX ORDER — SODIUM CHLORIDE 0.9 % (FLUSH) 0.9 %
10 SYRINGE (ML) INJECTION PRN
Status: DISCONTINUED | OUTPATIENT
Start: 2022-12-21 | End: 2022-12-21 | Stop reason: HOSPADM

## 2022-12-21 RX ORDER — 0.9 % SODIUM CHLORIDE 0.9 %
80 INTRAVENOUS SOLUTION INTRAVENOUS ONCE
Status: COMPLETED | OUTPATIENT
Start: 2022-12-21 | End: 2022-12-21

## 2022-12-21 RX ORDER — CEPHALEXIN 500 MG/1
500 CAPSULE ORAL 2 TIMES DAILY
Qty: 14 CAPSULE | Refills: 0 | Status: SHIPPED | OUTPATIENT
Start: 2022-12-21 | End: 2022-12-28

## 2022-12-21 RX ADMIN — SODIUM CHLORIDE 80 ML: 9 INJECTION, SOLUTION INTRAVENOUS at 18:09

## 2022-12-21 RX ADMIN — IOPAMIDOL 75 ML: 755 INJECTION, SOLUTION INTRAVENOUS at 18:08

## 2022-12-21 RX ADMIN — CEPHALEXIN 500 MG: 500 CAPSULE ORAL at 20:15

## 2022-12-21 RX ADMIN — SODIUM CHLORIDE, PRESERVATIVE FREE 10 ML: 5 INJECTION INTRAVENOUS at 18:09

## 2022-12-21 ASSESSMENT — ENCOUNTER SYMPTOMS
ABDOMINAL PAIN: 1
CONSTIPATION: 1
SORE THROAT: 0
NAUSEA: 0
COUGH: 0
RHINORRHEA: 0
COLOR CHANGE: 0
SHORTNESS OF BREATH: 0
EYE DISCHARGE: 0
VOMITING: 0
EYE REDNESS: 0
DIARRHEA: 0

## 2022-12-21 NOTE — ED NOTES
Pt denies need to use the restroom. Pt instructed to use call light when ready.      Demetrius Wellington RN  12/21/22 2075

## 2022-12-21 NOTE — ED PROVIDER NOTES
EMERGENCY DEPARTMENT ENCOUNTER    Pt Name: Gunnar Montero  MRN: [id number]  [place] 1950  Date of evaluation: 12/21/22  CHIEF COMPLAINT       Chief Complaint   Patient presents with    Abdominal Pain     HISTORY OF PRESENT ILLNESS   This is a 57-year-old female that presents with complaints of abdominal pain. The patient is somewhat of a poor historian, she is states that she has abdominal pain and states that she has had issues with constipation for the past few weeks. Patient states she does not remember when she had a bowel movement last.  Patient describes her symptoms as severe, pain is primarily in the right lower quadrant. She states that she is uncertain about her surgical history. REVIEW OF SYSTEMS     Review of Systems   Constitutional:  Negative for chills and fever. HENT:  Negative for rhinorrhea and sore throat. Eyes:  Negative for discharge, redness and visual disturbance. Respiratory:  Negative for cough and shortness of breath. Cardiovascular:  Negative for chest pain, palpitations and leg swelling. Gastrointestinal:  Positive for abdominal pain and constipation. Negative for diarrhea, nausea and vomiting. Genitourinary:  Negative for dysuria and hematuria. Musculoskeletal:  Negative for arthralgias, myalgias and neck pain. Skin:  Negative for color change and rash. Neurological:  Negative for seizures, weakness and headaches. Psychiatric/Behavioral:  Negative for hallucinations, self-injury and suicidal ideas. PASTMEDICAL HISTORY     Past Medical History:   Diagnosis Date    Arthritis     knees    Bronchitis x1 long ago    Chronic obstructive pulmonary disease ([address] 9/12/2017    Colon polyps     Controlled type 2 diabetes mellitus without complication, without long-term current use of insulin ([address] 6/29/2021    Dental neglect     poor dentation. Missing teeth.  No loose teeth    Depression     Environmental allergies     GERD (gastroesophageal reflux disease) Hyperlipidemia     Hypertension     Obesity     Onychomycosis     Poor historian     RBBB     Treadmill stress test negative for angina pectoris 2009    Wears glasses     reading only     SURGICAL HISTORY       Past Surgical History:   Procedure Laterality Date    CHOLECYSTECTOMY      COLONOSCOPY  2013    one hyperplastic polyp    DOPPLER ECHOCARDIOGRAPHY      cardiac    TUBAL LIGATION       CURRENT MEDICATIONS       Previous Medications    CLOBETASOL (TEMOVATE) 0.05 % OINTMENT    Apply topically 2 times daily. DOCUSATE SODIUM (COLACE) 100 MG CAPSULE    Take 1 capsule by mouth 2 times daily    ELASTIC BANDAGES & SUPPORTS (ACE ARM SLING) MISC    Apply 1 Units topically daily    ONDANSETRON (ZOFRAN ODT) 4 MG DISINTEGRATING TABLET    Take 1 tablet by mouth every 8 hours as needed for Nausea    POLYETHYLENE GLYCOL (GLYCOLAX) 17 G PACKET    Take 17 g by mouth daily as needed for Constipation    TAMSULOSIN (FLOMAX) 0.4 MG CAPSULE    Take 1 capsule by mouth in the morning. TIOTROPIUM (SPIRIVA RESPIMAT) 1.25 MCG/ACT AERS INHALER    Inhale 2 puffs into the lungs daily     ALLERGIES     has No Known Allergies. FAMILY HISTORY     She indicated that her mother is . She indicated that her father is . She indicated that her maternal grandmother is . She indicated that her maternal grandfather is . She indicated that her paternal grandmother is . She indicated that her paternal grandfather is .      SOCIAL HISTORY       Social History     Tobacco Use    Smoking status: Former     Packs/day: 0.50     Years: 35.00     Pack years: 17.50     Types: Cigarettes     Quit date: 2015     Years since quittin.4    Smokeless tobacco: Never   Vaping Use    Vaping Use: Never used   Substance Use Topics    Alcohol use: No     Alcohol/week: 0.0 standard drinks    Drug use: No     PHYSICAL EXAM     INITIAL VITALS: BP (!) 146/80   Pulse 70   Temp 98.1 °F (36.7 °C) (Oral)   Resp 22   Wt 141 lb 9.6 oz (64.2 kg)   SpO2 100%   BMI 23.56 kg/m²    Physical Exam  Constitutional:       Appearance: Normal appearance. She is well-developed. She is not ill-appearing or toxic-appearing. HENT:      Head: Normocephalic and atraumatic. Eyes:      Conjunctiva/sclera: Conjunctivae normal.      Pupils: Pupils are equal, round, and reactive to light. Neck:      Trachea: Trachea normal.   Cardiovascular:      Rate and Rhythm: Normal rate and regular rhythm. Heart sounds: S1 normal and S2 normal. No murmur heard. Pulmonary:      Effort: Pulmonary effort is normal. No accessory muscle usage or respiratory distress. Breath sounds: Normal breath sounds. Chest:      Chest wall: No deformity or tenderness. Abdominal:      General: Bowel sounds are normal. There is no distension or abdominal bruit. Palpations: Abdomen is not rigid. Tenderness: There is generalized abdominal tenderness. There is no guarding or rebound. Comments: Rectal examination was performed with the nurse chaperone, it was negative for fecal impaction or mass. Musculoskeletal:      Cervical back: Normal range of motion and neck supple. Skin:     General: Skin is warm. Findings: No rash. Neurological:      Mental Status: She is alert and oriented to person, place, and time. GCS: GCS eye subscore is 4. GCS verbal subscore is 5. GCS motor subscore is 6. Psychiatric:         Speech: Speech normal.       MEDICAL DECISION MAKIN-year-old female, right lower quadrant and generalized abdominal pain, plan is basic labs CT of the abdomen and pelvis and reevaluation.     8:12 PM EST  Patient's CT scan shows questionable UTI, her urinalysis is concerning for UTI as well, at this time the plan will be to biotics and discharge          HEART SCORE: Not indicated All patient's question's and concerns were answered prior to disposition and patient and/or family expressed understanding and agreement of treatment plan. CRITICAL CARE:              NIH STROKE SCALE:            PROCEDURES:    Procedures    DIAGNOSTIC RESULTS   EKG:All EKG's are interpreted by the Emergency Department Physician who either signs or Co-signs this chart in the absence of a cardiologist.        RADIOLOGY:All plain film, CT, MRI, and formal ultrasound images (except ED bedside ultrasound) are read by the radiologist, see reports below, unless otherwisenoted in MDM or here. CT ABDOMEN PELVIS WO CONTRAST Additional Contrast? None   Final Result   Small focus of intraluminal gas within the bladder and mild adjacent fat   stranding. In the absence of recent intervention, underlying urinary tract   infection should be considered. LABS: All lab results were reviewed by myself, and all abnormals are listed below.   Labs Reviewed   BASIC METABOLIC PANEL - Abnormal; Notable for the following components:       Result Value    Glucose 100 (*)     All other components within normal limits   HEPATIC FUNCTION PANEL - Abnormal; Notable for the following components:    Alkaline Phosphatase 128 (*)     All other components within normal limits   URINALYSIS - Abnormal; Notable for the following components:    Turbidity UA SLIGHTLY CLOUDY (*)     Leukocyte Esterase, Urine SMALL (*)     All other components within normal limits   MICROSCOPIC URINALYSIS - Abnormal; Notable for the following components:    Bacteria, UA FEW (*)     All other components within normal limits   CBC WITH AUTO DIFFERENTIAL   LIPASE   LACTATE, SEPSIS   LACTATE, SEPSIS       EMERGENCY DEPARTMENTCOURSE:         Vitals:    Vitals:    12/21/22 1642 12/21/22 1649 12/21/22 1700   BP: (!) 146/80     Pulse: 70     Resp: 22     Temp:  98.1 °F (36.7 °C)    TempSrc:  Oral    SpO2:   100%   Weight:   141 lb 9.6 oz (64.2 kg)       The patient was given the following medications while in the emergency department:  Orders Placed This Encounter   Medications    0.9 % sodium chloride bolus    iopamidol (ISOVUE-370) 76 % injection 75 mL    sodium chloride flush 0.9 % injection 10 mL    cephALEXin (KEFLEX) capsule 500 mg     Order Specific Question:   Antimicrobial Indications     Answer:   Urinary Tract Infection    cephALEXin (KEFLEX) 500 MG capsule     Sig: Take 1 capsule by mouth 2 times daily for 7 days     Dispense:  14 capsule     Refill:  0     CONSULTS:  None    FINAL IMPRESSION      1. Acute cystitis with hematuria    2. Generalized abdominal pain          DISPOSITION/PLAN   DISPOSITION Decision To Discharge 12/21/2022 08:08:14 PM      PATIENT REFERRED TO:  Catherine Pritchard  [address] [facility]  [phone]    Schedule an appointment as soon as possible for a visit in 2 days    DISCHARGE MEDICATIONS:  New Prescriptions    CEPHALEXIN (KEFLEX) 500 MG CAPSULE    Take 1 capsule by mouth 2 times daily for 7 days     Octavia Flanagan MD  Attending Emergency Physician      The care is provided during an unprecedented national emergency due to the novel coronavirus, COVID 19. This note was created with the assistance of a speech-recognition program. While intending to generate a document that actually reflects the content of the visit, no guarantees can be provided that every mistake has been identified and corrected by editing.     Odie [place], MD  12/21/22 2013

## 2022-12-23 ENCOUNTER — HOSPITAL ENCOUNTER (EMERGENCY)
Age: 72
Discharge: HOME | End: 2022-12-23
Attending: STUDENT IN AN ORGANIZED HEALTH CARE EDUCATION/TRAINING PROGRAM
Payer: COMMERCIAL

## 2022-12-23 VITALS
HEART RATE: 91 BPM | RESPIRATION RATE: 16 BRPM | DIASTOLIC BLOOD PRESSURE: 73 MMHG | OXYGEN SATURATION: 96 % | TEMPERATURE: 98.4 F | SYSTOLIC BLOOD PRESSURE: 131 MMHG

## 2022-12-23 DIAGNOSIS — R10.9 ABDOMINAL PAIN, UNSPECIFIED ABDOMINAL LOCATION: ICD-10-CM

## 2022-12-23 DIAGNOSIS — K52.9 GASTROENTERITIS: Primary | ICD-10-CM

## 2022-12-23 LAB
ABSOLUTE EOS #: 0.05 K/UL (ref 0–0.44)
ABSOLUTE IMMATURE GRANULOCYTE: 0.01 K/UL (ref 0–0.3)
ABSOLUTE LYMPH #: 1.28 K/UL (ref 1.1–3.7)
ABSOLUTE MONO #: 0.62 K/UL (ref 0.1–1.2)
ALBUMIN SERPL-MCNC: 3.9 G/DL (ref 3.5–5.2)
ALP BLD-CCNC: 124 U/L (ref 35–104)
ALT SERPL-CCNC: 10 U/L (ref 5–33)
AMYLASE: 56 U/L (ref 28–100)
ANION GAP SERPL CALCULATED.3IONS-SCNC: 12 MMOL/L (ref 9–17)
AST SERPL-CCNC: 14 U/L
BACTERIA: ABNORMAL
BASOPHILS # BLD: 0 % (ref 0–2)
BASOPHILS ABSOLUTE: 0.03 K/UL (ref 0–0.2)
BILIRUB SERPL-MCNC: 0.2 MG/DL (ref 0.3–1.2)
BILIRUBIN DIRECT: <0.1 MG/DL
BILIRUBIN URINE: NEGATIVE
BILIRUBIN, INDIRECT: ABNORMAL MG/DL (ref 0–1)
BUN BLDV-MCNC: 10 MG/DL (ref 8–23)
BUN/CREAT BLD: 11 (ref 9–20)
CALCIUM SERPL-MCNC: 9.3 MG/DL (ref 8.6–10.4)
CHLORIDE BLD-SCNC: 99 MMOL/L (ref 98–107)
CO2: 26 MMOL/L (ref 20–31)
COLOR: YELLOW
CREAT SERPL-MCNC: 0.88 MG/DL (ref 0.5–0.9)
EOSINOPHILS RELATIVE PERCENT: 1 % (ref 1–4)
EPITHELIAL CELLS UA: ABNORMAL /HPF (ref 0–5)
GFR SERPL CREATININE-BSD FRML MDRD: >60 ML/MIN/1.73M2
GLUCOSE BLD-MCNC: 93 MG/DL (ref 70–99)
GLUCOSE URINE: NEGATIVE
HCT VFR BLD CALC: 37.1 % (ref 36.3–47.1)
HEMOGLOBIN: 11.6 G/DL (ref 11.9–15.1)
IMMATURE GRANULOCYTES: 0 %
KETONES, URINE: NEGATIVE
LACTIC ACID: 1.9 MMOL/L (ref 0.5–2.2)
LEUKOCYTE ESTERASE, URINE: NEGATIVE
LIPASE: 31 U/L (ref 13–60)
LYMPHOCYTES # BLD: 19 % (ref 24–43)
MCH RBC QN AUTO: 30.3 PG (ref 25.2–33.5)
MCHC RBC AUTO-ENTMCNC: 31.3 G/DL (ref 28.4–34.8)
MCV RBC AUTO: 96.9 FL (ref 82.6–102.9)
MONOCYTES # BLD: 9 % (ref 3–12)
NITRITE, URINE: NEGATIVE
NRBC AUTOMATED: 0 PER 100 WBC
PDW BLD-RTO: 13.5 % (ref 11.8–14.4)
PH UA: 6 (ref 5–8)
PLATELET # BLD: 239 K/UL (ref 138–453)
PMV BLD AUTO: 9.6 FL (ref 8.1–13.5)
POTASSIUM SERPL-SCNC: 4.1 MMOL/L (ref 3.7–5.3)
PROTEIN UA: NEGATIVE
RBC # BLD: 3.83 M/UL (ref 3.95–5.11)
RBC UA: ABNORMAL /HPF (ref 0–2)
SEG NEUTROPHILS: 71 % (ref 36–65)
SEGMENTED NEUTROPHILS ABSOLUTE COUNT: 4.89 K/UL (ref 1.5–8.1)
SODIUM BLD-SCNC: 137 MMOL/L (ref 135–144)
SPECIFIC GRAVITY UA: 1.01 (ref 1–1.03)
TOTAL PROTEIN: 6.5 G/DL (ref 6.4–8.3)
TURBIDITY: CLEAR
URINE HGB: NEGATIVE
UROBILINOGEN, URINE: NORMAL
WBC # BLD: 6.9 K/UL (ref 3.5–11.3)
WBC UA: ABNORMAL /HPF (ref 0–5)

## 2022-12-23 PROCEDURE — 85025 COMPLETE CBC W/AUTO DIFF WBC: CPT

## 2022-12-23 PROCEDURE — 2580000003 HC RX 258: Performed by: NURSE PRACTITIONER

## 2022-12-23 PROCEDURE — 6370000000 HC RX 637 (ALT 250 FOR IP): Performed by: NURSE PRACTITIONER

## 2022-12-23 PROCEDURE — C9113 INJ PANTOPRAZOLE SODIUM, VIA: HCPCS | Performed by: NURSE PRACTITIONER

## 2022-12-23 PROCEDURE — A4216 STERILE WATER/SALINE, 10 ML: HCPCS | Performed by: NURSE PRACTITIONER

## 2022-12-23 PROCEDURE — 82150 ASSAY OF AMYLASE: CPT

## 2022-12-23 PROCEDURE — 6360000002 HC RX W HCPCS: Performed by: NURSE PRACTITIONER

## 2022-12-23 PROCEDURE — 80076 HEPATIC FUNCTION PANEL: CPT

## 2022-12-23 PROCEDURE — 80048 BASIC METABOLIC PNL TOTAL CA: CPT

## 2022-12-23 PROCEDURE — 96374 THER/PROPH/DIAG INJ IV PUSH: CPT

## 2022-12-23 PROCEDURE — 96375 TX/PRO/DX INJ NEW DRUG ADDON: CPT

## 2022-12-23 PROCEDURE — 83605 ASSAY OF LACTIC ACID: CPT

## 2022-12-23 PROCEDURE — 99284 EMERGENCY DEPT VISIT MOD MDM: CPT

## 2022-12-23 PROCEDURE — 81001 URINALYSIS AUTO W/SCOPE: CPT

## 2022-12-23 PROCEDURE — 83690 ASSAY OF LIPASE: CPT

## 2022-12-23 RX ORDER — 0.9 % SODIUM CHLORIDE 0.9 %
1000 INTRAVENOUS SOLUTION INTRAVENOUS ONCE
Status: COMPLETED | OUTPATIENT
Start: 2022-12-23 | End: 2022-12-23

## 2022-12-23 RX ORDER — DICYCLOMINE HYDROCHLORIDE 10 MG/1
10 CAPSULE ORAL
Qty: 15 CAPSULE | Refills: 0 | Status: SHIPPED | OUTPATIENT
Start: 2022-12-23

## 2022-12-23 RX ORDER — DICYCLOMINE HYDROCHLORIDE 10 MG/1
10 CAPSULE ORAL ONCE
Status: COMPLETED | OUTPATIENT
Start: 2022-12-23 | End: 2022-12-23

## 2022-12-23 RX ORDER — ONDANSETRON 4 MG/1
4 TABLET, ORALLY DISINTEGRATING ORAL 3 TIMES DAILY PRN
Qty: 15 TABLET | Refills: 0 | Status: SHIPPED | OUTPATIENT
Start: 2022-12-23

## 2022-12-23 RX ORDER — ONDANSETRON 2 MG/ML
4 INJECTION INTRAMUSCULAR; INTRAVENOUS ONCE
Status: COMPLETED | OUTPATIENT
Start: 2022-12-23 | End: 2022-12-23

## 2022-12-23 RX ORDER — OMEPRAZOLE 20 MG/1
20 CAPSULE, DELAYED RELEASE ORAL DAILY
Qty: 10 CAPSULE | Refills: 0 | Status: SHIPPED | OUTPATIENT
Start: 2022-12-23

## 2022-12-23 RX ADMIN — ONDANSETRON 4 MG: 2 INJECTION INTRAMUSCULAR; INTRAVENOUS at 21:02

## 2022-12-23 RX ADMIN — SODIUM CHLORIDE 1000 ML: 9 INJECTION, SOLUTION INTRAVENOUS at 21:01

## 2022-12-23 RX ADMIN — DICYCLOMINE HYDROCHLORIDE 10 MG: 10 CAPSULE ORAL at 21:53

## 2022-12-23 RX ADMIN — SODIUM CHLORIDE 40 MG: 9 INJECTION, SOLUTION INTRAMUSCULAR; INTRAVENOUS; SUBCUTANEOUS at 21:02

## 2022-12-23 ASSESSMENT — PAIN SCALES - GENERAL: PAINLEVEL_OUTOF10: 3

## 2022-12-23 ASSESSMENT — ENCOUNTER SYMPTOMS
DIARRHEA: 1
VOMITING: 0
NAUSEA: 1
ABDOMINAL PAIN: 1
COUGH: 0
ABDOMINAL DISTENTION: 0
BLOOD IN STOOL: 0
SHORTNESS OF BREATH: 0

## 2022-12-24 NOTE — ED PROVIDER NOTES
[facility]0 [facility] ED  EMERGENCY DEPARTMENT ENCOUNTER      Pt Name: Bethany Jones  MRN: [id number]  [place] 1950  Date of evaluation: 12/23/2022  Provider: PRABHAKAR Parr CNP    CHIEF COMPLAINT       Chief Complaint   Patient presents with    Abdominal Pain     [facility]. Possibly ate bad chili today    Dental Pain         HISTORY OFPRESENT ILLNESS  (Location/Symptom, Timing/Onset, Context/Setting, Quality, Duration, Modifying Factors, Severity.)   Bethany Jones is a 67 y.o. female who presents to the emergency department by private auto for evaluation of abdominal pain and nausea after she ate chili at home today. Brought to the ER by her son. Pain is moderate to severe at times. States has had some diarrhea as well. No cough shortness of breath fever or chills. No vomiting. Patient was evaluated here 2 days ago for abdominal pain had blood work CT abdomen pelvis and urinalysis and was discharged on Keflex for UTI. CT of the pelvis without contrast showed no bowel dilatation or wall thickening. She has history of COPD, diabetes, GERD, hypertension, hyperlipidemia, and obesity. Son told me the patient states she had some dental pain as well today. However upon arrival patient denies dental pain. Nursing Notes were reviewed. PASTMEDICAL HISTORY     Past Medical History:   Diagnosis Date    Arthritis     knees    Bronchitis x1 long ago    Chronic obstructive pulmonary disease ([address] 9/12/2017    Colon polyps     Controlled type 2 diabetes mellitus without complication, without long-term current use of insulin ([address] 6/29/2021    Dental neglect     poor dentation. Missing teeth.  No loose teeth    Depression     Environmental allergies     GERD (gastroesophageal reflux disease)     Hyperlipidemia     Hypertension     Obesity     Onychomycosis     Poor historian     RBBB     Treadmill stress test negative for angina pectoris 2009    Wears glasses     reading only SURGICAL HISTORY       Past Surgical History:   Procedure Laterality Date    CHOLECYSTECTOMY      COLONOSCOPY  2013    one hyperplastic polyp    DOPPLER ECHOCARDIOGRAPHY      cardiac    TUBAL LIGATION           CURRENT MEDICATIONS     Previous Medications    CEPHALEXIN (KEFLEX) 500 MG CAPSULE    Take 1 capsule by mouth 2 times daily for 7 days    CLOBETASOL (TEMOVATE) 0.05 % OINTMENT    Apply topically 2 times daily. DOCUSATE SODIUM (COLACE) 100 MG CAPSULE    Take 1 capsule by mouth 2 times daily    ELASTIC BANDAGES & SUPPORTS (ACE ARM SLING) MISC    Apply 1 Units topically daily    POLYETHYLENE GLYCOL (GLYCOLAX) 17 G PACKET    Take 17 g by mouth daily as needed for Constipation    TAMSULOSIN (FLOMAX) 0.4 MG CAPSULE    Take 1 capsule by mouth in the morning. TIOTROPIUM (SPIRIVA RESPIMAT) 1.25 MCG/ACT AERS INHALER    Inhale 2 puffs into the lungs daily       ALLERGIES     Patient has no known allergies. FAMILY HISTORY       Family History   Problem Relation Age of Onset    Heart Disease Mother     Cancer Father           SOCIAL HISTORY       Social History     Socioeconomic History    Marital status: Single     Spouse name: None    Number of children: None    Years of education: None    Highest education level: None   Tobacco Use    Smoking status: Former     Packs/day: 0.50     Years: 35.00     Pack years: 17.50     Types: Cigarettes     Quit date: 2015     Years since quittin.4    Smokeless tobacco: Never   Vaping Use    Vaping Use: Never used   Substance and Sexual Activity    Alcohol use: No     Alcohol/week: 0.0 standard drinks    Drug use: No    Sexual activity: Not Currently         REVIEW OF SYSTEMS    (2-9 systems for level 4, 10 or more for level 5)     Review of Systems   Constitutional:  Positive for appetite change. Negative for chills and fever. HENT:  Positive for dental problem. Respiratory:  Negative for cough and shortness of breath.     Cardiovascular:  Negative for chest pain. Gastrointestinal:  Positive for abdominal pain, diarrhea and nausea. Negative for abdominal distention, blood in stool and vomiting. All other systems reviewed and are negative. Except as noted above the remainder of the review of systems was reviewed and negative. PHYSICAL EXAM    (up to 7 for level 4, 8 or more for level 5)     ED Triage Vitals [12/23/22 2018]   BP Temp Temp Source Heart Rate Resp SpO2 Height Weight   131/73 98.4 °F (36.9 °C) Oral 91 16 96 % -- --       Physical Exam  Constitutional:       Appearance: Normal appearance. She is well-developed, well-groomed and normal weight. HENT:      Head: Normocephalic. Right Ear: Hearing and external ear normal.      Left Ear: Hearing and external ear normal.      Nose: Nose normal.      Mouth/Throat:      Lips: Pink. Mouth: Mucous membranes are moist.      Dentition: Normal dentition. Does not have dentures. No dental tenderness, gingival swelling or dental caries. Pharynx: Oropharynx is clear. Uvula midline. Comments: Patient has poor dentition and has several teeth that are removed but there is no signs of infection, gingival swelling or dental abscess noted. Controlling her secretions. Mucous membranes are moist.  Eyes:      Conjunctiva/sclera: Conjunctivae normal.      Pupils: Pupils are equal, round, and reactive to light. Cardiovascular:      Rate and Rhythm: Normal rate and regular rhythm. Heart sounds: Normal heart sounds. Pulmonary:      Effort: Pulmonary effort is normal.      Breath sounds: Normal breath sounds. Abdominal:      General: Bowel sounds are normal. There is no distension. Palpations: Abdomen is soft. Tenderness: There is abdominal tenderness in the epigastric area. There is no guarding or rebound. Hernia: No hernia is present. Comments: Abdomen is soft to palpation. No distention. Has epigastric tenderness but no guarding or rebound. No hernia. Musculoskeletal:         General: Normal range of motion. Cervical back: Normal range of motion and neck supple. Right lower leg: No edema. Left lower leg: No edema. Skin:     General: Skin is warm and dry. Neurological:      Mental Status: She is alert and oriented to person, place, and time. DIAGNOSTIC RESULTS     EKG:All EKG's are interpreted by the Emergency Department Physician who either signs or Co-signs this chart in the absence of a cardiologist.        RADIOLOGY:   Non-plain film images such as CT, Ultrasound and MRI are read by theradiologist. Plain radiographic images are visualized and preliminarily interpreted by the emergency physician with the below findings:    CT ABDOMEN PELVIS WO CONTRAST Additional Contrast? None    Result Date: 12/21/2022  EXAMINATION: CT OF THE ABDOMEN AND PELVIS WITHOUT CONTRAST 12/21/2022 6:25 pm TECHNIQUE: CT of the abdomen and pelvis was performed without the administration of intravenous contrast. Multiplanar reformatted images are provided for review. Automated exposure control, iterative reconstruction, and/or weight based adjustment of the mA/kV was utilized to reduce the radiation dose to as low as reasonably achievable. COMPARISON: 11/13/2022 HISTORY: ORDERING SYSTEM PROVIDED HISTORY: [facility] pain TECHNOLOGIST PROVIDED HISTORY: [facility] pain Decision Support Exception - unselect if not a suspected or confirmed emergency medical condition->Emergency Medical Condition (MA) FINDINGS: LOWER CHEST: No acute findings. ORGANS: Lack of intravenous contrast limits evaluation of the solid organs and bowel. The liver, pancreas, spleen, kidneys and adrenals reveal no acute abnormality. Mild bilateral pelviectasis. Left parapelvic renal cysts again noted. GI/BOWEL: No bowel dilatation or wall thickening. PELVIS: The bladder is well distended. Tiny focus of intraluminal gas is noted in there is mild induration of the adjacent fat.  PERITONEUM/RETROPERITONEUM: No lymphadenopathy is noted. No free air or free fluid. The aorta is normal in caliber. BONES/SOFT TISSUES: No acute osseous abnormality is identified. *Unless otherwise specified, incidental findings do not require dedicated imaging follow-up. Small focus of intraluminal gas within the bladder and mild adjacent fat stranding. In the absence of recent intervention, underlying urinary tract infection should be considered. Interpretation per the Radiologist below, if available at the time of this note:    No orders to display         EDBEDSIDE ULTRASOUND:   Performed by Teodora Guardado - none    LABS:  Labs Reviewed   CBC WITH AUTO DIFFERENTIAL - Abnormal; Notable for the following components:       Result Value    RBC 3.83 (*)     Hemoglobin 11.6 (*)     Seg Neutrophils 71 (*)     Lymphocytes 19 (*)     All other components within normal limits   HEPATIC FUNCTION PANEL - Abnormal; Notable for the following components:    Alkaline Phosphatase 124 (*)     Total Bilirubin 0.2 (*)     All other components within normal limits   URINALYSIS WITH MICROSCOPIC - Abnormal; Notable for the following components:    Bacteria, UA RARE (*)     All other components within normal limits   BASIC METABOLIC PANEL   LACTIC ACID   LIPASE   AMYLASE       All other labs were within normal range or not returned as of this dictation. EMERGENCY DEPARTMENT COURSE andDIFFERENTIAL DIAGNOSIS/MDM:   Patient evaluated in conjunction with ER physician. Abdomen is soft and nondistended. Bowel sounds active. Has epigastric tenderness. No guarding or rebound. Afebrile. Vital signs stable. CT abdomen pelvis 2 days ago. Labs reviewed. WBC 6.9. Hemoglobin 11.6. Lactic acid 1.9. FTs, amylase lipase unremarkable. Urinalysis shows 2-5 WBCs, 0-2 RBCs. Negative leuks or nitrites. She is currently on Keflex for UTI. Son states she has been taking Keflex without issue. Patient was given IV fluids, Bentyl Zofran and Protonix in the ED. She is still having some abdominal discomfort but is nondistressed. Do not feel repeat imaging of the abdomen is indicated at this time. Symptoms started after she ate chili at home. Likely gastroenteritis. Discussed test results, diagnosis follow-up care with the patient and her son. Prescription written for symptom management. She was discharged home with her son. Vitals:    Vitals:    12/23/22 2018   BP: 131/73   Pulse: 91   Resp: 16   Temp: 98.4 °F (36.9 °C)   TempSrc: Oral   SpO2: 96%         CONSULTS:  None    RES:  Procedures    FINAL IMPRESSION      1. Gastroenteritis    2.  Abdominal pain, unspecified abdominal location          DISPOSITION/PLAN   DISPOSITION Decision To Discharge 12/23/2022 11:01:29 PM      PATIENT REFERRED TO:   Sunshine Pritchard  [address] [facility]  [phone]    Schedule an appointment as soon as possible for a visit       [facility] ED  [address] [facility]  [phone]    If symptoms worsen    DISCHARGE MEDICATIONS:     New Prescriptions    DICYCLOMINE (BENTYL) 10 MG CAPSULE    Take 1 capsule by mouth every 4-6 hours as needed (abdominal pain)    OMEPRAZOLE (PRILOSEC) 20 MG DELAYED RELEASE CAPSULE    Take 1 capsule by mouth daily    ONDANSETRON (ZOFRAN-ODT) 4 MG DISINTEGRATING TABLET    Take 1 tablet by mouth 3 times daily as needed for Nausea or Vomiting     Electronically signed by PRABHAKAR Mendieta 12/23/2022 at 11:12 PM            PRABHAKAR Mendieta CNP  12/23/22 7614

## 2022-12-24 NOTE — ED NOTES
Pt presents to the er c/o generalized abdominal pain that started today     Mine Gunn RN  12/23/22 2112

## 2023-03-29 ENCOUNTER — HOSPITAL ENCOUNTER (EMERGENCY)
Age: 73
Discharge: HOME | End: 2023-03-29
Attending: EMERGENCY MEDICINE
Payer: COMMERCIAL

## 2023-03-29 VITALS
BODY MASS INDEX: 23.46 KG/M2 | RESPIRATION RATE: 16 BRPM | TEMPERATURE: 98.3 F | DIASTOLIC BLOOD PRESSURE: 57 MMHG | WEIGHT: 141 LBS | HEART RATE: 58 BPM | OXYGEN SATURATION: 100 % | SYSTOLIC BLOOD PRESSURE: 117 MMHG

## 2023-03-29 DIAGNOSIS — R30.0 DYSURIA: Primary | ICD-10-CM

## 2023-03-29 LAB
ABSOLUTE EOS #: 0.08 K/UL (ref 0–0.44)
ABSOLUTE IMMATURE GRANULOCYTE: 0.01 K/UL (ref 0–0.3)
ABSOLUTE LYMPH #: 2.17 K/UL (ref 1.1–3.7)
ABSOLUTE MONO #: 0.43 K/UL (ref 0.1–1.2)
ANION GAP SERPL CALCULATED.3IONS-SCNC: 8 MMOL/L (ref 9–17)
BASOPHILS # BLD: 0 % (ref 0–2)
BASOPHILS ABSOLUTE: <0.03 K/UL (ref 0–0.2)
BILIRUBIN URINE: NEGATIVE
BUN SERPL-MCNC: 14 MG/DL (ref 8–23)
BUN/CREAT BLD: 18 (ref 9–20)
CALCIUM SERPL-MCNC: 9.4 MG/DL (ref 8.6–10.4)
CANDIDA SPECIES, DNA PROBE: NEGATIVE
CHLORIDE SERPL-SCNC: 105 MMOL/L (ref 98–107)
CO2 SERPL-SCNC: 28 MMOL/L (ref 20–31)
COLOR: YELLOW
CREAT SERPL-MCNC: 0.77 MG/DL (ref 0.5–0.9)
EOSINOPHILS RELATIVE PERCENT: 2 % (ref 1–4)
EPITHELIAL CELLS UA: ABNORMAL /HPF (ref 0–5)
GARDNERELLA VAGINALIS, DNA PROBE: NEGATIVE
GFR SERPL CREATININE-BSD FRML MDRD: >60 ML/MIN/1.73M2
GLUCOSE SERPL-MCNC: 87 MG/DL (ref 70–99)
GLUCOSE UR STRIP.AUTO-MCNC: NEGATIVE MG/DL
HCT VFR BLD AUTO: 38.2 % (ref 36.3–47.1)
HGB BLD-MCNC: 12.3 G/DL (ref 11.9–15.1)
IMMATURE GRANULOCYTES: 0 %
KETONES UR STRIP.AUTO-MCNC: NEGATIVE MG/DL
LEUKOCYTE ESTERASE UR QL STRIP.AUTO: NEGATIVE
LYMPHOCYTES # BLD: 46 % (ref 24–43)
MCH RBC QN AUTO: 31 PG (ref 25.2–33.5)
MCHC RBC AUTO-ENTMCNC: 32.2 G/DL (ref 28.4–34.8)
MCV RBC AUTO: 96.2 FL (ref 82.6–102.9)
MONOCYTES # BLD: 9 % (ref 3–12)
MUCUS: ABNORMAL
NITRITE UR QL STRIP.AUTO: NEGATIVE
NRBC AUTOMATED: 0 PER 100 WBC
PDW BLD-RTO: 13.1 % (ref 11.8–14.4)
PLATELET # BLD AUTO: 192 K/UL (ref 138–453)
PMV BLD AUTO: 9.1 FL (ref 8.1–13.5)
POTASSIUM SERPL-SCNC: 4 MMOL/L (ref 3.7–5.3)
PROT UR STRIP.AUTO-MCNC: 6 MG/DL (ref 5–8)
PROT UR STRIP.AUTO-MCNC: NEGATIVE MG/DL
RBC # BLD: 3.97 M/UL (ref 3.95–5.11)
RBC CLUMPS #/AREA URNS AUTO: ABNORMAL /HPF (ref 0–2)
SEG NEUTROPHILS: 43 % (ref 36–65)
SEGMENTED NEUTROPHILS ABSOLUTE COUNT: 2.06 K/UL (ref 1.5–8.1)
SODIUM SERPL-SCNC: 141 MMOL/L (ref 135–144)
SOURCE: NORMAL
SPECIFIC GRAVITY UA: 1.02 (ref 1–1.03)
TRICHOMONAS VAGINALIS DNA: NEGATIVE
TURBIDITY: ABNORMAL
URINE HGB: NEGATIVE
UROBILINOGEN, URINE: NORMAL
WBC # BLD AUTO: 4.8 K/UL (ref 3.5–11.3)
WBC UA: ABNORMAL /HPF (ref 0–5)

## 2023-03-29 PROCEDURE — 81001 URINALYSIS AUTO W/SCOPE: CPT

## 2023-03-29 PROCEDURE — 87660 TRICHOMONAS VAGIN DIR PROBE: CPT

## 2023-03-29 PROCEDURE — 99283 EMERGENCY DEPT VISIT LOW MDM: CPT

## 2023-03-29 PROCEDURE — 6370000000 HC RX 637 (ALT 250 FOR IP): Performed by: EMERGENCY MEDICINE

## 2023-03-29 PROCEDURE — 85025 COMPLETE CBC W/AUTO DIFF WBC: CPT

## 2023-03-29 PROCEDURE — 87480 CANDIDA DNA DIR PROBE: CPT

## 2023-03-29 PROCEDURE — 36415 COLL VENOUS BLD VENIPUNCTURE: CPT

## 2023-03-29 PROCEDURE — 80048 BASIC METABOLIC PNL TOTAL CA: CPT

## 2023-03-29 PROCEDURE — 87086 URINE CULTURE/COLONY COUNT: CPT

## 2023-03-29 PROCEDURE — 87510 GARDNER VAG DNA DIR PROBE: CPT

## 2023-03-29 RX ORDER — NYSTATIN 100000 [USP'U]/G
POWDER TOPICAL
Qty: 15 G | Refills: 0 | Status: SHIPPED | OUTPATIENT
Start: 2023-03-29

## 2023-03-29 RX ORDER — FLUCONAZOLE 150 MG/1
150 TABLET ORAL ONCE
Qty: 1 TABLET | Refills: 0 | Status: SHIPPED | OUTPATIENT
Start: 2023-03-30 | End: 2023-03-30

## 2023-03-29 RX ORDER — NITROFURANTOIN 25; 75 MG/1; MG/1
100 CAPSULE ORAL ONCE
Status: COMPLETED | OUTPATIENT
Start: 2023-03-29 | End: 2023-03-29

## 2023-03-29 RX ORDER — NITROFURANTOIN 25; 75 MG/1; MG/1
100 CAPSULE ORAL 2 TIMES DAILY
Qty: 14 CAPSULE | Refills: 0 | Status: SHIPPED | OUTPATIENT
Start: 2023-03-29 | End: 2023-04-05

## 2023-03-29 RX ORDER — FLUCONAZOLE 100 MG/1
200 TABLET ORAL ONCE
Status: COMPLETED | OUTPATIENT
Start: 2023-03-29 | End: 2023-03-29

## 2023-03-29 RX ORDER — PHENAZOPYRIDINE HYDROCHLORIDE 200 MG/1
200 TABLET, FILM COATED ORAL ONCE
Status: COMPLETED | OUTPATIENT
Start: 2023-03-29 | End: 2023-03-29

## 2023-03-29 RX ADMIN — PHENAZOPYRIDINE 200 MG: 200 TABLET ORAL at 16:58

## 2023-03-29 RX ADMIN — FLUCONAZOLE 200 MG: 100 TABLET ORAL at 17:51

## 2023-03-29 RX ADMIN — NITROFURANTOIN MONOHYDRATE/MACROCRYSTALS 100 MG: 75; 25 CAPSULE ORAL at 16:58

## 2023-03-29 ASSESSMENT — PAIN DESCRIPTION - DESCRIPTORS: DESCRIPTORS: SHARP

## 2023-03-29 ASSESSMENT — PAIN DESCRIPTION - LOCATION: LOCATION: ABDOMEN

## 2023-03-29 ASSESSMENT — PAIN - FUNCTIONAL ASSESSMENT: PAIN_FUNCTIONAL_ASSESSMENT: 0-10

## 2023-03-29 ASSESSMENT — PAIN DESCRIPTION - FREQUENCY: FREQUENCY: CONTINUOUS

## 2023-03-29 ASSESSMENT — PAIN SCALES - GENERAL: PAINLEVEL_OUTOF10: 5

## 2023-03-29 NOTE — ED PROVIDER NOTES
J44.9    Smoker F17.200    Chronic pain of left knee M25.562, G89.29    Pre-diabetes R73.03    Falls frequently R29.6    Cognitive and behavioral changes R41.89, M89.65    Toxic metabolic encephalopathy I47.0    Chest pain R07.9    Ileus (HCC) K56.7    Urinary retention R33.9    Normocytic normochromic anemia D64.9    BMI 28.0-28.9,adult Z68.28    AMS (altered mental status) R41.82    Unspecified mood (affective) disorder (HCC) F39    Unable to care for self Z78.9    Candidal vulvovaginitis B37.31    Acute cystitis without hematuria N30.00    Developmental delay P15.14    Complicated UTI (urinary tract infection) N39.0     SURGICAL HISTORY       Past Surgical History:   Procedure Laterality Date    CHOLECYSTECTOMY      COLONOSCOPY  2013    one hyperplastic polyp    DOPPLER ECHOCARDIOGRAPHY      cardiac    TUBAL LIGATION       CURRENT MEDICATIONS       Previous Medications    CLOBETASOL (TEMOVATE) 0.05 % OINTMENT    Apply topically 2 times daily. DICYCLOMINE (BENTYL) 10 MG CAPSULE    Take 1 capsule by mouth every 4-6 hours as needed (abdominal pain)    DOCUSATE SODIUM (COLACE) 100 MG CAPSULE    Take 1 capsule by mouth 2 times daily    ELASTIC BANDAGES & SUPPORTS (ACE ARM SLING) MISC    Apply 1 Units topically daily    OMEPRAZOLE (PRILOSEC) 20 MG DELAYED RELEASE CAPSULE    Take 1 capsule by mouth daily    ONDANSETRON (ZOFRAN-ODT) 4 MG DISINTEGRATING TABLET    Take 1 tablet by mouth 3 times daily as needed for Nausea or Vomiting    TAMSULOSIN (FLOMAX) 0.4 MG CAPSULE    Take 1 capsule by mouth in the morning. TIOTROPIUM (SPIRIVA RESPIMAT) 1.25 MCG/ACT AERS INHALER    Inhale 2 puffs into the lungs daily     ALLERGIES     has No Known Allergies. FAMILY HISTORY     She indicated that her mother is . She indicated that her father is . She indicated that her maternal grandmother is . She indicated that her maternal grandfather is .  She indicated that her paternal grandmother is

## 2023-03-29 NOTE — DISCHARGE INSTRUCTIONS
Take antibiotics as prescribed. The fluconazole was given here once and should be given tomorrow once. Nystatin should be applied 4 times daily. I recommend monitoring the area if it seems to worsen I recommend reevaluation.
verbal instruction

## 2023-03-30 LAB
MICROORGANISM SPEC CULT: NORMAL
SPECIMEN DESCRIPTION: NORMAL

## 2023-06-24 ENCOUNTER — APPOINTMENT (OUTPATIENT)
Dept: CT IMAGING | Age: 73
End: 2023-06-24
Payer: COMMERCIAL

## 2023-06-24 ENCOUNTER — HOSPITAL ENCOUNTER (EMERGENCY)
Age: 73
Discharge: HOME | End: 2023-06-24
Attending: EMERGENCY MEDICINE
Payer: COMMERCIAL

## 2023-06-24 VITALS
RESPIRATION RATE: 16 BRPM | BODY MASS INDEX: 23.49 KG/M2 | HEIGHT: 65 IN | WEIGHT: 141 LBS | SYSTOLIC BLOOD PRESSURE: 127 MMHG | HEART RATE: 81 BPM | DIASTOLIC BLOOD PRESSURE: 69 MMHG | OXYGEN SATURATION: 100 % | TEMPERATURE: 98 F

## 2023-06-24 DIAGNOSIS — R21 RASH AND OTHER NONSPECIFIC SKIN ERUPTION: Primary | ICD-10-CM

## 2023-06-24 DIAGNOSIS — N30.00 ACUTE CYSTITIS WITHOUT HEMATURIA: ICD-10-CM

## 2023-06-24 DIAGNOSIS — R11.0 NAUSEA: ICD-10-CM

## 2023-06-24 LAB
ALBUMIN SERPL-MCNC: 4 G/DL (ref 3.5–5.2)
ALP SERPL-CCNC: 79 U/L (ref 35–104)
ALT SERPL-CCNC: 8 U/L (ref 5–33)
AMORPH SED URNS QL MICRO: ABNORMAL
ANION GAP SERPL CALCULATED.3IONS-SCNC: 9 MMOL/L (ref 9–17)
AST SERPL-CCNC: 15 U/L
BACTERIA URNS QL MICRO: ABNORMAL
BASOPHILS # BLD: <0.03 K/UL (ref 0–0.2)
BASOPHILS NFR BLD: 0 % (ref 0–2)
BILIRUB DIRECT SERPL-MCNC: 0.2 MG/DL
BILIRUB INDIRECT SERPL-MCNC: 0.4 MG/DL (ref 0–1)
BILIRUB SERPL-MCNC: 0.6 MG/DL (ref 0.3–1.2)
BILIRUB UR QL STRIP: NEGATIVE
BUN SERPL-MCNC: 12 MG/DL (ref 8–23)
BUN/CREAT SERPL: 15 (ref 9–20)
CALCIUM SERPL-MCNC: 9.6 MG/DL (ref 8.6–10.4)
CHLORIDE SERPL-SCNC: 103 MMOL/L (ref 98–107)
CLARITY UR: ABNORMAL
CO2 SERPL-SCNC: 28 MMOL/L (ref 20–31)
COLOR UR: YELLOW
CREAT SERPL-MCNC: 0.81 MG/DL (ref 0.5–0.9)
EOSINOPHIL # BLD: 0.05 K/UL (ref 0–0.44)
EOSINOPHILS RELATIVE PERCENT: 1 % (ref 1–4)
EPI CELLS #/AREA URNS HPF: ABNORMAL /HPF (ref 0–5)
ERYTHROCYTE [DISTWIDTH] IN BLOOD BY AUTOMATED COUNT: 12.8 % (ref 11.8–14.4)
GFR SERPL CREATININE-BSD FRML MDRD: >60 ML/MIN/1.73M2
GLUCOSE SERPL-MCNC: 74 MG/DL (ref 70–99)
GLUCOSE UR STRIP-MCNC: NEGATIVE MG/DL
HCT VFR BLD AUTO: 38.4 % (ref 36.3–47.1)
HGB BLD-MCNC: 12.1 G/DL (ref 11.9–15.1)
HGB UR QL STRIP.AUTO: NEGATIVE
IMM GRANULOCYTES # BLD AUTO: 0.01 K/UL (ref 0–0.3)
IMM GRANULOCYTES NFR BLD: 0 %
KETONES UR STRIP-MCNC: ABNORMAL MG/DL
LEUKOCYTE ESTERASE UR QL STRIP: ABNORMAL
LIPASE SERPL-CCNC: 13 U/L (ref 13–60)
LYMPHOCYTES # BLD: 21 % (ref 24–43)
LYMPHOCYTES NFR BLD: 0.95 K/UL (ref 1.1–3.7)
MCH RBC QN AUTO: 31.3 PG (ref 25.2–33.5)
MCHC RBC AUTO-ENTMCNC: 31.5 G/DL (ref 28.4–34.8)
MCV RBC AUTO: 99.5 FL (ref 82.6–102.9)
MONOCYTES NFR BLD: 0.28 K/UL (ref 0.1–1.2)
MONOCYTES NFR BLD: 6 % (ref 3–12)
NEUTROPHILS NFR BLD: 72 % (ref 36–65)
NEUTS SEG NFR BLD: 3.17 K/UL (ref 1.5–8.1)
NITRITE UR QL STRIP: NEGATIVE
NRBC BLD-RTO: 0 PER 100 WBC
PH UR STRIP: 6 [PH] (ref 5–8)
PLATELET # BLD AUTO: 216 K/UL (ref 138–453)
PMV BLD AUTO: 9.5 FL (ref 8.1–13.5)
POTASSIUM SERPL-SCNC: 3.9 MMOL/L (ref 3.7–5.3)
PROT SERPL-MCNC: 6.5 G/DL (ref 6.4–8.3)
PROT UR STRIP-MCNC: NEGATIVE MG/DL
RBC # BLD AUTO: 3.86 M/UL (ref 3.95–5.11)
RBC #/AREA URNS HPF: ABNORMAL /HPF (ref 0–2)
SODIUM SERPL-SCNC: 140 MMOL/L (ref 135–144)
SP GR UR STRIP: 1.02 (ref 1–1.03)
UROBILINOGEN UR STRIP-ACNC: NORMAL
WBC #/AREA URNS HPF: ABNORMAL /HPF (ref 0–5)
WBC OTHER # BLD: 4.5 K/UL (ref 3.5–11.3)

## 2023-06-24 PROCEDURE — 87186 SC STD MICRODIL/AGAR DIL: CPT

## 2023-06-24 PROCEDURE — 74176 CT ABD & PELVIS W/O CONTRAST: CPT

## 2023-06-24 PROCEDURE — 80076 HEPATIC FUNCTION PANEL: CPT

## 2023-06-24 PROCEDURE — 87088 URINE BACTERIA CULTURE: CPT

## 2023-06-24 PROCEDURE — 99284 EMERGENCY DEPT VISIT MOD MDM: CPT

## 2023-06-24 PROCEDURE — 81001 URINALYSIS AUTO W/SCOPE: CPT

## 2023-06-24 PROCEDURE — 80048 BASIC METABOLIC PNL TOTAL CA: CPT

## 2023-06-24 PROCEDURE — 6370000000 HC RX 637 (ALT 250 FOR IP): Performed by: EMERGENCY MEDICINE

## 2023-06-24 PROCEDURE — 87086 URINE CULTURE/COLONY COUNT: CPT

## 2023-06-24 PROCEDURE — 85027 COMPLETE CBC AUTOMATED: CPT

## 2023-06-24 PROCEDURE — 83690 ASSAY OF LIPASE: CPT

## 2023-06-24 RX ORDER — ONDANSETRON 4 MG/1
4 TABLET, ORALLY DISINTEGRATING ORAL EVERY 8 HOURS PRN
Status: DISCONTINUED | OUTPATIENT
Start: 2023-06-24 | End: 2023-06-24 | Stop reason: HOSPADM

## 2023-06-24 RX ORDER — ONDANSETRON 2 MG/ML
4 INJECTION INTRAMUSCULAR; INTRAVENOUS ONCE
Status: DISCONTINUED | OUTPATIENT
Start: 2023-06-24 | End: 2023-06-24 | Stop reason: HOSPADM

## 2023-06-24 RX ORDER — CEPHALEXIN 500 MG/1
500 CAPSULE ORAL 2 TIMES DAILY
Qty: 14 CAPSULE | Refills: 0 | Status: SHIPPED | OUTPATIENT
Start: 2023-06-24 | End: 2023-07-01

## 2023-06-24 RX ORDER — CEPHALEXIN 500 MG/1
500 CAPSULE ORAL ONCE
Status: COMPLETED | OUTPATIENT
Start: 2023-06-24 | End: 2023-06-24

## 2023-06-24 RX ORDER — ONDANSETRON 4 MG/1
4 TABLET, ORALLY DISINTEGRATING ORAL 3 TIMES DAILY PRN
Qty: 21 TABLET | Refills: 0 | Status: SHIPPED | OUTPATIENT
Start: 2023-06-24

## 2023-06-24 RX ADMIN — CEPHALEXIN 500 MG: 500 CAPSULE ORAL at 06:01

## 2023-06-24 RX ADMIN — ONDANSETRON 4 MG: 4 TABLET, ORALLY DISINTEGRATING ORAL at 04:21

## 2023-06-24 ASSESSMENT — ENCOUNTER SYMPTOMS
VOMITING: 0
DIARRHEA: 0
COUGH: 0
EYE REDNESS: 0
SORE THROAT: 0
SHORTNESS OF BREATH: 0
NAUSEA: 1
ABDOMINAL PAIN: 1
RHINORRHEA: 0
COLOR CHANGE: 0
EYE DISCHARGE: 0

## 2023-06-24 NOTE — ED PROVIDER NOTES
EMERGENCY DEPARTMENT ENCOUNTER    Pt Name: Tray Adams  MRN: [id number]  [place] 1950  Date of evaluation: 6/24/23  CHIEF COMPLAINT       Chief Complaint   Patient presents with    Rash     On neck    Nausea     HISTORY OF PRESENT ILLNESS   Is a 68-year-old female who presents with complaints of nausea, abdominal pain and a skin rash. Patient's symptoms of abdominal pain have been ongoing for the past few weeks. She states her symptoms are severe. REVIEW OF SYSTEMS     Review of Systems   Constitutional:  Negative for chills and fever. HENT:  Negative for rhinorrhea and sore throat. Eyes:  Negative for discharge, redness and visual disturbance. Respiratory:  Negative for cough and shortness of breath. Cardiovascular:  Negative for chest pain, palpitations and leg swelling. Gastrointestinal:  Positive for abdominal pain and nausea. Negative for diarrhea and vomiting. Musculoskeletal:  Negative for arthralgias, myalgias and neck pain. Skin:  Positive for rash. Negative for color change. Neurological:  Negative for seizures, weakness and headaches. Psychiatric/Behavioral:  Negative for hallucinations, self-injury and suicidal ideas. PASTMEDICAL HISTORY     Past Medical History:   Diagnosis Date    Arthritis     knees    Bronchitis x1 long ago    Chronic obstructive pulmonary disease ([facility] 9/12/2017    Colon polyps     Controlled type 2 diabetes mellitus without complication, without long-term current use of insulin ([address] 6/29/2021    Dental neglect     poor dentation. Missing teeth.  No loose teeth    Depression     Environmental allergies     GERD (gastroesophageal reflux disease)     Hyperlipidemia     Hypertension     Obesity     Onychomycosis     Poor historian     RBBB     Treadmill stress test negative for angina pectoris 2009    Wears glasses     reading only     Past Problem List  Patient Active Problem List   Diagnosis Code    GERD (gastroesophageal reflux disease)

## 2023-06-25 LAB
MICROORGANISM SPEC CULT: ABNORMAL
SPECIMEN DESCRIPTION: ABNORMAL

## 2023-07-05 ENCOUNTER — APPOINTMENT (OUTPATIENT)
Dept: CT IMAGING | Age: 73
DRG: 465 | End: 2023-07-05
Payer: COMMERCIAL

## 2023-07-05 ENCOUNTER — HOSPITAL ENCOUNTER (INPATIENT)
Age: 73
LOS: 7 days | Discharge: NURSING, REHAB OR LONG-TERM CARE | DRG: 465 | End: 2023-07-12
Attending: EMERGENCY MEDICINE | Admitting: INTERNAL MEDICINE
Payer: COMMERCIAL

## 2023-07-05 DIAGNOSIS — K57.32 DIVERTICULITIS OF COLON: ICD-10-CM

## 2023-07-05 DIAGNOSIS — N13.30 HYDRONEPHROSIS, UNSPECIFIED HYDRONEPHROSIS TYPE: ICD-10-CM

## 2023-07-05 DIAGNOSIS — K57.90 DIVERTICULOSIS: Primary | ICD-10-CM

## 2023-07-05 DIAGNOSIS — R29.6 FALLS FREQUENTLY: ICD-10-CM

## 2023-07-05 LAB
ALBUMIN SERPL-MCNC: 3.8 G/DL (ref 3.5–5.2)
ALP SERPL-CCNC: 69 U/L (ref 35–104)
ALT SERPL-CCNC: 12 U/L (ref 5–33)
ANION GAP SERPL CALCULATED.3IONS-SCNC: 10 MMOL/L (ref 9–17)
AST SERPL-CCNC: 23 U/L
BACTERIA URNS QL MICRO: ABNORMAL
BASOPHILS # BLD: 0.03 K/UL (ref 0–0.2)
BASOPHILS NFR BLD: 1 % (ref 0–2)
BILIRUB DIRECT SERPL-MCNC: 0.1 MG/DL
BILIRUB INDIRECT SERPL-MCNC: 0.3 MG/DL (ref 0–1)
BILIRUB SERPL-MCNC: 0.4 MG/DL (ref 0.3–1.2)
BILIRUB UR QL STRIP: NEGATIVE
BUN SERPL-MCNC: 12 MG/DL (ref 8–23)
BUN/CREAT SERPL: 16 (ref 9–20)
CALCIUM SERPL-MCNC: 9.1 MG/DL (ref 8.6–10.4)
CHLORIDE SERPL-SCNC: 105 MMOL/L (ref 98–107)
CLARITY UR: CLEAR
CO2 SERPL-SCNC: 24 MMOL/L (ref 20–31)
COLOR UR: YELLOW
CREAT SERPL-MCNC: 0.73 MG/DL (ref 0.5–0.9)
EOSINOPHIL # BLD: 0.07 K/UL (ref 0–0.44)
EOSINOPHILS RELATIVE PERCENT: 1 % (ref 1–4)
EPI CELLS #/AREA URNS HPF: ABNORMAL /HPF (ref 0–5)
ERYTHROCYTE [DISTWIDTH] IN BLOOD BY AUTOMATED COUNT: 13.3 % (ref 11.8–14.4)
GFR SERPL CREATININE-BSD FRML MDRD: >60 ML/MIN/1.73M2
GLUCOSE SERPL-MCNC: 80 MG/DL (ref 70–99)
GLUCOSE UR STRIP-MCNC: NEGATIVE MG/DL
HCT VFR BLD AUTO: 38.3 % (ref 36.3–47.1)
HGB BLD-MCNC: 12.1 G/DL (ref 11.9–15.1)
HGB UR QL STRIP.AUTO: NEGATIVE
IMM GRANULOCYTES # BLD AUTO: 0.01 K/UL (ref 0–0.3)
IMM GRANULOCYTES NFR BLD: 0 %
KETONES UR STRIP-MCNC: NEGATIVE MG/DL
LACTATE BLDV-SCNC: 0.7 MMOL/L (ref 0.5–1.9)
LEUKOCYTE ESTERASE UR QL STRIP: ABNORMAL
LIPASE SERPL-CCNC: 16 U/L (ref 13–60)
LYMPHOCYTES # BLD: 42 % (ref 24–43)
LYMPHOCYTES NFR BLD: 2.24 K/UL (ref 1.1–3.7)
MCH RBC QN AUTO: 32.6 PG (ref 25.2–33.5)
MCHC RBC AUTO-ENTMCNC: 31.6 G/DL (ref 28.4–34.8)
MCV RBC AUTO: 103.2 FL (ref 82.6–102.9)
MONOCYTES NFR BLD: 0.55 K/UL (ref 0.1–1.2)
MONOCYTES NFR BLD: 10 % (ref 3–12)
NEUTROPHILS NFR BLD: 46 % (ref 36–65)
NEUTS SEG NFR BLD: 2.42 K/UL (ref 1.5–8.1)
NITRITE UR QL STRIP: NEGATIVE
NRBC BLD-RTO: 0 PER 100 WBC
PH UR STRIP: 6 [PH] (ref 5–8)
PLATELET # BLD AUTO: 237 K/UL (ref 138–453)
PMV BLD AUTO: 9.1 FL (ref 8.1–13.5)
POTASSIUM SERPL-SCNC: 4.4 MMOL/L (ref 3.7–5.3)
PROT SERPL-MCNC: 6.3 G/DL (ref 6.4–8.3)
PROT UR STRIP-MCNC: NEGATIVE MG/DL
RBC # BLD AUTO: 3.71 M/UL (ref 3.95–5.11)
RBC # BLD: ABNORMAL 10*6/UL
RBC #/AREA URNS HPF: ABNORMAL /HPF (ref 0–2)
SODIUM SERPL-SCNC: 139 MMOL/L (ref 135–144)
SP GR UR STRIP: 1 (ref 1–1.03)
UROBILINOGEN UR STRIP-ACNC: NORMAL
WBC #/AREA URNS HPF: ABNORMAL /HPF (ref 0–5)
WBC OTHER # BLD: 5.3 K/UL (ref 3.5–11.3)

## 2023-07-05 PROCEDURE — 2580000003 HC RX 258: Performed by: EMERGENCY MEDICINE

## 2023-07-05 PROCEDURE — 36415 COLL VENOUS BLD VENIPUNCTURE: CPT

## 2023-07-05 PROCEDURE — 51798 US URINE CAPACITY MEASURE: CPT

## 2023-07-05 PROCEDURE — 6360000002 HC RX W HCPCS: Performed by: EMERGENCY MEDICINE

## 2023-07-05 PROCEDURE — 99285 EMERGENCY DEPT VISIT HI MDM: CPT

## 2023-07-05 PROCEDURE — 83690 ASSAY OF LIPASE: CPT

## 2023-07-05 PROCEDURE — 81001 URINALYSIS AUTO W/SCOPE: CPT

## 2023-07-05 PROCEDURE — 99222 1ST HOSP IP/OBS MODERATE 55: CPT | Performed by: STUDENT IN AN ORGANIZED HEALTH CARE EDUCATION/TRAINING PROGRAM

## 2023-07-05 PROCEDURE — 6360000004 HC RX CONTRAST MEDICATION: Performed by: EMERGENCY MEDICINE

## 2023-07-05 PROCEDURE — 74177 CT ABD & PELVIS W/CONTRAST: CPT

## 2023-07-05 PROCEDURE — 80048 BASIC METABOLIC PNL TOTAL CA: CPT

## 2023-07-05 PROCEDURE — 96361 HYDRATE IV INFUSION ADD-ON: CPT

## 2023-07-05 PROCEDURE — 96374 THER/PROPH/DIAG INJ IV PUSH: CPT

## 2023-07-05 PROCEDURE — 83605 ASSAY OF LACTIC ACID: CPT

## 2023-07-05 PROCEDURE — 1200000000 HC SEMI PRIVATE

## 2023-07-05 PROCEDURE — 80076 HEPATIC FUNCTION PANEL: CPT

## 2023-07-05 PROCEDURE — 6370000000 HC RX 637 (ALT 250 FOR IP): Performed by: EMERGENCY MEDICINE

## 2023-07-05 PROCEDURE — 85027 COMPLETE CBC AUTOMATED: CPT

## 2023-07-05 RX ORDER — TAMSULOSIN HYDROCHLORIDE 0.4 MG/1
0.4 CAPSULE ORAL DAILY
Status: DISCONTINUED | OUTPATIENT
Start: 2023-07-06 | End: 2023-07-06

## 2023-07-05 RX ORDER — ENOXAPARIN SODIUM 100 MG/ML
40 INJECTION SUBCUTANEOUS DAILY
Status: DISCONTINUED | OUTPATIENT
Start: 2023-07-06 | End: 2023-07-12 | Stop reason: HOSPADM

## 2023-07-05 RX ORDER — 0.9 % SODIUM CHLORIDE 0.9 %
500 INTRAVENOUS SOLUTION INTRAVENOUS ONCE
Status: COMPLETED | OUTPATIENT
Start: 2023-07-05 | End: 2023-07-05

## 2023-07-05 RX ORDER — ALBUTEROL SULFATE 90 UG/1
2 AEROSOL, METERED RESPIRATORY (INHALATION) DAILY
COMMUNITY

## 2023-07-05 RX ORDER — ONDANSETRON 2 MG/ML
4 INJECTION INTRAMUSCULAR; INTRAVENOUS EVERY 6 HOURS PRN
Status: DISCONTINUED | OUTPATIENT
Start: 2023-07-05 | End: 2023-07-12 | Stop reason: HOSPADM

## 2023-07-05 RX ORDER — ONDANSETRON 2 MG/ML
4 INJECTION INTRAMUSCULAR; INTRAVENOUS ONCE
Status: COMPLETED | OUTPATIENT
Start: 2023-07-05 | End: 2023-07-05

## 2023-07-05 RX ORDER — SODIUM CHLORIDE 9 MG/ML
INJECTION, SOLUTION INTRAVENOUS PRN
Status: DISCONTINUED | OUTPATIENT
Start: 2023-07-05 | End: 2023-07-12 | Stop reason: HOSPADM

## 2023-07-05 RX ORDER — SODIUM CHLORIDE, SODIUM LACTATE, POTASSIUM CHLORIDE, CALCIUM CHLORIDE 600; 310; 30; 20 MG/100ML; MG/100ML; MG/100ML; MG/100ML
INJECTION, SOLUTION INTRAVENOUS CONTINUOUS
Status: DISCONTINUED | OUTPATIENT
Start: 2023-07-05 | End: 2023-07-08

## 2023-07-05 RX ORDER — SODIUM CHLORIDE 0.9 % (FLUSH) 0.9 %
5-40 SYRINGE (ML) INJECTION PRN
Status: DISCONTINUED | OUTPATIENT
Start: 2023-07-05 | End: 2023-07-12 | Stop reason: HOSPADM

## 2023-07-05 RX ORDER — BUDESONIDE AND FORMOTEROL FUMARATE DIHYDRATE 160; 4.5 UG/1; UG/1
2 AEROSOL RESPIRATORY (INHALATION) DAILY
COMMUNITY

## 2023-07-05 RX ORDER — SODIUM CHLORIDE 0.9 % (FLUSH) 0.9 %
10 SYRINGE (ML) INJECTION PRN
Status: DISCONTINUED | OUTPATIENT
Start: 2023-07-05 | End: 2023-07-12 | Stop reason: HOSPADM

## 2023-07-05 RX ORDER — ONDANSETRON 4 MG/1
4 TABLET, ORALLY DISINTEGRATING ORAL EVERY 8 HOURS PRN
Status: DISCONTINUED | OUTPATIENT
Start: 2023-07-05 | End: 2023-07-12 | Stop reason: HOSPADM

## 2023-07-05 RX ORDER — ACETAMINOPHEN 325 MG/1
650 TABLET ORAL EVERY 6 HOURS PRN
Status: DISCONTINUED | OUTPATIENT
Start: 2023-07-05 | End: 2023-07-12 | Stop reason: HOSPADM

## 2023-07-05 RX ORDER — SODIUM CHLORIDE 0.9 % (FLUSH) 0.9 %
5-40 SYRINGE (ML) INJECTION EVERY 12 HOURS SCHEDULED
Status: DISCONTINUED | OUTPATIENT
Start: 2023-07-05 | End: 2023-07-12 | Stop reason: HOSPADM

## 2023-07-05 RX ORDER — ACETAMINOPHEN 500 MG
1000 TABLET ORAL ONCE
Status: COMPLETED | OUTPATIENT
Start: 2023-07-05 | End: 2023-07-05

## 2023-07-05 RX ORDER — ACETAMINOPHEN 650 MG/1
650 SUPPOSITORY RECTAL EVERY 6 HOURS PRN
Status: DISCONTINUED | OUTPATIENT
Start: 2023-07-05 | End: 2023-07-12 | Stop reason: HOSPADM

## 2023-07-05 RX ORDER — ONDANSETRON 4 MG/1
4 TABLET, ORALLY DISINTEGRATING ORAL 3 TIMES DAILY PRN
Status: DISCONTINUED | OUTPATIENT
Start: 2023-07-05 | End: 2023-07-05 | Stop reason: SDUPTHER

## 2023-07-05 RX ORDER — 0.9 % SODIUM CHLORIDE 0.9 %
80 INTRAVENOUS SOLUTION INTRAVENOUS ONCE
Status: COMPLETED | OUTPATIENT
Start: 2023-07-05 | End: 2023-07-05

## 2023-07-05 RX ORDER — POLYETHYLENE GLYCOL 3350 17 G/17G
17 POWDER, FOR SOLUTION ORAL DAILY PRN
Status: DISCONTINUED | OUTPATIENT
Start: 2023-07-05 | End: 2023-07-12 | Stop reason: HOSPADM

## 2023-07-05 RX ADMIN — IOPAMIDOL 75 ML: 755 INJECTION, SOLUTION INTRAVENOUS at 19:28

## 2023-07-05 RX ADMIN — PIPERACILLIN AND TAZOBACTAM 4500 MG: 4; .5 INJECTION, POWDER, LYOPHILIZED, FOR SOLUTION INTRAVENOUS at 22:32

## 2023-07-05 RX ADMIN — SODIUM CHLORIDE, PRESERVATIVE FREE 10 ML: 5 INJECTION INTRAVENOUS at 19:28

## 2023-07-05 RX ADMIN — SODIUM CHLORIDE 80 ML: 9 INJECTION, SOLUTION INTRAVENOUS at 19:28

## 2023-07-05 RX ADMIN — ACETAMINOPHEN 1000 MG: 500 TABLET ORAL at 19:47

## 2023-07-05 RX ADMIN — ONDANSETRON 4 MG: 2 INJECTION INTRAMUSCULAR; INTRAVENOUS at 18:29

## 2023-07-05 RX ADMIN — SODIUM CHLORIDE 500 ML: 9 INJECTION, SOLUTION INTRAVENOUS at 18:29

## 2023-07-05 ASSESSMENT — PAIN SCALES - GENERAL
PAINLEVEL_OUTOF10: 4
PAINLEVEL_OUTOF10: 10

## 2023-07-05 ASSESSMENT — ENCOUNTER SYMPTOMS
CONSTIPATION: 1
SORE THROAT: 0
NAUSEA: 0
SHORTNESS OF BREATH: 0
DIARRHEA: 0
EYE DISCHARGE: 0
RHINORRHEA: 0
ABDOMINAL PAIN: 1
VOMITING: 0
COLOR CHANGE: 0
COUGH: 0
EYE REDNESS: 0

## 2023-07-05 ASSESSMENT — PAIN DESCRIPTION - LOCATION
LOCATION: ABDOMEN
LOCATION: HEAD

## 2023-07-05 ASSESSMENT — PAIN DESCRIPTION - DESCRIPTORS
DESCRIPTORS: ACHING
DESCRIPTORS: SHARP

## 2023-07-05 ASSESSMENT — PAIN DESCRIPTION - FREQUENCY: FREQUENCY: INTERMITTENT

## 2023-07-05 ASSESSMENT — PAIN - FUNCTIONAL ASSESSMENT: PAIN_FUNCTIONAL_ASSESSMENT: 0-10

## 2023-07-06 PROBLEM — L89.151 PRESSURE INJURY OF COCCYGEAL REGION, STAGE 1: Status: ACTIVE | Noted: 2023-07-06

## 2023-07-06 PROBLEM — E43 SEVERE MALNUTRITION (HCC): Status: ACTIVE | Noted: 2023-07-06

## 2023-07-06 PROBLEM — N13.30 BILATERAL HYDRONEPHROSIS: Status: ACTIVE | Noted: 2023-07-06

## 2023-07-06 LAB
ANION GAP SERPL CALCULATED.3IONS-SCNC: 8 MMOL/L (ref 9–17)
BASOPHILS # BLD: 0.04 K/UL (ref 0–0.2)
BASOPHILS NFR BLD: 1 % (ref 0–2)
BUN SERPL-MCNC: 10 MG/DL (ref 8–23)
BUN/CREAT SERPL: 14 (ref 9–20)
CALCIUM SERPL-MCNC: 9 MG/DL (ref 8.6–10.4)
CHLORIDE SERPL-SCNC: 109 MMOL/L (ref 98–107)
CO2 SERPL-SCNC: 26 MMOL/L (ref 20–31)
CREAT SERPL-MCNC: 0.74 MG/DL (ref 0.5–0.9)
EOSINOPHIL # BLD: 0.08 K/UL (ref 0–0.44)
EOSINOPHILS RELATIVE PERCENT: 2 % (ref 1–4)
ERYTHROCYTE [DISTWIDTH] IN BLOOD BY AUTOMATED COUNT: 13.3 % (ref 11.8–14.4)
GFR SERPL CREATININE-BSD FRML MDRD: >60 ML/MIN/1.73M2
GLUCOSE BLD-MCNC: 53 MG/DL (ref 65–105)
GLUCOSE BLD-MCNC: 84 MG/DL (ref 65–105)
GLUCOSE SERPL-MCNC: 60 MG/DL (ref 70–99)
HCT VFR BLD AUTO: 41.1 % (ref 36.3–47.1)
HGB BLD-MCNC: 12.5 G/DL (ref 11.9–15.1)
IMM GRANULOCYTES # BLD AUTO: 0 K/UL (ref 0–0.3)
IMM GRANULOCYTES NFR BLD: 0 %
LACTATE BLDV-SCNC: 1.8 MMOL/L (ref 0.5–1.9)
LYMPHOCYTES # BLD: 45 % (ref 24–43)
LYMPHOCYTES NFR BLD: 1.54 K/UL (ref 1.1–3.7)
MCH RBC QN AUTO: 31.9 PG (ref 25.2–33.5)
MCHC RBC AUTO-ENTMCNC: 30.4 G/DL (ref 28.4–34.8)
MCV RBC AUTO: 104.8 FL (ref 82.6–102.9)
MONOCYTES NFR BLD: 0.31 K/UL (ref 0.1–1.2)
MONOCYTES NFR BLD: 9 % (ref 3–12)
NEUTROPHILS NFR BLD: 42 % (ref 36–65)
NEUTS SEG NFR BLD: 1.45 K/UL (ref 1.5–8.1)
NRBC BLD-RTO: 0 PER 100 WBC
PLATELET # BLD AUTO: 197 K/UL (ref 138–453)
PMV BLD AUTO: 8.9 FL (ref 8.1–13.5)
POTASSIUM SERPL-SCNC: 4 MMOL/L (ref 3.7–5.3)
RBC # BLD AUTO: 3.92 M/UL (ref 3.95–5.11)
RBC # BLD: ABNORMAL 10*6/UL
SODIUM SERPL-SCNC: 143 MMOL/L (ref 135–144)
WBC OTHER # BLD: 3.4 K/UL (ref 3.5–11.3)

## 2023-07-06 PROCEDURE — 36415 COLL VENOUS BLD VENIPUNCTURE: CPT

## 2023-07-06 PROCEDURE — 6360000002 HC RX W HCPCS: Performed by: STUDENT IN AN ORGANIZED HEALTH CARE EDUCATION/TRAINING PROGRAM

## 2023-07-06 PROCEDURE — 87040 BLOOD CULTURE FOR BACTERIA: CPT

## 2023-07-06 PROCEDURE — 97112 NEUROMUSCULAR REEDUCATION: CPT

## 2023-07-06 PROCEDURE — 80048 BASIC METABOLIC PNL TOTAL CA: CPT

## 2023-07-06 PROCEDURE — 99232 SBSQ HOSP IP/OBS MODERATE 35: CPT | Performed by: INTERNAL MEDICINE

## 2023-07-06 PROCEDURE — 97116 GAIT TRAINING THERAPY: CPT

## 2023-07-06 PROCEDURE — 2580000003 HC RX 258: Performed by: NURSE PRACTITIONER

## 2023-07-06 PROCEDURE — 97530 THERAPEUTIC ACTIVITIES: CPT

## 2023-07-06 PROCEDURE — 82947 ASSAY GLUCOSE BLOOD QUANT: CPT

## 2023-07-06 PROCEDURE — 85027 COMPLETE CBC AUTOMATED: CPT

## 2023-07-06 PROCEDURE — A4216 STERILE WATER/SALINE, 10 ML: HCPCS | Performed by: STUDENT IN AN ORGANIZED HEALTH CARE EDUCATION/TRAINING PROGRAM

## 2023-07-06 PROCEDURE — 99221 1ST HOSP IP/OBS SF/LOW 40: CPT

## 2023-07-06 PROCEDURE — 1200000000 HC SEMI PRIVATE

## 2023-07-06 PROCEDURE — 83605 ASSAY OF LACTIC ACID: CPT

## 2023-07-06 PROCEDURE — C9113 INJ PANTOPRAZOLE SODIUM, VIA: HCPCS | Performed by: STUDENT IN AN ORGANIZED HEALTH CARE EDUCATION/TRAINING PROGRAM

## 2023-07-06 PROCEDURE — 2580000003 HC RX 258: Performed by: STUDENT IN AN ORGANIZED HEALTH CARE EDUCATION/TRAINING PROGRAM

## 2023-07-06 PROCEDURE — 97162 PT EVAL MOD COMPLEX 30 MIN: CPT

## 2023-07-06 PROCEDURE — 2580000003 HC RX 258: Performed by: EMERGENCY MEDICINE

## 2023-07-06 PROCEDURE — 2500000003 HC RX 250 WO HCPCS: Performed by: NURSE PRACTITIONER

## 2023-07-06 RX ORDER — DEXTROSE MONOHYDRATE 100 MG/ML
INJECTION, SOLUTION INTRAVENOUS CONTINUOUS PRN
Status: DISCONTINUED | OUTPATIENT
Start: 2023-07-06 | End: 2023-07-12 | Stop reason: HOSPADM

## 2023-07-06 RX ADMIN — PIPERACILLIN AND TAZOBACTAM 3375 MG: 3; .375 INJECTION, POWDER, LYOPHILIZED, FOR SOLUTION INTRAVENOUS at 14:31

## 2023-07-06 RX ADMIN — PIPERACILLIN AND TAZOBACTAM 3375 MG: 3; .375 INJECTION, POWDER, LYOPHILIZED, FOR SOLUTION INTRAVENOUS at 05:28

## 2023-07-06 RX ADMIN — ANTI-FUNGAL POWDER MICONAZOLE NITRATE TALC FREE: 1.42 POWDER TOPICAL at 23:02

## 2023-07-06 RX ADMIN — SODIUM CHLORIDE, POTASSIUM CHLORIDE, SODIUM LACTATE AND CALCIUM CHLORIDE: 600; 310; 30; 20 INJECTION, SOLUTION INTRAVENOUS at 00:26

## 2023-07-06 RX ADMIN — ANTI-FUNGAL POWDER MICONAZOLE NITRATE TALC FREE: 1.42 POWDER TOPICAL at 01:07

## 2023-07-06 RX ADMIN — ENOXAPARIN SODIUM 40 MG: 100 INJECTION SUBCUTANEOUS at 10:22

## 2023-07-06 RX ADMIN — SODIUM CHLORIDE, PRESERVATIVE FREE 10 ML: 5 INJECTION INTRAVENOUS at 10:23

## 2023-07-06 RX ADMIN — PIPERACILLIN AND TAZOBACTAM 3375 MG: 3; .375 INJECTION, POWDER, LYOPHILIZED, FOR SOLUTION INTRAVENOUS at 23:05

## 2023-07-06 RX ADMIN — SODIUM CHLORIDE, PRESERVATIVE FREE 10 ML: 5 INJECTION INTRAVENOUS at 23:01

## 2023-07-06 RX ADMIN — ANTI-FUNGAL POWDER MICONAZOLE NITRATE TALC FREE: 1.42 POWDER TOPICAL at 10:22

## 2023-07-06 RX ADMIN — SODIUM CHLORIDE 40 MG: 9 INJECTION INTRAMUSCULAR; INTRAVENOUS; SUBCUTANEOUS at 10:22

## 2023-07-06 RX ADMIN — DEXTROSE MONOHYDRATE 125 ML: 100 INJECTION, SOLUTION INTRAVENOUS at 07:07

## 2023-07-06 RX ADMIN — SODIUM CHLORIDE, PRESERVATIVE FREE 10 ML: 5 INJECTION INTRAVENOUS at 00:30

## 2023-07-06 ASSESSMENT — PAIN SCALES - GENERAL: PAINLEVEL_OUTOF10: 0

## 2023-07-07 ENCOUNTER — APPOINTMENT (OUTPATIENT)
Dept: GENERAL RADIOLOGY | Age: 73
DRG: 465 | End: 2023-07-07
Payer: COMMERCIAL

## 2023-07-07 ENCOUNTER — ANESTHESIA EVENT (OUTPATIENT)
Dept: OPERATING ROOM | Age: 73
End: 2023-07-07
Payer: COMMERCIAL

## 2023-07-07 ENCOUNTER — ANESTHESIA (OUTPATIENT)
Dept: OPERATING ROOM | Age: 73
End: 2023-07-07
Payer: COMMERCIAL

## 2023-07-07 LAB
ANION GAP SERPL CALCULATED.3IONS-SCNC: 8 MMOL/L (ref 9–17)
BILIRUB UR QL STRIP: NEGATIVE
BUN SERPL-MCNC: 7 MG/DL (ref 8–23)
BUN/CREAT SERPL: 8 (ref 9–20)
CALCIUM SERPL-MCNC: 8.5 MG/DL (ref 8.6–10.4)
CHLORIDE SERPL-SCNC: 105 MMOL/L (ref 98–107)
CLARITY UR: CLEAR
CO2 SERPL-SCNC: 28 MMOL/L (ref 20–31)
COLOR UR: YELLOW
COMMENT UA: NORMAL
CREAT SERPL-MCNC: 0.88 MG/DL (ref 0.5–0.9)
GFR SERPL CREATININE-BSD FRML MDRD: >60 ML/MIN/1.73M2
GLUCOSE SERPL-MCNC: 135 MG/DL (ref 70–99)
GLUCOSE UR STRIP-MCNC: NEGATIVE MG/DL
HGB UR QL STRIP.AUTO: NEGATIVE
KETONES UR STRIP-MCNC: NEGATIVE MG/DL
LEUKOCYTE ESTERASE UR QL STRIP: NEGATIVE
MICROORGANISM/AGENT SPEC: POSITIVE
NITRITE UR QL STRIP: NEGATIVE
PH UR STRIP: 6 [PH] (ref 5–8)
POTASSIUM SERPL-SCNC: 4 MMOL/L (ref 3.7–5.3)
PROT UR STRIP-MCNC: NEGATIVE MG/DL
SODIUM SERPL-SCNC: 141 MMOL/L (ref 135–144)
SP GR UR STRIP: 1.02 (ref 1–1.03)
SPECIMEN DESCRIPTION: ABNORMAL
UROBILINOGEN UR STRIP-ACNC: NORMAL

## 2023-07-07 PROCEDURE — 2580000003 HC RX 258: Performed by: UROLOGY

## 2023-07-07 PROCEDURE — 6370000000 HC RX 637 (ALT 250 FOR IP): Performed by: NURSE PRACTITIONER

## 2023-07-07 PROCEDURE — 99232 SBSQ HOSP IP/OBS MODERATE 35: CPT | Performed by: NURSE PRACTITIONER

## 2023-07-07 PROCEDURE — C9113 INJ PANTOPRAZOLE SODIUM, VIA: HCPCS | Performed by: STUDENT IN AN ORGANIZED HEALTH CARE EDUCATION/TRAINING PROGRAM

## 2023-07-07 PROCEDURE — C1758 CATHETER, URETERAL: HCPCS | Performed by: UROLOGY

## 2023-07-07 PROCEDURE — 36415 COLL VENOUS BLD VENIPUNCTURE: CPT

## 2023-07-07 PROCEDURE — 6360000002 HC RX W HCPCS: Performed by: UROLOGY

## 2023-07-07 PROCEDURE — C1769 GUIDE WIRE: HCPCS | Performed by: UROLOGY

## 2023-07-07 PROCEDURE — 80048 BASIC METABOLIC PNL TOTAL CA: CPT

## 2023-07-07 PROCEDURE — 3600000002 HC SURGERY LEVEL 2 BASE: Performed by: UROLOGY

## 2023-07-07 PROCEDURE — 6370000000 HC RX 637 (ALT 250 FOR IP): Performed by: UROLOGY

## 2023-07-07 PROCEDURE — C9113 INJ PANTOPRAZOLE SODIUM, VIA: HCPCS | Performed by: UROLOGY

## 2023-07-07 PROCEDURE — 6360000002 HC RX W HCPCS: Performed by: NURSE ANESTHETIST, CERTIFIED REGISTERED

## 2023-07-07 PROCEDURE — BT141ZZ FLUOROSCOPY OF KIDNEYS, URETERS AND BLADDER USING LOW OSMOLAR CONTRAST: ICD-10-PCS | Performed by: UROLOGY

## 2023-07-07 PROCEDURE — 97110 THERAPEUTIC EXERCISES: CPT

## 2023-07-07 PROCEDURE — 7100000000 HC PACU RECOVERY - FIRST 15 MIN: Performed by: UROLOGY

## 2023-07-07 PROCEDURE — 81003 URINALYSIS AUTO W/O SCOPE: CPT

## 2023-07-07 PROCEDURE — 6360000002 HC RX W HCPCS: Performed by: STUDENT IN AN ORGANIZED HEALTH CARE EDUCATION/TRAINING PROGRAM

## 2023-07-07 PROCEDURE — 51798 US URINE CAPACITY MEASURE: CPT

## 2023-07-07 PROCEDURE — 0T788DZ DILATION OF BILATERAL URETERS WITH INTRALUMINAL DEVICE, VIA NATURAL OR ARTIFICIAL OPENING ENDOSCOPIC: ICD-10-PCS | Performed by: UROLOGY

## 2023-07-07 PROCEDURE — 2580000003 HC RX 258: Performed by: NURSE ANESTHETIST, CERTIFIED REGISTERED

## 2023-07-07 PROCEDURE — 97116 GAIT TRAINING THERAPY: CPT

## 2023-07-07 PROCEDURE — 2500000003 HC RX 250 WO HCPCS: Performed by: NURSE ANESTHETIST, CERTIFIED REGISTERED

## 2023-07-07 PROCEDURE — 2580000003 HC RX 258: Performed by: STUDENT IN AN ORGANIZED HEALTH CARE EDUCATION/TRAINING PROGRAM

## 2023-07-07 PROCEDURE — 3600000012 HC SURGERY LEVEL 2 ADDTL 15MIN: Performed by: UROLOGY

## 2023-07-07 PROCEDURE — 87338 HPYLORI STOOL AG IA: CPT

## 2023-07-07 PROCEDURE — 7100000001 HC PACU RECOVERY - ADDTL 15 MIN: Performed by: UROLOGY

## 2023-07-07 PROCEDURE — A4216 STERILE WATER/SALINE, 10 ML: HCPCS | Performed by: STUDENT IN AN ORGANIZED HEALTH CARE EDUCATION/TRAINING PROGRAM

## 2023-07-07 PROCEDURE — 6360000004 HC RX CONTRAST MEDICATION: Performed by: UROLOGY

## 2023-07-07 PROCEDURE — 3700000001 HC ADD 15 MINUTES (ANESTHESIA): Performed by: UROLOGY

## 2023-07-07 PROCEDURE — 2709999900 HC NON-CHARGEABLE SUPPLY: Performed by: UROLOGY

## 2023-07-07 PROCEDURE — 1200000000 HC SEMI PRIVATE

## 2023-07-07 PROCEDURE — 6370000000 HC RX 637 (ALT 250 FOR IP): Performed by: STUDENT IN AN ORGANIZED HEALTH CARE EDUCATION/TRAINING PROGRAM

## 2023-07-07 PROCEDURE — C2617 STENT, NON-COR, TEM W/O DEL: HCPCS | Performed by: UROLOGY

## 2023-07-07 PROCEDURE — 3700000000 HC ANESTHESIA ATTENDED CARE: Performed by: UROLOGY

## 2023-07-07 PROCEDURE — A4216 STERILE WATER/SALINE, 10 ML: HCPCS | Performed by: UROLOGY

## 2023-07-07 DEVICE — URETERAL STENT WITH SIDE HOLES 7FX26CM
Type: IMPLANTABLE DEVICE | Site: URETER | Status: FUNCTIONAL
Brand: TRIA™ SOFT

## 2023-07-07 DEVICE — URETERAL STENT WITH SIDE HOLES 7FX24CM
Type: IMPLANTABLE DEVICE | Site: URETER | Status: FUNCTIONAL
Brand: TRIA™ SOFT

## 2023-07-07 RX ORDER — SODIUM CHLORIDE 9 MG/ML
INJECTION, SOLUTION INTRAVENOUS PRN
Status: DISCONTINUED | OUTPATIENT
Start: 2023-07-07 | End: 2023-07-07 | Stop reason: HOSPADM

## 2023-07-07 RX ORDER — MORPHINE SULFATE 2 MG/ML
2 INJECTION, SOLUTION INTRAMUSCULAR; INTRAVENOUS EVERY 4 HOURS PRN
Status: DISCONTINUED | OUTPATIENT
Start: 2023-07-07 | End: 2023-07-12 | Stop reason: HOSPADM

## 2023-07-07 RX ORDER — LIDOCAINE HYDROCHLORIDE 20 MG/ML
JELLY TOPICAL PRN
Status: DISCONTINUED | OUTPATIENT
Start: 2023-07-07 | End: 2023-07-07 | Stop reason: ALTCHOICE

## 2023-07-07 RX ORDER — CLARITHROMYCIN 500 MG/1
500 TABLET, COATED ORAL EVERY 12 HOURS SCHEDULED
Status: DISCONTINUED | OUTPATIENT
Start: 2023-07-07 | End: 2023-07-12 | Stop reason: HOSPADM

## 2023-07-07 RX ORDER — SODIUM CHLORIDE 0.9 % (FLUSH) 0.9 %
5-40 SYRINGE (ML) INJECTION EVERY 12 HOURS SCHEDULED
Status: DISCONTINUED | OUTPATIENT
Start: 2023-07-07 | End: 2023-07-07 | Stop reason: HOSPADM

## 2023-07-07 RX ORDER — SODIUM CHLORIDE 0.9 % (FLUSH) 0.9 %
5-40 SYRINGE (ML) INJECTION PRN
Status: DISCONTINUED | OUTPATIENT
Start: 2023-07-07 | End: 2023-07-07 | Stop reason: HOSPADM

## 2023-07-07 RX ORDER — DIPHENHYDRAMINE HYDROCHLORIDE 50 MG/ML
25 INJECTION INTRAMUSCULAR; INTRAVENOUS EVERY 6 HOURS PRN
Status: DISCONTINUED | OUTPATIENT
Start: 2023-07-07 | End: 2023-07-12 | Stop reason: HOSPADM

## 2023-07-07 RX ORDER — ONDANSETRON 2 MG/ML
INJECTION INTRAMUSCULAR; INTRAVENOUS PRN
Status: DISCONTINUED | OUTPATIENT
Start: 2023-07-07 | End: 2023-07-07 | Stop reason: SDUPTHER

## 2023-07-07 RX ORDER — DOCUSATE SODIUM 100 MG/1
100 CAPSULE, LIQUID FILLED ORAL DAILY
Status: DISCONTINUED | OUTPATIENT
Start: 2023-07-07 | End: 2023-07-12 | Stop reason: HOSPADM

## 2023-07-07 RX ORDER — LIDOCAINE HYDROCHLORIDE 20 MG/ML
INJECTION, SOLUTION EPIDURAL; INFILTRATION; INTRACAUDAL; PERINEURAL PRN
Status: DISCONTINUED | OUTPATIENT
Start: 2023-07-07 | End: 2023-07-07 | Stop reason: SDUPTHER

## 2023-07-07 RX ORDER — SODIUM CHLORIDE 9 MG/ML
INJECTION, SOLUTION INTRAVENOUS CONTINUOUS PRN
Status: DISCONTINUED | OUTPATIENT
Start: 2023-07-07 | End: 2023-07-07 | Stop reason: SDUPTHER

## 2023-07-07 RX ORDER — ROCURONIUM BROMIDE 10 MG/ML
INJECTION, SOLUTION INTRAVENOUS PRN
Status: DISCONTINUED | OUTPATIENT
Start: 2023-07-07 | End: 2023-07-07 | Stop reason: SDUPTHER

## 2023-07-07 RX ORDER — CLARITHROMYCIN 500 MG/1
500 TABLET, COATED ORAL ONCE
Status: COMPLETED | OUTPATIENT
Start: 2023-07-07 | End: 2023-07-07

## 2023-07-07 RX ORDER — FENTANYL CITRATE 50 UG/ML
25 INJECTION, SOLUTION INTRAMUSCULAR; INTRAVENOUS EVERY 5 MIN PRN
Status: DISCONTINUED | OUTPATIENT
Start: 2023-07-07 | End: 2023-07-07 | Stop reason: HOSPADM

## 2023-07-07 RX ORDER — ONDANSETRON 2 MG/ML
4 INJECTION INTRAMUSCULAR; INTRAVENOUS
Status: DISCONTINUED | OUTPATIENT
Start: 2023-07-07 | End: 2023-07-07 | Stop reason: HOSPADM

## 2023-07-07 RX ORDER — PROPOFOL 10 MG/ML
INJECTION, EMULSION INTRAVENOUS PRN
Status: DISCONTINUED | OUTPATIENT
Start: 2023-07-07 | End: 2023-07-07 | Stop reason: SDUPTHER

## 2023-07-07 RX ORDER — DEXAMETHASONE SODIUM PHOSPHATE 10 MG/ML
INJECTION, SOLUTION INTRAMUSCULAR; INTRAVENOUS PRN
Status: DISCONTINUED | OUTPATIENT
Start: 2023-07-07 | End: 2023-07-07 | Stop reason: SDUPTHER

## 2023-07-07 RX ORDER — FENTANYL CITRATE 50 UG/ML
INJECTION, SOLUTION INTRAMUSCULAR; INTRAVENOUS PRN
Status: DISCONTINUED | OUTPATIENT
Start: 2023-07-07 | End: 2023-07-07 | Stop reason: SDUPTHER

## 2023-07-07 RX ORDER — EPHEDRINE SULFATE/0.9% NACL/PF 50 MG/5 ML
SYRINGE (ML) INTRAVENOUS PRN
Status: DISCONTINUED | OUTPATIENT
Start: 2023-07-07 | End: 2023-07-07 | Stop reason: SDUPTHER

## 2023-07-07 RX ORDER — HYDROMORPHONE HYDROCHLORIDE 1 MG/ML
0.5 INJECTION, SOLUTION INTRAMUSCULAR; INTRAVENOUS; SUBCUTANEOUS EVERY 5 MIN PRN
Status: DISCONTINUED | OUTPATIENT
Start: 2023-07-07 | End: 2023-07-07 | Stop reason: HOSPADM

## 2023-07-07 RX ADMIN — ONDANSETRON 4 MG: 2 INJECTION INTRAMUSCULAR; INTRAVENOUS at 16:31

## 2023-07-07 RX ADMIN — Medication 15 MG: at 16:31

## 2023-07-07 RX ADMIN — SODIUM CHLORIDE 40 MG: 9 INJECTION INTRAMUSCULAR; INTRAVENOUS; SUBCUTANEOUS at 22:30

## 2023-07-07 RX ADMIN — PHENYLEPHRINE HYDROCHLORIDE 100 MCG: 10 INJECTION INTRAVENOUS at 16:51

## 2023-07-07 RX ADMIN — ENOXAPARIN SODIUM 40 MG: 100 INJECTION SUBCUTANEOUS at 07:37

## 2023-07-07 RX ADMIN — SUGAMMADEX 150 MG: 100 INJECTION, SOLUTION INTRAVENOUS at 17:02

## 2023-07-07 RX ADMIN — DEXAMETHASONE SODIUM PHOSPHATE 10 MG: 10 INJECTION, SOLUTION INTRAMUSCULAR; INTRAVENOUS at 16:31

## 2023-07-07 RX ADMIN — SODIUM CHLORIDE 40 MG: 9 INJECTION INTRAMUSCULAR; INTRAVENOUS; SUBCUTANEOUS at 07:37

## 2023-07-07 RX ADMIN — DOCUSATE SODIUM 100 MG: 100 CAPSULE, LIQUID FILLED ORAL at 10:30

## 2023-07-07 RX ADMIN — LIDOCAINE HYDROCHLORIDE 60 MG: 20 INJECTION, SOLUTION EPIDURAL; INFILTRATION; INTRACAUDAL; PERINEURAL at 16:24

## 2023-07-07 RX ADMIN — PIPERACILLIN AND TAZOBACTAM 3375 MG: 3; .375 INJECTION, POWDER, LYOPHILIZED, FOR SOLUTION INTRAVENOUS at 15:00

## 2023-07-07 RX ADMIN — SODIUM CHLORIDE: 9 INJECTION, SOLUTION INTRAVENOUS at 16:19

## 2023-07-07 RX ADMIN — CLARITHROMYCIN 500 MG: 500 TABLET ORAL at 22:30

## 2023-07-07 RX ADMIN — PROPOFOL 90 MG: 10 INJECTION, EMULSION INTRAVENOUS at 16:24

## 2023-07-07 RX ADMIN — PIPERACILLIN AND TAZOBACTAM 3375 MG: 3; .375 INJECTION, POWDER, LYOPHILIZED, FOR SOLUTION INTRAVENOUS at 07:38

## 2023-07-07 RX ADMIN — PIPERACILLIN AND TAZOBACTAM 3375 MG: 3; .375 INJECTION, POWDER, LYOPHILIZED, FOR SOLUTION INTRAVENOUS at 22:39

## 2023-07-07 RX ADMIN — FENTANYL CITRATE 25 MCG: 50 INJECTION INTRAMUSCULAR; INTRAVENOUS at 16:24

## 2023-07-07 RX ADMIN — ANTI-FUNGAL POWDER MICONAZOLE NITRATE TALC FREE: 1.42 POWDER TOPICAL at 07:37

## 2023-07-07 RX ADMIN — ROCURONIUM BROMIDE 25 MG: 10 INJECTION, SOLUTION INTRAVENOUS at 16:24

## 2023-07-07 RX ADMIN — PIPERACILLIN AND TAZOBACTAM 3375 MG: 3; .375 INJECTION, POWDER, LYOPHILIZED, FOR SOLUTION INTRAVENOUS at 16:31

## 2023-07-07 RX ADMIN — ACETAMINOPHEN 650 MG: 325 TABLET ORAL at 09:31

## 2023-07-07 RX ADMIN — SODIUM CHLORIDE, POTASSIUM CHLORIDE, SODIUM LACTATE AND CALCIUM CHLORIDE: 600; 310; 30; 20 INJECTION, SOLUTION INTRAVENOUS at 20:53

## 2023-07-07 RX ADMIN — CLARITHROMYCIN 500 MG: 500 TABLET ORAL at 13:36

## 2023-07-07 RX ADMIN — SODIUM CHLORIDE, POTASSIUM CHLORIDE, SODIUM LACTATE AND CALCIUM CHLORIDE: 600; 310; 30; 20 INJECTION, SOLUTION INTRAVENOUS at 12:49

## 2023-07-07 RX ADMIN — ANTI-FUNGAL POWDER MICONAZOLE NITRATE TALC FREE: 1.42 POWDER TOPICAL at 20:47

## 2023-07-07 ASSESSMENT — PAIN DESCRIPTION - DESCRIPTORS: DESCRIPTORS: ACHING

## 2023-07-07 ASSESSMENT — PAIN DESCRIPTION - LOCATION: LOCATION: HEAD

## 2023-07-07 ASSESSMENT — PAIN SCALES - GENERAL: PAINLEVEL_OUTOF10: 3

## 2023-07-07 NOTE — ANESTHESIA POSTPROCEDURE EVALUATION
Department of Anesthesiology  Postprocedure Note    Patient: Kunal James  MRN: [id number]  YOB: 1950  Date of evaluation: 7/7/2023      Procedure Summary     Date: 07/07/23 Room / Location: [address]     Anesthesia Start: 1619 Anesthesia Stop: 1717    Procedure: CYSTOSCOPY RETROGRADE PYELOGRAM URETERAL STENT INSERTION (Bilateral) Diagnosis:       Hydronephrosis, unspecified hydronephrosis type      (Hydronephrosis, unspecified hydronephrosis type [N13.30])    Surgeons: Madison Bernard MD Responsible Provider: Nicola Aguiar MD    Anesthesia Type: general ASA Status: 3 - Emergent          Anesthesia Type: No value filed.     Ney Phase I: Ney Score: 10    Ney Phase II:        Anesthesia Post Evaluation    Patient location during evaluation: PACU  Patient participation: complete - patient participated  Level of consciousness: awake  Airway patency: patent  Nausea & Vomiting: no nausea  Complications: no  Cardiovascular status: blood pressure returned to baseline  Respiratory status: acceptable  Hydration status: euvolemic  Comments: Multimodal analgesia pain management as indicated by procedure  Multimodal analgesia pain management approach

## 2023-07-08 PROBLEM — A04.8 H. PYLORI INFECTION: Status: ACTIVE | Noted: 2023-07-08

## 2023-07-08 PROCEDURE — 6360000002 HC RX W HCPCS: Performed by: NURSE PRACTITIONER

## 2023-07-08 PROCEDURE — 1200000000 HC SEMI PRIVATE

## 2023-07-08 PROCEDURE — 6370000000 HC RX 637 (ALT 250 FOR IP): Performed by: UROLOGY

## 2023-07-08 PROCEDURE — C9113 INJ PANTOPRAZOLE SODIUM, VIA: HCPCS | Performed by: UROLOGY

## 2023-07-08 PROCEDURE — 2580000003 HC RX 258: Performed by: UROLOGY

## 2023-07-08 PROCEDURE — 99232 SBSQ HOSP IP/OBS MODERATE 35: CPT | Performed by: INTERNAL MEDICINE

## 2023-07-08 PROCEDURE — 51798 US URINE CAPACITY MEASURE: CPT

## 2023-07-08 PROCEDURE — 6360000002 HC RX W HCPCS: Performed by: UROLOGY

## 2023-07-08 PROCEDURE — A4216 STERILE WATER/SALINE, 10 ML: HCPCS | Performed by: UROLOGY

## 2023-07-08 PROCEDURE — 6370000000 HC RX 637 (ALT 250 FOR IP): Performed by: INTERNAL MEDICINE

## 2023-07-08 RX ORDER — LORAZEPAM 2 MG/ML
0.5 INJECTION INTRAMUSCULAR ONCE
Status: COMPLETED | OUTPATIENT
Start: 2023-07-08 | End: 2023-07-08

## 2023-07-08 RX ORDER — AMOXICILLIN AND CLAVULANATE POTASSIUM 875; 125 MG/1; MG/1
1 TABLET, FILM COATED ORAL EVERY 12 HOURS SCHEDULED
Status: DISCONTINUED | OUTPATIENT
Start: 2023-07-08 | End: 2023-07-12 | Stop reason: HOSPADM

## 2023-07-08 RX ORDER — CLARITHROMYCIN 500 MG/1
500 TABLET, COATED ORAL EVERY 12 HOURS SCHEDULED
Qty: 20 TABLET | Refills: 0 | Status: SHIPPED | OUTPATIENT
Start: 2023-07-08 | End: 2023-07-18

## 2023-07-08 RX ORDER — OMEPRAZOLE 20 MG/1
40 CAPSULE, DELAYED RELEASE ORAL 2 TIMES DAILY
Qty: 60 CAPSULE | Refills: 1 | Status: SHIPPED | OUTPATIENT
Start: 2023-07-08

## 2023-07-08 RX ORDER — AMOXICILLIN AND CLAVULANATE POTASSIUM 875; 125 MG/1; MG/1
1 TABLET, FILM COATED ORAL EVERY 12 HOURS SCHEDULED
Qty: 10 TABLET | Refills: 0 | Status: SHIPPED | OUTPATIENT
Start: 2023-07-08 | End: 2023-07-13

## 2023-07-08 RX ORDER — TAMSULOSIN HYDROCHLORIDE 0.4 MG/1
0.4 CAPSULE ORAL DAILY
Qty: 30 CAPSULE | Refills: 3 | Status: SHIPPED | OUTPATIENT
Start: 2023-07-08

## 2023-07-08 RX ADMIN — ANTI-FUNGAL POWDER MICONAZOLE NITRATE TALC FREE: 1.42 POWDER TOPICAL at 09:26

## 2023-07-08 RX ADMIN — AMOXICILLIN AND CLAVULANATE POTASSIUM 1 TABLET: 875; 125 TABLET, FILM COATED ORAL at 22:50

## 2023-07-08 RX ADMIN — CLARITHROMYCIN 500 MG: 500 TABLET ORAL at 22:50

## 2023-07-08 RX ADMIN — DOCUSATE SODIUM 100 MG: 100 CAPSULE, LIQUID FILLED ORAL at 09:26

## 2023-07-08 RX ADMIN — LORAZEPAM 0.5 MG: 2 INJECTION INTRAMUSCULAR; INTRAVENOUS at 04:13

## 2023-07-08 RX ADMIN — ACETAMINOPHEN 650 MG: 325 TABLET ORAL at 02:05

## 2023-07-08 RX ADMIN — SODIUM CHLORIDE 40 MG: 9 INJECTION INTRAMUSCULAR; INTRAVENOUS; SUBCUTANEOUS at 09:17

## 2023-07-08 RX ADMIN — PIPERACILLIN AND TAZOBACTAM 3375 MG: 3; .375 INJECTION, POWDER, LYOPHILIZED, FOR SOLUTION INTRAVENOUS at 06:34

## 2023-07-08 RX ADMIN — ENOXAPARIN SODIUM 40 MG: 100 INJECTION SUBCUTANEOUS at 09:26

## 2023-07-08 RX ADMIN — CLARITHROMYCIN 500 MG: 500 TABLET ORAL at 09:25

## 2023-07-08 ASSESSMENT — PAIN DESCRIPTION - DESCRIPTORS: DESCRIPTORS: ACHING

## 2023-07-08 ASSESSMENT — PAIN DESCRIPTION - LOCATION: LOCATION: ABDOMEN

## 2023-07-08 ASSESSMENT — PAIN SCALES - GENERAL
PAINLEVEL_OUTOF10: 0
PAINLEVEL_OUTOF10: 0
PAINLEVEL_OUTOF10: 3

## 2023-07-09 PROCEDURE — 6370000000 HC RX 637 (ALT 250 FOR IP): Performed by: INTERNAL MEDICINE

## 2023-07-09 PROCEDURE — 6360000002 HC RX W HCPCS: Performed by: UROLOGY

## 2023-07-09 PROCEDURE — 1200000000 HC SEMI PRIVATE

## 2023-07-09 PROCEDURE — 51798 US URINE CAPACITY MEASURE: CPT

## 2023-07-09 PROCEDURE — 6370000000 HC RX 637 (ALT 250 FOR IP): Performed by: UROLOGY

## 2023-07-09 PROCEDURE — 99232 SBSQ HOSP IP/OBS MODERATE 35: CPT | Performed by: INTERNAL MEDICINE

## 2023-07-09 RX ADMIN — ENOXAPARIN SODIUM 40 MG: 100 INJECTION SUBCUTANEOUS at 10:01

## 2023-07-09 RX ADMIN — ONDANSETRON 4 MG: 4 TABLET, ORALLY DISINTEGRATING ORAL at 19:15

## 2023-07-09 RX ADMIN — CLARITHROMYCIN 500 MG: 500 TABLET ORAL at 10:03

## 2023-07-09 RX ADMIN — ANTI-FUNGAL POWDER MICONAZOLE NITRATE TALC FREE: 1.42 POWDER TOPICAL at 19:25

## 2023-07-09 RX ADMIN — DOCUSATE SODIUM 100 MG: 100 CAPSULE, LIQUID FILLED ORAL at 10:01

## 2023-07-09 RX ADMIN — ANTI-FUNGAL POWDER MICONAZOLE NITRATE TALC FREE: 1.42 POWDER TOPICAL at 10:02

## 2023-07-09 RX ADMIN — AMOXICILLIN AND CLAVULANATE POTASSIUM 1 TABLET: 875; 125 TABLET, FILM COATED ORAL at 10:01

## 2023-07-09 RX ADMIN — AMOXICILLIN AND CLAVULANATE POTASSIUM 1 TABLET: 875; 125 TABLET, FILM COATED ORAL at 22:38

## 2023-07-09 RX ADMIN — CLARITHROMYCIN 500 MG: 500 TABLET ORAL at 22:38

## 2023-07-09 ASSESSMENT — PAIN SCALES - GENERAL
PAINLEVEL_OUTOF10: 0
PAINLEVEL_OUTOF10: 0

## 2023-07-10 ENCOUNTER — APPOINTMENT (OUTPATIENT)
Dept: GENERAL RADIOLOGY | Age: 73
DRG: 465 | End: 2023-07-10
Payer: COMMERCIAL

## 2023-07-10 PROCEDURE — 6370000000 HC RX 637 (ALT 250 FOR IP): Performed by: INTERNAL MEDICINE

## 2023-07-10 PROCEDURE — 1200000000 HC SEMI PRIVATE

## 2023-07-10 PROCEDURE — 97110 THERAPEUTIC EXERCISES: CPT

## 2023-07-10 PROCEDURE — 97166 OT EVAL MOD COMPLEX 45 MIN: CPT

## 2023-07-10 PROCEDURE — 6370000000 HC RX 637 (ALT 250 FOR IP): Performed by: UROLOGY

## 2023-07-10 PROCEDURE — 6360000002 HC RX W HCPCS: Performed by: UROLOGY

## 2023-07-10 PROCEDURE — 51798 US URINE CAPACITY MEASURE: CPT

## 2023-07-10 PROCEDURE — 97530 THERAPEUTIC ACTIVITIES: CPT

## 2023-07-10 PROCEDURE — 97116 GAIT TRAINING THERAPY: CPT

## 2023-07-10 PROCEDURE — 74018 RADEX ABDOMEN 1 VIEW: CPT

## 2023-07-10 PROCEDURE — 99232 SBSQ HOSP IP/OBS MODERATE 35: CPT | Performed by: INTERNAL MEDICINE

## 2023-07-10 PROCEDURE — 97535 SELF CARE MNGMENT TRAINING: CPT

## 2023-07-10 RX ADMIN — CLARITHROMYCIN 500 MG: 500 TABLET ORAL at 20:08

## 2023-07-10 RX ADMIN — AMOXICILLIN AND CLAVULANATE POTASSIUM 1 TABLET: 875; 125 TABLET, FILM COATED ORAL at 20:08

## 2023-07-10 RX ADMIN — DOCUSATE SODIUM 100 MG: 100 CAPSULE, LIQUID FILLED ORAL at 08:49

## 2023-07-10 RX ADMIN — AMOXICILLIN AND CLAVULANATE POTASSIUM 1 TABLET: 875; 125 TABLET, FILM COATED ORAL at 08:48

## 2023-07-10 RX ADMIN — ANTI-FUNGAL POWDER MICONAZOLE NITRATE TALC FREE: 1.42 POWDER TOPICAL at 20:08

## 2023-07-10 RX ADMIN — CLARITHROMYCIN 500 MG: 500 TABLET ORAL at 08:48

## 2023-07-10 RX ADMIN — ENOXAPARIN SODIUM 40 MG: 100 INJECTION SUBCUTANEOUS at 08:49

## 2023-07-10 RX ADMIN — ONDANSETRON 4 MG: 4 TABLET, ORALLY DISINTEGRATING ORAL at 06:11

## 2023-07-10 RX ADMIN — ANTI-FUNGAL POWDER MICONAZOLE NITRATE TALC FREE: 1.42 POWDER TOPICAL at 08:50

## 2023-07-10 ASSESSMENT — PAIN SCALES - GENERAL
PAINLEVEL_OUTOF10: 0
PAINLEVEL_OUTOF10: 3

## 2023-07-10 ASSESSMENT — PAIN DESCRIPTION - DESCRIPTORS: DESCRIPTORS: DISCOMFORT

## 2023-07-10 ASSESSMENT — PAIN DESCRIPTION - ORIENTATION: ORIENTATION: ANTERIOR;LOWER

## 2023-07-10 ASSESSMENT — PAIN DESCRIPTION - LOCATION: LOCATION: ABDOMEN

## 2023-07-10 NOTE — CARE COORDINATION
Social Work-Met with patient to discuss dc options. Discussed rehab. She is agreeable. Offered choice. She would like to go to [facility]. Cancelled [facility] and requested [facility] begin [employer].  Laura Moss

## 2023-07-11 ENCOUNTER — APPOINTMENT (OUTPATIENT)
Dept: CT IMAGING | Age: 73
DRG: 465 | End: 2023-07-11
Payer: COMMERCIAL

## 2023-07-11 LAB
MICROORGANISM SPEC CULT: NORMAL
SERVICE CMNT-IMP: NORMAL
SPECIMEN DESCRIPTION: NORMAL

## 2023-07-11 PROCEDURE — 97116 GAIT TRAINING THERAPY: CPT

## 2023-07-11 PROCEDURE — 97530 THERAPEUTIC ACTIVITIES: CPT

## 2023-07-11 PROCEDURE — 6370000000 HC RX 637 (ALT 250 FOR IP): Performed by: INTERNAL MEDICINE

## 2023-07-11 PROCEDURE — 97110 THERAPEUTIC EXERCISES: CPT

## 2023-07-11 PROCEDURE — 74176 CT ABD & PELVIS W/O CONTRAST: CPT

## 2023-07-11 PROCEDURE — 6370000000 HC RX 637 (ALT 250 FOR IP): Performed by: UROLOGY

## 2023-07-11 PROCEDURE — 1200000000 HC SEMI PRIVATE

## 2023-07-11 PROCEDURE — 6370000000 HC RX 637 (ALT 250 FOR IP): Performed by: NURSE PRACTITIONER

## 2023-07-11 PROCEDURE — 97535 SELF CARE MNGMENT TRAINING: CPT

## 2023-07-11 PROCEDURE — 6360000002 HC RX W HCPCS: Performed by: UROLOGY

## 2023-07-11 PROCEDURE — 99232 SBSQ HOSP IP/OBS MODERATE 35: CPT | Performed by: NURSE PRACTITIONER

## 2023-07-11 RX ORDER — LANOLIN ALCOHOL/MO/W.PET/CERES
3 CREAM (GRAM) TOPICAL NIGHTLY PRN
Status: DISCONTINUED | OUTPATIENT
Start: 2023-07-11 | End: 2023-07-12 | Stop reason: HOSPADM

## 2023-07-11 RX ADMIN — DOCUSATE SODIUM 100 MG: 100 CAPSULE, LIQUID FILLED ORAL at 09:42

## 2023-07-11 RX ADMIN — ENOXAPARIN SODIUM 40 MG: 100 INJECTION SUBCUTANEOUS at 09:42

## 2023-07-11 RX ADMIN — Medication 3 MG: at 23:34

## 2023-07-11 RX ADMIN — AMOXICILLIN AND CLAVULANATE POTASSIUM 1 TABLET: 875; 125 TABLET, FILM COATED ORAL at 20:13

## 2023-07-11 RX ADMIN — CLARITHROMYCIN 500 MG: 500 TABLET ORAL at 20:13

## 2023-07-11 RX ADMIN — ANTI-FUNGAL POWDER MICONAZOLE NITRATE TALC FREE: 1.42 POWDER TOPICAL at 20:13

## 2023-07-11 RX ADMIN — CLARITHROMYCIN 500 MG: 500 TABLET ORAL at 09:42

## 2023-07-11 RX ADMIN — AMOXICILLIN AND CLAVULANATE POTASSIUM 1 TABLET: 875; 125 TABLET, FILM COATED ORAL at 09:42

## 2023-07-11 RX ADMIN — ANTI-FUNGAL POWDER MICONAZOLE NITRATE TALC FREE: 1.42 POWDER TOPICAL at 09:42

## 2023-07-11 ASSESSMENT — ENCOUNTER SYMPTOMS
WHEEZING: 0
SINUS PRESSURE: 0
DIARRHEA: 0
ABDOMINAL PAIN: 0
VOMITING: 0
PHOTOPHOBIA: 0
COUGH: 0
SINUS PAIN: 0
SHORTNESS OF BREATH: 0
NAUSEA: 0
CONSTIPATION: 0

## 2023-07-11 ASSESSMENT — PAIN SCALES - WONG BAKER: WONGBAKER_NUMERICALRESPONSE: 0

## 2023-07-12 VITALS
HEART RATE: 53 BPM | SYSTOLIC BLOOD PRESSURE: 101 MMHG | TEMPERATURE: 98.1 F | HEIGHT: 65 IN | DIASTOLIC BLOOD PRESSURE: 52 MMHG | RESPIRATION RATE: 16 BRPM | OXYGEN SATURATION: 98 % | WEIGHT: 132.3 LBS | BODY MASS INDEX: 22.04 KG/M2

## 2023-07-12 PROCEDURE — 97535 SELF CARE MNGMENT TRAINING: CPT

## 2023-07-12 PROCEDURE — 99239 HOSP IP/OBS DSCHRG MGMT >30: CPT | Performed by: NURSE PRACTITIONER

## 2023-07-12 PROCEDURE — 6370000000 HC RX 637 (ALT 250 FOR IP): Performed by: UROLOGY

## 2023-07-12 PROCEDURE — 6370000000 HC RX 637 (ALT 250 FOR IP): Performed by: INTERNAL MEDICINE

## 2023-07-12 PROCEDURE — 97530 THERAPEUTIC ACTIVITIES: CPT

## 2023-07-12 PROCEDURE — 6360000002 HC RX W HCPCS: Performed by: UROLOGY

## 2023-07-12 RX ADMIN — CLARITHROMYCIN 500 MG: 500 TABLET ORAL at 09:26

## 2023-07-12 RX ADMIN — ENOXAPARIN SODIUM 40 MG: 100 INJECTION SUBCUTANEOUS at 09:26

## 2023-07-12 RX ADMIN — AMOXICILLIN AND CLAVULANATE POTASSIUM 1 TABLET: 875; 125 TABLET, FILM COATED ORAL at 09:26

## 2023-07-12 RX ADMIN — ANTI-FUNGAL POWDER MICONAZOLE NITRATE TALC FREE: 1.42 POWDER TOPICAL at 09:26

## 2023-07-12 ASSESSMENT — ENCOUNTER SYMPTOMS
ABDOMINAL PAIN: 0
PHOTOPHOBIA: 0
VOMITING: 0
DIARRHEA: 0
SINUS PAIN: 0
SINUS PRESSURE: 0
WHEEZING: 0
NAUSEA: 0
SHORTNESS OF BREATH: 0
CONSTIPATION: 0
COUGH: 0

## 2023-07-12 ASSESSMENT — PAIN SCALES - WONG BAKER: WONGBAKER_NUMERICALRESPONSE: 0

## 2023-07-12 ASSESSMENT — PAIN SCALES - GENERAL: PAINLEVEL_OUTOF10: 0

## 2023-07-12 NOTE — CARE COORDINATION
[facility] [address] [facility]. [facility] will transport at noon. Orders faxed. Nurse to call report to [phone]. HENS completed. Pt is agreeable with dc plans. Daughter notified nd agreeable.  Socorro Mims

## 2023-07-12 NOTE — CARE COORDINATION
Social Work-[facility]. Requested 1PM transport.  Amy Ibarra

## 2023-07-12 NOTE — CARE COORDINATION
Discharge Planning    Appointment with Dr. Johnathan Garcia set up for Tuesday July 18th @ 08:30 Bedside nurse updated. Plan is to [facility] today, called daughter earlier this morning to help with patient anxiety which seemed to help. Daughter plans to visit patient this afternoon and will bring her clothing and cell phone.

## 2023-07-12 NOTE — DISCHARGE SUMMARY
[facility]  Office: [address], DO, Pham Eduardo, DO, Clarene Meckel, DO, Laura Miles, DO, Bernard Marsh MD, Tati Whitt MD, Mahin Mccoy MD, Shree Mae MD,  Lou Marshall MD, Yonny Rodriguez MD, Isma Peterson, DO, Finn Crystal MD,  Aníbal Jones, DO, Aislinn Uribe MD, aNtalio Steen MD, Lenny Terrazas, DO, Garcia Coulter MD,  Eloy Figueredo DO, Su Christie MD, Zia Cool MD, Aislinn Rosado MD, Ozzie Salmon MD,  Jessenia Downey MD, Rere Potter MD, John Crystla, DO, David Martinez MD,  Scotty Gaytan MD, Casandra Solitario, CNP,  Joanne Corral, CNP, Farrukh Puente, CNP, Medardo Archuleta, CNP,  Roxi Pereira, [facility], Chris Sarmiento, CNP, Thomas White, CNP, Chapo Tan, CNP, Deacon Franklin, CNP, Kriss Roblero, CNP, Makayla Briceño PA-C, Mauricia Collet, CNS, Vicki Shearer, CNP, [facility], [facility]    Discharge Summary     Patient ID: Louise Ng  :  1950   MRN: [id number]     ACCOUNT:  [de-identified]   Patient's PCP: Farzana Carrera Date: 2023   Discharge Date: 2023     Length of Stay: 7  Code Status:  Full Code  Admitting Physician: Mireya Bernardo DO  Discharge Physician: PRABHAKAR Amado NP     Active Discharge Diagnoses:     Hospital Problem Lists:  Principal Problem:    Diverticulitis large intestine w/o perforation or abscess w/o bleeding  Active Problems:    Developmental delay    Hyperlipidemia    Primary hypertension    COPD without exacerbation ([address])    Cognitive and behavioral changes    Diverticulosis    Severe malnutrition ([address])    Bilateral hydronephrosis    Pressure injury of coccygeal region, stage 1    H. pylori infection  Resolved Problems:    * No resolved hospital problems. *      Admission Condition:  fair     Discharged Condition: fair    Hospital Stay:     Hospital Course:   Louise Ng is a 67 y.o. female who was admitted

## 2023-08-02 ENCOUNTER — APPOINTMENT (OUTPATIENT)
Dept: GENERAL RADIOLOGY | Age: 73
End: 2023-08-02
Attending: UROLOGY
Payer: COMMERCIAL

## 2023-08-02 ENCOUNTER — ANESTHESIA EVENT (OUTPATIENT)
Dept: OPERATING ROOM | Age: 73
End: 2023-08-02
Payer: COMMERCIAL

## 2023-08-02 ENCOUNTER — HOSPITAL ENCOUNTER (OUTPATIENT)
Age: 73
Setting detail: OUTPATIENT SURGERY
Discharge: NURSING, REHAB OR LONG-TERM CARE | End: 2023-08-02
Attending: UROLOGY | Admitting: UROLOGY
Payer: COMMERCIAL

## 2023-08-02 ENCOUNTER — ANESTHESIA (OUTPATIENT)
Dept: OPERATING ROOM | Age: 73
End: 2023-08-02
Payer: COMMERCIAL

## 2023-08-02 VITALS
DIASTOLIC BLOOD PRESSURE: 50 MMHG | TEMPERATURE: 96.8 F | SYSTOLIC BLOOD PRESSURE: 119 MMHG | HEART RATE: 58 BPM | BODY MASS INDEX: 20.49 KG/M2 | RESPIRATION RATE: 16 BRPM | WEIGHT: 123 LBS | HEIGHT: 65 IN | OXYGEN SATURATION: 100 %

## 2023-08-02 DIAGNOSIS — N13.30 BILATERAL HYDRONEPHROSIS: ICD-10-CM

## 2023-08-02 LAB
BASOPHILS # BLD: 0.03 K/UL (ref 0–0.2)
BASOPHILS NFR BLD: 1 % (ref 0–2)
BILIRUB UR QL STRIP: NEGATIVE
BUN SERPL-MCNC: 20 MG/DL (ref 8–23)
CLARITY UR: ABNORMAL
COLOR UR: YELLOW
CREAT SERPL-MCNC: 0.6 MG/DL (ref 0.5–0.9)
EOSINOPHIL # BLD: 0.08 K/UL (ref 0–0.44)
EOSINOPHILS RELATIVE PERCENT: 2 % (ref 1–4)
EPI CELLS #/AREA URNS HPF: NORMAL /HPF (ref 0–5)
ERYTHROCYTE [DISTWIDTH] IN BLOOD BY AUTOMATED COUNT: 13.2 % (ref 11.8–14.4)
GFR SERPL CREATININE-BSD FRML MDRD: >60 ML/MIN/1.73M2
GLUCOSE UR STRIP-MCNC: NEGATIVE MG/DL
HCT VFR BLD AUTO: 35.2 % (ref 36.3–47.1)
HGB BLD-MCNC: 11 G/DL (ref 11.9–15.1)
HGB UR QL STRIP.AUTO: ABNORMAL
IMM GRANULOCYTES # BLD AUTO: 0.01 K/UL (ref 0–0.3)
IMM GRANULOCYTES NFR BLD: 0 %
KETONES UR STRIP-MCNC: NEGATIVE MG/DL
LEUKOCYTE ESTERASE UR QL STRIP: ABNORMAL
LYMPHOCYTES NFR BLD: 1.43 K/UL (ref 1.1–3.7)
LYMPHOCYTES RELATIVE PERCENT: 39 % (ref 24–43)
MCH RBC QN AUTO: 32.4 PG (ref 25.2–33.5)
MCHC RBC AUTO-ENTMCNC: 31.3 G/DL (ref 28.4–34.8)
MCV RBC AUTO: 103.8 FL (ref 82.6–102.9)
METER GLUCOSE: 97 MG/DL (ref 65–105)
MONOCYTES NFR BLD: 0.41 K/UL (ref 0.1–1.2)
MONOCYTES NFR BLD: 11 % (ref 3–12)
NEUTROPHILS NFR BLD: 47 % (ref 36–65)
NEUTS SEG NFR BLD: 1.67 K/UL (ref 1.5–8.1)
NITRITE UR QL STRIP: NEGATIVE
NRBC BLD-RTO: 0 PER 100 WBC
PH UR STRIP: 6 [PH] (ref 5–8)
PLATELET # BLD AUTO: 184 K/UL (ref 138–453)
PMV BLD AUTO: 9.3 FL (ref 8.1–13.5)
PROT UR STRIP-MCNC: ABNORMAL MG/DL
RBC # BLD AUTO: 3.39 M/UL (ref 3.95–5.11)
RBC # BLD: ABNORMAL 10*6/UL
RBC #/AREA URNS HPF: NORMAL /HPF (ref 0–2)
SP GR UR STRIP: 1.03 (ref 1–1.03)
UROBILINOGEN UR STRIP-ACNC: NORMAL EU/DL (ref 0–1)
WBC #/AREA URNS HPF: NORMAL /HPF (ref 0–5)
WBC OTHER # BLD: 3.6 K/UL (ref 3.5–11.3)

## 2023-08-02 PROCEDURE — 2580000003 HC RX 258: Performed by: UROLOGY

## 2023-08-02 PROCEDURE — 7100000010 HC PHASE II RECOVERY - FIRST 15 MIN: Performed by: UROLOGY

## 2023-08-02 PROCEDURE — 81001 URINALYSIS AUTO W/SCOPE: CPT

## 2023-08-02 PROCEDURE — 2580000003 HC RX 258: Performed by: ANESTHESIOLOGY

## 2023-08-02 PROCEDURE — 3600000012 HC SURGERY LEVEL 2 ADDTL 15MIN: Performed by: UROLOGY

## 2023-08-02 PROCEDURE — 82947 ASSAY GLUCOSE BLOOD QUANT: CPT

## 2023-08-02 PROCEDURE — 2500000003 HC RX 250 WO HCPCS: Performed by: NURSE ANESTHETIST, CERTIFIED REGISTERED

## 2023-08-02 PROCEDURE — 84520 ASSAY OF UREA NITROGEN: CPT

## 2023-08-02 PROCEDURE — 3700000001 HC ADD 15 MINUTES (ANESTHESIA): Performed by: UROLOGY

## 2023-08-02 PROCEDURE — 3700000000 HC ANESTHESIA ATTENDED CARE: Performed by: UROLOGY

## 2023-08-02 PROCEDURE — 85025 COMPLETE CBC W/AUTO DIFF WBC: CPT

## 2023-08-02 PROCEDURE — 2709999900 HC NON-CHARGEABLE SUPPLY: Performed by: UROLOGY

## 2023-08-02 PROCEDURE — 6370000000 HC RX 637 (ALT 250 FOR IP): Performed by: UROLOGY

## 2023-08-02 PROCEDURE — 6360000002 HC RX W HCPCS: Performed by: NURSE ANESTHETIST, CERTIFIED REGISTERED

## 2023-08-02 PROCEDURE — 3600000002 HC SURGERY LEVEL 2 BASE: Performed by: UROLOGY

## 2023-08-02 PROCEDURE — 36415 COLL VENOUS BLD VENIPUNCTURE: CPT

## 2023-08-02 PROCEDURE — 7100000011 HC PHASE II RECOVERY - ADDTL 15 MIN: Performed by: UROLOGY

## 2023-08-02 PROCEDURE — 82565 ASSAY OF CREATININE: CPT

## 2023-08-02 RX ORDER — MAGNESIUM HYDROXIDE 1200 MG/15ML
LIQUID ORAL CONTINUOUS PRN
Status: COMPLETED | OUTPATIENT
Start: 2023-08-02 | End: 2023-08-02

## 2023-08-02 RX ORDER — LIDOCAINE HYDROCHLORIDE 10 MG/ML
1 INJECTION, SOLUTION EPIDURAL; INFILTRATION; INTRACAUDAL; PERINEURAL
Status: DISCONTINUED | OUTPATIENT
Start: 2023-08-03 | End: 2023-08-02 | Stop reason: HOSPADM

## 2023-08-02 RX ORDER — SODIUM CHLORIDE 9 MG/ML
INJECTION, SOLUTION INTRAVENOUS PRN
Status: DISCONTINUED | OUTPATIENT
Start: 2023-08-02 | End: 2023-08-02 | Stop reason: HOSPADM

## 2023-08-02 RX ORDER — LIDOCAINE HYDROCHLORIDE 20 MG/ML
JELLY TOPICAL PRN
Status: DISCONTINUED | OUTPATIENT
Start: 2023-08-02 | End: 2023-08-02 | Stop reason: ALTCHOICE

## 2023-08-02 RX ORDER — SODIUM CHLORIDE 0.9 % (FLUSH) 0.9 %
5-40 SYRINGE (ML) INJECTION PRN
Status: DISCONTINUED | OUTPATIENT
Start: 2023-08-02 | End: 2023-08-02 | Stop reason: HOSPADM

## 2023-08-02 RX ORDER — PROPOFOL 10 MG/ML
INJECTION, EMULSION INTRAVENOUS PRN
Status: DISCONTINUED | OUTPATIENT
Start: 2023-08-02 | End: 2023-08-02 | Stop reason: SDUPTHER

## 2023-08-02 RX ORDER — CEPHALEXIN 500 MG/1
500 CAPSULE ORAL 3 TIMES DAILY
Qty: 15 CAPSULE | Refills: 0 | Status: SHIPPED | OUTPATIENT
Start: 2023-08-02 | End: 2023-08-07

## 2023-08-02 RX ORDER — SODIUM CHLORIDE 9 MG/ML
INJECTION, SOLUTION INTRAVENOUS CONTINUOUS
Status: DISCONTINUED | OUTPATIENT
Start: 2023-08-02 | End: 2023-08-02 | Stop reason: HOSPADM

## 2023-08-02 RX ORDER — SODIUM CHLORIDE, SODIUM LACTATE, POTASSIUM CHLORIDE, CALCIUM CHLORIDE 600; 310; 30; 20 MG/100ML; MG/100ML; MG/100ML; MG/100ML
INJECTION, SOLUTION INTRAVENOUS CONTINUOUS
Status: DISCONTINUED | OUTPATIENT
Start: 2023-08-02 | End: 2023-08-02 | Stop reason: HOSPADM

## 2023-08-02 RX ORDER — SODIUM CHLORIDE 0.9 % (FLUSH) 0.9 %
5-40 SYRINGE (ML) INJECTION EVERY 12 HOURS SCHEDULED
Status: DISCONTINUED | OUTPATIENT
Start: 2023-08-02 | End: 2023-08-02 | Stop reason: HOSPADM

## 2023-08-02 RX ORDER — LIDOCAINE HYDROCHLORIDE 20 MG/ML
INJECTION, SOLUTION EPIDURAL; INFILTRATION; INTRACAUDAL; PERINEURAL PRN
Status: DISCONTINUED | OUTPATIENT
Start: 2023-08-02 | End: 2023-08-02 | Stop reason: SDUPTHER

## 2023-08-02 RX ADMIN — PROPOFOL 20 MG: 10 INJECTION, EMULSION INTRAVENOUS at 12:18

## 2023-08-02 RX ADMIN — PROPOFOL 20 MG: 10 INJECTION, EMULSION INTRAVENOUS at 12:15

## 2023-08-02 RX ADMIN — PROPOFOL 40 MG: 10 INJECTION, EMULSION INTRAVENOUS at 12:12

## 2023-08-02 RX ADMIN — LIDOCAINE HYDROCHLORIDE 60 MG: 20 INJECTION, SOLUTION EPIDURAL; INFILTRATION; INTRACAUDAL; PERINEURAL at 12:12

## 2023-08-02 RX ADMIN — PROPOFOL 20 MG: 10 INJECTION, EMULSION INTRAVENOUS at 12:21

## 2023-08-02 RX ADMIN — SODIUM CHLORIDE, POTASSIUM CHLORIDE, SODIUM LACTATE AND CALCIUM CHLORIDE: 600; 310; 30; 20 INJECTION, SOLUTION INTRAVENOUS at 12:08

## 2023-08-02 ASSESSMENT — PAIN - FUNCTIONAL ASSESSMENT: PAIN_FUNCTIONAL_ASSESSMENT: 0-10

## 2023-08-02 NOTE — ANESTHESIA POSTPROCEDURE EVALUATION
Department of Anesthesiology  Postprocedure Note    Patient: Tello Dubois  MRN: [id number]  YOB: 1950  Date of evaluation: 8/2/2023      Procedure Summary     Date: 08/02/23 Room / Location: [facility] - INPATIENT    Anesthesia Start: 8032 Anesthesia Stop: 7832    Procedure: CYSTOSCOPY, BILATERAL STENT REMOVAL (Bilateral: Ureter) Diagnosis:       Bilateral hydronephrosis      (Bilateral hydronephrosis [N13.30])    Surgeons: Emma Crowder MD Responsible Provider: Kirsten Hammans, MD    Anesthesia Type: MAC ASA Status: 3          Anesthesia Type: MAC    Ney Phase I:      Ney Phase II:        Anesthesia Post Evaluation    Patient location during evaluation: PACU  Patient participation: complete - patient participated  Level of consciousness: awake  Pain score: 1  Airway patency: patent  Nausea & Vomiting: no nausea and no vomiting  Complications: no  Cardiovascular status: blood pressure returned to baseline and hemodynamically stable  Respiratory status: acceptable  Hydration status: euvolemic  Pain management: adequate

## 2023-08-02 NOTE — OP NOTE
Operative Note      Patient: Kesha Vlilanueva  YOB: 1950  MRN: [id number]    Date of Procedure: 8/2/2023    Pre-Op Diagnosis Codes:     * Bilateral hydronephrosis [N13.30]  Status post bilateral stent placement    Post-Op Diagnosis: Same       Procedure(s):  CYSTOSCOPY, BILATERAL STENT REMOVAL    Surgeon(s):  Sunny Liz MD    Assistant:   * No surgical staff found *    Anesthesia: General    Estimated Blood Loss (mL): Minimal    Complications: None    Specimens:   ID Type Source Tests Collected by Time Destination   1 : urine for reflex and culture  Urine Bladder URINALYSIS WITH REFLEX TO CULTURE Sunny Liz MD 8/2/2023 1228        Implants:  * No implants in log *      Drains:   [REMOVED] Urinary Catheter 07/07/23 2 Way (Removed)   Catheter Indications Perioperative use for selected surgical procedures 07/08/23 0400   Site Assessment No urethral drainage 07/08/23 0400   Urine Color Cherry 07/08/23 0400   Urine Appearance Clear 07/08/23 0400   Collection Container Standard 07/08/23 0400   Securement Method Securing device (Describe) 07/08/23 0400   Catheter Best Practices  Drainage tube clipped to bed;Catheter secured to thigh; Tamper seal intact; Bag below bladder;Bag not on floor; Lack of dependent loop in tubing;Drainage bag less than half full 07/08/23 0400   Status Patent;Draining 07/08/23 0400   Output (mL) 600 mL 07/08/23 3043       Findings: Indications: 76-year-old female patient was previously seen in consultation with bilateral hydronephrosis,  Narrowing contributing to hydronephrosis, no stones were identified at the time, the patient had a cystoscopy and bilateral stent placed    The patient is scheduled today for stent removal    Detailed Description of Procedure:   Patient was brought to the cystoscopy suite, positioned in dorsolithotomy, proper patient identification procedure identification prepping and draping    Examination of the introitus revealed no vaginal bleeding or discharge, proceeded with entering the bladder scope, examination of bladder revealed no tumors ulcers or stones, chronic cystitis changes associated with the presence of the stents. Under fluoroscopy the right stent was then grasped and removed without complications. Same process was carried out with a left stent without complications. The bladder was then surveyed, no tumors ulcers or stones, the ureteral orifices effluxing clear urine. Bladder was emptied the scope removed the patient returned to recovery in stable condition. Recommendations:  Follow-up the office in 3-month repeat CT imaging    Electronically signed by Seth Napier MD on 8/2/2023 at 12:45 PM

## 2023-08-02 NOTE — PROGRESS NOTES
Caregiver and daughter having heated yet civil discussion at bedside regarding pt long term care outside of hospital.

## 2023-09-26 ENCOUNTER — HOSPITAL ENCOUNTER (EMERGENCY)
Age: 73
Discharge: HOME | End: 2023-09-26
Attending: EMERGENCY MEDICINE
Payer: COMMERCIAL

## 2023-09-26 ENCOUNTER — APPOINTMENT (OUTPATIENT)
Dept: CT IMAGING | Age: 73
End: 2023-09-26
Payer: COMMERCIAL

## 2023-09-26 VITALS
HEIGHT: 65 IN | OXYGEN SATURATION: 98 % | RESPIRATION RATE: 18 BRPM | SYSTOLIC BLOOD PRESSURE: 125 MMHG | WEIGHT: 140 LBS | HEART RATE: 55 BPM | BODY MASS INDEX: 23.32 KG/M2 | TEMPERATURE: 98.3 F | DIASTOLIC BLOOD PRESSURE: 60 MMHG

## 2023-09-26 DIAGNOSIS — H54.7 VISION PROBLEMS: Primary | ICD-10-CM

## 2023-09-26 LAB
ALBUMIN SERPL-MCNC: 4.2 G/DL (ref 3.5–5.2)
ALP SERPL-CCNC: 95 U/L (ref 35–104)
ALT SERPL-CCNC: 15 U/L (ref 5–33)
ANION GAP SERPL CALCULATED.3IONS-SCNC: 8 MMOL/L (ref 9–17)
AST SERPL-CCNC: 18 U/L
BASOPHILS # BLD: 0.03 K/UL (ref 0–0.2)
BASOPHILS NFR BLD: 1 % (ref 0–2)
BILIRUB SERPL-MCNC: 0.3 MG/DL (ref 0.3–1.2)
BUN SERPL-MCNC: 28 MG/DL (ref 8–23)
BUN/CREAT SERPL: 25 (ref 9–20)
CALCIUM SERPL-MCNC: 9.3 MG/DL (ref 8.6–10.4)
CHLORIDE SERPL-SCNC: 102 MMOL/L (ref 98–107)
CO2 SERPL-SCNC: 28 MMOL/L (ref 20–31)
CREAT SERPL-MCNC: 1.1 MG/DL (ref 0.5–0.9)
EOSINOPHIL # BLD: 0.09 K/UL (ref 0–0.44)
EOSINOPHILS RELATIVE PERCENT: 2 % (ref 1–4)
ERYTHROCYTE [DISTWIDTH] IN BLOOD BY AUTOMATED COUNT: 12.7 % (ref 11.8–14.4)
GFR SERPL CREATININE-BSD FRML MDRD: 53 ML/MIN/1.73M2
GLUCOSE SERPL-MCNC: 104 MG/DL (ref 70–99)
HCT VFR BLD AUTO: 37.2 % (ref 36.3–47.1)
HGB BLD-MCNC: 11.9 G/DL (ref 11.9–15.1)
IMM GRANULOCYTES # BLD AUTO: 0.01 K/UL (ref 0–0.3)
IMM GRANULOCYTES NFR BLD: 0 %
LYMPHOCYTES NFR BLD: 1.41 K/UL (ref 1.1–3.7)
LYMPHOCYTES RELATIVE PERCENT: 30 % (ref 24–43)
MCH RBC QN AUTO: 31.9 PG (ref 25.2–33.5)
MCHC RBC AUTO-ENTMCNC: 32 G/DL (ref 28.4–34.8)
MCV RBC AUTO: 99.7 FL (ref 82.6–102.9)
MONOCYTES NFR BLD: 0.54 K/UL (ref 0.1–1.2)
MONOCYTES NFR BLD: 12 % (ref 3–12)
NEUTROPHILS NFR BLD: 55 % (ref 36–65)
NEUTS SEG NFR BLD: 2.58 K/UL (ref 1.5–8.1)
NRBC BLD-RTO: 0 PER 100 WBC
PLATELET # BLD AUTO: 209 K/UL (ref 138–453)
PMV BLD AUTO: 8.8 FL (ref 8.1–13.5)
POTASSIUM SERPL-SCNC: 3.9 MMOL/L (ref 3.7–5.3)
PROT SERPL-MCNC: 6.4 G/DL (ref 6.4–8.3)
RBC # BLD AUTO: 3.73 M/UL (ref 3.95–5.11)
SODIUM SERPL-SCNC: 138 MMOL/L (ref 135–144)
WBC OTHER # BLD: 4.7 K/UL (ref 3.5–11.3)

## 2023-09-26 PROCEDURE — 99284 EMERGENCY DEPT VISIT MOD MDM: CPT

## 2023-09-26 PROCEDURE — 80053 COMPREHEN METABOLIC PANEL: CPT

## 2023-09-26 PROCEDURE — 85025 COMPLETE CBC W/AUTO DIFF WBC: CPT

## 2023-09-26 PROCEDURE — 70450 CT HEAD/BRAIN W/O DYE: CPT

## 2023-09-26 NOTE — ED PROVIDER NOTES
EMERGENCY DEPARTMENT ENCOUNTER    Pt Name: Zina Ramos  MRN: [id number]  [address] 1950  Date of evaluation: 9/26/23  CHIEF COMPLAINT       Chief Complaint   Patient presents with    Blurred Vision     PT states she has been having blurred vision for a long time. HISTORY OF PRESENT ILLNESS   69-year-old female presents emergency room with complaint of blurred vision. Patient reports her eyes bother her because they are blurry. It is both eyes. It is unclear as to how long this has been going on. Patient cannot recall when she was last at an eye specialist.  All she can tell me as her eyes are blurry and her son was post to make her an appointment but did not. She wants to know if her primary care doctor can see her today in the emergency room as well. REVIEW OF SYSTEMS     Review of Systems   Unable to perform ROS: Other   Developmental delay    PASTMEDICAL HISTORY     Past Medical History:   Diagnosis Date    Arthritis     knees    Bilateral hydronephrosis 7/6/2023    Bronchitis x1 long ago    Chronic obstructive pulmonary disease ([address]) 9/12/2017    Colon polyps     Controlled type 2 diabetes mellitus without complication, without long-term current use of insulin ([address]) 6/29/2021    Dental neglect     poor dentation. Missing teeth. No loose teeth    Depression     Environmental allergies     GERD (gastroesophageal reflux disease)     Hyperlipidemia     Hypertension     Obesity     Onychomycosis     Poor historian     RBBB     Treadmill stress test negative for angina pectoris 2009    Wears glasses     reading only     Past Problem List  Patient Active Problem List   Diagnosis Code    GERD (gastroesophageal reflux disease) K21.9    Hypertension I10    Hyperlipidemia E78.5    Depression F32. A    Bronchitis J40    Primary hypertension I10    HLD (hyperlipidemia) E78.5    Osteoarthritis M19.90    Osteoarthritis of right knee M17.11    Closed nondisplaced fracture of head of right radius

## (undated) DEVICE — CATHETER FOL 2 W 16 FR URETH INDWL REG DOVER

## (undated) DEVICE — SOLUTION IRRIGATION STRL H2O 1000 ML UROMATIC CONTAINER

## (undated) DEVICE — MARKER,SKIN,WI/RULER AND LABELS: Brand: MEDLINE

## (undated) DEVICE — CONTAINER,SPECIMEN,OR STERILE,4OZ: Brand: MEDLINE

## (undated) DEVICE — RADIFOCUS GLIDEWIRE: Brand: GLIDEWIRE

## (undated) DEVICE — DRAINBAG,ANTI-REFLUX TOWER,L/F,2000ML,LL: Brand: MEDLINE

## (undated) DEVICE — WHISTLE TIP URETERAL CATHETER (RIGHT): Brand: COOK

## (undated) DEVICE — SOLUTION IRRIG 3000ML STRL H2O USP UROMATIC PLAS CONT

## (undated) DEVICE — GLOVE SURG SZ 7 CRM LTX FREE POLYISOPRENE POLYMER BEAD ANTI

## (undated) DEVICE — Device

## (undated) DEVICE — MASTISOL ADHESIVE LIQ 2/3ML

## (undated) DEVICE — STRIP,CLOSURE,WOUND,MEDI-STRIP,1/2X4: Brand: MEDLINE